# Patient Record
Sex: MALE | Race: WHITE | NOT HISPANIC OR LATINO | Employment: OTHER | URBAN - METROPOLITAN AREA
[De-identification: names, ages, dates, MRNs, and addresses within clinical notes are randomized per-mention and may not be internally consistent; named-entity substitution may affect disease eponyms.]

---

## 2024-06-05 ENCOUNTER — HOSPITAL ENCOUNTER (OUTPATIENT)
Facility: CLINIC | Age: 89
Discharge: NURSING, REHAB OR LONG-TERM CARE | End: 2024-10-02
Attending: HOSPITALIST

## 2024-06-05 ENCOUNTER — HOSPITAL ENCOUNTER (OUTPATIENT)
Facility: CLINIC | Age: 89
Setting detail: OBSERVATION
End: 2024-06-05
Admitting: HOSPITALIST
Payer: COMMERCIAL

## 2024-06-05 VITALS
SYSTOLIC BLOOD PRESSURE: 153 MMHG | TEMPERATURE: 97.9 F | DIASTOLIC BLOOD PRESSURE: 86 MMHG | HEART RATE: 56 BPM | OXYGEN SATURATION: 95 % | RESPIRATION RATE: 18 BRPM

## 2024-06-05 DIAGNOSIS — F03.90 DEMENTIA, UNSPECIFIED DEMENTIA SEVERITY, UNSPECIFIED DEMENTIA TYPE, UNSPECIFIED WHETHER BEHAVIORAL, PSYCHOTIC, OR MOOD DISTURBANCE OR ANXIETY (H): ICD-10-CM

## 2024-06-05 DIAGNOSIS — L21.0 DANDRUFF, ADULT: ICD-10-CM

## 2024-06-05 DIAGNOSIS — K59.01 SLOW TRANSIT CONSTIPATION: ICD-10-CM

## 2024-06-05 DIAGNOSIS — R52 PAIN: ICD-10-CM

## 2024-06-05 DIAGNOSIS — G47.00 INSOMNIA, UNSPECIFIED TYPE: ICD-10-CM

## 2024-06-05 DIAGNOSIS — F03.90 DEMENTIA, UNSPECIFIED DEMENTIA SEVERITY, UNSPECIFIED DEMENTIA TYPE, UNSPECIFIED WHETHER BEHAVIORAL, PSYCHOTIC, OR MOOD DISTURBANCE OR ANXIETY (H): Primary | ICD-10-CM

## 2024-06-05 DIAGNOSIS — R35.0 URINARY FREQUENCY: ICD-10-CM

## 2024-06-05 DIAGNOSIS — T74.91XD ELDER ABUSE, SUBSEQUENT ENCOUNTER: ICD-10-CM

## 2024-06-05 LAB
ALBUMIN UR-MCNC: 10 MG/DL
APPEARANCE UR: CLEAR
BILIRUB UR QL STRIP: NEGATIVE
COLOR UR AUTO: ABNORMAL
GLUCOSE BLDC GLUCOMTR-MCNC: 97 MG/DL (ref 70–99)
GLUCOSE UR STRIP-MCNC: NEGATIVE MG/DL
HGB UR QL STRIP: ABNORMAL
KETONES UR STRIP-MCNC: NEGATIVE MG/DL
LEUKOCYTE ESTERASE UR QL STRIP: NEGATIVE
MUCOUS THREADS #/AREA URNS LPF: PRESENT /LPF
NITRATE UR QL: NEGATIVE
PH UR STRIP: 5 [PH] (ref 5–7)
RBC URINE: 3 /HPF
SP GR UR STRIP: 1.02 (ref 1–1.03)
SQUAMOUS EPITHELIAL: <1 /HPF
UROBILINOGEN UR STRIP-MCNC: NORMAL MG/DL
WBC URINE: 3 /HPF

## 2024-06-05 PROCEDURE — 250N000011 HC RX IP 250 OP 636: Mod: JZ | Performed by: NURSE PRACTITIONER

## 2024-06-05 PROCEDURE — 250N000013 HC RX MED GY IP 250 OP 250 PS 637: Performed by: NURSE PRACTITIONER

## 2024-06-05 PROCEDURE — G0378 HOSPITAL OBSERVATION PER HR: HCPCS

## 2024-06-05 PROCEDURE — 96372 THER/PROPH/DIAG INJ SC/IM: CPT | Performed by: NURSE PRACTITIONER

## 2024-06-05 PROCEDURE — 81001 URINALYSIS AUTO W/SCOPE: CPT

## 2024-06-05 PROCEDURE — 99285 EMERGENCY DEPT VISIT HI MDM: CPT

## 2024-06-05 PROCEDURE — 99222 1ST HOSP IP/OBS MODERATE 55: CPT | Performed by: NURSE PRACTITIONER

## 2024-06-05 PROCEDURE — 82962 GLUCOSE BLOOD TEST: CPT

## 2024-06-05 PROCEDURE — 101N000002 HC CUSTODIAL CARE DAILY

## 2024-06-05 RX ORDER — ACETAMINOPHEN 325 MG/1
650 TABLET ORAL EVERY 4 HOURS PRN
Status: DISCONTINUED | OUTPATIENT
Start: 2024-06-05 | End: 2024-06-05

## 2024-06-05 RX ORDER — OLANZAPINE 10 MG/2ML
2.5 INJECTION, POWDER, FOR SOLUTION INTRAMUSCULAR EVERY 6 HOURS PRN
Status: DISCONTINUED | OUTPATIENT
Start: 2024-06-05 | End: 2024-08-24

## 2024-06-05 RX ORDER — CEPHALEXIN 250 MG/5ML
10 POWDER, FOR SUSPENSION ORAL 4 TIMES DAILY
Status: ON HOLD | COMMUNITY
Start: 2024-06-04 | End: 2024-10-02

## 2024-06-05 RX ORDER — ACETAMINOPHEN 325 MG/1
650 TABLET ORAL EVERY 4 HOURS PRN
Status: DISCONTINUED | OUTPATIENT
Start: 2024-06-05 | End: 2024-08-24

## 2024-06-05 RX ORDER — CEPHALEXIN 250 MG/5ML
500 POWDER, FOR SUSPENSION ORAL 4 TIMES DAILY
Status: DISCONTINUED | OUTPATIENT
Start: 2024-06-05 | End: 2024-06-11

## 2024-06-05 RX ORDER — OLANZAPINE 5 MG/1
10 TABLET, ORALLY DISINTEGRATING ORAL 2 TIMES DAILY PRN
Status: DISCONTINUED | OUTPATIENT
Start: 2024-06-05 | End: 2024-06-05

## 2024-06-05 RX ORDER — OLANZAPINE 10 MG/2ML
10 INJECTION, POWDER, FOR SOLUTION INTRAMUSCULAR 2 TIMES DAILY PRN
Status: DISCONTINUED | OUTPATIENT
Start: 2024-06-05 | End: 2024-06-05

## 2024-06-05 RX ORDER — HYDROXYZINE HYDROCHLORIDE 25 MG/1
50 TABLET, FILM COATED ORAL EVERY 6 HOURS PRN
Status: DISCONTINUED | OUTPATIENT
Start: 2024-06-05 | End: 2024-06-05

## 2024-06-05 RX ORDER — CALCIUM CARBONATE 500 MG/1
500 TABLET, CHEWABLE ORAL 4 TIMES DAILY PRN
Status: DISCONTINUED | OUTPATIENT
Start: 2024-06-05 | End: 2024-08-24

## 2024-06-05 RX ADMIN — QUETIAPINE FUMARATE 12.5 MG: 25 TABLET ORAL at 23:30

## 2024-06-05 RX ADMIN — CEPHALEXIN 500 MG: 250 FOR SUSPENSION ORAL at 20:44

## 2024-06-05 RX ADMIN — OLANZAPINE 10 MG: 10 INJECTION, POWDER, FOR SOLUTION INTRAMUSCULAR at 21:49

## 2024-06-05 ASSESSMENT — ACTIVITIES OF DAILY LIVING (ADL)
ADLS_ACUITY_SCORE: 35
ADLS_ACUITY_SCORE: 36
ADLS_ACUITY_SCORE: 33
ADLS_ACUITY_SCORE: 40
ADLS_ACUITY_SCORE: 35
ADLS_ACUITY_SCORE: 36
ADLS_ACUITY_SCORE: 40
ADLS_ACUITY_SCORE: 40
ADLS_ACUITY_SCORE: 35
ADLS_ACUITY_SCORE: 36
ADLS_ACUITY_SCORE: 35
ADLS_ACUITY_SCORE: 40

## 2024-06-05 ASSESSMENT — COLUMBIA-SUICIDE SEVERITY RATING SCALE - C-SSRS
1. IN THE PAST MONTH, HAVE YOU WISHED YOU WERE DEAD OR WISHED YOU COULD GO TO SLEEP AND NOT WAKE UP?: NO
6. HAVE YOU EVER DONE ANYTHING, STARTED TO DO ANYTHING, OR PREPARED TO DO ANYTHING TO END YOUR LIFE?: NO
2. HAVE YOU ACTUALLY HAD ANY THOUGHTS OF KILLING YOURSELF IN THE PAST MONTH?: NO
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
## 2024-06-05 NOTE — PLAN OF CARE
Received a message that family called with questions, left a VM for both contacts listed as called did not leave a message/name or call back number.
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
## 2024-06-05 NOTE — PHARMACY-ADMISSION MEDICATION HISTORY
Pharmacist Admission Medication History    Admission medication history is complete. The information provided in this note is only as accurate as the sources available at the time of the update.    Information Source(s): Family member and Hospital records via in-person    Pertinent Information: Daughter reports he takes no prescription medications except the new antibiotic prescribed yesterday.     Changes made to PTA medication list:  Added: Cephalexin suspension  Deleted: Losartan-hydrochlorothiazide 100-25mg, Klor-Con 20mEq, Psyllium packets BID  Changed: None    Allergies reviewed with patient and updates made in EHR: yes    Medication History Completed By: Lino Orozco RPH 6/5/2024 2:11 PM    PTA Med List   Medication Sig Last Dose    ascorbic acid (VITAMIN C) 1000 MG tablet [ASCORBIC ACID (VITAMIN C) 1000 MG TABLET] Take 1,000 mg by mouth 2 (two) times a day.  Past Week    cephALEXin (KEFLEX) 250 MG/5ML suspension Take 10 mLs by mouth 4 times daily 6/5/2024 at 0800    cholecalciferol, vitamin D3, 1,000 unit tablet [CHOLECALCIFEROL, VITAMIN D3, 1,000 UNIT TABLET] Take 2,000 Units by mouth 2 (two) times a day.  Past Week

## 2024-06-06 PROCEDURE — 96372 THER/PROPH/DIAG INJ SC/IM: CPT | Performed by: NURSE PRACTITIONER

## 2024-06-06 PROCEDURE — 250N000013 HC RX MED GY IP 250 OP 250 PS 637: Performed by: NURSE PRACTITIONER

## 2024-06-06 PROCEDURE — 99232 SBSQ HOSP IP/OBS MODERATE 35: CPT | Performed by: STUDENT IN AN ORGANIZED HEALTH CARE EDUCATION/TRAINING PROGRAM

## 2024-06-06 PROCEDURE — 101N000002 HC CUSTODIAL CARE DAILY

## 2024-06-06 PROCEDURE — 250N000011 HC RX IP 250 OP 636: Mod: JZ | Performed by: NURSE PRACTITIONER

## 2024-06-06 RX ADMIN — CEPHALEXIN 500 MG: 250 FOR SUSPENSION ORAL at 19:27

## 2024-06-06 RX ADMIN — CEPHALEXIN 500 MG: 250 FOR SUSPENSION ORAL at 14:07

## 2024-06-06 RX ADMIN — OLANZAPINE 2.5 MG: 10 INJECTION, POWDER, FOR SOLUTION INTRAMUSCULAR at 23:43

## 2024-06-06 RX ADMIN — CEPHALEXIN 500 MG: 250 FOR SUSPENSION ORAL at 15:34

## 2024-06-06 RX ADMIN — CEPHALEXIN 500 MG: 250 FOR SUSPENSION ORAL at 09:40

## 2024-06-06 ASSESSMENT — ACTIVITIES OF DAILY LIVING (ADL)
ADLS_ACUITY_SCORE: 40
ADLS_ACUITY_SCORE: 42
ADLS_ACUITY_SCORE: 40
ADLS_ACUITY_SCORE: 42
ADLS_ACUITY_SCORE: 40
ADLS_ACUITY_SCORE: 40
ADLS_ACUITY_SCORE: 42
ADLS_ACUITY_SCORE: 40
ADLS_ACUITY_SCORE: 42
ADLS_ACUITY_SCORE: 40

## 2024-06-06 NOTE — PHARMACY-ADMISSION MEDICATION HISTORY
Pharmacist Admission Medication History  Please see admission medication history completed earlier today prior to change in patient class to group home care.      Cassy AvinaD., BCPS       PTA Med List   Medication Sig Last Dose    ascorbic acid (VITAMIN C) 1000 MG tablet [ASCORBIC ACID (VITAMIN C) 1000 MG TABLET] Take 1,000 mg by mouth 2 (two) times a day.  Past Week    cephALEXin (KEFLEX) 250 MG/5ML suspension Take 10 mLs by mouth 4 times daily 6/5/2024 at 0800    cholecalciferol, vitamin D3, 1,000 unit tablet [CHOLECALCIFEROL, VITAMIN D3, 1,000 UNIT TABLET] Take 2,000 Units by mouth 2 (two) times a day.  Past Week

## 2024-06-06 NOTE — ED NOTES
Talked to daughter Franck that says pt needs to stay overnight, would not be able to take him. Will clarify with other sister she says to make sure that they are all on the same page, and that hopefully pt can have a bed upstairs.

## 2024-06-06 NOTE — PROGRESS NOTES
Cross Cover Note:  6/5/2024 11:18 PM     Received Olanzapine 10 mg IM 2149.  Still with agitation.  Will have Olanzapine 2.5 mg IM q6hrs PRN available (rescue) instead of 10mg BID PRN.  Will also have Quetiapine 12.5 mg PO q6hrs PRN agitation (preferred route).    Okay for kingter.    LANI Covarrubias CNP
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
## 2024-06-06 NOTE — PROGRESS NOTES
[facility]    Medicine Progress Note - Hospitalist Service    Date of Admission:  6/5/2024    Assessment & Plan   88-year-old male who was transitioned to long-term care on 6/5.  He has history of dementia and family is unable to provide care for him.  He was recently treated for right full cellulitis which has resolved.      group home care admission.  Social work consult.    Right foot cellulitis.  Continuing Keflex 4 times daily through 6/11.  There is some swelling but no redness.  Venous ultrasound negative for cellulitis.    Dementia.  Not on any medications.  As needed olanzapine ordered for agitation.          Diet: Combination Diet Regular Diet    DVT Prophylaxis: Pneumatic Compression Devices  Maddox Catheter: Not present  Lines: None     Cardiac Monitoring: None  Code Status: Full Code      Clinically Significant Risk Factors Present on Admission                    # Dementia: noted on problem list                      Disposition Plan     Medically Ready for Discharge: Anticipated in 5+ Days             Lito Skinner MD  Hospitalist Service  [facility]  Securely message with [employer] (more info)  Text page via [employer] Paging/Directory   ______________________________________________________________________    Interval History   Patient seen in the ER this morning.  Confused and does not make much sense.    Physical Exam   Vital Signs: Temp: 98  F (36.7  C) Temp src: Oral BP: (!) 159/81 Pulse: 51   Resp: 18 SpO2: 100 % O2 Device: None (Room air)    Weight: 0 lbs 0 oz    General Appearance: Sitter at bedside.  Appears comfortable but not making much sense.  Respiratory: Clear to auscultation.  Cardiovascular: S1-S2 normal.  GI: Soft and nontender.  Skin: No rash.  Other: Right lower extremity edema.    Medical Decision Making         Data         Imaging results reviewed over the past 24 hrs:   No results found for this or any previous visit (from the past 24  hour(s)).

## 2024-06-06 NOTE — ED NOTES
[facility]    ED Boarding Nurse Handoff Addendum Report:    Date/time: 6/6/2024, 6:43 AM    Activity Level: assist of 1    Fall Risk: Yes:  bed/chair alarm on, nonskid shoes/slippers when out of bed, arm band in place, patient and family education, assistive device/personal items within reach, activity supervised, and room door open    Active Infusions: None    Current Meds Due: None    Current care needs: Sitter, see orders    Oxygen requirements (liters/min and/or FiO2): none    Respiratory status: Room air    Vital signs (within last 30 minutes):    Vitals:    06/05/24 1733 06/05/24 1948 06/05/24 2220   BP: (!) 154/79 (!) 173/99 (!) 158/82   Pulse: 58 70    Resp: 16     Temp: 98.8  F (37.1  C) 97.4  F (36.3  C)    TempSrc: Oral Temporal    SpO2: 98% 95%        Focused assessment within last 30 minutes:  Alert to self only, confused, no non-verbal indications of pain observed, sitter at bedside      ED Boarding Nurse name: Leticia Byrne RN

## 2024-06-07 PROCEDURE — 99232 SBSQ HOSP IP/OBS MODERATE 35: CPT | Performed by: STUDENT IN AN ORGANIZED HEALTH CARE EDUCATION/TRAINING PROGRAM

## 2024-06-07 PROCEDURE — 101N000002 HC CUSTODIAL CARE DAILY

## 2024-06-07 PROCEDURE — 250N000013 HC RX MED GY IP 250 OP 250 PS 637: Performed by: NURSE PRACTITIONER

## 2024-06-07 RX ADMIN — CEPHALEXIN 500 MG: 250 FOR SUSPENSION ORAL at 21:27

## 2024-06-07 RX ADMIN — CEPHALEXIN 500 MG: 250 FOR SUSPENSION ORAL at 12:23

## 2024-06-07 RX ADMIN — CEPHALEXIN 500 MG: 250 FOR SUSPENSION ORAL at 08:49

## 2024-06-07 RX ADMIN — CEPHALEXIN 500 MG: 250 FOR SUSPENSION ORAL at 17:36

## 2024-06-07 ASSESSMENT — ACTIVITIES OF DAILY LIVING (ADL)
ADLS_ACUITY_SCORE: 50
ADLS_ACUITY_SCORE: 47
ADLS_ACUITY_SCORE: 42
ADLS_ACUITY_SCORE: 50
ADLS_ACUITY_SCORE: 51
ADLS_ACUITY_SCORE: 52
ADLS_ACUITY_SCORE: 42
ADLS_ACUITY_SCORE: 47
ADLS_ACUITY_SCORE: 51
ADLS_ACUITY_SCORE: 47
ADLS_ACUITY_SCORE: 42
ADLS_ACUITY_SCORE: 51
ADLS_ACUITY_SCORE: 42
ADLS_ACUITY_SCORE: 51
ADLS_ACUITY_SCORE: 52
ADLS_ACUITY_SCORE: 42
ADLS_ACUITY_SCORE: 50
ADLS_ACUITY_SCORE: 51
ADLS_ACUITY_SCORE: 50
ADLS_ACUITY_SCORE: 51
ADLS_ACUITY_SCORE: 42

## 2024-06-07 NOTE — PROGRESS NOTES
Care Management Medical [facility]f Outpatient Consult:    Care management consulted by ED provider for nursing home assessment.  CM spoke with provider to discuss nursing home case. Provider has confirmed patient is medically stable for discharge.    Background information (reason for presentation to ED): Placement    Provider recommended discharge disposition:  LTC    Resources needed for discharge: MA    Estimated timeline for needed resources: >24 hrs (new LTC once MA is pending)    Barriers to discharge: MA, LTC that accept's patients with Dementia and MA pending    CM met/spoke with patient/family at bedside/via phone.  Discussed patient is medically stable for discharge from ED & medical insurance will likely not cover the hospital stay.  Discussed recommended discharge disposition and resources needed, included noted barriers.  Provided the following resources:       Discussed above with ED provider.      Next steps:   Anticipate patient will discharge to LTC when MA is pending.    Anticipate patient will transition to nursing home care due to needing placement and limited funds.       Richy spoke to Severa with FC who said that they have tried to contact both of the pt's dtrs to discuss a MA application, but was unable to get in touch with them.  FC will continue to work on contacting the pt's family to get a MA application completed to proceed forward with the pt's discharge.    KARLA William, [facility]  Inpatient Care Coordination  [facility]  [phone]

## 2024-06-07 NOTE — PROGRESS NOTES
[facility]    Medicine Progress Note - Hospitalist Service    Date of Admission:  6/5/2024    Assessment & Plan   88-year-old male who was transitioned to [facility] care on 6/5.  He has history of dementia and family is unable to provide care for him.  He was recently treated for right full cellulitis which has resolved.      retirement care admission.  Social work consult.    Right foot cellulitis.  Continuing Keflex 4 times daily through 6/11.  There is some swelling but no redness.  Venous ultrasound negative for cellulitis.    Dementia.  Not on any medications.  As needed olanzapine ordered for agitation.          Diet: Combination Diet Regular Diet    DVT Prophylaxis: Pneumatic Compression Devices  Maddox Catheter: Not present  Lines: None     Cardiac Monitoring: None  Code Status: Full Code      Clinically Significant Risk Factors Present on Admission                    # Dementia: noted on problem list                      Disposition Plan     Medically Ready for Discharge: Anticipated in 5+ Days             Lito Skinner MD  Hospitalist Service  [facility]  Securely message with [employer] (more info)  Text page via [employer] Paging/Directory   ______________________________________________________________________    Interval History   Patient seen in the ER this morning and discussed with the sitter.  Patient is now sleeping comfortably in the bed.  He was awake this morning and was confused.    Physical Exam   Vital Signs: Temp: 97.7  F (36.5  C) Temp src: Axillary BP: (!) 153/82 Pulse: 53   Resp: 22 SpO2: 96 % O2 Device: None (Room air)    Weight: 0 lbs 0 oz    General Appearance: Sitter at bedside.  Sleeping comfortably in the chair     Medical Decision Making         Data         Imaging results reviewed over the past 24 hrs:   No results found for this or any previous visit (from the past 24 hour(s)).

## 2024-06-07 NOTE — PROGRESS NOTES
[facility]    ED Boarding Nurse Handoff Addendum Report:    Date/time: 6/7/2024, 4:03 AM    Activity Level: assist of 1    Fall Risk: Yes:  bed/chair alarm on, nonskid shoes/slippers when out of bed, patient and family education, assistive device/personal items within reach, activity supervised, room door open, and sitter at bedside    Active Infusions: None    Current Meds Due: See MAR    Current care needs: Monitor for agitation    Oxygen requirements (liters/min and/or FiO2): None    Respiratory status: Room air    Vital signs (within last 30 minutes):    Vitals:    06/05/24 2220 06/06/24 0645 06/06/24 1230 06/06/24 2234   BP: (!) 158/82 (!) 155/63 (!) 159/81 133/67   BP Location:   Right arm Right arm   Pulse:   51 53   Resp:   18 18   Temp:   98  F (36.7  C) 97.7  F (36.5  C)   TempSrc:   Oral Axillary   SpO2:   100% 97%       Focused assessment within last 30 minutes:    Assumed care 2617-2250. Disoriented x4. Ax1 when OOB. No IV access. External catheter in place draining yellow colored urine accurately. On a regular diet. On RA. Given PRN Zyprexa x1 for agitation overnight. Sitter at bedside. Plan of care ongoing.     ED Boarding Nurse name: Arabella Yates RN

## 2024-06-07 NOTE — PROGRESS NOTES
[facility]    ED Boarding Nurse Handoff Addendum Report:    Date/time: 6/7/2024, 5:12 PM    Activity Level:  heavy AX2, unable to bear own weight at times    Fall Risk: Yes:  bed/chair alarm on, nonskid shoes/slippers when out of bed, arm band in place, patient and family education, assistive device/personal items within reach, activity supervised, mobility aid in reach, room door open, and toileting schedule implemented    Active Infusions: none    Current Meds Due: review MAR    Current care needs: continue plan of care    Oxygen requirements (liters/min and/or FiO2):     Respiratory status: Room air    Vital signs (within last 30 minutes):  /65 (BP Location: Right arm)   Pulse 55   Temp 98.3  F (36.8  C) (Oral)   Resp 18   SpO2 98%       Focused assessment within last 30 minutes:    Dementia, disoriented x4. Garbled spontaneous speech. VSS on RA. No behaviors this shift. Regular diet, good urine output (purewick). No bm this shift.   ED Boarding Nurse name: Urvashi Salmeron RN    RECEIVING UNIT ED HANDOFF REVIEW    Above ED Nurse Handoff Report was reviewed: Yes  Reviewed by: Sergey Sandoval RN on June 7, 2024 at 5:17 PM   I Samantha called the ED to inform them the note was read: Yes

## 2024-06-08 PROCEDURE — 250N000013 HC RX MED GY IP 250 OP 250 PS 637: Performed by: NURSE PRACTITIONER

## 2024-06-08 PROCEDURE — 99232 SBSQ HOSP IP/OBS MODERATE 35: CPT | Performed by: STUDENT IN AN ORGANIZED HEALTH CARE EDUCATION/TRAINING PROGRAM

## 2024-06-08 PROCEDURE — 101N000002 HC CUSTODIAL CARE DAILY

## 2024-06-08 RX ADMIN — CEPHALEXIN 500 MG: 250 FOR SUSPENSION ORAL at 21:25

## 2024-06-08 RX ADMIN — CEPHALEXIN 500 MG: 250 FOR SUSPENSION ORAL at 12:58

## 2024-06-08 RX ADMIN — CEPHALEXIN 500 MG: 250 FOR SUSPENSION ORAL at 08:36

## 2024-06-08 RX ADMIN — CEPHALEXIN 500 MG: 250 FOR SUSPENSION ORAL at 16:55

## 2024-06-08 ASSESSMENT — ACTIVITIES OF DAILY LIVING (ADL)
ADLS_ACUITY_SCORE: 47
ADLS_ACUITY_SCORE: 47
ADLS_ACUITY_SCORE: 52
ADLS_ACUITY_SCORE: 52
ADLS_ACUITY_SCORE: 49
ADLS_ACUITY_SCORE: 47
ADLS_ACUITY_SCORE: 52
ADLS_ACUITY_SCORE: 47
ADLS_ACUITY_SCORE: 52
ADLS_ACUITY_SCORE: 48
ADLS_ACUITY_SCORE: 49
ADLS_ACUITY_SCORE: 52
ADLS_ACUITY_SCORE: 49
ADLS_ACUITY_SCORE: 49
ADLS_ACUITY_SCORE: 52
ADLS_ACUITY_SCORE: 49
ADLS_ACUITY_SCORE: 52

## 2024-06-08 NOTE — PLAN OF CARE
"15:02  RN shift summary 7-3: No c/o pain this shift.  Alert.  Oriented to self.  [place]. Pocket talker in room if needed. Up to chair with assist of 2 gait belt and walker.  Lungs clear.  96% RA.  Regular diet.  Assisted with feeding.  Voiding without difficulty.  Uses urinal. No PIV.  Continue po keflex for resolving right foot cellulitis.  Here for half-way care.  Awaiting LTC / care facility placement.         Goal Outcome Evaluation:      Plan of Care Reviewed With: patient          Outcome Evaluation: Pt alert and oriented to self; cooperative with cares; no longer has a sitter.      Problem: Adult Inpatient Plan of Care  Goal: Plan of Care Review  Description: The Plan of Care Review/Shift note should be completed every shift.  The Outcome Evaluation is a brief statement about your assessment that the patient is improving, declining, or no change.  This information will be displayed automatically on your shift  note.  Outcome: Progressing  Flowsheets (Taken 6/8/2024 1356)  Outcome Evaluation:   Pt alert and oriented to self   cooperative with cares   no longer has a sitter.  Plan of Care Reviewed With: patient  Goal: Patient-Specific Goal (Individualized)  Description: You can add care plan individualizations to a care plan. Examples of Individualization might be:  \"Parent requests to be called daily at 9am for status\", \"I have a hard time hearing out of my right ear\", or \"Do not touch me to wake me up as it startles  me\".  Outcome: Progressing  Goal: Absence of Hospital-Acquired Illness or Injury  Outcome: Progressing  Intervention: Identify and Manage Fall Risk  Recent Flowsheet Documentation  Taken 6/8/2024 0840 by Fariha Phillips, RN  Safety Promotion/Fall Prevention:   activity supervised   clutter free environment maintained   nonskid shoes/slippers when out of bed   room near nurse's station   supervised activity  Intervention: Prevent Skin Injury  Recent Flowsheet Documentation  Taken 6/8/2024 0840 by " Fariha Phillips RN  Body Position: supine, head elevated  Goal: Optimal Comfort and Wellbeing  Outcome: Progressing  Goal: Readiness for Transition of Care  Outcome: Progressing     Problem: Delirium  Goal: Optimal Coping  Outcome: Progressing  Goal: Improved Behavioral Control  Outcome: Progressing  Goal: Improved Attention and Thought Clarity  Outcome: Progressing  Goal: Improved Sleep  Outcome: Progressing     Problem: Dementia Signs/Symptoms  Goal: Improved Behavioral Control (Dementia Signs/Symptoms)  Outcome: Progressing  Goal: Improved Mood Symptoms (Dementia Signs/Symptoms)  Outcome: Progressing  Goal: Optimized Cognitive Function (Dementia Signs/Symptoms)  Outcome: Progressing  Goal: Optimized Oral Intake (Dementia Signs/Symptoms)  Outcome: Progressing  Goal: Improved Sleep (Dementia Signs/Symptoms)  Outcome: Progressing  Goal: Enhanced Social or Functional Skills and Ability (Dementia Signs/Symptoms)  Outcome: Progressing

## 2024-06-08 NOTE — PLAN OF CARE
"Pt detention outpatient status, cooperative with cares. Communication remains challenging, Pt receptive and willing while not fully able to follow commands. Expressive aphasia, speech garbled and illogical. Continent of bladder with urinal use at bedside, Pt prompted own voiding. Was not compliant with external cath, confused with device/process. PRN seroquel not required.      Goal Outcome Evaluation:      Plan of Care Reviewed With: patient    Overall Patient Progress: no changeOverall Patient Progress: no change    Outcome Evaluation: Pt confused, expressive aphasia, calm, and cooperative. Sitter at bedside and PRN seroquil not administered.      Problem: Adult Inpatient Plan of Care  Goal: Plan of Care Review  Description: The Plan of Care Review/Shift note should be completed every shift.  The Outcome Evaluation is a brief statement about your assessment that the patient is improving, declining, or no change.  This information will be displayed automatically on your shift  note.  Outcome: Progressing  Flowsheets (Taken 6/8/2024 0012)  Outcome Evaluation: Pt confused, expressive aphasia, calm, and cooperative. Sitter at bedside and PRN seroquil not administered.  Plan of Care Reviewed With: patient  Overall Patient Progress: no change  Goal: Patient-Specific Goal (Individualized)  Description: You can add care plan individualizations to a care plan. Examples of Individualization might be:  \"Parent requests to be called daily at 9am for status\", \"I have a hard time hearing out of my right ear\", or \"Do not touch me to wake me up as it startles  me\".  Outcome: Progressing  Goal: Absence of Hospital-Acquired Illness or Injury  Outcome: Progressing  Intervention: Identify and Manage Fall Risk  Recent Flowsheet Documentation  Taken 6/7/2024 2015 by Sergey Snadoval, RN  Safety Promotion/Fall Prevention:   supervised activity   safety round/check completed   room organization consistent   bedside attendant  Goal: Optimal " Comfort and Wellbeing  Outcome: Progressing  Goal: Readiness for Transition of Care  Outcome: Progressing  Intervention: Mutually Develop Transition Plan  Recent Flowsheet Documentation  Taken 6/7/2024 1700 by Sergey Sandoval, RN  Equipment Currently Used at Home: walker, standard     Problem: Delirium  Goal: Optimal Coping  Outcome: Progressing  Goal: Improved Behavioral Control  Outcome: Progressing  Intervention: Minimize Safety Risk  Recent Flowsheet Documentation  Taken 6/7/2024 2015 by Sergey Sandoval, RN  Enhanced Safety Measures:  at bedside  Goal: Improved Attention and Thought Clarity  Outcome: Progressing  Goal: Improved Sleep  Outcome: Progressing     Problem: Dementia Signs/Symptoms  Goal: Improved Behavioral Control (Dementia Signs/Symptoms)  Outcome: Progressing  Goal: Improved Mood Symptoms (Dementia Signs/Symptoms)  Outcome: Progressing  Goal: Optimized Cognitive Function (Dementia Signs/Symptoms)  Outcome: Progressing  Goal: Optimized Oral Intake (Dementia Signs/Symptoms)  Outcome: Progressing  Goal: Improved Sleep (Dementia Signs/Symptoms)  Outcome: Progressing  Goal: Enhanced Social or Functional Skills and Ability (Dementia Signs/Symptoms)  Outcome: Progressing

## 2024-06-08 NOTE — PROGRESS NOTES
[facility]    Medicine Progress Note - Hospitalist Service    Date of Admission:  6/5/2024    Assessment & Plan   88-year-old male who was transitioned to longterm care on 6/5.  He has history of dementia and family is unable to provide care for him.  He was recently treated for right full cellulitis which has resolved.      intermediate care admission.  Social work consult.    Right foot cellulitis.  Continuing Keflex 4 times daily through 6/11.  Swelling and redness resolved.    Venous ultrasound negative for DVT.    Dementia.  Not on any medications.  As needed olanzapine ordered for agitation.          Diet: Combination Diet Regular Diet  Room Service    DVT Prophylaxis: Pneumatic Compression Devices  Maddox Catheter: Not present  Lines: None     Cardiac Monitoring: None  Code Status: Full Code      Clinically Significant Risk Factors Present on Admission                    # Dementia: noted on problem list                      Disposition Plan     Medically Ready for Discharge: Anticipated in 5+ Days             Lito Skinner MD  Hospitalist Service  [facility]  Securely message with [employer] (more info)  Text page via [employer] Paging/Directory   ______________________________________________________________________    Interval History   Patient seen in his room today.  Confused but appears comfortable.  Physical Exam   Vital Signs: Temp: 98.5  F (36.9  C) Temp src: Oral BP: (!) 157/71 Pulse: 55   Resp: 18 SpO2: 96 % O2 Device: None (Room air)    Weight: 0 lbs 0 oz    General Appearance: Appears pleasantly confused.  Has known left periorbital bruising.    Medical Decision Making         Data         Imaging results reviewed over the past 24 hrs:   No results found for this or any previous visit (from the past 24 hour(s)).

## 2024-06-09 PROCEDURE — 99232 SBSQ HOSP IP/OBS MODERATE 35: CPT | Performed by: STUDENT IN AN ORGANIZED HEALTH CARE EDUCATION/TRAINING PROGRAM

## 2024-06-09 PROCEDURE — 250N000013 HC RX MED GY IP 250 OP 250 PS 637: Performed by: NURSE PRACTITIONER

## 2024-06-09 PROCEDURE — 101N000002 HC CUSTODIAL CARE DAILY

## 2024-06-09 RX ADMIN — CEPHALEXIN 500 MG: 250 FOR SUSPENSION ORAL at 19:33

## 2024-06-09 RX ADMIN — CEPHALEXIN 500 MG: 250 FOR SUSPENSION ORAL at 08:35

## 2024-06-09 RX ADMIN — QUETIAPINE FUMARATE 12.5 MG: 25 TABLET ORAL at 03:46

## 2024-06-09 RX ADMIN — CEPHALEXIN 500 MG: 250 FOR SUSPENSION ORAL at 13:14

## 2024-06-09 RX ADMIN — CEPHALEXIN 500 MG: 250 FOR SUSPENSION ORAL at 16:00

## 2024-06-09 ASSESSMENT — ACTIVITIES OF DAILY LIVING (ADL)
ADLS_ACUITY_SCORE: 49
ADLS_ACUITY_SCORE: 52
ADLS_ACUITY_SCORE: 52
ADLS_ACUITY_SCORE: 53
ADLS_ACUITY_SCORE: 49
ADLS_ACUITY_SCORE: 53
ADLS_ACUITY_SCORE: 52
ADLS_ACUITY_SCORE: 52
ADLS_ACUITY_SCORE: 53
ADLS_ACUITY_SCORE: 52
ADLS_ACUITY_SCORE: 53
ADLS_ACUITY_SCORE: 53
ADLS_ACUITY_SCORE: 49
ADLS_ACUITY_SCORE: 49
ADLS_ACUITY_SCORE: 52
ADLS_ACUITY_SCORE: 52
ADLS_ACUITY_SCORE: 53
ADLS_ACUITY_SCORE: 52
ADLS_ACUITY_SCORE: 49
ADLS_ACUITY_SCORE: 52
ADLS_ACUITY_SCORE: 49
ADLS_ACUITY_SCORE: 52
ADLS_ACUITY_SCORE: 52

## 2024-06-09 NOTE — PLAN OF CARE
"Pt calm and cooperative with cares. AO to self, follows commands with reinforcement and repetition, expressive aphasia upon reply. Continent of bladder and bowel and will occasionally request assistance. Prompting urinal use at bedside effective for voiding. Good appetite and food intake. Hydration intake with prompting. Family requested Pt's daughters remain the only designated visitors.    Goal Outcome Evaluation:      Plan of Care Reviewed With: patient, family    Overall Patient Progress: no changeOverall Patient Progress: no change    Outcome Evaluation: Pt remains on prison cares. AO to self, A1-2 with gb and walker. Continent with occasional prompting.      Problem: Adult Inpatient Plan of Care  Goal: Plan of Care Review  Description: The Plan of Care Review/Shift note should be completed every shift.  The Outcome Evaluation is a brief statement about your assessment that the patient is improving, declining, or no change.  This information will be displayed automatically on your shift  note.  Outcome: Progressing  Flowsheets (Taken 6/8/2024 6810)  Outcome Evaluation: Pt remains on prison cares. AO to self, A1-2 with gb and walker. Continent with occasional prompting.  Plan of Care Reviewed With:   patient   family  Overall Patient Progress: no change  Goal: Patient-Specific Goal (Individualized)  Description: You can add care plan individualizations to a care plan. Examples of Individualization might be:  \"Parent requests to be called daily at 9am for status\", \"I have a hard time hearing out of my right ear\", or \"Do not touch me to wake me up as it startles  me\".  Outcome: Progressing  Goal: Absence of Hospital-Acquired Illness or Injury  Outcome: Progressing  Intervention: Identify and Manage Fall Risk  Recent Flowsheet Documentation  Taken 6/8/2024 5538 by Sergey Sandoval, RN  Safety Promotion/Fall Prevention:   activity supervised   mobility aid in reach   room organization consistent   safety " round/check completed  Intervention: Prevent Skin Injury  Recent Flowsheet Documentation  Taken 6/8/2024 1655 by Sergey Sandoval, RN  Body Position:   supine, head elevated   supine, legs elevated  Intervention: Prevent Infection  Recent Flowsheet Documentation  Taken 6/8/2024 1655 by Sergey Sandoval, RN  Infection Prevention:   environmental surveillance performed   hand hygiene promoted  Goal: Optimal Comfort and Wellbeing  Outcome: Progressing  Goal: Readiness for Transition of Care  Outcome: Progressing     Problem: Delirium  Goal: Optimal Coping  Outcome: Progressing  Goal: Improved Behavioral Control  Outcome: Progressing  Goal: Improved Attention and Thought Clarity  Outcome: Progressing  Goal: Improved Sleep  Outcome: Progressing     Problem: Dementia Signs/Symptoms  Goal: Improved Behavioral Control (Dementia Signs/Symptoms)  Outcome: Progressing  Goal: Improved Mood Symptoms (Dementia Signs/Symptoms)  Outcome: Progressing  Goal: Optimized Cognitive Function (Dementia Signs/Symptoms)  Outcome: Progressing  Goal: Optimized Oral Intake (Dementia Signs/Symptoms)  Outcome: Progressing  Goal: Improved Sleep (Dementia Signs/Symptoms)  Outcome: Progressing  Goal: Enhanced Social or Functional Skills and Ability (Dementia Signs/Symptoms)  Outcome: Progressing

## 2024-06-09 NOTE — PROGRESS NOTES
[facility] [place]    Medicine Progress Note - Hospitalist Service    Date of Admission:  6/5/2024    Assessment & Plan   88-year-old male who was transitioned to nursing home care on 6/5.  He has history of dementia and family is unable to provide care for him.  He was recently treated for right full cellulitis which has resolved.      nursing home care admission.  Social work consult.    Right foot cellulitis.  Continuing Keflex 4 times daily through 6/11.  Swelling and redness resolved.    Venous ultrasound negative for DVT.    Dementia.  Not on any medications.  As needed olanzapine ordered for agitation.          Diet: Combination Diet Regular Diet  Room Service    DVT Prophylaxis: Pneumatic Compression Devices  Maddox Catheter: Not present  Lines: None     Cardiac Monitoring: None  Code Status: Full Code      Clinically Significant Risk Factors Present on Admission                    # Dementia: noted on problem list                      Disposition Plan     Medically Ready for Discharge: Anticipated in 5+ Days             Lito Skinner MD  Hospitalist Service  [facility] [place]  Securely message with [employer] (more info)  Text page via [employer] Paging/Directory   ______________________________________________________________________    Interval History   Patient seen in his room today.  Sleeping comfortably..  Physical Exam   Vital Signs: Temp: 97.9  F (36.6  C) Temp src: Axillary BP: (!) 168/82 Pulse: 63   Resp: 18 SpO2: 98 % O2 Device: None (Room air)    Weight: 0 lbs 0 oz    General Appearance: Sleeping comfortably.  Medical Decision Making         Data         Imaging results reviewed over the past 24 hrs:   No results found for this or any previous visit (from the past 24 hour(s)).

## 2024-06-09 NOTE — PLAN OF CARE
"Goal Outcome Evaluation:      Plan of Care Reviewed With: patient    Overall Patient Progress: no changeOverall Patient Progress: no change    Outcome Evaluation: Zyprexa given for confusion overnight      Problem: Adult Inpatient Plan of Care  Goal: Plan of Care Review  Description: The Plan of Care Review/Shift note should be completed every shift.  The Outcome Evaluation is a brief statement about your assessment that the patient is improving, declining, or no change.  This information will be displayed automatically on your shift  note.  Outcome: Progressing  Flowsheets (Taken 6/9/2024 4036)  Outcome Evaluation: Zyprexa given for confusion overnight  Plan of Care Reviewed With: patient  Overall Patient Progress: no change  Goal: Patient-Specific Goal (Individualized)  Description: You can add care plan individualizations to a care plan. Examples of Individualization might be:  \"Parent requests to be called daily at 9am for status\", \"I have a hard time hearing out of my right ear\", or \"Do not touch me to wake me up as it startles  me\".  Outcome: Progressing  Goal: Absence of Hospital-Acquired Illness or Injury  Outcome: Progressing  Intervention: Identify and Manage Fall Risk  Recent Flowsheet Documentation  Taken 6/8/2024 6702 by Jarett Meyer RN  Safety Promotion/Fall Prevention:   activity supervised   assistive device/personal items within reach   clutter free environment maintained   nonskid shoes/slippers when out of bed   room near nurse's station   safety round/check completed   supervised activity  Goal: Optimal Comfort and Wellbeing  Outcome: Progressing  Goal: Readiness for Transition of Care  Outcome: Progressing     Problem: Delirium  Goal: Optimal Coping  Outcome: Progressing  Goal: Improved Behavioral Control  Outcome: Progressing  Goal: Improved Attention and Thought Clarity  Outcome: Progressing  Goal: Improved Sleep  Outcome: Progressing     Problem: Dementia Signs/Symptoms  Goal: Improved " Behavioral Control (Dementia Signs/Symptoms)  Outcome: Progressing  Goal: Improved Mood Symptoms (Dementia Signs/Symptoms)  Outcome: Progressing  Goal: Optimized Cognitive Function (Dementia Signs/Symptoms)  Outcome: Progressing  Goal: Optimized Oral Intake (Dementia Signs/Symptoms)  Outcome: Progressing  Goal: Improved Sleep (Dementia Signs/Symptoms)  Outcome: Progressing  Goal: Enhanced Social or Functional Skills and Ability (Dementia Signs/Symptoms)  Outcome: Progressing

## 2024-06-09 NOTE — PLAN OF CARE
"12:28 RN Shift Summary 7-3: RN shift: No c/o pain this shift.  Alert.  Oriented to self.  [place]. Pocket talker in room if needed. Up to chair with assist of 2 gait belt and walker.  Lungs clear.  98% RA.  Regular diet.  Assisted with feeding.  Voiding without difficulty.  Uses urinal. Incont at times.  No PIV.  Continue po keflex for resolving right foot cellulitis.  Here for [facility] care.  Awaiting LTC / care facility placement.           Goal Outcome Evaluation:      Plan of Care Reviewed With: patient    Overall Patient Progress: no changeOverall Patient Progress: no change    Outcome Evaluation: Up in chair this morning; good appetite; cooperative with cares.      Problem: Adult Inpatient Plan of Care  Goal: Plan of Care Review  Description: The Plan of Care Review/Shift note should be completed every shift.  The Outcome Evaluation is a brief statement about your assessment that the patient is improving, declining, or no change.  This information will be displayed automatically on your shift  note.  Outcome: Progressing  Flowsheets (Taken 6/9/2024 1202)  Outcome Evaluation:   Up in chair this morning   good appetite   cooperative with cares.  Plan of Care Reviewed With: patient  Overall Patient Progress: no change  Goal: Patient-Specific Goal (Individualized)  Description: You can add care plan individualizations to a care plan. Examples of Individualization might be:  \"Parent requests to be called daily at 9am for status\", \"I have a hard time hearing out of my right ear\", or \"Do not touch me to wake me up as it startles  me\".  Outcome: Progressing  Goal: Absence of Hospital-Acquired Illness or Injury  Outcome: Progressing  Intervention: Identify and Manage Fall Risk  Recent Flowsheet Documentation  Taken 6/9/2024 0814 by Fariha Phillips, RN  Safety Promotion/Fall Prevention:   activity supervised   clutter free environment maintained   nonskid shoes/slippers when out of bed   room near nurse's station   " supervised activity  Goal: Optimal Comfort and Wellbeing  Outcome: Progressing  Goal: Readiness for Transition of Care  Outcome: Progressing     Problem: Delirium  Goal: Optimal Coping  Outcome: Progressing  Intervention: Optimize Psychosocial Adjustment to Delirium  Recent Flowsheet Documentation  Taken 6/9/2024 0835 by Fariha Phillips RN  Supportive Measures: active listening utilized  Goal: Improved Behavioral Control  Outcome: Progressing  Intervention: Minimize Safety Risk  Recent Flowsheet Documentation  Taken 6/9/2024 0835 by Fariha Phillips RN  Enhanced Safety Measures: room near unit station  Goal: Improved Attention and Thought Clarity  Outcome: Progressing  Goal: Improved Sleep  Outcome: Progressing     Problem: Dementia Signs/Symptoms  Goal: Improved Behavioral Control (Dementia Signs/Symptoms)  Outcome: Progressing  Intervention: Manage Behavior  Recent Flowsheet Documentation  Taken 6/9/2024 0835 by Fariha Phillips RN  Environmental Support: calm environment promoted  Goal: Improved Mood Symptoms (Dementia Signs/Symptoms)  Outcome: Progressing  Intervention: Optimize Emotion and Mood  Recent Flowsheet Documentation  Taken 6/9/2024 0835 by Fariha Phillips RN  Supportive Measures: active listening utilized  Goal: Optimized Cognitive Function (Dementia Signs/Symptoms)  Outcome: Progressing  Goal: Optimized Oral Intake (Dementia Signs/Symptoms)  Outcome: Progressing  Goal: Improved Sleep (Dementia Signs/Symptoms)  Outcome: Progressing  Goal: Enhanced Social or Functional Skills and Ability (Dementia Signs/Symptoms)  Outcome: Progressing

## 2024-06-10 PROCEDURE — 101N000002 HC CUSTODIAL CARE DAILY

## 2024-06-10 PROCEDURE — 99232 SBSQ HOSP IP/OBS MODERATE 35: CPT | Performed by: STUDENT IN AN ORGANIZED HEALTH CARE EDUCATION/TRAINING PROGRAM

## 2024-06-10 PROCEDURE — 250N000013 HC RX MED GY IP 250 OP 250 PS 637: Performed by: NURSE PRACTITIONER

## 2024-06-10 RX ADMIN — CEPHALEXIN 500 MG: 250 FOR SUSPENSION ORAL at 16:19

## 2024-06-10 RX ADMIN — CEPHALEXIN 500 MG: 250 FOR SUSPENSION ORAL at 07:53

## 2024-06-10 RX ADMIN — CEPHALEXIN 500 MG: 250 FOR SUSPENSION ORAL at 13:36

## 2024-06-10 RX ADMIN — CEPHALEXIN 500 MG: 250 FOR SUSPENSION ORAL at 20:20

## 2024-06-10 ASSESSMENT — ACTIVITIES OF DAILY LIVING (ADL)
ADLS_ACUITY_SCORE: 52
ADLS_ACUITY_SCORE: 56
ADLS_ACUITY_SCORE: 56
ADLS_ACUITY_SCORE: 51
ADLS_ACUITY_SCORE: 56
ADLS_ACUITY_SCORE: 55
ADLS_ACUITY_SCORE: 56
ADLS_ACUITY_SCORE: 52
ADLS_ACUITY_SCORE: 55
ADLS_ACUITY_SCORE: 52
ADLS_ACUITY_SCORE: 52
ADLS_ACUITY_SCORE: 56
ADLS_ACUITY_SCORE: 56
ADLS_ACUITY_SCORE: 52
ADLS_ACUITY_SCORE: 55
ADLS_ACUITY_SCORE: 56
ADLS_ACUITY_SCORE: 52
ADLS_ACUITY_SCORE: 51
ADLS_ACUITY_SCORE: 56
ADLS_ACUITY_SCORE: 51
ADLS_ACUITY_SCORE: 51

## 2024-06-10 NOTE — PLAN OF CARE
"Nursing staff and support encouraged pt to get some sleep at 0200. Changed brief x 1. A2 with GB + walker.     Goal Outcome Evaluation:      Plan of Care Reviewed With: patient    Overall Patient Progress: improvingOverall Patient Progress: improving    Outcome Evaluation: Slept well, did not have to give any meds for confusion overnight.      Problem: Adult Inpatient Plan of Care  Goal: Plan of Care Review  Description: The Plan of Care Review/Shift note should be completed every shift.  The Outcome Evaluation is a brief statement about your assessment that the patient is improving, declining, or no change.  This information will be displayed automatically on your shift  note.  Outcome: Progressing  Flowsheets (Taken 6/10/2024 0609)  Outcome Evaluation: Slept well, did not have to give any meds for confusion overnight.  Plan of Care Reviewed With: patient  Overall Patient Progress: improving  Goal: Patient-Specific Goal (Individualized)  Description: You can add care plan individualizations to a care plan. Examples of Individualization might be:  \"Parent requests to be called daily at 9am for status\", \"I have a hard time hearing out of my right ear\", or \"Do not touch me to wake me up as it startles  me\".  Outcome: Progressing  Goal: Absence of Hospital-Acquired Illness or Injury  Outcome: Progressing  Intervention: Prevent Skin Injury  Recent Flowsheet Documentation  Taken 6/10/2024 0200 by Jarett Meyer RN  Body Position: supine, head elevated  Intervention: Prevent and Manage VTE (Venous Thromboembolism) Risk  Recent Flowsheet Documentation  Taken 6/10/2024 0200 by Jarett Meyer, RN  VTE Prevention/Management: SCDs (sequential compression devices) off  Goal: Optimal Comfort and Wellbeing  Outcome: Progressing  Goal: Readiness for Transition of Care  Outcome: Progressing     Problem: Delirium  Goal: Optimal Coping  Outcome: Progressing  Intervention: Optimize Psychosocial Adjustment to Delirium  Recent " Flowsheet Documentation  Taken 6/10/2024 0200 by Jarett Meyer RN  Supportive Measures: active listening utilized  Goal: Improved Behavioral Control  Outcome: Progressing  Goal: Improved Attention and Thought Clarity  Outcome: Progressing  Goal: Improved Sleep  Outcome: Progressing     Problem: Dementia Signs/Symptoms  Goal: Improved Behavioral Control (Dementia Signs/Symptoms)  Outcome: Progressing  Intervention: Manage Behavior  Recent Flowsheet Documentation  Taken 6/10/2024 0200 by Jarett Meyer RN  Environmental Support: calm environment promoted  Goal: Improved Mood Symptoms (Dementia Signs/Symptoms)  Outcome: Progressing  Intervention: Optimize Emotion and Mood  Recent Flowsheet Documentation  Taken 6/10/2024 0200 by Jarett Meyer RN  Supportive Measures: active listening utilized  Goal: Optimized Cognitive Function (Dementia Signs/Symptoms)  Outcome: Progressing  Goal: Optimized Oral Intake (Dementia Signs/Symptoms)  Outcome: Progressing  Goal: Improved Sleep (Dementia Signs/Symptoms)  Outcome: Progressing  Goal: Enhanced Social or Functional Skills and Ability (Dementia Signs/Symptoms)  Outcome: Progressing

## 2024-06-10 NOTE — PLAN OF CARE
"15:10 Shift Summary 7-3: RN shift: No c/o pain this shift.  Alert.  Oriented to self.  [place]. Pocket talker in room if needed. Up to chair with assist of 2 gait belt and walker.  Lungs clear.  97% RA.  Regular diet.  Assisted with feeding.  Voiding without difficulty.  Uses urinal. Incont at times.  No PIV.  Continue po keflex for resolving right foot cellulitis.  Here for [facility] care.  Awaiting LTC / care facility placement.         Goal Outcome Evaluation:           Overall Patient Progress: no changeOverall Patient Progress: no change    Outcome Evaluation: assisted care.  Good appetite.  Impulsive behavior at times.      Problem: Adult Inpatient Plan of Care  Goal: Plan of Care Review  Description: The Plan of Care Review/Shift note should be completed every shift.  The Outcome Evaluation is a brief statement about your assessment that the patient is improving, declining, or no change.  This information will be displayed automatically on your shift  note.  Outcome: Progressing  Flowsheets (Taken 6/10/2024 1408)  Outcome Evaluation: assisted care.  Good appetite.  Impulsive behavior at times.  Overall Patient Progress: no change  Goal: Patient-Specific Goal (Individualized)  Description: You can add care plan individualizations to a care plan. Examples of Individualization might be:  \"Parent requests to be called daily at 9am for status\", \"I have a hard time hearing out of my right ear\", or \"Do not touch me to wake me up as it startles  me\".  Outcome: Progressing  Goal: Absence of Hospital-Acquired Illness or Injury  Outcome: Progressing  Intervention: Identify and Manage Fall Risk  Recent Flowsheet Documentation  Taken 6/10/2024 0754 by Fariha Phillips, RN  Safety Promotion/Fall Prevention:   activity supervised   clutter free environment maintained   nonskid shoes/slippers when out of bed   room near nurse's station   supervised activity  Intervention: Prevent Skin Injury  Recent Flowsheet " Documentation  Taken 6/10/2024 0754 by Fariha Phillips RN  Body Position: supine, head elevated  Goal: Optimal Comfort and Wellbeing  Outcome: Progressing  Goal: Readiness for Transition of Care  Outcome: Progressing     Problem: Delirium  Goal: Optimal Coping  Outcome: Progressing  Intervention: Optimize Psychosocial Adjustment to Delirium  Recent Flowsheet Documentation  Taken 6/10/2024 0754 by Fariha Phillips RN  Supportive Measures: active listening utilized  Goal: Improved Behavioral Control  Outcome: Progressing  Intervention: Minimize Safety Risk  Recent Flowsheet Documentation  Taken 6/10/2024 0754 by Fariha Phillips RN  Enhanced Safety Measures: room near unit station  Goal: Improved Attention and Thought Clarity  Outcome: Progressing  Goal: Improved Sleep  Outcome: Progressing     Problem: Dementia Signs/Symptoms  Goal: Improved Behavioral Control (Dementia Signs/Symptoms)  Outcome: Progressing  Intervention: Manage Behavior  Recent Flowsheet Documentation  Taken 6/10/2024 0754 by Fariha Phillips RN  Environmental Support: calm environment promoted  Goal: Improved Mood Symptoms (Dementia Signs/Symptoms)  Outcome: Progressing  Intervention: Optimize Emotion and Mood  Recent Flowsheet Documentation  Taken 6/10/2024 0754 by Fariha Phillips RN  Supportive Measures: active listening utilized  Goal: Optimized Cognitive Function (Dementia Signs/Symptoms)  Outcome: Progressing  Goal: Optimized Oral Intake (Dementia Signs/Symptoms)  Outcome: Progressing  Goal: Improved Sleep (Dementia Signs/Symptoms)  Outcome: Progressing  Goal: Enhanced Social or Functional Skills and Ability (Dementia Signs/Symptoms)  Outcome: Progressing

## 2024-06-10 NOTE — PROGRESS NOTES
[facility]    Medicine Progress Note - Hospitalist Service    Date of Admission:  6/5/2024    Assessment & Plan   88-year-old male who [facility].  He has history of dementia and family is unable to provide care for him.  He was recently treated for right full cellulitis which has resolved.      long-term care admission.  Social work consult.    Right foot cellulitis.  Continuing Keflex 4 times daily through 6/11.  Swelling and redness resolved.    Venous ultrasound negative for DVT.    Dementia.  Not on any medications.  As needed olanzapine ordered for agitation.          Diet: Combination Diet Regular Diet  Room Service    DVT Prophylaxis: Pneumatic Compression Devices  Maddox Catheter: Not present  Lines: None     Cardiac Monitoring: None  Code Status: Full Code      Clinically Significant Risk Factors Present on Admission                    # Dementia: noted on problem list                      Disposition Plan     Medically Ready for Discharge: Anticipated in 5+ Days             Lito Skinner MD  Hospitalist Service  [facility]  Securely message with [employer] (more info)  Text page via [employer] Paging/Directory   ______________________________________________________________________    Interval History   Patient seen in his room today.  Sitting in the chair this morning, having breakfast.  Physical Exam   Vital Signs: Temp: 98.8  F (37.1  C) Temp src: Axillary BP: (!) 151/68 Pulse: 60   Resp: 18 SpO2: 97 % O2 Device: None (Room air)    Weight: 0 lbs 0 oz    General Appearance: Sitting comfortably.  Medical Decision Making         Data         Imaging results reviewed over the past 24 hrs:   No results found for this or any previous visit (from the past 24 hour(s)).

## 2024-06-10 NOTE — PLAN OF CARE
"Pt AO to self, calm and cooperative w/ cares. Repetition of commands often required. Expressive aphasia. Denies pain and SOB. A1-2 w/ gb and walker to restroom, chair. Difficulty standing this shift.Appetite good. Keflex suspension administered. Remains on senior living cares. Daughter, Cristal, updated via telephone, plans to bring in information regarding Pt keflex prescription prior to admission as well as at home medication information.     Goal Outcome Evaluation:      Plan of Care Reviewed With: patient, child    Overall Patient Progress: no changeOverall Patient Progress: no change    Outcome Evaluation: Pt VSS, cooperative w/ cares, pleasant demeanor. Appetite good, remains senior living care.      Problem: Adult Inpatient Plan of Care  Goal: Plan of Care Review  Description: The Plan of Care Review/Shift note should be completed every shift.  The Outcome Evaluation is a brief statement about your assessment that the patient is improving, declining, or no change.  This information will be displayed automatically on your shift  note.  Outcome: Progressing  Flowsheets (Taken 6/9/2024 2071)  Outcome Evaluation: Pt VSS, cooperative w/ cares, pleasant demeanor. Appetite good, remains senior living care.  Plan of Care Reviewed With:   patient   child  Goal: Patient-Specific Goal (Individualized)  Description: You can add care plan individualizations to a care plan. Examples of Individualization might be:  \"Parent requests to be called daily at 9am for status\", \"I have a hard time hearing out of my right ear\", or \"Do not touch me to wake me up as it startles  me\".  Outcome: Progressing  Goal: Absence of Hospital-Acquired Illness or Injury  Outcome: Progressing  Intervention: Identify and Manage Fall Risk  Recent Flowsheet Documentation  Taken 6/9/2024 1600 by Sergey Sandoval RN  Safety Promotion/Fall Prevention:   activity supervised   safety round/check completed   room organization consistent   room near nurse's station   room " door open   mobility aid in reach  Goal: Optimal Comfort and Wellbeing  Outcome: Progressing  Goal: Readiness for Transition of Care  Outcome: Progressing     Problem: Delirium  Goal: Optimal Coping  Outcome: Progressing  Goal: Improved Behavioral Control  Outcome: Progressing  Goal: Improved Attention and Thought Clarity  Outcome: Progressing  Goal: Improved Sleep  Outcome: Progressing     Problem: Dementia Signs/Symptoms  Goal: Improved Behavioral Control (Dementia Signs/Symptoms)  Outcome: Progressing  Goal: Improved Mood Symptoms (Dementia Signs/Symptoms)  Outcome: Progressing  Goal: Optimized Cognitive Function (Dementia Signs/Symptoms)  Outcome: Progressing  Goal: Optimized Oral Intake (Dementia Signs/Symptoms)  Outcome: Progressing  Goal: Improved Sleep (Dementia Signs/Symptoms)  Outcome: Progressing  Goal: Enhanced Social or Functional Skills and Ability (Dementia Signs/Symptoms)  Outcome: Progressing

## 2024-06-11 PROCEDURE — 101N000002 HC CUSTODIAL CARE DAILY

## 2024-06-11 PROCEDURE — 250N000013 HC RX MED GY IP 250 OP 250 PS 637: Performed by: INTERNAL MEDICINE

## 2024-06-11 PROCEDURE — 99232 SBSQ HOSP IP/OBS MODERATE 35: CPT | Performed by: INTERNAL MEDICINE

## 2024-06-11 PROCEDURE — 250N000013 HC RX MED GY IP 250 OP 250 PS 637: Performed by: NURSE PRACTITIONER

## 2024-06-11 RX ORDER — CEPHALEXIN 250 MG/5ML
500 POWDER, FOR SUSPENSION ORAL 4 TIMES DAILY
Status: COMPLETED | OUTPATIENT
Start: 2024-06-11 | End: 2024-06-11

## 2024-06-11 RX ADMIN — CEPHALEXIN 500 MG: 250 FOR SUSPENSION ORAL at 09:21

## 2024-06-11 RX ADMIN — CEPHALEXIN 500 MG: 250 FOR SUSPENSION ORAL at 12:40

## 2024-06-11 RX ADMIN — CEPHALEXIN 500 MG: 250 FOR SUSPENSION ORAL at 17:13

## 2024-06-11 ASSESSMENT — ACTIVITIES OF DAILY LIVING (ADL)
ADLS_ACUITY_SCORE: 50
ADLS_ACUITY_SCORE: 51
ADLS_ACUITY_SCORE: 50
ADLS_ACUITY_SCORE: 51
ADLS_ACUITY_SCORE: 50
ADLS_ACUITY_SCORE: 51
ADLS_ACUITY_SCORE: 50

## 2024-06-11 NOTE — PLAN OF CARE
"  Problem: Adult Inpatient Plan of Care  Goal: Plan of Care Review  Description: The Plan of Care Review/Shift note should be completed every shift.  The Outcome Evaluation is a brief statement about your assessment that the patient is improving, declining, or no change.  This information will be displayed automatically on your shift  note.  Outcome: Not Progressing  Flowsheets (Taken 6/10/2024 2119)  Outcome Evaluation: Pleasant and cooperative. Good appetite. Ambulated to BR w walker/GB.  Plan of Care Reviewed With:   patient   family  Overall Patient Progress: no change     Problem: Adult Inpatient Plan of Care  Goal: Patient-Specific Goal (Individualized)  Description: You can add care plan individualizations to a care plan. Examples of Individualization might be:  \"Parent requests to be called daily at 9am for status\", \"I have a hard time hearing out of my right ear\", or \"Do not touch me to wake me up as it startles  me\".  Outcome: Not Progressing     Problem: Adult Inpatient Plan of Care  Goal: Absence of Hospital-Acquired Illness or Injury  Intervention: Identify and Manage Fall Risk  Recent Flowsheet Documentation  Taken 6/10/2024 1733 by Lisa Dorman RN  Safety Promotion/Fall Prevention:   activity supervised   clutter free environment maintained   nonskid shoes/slippers when out of bed   room near nurse's station   supervised activity     Problem: Adult Inpatient Plan of Care  Goal: Absence of Hospital-Acquired Illness or Injury  Intervention: Prevent Skin Injury  Recent Flowsheet Documentation  Taken 6/10/2024 1733 by Lisa Dorman RN  Body Position: supine, head elevated   Goal Outcome Evaluation:      Plan of Care Reviewed With: patient, family    Overall Patient Progress: no changeOverall Patient Progress: no change    Outcome Evaluation: Pleasant and cooperative. Good appetite. Ambulated to BR w walker/GB.      "

## 2024-06-11 NOTE — PLAN OF CARE
"  Pt is alert to self with confusion. Pt denies pain or discomfort. Pt is on keflex for right foot cellulitis. Pt is hard of hearing. Pt has no IV access. Pt is assist of one in transfer and care. Pt is slept most of the shift. Pt is sleeping in bed. Bed alarm in place and flip light within reach. Will continue to monitor and assess pt.       Goal Outcome Evaluation:      Plan of Care Reviewed With: patient    Overall Patient Progress: improvingOverall Patient Progress: improving    Outcome Evaluation: pt denies pain. no acute distress noted. pt has good appetite.      Problem: Adult Inpatient Plan of Care  Goal: Plan of Care Review  Description: The Plan of Care Review/Shift note should be completed every shift.  The Outcome Evaluation is a brief statement about your assessment that the patient is improving, declining, or no change.  This information will be displayed automatically on your shift  note.  Outcome: Progressing  Flowsheets (Taken 6/11/2024 0056)  Outcome Evaluation: pt denies pain. no acute distress noted. pt has good appetite.  Plan of Care Reviewed With: patient  Overall Patient Progress: improving  Goal: Patient-Specific Goal (Individualized)  Description: You can add care plan individualizations to a care plan. Examples of Individualization might be:  \"Parent requests to be called daily at 9am for status\", \"I have a hard time hearing out of my right ear\", or \"Do not touch me to wake me up as it startles  me\".  Outcome: Progressing  Goal: Absence of Hospital-Acquired Illness or Injury  Outcome: Progressing  Intervention: Identify and Manage Fall Risk  Recent Flowsheet Documentation  Taken 6/10/2024 9853 by Reji Duron RN  Safety Promotion/Fall Prevention:   assistive device/personal items within reach   activity supervised   nonskid shoes/slippers when out of bed   increased rounding and observation   clutter free environment maintained   increase visualization of patient   patient and " family education   safety round/check completed  Intervention: Prevent Skin Injury  Recent Flowsheet Documentation  Taken 6/10/2024 2350 by Reji Duron RN  Body Position: position changed independently  Intervention: Prevent and Manage VTE (Venous Thromboembolism) Risk  Recent Flowsheet Documentation  Taken 6/10/2024 2350 by Reji Duron RN  VTE Prevention/Management: SCDs (sequential compression devices) off  Intervention: Prevent Infection  Recent Flowsheet Documentation  Taken 6/10/2024 2350 by Reji Duron RN  Infection Prevention:   rest/sleep promoted   single patient room provided   hand hygiene promoted  Goal: Optimal Comfort and Wellbeing  Outcome: Progressing  Goal: Readiness for Transition of Care  Outcome: Progressing     Problem: Delirium  Goal: Optimal Coping  Outcome: Progressing  Intervention: Optimize Psychosocial Adjustment to Delirium  Recent Flowsheet Documentation  Taken 6/10/2024 2350 by Reji Duron RN  Supportive Measures:   self-care encouraged   self-reflection promoted   active listening utilized  Goal: Improved Behavioral Control  Outcome: Progressing  Intervention: Minimize Safety Risk  Recent Flowsheet Documentation  Taken 6/10/2024 2350 by Reji Duron RN  Enhanced Safety Measures: room near unit station  Goal: Improved Attention and Thought Clarity  Outcome: Progressing  Goal: Improved Sleep  Outcome: Progressing     Problem: Dementia Signs/Symptoms  Goal: Improved Behavioral Control (Dementia Signs/Symptoms)  Outcome: Progressing  Intervention: Manage Behavior  Recent Flowsheet Documentation  Taken 6/10/2024 2350 by Reji Duron RN  Environmental Support:   rest periods encouraged   distractions minimized  Goal: Improved Mood Symptoms (Dementia Signs/Symptoms)  Outcome: Progressing  Intervention: Optimize Emotion and Mood  Recent Flowsheet Documentation  Taken 6/10/2024 2350 by Reji Duron  RN  Supportive Measures:   self-care encouraged   self-reflection promoted   active listening utilized  Goal: Optimized Cognitive Function (Dementia Signs/Symptoms)  Outcome: Progressing  Goal: Optimized Oral Intake (Dementia Signs/Symptoms)  Outcome: Progressing  Goal: Improved Sleep (Dementia Signs/Symptoms)  Outcome: Progressing  Goal: Enhanced Social or Functional Skills and Ability (Dementia Signs/Symptoms)  Outcome: Progressing

## 2024-06-11 NOTE — PROGRESS NOTES
[facility]    Medicine Progress Note - Hospitalist Service    Date of Admission:  6/5/2024    Assessment & Plan   88-year-old male who was transitioned to senior living care on 6/5.  He has history of dementia and family is unable to provide care for him.  He was recently treated for right full cellulitis which has resolved.  Patient is MA pending, awaiting placement in long term care      detention care admission.  Social work consulted.  Looking for LTC.  Is MA pending.     Right foot cellulitis.  Resolved with Keflex 4 times daily through 6/11.  Swelling and redness resolved.    Venous ultrasound negative for DVT.    Dementia.  Not on any medications.  As needed olanzapine ordered for agitation.          Diet: Combination Diet Regular Diet  Room Service    DVT Prophylaxis: Pneumatic Compression Devices  Maddox Catheter: Not present  Lines: None     Cardiac Monitoring: None  Code Status: Full Code         Medically Ready for Discharge: Anticipated in 5+ Days, can go anytime once MA application has been accepted and has a bed to go to     Discussed with nursing, social work         Ata Nogueira MD  Hospitalist Service  [facility]  Securely message with [employer] (more info)  Text page via [facility] Paging/Directory   ______________________________________________________________________    Interval History   Patient is new to me.  Patient found lying in bed.  Has no new complaints.  Awaiting placement      Physical Exam   Vital Signs: Temp: 98  F (36.7  C) Temp src: Oral BP: 123/60 Pulse: 61   Resp: 18 SpO2: 93 % O2 Device: None (Room air)    Weight: 0 lbs 0 oz    General Appearance: Lying flat in bed, no distress, appears elderly   EXTR: ALVAREZ, no current erythema of his foot    Imaging results reviewed over the past 24 hrs:   No results found for this or any previous visit (from the past 24 hour(s)).

## 2024-06-11 NOTE — PLAN OF CARE
"Denies pain. A1 w/ walker and gb. Last dose of Keflex given. External cath in place. Safety maintained throughout shift.       Goal Outcome Evaluation:      Plan of Care Reviewed With: patient    Overall Patient Progress: no changeOverall Patient Progress: no change    Outcome Evaluation: Denies pain. Ceflex finished.              Problem: Adult Inpatient Plan of Care  Goal: Plan of Care Review  Description: The Plan of Care Review/Shift note should be completed every shift.  The Outcome Evaluation is a brief statement about your assessment that the patient is improving, declining, or no change.  This information will be displayed automatically on your shift  note.  Outcome: Progressing  Flowsheets (Taken 6/11/2024 5447)  Outcome Evaluation: Denies pain. Ceflex finished.  Plan of Care Reviewed With: patient  Overall Patient Progress: no change  Goal: Patient-Specific Goal (Individualized)  Description: You can add care plan individualizations to a care plan. Examples of Individualization might be:  \"Parent requests to be called daily at 9am for status\", \"I have a hard time hearing out of my right ear\", or \"Do not touch me to wake me up as it startles  me\".  Outcome: Progressing  Goal: Absence of Hospital-Acquired Illness or Injury  Outcome: Progressing  Intervention: Identify and Manage Fall Risk  Recent Flowsheet Documentation  Taken 6/11/2024 0925 by Nela Garcia, RN  Safety Promotion/Fall Prevention:   assistive device/personal items within reach   activity supervised   nonskid shoes/slippers when out of bed   increased rounding and observation   clutter free environment maintained   increase visualization of patient   patient and family education   safety round/check completed   lighting adjusted   room near nurse's station   supervised activity  Intervention: Prevent Skin Injury  Recent Flowsheet Documentation  Taken 6/11/2024 0925 by Nela Garcia, RN  Body Position: position changed " independently  Goal: Optimal Comfort and Wellbeing  Outcome: Progressing  Goal: Readiness for Transition of Care  Outcome: Progressing     Problem: Delirium  Goal: Optimal Coping  Outcome: Progressing  Goal: Improved Behavioral Control  Outcome: Progressing  Intervention: Minimize Safety Risk  Recent Flowsheet Documentation  Taken 6/11/2024 0925 by Nela Garcia RN  Enhanced Safety Measures: room near unit station  Goal: Improved Attention and Thought Clarity  Outcome: Progressing  Goal: Improved Sleep  Outcome: Progressing     Problem: Dementia Signs/Symptoms  Goal: Improved Behavioral Control (Dementia Signs/Symptoms)  Outcome: Progressing  Goal: Improved Mood Symptoms (Dementia Signs/Symptoms)  Outcome: Progressing  Goal: Optimized Cognitive Function (Dementia Signs/Symptoms)  Outcome: Progressing  Goal: Optimized Oral Intake (Dementia Signs/Symptoms)  Outcome: Progressing  Goal: Improved Sleep (Dementia Signs/Symptoms)  Outcome: Progressing  Goal: Enhanced Social or Functional Skills and Ability (Dementia Signs/Symptoms)  Outcome: Progressing

## 2024-06-12 PROCEDURE — 250N000013 HC RX MED GY IP 250 OP 250 PS 637: Performed by: NURSE PRACTITIONER

## 2024-06-12 PROCEDURE — 99207 PR NO BILLABLE SERVICE THIS VISIT: CPT | Performed by: INTERNAL MEDICINE

## 2024-06-12 PROCEDURE — 101N000002 HC CUSTODIAL CARE DAILY

## 2024-06-12 RX ADMIN — QUETIAPINE FUMARATE 12.5 MG: 25 TABLET ORAL at 00:59

## 2024-06-12 ASSESSMENT — ACTIVITIES OF DAILY LIVING (ADL)
ADLS_ACUITY_SCORE: 50
ADLS_ACUITY_SCORE: 55
ADLS_ACUITY_SCORE: 50
ADLS_ACUITY_SCORE: 50
ADLS_ACUITY_SCORE: 55
ADLS_ACUITY_SCORE: 50
ADLS_ACUITY_SCORE: 55
ADLS_ACUITY_SCORE: 50
ADLS_ACUITY_SCORE: 55
ADLS_ACUITY_SCORE: 50
ADLS_ACUITY_SCORE: 50
ADLS_ACUITY_SCORE: 55
ADLS_ACUITY_SCORE: 50
ADLS_ACUITY_SCORE: 55
ADLS_ACUITY_SCORE: 50

## 2024-06-12 NOTE — PLAN OF CARE
Cared for [id number]    Activity: Ax1, walker, gb. Pt did not move out of bed for writer overnight  Neuro:  A&O to self, illogical and garbled speech, baseline per report.   Cardiac: no tele, HR in the 60s.  Resp: on RA w/no SOB noted  GI/: external cath with adequate output. No BM on shift  Diet: Regular with room service  Skin: L eye bruising   LDAs:  no PIV, MD aware  Pain: no pain PAINAD score  PRNs: Seroquel    Discharge Plan: Awaiting placement for LTC    Major Shift Events:  One episode overnight of pt wanting to get out of bed. Easily redirectable but kicking blankets off. PRN given.    Plan: Continue with plan of care  For vital signs and complete assessments, please see documentation under flowsheets.    Garima Tucker RN      Goal Outcome Evaluation:      Plan of Care Reviewed With: patient    Overall Patient Progress: no changeOverall Patient Progress: no change    Outcome Evaluation: A&O to self, no acute changes overnight, Awaiting placement      Problem: Delirium  Goal: Optimal Coping  Outcome: Not Progressing  Goal: Improved Behavioral Control  Outcome: Not Progressing  Goal: Improved Attention and Thought Clarity  Outcome: Not Progressing  Goal: Improved Sleep  Outcome: Not Progressing     Problem: Dementia Signs/Symptoms  Goal: Improved Behavioral Control (Dementia Signs/Symptoms)  Outcome: Not Progressing  Goal: Improved Mood Symptoms (Dementia Signs/Symptoms)  Outcome: Not Progressing  Goal: Optimized Cognitive Function (Dementia Signs/Symptoms)  Outcome: Not Progressing  Goal: Optimized Oral Intake (Dementia Signs/Symptoms)  Outcome: Not Progressing  Goal: Improved Sleep (Dementia Signs/Symptoms)  Outcome: Not Progressing  Goal: Enhanced Social or Functional Skills and Ability (Dementia Signs/Symptoms)  Outcome: Not Progressing     Problem: Adult Inpatient Plan of Care  Goal: Plan of Care Review  Description: The Plan of Care Review/Shift note should be completed every shift.  The Outcome  "Evaluation is a brief statement about your assessment that the patient is improving, declining, or no change.  This information will be displayed automatically on your shift  note.  Outcome: Progressing  Flowsheets (Taken 6/12/2024 0632)  Outcome Evaluation: A&O to self, no acute changes overnight, Awaiting placement  Plan of Care Reviewed With: patient  Overall Patient Progress: no change  Goal: Patient-Specific Goal (Individualized)  Description: You can add care plan individualizations to a care plan. Examples of Individualization might be:  \"Parent requests to be called daily at 9am for status\", \"I have a hard time hearing out of my right ear\", or \"Do not touch me to wake me up as it startles  me\".  Outcome: Progressing  Goal: Absence of Hospital-Acquired Illness or Injury  Outcome: Progressing  Intervention: Identify and Manage Fall Risk  Recent Flowsheet Documentation  Taken 6/11/2024 2210 by Prieto Tucker RN  Safety Promotion/Fall Prevention: safety round/check completed  Goal: Optimal Comfort and Wellbeing  Outcome: Progressing  Goal: Readiness for Transition of Care  Outcome: Progressing     "

## 2024-06-12 NOTE — PROGRESS NOTES
[facility]    Medicine Progress Note - Hospitalist Service    Date of Admission:  6/5/2024    Assessment & Plan   88-year-old male who was transitioned to longterm care on 6/5.  He has history of dementia and family is unable to provide care for him.  He was recently treated for right full cellulitis which has resolved.  Patient is MA pending, awaiting placement in long term care         jail care admission.  Social work consulted.  Looking for LTC.  Is MA pending.     Right foot cellulitis.  Resolved with Keflex 4 times daily through 6/11.  Swelling and redness resolved.    Venous ultrasound negative for DVT.    Dementia.  Not on any medications.  As needed olanzapine ordered for agitation.     4.  Advance care planning  Patient is 89 with significant dementia.  Is currently full code and this likely is not appropriate.  I tried calling both daughter without success.  Ongoing advance care planning needs to be done.      Diet: Combination Diet Regular Diet  Room Service    DVT Prophylaxis: Pneumatic Compression Devices  Maddox Catheter: Not present  Lines: None     Cardiac Monitoring: None  Code Status: Full Code  , this needs to be readdressed and discussed with family.        Medically Ready for Discharge: Anticipated in 5+ Days, can go anytime once MA application has been accepted and has a bed to go to     Discussed with nursing, social work     NONBILLABLE SOCIAL NOTE, IF ABLE TO CONTACT FAMILY COULD ILL FOR ADVANCE CARE PLANNING.     Ata Nogueira MD  Hospitalist Service  [facility]  Securely message with [employer] (more info)  Text page via [employer] Paging/Directory   ______________________________________________________________________    Interval History     Patient found lying in bed.  Sleeping well,  Has no new complaints.  Awaiting placement      Physical Exam   Vital Signs: Temp: 98  F (36.7  C) Temp src: Oral BP: 123/60 Pulse: 61   Resp: 18 SpO2: 93 %  O2 Device: None (Room air)    Weight: 0 lbs 0 oz    General Appearance: Lying flat in bed, no distress, appears elderly   EXTR: ALVAREZ, no current erythema of his foot    Imaging results reviewed over the past 24 hrs:   No results found for this or any previous visit (from the past 24 hour(s)).

## 2024-06-12 NOTE — PLAN OF CARE
"Denies pain. A1 w/ walker and gb. Pulled off external cath. Utilized pocket talker. Safety maintained throughout shift.     Goal Outcome Evaluation:      Plan of Care Reviewed With: patient    Overall Patient Progress: no changeOverall Patient Progress: no change    Outcome Evaluation: Up in chair for some of shift. Transferred there with pivot.            Problem: Adult Inpatient Plan of Care  Goal: Plan of Care Review  Description: The Plan of Care Review/Shift note should be completed every shift.  The Outcome Evaluation is a brief statement about your assessment that the patient is improving, declining, or no change.  This information will be displayed automatically on your shift  note.  Outcome: Not Progressing  Flowsheets (Taken 6/12/2024 0017)  Outcome Evaluation: Up in chair for some of shit. Transferred there with pivot.  Plan of Care Reviewed With: patient  Overall Patient Progress: no change  Goal: Patient-Specific Goal (Individualized)  Description: You can add care plan individualizations to a care plan. Examples of Individualization might be:  \"Parent requests to be called daily at 9am for status\", \"I have a hard time hearing out of my right ear\", or \"Do not touch me to wake me up as it startles  me\".  Outcome: Not Progressing  Goal: Absence of Hospital-Acquired Illness or Injury  Outcome: Not Progressing  Goal: Optimal Comfort and Wellbeing  Outcome: Not Progressing  Goal: Readiness for Transition of Care  Outcome: Not Progressing     Problem: Delirium  Goal: Optimal Coping  Outcome: Not Progressing  Goal: Improved Behavioral Control  Outcome: Not Progressing  Goal: Improved Attention and Thought Clarity  Outcome: Not Progressing  Goal: Improved Sleep  Outcome: Not Progressing     Problem: Dementia Signs/Symptoms  Goal: Improved Behavioral Control (Dementia Signs/Symptoms)  Outcome: Not Progressing  Goal: Improved Mood Symptoms (Dementia Signs/Symptoms)  Outcome: Not Progressing  Goal: Optimized " Cognitive Function (Dementia Signs/Symptoms)  Outcome: Not Progressing  Goal: Optimized Oral Intake (Dementia Signs/Symptoms)  Outcome: Not Progressing  Goal: Improved Sleep (Dementia Signs/Symptoms)  Outcome: Not Progressing  Goal: Enhanced Social or Functional Skills and Ability (Dementia Signs/Symptoms)  Outcome: Not Progressing

## 2024-06-13 PROCEDURE — 99207 PR NO BILLABLE SERVICE THIS VISIT: CPT | Performed by: INTERNAL MEDICINE

## 2024-06-13 PROCEDURE — 101N000002 HC CUSTODIAL CARE DAILY

## 2024-06-13 ASSESSMENT — ACTIVITIES OF DAILY LIVING (ADL)
ADLS_ACUITY_SCORE: 50
ADLS_ACUITY_SCORE: 53
ADLS_ACUITY_SCORE: 53
ADLS_ACUITY_SCORE: 50
ADLS_ACUITY_SCORE: 57
ADLS_ACUITY_SCORE: 57
ADLS_ACUITY_SCORE: 51
ADLS_ACUITY_SCORE: 53
ADLS_ACUITY_SCORE: 51
ADLS_ACUITY_SCORE: 50
ADLS_ACUITY_SCORE: 55
ADLS_ACUITY_SCORE: 55
ADLS_ACUITY_SCORE: 51
ADLS_ACUITY_SCORE: 53
ADLS_ACUITY_SCORE: 57
ADLS_ACUITY_SCORE: 53
ADLS_ACUITY_SCORE: 55
ADLS_ACUITY_SCORE: 57
ADLS_ACUITY_SCORE: 51
ADLS_ACUITY_SCORE: 51
ADLS_ACUITY_SCORE: 53

## 2024-06-13 NOTE — PLAN OF CARE
"Pt slept most of noc.  Denies pain.  Incontinent of urine.  Here for senior living care, awaiting LTC placement.  Problem: Adult Inpatient Plan of Care  Goal: Plan of Care Review  Description: The Plan of Care Review/Shift note should be completed every shift.  The Outcome Evaluation is a brief statement about your assessment that the patient is improving, declining, or no change.  This information will be displayed automatically on your shift  note.  Outcome: Adequate for Care Transition  Flowsheets (Taken 6/13/2024 0615)  Plan of Care Reviewed With: patient  Goal: Patient-Specific Goal (Individualized)  Description: You can add care plan individualizations to a care plan. Examples of Individualization might be:  \"Parent requests to be called daily at 9am for status\", \"I have a hard time hearing out of my right ear\", or \"Do not touch me to wake me up as it startles  me\".  Outcome: Adequate for Care Transition  Goal: Absence of Hospital-Acquired Illness or Injury  Outcome: Adequate for Care Transition  Intervention: Identify and Manage Fall Risk  Recent Flowsheet Documentation  Taken 6/13/2024 0115 by Orin Coker, RN  Safety Promotion/Fall Prevention:   activity supervised   clutter free environment maintained   increased rounding and observation   nonskid shoes/slippers when out of bed   patient and family education   safety round/check completed  Goal: Optimal Comfort and Wellbeing  Outcome: Adequate for Care Transition  Goal: Readiness for Transition of Care  Outcome: Adequate for Care Transition   Goal Outcome Evaluation:      Plan of Care Reviewed With: patient                 "

## 2024-06-13 NOTE — PLAN OF CARE
"Goal Outcome Evaluation:      Disoriented to place, time and situation. [place], provided pocket talker as needed.  A x1 gb/ pivot to chair.  Pulled external catheter.  Plan looking placement for LTC facility.  Will continue POC and monitoring.       Plan of Care Reviewed With: patient    Overall Patient Progress: no changeOverall Patient Progress: no change    Outcome Evaluation: UP in chair for dinner.no pain for the shift.      Problem: Adult Inpatient Plan of Care  Goal: Plan of Care Review  Description: The Plan of Care Review/Shift note should be completed every shift.  The Outcome Evaluation is a brief statement about your assessment that the patient is improving, declining, or no change.  This information will be displayed automatically on your shift  note.  Outcome: Progressing  Flowsheets (Taken 6/12/2024 2213)  Outcome Evaluation: UP in chair for dinner.no pain for the shift.  Plan of Care Reviewed With: patient  Overall Patient Progress: no change  Goal: Patient-Specific Goal (Individualized)  Description: You can add care plan individualizations to a care plan. Examples of Individualization might be:  \"Parent requests to be called daily at 9am for status\", \"I have a hard time hearing out of my right ear\", or \"Do not touch me to wake me up as it startles  me\".  Outcome: Progressing  Goal: Absence of Hospital-Acquired Illness or Injury  Outcome: Progressing  Goal: Optimal Comfort and Wellbeing  Outcome: Progressing  Goal: Readiness for Transition of Care  Outcome: Progressing     Problem: Delirium  Goal: Optimal Coping  Outcome: Progressing  Goal: Improved Behavioral Control  Outcome: Progressing  Goal: Improved Attention and Thought Clarity  Outcome: Progressing  Goal: Improved Sleep  Outcome: Progressing     Problem: Dementia Signs/Symptoms  Goal: Improved Behavioral Control (Dementia Signs/Symptoms)  Outcome: Progressing  Goal: Improved Mood Symptoms (Dementia Signs/Symptoms)  Outcome: Progressing  Goal: " Optimized Cognitive Function (Dementia Signs/Symptoms)  Outcome: Progressing  Goal: Optimized Oral Intake (Dementia Signs/Symptoms)  Outcome: Progressing  Goal: Improved Sleep (Dementia Signs/Symptoms)  Outcome: Progressing  Goal: Enhanced Social or Functional Skills and Ability (Dementia Signs/Symptoms)  Outcome: Progressing

## 2024-06-13 NOTE — PLAN OF CARE
Denies pain. A1 w/ walker and gb. Utilized pocket talker. Incontinent of bladder. Redness on buttocks, blanchable. Safety maintained throughout shift.     Goal Outcome Evaluation:      Plan of Care Reviewed With: patient    Overall Patient Progress: no changeOverall Patient Progress: no change    Outcome Evaluation: Up in chair most of shift. Waiting for placement.            Problem: Dementia Signs/Symptoms  Goal: Improved Behavioral Control (Dementia Signs/Symptoms)  6/13/2024 1633 by Nela Garcia RN  Outcome: Progressing  6/13/2024 1632 by Nela Garcia RN  Outcome: Progressing  Goal: Improved Mood Symptoms (Dementia Signs/Symptoms)  6/13/2024 1633 by Nela Garcia RN  Outcome: Progressing  6/13/2024 1632 by Nela Garcia RN  Outcome: Progressing  Goal: Optimized Cognitive Function (Dementia Signs/Symptoms)  6/13/2024 1633 by Nela Garcia RN  Outcome: Progressing  6/13/2024 1632 by Nela Garcia RN  Outcome: Progressing  Goal: Optimized Oral Intake (Dementia Signs/Symptoms)  6/13/2024 1633 by Nela Garcia RN  Outcome: Progressing  6/13/2024 1632 by Nela Garcia RN  Outcome: Progressing  Goal: Improved Sleep (Dementia Signs/Symptoms)  6/13/2024 1633 by Nela Garcia RN  Outcome: Progressing  6/13/2024 1632 by Nela Garcia RN  Outcome: Progressing  Goal: Enhanced Social or Functional Skills and Ability (Dementia Signs/Symptoms)  6/13/2024 1633 by Nela Garcia RN  Outcome: Progressing  6/13/2024 1632 by Nela Garcia RN  Outcome: Progressing     Problem: Adult Inpatient Plan of Care  Goal: Plan of Care Review  Description: The Plan of Care Review/Shift note should be completed every shift.  The Outcome Evaluation is a brief statement about your assessment that the patient is improving, declining, or no change.  This information will be displayed automatically on your shift  note.  Outcome: Progressing  Flowsheets (Taken 6/13/2024 1633)  Outcome Evaluation: Up in  "chair most of shift. Waiting for placement.  Plan of Care Reviewed With: patient  Overall Patient Progress: no change  Goal: Patient-Specific Goal (Individualized)  Description: You can add care plan individualizations to a care plan. Examples of Individualization might be:  \"Parent requests to be called daily at 9am for status\", \"I have a hard time hearing out of my right ear\", or \"Do not touch me to wake me up as it startles  me\".  Outcome: Progressing  Goal: Absence of Hospital-Acquired Illness or Injury  Outcome: Progressing  Intervention: Identify and Manage Fall Risk  Recent Flowsheet Documentation  Taken 6/13/2024 1100 by Nela Garcia, RN  Safety Promotion/Fall Prevention:   activity supervised   assistive device/personal items within reach   clutter free environment maintained   increased rounding and observation   increase visualization of patient   lighting adjusted   nonskid shoes/slippers when out of bed   patient and family education   room near nurse's station   safety round/check completed   supervised activity  Intervention: Prevent Skin Injury  Recent Flowsheet Documentation  Taken 6/13/2024 1100 by Nela Garcia, RN  Body Position: position changed independently  Goal: Optimal Comfort and Wellbeing  Outcome: Progressing  Goal: Readiness for Transition of Care  Outcome: Progressing     "

## 2024-06-13 NOTE — PROGRESS NOTES
[facility]    Medicine Progress Note - Hospitalist Service    Date of Admission:  6/5/2024    Assessment & Plan   88-year-old male who [facility].  He has history of dementia and family is unable to provide care for him.  He was recently treated for right full cellulitis which has resolved.  Patient is MA pending, awaiting placement in long term care     6/13/24:  patient without any changes in plan of care.  Awaiting MA and placement.  This a nonbillable note    correction care admission.  Social work consulted.  Looking for LTC.  Is MA pending.     Right foot cellulitis.  Resolved with Keflex 4 times daily through 6/11.  Swelling and redness resolved.    Venous ultrasound negative for DVT.    Dementia.  Not on any medications.  As needed olanzapine ordered for agitation.     4.  Advance care planning  Patient is 89 with significant dementia.  Is currently full code and this likely is not appropriate.  I tried calling both daughter without success.  Ongoing advance care planning needs to be done.      Diet: Combination Diet Regular Diet  Room Service    DVT Prophylaxis: Pneumatic Compression Devices  Maddox Catheter: Not present  Lines: None     Cardiac Monitoring: None  Code Status: Full Code  , this needs to be readdressed and discussed with family.        Medically Ready for Discharge: Anticipated in 5+ Days, can go anytime once MA application has been accepted and has a bed to go to     Discussed with nursing, social work     NONBILLABLE SOCIAL NOTE, IF ABLE TO CONTACT FAMILY COULD BILL FOR ADVANCE CARE PLANNING once taking about his code status.     Ata Nogueira MD  Hospitalist Service  [facility]  Securely message with [employer] (more info)  Text page via [employer] Paging/Directory   ______________________________________________________________________    Interval History     Patient found lying in bed.  Sleeping well,  Has no new  complaints.  Awaiting placement, no change to plan today      Physical Exam   Vital Signs: Temp: 97.9  F (36.6  C) Temp src: Oral BP: 121/54 Pulse: 53   Resp: 18 SpO2: 94 % O2 Device: None (Room air)    Weight: 0 lbs 0 oz    General Appearance: Lying flat in bed, no distress, appears elderly        Imaging results reviewed over the past 24 hrs:   No results found for this or any previous visit (from the past 24 hour(s)).

## 2024-06-14 PROCEDURE — 99497 ADVNCD CARE PLAN 30 MIN: CPT | Performed by: INTERNAL MEDICINE

## 2024-06-14 PROCEDURE — 99232 SBSQ HOSP IP/OBS MODERATE 35: CPT | Performed by: INTERNAL MEDICINE

## 2024-06-14 PROCEDURE — 250N000013 HC RX MED GY IP 250 OP 250 PS 637: Performed by: STUDENT IN AN ORGANIZED HEALTH CARE EDUCATION/TRAINING PROGRAM

## 2024-06-14 PROCEDURE — 101N000002 HC CUSTODIAL CARE DAILY

## 2024-06-14 PROCEDURE — 250N000013 HC RX MED GY IP 250 OP 250 PS 637: Performed by: INTERNAL MEDICINE

## 2024-06-14 RX ORDER — AMOXICILLIN 250 MG
1 CAPSULE ORAL AT BEDTIME
Status: DISCONTINUED | OUTPATIENT
Start: 2024-06-14 | End: 2024-10-02 | Stop reason: HOSPADM

## 2024-06-14 RX ORDER — POLYETHYLENE GLYCOL 3350 17 G/17G
17 POWDER, FOR SOLUTION ORAL DAILY PRN
Status: DISCONTINUED | OUTPATIENT
Start: 2024-06-14 | End: 2024-10-02 | Stop reason: HOSPADM

## 2024-06-14 RX ORDER — BISACODYL 10 MG
10 SUPPOSITORY, RECTAL RECTAL DAILY PRN
Status: DISCONTINUED | OUTPATIENT
Start: 2024-06-14 | End: 2024-10-02 | Stop reason: HOSPADM

## 2024-06-14 RX ADMIN — POLYETHYLENE GLYCOL 3350 17 G: 17 POWDER, FOR SOLUTION ORAL at 10:29

## 2024-06-14 ASSESSMENT — ACTIVITIES OF DAILY LIVING (ADL)
ADLS_ACUITY_SCORE: 56
ADLS_ACUITY_SCORE: 53
ADLS_ACUITY_SCORE: 56
ADLS_ACUITY_SCORE: 56
ADLS_ACUITY_SCORE: 57
ADLS_ACUITY_SCORE: 57
ADLS_ACUITY_SCORE: 56
ADLS_ACUITY_SCORE: 53
ADLS_ACUITY_SCORE: 56
ADLS_ACUITY_SCORE: 56
ADLS_ACUITY_SCORE: 52
ADLS_ACUITY_SCORE: 57
ADLS_ACUITY_SCORE: 53
ADLS_ACUITY_SCORE: 52
ADLS_ACUITY_SCORE: 52
ADLS_ACUITY_SCORE: 56
ADLS_ACUITY_SCORE: 56
ADLS_ACUITY_SCORE: 53
ADLS_ACUITY_SCORE: 52
ADLS_ACUITY_SCORE: 53
ADLS_ACUITY_SCORE: 52
ADLS_ACUITY_SCORE: 53
ADLS_ACUITY_SCORE: 56

## 2024-06-14 NOTE — PLAN OF CARE
"Code status changed to DNR/DNI, VSS, A1-2 gaitbelt walker, denies pain, regular diet. Alert to self only per baseline, PRN bowel meds ordered and given today successful BM. Up in chair for meals, awaiting placement and MA.      Goal Outcome Evaluation:      Plan of Care Reviewed With: patient    Overall Patient Progress: no changeOverall Patient Progress: no change    Outcome Evaluation: VSS confused per baseline, awaiting placement    Problem: Dementia Signs/Symptoms  Goal: Improved Behavioral Control (Dementia Signs/Symptoms)  Outcome: Not Progressing  Goal: Improved Mood Symptoms (Dementia Signs/Symptoms)  Outcome: Not Progressing  Goal: Optimized Cognitive Function (Dementia Signs/Symptoms)  Outcome: Not Progressing  Goal: Optimized Oral Intake (Dementia Signs/Symptoms)  Outcome: Not Progressing  Goal: Improved Sleep (Dementia Signs/Symptoms)  Outcome: Not Progressing  Goal: Enhanced Social or Functional Skills and Ability (Dementia Signs/Symptoms)  Outcome: Not Progressing     Problem: Adult Inpatient Plan of Care  Goal: Plan of Care Review  Description: The Plan of Care Review/Shift note should be completed every shift.  The Outcome Evaluation is a brief statement about your assessment that the patient is improving, declining, or no change.  This information will be displayed automatically on your shift  note.  Outcome: Not Progressing  Flowsheets (Taken 6/14/2024 1500)  Outcome Evaluation: VSS confused per baseline, awaiting placement  Plan of Care Reviewed With: patient  Overall Patient Progress: no change  Goal: Patient-Specific Goal (Individualized)  Description: You can add care plan individualizations to a care plan. Examples of Individualization might be:  \"Parent requests to be called daily at 9am for status\", \"I have a hard time hearing out of my right ear\", or \"Do not touch me to wake me up as it startles  me\".  Outcome: Not Progressing  Goal: Absence of Hospital-Acquired Illness or Injury  Outcome: " Not Progressing  Intervention: Identify and Manage Fall Risk  Recent Flowsheet Documentation  Taken 6/14/2024 1000 by Katie Marks, RN  Safety Promotion/Fall Prevention: safety round/check completed  Goal: Optimal Comfort and Wellbeing  Outcome: Not Progressing  Goal: Readiness for Transition of Care  Outcome: Not Progressing

## 2024-06-14 NOTE — PROGRESS NOTES
[facility]    Medicine Progress Note - Hospitalist Service    Date of Admission:  6/5/2024    Assessment & Plan   88-year-old male who was transitioned to snf care on 6/5.  He has history of dementia and family is unable to provide care for him.  He was recently treated for right full cellulitis which has resolved.  Patient is MA pending, awaiting placement in long term care     6/14/24:  patient without any changes in plan of care.  Awaiting MA and placement.  Patient with constipation has not gone in days.  I attempted to call his daughters.  Cheri's phone is disconnected.  I left a message for Cristal and Zenia his daughters about the need to talk about advance care planning.     assisted care admission.  Social work consulted.  Looking for LTC.  Is MA pending.     Constipation  Start on senna, have as needed miralax and dulcolax available    Right foot cellulitis.  Resolved with Keflex 4 times daily through 6/11.  Swelling and redness resolved.    Venous ultrasound negative for DVT.    Dementia.  Not on any medications.  As needed olanzapine ordered for agitation.     4.  Advance care planning  Patient is 89 with significant dementia.  Is currently full code and this likely is not appropriate.  I tried calling his daughters without success.  Ongoing advance care planning needs to be done.    Addendum: had a 25 minutes discussion with family.  Was able to get a hold of the patient's daughter Cheri [phone] who is listed as power of  along with her Sister Cristal.  Her other sister Zenia is not.  We reviewed the patient's healthcare directive that was signed in 2018 at which point he is wanted to be full code except and the fact when it was deemed not appropriate.  We had a long discussion about the patient's progressive dementia, current quality life, the fact that he now has a guardian and is on able to make his own decisions.  We have decided the patient should be DNR/I  at this point but we will still continue on other treatments including antibiotics.  Patient's daughter Cheri will update the rest of the family about her decision.  I feel this is completely appropriate for this patient.     Diet: Combination Diet Regular Diet  Room Service    DVT Prophylaxis: Pneumatic Compression Devices  Maddox Catheter: Not present  Lines: None     Cardiac Monitoring: None  Code Status: Full Code  , this needs to be readdressed and discussed with family.        Medically Ready for Discharge: Anticipated in 5+ Days, can go anytime once MA application has been accepted and has a bed to go to     Discussed with nursing, social work     NONBILLABLE SOCIAL NOTE, IF ABLE TO CONTACT FAMILY COULD BILL FOR ADVANCE CARE PLANNING once taking about his code status.     Ata Nogueira MD  Hospitalist Service  [facility]  Securely message with [employer] (more info)  Text page via [employer] Paging/Directory   ______________________________________________________________________    Interval History     Patient found lying in bed patient without any new complaints.  Awaiting placement, no changes in plan today.        Physical Exam   Vital Signs: Temp: 98  F (36.7  C) Temp src: Oral BP: (!) 141/68 Pulse: 54   Resp: 20 SpO2: 100 % O2 Device: None (Room air)    Weight: 0 lbs 0 oz    General Appearance: Lying flat in bed, no distress, appears elderly        Imaging results reviewed over the past 24 hrs:   No results found for this or any previous visit (from the past 24 hour(s)).

## 2024-06-14 NOTE — PLAN OF CARE
"Disoriented. RA. A1-2 with gb/walker. Reg diet. Incontinent of bladder and bowel.    Goal Outcome Evaluation:      Plan of Care Reviewed With: patient    Overall Patient Progress: no changeOverall Patient Progress: no change    Outcome Evaluation: Awaiting [facility] placement. Slept throughout my shift.      Problem: Dementia Signs/Symptoms  Goal: Improved Behavioral Control (Dementia Signs/Symptoms)  Outcome: Progressing  Goal: Improved Mood Symptoms (Dementia Signs/Symptoms)  Outcome: Progressing  Goal: Optimized Cognitive Function (Dementia Signs/Symptoms)  Outcome: Progressing  Goal: Optimized Oral Intake (Dementia Signs/Symptoms)  Outcome: Progressing  Goal: Improved Sleep (Dementia Signs/Symptoms)  Outcome: Progressing  Goal: Enhanced Social or Functional Skills and Ability (Dementia Signs/Symptoms)  Outcome: Progressing     Problem: Adult Inpatient Plan of Care  Goal: Plan of Care Review  Description: The Plan of Care Review/Shift note should be completed every shift.  The Outcome Evaluation is a brief statement about your assessment that the patient is improving, declining, or no change.  This information will be displayed automatically on your shift  note.  Outcome: Progressing  Flowsheets (Taken 6/14/2024 0612)  Outcome Evaluation: Awaiting [facility] placement. Slept throughout my shift.  Plan of Care Reviewed With: patient  Overall Patient Progress: no change  Goal: Patient-Specific Goal (Individualized)  Description: You can add care plan individualizations to a care plan. Examples of Individualization might be:  \"Parent requests to be called daily at 9am for status\", \"I have a hard time hearing out of my right ear\", or \"Do not touch me to wake me up as it startles  me\".  Outcome: Progressing  Goal: Absence of Hospital-Acquired Illness or Injury  Outcome: Progressing  Goal: Optimal Comfort and Wellbeing  Outcome: Progressing  Goal: Readiness for Transition of Care  Outcome: Progressing     "

## 2024-06-15 PROCEDURE — 250N000013 HC RX MED GY IP 250 OP 250 PS 637: Performed by: INTERNAL MEDICINE

## 2024-06-15 PROCEDURE — 250N000013 HC RX MED GY IP 250 OP 250 PS 637: Performed by: NURSE PRACTITIONER

## 2024-06-15 PROCEDURE — 250N000013 HC RX MED GY IP 250 OP 250 PS 637: Performed by: STUDENT IN AN ORGANIZED HEALTH CARE EDUCATION/TRAINING PROGRAM

## 2024-06-15 PROCEDURE — 101N000002 HC CUSTODIAL CARE DAILY

## 2024-06-15 PROCEDURE — 99207 PR NO BILLABLE SERVICE THIS VISIT: CPT | Performed by: INTERNAL MEDICINE

## 2024-06-15 RX ADMIN — QUETIAPINE FUMARATE 12.5 MG: 25 TABLET ORAL at 23:54

## 2024-06-15 RX ADMIN — SENNOSIDES AND DOCUSATE SODIUM 1 TABLET: 8.6; 5 TABLET ORAL at 21:01

## 2024-06-15 ASSESSMENT — ACTIVITIES OF DAILY LIVING (ADL)
ADLS_ACUITY_SCORE: 52
ADLS_ACUITY_SCORE: 53
ADLS_ACUITY_SCORE: 52
ADLS_ACUITY_SCORE: 52
ADLS_ACUITY_SCORE: 53
ADLS_ACUITY_SCORE: 52
ADLS_ACUITY_SCORE: 53
ADLS_ACUITY_SCORE: 52
ADLS_ACUITY_SCORE: 53
ADLS_ACUITY_SCORE: 52
ADLS_ACUITY_SCORE: 53
ADLS_ACUITY_SCORE: 52

## 2024-06-15 NOTE — PLAN OF CARE
Pt is alert to self with confusion. Pt denies pain or discomfort. Pt is hard of hearing. Pt has no IV access. Pt is assist of one in transfer and care. Bed alarm in place and call light within reach. Will continue to monitor and assess pt.          Goal Outcome Evaluation:      Plan of Care Reviewed With: patient    Overall Patient Progress: no changeOverall Patient Progress: no change    Outcome Evaluation: pt is awaiting for placement.      Problem: Dementia Signs/Symptoms  Goal: Improved Behavioral Control (Dementia Signs/Symptoms)  6/15/2024 1850 by Reji Duron RN  Outcome: Progressing  6/15/2024 1833 by Reji Duron RN  Outcome: Progressing  Goal: Improved Mood Symptoms (Dementia Signs/Symptoms)  6/15/2024 1850 by Reji Duron RN  Outcome: Progressing  6/15/2024 1833 by Reji Duron RN  Outcome: Progressing  Goal: Optimized Cognitive Function (Dementia Signs/Symptoms)  6/15/2024 1850 by Reji Duron RN  Outcome: Progressing  6/15/2024 1833 by Reji Duron RN  Outcome: Progressing  Goal: Optimized Oral Intake (Dementia Signs/Symptoms)  6/15/2024 1850 by Reji Duron RN  Outcome: Progressing  6/15/2024 1833 by Reji Duron RN  Outcome: Progressing  Goal: Improved Sleep (Dementia Signs/Symptoms)  6/15/2024 1850 by Reji Duron RN  Outcome: Progressing  6/15/2024 1833 by Reji Duron RN  Outcome: Progressing  Goal: Enhanced Social or Functional Skills and Ability (Dementia Signs/Symptoms)  6/15/2024 1850 by Reji Duron RN  Outcome: Progressing  6/15/2024 1833 by Reji Duron RN  Outcome: Progressing     Problem: Adult Inpatient Plan of Care  Goal: Plan of Care Review  Description: The Plan of Care Review/Shift note should be completed every shift.  The Outcome Evaluation is a brief statement about your assessment that the patient is improving, declining, or no  "change.  This information will be displayed automatically on your shift  note.  6/15/2024 1850 by Reji Duron RN  Outcome: Progressing  Flowsheets (Taken 6/15/2024 1850)  Outcome Evaluation: pt is awaiting for placement.  Plan of Care Reviewed With: patient  Overall Patient Progress: no change  6/15/2024 1833 by Reji Duron RN  Outcome: Progressing  Flowsheets (Taken 6/15/2024 1833)  Outcome Evaluation: pt awaiting for placement.  Plan of Care Reviewed With: patient  Overall Patient Progress: no change  Goal: Patient-Specific Goal (Individualized)  Description: You can add care plan individualizations to a care plan. Examples of Individualization might be:  \"Parent requests to be called daily at 9am for status\", \"I have a hard time hearing out of my right ear\", or \"Do not touch me to wake me up as it startles  me\".  6/15/2024 1850 by Reji Duron RN  Outcome: Progressing  6/15/2024 1833 by Reji Duron RN  Outcome: Progressing  Goal: Absence of Hospital-Acquired Illness or Injury  6/15/2024 1850 by Reji Duron RN  Outcome: Progressing  6/15/2024 1833 by Reji Duron RN  Outcome: Progressing  Intervention: Identify and Manage Fall Risk  Recent Flowsheet Documentation  Taken 6/15/2024 1619 by Reji Duron RN  Safety Promotion/Fall Prevention:   activity supervised   assistive device/personal items within reach   increased rounding and observation   clutter free environment maintained   increase visualization of patient   nonskid shoes/slippers when out of bed   patient and family education   safety round/check completed  Intervention: Prevent Infection  Recent Flowsheet Documentation  Taken 6/15/2024 1619 by Reji Duron RN  Infection Prevention:   rest/sleep promoted   single patient room provided   hand hygiene promoted  Goal: Optimal Comfort and Wellbeing  6/15/2024 1850 by Reji Duron" RN  Outcome: Progressing  6/15/2024 1833 by Reji Duron RN  Outcome: Progressing  Goal: Readiness for Transition of Care  6/15/2024 1850 by Reji Duron RN  Outcome: Progressing  6/15/2024 1833 by Reji Duron RN  Outcome: Progressing

## 2024-06-15 NOTE — PLAN OF CARE
"Goal Outcome Evaluation:    Pt confused, at baseline. A1 gb/walker. Awaiting placement. SW following.      Plan of Care Reviewed With: patient    Overall Patient Progress: no change    Outcome Evaluation: Awaiting placement.    Problem: Dementia Signs/Symptoms  Goal: Improved Behavioral Control (Dementia Signs/Symptoms)  Outcome: Progressing  Goal: Improved Mood Symptoms (Dementia Signs/Symptoms)  Outcome: Progressing  Goal: Optimized Cognitive Function (Dementia Signs/Symptoms)  Outcome: Progressing  Goal: Optimized Oral Intake (Dementia Signs/Symptoms)  Outcome: Progressing  Goal: Improved Sleep (Dementia Signs/Symptoms)  Outcome: Progressing  Goal: Enhanced Social or Functional Skills and Ability (Dementia Signs/Symptoms)  Outcome: Progressing     Problem: Adult Inpatient Plan of Care  Goal: Plan of Care Review  Description: The Plan of Care Review/Shift note should be completed every shift.  The Outcome Evaluation is a brief statement about your assessment that the patient is improving, declining, or no change.  This information will be displayed automatically on your shift  note.  Outcome: Progressing  Flowsheets (Taken 6/15/2024 1346)  Outcome Evaluation: Awaiting placement.  Plan of Care Reviewed With: patient  Overall Patient Progress: no change  Goal: Patient-Specific Goal (Individualized)  Description: You can add care plan individualizations to a care plan. Examples of Individualization might be:  \"Parent requests to be called daily at 9am for status\", \"I have a hard time hearing out of my right ear\", or \"Do not touch me to wake me up as it startles  me\".  Outcome: Progressing  Goal: Absence of Hospital-Acquired Illness or Injury  Outcome: Progressing  Intervention: Identify and Manage Fall Risk  Recent Flowsheet Documentation  Taken 6/15/2024 1150 by Laury Rios, RN  Safety Promotion/Fall Prevention:   activity supervised   clutter free environment maintained   nonskid shoes/slippers when out of " bed   safety round/check completed  Intervention: Prevent Skin Injury  Recent Flowsheet Documentation  Taken 6/15/2024 1150 by Laury Rios, RN  Body Position: position changed independently  Goal: Optimal Comfort and Wellbeing  Outcome: Progressing  Goal: Readiness for Transition of Care  Outcome: Progressing

## 2024-06-15 NOTE — PROGRESS NOTES
[facility] [place]    Medicine Progress Note - Hospitalist Service    Date of Admission:  6/5/2024    Assessment & Plan   88-year-old male who was transitioned to longterm care on 6/5.  He has history of dementia and family is unable to provide care for him.  He was recently treated for right full cellulitis which has resolved.  Patient is MA pending, awaiting placement in long term care     6/15/24: Made the patient DNR/I yesterday after discussions with the patient's power of  daughter Brittny beckett.  Patient has no new complaints.  There is no changes in his plan of care or his orders today.  This is a nonbillable social visit.    nursing home care admission.  Social work consulted.  Looking for LTC.  Is MA pending.     Constipation  Start on senna, have as needed miralax and dulcolax available    Right foot cellulitis.  Resolved with Keflex 4 times daily through 6/11.  Swelling and redness resolved.    Venous ultrasound negative for DVT.    Dementia.  Not on any medications.  As needed olanzapine ordered for agitation.     4.  Advance care planning  Patient is 89 with significant dementia.  Is currently full code and this likely is not appropriate.  I tried calling his daughters without success.  Ongoing advance care planning needs to be done. On 6/14/24, I had a 25 minutes discussion with family.  Was able to get a hold of the patient's daughter Cheri [phone] who is listed as power of  along with her Sister Cristal.  Her other sister Zenia is not.  We reviewed the patient's healthcare directive that was signed in 2018 at which point he is wanted to be full code except and the fact when it was deemed not appropriate.  We had a long discussion about the patient's progressive dementia, current quality life, the fact that he now has a guardian and is on able to make his own decisions.  We have decided the patient should be DNR/I at this point but we will still continue on other  treatments including antibiotics.  Patient's daughter Cheri will update the rest of the family about her decision.  I feel this is completely appropriate for this patient. I did update the patient who is agreable though he does not have capacity to make his own decisions at this point.      Diet: Combination Diet Regular Diet  Room Service    DVT Prophylaxis: Pneumatic Compression Devices  Maddox Catheter: Not present  Lines: None     Cardiac Monitoring: None  Code Status: No CPR- Do NOT Intubate  , this needs to be readdressed and discussed with family.        Medically Ready for Discharge: Anticipated in 5+ Days, can go anytime once MA application has been accepted and has a bed to go to     Discussed with nursing      NONBILLABLE SOCIAL NOTE      Ata Nogueira MD  Hospitalist Service  [facility]  Securely message with [employer] (more info)  Text page via [employer] Paging/Directory   ______________________________________________________________________    Interval History   Patient lying in bed without any new complaints.  Still awaiting placement.  Patient will be here for weeks.      Physical Exam   Vital Signs: Temp: 99  F (37.2  C) Temp src: Oral BP: 127/61 Pulse: 57   Resp: 24 SpO2: 98 % O2 Device: None (Room air)    Weight: 0 lbs 0 oz    General Appearance: Lying flat in bed, no distress, appears elderly        Imaging results reviewed over the past 24 hrs:   No results found for this or any previous visit (from the past 24 hour(s)).

## 2024-06-15 NOTE — PLAN OF CARE
"  Problem: Dementia Signs/Symptoms  Goal: Improved Behavioral Control (Dementia Signs/Symptoms)  Outcome: Progressing  Intervention: Manage Behavior  Recent Flowsheet Documentation  Taken 6/14/2024 1610 by Brook Hansen RN  Environmental Support: rest periods encouraged  Goal: Improved Mood Symptoms (Dementia Signs/Symptoms)  Outcome: Progressing  Intervention: Optimize Emotion and Mood  Recent Flowsheet Documentation  Taken 6/14/2024 1610 by Brook Hansen RN  Supportive Measures: self-care encouraged  Goal: Optimized Cognitive Function (Dementia Signs/Symptoms)  Outcome: Progressing  Goal: Optimized Oral Intake (Dementia Signs/Symptoms)  Outcome: Progressing  Goal: Improved Sleep (Dementia Signs/Symptoms)  Outcome: Progressing  Goal: Enhanced Social or Functional Skills and Ability (Dementia Signs/Symptoms)  Outcome: Progressing     Problem: Adult Inpatient Plan of Care  Goal: Plan of Care Review  Description: The Plan of Care Review/Shift note should be completed every shift.  The Outcome Evaluation is a brief statement about your assessment that the patient is improving, declining, or no change.  This information will be displayed automatically on your shift  note.  Outcome: Progressing  Flowsheets (Taken 6/14/2024 2302)  Outcome Evaluation: dementia and confused at baseline. ate poorly, up in recliner. singing to staff and pleasantly confused. [place]. awaiting placement.  Plan of Care Reviewed With: patient  Overall Patient Progress: no change  Goal: Patient-Specific Goal (Individualized)  Description: You can add care plan individualizations to a care plan. Examples of Individualization might be:  \"Parent requests to be called daily at 9am for status\", \"I have a hard time hearing out of my right ear\", or \"Do not touch me to wake me up as it startles  me\".  Outcome: Progressing  Goal: Absence of Hospital-Acquired Illness or Injury  Outcome: Progressing  Goal: Optimal Comfort and Wellbeing  Outcome: " Progressing  Intervention: Monitor Pain and Promote Comfort  Recent Flowsheet Documentation  Taken 6/14/2024 1610 by Brook Hansen RN  Pain Management Interventions:   repositioned   rest  Goal: Readiness for Transition of Care  Outcome: Progressing   Goal Outcome Evaluation:      Plan of Care Reviewed With: patient    Overall Patient Progress: no changeOverall Patient Progress: no change    Outcome Evaluation: dementia and confused at baseline. ate poorly, up in recliner. singing to staff and pleasantly confused. [place]. awaiting placement.       Stable.

## 2024-06-15 NOTE — PLAN OF CARE
"Pt is alert but disoriented x4. VSS. RA. Incontinent of bladder and bowel, no bm on my shift. A1 gb/walker. Pt functioning at baseline. Awaiting placement.    Goal Outcome Evaluation:      Plan of Care Reviewed With: patient    Overall Patient Progress: no changeOverall Patient Progress: no change    Outcome Evaluation: Confused, pleasant. Awaiting placement.      Problem: Dementia Signs/Symptoms  Goal: Improved Behavioral Control (Dementia Signs/Symptoms)  Outcome: Progressing  Goal: Improved Mood Symptoms (Dementia Signs/Symptoms)  Outcome: Progressing  Goal: Optimized Cognitive Function (Dementia Signs/Symptoms)  Outcome: Progressing  Goal: Optimized Oral Intake (Dementia Signs/Symptoms)  Outcome: Progressing  Goal: Improved Sleep (Dementia Signs/Symptoms)  Outcome: Progressing  Goal: Enhanced Social or Functional Skills and Ability (Dementia Signs/Symptoms)  Outcome: Progressing     Problem: Adult Inpatient Plan of Care  Goal: Plan of Care Review  Description: The Plan of Care Review/Shift note should be completed every shift.  The Outcome Evaluation is a brief statement about your assessment that the patient is improving, declining, or no change.  This information will be displayed automatically on your shift  note.  Outcome: Progressing  Flowsheets (Taken 6/15/2024 0604)  Outcome Evaluation: Confused, pleasant. Awaiting placement.  Plan of Care Reviewed With: patient  Overall Patient Progress: no change  Goal: Patient-Specific Goal (Individualized)  Description: You can add care plan individualizations to a care plan. Examples of Individualization might be:  \"Parent requests to be called daily at 9am for status\", \"I have a hard time hearing out of my right ear\", or \"Do not touch me to wake me up as it startles  me\".  Outcome: Progressing  Goal: Absence of Hospital-Acquired Illness or Injury  Outcome: Progressing  Intervention: Identify and Manage Fall Risk  Recent Flowsheet Documentation  Taken 6/15/2024 0141 " by Partha, Doyin N, RN  Safety Promotion/Fall Prevention:   activity supervised   assistive device/personal items within reach   safety round/check completed   room organization consistent   clutter free environment maintained  Intervention: Prevent Skin Injury  Recent Flowsheet Documentation  Taken 6/15/2024 0141 by Axel Chavis RN  Body Position:   position changed independently   log-rolled  Intervention: Prevent Infection  Recent Flowsheet Documentation  Taken 6/15/2024 0412 by Axel Chavis RN  Infection Prevention:   single patient room provided   equipment surfaces disinfected   hand hygiene promoted   rest/sleep promoted  Taken 6/15/2024 0000 by Axel Chavis RN  Infection Prevention: single patient room provided  Goal: Optimal Comfort and Wellbeing  Outcome: Progressing  Goal: Readiness for Transition of Care  Outcome: Progressing     Problem: Adult Inpatient Plan of Care  Goal: Plan of Care Review  Description: The Plan of Care Review/Shift note should be completed every shift.  The Outcome Evaluation is a brief statement about your assessment that the patient is improving, declining, or no change.  This information will be displayed automatically on your shift  note.  Recent Flowsheet Documentation  Taken 6/15/2024 0625 by Axel Chavis RN  Outcome Evaluation: Confused, pleasant. Awaiting placement.  Plan of Care Reviewed With: patient  Overall Patient Progress: no change  Goal: Absence of Hospital-Acquired Illness or Injury  Intervention: Identify and Manage Fall Risk  Recent Flowsheet Documentation  Taken 6/15/2024 0141 by Axel Chavis RN  Safety Promotion/Fall Prevention:   activity supervised   assistive device/personal items within reach   safety round/check completed   room organization consistent   clutter free environment maintained  Intervention: Prevent Skin Injury  Recent Flowsheet Documentation  Taken 6/15/2024 0141 by Axel Chavis RN  Body Position:   position changed independently    log-rolled  Intervention: Prevent Infection  Recent Flowsheet Documentation  Taken 6/15/2024 0412 by Axel Chavis, RN  Infection Prevention:   single patient room provided   equipment surfaces disinfected   hand hygiene promoted   rest/sleep promoted  Taken 6/15/2024 0000 by Axel Chavis, RN  Infection Prevention: single patient room provided     Problem: Delirium  Goal: Improved Behavioral Control  Intervention: Minimize Safety Risk  Recent Flowsheet Documentation  Taken 6/15/2024 0141 by Axel Chavis, RN  Enhanced Safety Measures:  at bedside

## 2024-06-16 PROCEDURE — 101N000002 HC CUSTODIAL CARE DAILY

## 2024-06-16 PROCEDURE — 99207 PR NO BILLABLE SERVICE THIS VISIT: CPT | Performed by: INTERNAL MEDICINE

## 2024-06-16 ASSESSMENT — ACTIVITIES OF DAILY LIVING (ADL)
ADLS_ACUITY_SCORE: 52
ADLS_ACUITY_SCORE: 50
ADLS_ACUITY_SCORE: 52
ADLS_ACUITY_SCORE: 50
ADLS_ACUITY_SCORE: 52
ADLS_ACUITY_SCORE: 50
ADLS_ACUITY_SCORE: 52
ADLS_ACUITY_SCORE: 50
ADLS_ACUITY_SCORE: 52
ADLS_ACUITY_SCORE: 50
ADLS_ACUITY_SCORE: 50
ADLS_ACUITY_SCORE: 52

## 2024-06-16 NOTE — PLAN OF CARE
"AO x0 pleasantly confused, adequate intake and output. SBA gaitbelt walker to bathroom, denies pain.      Goal Outcome Evaluation:    Plan of Care Reviewed With: patient, family  Overall Patient Progress: improvingOverall Patient Progress: improving  Outcome Evaluation: pt pleasently confused awaiting placement, family visited today    Problem: Dementia Signs/Symptoms  Goal: Improved Behavioral Control (Dementia Signs/Symptoms)  Outcome: Progressing  Goal: Improved Mood Symptoms (Dementia Signs/Symptoms)  Outcome: Progressing  Goal: Optimized Cognitive Function (Dementia Signs/Symptoms)  Outcome: Progressing  Goal: Optimized Oral Intake (Dementia Signs/Symptoms)  Outcome: Progressing  Goal: Improved Sleep (Dementia Signs/Symptoms)  Outcome: Progressing  Goal: Enhanced Social or Functional Skills and Ability (Dementia Signs/Symptoms)  Outcome: Progressing     Problem: Adult Inpatient Plan of Care  Goal: Plan of Care Review  Description: The Plan of Care Review/Shift note should be completed every shift.  The Outcome Evaluation is a brief statement about your assessment that the patient is improving, declining, or no change.  This information will be displayed automatically on your shift  note.  Outcome: Progressing  Flowsheets (Taken 6/16/2024 1224)  Outcome Evaluation: pt pleasently confused awaiting placement, family visited today  Plan of Care Reviewed With:   patient   family  Overall Patient Progress: improving  Goal: Patient-Specific Goal (Individualized)  Description: You can add care plan individualizations to a care plan. Examples of Individualization might be:  \"Parent requests to be called daily at 9am for status\", \"I have a hard time hearing out of my right ear\", or \"Do not touch me to wake me up as it startles  me\".  Outcome: Progressing  Goal: Absence of Hospital-Acquired Illness or Injury  Outcome: Progressing  Goal: Optimal Comfort and Wellbeing  Outcome: Progressing  Goal: Readiness for Transition of " Care  Outcome: Progressing     Problem: Fall Injury Risk  Goal: Absence of Fall and Fall-Related Injury  Outcome: Progressing

## 2024-06-16 NOTE — PLAN OF CARE
"Orientation: confuse VSS  Pain: denies pain, no numbness and tingling sensation on both arms and legs.  O2: RA  GI/: Ambulates to restroom: 1 person assist.  Activity: A1 GB with walker.  Diet: regular diet  Protocols: none  Major shift events:  Plan:waiting for placement  Problem: Dementia Signs/Symptoms  Goal: Improved Behavioral Control (Dementia Signs/Symptoms)  Outcome: Progressing  Goal: Improved Mood Symptoms (Dementia Signs/Symptoms)  Outcome: Progressing  Goal: Optimized Cognitive Function (Dementia Signs/Symptoms)  Outcome: Progressing  Goal: Optimized Oral Intake (Dementia Signs/Symptoms)  Outcome: Progressing  Goal: Improved Sleep (Dementia Signs/Symptoms)  Outcome: Progressing  Goal: Enhanced Social or Functional Skills and Ability (Dementia Signs/Symptoms)  Outcome: Progressing     Problem: Adult Inpatient Plan of Care  Goal: Plan of Care Review  Description: The Plan of Care Review/Shift note should be completed every shift.  The Outcome Evaluation is a brief statement about your assessment that the patient is improving, declining, or no change.  This information will be displayed automatically on your shift  note.  Outcome: Progressing  Flowsheets (Taken 6/16/2024 0044)  Plan of Care Reviewed With: patient  Overall Patient Progress: no change  Goal: Patient-Specific Goal (Individualized)  Description: You can add care plan individualizations to a care plan. Examples of Individualization might be:  \"Parent requests to be called daily at 9am for status\", \"I have a hard time hearing out of my right ear\", or \"Do not touch me to wake me up as it startles  me\".  Outcome: Progressing  Goal: Absence of Hospital-Acquired Illness or Injury  Outcome: Progressing  Goal: Optimal Comfort and Wellbeing  Outcome: Progressing  Goal: Readiness for Transition of Care  Outcome: Progressing   Goal Outcome Evaluation:      Plan of Care Reviewed With: patient    Overall Patient Progress: no changeOverall Patient " Progress: no change

## 2024-06-16 NOTE — PROGRESS NOTES
[facility]    Medicine Progress Note - Hospitalist Service    Date of Admission:  6/5/2024    Assessment & Plan   88-year-old male who was transitioned to long term care on 6/5.  He has history of dementia and family is unable to provide care for him.  He was recently treated for right full cellulitis which has resolved.  Patient is MA pending, awaiting placement in long term care    6/16/24:  patient without any new complaints.  Slept well.  Eating fine.  This is nonbillable social visit       [facility] care admission.  Social work consulted.  Looking for LTC.  Is MA pending. Once a bed is available patient `can go anytime     Constipation  Started/continue on senna, have as needed miralax and dulcolax available    Right foot cellulitis.  Resolved with Keflex 4 times daily through 6/11.  Swelling and redness resolved.    Venous ultrasound negative for DVT.    Dementia.  Not on any medications.  As needed olanzapine ordered for agitation.     4.  Advance care planning  Patient is 89 with significant dementia.  Is currently full code and this likely is not appropriate.  I tried calling his daughters without success.  Ongoing advance care planning needs to be done. On 6/14/24, I had a 25 minutes discussion with family.  Was able to get a hold of the patient's daughter Cheri [phone] who is listed as power of  along with her Sister Cristal.  Her other sister Zenia is not.  We reviewed the patient's healthcare directive that was signed in 2018 at which point he is wanted to be full code except and the fact when it was deemed not appropriate.  We had a long discussion about the patient's progressive dementia, current quality life, the fact that he now has a guardian and is on able to make his own decisions.  We have decided the patient should be DNR/I at this point but we will still continue on other treatments including antibiotics.  Patient's daughter Cheri will update the rest of the  family about her decision.  I feel this is completely appropriate for this patient. I did update the patient who is agreable though he does not have capacity to make his own decisions at this point.      Diet: Combination Diet Regular Diet  Room Service    DVT Prophylaxis: Pneumatic Compression Devices  Maddox Catheter: Not present  Lines: None     Cardiac Monitoring: None  Code Status: No CPR- Do NOT Intubate  , this needs to be readdressed and discussed with family.        Medically Ready for Discharge: Anticipated in 5+ Days, can go anytime once MA application has been accepted and has a bed to go to     Discussed with nursing and had nothing new to add       NONBILLABLE NOTE          Ata Nogueiar MD  Hospitalist Service  [facility]  Securely message with [employer] (more info)  Text page via [employer] Paging/Directory   ______________________________________________________________________    Interval History   Patient lying in bed without any new complaints once again.  Still awaiting placement, MA pending.  Patient will be here for weeks.      Physical Exam   Vital Signs: Temp: 98.4  F (36.9  C) Temp src: Oral BP: 139/71 Pulse: 53   Resp: 18 SpO2: 98 % O2 Device: None (Room air)    Weight: 0 lbs 0 oz    General Appearance: Lying flat in bed, no distress, appears elderly        Imaging results reviewed over the past 24 hrs:   No results found for this or any previous visit (from the past 24 hour(s)).

## 2024-06-17 PROCEDURE — 101N000002 HC CUSTODIAL CARE DAILY

## 2024-06-17 PROCEDURE — 250N000013 HC RX MED GY IP 250 OP 250 PS 637: Performed by: INTERNAL MEDICINE

## 2024-06-17 RX ADMIN — SENNOSIDES AND DOCUSATE SODIUM 1 TABLET: 8.6; 5 TABLET ORAL at 22:52

## 2024-06-17 ASSESSMENT — ACTIVITIES OF DAILY LIVING (ADL)
ADLS_ACUITY_SCORE: 50
ADLS_ACUITY_SCORE: 51
ADLS_ACUITY_SCORE: 50
ADLS_ACUITY_SCORE: 51
ADLS_ACUITY_SCORE: 50
ADLS_ACUITY_SCORE: 51
ADLS_ACUITY_SCORE: 51
ADLS_ACUITY_SCORE: 50
ADLS_ACUITY_SCORE: 50

## 2024-06-17 NOTE — PLAN OF CARE
"Patient confused, alert, follow commands, up in chair, walked in room. Had soft BM. Resting comfortably in bed.  Problem: Dementia Signs/Symptoms  Goal: Improved Behavioral Control (Dementia Signs/Symptoms)  Outcome: Not Progressing  Goal: Improved Mood Symptoms (Dementia Signs/Symptoms)  Outcome: Not Progressing  Goal: Optimized Cognitive Function (Dementia Signs/Symptoms)  Outcome: Not Progressing  Goal: Optimized Oral Intake (Dementia Signs/Symptoms)  Outcome: Not Progressing  Goal: Improved Sleep (Dementia Signs/Symptoms)  Outcome: Not Progressing  Goal: Enhanced Social or Functional Skills and Ability (Dementia Signs/Symptoms)  Outcome: Not Progressing     Problem: Adult Inpatient Plan of Care  Goal: Plan of Care Review  Description: The Plan of Care Review/Shift note should be completed every shift.  The Outcome Evaluation is a brief statement about your assessment that the patient is improving, declining, or no change.  This information will be displayed automatically on your shift  note.  Outcome: Not Progressing  Flowsheets (Taken 6/16/2024 1820)  Outcome Evaluation: Patient on comfort care, confused, follow commands. Poor appetite, resting comfortably.  Plan of Care Reviewed With:   patient   family  Goal: Patient-Specific Goal (Individualized)  Description: You can add care plan individualizations to a care plan. Examples of Individualization might be:  \"Parent requests to be called daily at 9am for status\", \"I have a hard time hearing out of my right ear\", or \"Do not touch me to wake me up as it startles  me\".  Outcome: Not Progressing  Goal: Absence of Hospital-Acquired Illness or Injury  Outcome: Not Progressing  Intervention: Identify and Manage Fall Risk  Recent Flowsheet Documentation  Taken 6/16/2024 1700 by Marisela Robb RN  Safety Promotion/Fall Prevention:   activity supervised   assistive device/personal items within reach   clutter free environment maintained   nonskid shoes/slippers when " out of bed   room door open   toileting scheduled   supervised activity  Goal: Optimal Comfort and Wellbeing  Outcome: Not Progressing  Goal: Readiness for Transition of Care  Outcome: Not Progressing     Problem: Fall Injury Risk  Goal: Absence of Fall and Fall-Related Injury  Outcome: Not Progressing  Intervention: Promote Injury-Free Environment  Recent Flowsheet Documentation  Taken 6/16/2024 1700 by Marisela Robb RN  Safety Promotion/Fall Prevention:   activity supervised   assistive device/personal items within reach   clutter free environment maintained   nonskid shoes/slippers when out of bed   room door open   toileting scheduled   supervised activity   Goal Outcome Evaluation:      Plan of Care Reviewed With: patient, family          Outcome Evaluation: Patient on comfort care, confused, follow commands. Poor appetite, resting comfortably.

## 2024-06-17 NOTE — PLAN OF CARE
"[id number]    D/O x4, very pleasantly confused. Sings to staff. Alarms on for safety, up A1 GB W. Slept well overnight.     Goal Outcome Evaluation:      Plan of Care Reviewed With: patient    Overall Patient Progress: no changeOverall Patient Progress: no change    Outcome Evaluation: assisted care w/ no acute medical needs at this time.      Problem: Dementia Signs/Symptoms  Goal: Improved Behavioral Control (Dementia Signs/Symptoms)  Outcome: Not Progressing  Goal: Improved Mood Symptoms (Dementia Signs/Symptoms)  Outcome: Not Progressing  Goal: Optimized Cognitive Function (Dementia Signs/Symptoms)  Outcome: Not Progressing  Goal: Optimized Oral Intake (Dementia Signs/Symptoms)  Outcome: Not Progressing  Goal: Improved Sleep (Dementia Signs/Symptoms)  Outcome: Not Progressing  Goal: Enhanced Social or Functional Skills and Ability (Dementia Signs/Symptoms)  Outcome: Not Progressing     Problem: Adult Inpatient Plan of Care  Goal: Plan of Care Review  Description: The Plan of Care Review/Shift note should be completed every shift.  The Outcome Evaluation is a brief statement about your assessment that the patient is improving, declining, or no change.  This information will be displayed automatically on your shift  note.  Outcome: Not Progressing  Flowsheets (Taken 6/17/2024 0233)  Outcome Evaluation: assisted care w/ no acute medical needs at this time.  Plan of Care Reviewed With: patient  Overall Patient Progress: no change  Goal: Patient-Specific Goal (Individualized)  Description: You can add care plan individualizations to a care plan. Examples of Individualization might be:  \"Parent requests to be called daily at 9am for status\", \"I have a hard time hearing out of my right ear\", or \"Do not touch me to wake me up as it startles  me\".  Outcome: Not Progressing  Goal: Absence of Hospital-Acquired Illness or Injury  Outcome: Not Progressing  Intervention: Prevent Skin Injury  Recent Flowsheet " Documentation  Taken 6/17/2024 0030 by Lian Zimmerman RN  Body Position: position changed independently  Goal: Optimal Comfort and Wellbeing  Outcome: Not Progressing  Goal: Readiness for Transition of Care  Outcome: Not Progressing     Problem: Fall Injury Risk  Goal: Absence of Fall and Fall-Related Injury  Outcome: Not Progressing

## 2024-06-17 NOTE — PROGRESS NOTES
[facility]    Medicine Progress Note - Hospitalist Service    Date of Admission:  6/5/2024    Assessment & Plan   88-year-old male who [facility].  He has history of dementia and family is unable to provide care for him.  He was recently treated for right full cellulitis which has resolved.  Patient is MA pending, awaiting placement in long term care    6/16/24:  patient without any new complaints.  Slept well.  Eating fine.  This is nonbillable social visit       [facility] care admission.  Social work consulted.  Looking for LTC.  Is MA pending. Once a bed is available patient `can go anytime     Constipation  Started/continue on senna, have as needed miralax and dulcolax available    Right foot cellulitis.  Resolved with Keflex 4 times daily through 6/11.  Swelling and redness resolved.    Venous ultrasound negative for DVT.    Dementia.  Not on any medications.  As needed olanzapine ordered for agitation.          Diet: Combination Diet Regular Diet  Room Service    DVT Prophylaxis: Pneumatic Compression Devices  Maddox Catheter: Not present  Lines: None     Cardiac Monitoring: None  Code Status: No CPR- Do NOT Intubate      Clinically Significant Risk Factors Present on Admission                    # Dementia: noted on problem list                      Disposition Plan     Medically Ready for Discharge: Anticipated in 2-4 Days             Cosmo Landers MD  Hospitalist Service  [facility]  Securely message with [employer] (more info)  Text page via [employer] Paging/Directory   ______________________________________________________________________    Interval History     Poor historian.    Physical Exam   Vital Signs: Temp: 98.1  F (36.7  C) Temp src: Oral BP: (!) 140/67 Pulse: 51   Resp: 18 SpO2: 96 % O2 Device: None (Room air)    Weight: 0 lbs 0 oz    General Appearance:  Lying flat in bed, no distress, appears elderly     Medical Decision  Making         MINUTES SPENT BY ME on the date of service doing chart review, history, exam, documentation & further activities per the note.      Data

## 2024-06-17 NOTE — PLAN OF CARE
"Denies pain. A1 w/ walker and gb. Utilized pocket talker. Incontinent of bladder. Had BM in toilet. Redness on buttocks, blanchable. Safety maintained throughout shift.     Goal Outcome Evaluation:      Plan of Care Reviewed With: patient    Overall Patient Progress: no changeOverall Patient Progress: no change    Outcome Evaluation: long-term care w/ no acute medical needs at this time.            Problem: Dementia Signs/Symptoms  Goal: Improved Behavioral Control (Dementia Signs/Symptoms)  Outcome: Progressing  Goal: Improved Mood Symptoms (Dementia Signs/Symptoms)  Outcome: Progressing  Goal: Optimized Cognitive Function (Dementia Signs/Symptoms)  Outcome: Progressing  Goal: Optimized Oral Intake (Dementia Signs/Symptoms)  Outcome: Progressing  Goal: Improved Sleep (Dementia Signs/Symptoms)  Outcome: Progressing  Goal: Enhanced Social or Functional Skills and Ability (Dementia Signs/Symptoms)  Outcome: Progressing     Problem: Adult Inpatient Plan of Care  Goal: Plan of Care Review  Description: The Plan of Care Review/Shift note should be completed every shift.  The Outcome Evaluation is a brief statement about your assessment that the patient is improving, declining, or no change.  This information will be displayed automatically on your shift  note.  Outcome: Progressing  Flowsheets (Taken 6/17/2024 1123)  Outcome Evaluation: long-term care w/ no acute medical needs at this time.  Plan of Care Reviewed With: patient  Overall Patient Progress: no change  Goal: Patient-Specific Goal (Individualized)  Description: You can add care plan individualizations to a care plan. Examples of Individualization might be:  \"Parent requests to be called daily at 9am for status\", \"I have a hard time hearing out of my right ear\", or \"Do not touch me to wake me up as it startles  me\".  Outcome: Progressing  Goal: Absence of Hospital-Acquired Illness or Injury  Outcome: Progressing  Goal: Optimal Comfort and Wellbeing  Outcome: " Progressing  Goal: Readiness for Transition of Care  Outcome: Progressing     Problem: Fall Injury Risk  Goal: Absence of Fall and Fall-Related Injury  Outcome: Progressing

## 2024-06-18 PROCEDURE — 101N000002 HC CUSTODIAL CARE DAILY

## 2024-06-18 ASSESSMENT — ACTIVITIES OF DAILY LIVING (ADL)
ADLS_ACUITY_SCORE: 50

## 2024-06-18 NOTE — PLAN OF CARE
"Complete head-to-toe assessment done today per orders.     Disoriented x4. No pain. A1 w/ walker and gb. LS: dim. No tele. Incont B/B. Mild BLE edema. Blanchable redness on buttocks. Waiting for insurance/placement.     Goal Outcome Evaluation:      Plan of Care Reviewed With: patient    Overall Patient Progress: no changeOverall Patient Progress: no change    Outcome Evaluation: half-way care. No acute medical needs at this time.            Problem: Dementia Signs/Symptoms  Goal: Improved Behavioral Control (Dementia Signs/Symptoms)  Outcome: Progressing  Goal: Improved Mood Symptoms (Dementia Signs/Symptoms)  Outcome: Progressing  Goal: Optimized Cognitive Function (Dementia Signs/Symptoms)  Outcome: Progressing  Goal: Optimized Oral Intake (Dementia Signs/Symptoms)  Outcome: Progressing  Goal: Improved Sleep (Dementia Signs/Symptoms)  Outcome: Progressing  Goal: Enhanced Social or Functional Skills and Ability (Dementia Signs/Symptoms)  Outcome: Progressing     Problem: Adult Inpatient Plan of Care  Goal: Plan of Care Review  Description: The Plan of Care Review/Shift note should be completed every shift.  The Outcome Evaluation is a brief statement about your assessment that the patient is improving, declining, or no change.  This information will be displayed automatically on your shift  note.  Outcome: Progressing  Flowsheets (Taken 6/18/2024 1126)  Outcome Evaluation: half-way care. No acute medical needs at this time.  Plan of Care Reviewed With: patient  Overall Patient Progress: no change  Goal: Patient-Specific Goal (Individualized)  Description: You can add care plan individualizations to a care plan. Examples of Individualization might be:  \"Parent requests to be called daily at 9am for status\", \"I have a hard time hearing out of my right ear\", or \"Do not touch me to wake me up as it startles  me\".  Outcome: Progressing  Goal: Absence of Hospital-Acquired Illness or Injury  Outcome: " Progressing  Intervention: Identify and Manage Fall Risk  Recent Flowsheet Documentation  Taken 6/18/2024 1110 by Nela Garcia RN  Safety Promotion/Fall Prevention:   activity supervised   assistive device/personal items within reach   clutter free environment maintained   increased rounding and observation   increase visualization of patient   lighting adjusted   nonskid shoes/slippers when out of bed   patient and family education   room door open   room near nurse's station   safety round/check completed   supervised activity  Intervention: Prevent Skin Injury  Recent Flowsheet Documentation  Taken 6/18/2024 1110 by Nela Garcia RN  Body Position: position changed independently  Goal: Optimal Comfort and Wellbeing  Outcome: Progressing  Goal: Readiness for Transition of Care  Outcome: Progressing     Problem: Fall Injury Risk  Goal: Absence of Fall and Fall-Related Injury  Outcome: Progressing  Intervention: Identify and Manage Contributors  Recent Flowsheet Documentation  Taken 6/18/2024 1110 by Nela Garcia RN  Medication Review/Management: medications reviewed  Intervention: Promote Injury-Free Environment  Recent Flowsheet Documentation  Taken 6/18/2024 1110 by Nela Garcia RN  Safety Promotion/Fall Prevention:   activity supervised   assistive device/personal items within reach   clutter free environment maintained   increased rounding and observation   increase visualization of patient   lighting adjusted   nonskid shoes/slippers when out of bed   patient and family education   room door open   room near nurse's station   safety round/check completed   supervised activity

## 2024-06-18 NOTE — PROGRESS NOTES
[facility]    Medicine Progress Note - Hospitalist Service    Date of Admission:  6/5/2024    Assessment & Plan   88-year-old male who was transitioned to [facility] care on 6/5.  He has history of dementia and family is unable to provide care for him.  He was recently treated for right full cellulitis which has resolved.  Patient is MA pending, awaiting placement in long term care    6/16/24:  patient without any new complaints.  Slept well.  Eating fine.  This is nonbillable social visit       skilled nursing care admission.  Social work consulted.  Looking for LTC.  Is MA pending. Once a bed is available patient `can go anytime     Constipation  Started/continue on senna, have as needed miralax and dulcolax available    Right foot cellulitis.  Resolved with Keflex 4 times daily through 6/11.  Swelling and redness resolved.    Venous ultrasound negative for DVT.    Dementia.  Not on any medications.  As needed olanzapine ordered for agitation.          Diet: Combination Diet Regular Diet  Room Service    DVT Prophylaxis: Pneumatic Compression Devices  Maddox Catheter: Not present  Lines: None     Cardiac Monitoring: None  Code Status: No CPR- Do NOT Intubate      Clinically Significant Risk Factors Present on Admission                    # Dementia: noted on problem list                      Disposition Plan     Medically Ready for Discharge: Anticipated Tomorrow             Cosmo Landers MD  Hospitalist Service  [facility]  Securely message with [employer] (more info)  Text page via [employer] Paging/Directory   ______________________________________________________________________    Interval History     Poor historian.     Physical Exam   Vital Signs: Temp: 98.5  F (36.9  C) Temp src: Oral BP: 133/65 Pulse: 62   Resp: 20 SpO2: 96 % O2 Device: None (Room air)    Weight: 0 lbs 0 oz    General Appearance: Lying flat in bed, no distress, appears elderly     Medical Decision Making          MINUTES SPENT BY ME on the date of service doing chart review, history, exam, documentation & further activities per the note.          Data

## 2024-06-18 NOTE — PLAN OF CARE
"  Problem: Adult Inpatient Plan of Care  Goal: Plan of Care Review  Description: The Plan of Care Review/Shift note should be completed every shift.  The Outcome Evaluation is a brief statement about your assessment that the patient is improving, declining, or no change.  This information will be displayed automatically on your shift  note.  Recent Flowsheet Documentation  Taken 6/18/2024 0744 by Viki Joya RN  Outcome Evaluation: No acute medical needs at the moment, residential care  Plan of Care Reviewed With: patient  Overall Patient Progress: no change     Problem: Adult Inpatient Plan of Care  Goal: Plan of Care Review  Description: The Plan of Care Review/Shift note should be completed every shift.  The Outcome Evaluation is a brief statement about your assessment that the patient is improving, declining, or no change.  This information will be displayed automatically on your shift  note.  Outcome: Progressing  Flowsheets (Taken 6/18/2024 0744)  Outcome Evaluation: No acute medical needs at the moment, residential care  Plan of Care Reviewed With: patient  Overall Patient Progress: no change  Goal: Patient-Specific Goal (Individualized)  Description: You can add care plan individualizations to a care plan. Examples of Individualization might be:  \"Parent requests to be called daily at 9am for status\", \"I have a hard time hearing out of my right ear\", or \"Do not touch me to wake me up as it startles  me\".  Outcome: Progressing  Goal: Absence of Hospital-Acquired Illness or Injury  Outcome: Progressing  Goal: Optimal Comfort and Wellbeing  Outcome: Progressing  Goal: Readiness for Transition of Care  Outcome: Progressing     Problem: Dementia Signs/Symptoms  Goal: Improved Behavioral Control (Dementia Signs/Symptoms)  Outcome: Progressing  Goal: Improved Mood Symptoms (Dementia Signs/Symptoms)  Outcome: Progressing  Goal: Optimized Cognitive Function (Dementia Signs/Symptoms)  Outcome: Progressing  Goal: " Optimized Oral Intake (Dementia Signs/Symptoms)  Outcome: Progressing  Goal: Improved Sleep (Dementia Signs/Symptoms)  Outcome: Progressing  Goal: Enhanced Social or Functional Skills and Ability (Dementia Signs/Symptoms)  Outcome: Progressing     Problem: Fall Injury Risk  Goal: Absence of Fall and Fall-Related Injury  Outcome: Progressing   Goal Outcome Evaluation:      Plan of Care Reviewed With: patient    Overall Patient Progress: no changeOverall Patient Progress: no change    Outcome Evaluation: No acute medical needs at the moment, intermediate care

## 2024-06-19 PROCEDURE — 250N000013 HC RX MED GY IP 250 OP 250 PS 637: Performed by: NURSE PRACTITIONER

## 2024-06-19 PROCEDURE — 250N000013 HC RX MED GY IP 250 OP 250 PS 637: Performed by: INTERNAL MEDICINE

## 2024-06-19 PROCEDURE — 101N000002 HC CUSTODIAL CARE DAILY

## 2024-06-19 RX ADMIN — SENNOSIDES AND DOCUSATE SODIUM 1 TABLET: 8.6; 5 TABLET ORAL at 21:52

## 2024-06-19 RX ADMIN — QUETIAPINE FUMARATE 12.5 MG: 25 TABLET ORAL at 16:54

## 2024-06-19 ASSESSMENT — ACTIVITIES OF DAILY LIVING (ADL)
ADLS_ACUITY_SCORE: 52
ADLS_ACUITY_SCORE: 51
ADLS_ACUITY_SCORE: 51
ADLS_ACUITY_SCORE: 52
ADLS_ACUITY_SCORE: 51
ADLS_ACUITY_SCORE: 50
ADLS_ACUITY_SCORE: 52
ADLS_ACUITY_SCORE: 52
ADLS_ACUITY_SCORE: 51
ADLS_ACUITY_SCORE: 50
ADLS_ACUITY_SCORE: 52
ADLS_ACUITY_SCORE: 51
ADLS_ACUITY_SCORE: 51
ADLS_ACUITY_SCORE: 50
ADLS_ACUITY_SCORE: 52
ADLS_ACUITY_SCORE: 51

## 2024-06-19 NOTE — PLAN OF CARE
"[id number]    Inpatient Progress Note:  Patient is disoriented. No S/SX of respiratory distress. No wheezes, crackles, or rales auscultated . A X A walker and gait belt. Not call light reliable. Bed alarm on and audible. No IV access. No verbal or nonverbal S/SX of pain indicators present. Awaiting for LTC placement. SW following.      BP (!) 145/62 (BP Location: Left arm)   Pulse 67   Temp 97.8  F (36.6  C) (Axillary)   Resp 18   SpO2 94%      Will continue to provide supportive cares.     Crystal Talley RN     Problem: Dementia Signs/Symptoms  Goal: Improved Behavioral Control (Dementia Signs/Symptoms)  Outcome: Progressing  Goal: Improved Mood Symptoms (Dementia Signs/Symptoms)  Outcome: Progressing  Intervention: Optimize Emotion and Mood  Recent Flowsheet Documentation  Taken 6/19/2024 0338 by Crystal Talley, RN  Supportive Measures: active listening utilized  Goal: Optimized Cognitive Function (Dementia Signs/Symptoms)  Outcome: Progressing  Goal: Optimized Oral Intake (Dementia Signs/Symptoms)  Outcome: Progressing  Goal: Improved Sleep (Dementia Signs/Symptoms)  Outcome: Progressing  Goal: Enhanced Social or Functional Skills and Ability (Dementia Signs/Symptoms)  Outcome: Progressing     Problem: Adult Inpatient Plan of Care  Goal: Plan of Care Review  Description: The Plan of Care Review/Shift note should be completed every shift.  The Outcome Evaluation is a brief statement about your assessment that the patient is improving, declining, or no change.  This information will be displayed automatically on your shift  note.  Outcome: Progressing  Flowsheets (Taken 6/19/2024 0602)  Plan of Care Reviewed With: patient  Goal: Patient-Specific Goal (Individualized)  Description: You can add care plan individualizations to a care plan. Examples of Individualization might be:  \"Parent requests to be called daily at 9am for status\", \"I have a hard time hearing out of my right ear\", or \"Do not touch me " "to wake me up as it startles  me\".  Outcome: Progressing  Goal: Absence of Hospital-Acquired Illness or Injury  Outcome: Progressing  Intervention: Identify and Manage Fall Risk  Recent Flowsheet Documentation  Taken 6/19/2024 0338 by Crystal Talley RN  Safety Promotion/Fall Prevention:   activity supervised   assistive device/personal items within reach   clutter free environment maintained   increased rounding and observation   increase visualization of patient   lighting adjusted   nonskid shoes/slippers when out of bed   patient and family education   room door open   room near nurse's station   safety round/check completed   supervised activity  Intervention: Prevent Skin Injury  Recent Flowsheet Documentation  Taken 6/19/2024 0338 by Crystal Talley RN  Body Position: position changed independently  Goal: Optimal Comfort and Wellbeing  Outcome: Progressing  Goal: Readiness for Transition of Care  Outcome: Progressing     Problem: Fall Injury Risk  Goal: Absence of Fall and Fall-Related Injury  Outcome: Progressing  Intervention: Identify and Manage Contributors  Recent Flowsheet Documentation  Taken 6/19/2024 0338 by Crystal Talley RN  Medication Review/Management: medications reviewed  Intervention: Promote Injury-Free Environment  Recent Flowsheet Documentation  Taken 6/19/2024 0338 by Crystal Talley RN  Safety Promotion/Fall Prevention:   activity supervised   assistive device/personal items within reach   clutter free environment maintained   increased rounding and observation   increase visualization of patient   lighting adjusted   nonskid shoes/slippers when out of bed   patient and family education   room door open   room near nurse's station   safety round/check completed   supervised activity         Plan of Care Reviewed With: patient                 "

## 2024-06-19 NOTE — PLAN OF CARE
Provided cares for pt from [id number]. Pt disoriented x4. Denied pain. Pt started to become agitated this evening, PRN Seroquel given. BM x1. Assist of 1-2, pt refused gait belt and walker. Tolerating regular diet. Waiting placement.     BP (!) 141/78 (BP Location: Left arm)   Pulse 54   Temp 98.2  F (36.8  C) (Oral)   Resp 16   SpO2 95%         Plan of Care Reviewed With: patient    Overall Patient Progress: no changeOverall Patient Progress: no change    Outcome Evaluation: waiting for placement, pt aggitated this afternoon PRN seraquel given      Problem: Dementia Signs/Symptoms  Goal: Improved Behavioral Control (Dementia Signs/Symptoms)  Outcome: Progressing  Intervention: Manage Behavior  Recent Flowsheet Documentation  Taken 6/19/2024 1658 by Hamida Patino RN  Environmental Support:   rest periods encouraged   calm environment promoted  Goal: Improved Mood Symptoms (Dementia Signs/Symptoms)  Outcome: Progressing  Intervention: Optimize Emotion and Mood  Recent Flowsheet Documentation  Taken 6/19/2024 1658 by Hamida Patino RN  Supportive Measures:   counseling provided   decision-making supported   positive reinforcement provided   problem-solving facilitated   relaxation techniques promoted   self-care encouraged  Goal: Optimized Cognitive Function (Dementia Signs/Symptoms)  Outcome: Progressing  Goal: Optimized Oral Intake (Dementia Signs/Symptoms)  Outcome: Progressing  Goal: Improved Sleep (Dementia Signs/Symptoms)  Outcome: Progressing  Goal: Enhanced Social or Functional Skills and Ability (Dementia Signs/Symptoms)  Outcome: Progressing     Problem: Adult Inpatient Plan of Care  Goal: Plan of Care Review  Description: The Plan of Care Review/Shift note should be completed every shift.  The Outcome Evaluation is a brief statement about your assessment that the patient is improving, declining, or no change.  This information will be displayed automatically on your shift  note.  Outcome:  "Progressing  Flowsheets (Taken 6/19/2024 1707)  Outcome Evaluation: waiting for placement, pt aggitated this afternoon PRN luisl given  Plan of Care Reviewed With: patient  Overall Patient Progress: no change  Goal: Patient-Specific Goal (Individualized)  Description: You can add care plan individualizations to a care plan. Examples of Individualization might be:  \"Parent requests to be called daily at 9am for status\", \"I have a hard time hearing out of my right ear\", or \"Do not touch me to wake me up as it startles  me\".  Outcome: Progressing  Goal: Absence of Hospital-Acquired Illness or Injury  Outcome: Progressing  Intervention: Identify and Manage Fall Risk  Recent Flowsheet Documentation  Taken 6/19/2024 1658 by Hamida Patino RN  Safety Promotion/Fall Prevention:   activity supervised   assistive device/personal items within reach   clutter free environment maintained   increased rounding and observation   nonskid shoes/slippers when out of bed   room door open   room near nurse's station   room organization consistent   safety round/check completed   supervised activity  Intervention: Prevent Skin Injury  Recent Flowsheet Documentation  Taken 6/19/2024 1658 by Hamida Patino RN  Body Position:   position changed independently   sitting up in bed  Intervention: Prevent Infection  Recent Flowsheet Documentation  Taken 6/19/2024 1658 by Hamida Patino RN  Infection Prevention:   equipment surfaces disinfected   personal protective equipment utilized   hand hygiene promoted   rest/sleep promoted   single patient room provided  Goal: Optimal Comfort and Wellbeing  Outcome: Progressing  Goal: Readiness for Transition of Care  Outcome: Progressing     Problem: Fall Injury Risk  Goal: Absence of Fall and Fall-Related Injury  Outcome: Progressing  Intervention: Promote Injury-Free Environment  Recent Flowsheet Documentation  Taken 6/19/2024 1658 by Hamida Patino RN  Safety Promotion/Fall " Prevention:   activity supervised   assistive device/personal items within reach   clutter free environment maintained   increased rounding and observation   nonskid shoes/slippers when out of bed   room door open   room near nurse's station   room organization consistent   safety round/check completed   supervised activity

## 2024-06-19 NOTE — PLAN OF CARE
"Denies pain. A1 w/ walker and gb. Incontinent of bladder. Safety maintained throughout shift. Waiting for insurance/placement.     Goal Outcome Evaluation:      Plan of Care Reviewed With: patient    Overall Patient Progress: no changeOverall Patient Progress: no change    Outcome Evaluation: [facility] care. No acute medical needs at this time.            Problem: Dementia Signs/Symptoms  Goal: Improved Behavioral Control (Dementia Signs/Symptoms)  Outcome: Progressing  Goal: Improved Mood Symptoms (Dementia Signs/Symptoms)  Outcome: Progressing  Goal: Optimized Cognitive Function (Dementia Signs/Symptoms)  Outcome: Progressing  Goal: Optimized Oral Intake (Dementia Signs/Symptoms)  Outcome: Progressing  Goal: Improved Sleep (Dementia Signs/Symptoms)  Outcome: Progressing  Goal: Enhanced Social or Functional Skills and Ability (Dementia Signs/Symptoms)  Outcome: Progressing     Problem: Adult Inpatient Plan of Care  Goal: Plan of Care Review  Description: The Plan of Care Review/Shift note should be completed every shift.  The Outcome Evaluation is a brief statement about your assessment that the patient is improving, declining, or no change.  This information will be displayed automatically on your shift  note.  Outcome: Progressing  Flowsheets (Taken 6/19/2024 1152)  Outcome Evaluation: [facility] care. No acute medical needs at this time.  Plan of Care Reviewed With: patient  Overall Patient Progress: no change  Goal: Patient-Specific Goal (Individualized)  Description: You can add care plan individualizations to a care plan. Examples of Individualization might be:  \"Parent requests to be called daily at 9am for status\", \"I have a hard time hearing out of my right ear\", or \"Do not touch me to wake me up as it startles  me\".  Outcome: Progressing  Goal: Absence of Hospital-Acquired Illness or Injury  Outcome: Progressing  Goal: Optimal Comfort and Wellbeing  Outcome: Progressing  Goal: Readiness for Transition of " Care  Outcome: Progressing     Problem: Fall Injury Risk  Goal: Absence of Fall and Fall-Related Injury  Outcome: Progressing

## 2024-06-19 NOTE — PROGRESS NOTES
[facility]    Medicine Progress Note - Hospitalist Service    Date of Admission:  6/5/2024    Assessment & Plan   88-year-old male who was transitioned to shelter care on 6/5.  He has history of dementia and family is unable to provide care for him.  He was recently treated for right full cellulitis which has resolved.  Patient is MA pending, awaiting placement in long term care    6/19/24:  patient without any new complaints.  Slept well.  Eating fine.  This is nonbillable social visit       [facility] care admission.  Social work consulted.  Looking for LTC.  Is MA pending. Once a bed is available patient `can go anytime     Constipation  Started/continue on senna, have as needed miralax and dulcolax available    Right foot cellulitis.  Resolved with Keflex 4 times daily through 6/11.  Swelling and redness resolved.    Venous ultrasound negative for DVT.    Dementia.  Not on any medications.  As needed olanzapine ordered for agitation.          Diet: Combination Diet Regular Diet  Room Service    DVT Prophylaxis: Pneumatic Compression Devices  Maddox Catheter: Not present  Lines: None     Cardiac Monitoring: None  Code Status: No CPR- Do NOT Intubate      Clinically Significant Risk Factors Present on Admission                    # Dementia: noted on problem list                      Disposition Plan     Medically Ready for Discharge: Anticipated in 2-4 Days             Cosmo Landers MD  Hospitalist Service  [facility]  Securely message with [employer] (more info)  Text page via [employer] Paging/Directory   ______________________________________________________________________    Interval History     Poor historian.     Physical Exam   Vital Signs: Temp: 97.8  F (36.6  C) Temp src: Axillary BP: (!) 145/62 Pulse: 67   Resp: 18 SpO2: 94 % O2 Device: None (Room air)    Weight: 0 lbs 0 oz    General Appearance: Lying flat in bed, no distress, appears elderly     Medical  Decision Making         MINUTES SPENT BY ME on the date of service doing chart review, history, exam, documentation & further activities per the note.          Data

## 2024-06-19 NOTE — UTILIZATION REVIEW
Concurrent stay review; Secondary Review Determination - [facility]    [facility]        Under the authority of the Utilization Management Committee, the utilization review process indicated a secondary review on the above patient.  The review outcome is based on review of the medical records, discussions with staff, and applying clinical experience noted on the date of the review.        (x) Outpatient senior care status is appropriate       RATIONALE FOR DETERMINATION:      The Patient is 89 y/o  man with PMHx significant for dementia was brought to emergency room for evaluation for social work services because his family is unable to provide care for him. He was recently treated for right full cellulitis which has resolved. Patient is MA pending, awaiting placement in long term care .   No new concerns, no new medical problems, no daily medications.    Patient delayed discharge is related to disposition, there is no medical necessity for inpatient admission at the time of this review. If there is a change in patient status, please resend for review.    The information on this document is developed by the utilization review team in order for the business office to ensure compliance.  This only denotes the appropriateness of proper admission status and does not reflect the quality of care rendered.       The definitions of Inpatient Status and Observation Status used in making the determination above are those provided in the CMS Coverage Manual, Chapter 1 and Chapter 6, section 70.4.         Sincerely,     MARIO ALDRICH MD   Utilization Review  Physician Advisor  [facility]

## 2024-06-20 PROCEDURE — 101N000002 HC CUSTODIAL CARE DAILY

## 2024-06-20 ASSESSMENT — ACTIVITIES OF DAILY LIVING (ADL)
ADLS_ACUITY_SCORE: 55
ADLS_ACUITY_SCORE: 51
ADLS_ACUITY_SCORE: 55
ADLS_ACUITY_SCORE: 51
ADLS_ACUITY_SCORE: 55
ADLS_ACUITY_SCORE: 51
ADLS_ACUITY_SCORE: 55
ADLS_ACUITY_SCORE: 55
ADLS_ACUITY_SCORE: 51
ADLS_ACUITY_SCORE: 55
ADLS_ACUITY_SCORE: 55
ADLS_ACUITY_SCORE: 51
ADLS_ACUITY_SCORE: 51
ADLS_ACUITY_SCORE: 55

## 2024-06-20 NOTE — PLAN OF CARE
"Goal Outcome Evaluation:    Patient cares provided as needed. No pain noted. Breathing comfortably on RA. Sleeping between cares.      Plan of Care Reviewed With: patient    Overall Patient Progress: improvingOverall Patient Progress: improving    Outcome Evaluation: Pt waiting for placement, medically stable      Problem: Dementia Signs/Symptoms  Goal: Improved Behavioral Control (Dementia Signs/Symptoms)  Outcome: Progressing  Goal: Improved Mood Symptoms (Dementia Signs/Symptoms)  Outcome: Progressing  Goal: Optimized Cognitive Function (Dementia Signs/Symptoms)  Outcome: Progressing  Goal: Optimized Oral Intake (Dementia Signs/Symptoms)  Outcome: Progressing  Goal: Improved Sleep (Dementia Signs/Symptoms)  Outcome: Progressing  Goal: Enhanced Social or Functional Skills and Ability (Dementia Signs/Symptoms)  Outcome: Progressing     Problem: Adult Inpatient Plan of Care  Goal: Plan of Care Review  Description: The Plan of Care Review/Shift note should be completed every shift.  The Outcome Evaluation is a brief statement about your assessment that the patient is improving, declining, or no change.  This information will be displayed automatically on your shift  note.  Outcome: Progressing  Flowsheets (Taken 6/19/2024 2223)  Outcome Evaluation: Pt waiting for placement, medically stable  Plan of Care Reviewed With: patient  Overall Patient Progress: improving  Goal: Patient-Specific Goal (Individualized)  Description: You can add care plan individualizations to a care plan. Examples of Individualization might be:  \"Parent requests to be called daily at 9am for status\", \"I have a hard time hearing out of my right ear\", or \"Do not touch me to wake me up as it startles  me\".  Outcome: Progressing  Goal: Absence of Hospital-Acquired Illness or Injury  Outcome: Progressing  Goal: Optimal Comfort and Wellbeing  Outcome: Progressing  Goal: Readiness for Transition of Care  Outcome: Progressing     Problem: Fall Injury " Risk  Goal: Absence of Fall and Fall-Related Injury  Outcome: Progressing

## 2024-06-20 NOTE — PLAN OF CARE
Patient up with assist of 2, gait belt, and walker. Bed alarm on. Appetite fair.  Problem: Adult Inpatient Plan of Care  Goal: Plan of Care Review  Description: The Plan of Care Review/Shift note should be completed every shift.  The Outcome Evaluation is a brief statement about your assessment that the patient is improving, declining, or no change.  This information will be displayed automatically on your shift  note.  Recent Flowsheet Documentation  Taken 6/20/2024 1732 by Luciana Edwards, RN  Plan of Care Reviewed With: patient  Overall Patient Progress: no change  Goal: Absence of Hospital-Acquired Illness or Injury  Intervention: Prevent Skin Injury  Recent Flowsheet Documentation  Taken 6/20/2024 0900 by Luciana Edwards, RN  Body Position: position changed independently   Goal Outcome Evaluation:      Plan of Care Reviewed With: patient    Overall Patient Progress: no changeOverall Patient Progress: no change

## 2024-06-20 NOTE — PROGRESS NOTES
[facility]    Medicine Progress Note - Hospitalist Service    Date of Admission:  6/5/2024    Assessment & Plan   88-year-old male who was transitioned to [facility] care on 6/5.  He has history of dementia and family is unable to provide care for him.  He was recently treated for right full cellulitis which has resolved.  Patient is MA pending, awaiting placement in long term care    6/19/24:  patient without any new complaints.  Slept well.  Eating fine.  This is nonbillable social visit       [facility] care admission.  Social work consulted.  Looking for LTC.  Is MA pending. Once a bed is available patient `can go anytime     Constipation  Started/continue on senna, have as needed miralax and dulcolax available    Right foot cellulitis.  Resolved with Keflex 4 times daily through 6/11.  Swelling and redness resolved.    Venous ultrasound negative for DVT.    Dementia.  Not on any medications.  As needed olanzapine ordered for agitation.          Diet: Combination Diet Regular Diet  Room Service    DVT Prophylaxis: Pneumatic Compression Devices  Maddox Catheter: Not present  Lines: None     Cardiac Monitoring: None  Code Status: No CPR- Do NOT Intubate      Clinically Significant Risk Factors Present on Admission                    # Dementia: noted on problem list                      Disposition Plan     Medically Ready for Discharge: Anticipated Tomorrow             Cosmo Landers MD  Hospitalist Service  [facility]  Securely message with [employer] (more info)  Text page via [employer] Paging/Directory   ______________________________________________________________________    Interval History     Poor historian.    Physical Exam   Vital Signs:                    Weight: 0 lbs 0 oz    General Appearance: Lying flat in bed, no distress, appears elderly     Medical Decision Making         MINUTES SPENT BY ME on the date of service doing chart review, history, exam,  documentation & further activities per the note.      Data

## 2024-06-21 PROCEDURE — 250N000013 HC RX MED GY IP 250 OP 250 PS 637: Performed by: INTERNAL MEDICINE

## 2024-06-21 PROCEDURE — 101N000002 HC CUSTODIAL CARE DAILY

## 2024-06-21 RX ADMIN — SENNOSIDES AND DOCUSATE SODIUM 1 TABLET: 8.6; 5 TABLET ORAL at 21:39

## 2024-06-21 ASSESSMENT — ACTIVITIES OF DAILY LIVING (ADL)
ADLS_ACUITY_SCORE: 51

## 2024-06-21 NOTE — PROGRESS NOTES
[facility]    Medicine Progress Note - Hospitalist Service    Date of Admission:  6/5/2024    Assessment & Plan   88-year-old male who was transitioned to residential care on 6/5.  He has history of dementia and family is unable to provide care for him.  He was recently treated for right full cellulitis which has resolved.  Patient is MA pending, awaiting placement in long term care    6/21/24:  patient without any new complaints. This is nonbillable social visit       retirement care admission.  Social work consulted.  Looking for LTC.  Is MA pending. Once a bed is available patient `can go anytime     Constipation  Started/continue on senna, have as needed miralax and dulcolax available    Right foot cellulitis.  Resolved with Keflex 4 times daily through 6/11.  Swelling and redness resolved.    Venous ultrasound negative for DVT.    Dementia.  Not on any medications.  As needed olanzapine ordered for agitation.          Diet: Combination Diet Regular Diet  Room Service    DVT Prophylaxis: Pneumatic Compression Devices  Maddox Catheter: Not present  Lines: None     Cardiac Monitoring: None  Code Status: No CPR- Do NOT Intubate      Clinically Significant Risk Factors Present on Admission                    # Dementia: noted on problem list                      Disposition Plan     Medically Ready for Discharge: Anticipated Tomorrow             Anjali Leiva MD  Hospitalist Service  [facility]  Securely message with [employer] (more info)  Text page via [employer] Paging/Directory   ______________________________________________________________________    Interval History     No complaints.     Physical Exam   Vital Signs: Temp: 97.8  F (36.6  C) Temp src: Axillary BP: 139/69 Pulse: 55   Resp: 16 SpO2: 96 % O2 Device: None (Room air)    Weight: 0 lbs 0 oz    General Appearance: sitting in recliner,  no distress, appears elderly     Medical Decision Making         MINUTES SPENT BY ME on  the date of service doing chart review, history, exam, documentation & further activities per the note.      Data

## 2024-06-21 NOTE — PLAN OF CARE
Goal Outcome Evaluation:                    Pt is alert, Disoriented x4. Easily agitated.  Ax1, gb, ambulated to bathroom. Pt gets oob/chair without calling for help. Voiding. Tolerating diet, minimal appetite. Pt refused RN head to toe assessment. Bed alarm on,

## 2024-06-21 NOTE — PLAN OF CARE
Alert but disoriented x 4 . Shows labile mood ,and  easily get agitated . Fall precaution maintained . Floor mat placed between bed and bathroom door . Refused to take scheduled  senna-docusate . Ambulated to bathroom with gait belt and walker .     Goal Outcome Evaluation:  Over all pt's progress : No change   Outcome evaluation : stable , in no form of distress or pain .     Problem: Adult Inpatient Plan of Care  Goal: Absence of Hospital-Acquired Illness or Injury  Intervention: Prevent Skin Injury  Recent Flowsheet Documentation  Taken 6/20/2024 1931 by Heraclio Hammond, RN  Body Position:   supine, head elevated   position changed independently

## 2024-06-22 PROCEDURE — 250N000013 HC RX MED GY IP 250 OP 250 PS 637: Performed by: INTERNAL MEDICINE

## 2024-06-22 PROCEDURE — 250N000013 HC RX MED GY IP 250 OP 250 PS 637: Performed by: NURSE PRACTITIONER

## 2024-06-22 PROCEDURE — 101N000002 HC CUSTODIAL CARE DAILY

## 2024-06-22 RX ADMIN — SENNOSIDES AND DOCUSATE SODIUM 1 TABLET: 8.6; 5 TABLET ORAL at 22:54

## 2024-06-22 RX ADMIN — ACETAMINOPHEN 650 MG: 325 TABLET, FILM COATED ORAL at 15:05

## 2024-06-22 ASSESSMENT — ACTIVITIES OF DAILY LIVING (ADL)
ADLS_ACUITY_SCORE: 51
ADLS_ACUITY_SCORE: 47
ADLS_ACUITY_SCORE: 50
ADLS_ACUITY_SCORE: 47
ADLS_ACUITY_SCORE: 47
ADLS_ACUITY_SCORE: 51
ADLS_ACUITY_SCORE: 47
ADLS_ACUITY_SCORE: 51
ADLS_ACUITY_SCORE: 51
ADLS_ACUITY_SCORE: 46
ADLS_ACUITY_SCORE: 51
ADLS_ACUITY_SCORE: 50
ADLS_ACUITY_SCORE: 51
ADLS_ACUITY_SCORE: 51
ADLS_ACUITY_SCORE: 47
ADLS_ACUITY_SCORE: 51
ADLS_ACUITY_SCORE: 51
ADLS_ACUITY_SCORE: 47
ADLS_ACUITY_SCORE: 47
ADLS_ACUITY_SCORE: 51
ADLS_ACUITY_SCORE: 46

## 2024-06-22 NOTE — PROGRESS NOTES
"SW notified that pt's daughter hCeri was here this AM looking for SW to \"sign paperwork.\"  Writer called Cheri and left a message [phone] and left a message asking for a return call. And if VM received to leave email so we can send paperwork via email and she can sign and return.     Awaiting return call.     Diane HITCHCOCK, Casual   Inpatient Care Coordination  [facility]  [phone]    "

## 2024-06-22 NOTE — PLAN OF CARE
Assumed care of patient at 1700.  VSS and denies pain.  Poor appetite and only eating peaches.  Encouraged to drink water.  LS diminished and illogical speech.  Continue monitoring.      Goal Outcome Evaluation:     Plan of Care Reviewed With: patient     Overall Patient Progress: Progressing     Outcome Evaluation: [facility]     Problem: Adult Inpatient Plan of Care  Goal: Absence of Hospital-Acquired Illness or Injury  6/21/2024 2004 by Howard Arizmendi, RN  Outcome: Progressing  6/21/2024 2003 by Howard Arizmendi, RN  Outcome: Progressing  Goal: Optimal Comfort and Wellbeing  6/21/2024 2004 by Howard Arizmendi, RN  Outcome: Progressing  6/21/2024 2003 by Howard Arizmendi, RN  Outcome: Progressing

## 2024-06-22 NOTE — PROGRESS NOTES
[facility]    Medicine Progress Note - Hospitalist Service    Date of Admission:  6/5/2024    Assessment & Plan   88-year-old male who was transitioned to group home care on 6/5.  He has history of dementia and family is unable to provide care for him.  He was recently treated for right full cellulitis which has resolved.  Patient is MA pending, awaiting placement in long term care    6/22/24:  patient without any new complaints. This is nonbillable social visit       intermediate care admission.  Social work consulted.  Looking for LTC.  Is MA pending. Once a bed is available patient `can go anytime     Constipation  Started/continue on senna, have as needed miralax and dulcolax available    Right foot cellulitis.  Resolved with Keflex 4 times daily through 6/11.  Swelling and redness resolved.    Venous ultrasound negative for DVT.    Dementia.  Not on any medications.  As needed olanzapine ordered for agitation.          Diet: Combination Diet Regular Diet  Room Service    DVT Prophylaxis: Pneumatic Compression Devices  Maddox Catheter: Not present  Lines: None     Cardiac Monitoring: None  Code Status: No CPR- Do NOT Intubate      Clinically Significant Risk Factors Present on Admission                    # Dementia: noted on problem list                      Disposition Plan     Medically Ready for Discharge: Anticipated Tomorrow             Anjali Leiva MD  Hospitalist Service  [facility]  Securely message with [employer] (more info)  Text page via [employer] Paging/Directory   ______________________________________________________________________    Interval History     No complaints.  Reported feeling good.    Physical Exam   Vital Signs: Temp: 97.5  F (36.4  C) Temp src: Axillary BP: (!) 144/73 Pulse: 50   Resp: 16 SpO2: 95 % O2 Device: None (Room air)    Weight: 0 lbs 0 oz    General Appearance: sitting in recliner,  no distress, appears elderly     Medical Decision Making          MINUTES SPENT BY ME on the date of service doing chart review, history, exam, documentation & further activities per the note.      Data

## 2024-06-22 NOTE — PLAN OF CARE
Care of Pt began at 19:00, Pt disoriented x4, VSS. Pleasant and calm, uncooperative with cares at times. Bed alarm on and Pt ambulates without warning, no call light use, education on safety and unit routine not effective with no signs of learning.     Goal Outcome Evaluation:       Plan of care reviewed with: patient      Overall patient progress: Progressing     Outcome evaluation: senior care care      Problem: Adult Inpatient Plan of Care  Goal: Absence of Hospital-Acquired Illness or Injury  6/21/2024 2254 by Sergey Sandoval, RN  Outcome: Progressing  6/21/2024 2244 by Sergey Sandoval, RN  Outcome: Progressing  Goal: Optimal Comfort and Wellbeing  6/21/2024 2254 by Sergey Sandoval, RN  Outcome: Progressing  6/21/2024 2244 by Sergey Sandoval, RN  Outcome: Progressing

## 2024-06-22 NOTE — PLAN OF CARE
Problem: Adult Inpatient Plan of Care  Goal: Absence of Hospital-Acquired Illness or Injury  Intervention: Identify and Manage Fall Risk  Recent Flowsheet Documentation  Taken 6/22/2024 0030 by Maria De Jesus Bruce RN  Safety Promotion/Fall Prevention:   safety round/check completed   room organization consistent   room near nurse's station   room door open   activity supervised   clutter free environment maintained   nonskid shoes/slippers when out of bed  Intervention: Prevent and Manage VTE (Venous Thromboembolism) Risk  Recent Flowsheet Documentation  Taken 6/22/2024 0030 by Maria De Jesus Bruce RN  VTE Prevention/Management: SCDs (sequential compression devices) off

## 2024-06-23 PROCEDURE — 101N000002 HC CUSTODIAL CARE DAILY

## 2024-06-23 ASSESSMENT — ACTIVITIES OF DAILY LIVING (ADL)
ADLS_ACUITY_SCORE: 46
ADLS_ACUITY_SCORE: 47
ADLS_ACUITY_SCORE: 46
ADLS_ACUITY_SCORE: 43
ADLS_ACUITY_SCORE: 47
ADLS_ACUITY_SCORE: 43
ADLS_ACUITY_SCORE: 43
ADLS_ACUITY_SCORE: 47
ADLS_ACUITY_SCORE: 47
ADLS_ACUITY_SCORE: 43
ADLS_ACUITY_SCORE: 43
ADLS_ACUITY_SCORE: 46
ADLS_ACUITY_SCORE: 46
ADLS_ACUITY_SCORE: 47
ADLS_ACUITY_SCORE: 43
ADLS_ACUITY_SCORE: 47
ADLS_ACUITY_SCORE: 43

## 2024-06-23 NOTE — PROGRESS NOTES
[facility]    Medicine Progress Note - Hospitalist Service    Date of Admission:  6/5/2024    Assessment & Plan   88-year-old male who was transitioned to [facility] care on 6/5.  He has history of dementia and family is unable to provide care for him.  He was recently treated for right full cellulitis which has resolved.  Patient is MA pending, awaiting placement in long term care.         intermediate care admission.  Social work consulted.  Looking for LTC.  Is MA pending. Once a bed is available patient `can go anytime     Constipation  Started/continue on senna, have as needed miralax and dulcolax available    Right foot cellulitis.  Resolved with Keflex 4 times daily through 6/11.  Swelling and redness resolved.    Venous ultrasound negative for DVT.    Dementia.  Not on any medications.  As needed olanzapine ordered for agitation.    5.   Bradycardia            Patient was noted to have HR in 50s. He is not on any beta blocker or CCB. Refused EKG.            -Discussed with RN to monitor heart rate when patient is awake, reattempt getting an EKG if patient is cooperative          Diet: Combination Diet Regular Diet  Room Service    DVT Prophylaxis: Pneumatic Compression Devices  Maddox Catheter: Not present  Lines: None     Cardiac Monitoring: None  Code Status: No CPR- Do NOT Intubate      Clinically Significant Risk Factors Present on Admission                    # Dementia: noted on problem list                      Disposition Plan     Medically Ready for Discharge: Anticipated Tomorrow             Anjali Leiva MD  Hospitalist Service  [facility]  Securely message with [employer] (more info)  Text page via [employer] Paging/Directory   ______________________________________________________________________    Interval History     Feels well.  Denied any dizziness or weakness however I am not sure how much he understands those questions given his underlying  dementia.    Physical Exam   Vital Signs: Temp: 97.6  F (36.4  C) Temp src: Axillary BP: (!) 155/75 Pulse: 56   Resp: 17 SpO2: 95 % O2 Device: None (Room air)    Weight: 0 lbs 0 oz    General Appearance: sitting in bed  no distress, appears elderly   CVS: bradycardia, regular rhythm     Medical Decision Making       10 MINUTES SPENT BY ME on the date of service doing chart review, history, exam, documentation & further activities per the note.      Data

## 2024-06-23 NOTE — PLAN OF CARE
Pt currently in snf care and eating partial meals but refuses to eat at times.  More energetic when family arrived.  Continue monitoring patient and prevent pressure injury. Pt bradycardic but refused to allow ECG to be performed.  LS diminished.  Tray setup.      Goal Outcome Evaluation:     Plan of Care Reviewed With: patient     Overall Patient Progress: Progressing     Outcome Evaluation: snf care continued

## 2024-06-23 NOTE — PLAN OF CARE
VSS.  Pt provided tylenol for pain in shoulder which resolved.  Poor appetite.  Illogical speech.  Does not use call light and floor mats in place for added protection.  Continue to encourage fluid intake.  Declined to ambulate with writer on multiple occasions.  Continue with intermediate care.     Goal Outcome Evaluation:     Plan of Care Reviewed With: patient     Overall Patient Progress: Progressing     Outcome Evaluation: [facility] care continued        Problem: Adult Inpatient Plan of Care  Goal: Absence of Hospital-Acquired Illness or Injury  Outcome: Progressing  Intervention: Identify and Manage Fall Risk  Recent Flowsheet Documentation  Taken 6/22/2024 0815 by Howard Arizmendi RN  Safety Promotion/Fall Prevention: safety round/check completed  Taken 6/22/2024 0725 by Howard Arizmendi RN  Safety Promotion/Fall Prevention: safety round/check completed  Intervention: Prevent Skin Injury  Recent Flowsheet Documentation  Taken 6/22/2024 0815 by Howard Arizmendi RN  Body Position: position changed independently  Intervention: Prevent and Manage VTE (Venous Thromboembolism) Risk  Recent Flowsheet Documentation  Taken 6/22/2024 0815 by Howard Arizmendi RN  VTE Prevention/Management: SCDs (sequential compression devices) off  Intervention: Prevent Infection  Recent Flowsheet Documentation  Taken 6/22/2024 0815 by Howard Arizmendi RN  Infection Prevention:   environmental surveillance performed   equipment surfaces disinfected   hand hygiene promoted   rest/sleep promoted   single patient room provided  Goal: Optimal Comfort and Wellbeing  Outcome: Progressing  Intervention: Monitor Pain and Promote Comfort  Recent Flowsheet Documentation  Taken 6/22/2024 1505 by Howard Arizmendi RN  Pain Management Interventions: medication (see MAR)

## 2024-06-23 NOTE — PLAN OF CARE
Pt disoriented x4. Transitioned to nursing home care. Awaiting placement to LTC once bed is available.     Problem: Adult Inpatient Plan of Care  Goal: Absence of Hospital-Acquired Illness or Injury  6/23/2024 0627 by Julia Moss RN  Outcome: Met  6/23/2024 0627 by Julia Moss RN  Outcome: Progressing  Intervention: Identify and Manage Fall Risk  Recent Flowsheet Documentation  Taken 6/22/2024 2150 by Julia Moss RN  Safety Promotion/Fall Prevention:   safety round/check completed   nonskid shoes/slippers when out of bed  Intervention: Prevent Skin Injury  Recent Flowsheet Documentation  Taken 6/22/2024 2150 by Julia Moss RN  Body Position: position changed independently  Intervention: Prevent and Manage VTE (Venous Thromboembolism) Risk  Recent Flowsheet Documentation  Taken 6/22/2024 2150 by Julia Moss RN  VTE Prevention/Management: SCDs (sequential compression devices) off  Intervention: Prevent Infection  Recent Flowsheet Documentation  Taken 6/22/2024 2150 by Julia Moss RN  Infection Prevention:   rest/sleep promoted   single patient room provided  Goal: Optimal Comfort and Wellbeing  6/23/2024 0627 by Julia Moss RN  Outcome: Met  6/23/2024 0627 by Julia Moss RN  Outcome: Progressing   Goal Outcome Evaluation:      Resolved.

## 2024-06-23 NOTE — PROGRESS NOTES
"Writer notified that pt's daughters were in the room. Writer met with Cristal and Cheri. Explained I do not have access to the paperwork that needs to be signed but I had left a message yesterday with Cheri that we can email it Monday AM.  Cheri provided email address of [email or web address]. Cheri stated that we can call her \"at any time\" with questions or needs so she can support moving things forward. Writer confirmed Cheri's number, stating we have attempted to reach out with minimal success. Cheri reports phone number of [phone].  Cristal asked for a business card, provided generic card to both Cristal and Cheri, along with phone number for 3rd floor  cell phone.     Diane HITCHCOCK, Casual   Inpatient Care Coordination  [facility]  [phone]    "

## 2024-06-24 PROCEDURE — 101N000002 HC CUSTODIAL CARE DAILY

## 2024-06-24 ASSESSMENT — ACTIVITIES OF DAILY LIVING (ADL)
ADLS_ACUITY_SCORE: 43
ADLS_ACUITY_SCORE: 44
ADLS_ACUITY_SCORE: 43
ADLS_ACUITY_SCORE: 49
ADLS_ACUITY_SCORE: 45
ADLS_ACUITY_SCORE: 49
ADLS_ACUITY_SCORE: 45
ADLS_ACUITY_SCORE: 44
ADLS_ACUITY_SCORE: 43
ADLS_ACUITY_SCORE: 45
ADLS_ACUITY_SCORE: 43
ADLS_ACUITY_SCORE: 45
ADLS_ACUITY_SCORE: 43
ADLS_ACUITY_SCORE: 44
ADLS_ACUITY_SCORE: 45
ADLS_ACUITY_SCORE: 43
ADLS_ACUITY_SCORE: 49
ADLS_ACUITY_SCORE: 45
ADLS_ACUITY_SCORE: 44
ADLS_ACUITY_SCORE: 45
ADLS_ACUITY_SCORE: 43

## 2024-06-24 NOTE — PLAN OF CARE
"Pt disoriented x4. Calm, pleasant, cooperative w cares, repetition of commands required. A1 gb/wkr, minimally compliant w ambulation needs. Continent. Refuses removal of brief in bed. Continued w nursing home cares.     Goal Outcome Evaluation:      Plan of Care Reviewed With: patient    Overall Patient Progress: no changeOverall Patient Progress: no change    Outcome Evaluation: Pt disoriented x4, stable, A1 w gb/wkr, minimally compliant w call use of ambulation needs.      Problem: Fall Injury Risk  Goal: Absence of Fall and Fall-Related Injury  Outcome: Not Progressing  Intervention: Identify and Manage Contributors  Recent Flowsheet Documentation  Taken 6/23/2024 1700 by Sergey Sandoval, RN  Medication Review/Management: medications reviewed  Intervention: Promote Injury-Free Environment  Recent Flowsheet Documentation  Taken 6/23/2024 1700 by Sergey Sandoval, RN  Safety Promotion/Fall Prevention:   supervised activity   room organization consistent   room door open   room near nurse's station   mobility aid in reach   lighting adjusted   increase visualization of patient     Problem: Adult Inpatient Plan of Care  Goal: Plan of Care Review  Description: The Plan of Care Review/Shift note should be completed every shift.  The Outcome Evaluation is a brief statement about your assessment that the patient is improving, declining, or no change.  This information will be displayed automatically on your shift  note.  Outcome: Adequate for Care Transition  Flowsheets (Taken 6/24/2024 0024)  Outcome Evaluation: Pt disoriented x4, stable, A1 w gb/wkr, minimally compliant w call use of ambulation needs.  Plan of Care Reviewed With: patient  Overall Patient Progress: no change  Goal: Patient-Specific Goal (Individualized)  Description: You can add care plan individualizations to a care plan. Examples of Individualization might be:  \"Parent requests to be called daily at 9am for status\", \"I have a hard time hearing out of my right " "ear\", or \"Do not touch me to wake me up as it startles  me\".  Outcome: Adequate for Care Transition  Goal: Absence of Hospital-Acquired Illness or Injury  Outcome: Adequate for Care Transition  Intervention: Identify and Manage Fall Risk  Recent Flowsheet Documentation  Taken 6/23/2024 1700 by Sergey Sandoval RN  Safety Promotion/Fall Prevention:   supervised activity   room organization consistent   room door open   room near nurse's station   mobility aid in reach   lighting adjusted   increase visualization of patient  Intervention: Prevent Skin Injury  Recent Flowsheet Documentation  Taken 6/23/2024 1700 by Sergey Sandoval RN  Body Position: position changed independently  Intervention: Prevent Infection  Recent Flowsheet Documentation  Taken 6/23/2024 1700 by Sergey Sandoval RN  Infection Prevention: rest/sleep promoted  Goal: Optimal Comfort and Wellbeing  Outcome: Adequate for Care Transition  Goal: Readiness for Transition of Care  Outcome: Adequate for Care Transition     Problem: Adult Inpatient Plan of Care  Goal: Plan of Care Review  Description: The Plan of Care Review/Shift note should be completed every shift.  The Outcome Evaluation is a brief statement about your assessment that the patient is improving, declining, or no change.  This information will be displayed automatically on your shift  note.  Recent Flowsheet Documentation  Taken 6/24/2024 0024 by Sergey Sandoval RN  Outcome Evaluation: Pt disoriented x4, stable, A1 w gb/wkr, minimally compliant w call use of ambulation needs.  Plan of Care Reviewed With: patient  Overall Patient Progress: no change  Goal: Absence of Hospital-Acquired Illness or Injury  Intervention: Identify and Manage Fall Risk  Recent Flowsheet Documentation  Taken 6/23/2024 1700 by Sergey Sandoval RN  Safety Promotion/Fall Prevention:   supervised activity   room organization consistent   room door open   room near nurse's station   mobility aid in reach   lighting adjusted   " increase visualization of patient  Intervention: Prevent Skin Injury  Recent Flowsheet Documentation  Taken 6/23/2024 1700 by Sergey Sandoval RN  Body Position: position changed independently  Intervention: Prevent Infection  Recent Flowsheet Documentation  Taken 6/23/2024 1700 by Sergey Sandoval RN  Infection Prevention: rest/sleep promoted     Problem: Delirium  Goal: Improved Behavioral Control  Intervention: Minimize Safety Risk  Recent Flowsheet Documentation  Taken 6/23/2024 1700 by Sergey Sandoval RN  Communication Enhancement Strategies:   verbal communication attempts encouraged   one-step directions provided  Enhanced Safety Measures: room near unit station  Goal: Improved Attention and Thought Clarity  Intervention: Maximize Cognitive Function  Recent Flowsheet Documentation  Taken 6/23/2024 1700 by Sergey Sandoval RN  Sensory Stimulation Regulation: care clustered  Reorientation Measures: reorientation provided     Problem: Adult Inpatient Plan of Care  Goal: Plan of Care Review  Description: The Plan of Care Review/Shift note should be completed every shift.  The Outcome Evaluation is a brief statement about your assessment that the patient is improving, declining, or no change.  This information will be displayed automatically on your shift  note.  Recent Flowsheet Documentation  Taken 6/24/2024 0024 by Sergey Sandoval RN  Outcome Evaluation: Pt disoriented x4, stable, A1 w gb/wkr, minimally compliant w call use of ambulation needs.  Plan of Care Reviewed With: patient  Overall Patient Progress: no change  Goal: Absence of Hospital-Acquired Illness or Injury  Intervention: Identify and Manage Fall Risk  Recent Flowsheet Documentation  Taken 6/23/2024 1700 by Sergey Sandoval RN  Safety Promotion/Fall Prevention:   supervised activity   room organization consistent   room door open   room near nurse's station   mobility aid in reach   lighting adjusted   increase visualization of patient  Intervention: Prevent  Skin Injury  Recent Flowsheet Documentation  Taken 6/23/2024 1700 by Sergey Sandoval RN  Body Position: position changed independently  Intervention: Prevent Infection  Recent Flowsheet Documentation  Taken 6/23/2024 1700 by Sergey Sandoval RN  Infection Prevention: rest/sleep promoted     Problem: Fall Injury Risk  Goal: Absence of Fall and Fall-Related Injury  Intervention: Identify and Manage Contributors  Recent Flowsheet Documentation  Taken 6/23/2024 1700 by Sergey Sandoval RN  Medication Review/Management: medications reviewed  Intervention: Promote Injury-Free Environment  Recent Flowsheet Documentation  Taken 6/23/2024 1700 by Sergey Sandoval RN  Safety Promotion/Fall Prevention:   supervised activity   room organization consistent   room door open   room near nurse's station   mobility aid in reach   lighting adjusted   increase visualization of patient     Problem: Adult Inpatient Plan of Care  Goal: Absence of Hospital-Acquired Illness or Injury  Intervention: Identify and Manage Fall Risk  Recent Flowsheet Documentation  Taken 6/23/2024 1700 by Sergey Sandoval RN  Safety Promotion/Fall Prevention:   supervised activity   room organization consistent   room door open   room near nurse's station   mobility aid in reach   lighting adjusted   increase visualization of patient  Intervention: Prevent Skin Injury  Recent Flowsheet Documentation  Taken 6/23/2024 1700 by Sergey Sandoval RN  Body Position: position changed independently  Intervention: Prevent Infection  Recent Flowsheet Documentation  Taken 6/23/2024 1700 by Sergey Sandoval RN  Infection Prevention: rest/sleep promoted     Problem: Fall Injury Risk  Goal: Absence of Fall and Fall-Related Injury  Intervention: Identify and Manage Contributors  Recent Flowsheet Documentation  Taken 6/23/2024 1700 by Sergey Sandoval RN  Medication Review/Management: medications reviewed  Intervention: Promote Injury-Free Environment  Recent Flowsheet Documentation  Taken  6/23/2024 1700 by Sergey Sandoval, RN  Safety Promotion/Fall Prevention:   supervised activity   room organization consistent   room door open   room near nurse's station   mobility aid in reach   lighting adjusted   increase visualization of patient

## 2024-06-24 NOTE — PROGRESS NOTES
[facility]    Medicine Progress Note - Hospitalist Service    Date of Admission:  6/5/2024    Assessment & Plan   88-year-old male who was transitioned to [facility] care on 6/5.  He has history of dementia and family is unable to provide care for him.  He was recently treated for right full cellulitis which has resolved.  Patient is MA pending, awaiting placement in long term care.     [facility]  care patient.  This is a nonbillable social visit    [facility] care admission.  Social work consulted.  Looking for LTC.  Is MA pending. Once a bed is available patient `can go anytime     Constipation  Started/continue on senna, have as needed miralax and dulcolax available    Right foot cellulitis.  Resolved with Keflex 4 times daily through 6/11.  Swelling and redness resolved.    Venous ultrasound negative for DVT.    Dementia.  Not on any medications.  As needed olanzapine ordered for agitation.    5.   Bradycardia            Patient was noted to have HR in 50s. He is not on any beta blocker or CCB. Refused EKG.            -Discussed with RN to monitor heart rate when patient is awake, reattempt getting an EKG if patient is cooperative          Diet: Combination Diet Regular Diet  Room Service    DVT Prophylaxis: Pneumatic Compression Devices  Maddox Catheter: Not present  Lines: None     Cardiac Monitoring: None  Code Status: No CPR- Do NOT Intubate      Clinically Significant Risk Factors Present on Admission                    # Dementia: noted on problem list                      Disposition Plan     Medically Ready for Discharge: Anticipated Tomorrow             Anjali Leiva MD  Hospitalist Service  [facility]  Securely message with [employer] (more info)  Text page via [employer] Paging/Directory   ______________________________________________________________________    Interval History     Sleeping, I did not wake up the patient.    Physical Exam   Vital Signs: Temp: 98  F  (36.7  C) Temp src: Oral BP: (!) 148/82 Pulse: 61   Resp: 18 SpO2: 98 % O2 Device: None (Room air)    Weight: 0 lbs 0 oz    General Appearance: Appears comfortable, sleeping    Medical Decision Making         MINUTES SPENT BY ME on the date of service doing chart review, history, exam, documentation & further activities per the note.      Data

## 2024-06-24 NOTE — PLAN OF CARE
Shift summary ([id number])    [facility] orientation, pt not answering asked questions but did state name. VSS on RA. Denies pain, no e/o pain. No reported SOB, CP. Up to chair and bathroom Ax1 GB W. Sacral mepi placed to upper midline buttocks for small skin tear-like appearance. Poor PO intake, continuing to encourage PO intake. Awaiting placement by SW and intermediate cares per orders.     Problem: Adult Inpatient Plan of Care  Goal: Absence of Hospital-Acquired Illness or Injury  Intervention: Identify and Manage Fall Risk  Recent Flowsheet Documentation  Taken 6/24/2024 1739 by Marva Collins RN  Safety Promotion/Fall Prevention: safety round/check completed  Taken 6/24/2024 1230 by Marva Collins RN  Safety Promotion/Fall Prevention: safety round/check completed  Taken 6/24/2024 1052 by Marva Collins RN  Safety Promotion/Fall Prevention: safety round/check completed  Taken 6/24/2024 1000 by Marva Collins RN  Safety Promotion/Fall Prevention: safety round/check completed  Taken 6/24/2024 0917 by Marva Collins RN  Safety Promotion/Fall Prevention:   activity supervised   assistive device/personal items within reach   clutter free environment maintained   increased rounding and observation   increase visualization of patient   lighting adjusted   mobility aid in reach   nonskid shoes/slippers when out of bed   patient and family education   room door open   room near nurse's station   room organization consistent   safety round/check completed   supervised activity   treat reversible contributory factors   treat underlying cause  Taken 6/24/2024 0730 by Marva Collins RN  Safety Promotion/Fall Prevention: safety round/check completed  Intervention: Prevent Skin Injury  Recent Flowsheet Documentation  Taken 6/24/2024 1230 by Marva Collins RN  Body Position:   position changed independently   supine, head elevated  Taken 6/24/2024 0920 by Marva Collins  RN  Body Position:   position changed independently   supine, head elevated  Taken 6/24/2024 0917 by Marva Collins RN  Skin Protection:   adhesive use limited   incontinence pads utilized  Device Skin Pressure Protection:   absorbent pad utilized/changed   adhesive use limited   pressure points protected   tubing/devices free from skin contact  Intervention: Prevent and Manage VTE (Venous Thromboembolism) Risk  Recent Flowsheet Documentation  Taken 6/24/2024 0917 by Marva Collins RN  VTE Prevention/Management: SCDs (sequential compression devices) off  Intervention: Prevent Infection  Recent Flowsheet Documentation  Taken 6/24/2024 0917 by Marva Collins RN  Infection Prevention:   equipment surfaces disinfected   hand hygiene promoted   personal protective equipment utilized   rest/sleep promoted   single patient room provided

## 2024-06-24 NOTE — PLAN OF CARE
Goal Outcome Evaluation:    Pt currently in [facility] outpatient care. VSS, calm, disoriented x4, A1 gb walker, regular diet, no IV access, redirecting required, continent. Awaiting LTC placement.       Problem: Adult Inpatient Plan of Care  Goal: Absence of Hospital-Acquired Illness or Injury  Intervention: Identify and Manage Fall Risk  Recent Flowsheet Documentation  Taken 6/24/2024 0104 by Sabrina Medina, RN  Safety Promotion/Fall Prevention:   activity supervised   assistive device/personal items within reach   clutter free environment maintained   nonskid shoes/slippers when out of bed   safety round/check completed  Intervention: Prevent and Manage VTE (Venous Thromboembolism) Risk  Recent Flowsheet Documentation  Taken 6/24/2024 0104 by Sabrina Medina, RN  VTE Prevention/Management: SCDs (sequential compression devices) off  Intervention: Prevent Infection  Recent Flowsheet Documentation  Taken 6/24/2024 0104 by Sabrina Medina, RN  Infection Prevention: rest/sleep promoted

## 2024-06-25 PROCEDURE — 250N000013 HC RX MED GY IP 250 OP 250 PS 637: Performed by: INTERNAL MEDICINE

## 2024-06-25 PROCEDURE — 101N000002 HC CUSTODIAL CARE DAILY

## 2024-06-25 RX ADMIN — SENNOSIDES AND DOCUSATE SODIUM 1 TABLET: 8.6; 5 TABLET ORAL at 21:44

## 2024-06-25 ASSESSMENT — ACTIVITIES OF DAILY LIVING (ADL)
ADLS_ACUITY_SCORE: 48
ADLS_ACUITY_SCORE: 49
ADLS_ACUITY_SCORE: 48
ADLS_ACUITY_SCORE: 49
ADLS_ACUITY_SCORE: 47
ADLS_ACUITY_SCORE: 47
ADLS_ACUITY_SCORE: 49
ADLS_ACUITY_SCORE: 49
ADLS_ACUITY_SCORE: 48
ADLS_ACUITY_SCORE: 49
ADLS_ACUITY_SCORE: 48
ADLS_ACUITY_SCORE: 49
ADLS_ACUITY_SCORE: 48
ADLS_ACUITY_SCORE: 49
ADLS_ACUITY_SCORE: 49
ADLS_ACUITY_SCORE: 47
ADLS_ACUITY_SCORE: 49
ADLS_ACUITY_SCORE: 48
ADLS_ACUITY_SCORE: 49
ADLS_ACUITY_SCORE: 49
ADLS_ACUITY_SCORE: 48

## 2024-06-25 NOTE — PROGRESS NOTES
[facility]    Medicine Progress Note - Hospitalist Service    Date of Admission:  6/5/2024    Assessment & Plan   88-year-old male who was transitioned to halfway care on 6/5.  He has history of dementia and family is unable to provide care for him.  He was recently treated for right full cellulitis which has resolved.  Patient is MA pending, awaiting placement in long term care.     [facility]  care patient.  This is a nonbillable social visit    [facility] care admission.  Social work consulted.  Looking for LTC.  Is MA pending. Once a bed is available patient `can go anytime     Constipation  Started/continue on senna, have as needed miralax and dulcolax available    Right foot cellulitis.  Resolved with Keflex 4 times daily through 6/11.  Swelling and redness resolved.    Venous ultrasound negative for DVT.    Dementia.  Not on any medications.  As needed olanzapine ordered for agitation.    5.   Bradycardia            Patient was noted to have HR in 50s. He is not on any beta blocker or CCB. Refused EKG. asymptomatic           - Monitor clinically          Diet: Combination Diet Regular Diet  Room Service    DVT Prophylaxis: Pneumatic Compression Devices  Maddox Catheter: Not present  Lines: None     Cardiac Monitoring: None  Code Status: No CPR- Do NOT Intubate      Clinically Significant Risk Factors Present on Admission                    # Dementia: noted on problem list                      Disposition Plan     Medically Ready for Discharge: Anticipated Tomorrow             Anjali Leiva MD  Hospitalist Service  [facility]  Securely message with [employer] (more info)  Text page via [employer] Paging/Directory   ______________________________________________________________________    Interval History     Sitting in bed, said that he feels good before when I asked him.  He asked me for how long he is going to stay in the hospital.  He denied any complaints.  No chest pain  or difficulty breathing.  No weakness or dizziness    Physical Exam   Vital Signs: Temp: (P) 98.1  F (36.7  C) Temp src: (P) Oral BP: (P) 132/66 Pulse: (P) 59   Resp: (P) 18 SpO2: (P) 96 % O2 Device: (P) None (Room air)    Weight: 0 lbs 0 oz    General Appearance: Appears comfortable, sleeping  Cardiovascular: Mildly bradycardic, regular rhythm    Medical Decision Making         MINUTES SPENT BY ME on the date of service doing chart review, history, exam, documentation & further activities per the note.      Data

## 2024-06-25 NOTE — PLAN OF CARE
"Cared for 1900-0730    No falls and acute changes overnight. Ax1 gb/walker. Pt has be moving to the edge of the bed, releasing bed alarms. However, easily redirectable back to bed or bathroom if needed. Pt illogical and garbled speech. VSS on RA. Denies pain. Poor intake and appetite. Awaiting placement by SW and snf cares per orders.    Plan: Continue with plan of care  For vital signs and complete assessments, please see documentation under flowsheets.    Garima Tucker RN    Goal Outcome Evaluation:                      Problem: Adult Inpatient Plan of Care  Goal: Plan of Care Review  Description: The Plan of Care Review/Shift note should be completed every shift.  The Outcome Evaluation is a brief statement about your assessment that the patient is improving, declining, or no change.  This information will be displayed automatically on your shift  note.  6/25/2024 0121 by Prieto Tucker RN  Outcome: Progressing  Flowsheets (Taken 6/25/2024 0121)  Outcome Evaluation: no falls and acute changes overnight  Plan of Care Reviewed With: patient  Overall Patient Progress: no change  6/25/2024 0121 by Prieto Tucker RN  Reactivated  Flowsheets (Taken 6/25/2024 0121)  Outcome Evaluation: no falls and acute changes overnight  Plan of Care Reviewed With: patient  Overall Patient Progress: no change  Goal: Patient-Specific Goal (Individualized)  Description: You can add care plan individualizations to a care plan. Examples of Individualization might be:  \"Parent requests to be called daily at 9am for status\", \"I have a hard time hearing out of my right ear\", or \"Do not touch me to wake me up as it startles  me\".  6/25/2024 0121 by Prieto Tucker RN  Outcome: Progressing  6/25/2024 0121 by Prieto Tucker RN  Reactivated  Goal: Absence of Hospital-Acquired Illness or Injury  6/25/2024 0121 by Prieto Tucker RN  Outcome: Progressing  6/25/2024 0121 by Prieto Tucker RN  Reactivated  Intervention: Identify and Manage " Fall Risk  Recent Flowsheet Documentation  Taken 6/24/2024 1948 by Prieto Tucker RN  Safety Promotion/Fall Prevention: safety round/check completed  Goal: Optimal Comfort and Wellbeing  6/25/2024 0121 by Prieto Tucker RN  Outcome: Progressing  6/25/2024 0121 by Prieto Tucker RN  Reactivated  Goal: Readiness for Transition of Care  6/25/2024 0121 by Prieto Tucker RN  Outcome: Progressing  6/25/2024 0121 by Prieto Tucker RN  Reactivated     Problem: Fall Injury Risk  Goal: Absence of Fall and Fall-Related Injury  6/25/2024 0121 by Prieto Tucker RN  Outcome: Progressing  6/25/2024 0120 by Prieto Tucker RN  Outcome: Not Progressing  Intervention: Promote Injury-Free Environment  Recent Flowsheet Documentation  Taken 6/24/2024 1948 by Prieto Tucker RN  Safety Promotion/Fall Prevention: safety round/check completed

## 2024-06-25 NOTE — PLAN OF CARE
"Shift summary (0700-1930)    Pt oriented to self. [place], understands staff / makes needs known best via pen/paper. VSS on RA. Denies pain, no e/o pain. No reported CP, SOB. Up to chair and bathroom Ax1 GB W. 2 BM's this shift. Mepi to buttocks CDI. Continuing encouragement of food and drink, improving intake w/ soft foods. Awaiting placement, SW following. Continuing alf cares per orders.     Goal Outcome Evaluation:      Plan of Care Reviewed With: patient    Overall Patient Progress: no changeOverall Patient Progress: no change    Outcome Evaluation: No pt changes. Continuing alf cares, awaiting placement by SW.      Problem: Adult Inpatient Plan of Care  Goal: Plan of Care Review  Description: The Plan of Care Review/Shift note should be completed every shift.  The Outcome Evaluation is a brief statement about your assessment that the patient is improving, declining, or no change.  This information will be displayed automatically on your shift  note.  Outcome: Not Progressing  Flowsheets (Taken 6/25/2024 1119)  Outcome Evaluation: No pt changes. Continuing alf cares, awaiting placement by SW.  Plan of Care Reviewed With: patient  Overall Patient Progress: no change  Goal: Patient-Specific Goal (Individualized)  Description: You can add care plan individualizations to a care plan. Examples of Individualization might be:  \"Parent requests to be called daily at 9am for status\", \"I have a hard time hearing out of my right ear\", or \"Do not touch me to wake me up as it startles  me\".  Outcome: Not Progressing  Goal: Absence of Hospital-Acquired Illness or Injury  Outcome: Not Progressing  Intervention: Identify and Manage Fall Risk  Recent Flowsheet Documentation  Taken 6/25/2024 0905 by Marva Collins, RN  Safety Promotion/Fall Prevention:   activity supervised   assistive device/personal items within reach   clutter free environment maintained   increased rounding and observation   increase " visualization of patient   lighting adjusted   mobility aid in reach   nonskid shoes/slippers when out of bed   patient and family education   room door open   room near nurse's station   room organization consistent   safety round/check completed   supervised activity   treat reversible contributory factors   treat underlying cause  Taken 6/25/2024 0714 by Marva Collins RN  Safety Promotion/Fall Prevention: safety round/check completed  Intervention: Prevent Skin Injury  Recent Flowsheet Documentation  Taken 6/25/2024 0905 by Marva Collins RN  Body Position:   position changed independently   supine, head elevated  Skin Protection:   adhesive use limited   incontinence pads utilized  Device Skin Pressure Protection:   absorbent pad utilized/changed   adhesive use limited   pressure points protected   tubing/devices free from skin contact  Intervention: Prevent and Manage VTE (Venous Thromboembolism) Risk  Recent Flowsheet Documentation  Taken 6/25/2024 0905 by Marva Collins RN  VTE Prevention/Management: SCDs (sequential compression devices) off  Intervention: Prevent Infection  Recent Flowsheet Documentation  Taken 6/25/2024 0905 by Marva Collins RN  Infection Prevention:   equipment surfaces disinfected   hand hygiene promoted   personal protective equipment utilized   rest/sleep promoted   single patient room provided  Goal: Optimal Comfort and Wellbeing  Outcome: Not Progressing  Goal: Readiness for Transition of Care  Outcome: Not Progressing

## 2024-06-26 PROCEDURE — 250N000013 HC RX MED GY IP 250 OP 250 PS 637: Performed by: INTERNAL MEDICINE

## 2024-06-26 PROCEDURE — 101N000002 HC CUSTODIAL CARE DAILY

## 2024-06-26 RX ADMIN — SENNOSIDES AND DOCUSATE SODIUM 1 TABLET: 8.6; 5 TABLET ORAL at 21:25

## 2024-06-26 ASSESSMENT — ACTIVITIES OF DAILY LIVING (ADL)
ADLS_ACUITY_SCORE: 47
ADLS_ACUITY_SCORE: 51
ADLS_ACUITY_SCORE: 55
ADLS_ACUITY_SCORE: 55
ADLS_ACUITY_SCORE: 51
ADLS_ACUITY_SCORE: 47
ADLS_ACUITY_SCORE: 51
ADLS_ACUITY_SCORE: 55
ADLS_ACUITY_SCORE: 55
ADLS_ACUITY_SCORE: 51
ADLS_ACUITY_SCORE: 55
ADLS_ACUITY_SCORE: 55
ADLS_ACUITY_SCORE: 47
ADLS_ACUITY_SCORE: 51
ADLS_ACUITY_SCORE: 47

## 2024-06-26 NOTE — PLAN OF CARE
"Orientation: oriented to self VSS  Pain: denies pain,  O2: RA  GI/: Ambulates to restroom: 1 person assist.   Activity: A1 GB with walker.  Diet: regular diet  Protocols: none  Major shift events:  Plan:waiting placement to long term care  Problem: Adult Inpatient Plan of Care  Goal: Plan of Care Review  Description: The Plan of Care Review/Shift note should be completed every shift.  The Outcome Evaluation is a brief statement about your assessment that the patient is improving, declining, or no change.  This information will be displayed automatically on your shift  note.  Outcome: Progressing  Flowsheets (Taken 6/26/2024 0015)  Plan of Care Reviewed With: patient  Overall Patient Progress: no change  Goal: Patient-Specific Goal (Individualized)  Description: You can add care plan individualizations to a care plan. Examples of Individualization might be:  \"Parent requests to be called daily at 9am for status\", \"I have a hard time hearing out of my right ear\", or \"Do not touch me to wake me up as it startles  me\".  Outcome: Progressing  Goal: Absence of Hospital-Acquired Illness or Injury  Outcome: Progressing  Intervention: Identify and Manage Fall Risk  Recent Flowsheet Documentation  Taken 6/25/2024 2033 by Adams Gil RN  Safety Promotion/Fall Prevention:   activity supervised   assistive device/personal items within reach   clutter free environment maintained   nonskid shoes/slippers when out of bed   safety round/check completed  Intervention: Prevent and Manage VTE (Venous Thromboembolism) Risk  Recent Flowsheet Documentation  Taken 6/25/2024 2033 by Adams Gil RN  VTE Prevention/Management: SCDs (sequential compression devices) off  Intervention: Prevent Infection  Recent Flowsheet Documentation  Taken 6/25/2024 2033 by Adams Gil RN  Infection Prevention: rest/sleep promoted  Goal: Optimal Comfort and Wellbeing  Outcome: Progressing  Goal: Readiness for Transition of Care  Outcome: " Progressing     Problem: Fall Injury Risk  Goal: Absence of Fall and Fall-Related Injury  Outcome: Progressing  Intervention: Promote Injury-Free Environment  Recent Flowsheet Documentation  Taken 6/25/2024 2033 by Adams Gil RN  Safety Promotion/Fall Prevention:   activity supervised   assistive device/personal items within reach   clutter free environment maintained   nonskid shoes/slippers when out of bed   safety round/check completed   Goal Outcome Evaluation:      Plan of Care Reviewed With: patient    Overall Patient Progress: no changeOverall Patient Progress: no change

## 2024-06-26 NOTE — PLAN OF CARE
"No changes. Pt calm. Continue longterm cares. Placement pending,  following. Incont at times. Alarms in place.       Goal Outcome Evaluation:      Plan of Care Reviewed With: patient          Outcome Evaluation: No changes. Pt calm. Continue longterm cares. Placement pending,  following      Problem: Adult Inpatient Plan of Care  Goal: Plan of Care Review  Description: The Plan of Care Review/Shift note should be completed every shift.  The Outcome Evaluation is a brief statement about your assessment that the patient is improving, declining, or no change.  This information will be displayed automatically on your shift  note.  Outcome: Progressing  Flowsheets (Taken 6/26/2024 1356)  Outcome Evaluation: No changes. Pt calm. Continue longterm cares. Placement pending,  following  Plan of Care Reviewed With: patient  Goal: Patient-Specific Goal (Individualized)  Description: You can add care plan individualizations to a care plan. Examples of Individualization might be:  \"Parent requests to be called daily at 9am for status\", \"I have a hard time hearing out of my right ear\", or \"Do not touch me to wake me up as it startles  me\".  Outcome: Progressing  Goal: Absence of Hospital-Acquired Illness or Injury  Outcome: Progressing  Intervention: Identify and Manage Fall Risk  Recent Flowsheet Documentation  Taken 6/26/2024 1228 by Janine Cantu RN  Safety Promotion/Fall Prevention: activity supervised  Intervention: Prevent Skin Injury  Recent Flowsheet Documentation  Taken 6/26/2024 1228 by Janine Cantu RN  Body Position: position changed independently  Goal: Optimal Comfort and Wellbeing  Outcome: Progressing  Goal: Readiness for Transition of Care  Outcome: Progressing     Problem: Fall Injury Risk  Goal: Absence of Fall and Fall-Related Injury  Outcome: Progressing  Intervention: Identify and Manage Contributors  Recent Flowsheet Documentation  Taken 6/26/2024 1228 by Janine Cantu" RN  Medication Review/Management: medications reviewed  Intervention: Promote Injury-Free Environment  Recent Flowsheet Documentation  Taken 6/26/2024 1228 by Janine Cantu RN  Safety Promotion/Fall Prevention: activity supervised

## 2024-06-26 NOTE — PROGRESS NOTES
[facility]    Medicine Progress Note - Hospitalist Service    Date of Admission:  6/5/2024    Assessment & Plan   88-year-old male who was transitioned to senior care care on 6/5.  He has history of dementia and family is unable to provide care for him.  He was recently treated for right full cellulitis which has resolved.  Patient is MA pending, awaiting placement in long term care.     [facility]  care patient.  This is a nonbillable social visit    [facility] care admission.  Social work consulted.  Looking for LTC.  Is MA pending. Once a bed is available patient `can go anytime     Constipation  Started/continue on senna, have as needed miralax and dulcolax available    Right foot cellulitis.  Resolved with Keflex 4 times daily through 6/11.  Swelling and redness resolved.    Venous ultrasound negative for DVT.    Dementia.  Not on any medications.  As needed olanzapine ordered for agitation.    5.   Bradycardia            Patient was noted to have HR in 50s. He is not on any beta blocker or CCB. Refused EKG. asymptomatic           - Monitor clinically          Diet: Combination Diet Regular Diet  Room Service    DVT Prophylaxis: Pneumatic Compression Devices  Maddox Catheter: Not present  Lines: None     Cardiac Monitoring: None  Code Status: No CPR- Do NOT Intubate      Clinically Significant Risk Factors Present on Admission                    # Dementia: noted on problem list                      Disposition Plan     Medically Ready for Discharge: Anticipated Tomorrow             Anjali Leiva MD  Hospitalist Service  [facility]  Securely message with [employer] (more info)  Text page via [employer] Paging/Directory   ______________________________________________________________________    Interval History     Leaping in bed.  I did not wake up the patient      Physical Exam   Vital Signs: Temp: 98.6  F (37  C) Temp src: Oral BP: (!) 143/72 Pulse: 50   Resp: 16 SpO2: 95 % O2  Device: None (Room air)    Weight: 0 lbs 0 oz    General Appearance: Appears comfortable, sleeping      Medical Decision Making         MINUTES SPENT BY ME on the date of service doing chart review, history, exam, documentation & further activities per the note.      Data

## 2024-06-27 PROCEDURE — 250N000013 HC RX MED GY IP 250 OP 250 PS 637: Performed by: INTERNAL MEDICINE

## 2024-06-27 PROCEDURE — 101N000002 HC CUSTODIAL CARE DAILY

## 2024-06-27 RX ADMIN — SENNOSIDES AND DOCUSATE SODIUM 1 TABLET: 8.6; 5 TABLET ORAL at 21:38

## 2024-06-27 ASSESSMENT — ACTIVITIES OF DAILY LIVING (ADL)
ADLS_ACUITY_SCORE: 51

## 2024-06-27 NOTE — PLAN OF CARE
"Pt disoriented x4. Appears to be in no distress.  Medically ready for discharge.     Problem: Adult Inpatient Plan of Care  Goal: Plan of Care Review  Description: The Plan of Care Review/Shift note should be completed every shift.  The Outcome Evaluation is a brief statement about your assessment that the patient is improving, declining, or no change.  This information will be displayed automatically on your shift  note.  Outcome: Progressing  Flowsheets (Taken 6/27/2024 0448)  Outcome Evaluation: Resolved. Awaiting placement.  Plan of Care Reviewed With: patient  Overall Patient Progress: no change  Goal: Patient-Specific Goal (Individualized)  Description: You can add care plan individualizations to a care plan. Examples of Individualization might be:  \"Parent requests to be called daily at 9am for status\", \"I have a hard time hearing out of my right ear\", or \"Do not touch me to wake me up as it startles  me\".  Outcome: Progressing  Goal: Absence of Hospital-Acquired Illness or Injury  Outcome: Progressing  Intervention: Identify and Manage Fall Risk  Recent Flowsheet Documentation  Taken 6/27/2024 0120 by Julia Moss RN  Safety Promotion/Fall Prevention:   safety round/check completed   nonskid shoes/slippers when out of bed  Intervention: Prevent Skin Injury  Recent Flowsheet Documentation  Taken 6/27/2024 0120 by Julia Moss RN  Body Position: position changed independently  Intervention: Prevent and Manage VTE (Venous Thromboembolism) Risk  Recent Flowsheet Documentation  Taken 6/27/2024 0120 by Julia Moss RN  VTE Prevention/Management: SCDs (sequential compression devices) off  Intervention: Prevent Infection  Recent Flowsheet Documentation  Taken 6/27/2024 0120 by Julia Moss RN  Infection Prevention:   rest/sleep promoted   single patient room provided  Goal: Optimal Comfort and Wellbeing  Outcome: Progressing  Goal: Readiness for Transition of Care  Outcome: Progressing     Problem: Fall Injury " Risk  Goal: Absence of Fall and Fall-Related Injury  Outcome: Progressing  Intervention: Identify and Manage Contributors  Recent Flowsheet Documentation  Taken 6/27/2024 0120 by Julia Moss RN  Medication Review/Management: medications reviewed  Intervention: Promote Injury-Free Environment  Recent Flowsheet Documentation  Taken 6/27/2024 0120 by Julia Moss, RN  Safety Promotion/Fall Prevention:   safety round/check completed   nonskid shoes/slippers when out of bed   Goal Outcome Evaluation:      Plan of Care Reviewed With: patient    Overall Patient Progress: no changeOverall Patient Progress: no change    Outcome Evaluation: Resolved. Awaiting placement.

## 2024-06-27 NOTE — PLAN OF CARE
"No changes. Pt calm. Continue shelter cares. Placement pending,  following. Incont at times. Alarms in place.            Goal Outcome Evaluation:      Plan of Care Reviewed With: patient    Overall Patient Progress: improvingOverall Patient Progress: improving    Outcome Evaluation: Pleasant, calm, waiting on placement, halfway cares      Problem: Adult Inpatient Plan of Care  Goal: Plan of Care Review  Description: The Plan of Care Review/Shift note should be completed every shift.  The Outcome Evaluation is a brief statement about your assessment that the patient is improving, declining, or no change.  This information will be displayed automatically on your shift  note.  Outcome: Progressing  Flowsheets (Taken 6/27/2024 1316)  Outcome Evaluation: Pleasant, calm, waiting on placement, halfway cares  Plan of Care Reviewed With: patient  Overall Patient Progress: improving  Goal: Patient-Specific Goal (Individualized)  Description: You can add care plan individualizations to a care plan. Examples of Individualization might be:  \"Parent requests to be called daily at 9am for status\", \"I have a hard time hearing out of my right ear\", or \"Do not touch me to wake me up as it startles  me\".  Outcome: Progressing  Goal: Absence of Hospital-Acquired Illness or Injury  Outcome: Progressing  Goal: Optimal Comfort and Wellbeing  Outcome: Progressing  Goal: Readiness for Transition of Care  Outcome: Progressing     Problem: Fall Injury Risk  Goal: Absence of Fall and Fall-Related Injury  Outcome: Progressing     "

## 2024-06-27 NOTE — PROGRESS NOTES
[facility]    Medicine Progress Note - Hospitalist Service    Date of Admission:  6/5/2024    Assessment & Plan   88-year-old male who was transitioned to shelter care on 6/5.  He has history of dementia and family is unable to provide care for him.  He was recently treated for right full cellulitis which has resolved.  Patient is MA pending, awaiting placement in long term care.     assisted  care patient.  This is a nonbillable social visit    assisted care admission.  Social work consulted.  Looking for LTC.  Is MA pending. Once a bed is available patient `can go anytime     Constipation  Started/continue on senna, have as needed miralax and dulcolax available    Right foot cellulitis.  Resolved with Keflex 4 times daily through 6/11.  Swelling and redness resolved.    Venous ultrasound negative for DVT.    Dementia.  Not on any medications.  As needed olanzapine ordered for agitation.    5.   Bradycardia            Patient was noted to have HR in 50s. He is not on any beta blocker or CCB. Refused EKG. asymptomatic           - Monitor clinically          Diet: Combination Diet Regular Diet  Room Service    DVT Prophylaxis: Pneumatic Compression Devices  Maddox Catheter: Not present  Lines: None     Cardiac Monitoring: None  Code Status: No CPR- Do NOT Intubate      Clinically Significant Risk Factors Present on Admission                    # Dementia: noted on problem list                      Disposition Plan     Medically Ready for Discharge: Anticipated Tomorrow             Edna Kelly MD  Hospitalist Service  [facility]  Securely message with [employer] (more info)  Text page via [employer] Paging/Directory   ______________________________________________________________________    Interval History     Resting in bed.  Discussed with bedside RN.  No acute issues.  Denies any complaints      Physical Exam   Vital Signs: Temp: 98.5  F (36.9  C) Temp src: Oral BP: 138/65  Pulse: 56   Resp: 17 SpO2: 94 % O2 Device: None (Room air)    Weight: 0 lbs 0 oz    General Appearance: Appears comfortable, resting in bed      Medical Decision Making         MINUTES SPENT BY ME on the date of service doing chart review, history, exam, documentation & further activities per the note.      Data

## 2024-06-27 NOTE — PLAN OF CARE
"Shift Summary (6421 - 7801):    Pt pleasantly confused, cooperative and redirectable. Assist of 1 gb wk to bathroom, inc at times. Fair appetite, needs encouragement. Alarms in place. Placement pending.    Goal Outcome Evaluation:      Plan of Care Reviewed With: patient    Overall Patient Progress: no change    Outcome Evaluation: Pt calm, pleasant. Continue group home cares. Awaiting placement.      Problem: Adult Inpatient Plan of Care  Goal: Plan of Care Review  Description: The Plan of Care Review/Shift note should be completed every shift.  The Outcome Evaluation is a brief statement about your assessment that the patient is improving, declining, or no change.  This information will be displayed automatically on your shift  note.  Outcome: Progressing  Flowsheets (Taken 6/26/2024 2357)  Outcome Evaluation: Pt calm, pleasant. Continue group home cares. Awaiting placement.  Plan of Care Reviewed With: patient  Overall Patient Progress: no change  Goal: Patient-Specific Goal (Individualized)  Description: You can add care plan individualizations to a care plan. Examples of Individualization might be:  \"Parent requests to be called daily at 9am for status\", \"I have a hard time hearing out of my right ear\", or \"Do not touch me to wake me up as it startles  me\".  Outcome: Progressing  Goal: Absence of Hospital-Acquired Illness or Injury  Outcome: Progressing  Intervention: Identify and Manage Fall Risk  Recent Flowsheet Documentation  Taken 6/26/2024 1554 by Nayana Trinh, RN  Safety Promotion/Fall Prevention:   activity supervised   assistive device/personal items within reach   clutter free environment maintained   increased rounding and observation   nonskid shoes/slippers when out of bed   room door open   room near nurse's station   room organization consistent   safety round/check completed   supervised activity  Intervention: Prevent Skin Injury  Recent Flowsheet Documentation  Taken 6/26/2024 1554 by Nayana Trinh " LATESHA RN  Body Position:   position changed independently   supine, head elevated  Intervention: Prevent Infection  Recent Flowsheet Documentation  Taken 6/26/2024 1554 by Nayana Trinh RN  Infection Prevention: single patient room provided  Goal: Optimal Comfort and Wellbeing  Outcome: Progressing  Goal: Readiness for Transition of Care  Outcome: Progressing     Problem: Fall Injury Risk  Goal: Absence of Fall and Fall-Related Injury  Outcome: Progressing  Intervention: Identify and Manage Contributors  Recent Flowsheet Documentation  Taken 6/26/2024 1554 by Nayana Trinh RN  Medication Review/Management: medications reviewed  Intervention: Promote Injury-Free Environment  Recent Flowsheet Documentation  Taken 6/26/2024 1554 by Nayana Trinh, RN  Safety Promotion/Fall Prevention:   activity supervised   assistive device/personal items within reach   clutter free environment maintained   increased rounding and observation   nonskid shoes/slippers when out of bed   room door open   room near nurse's station   room organization consistent   safety round/check completed   supervised activity

## 2024-06-28 PROCEDURE — 250N000013 HC RX MED GY IP 250 OP 250 PS 637: Performed by: INTERNAL MEDICINE

## 2024-06-28 PROCEDURE — 101N000002 HC CUSTODIAL CARE DAILY

## 2024-06-28 RX ADMIN — SENNOSIDES AND DOCUSATE SODIUM 1 TABLET: 8.6; 5 TABLET ORAL at 22:02

## 2024-06-28 ASSESSMENT — ACTIVITIES OF DAILY LIVING (ADL)
ADLS_ACUITY_SCORE: 51

## 2024-06-28 NOTE — PLAN OF CARE
"longterm cares. No changes overnight. No signs of pain or distress. Disoriented x4. Ax1 GB walker. Alarms in place. Waiting for placement.    Goal Outcome Evaluation:      Plan of Care Reviewed With: patient    Overall Patient Progress: no change    Outcome Evaluation: longterm cares. Awaiting placement. Ax1 GB walker.    Problem: Adult Inpatient Plan of Care  Goal: Plan of Care Review  Description: The Plan of Care Review/Shift note should be completed every shift.  The Outcome Evaluation is a brief statement about your assessment that the patient is improving, declining, or no change.  This information will be displayed automatically on your shift  note.  Outcome: Progressing  Flowsheets (Taken 6/28/2024 0354)  Outcome Evaluation: longterm cares. Awaiting placement. Ax1 GB walker.  Plan of Care Reviewed With: patient  Overall Patient Progress: no change  Goal: Patient-Specific Goal (Individualized)  Description: You can add care plan individualizations to a care plan. Examples of Individualization might be:  \"Parent requests to be called daily at 9am for status\", \"I have a hard time hearing out of my right ear\", or \"Do not touch me to wake me up as it startles  me\".  Outcome: Progressing  Goal: Absence of Hospital-Acquired Illness or Injury  Outcome: Progressing  Intervention: Identify and Manage Fall Risk  Recent Flowsheet Documentation  Taken 6/28/2024 0237 by Donna Shine, RN  Safety Promotion/Fall Prevention: safety round/check completed  Taken 6/28/2024 0130 by Donna Shine, RN  Safety Promotion/Fall Prevention:   activity supervised   assistive device/personal items within reach   clutter free environment maintained   increased rounding and observation   increase visualization of patient   lighting adjusted   mobility aid in reach   nonskid shoes/slippers when out of bed   patient and family education   room near nurse's station   room organization consistent   safety round/check completed   " supervised activity  Taken 6/27/2024 2330 by Donna Shine RN  Safety Promotion/Fall Prevention: safety round/check completed  Intervention: Prevent Skin Injury  Recent Flowsheet Documentation  Taken 6/28/2024 0130 by Donna Shine RN  Body Position: position changed independently  Intervention: Prevent Infection  Recent Flowsheet Documentation  Taken 6/28/2024 0130 by Donna Shine RN  Infection Prevention:   hand hygiene promoted   rest/sleep promoted   single patient room provided  Goal: Optimal Comfort and Wellbeing  Outcome: Progressing  Goal: Readiness for Transition of Care  Outcome: Progressing     Problem: Fall Injury Risk  Goal: Absence of Fall and Fall-Related Injury  Outcome: Progressing  Intervention: Identify and Manage Contributors  Recent Flowsheet Documentation  Taken 6/28/2024 0130 by Donna Shine RN  Medication Review/Management: medications reviewed  Intervention: Promote Injury-Free Environment  Recent Flowsheet Documentation  Taken 6/28/2024 0237 by Donna Shine RN  Safety Promotion/Fall Prevention: safety round/check completed  Taken 6/28/2024 0130 by Donna Shine RN  Safety Promotion/Fall Prevention:   activity supervised   assistive device/personal items within reach   clutter free environment maintained   increased rounding and observation   increase visualization of patient   lighting adjusted   mobility aid in reach   nonskid shoes/slippers when out of bed   patient and family education   room near nurse's station   room organization consistent   safety round/check completed   supervised activity  Taken 6/27/2024 2330 by Donna Shine RN  Safety Promotion/Fall Prevention: safety round/check completed

## 2024-06-28 NOTE — PROGRESS NOTES
[facility]    Medicine Progress Note - Hospitalist Service    Date of Admission:  6/5/2024    Assessment & Plan   88-year-old male who was transitioned to nursing home care on 6/5.  He has history of dementia and family is unable to provide care for him.  He was recently treated for right full cellulitis which has resolved.  Patient is MA pending, awaiting placement in long term care.     [facility]  care patient.  This is a nonbillable social visit    [facility] care admission.  Social work consulted.  Looking for LTC.  Is MA pending. Once a bed is available patient `can go anytime     Constipation  Started/continue on senna, have as needed miralax and dulcolax available    Right foot cellulitis.  Resolved with Keflex 4 times daily through 6/11.  Swelling and redness resolved.    Venous ultrasound negative for DVT.    Dementia.  Not on any medications.  As needed olanzapine ordered for agitation.    5.   Bradycardia            Patient was noted to have HR in 50s. He is not on any beta blocker or CCB. Refused EKG. asymptomatic           - Monitor clinically          Diet: Combination Diet Regular Diet  Room Service    DVT Prophylaxis: Pneumatic Compression Devices  Maddox Catheter: Not present  Lines: None     Cardiac Monitoring: None  Code Status: No CPR- Do NOT Intubate      Clinically Significant Risk Factors Present on Admission                    # Dementia: noted on problem list                      Disposition Plan     Medically Ready for Discharge: Anticipated Tomorrow             Edna Kelly MD  Hospitalist Service  [facility]  Securely message with [email or web address] (more info)  Text page via [employer] Paging/Directory   ______________________________________________________________________    Interval History     Resting in bed.  Discussed with bedside RN.  No acute issues.  Denies any complaints      Physical Exam   Vital Signs:                    Weight: 0 lbs 0 oz    General  Appearance: Appears comfortable, resting in bed      Medical Decision Making         MINUTES SPENT BY ME on the date of service doing chart review, history, exam, documentation & further activities per the note.      Data

## 2024-06-28 NOTE — PLAN OF CARE
"End of shift note: Pt alert, disoriented x4. Sating well on RA, VSS. Pt up 1 assist w/ GB and walker. VSS. No IV access, MD aware.     Goal Outcome Evaluation:      Plan of Care Reviewed With: patient    Overall Patient Progress: no changeOverall Patient Progress: no change    Outcome Evaluation: [facility]. Denies pain. Awaiting placement.      Problem: Adult Inpatient Plan of Care  Goal: Plan of Care Review  Description: The Plan of Care Review/Shift note should be completed every shift.  The Outcome Evaluation is a brief statement about your assessment that the patient is improving, declining, or no change.  This information will be displayed automatically on your shift  note.  Outcome: Progressing  Flowsheets (Taken 6/28/2024 1422)  Outcome Evaluation: prison cares. Denies pain. Awaiting placement.  Plan of Care Reviewed With: patient  Overall Patient Progress: no change  Goal: Patient-Specific Goal (Individualized)  Description: You can add care plan individualizations to a care plan. Examples of Individualization might be:  \"Parent requests to be called daily at 9am for status\", \"I have a hard time hearing out of my right ear\", or \"Do not touch me to wake me up as it startles  me\".  Outcome: Progressing  Goal: Absence of Hospital-Acquired Illness or Injury  Outcome: Progressing  Intervention: Identify and Manage Fall Risk  Recent Flowsheet Documentation  Taken 6/28/2024 1145 by Donna Mauricio RN  Safety Promotion/Fall Prevention: safety round/check completed  Taken 6/28/2024 1035 by Donna Mauricio RN  Safety Promotion/Fall Prevention: safety round/check completed  Taken 6/28/2024 0930 by Donna Mauricio RN  Safety Promotion/Fall Prevention: safety round/check completed  Taken 6/28/2024 0739 by Donna Mauricio, RN  Safety Promotion/Fall Prevention: safety round/check completed  Intervention: Prevent Skin Injury  Recent Flowsheet Documentation  Taken 6/28/2024 0834 by Donna Mauricio RN  Body " Position: position changed independently  Goal: Optimal Comfort and Wellbeing  Outcome: Progressing  Goal: Readiness for Transition of Care  Outcome: Progressing     Problem: Fall Injury Risk  Goal: Absence of Fall and Fall-Related Injury  Outcome: Progressing  Intervention: Promote Injury-Free Environment  Recent Flowsheet Documentation  Taken 6/28/2024 1145 by Donna Mauricio, RN  Safety Promotion/Fall Prevention: safety round/check completed  Taken 6/28/2024 1035 by Donna Mauricio, RN  Safety Promotion/Fall Prevention: safety round/check completed  Taken 6/28/2024 0930 by Donna Mauricio, RN  Safety Promotion/Fall Prevention: safety round/check completed  Taken 6/28/2024 0739 by Donna Mauricio, RN  Safety Promotion/Fall Prevention: safety round/check completed

## 2024-06-28 NOTE — PLAN OF CARE
"Goal Outcome Evaluation:      Plan of Care Reviewed With: patient    Overall Patient Progress: improving    Outcome Evaluation: [facility] cares. Pleasant and calm. Awaiting placement.      Problem: Adult Inpatient Plan of Care  Goal: Plan of Care Review  Description: The Plan of Care Review/Shift note should be completed every shift.  The Outcome Evaluation is a brief statement about your assessment that the patient is improving, declining, or no change.  This information will be displayed automatically on your shift  note.  Outcome: Progressing  Flowsheets (Taken 6/28/2024 0027)  Outcome Evaluation: [facility] cares. Pleasant and calm. Awaiting placement.  Plan of Care Reviewed With: patient  Overall Patient Progress: improving  Goal: Patient-Specific Goal (Individualized)  Description: You can add care plan individualizations to a care plan. Examples of Individualization might be:  \"Parent requests to be called daily at 9am for status\", \"I have a hard time hearing out of my right ear\", or \"Do not touch me to wake me up as it startles  me\".  Outcome: Progressing  Goal: Absence of Hospital-Acquired Illness or Injury  Outcome: Progressing  Intervention: Identify and Manage Fall Risk  Recent Flowsheet Documentation  Taken 6/27/2024 1700 by Nayana Trinh, RN  Safety Promotion/Fall Prevention:   activity supervised   assistive device/personal items within reach   clutter free environment maintained   mobility aid in reach   nonskid shoes/slippers when out of bed   room door open   room near nurse's station   room organization consistent   safety round/check completed   supervised activity  Intervention: Prevent Skin Injury  Recent Flowsheet Documentation  Taken 6/27/2024 1700 by Nayana Trinh RN  Body Position: position changed independently  Goal: Optimal Comfort and Wellbeing  Outcome: Progressing  Goal: Readiness for Transition of Care  Outcome: Progressing     Problem: Fall Injury Risk  Goal: Absence of Fall and " Fall-Related Injury  Outcome: Progressing  Intervention: Identify and Manage Contributors  Recent Flowsheet Documentation  Taken 6/27/2024 1700 by Nayana Trinh, RN  Medication Review/Management: medications reviewed  Intervention: Promote Injury-Free Environment  Recent Flowsheet Documentation  Taken 6/27/2024 1700 by Nayana Trinh, RN  Safety Promotion/Fall Prevention:   activity supervised   assistive device/personal items within reach   clutter free environment maintained   mobility aid in reach   nonskid shoes/slippers when out of bed   room door open   room near nurse's station   room organization consistent   safety round/check completed   supervised activity

## 2024-06-29 PROCEDURE — 101N000002 HC CUSTODIAL CARE DAILY

## 2024-06-29 PROCEDURE — 250N000013 HC RX MED GY IP 250 OP 250 PS 637: Performed by: INTERNAL MEDICINE

## 2024-06-29 RX ADMIN — SENNOSIDES AND DOCUSATE SODIUM 1 TABLET: 8.6; 5 TABLET ORAL at 21:11

## 2024-06-29 ASSESSMENT — ACTIVITIES OF DAILY LIVING (ADL)
ADLS_ACUITY_SCORE: 51

## 2024-06-29 NOTE — PROGRESS NOTES
[facility]    Medicine Progress Note - Hospitalist Service    Date of Admission:  6/5/2024    Assessment & Plan   88-year-old male who was [facility]ed to [facility] on 6/5.  He has history of dementia and family is unable to provide care for him.  He was recently treated for right full cellulitis which has resolved.  Patient is MA pending, awaiting placement in long term care.     [facility]  care patient.  This is a nonbillable social visit    [facility] care admission.  Social work consulted.  Looking for LTC.  Is MA pending. Once a bed is available patient `can go anytime     Constipation  Started/continue on senna, have as needed miralax and dulcolax available    Right foot cellulitis.  Resolved with Keflex 4 times daily through 6/11.  Swelling and redness resolved.    Venous ultrasound negative for DVT.    Dementia.  Not on any medications.  As needed olanzapine ordered for agitation.    5.   Bradycardia            Patient was noted to have HR in 50s. He is not on any beta blocker or CCB. Refused EKG. asymptomatic           - Monitor clinically          Diet: Combination Diet Regular Diet  Room Service    DVT Prophylaxis: Pneumatic Compression Devices  Maddox Catheter: Not present  Lines: None     Cardiac Monitoring: None  Code Status: No CPR- Do NOT Intubate      Clinically Significant Risk Factors Present on Admission                    # Dementia: noted on problem list                      Disposition Plan     Medically Ready for Discharge: Anticipated Tomorrow             Mendez Dobbs MD  Hospitalist Service  [facility]  Securely message with [employer] (more info)  Text page via [employer] Paging/Directory   ______________________________________________________________________    Interval History   Nursing notes reviewed. No acute events overnight.       Physical Exam   Vital Signs: Temp: 98.1  F (36.7  C) Temp src: Oral BP: 126/63 Pulse: 56   Resp: 16 SpO2: 96 % O2  Device: None (Room air)    Weight: 0 lbs 0 oz    General Appearance: Appears comfortable, resting in bed. Lunch tray in front of him.      Medical Decision Making         MINUTES SPENT BY ME on the date of service doing chart review, history, exam, documentation & further activities per the note.      Data

## 2024-06-29 NOTE — PLAN OF CARE
"End of shift note: Pt alert, disoriented x4. Sating well on RA, VSS. Pt up 1 assist w/ GB and walker. VSS. No IV access, MD aware.      Goal Outcome Evaluation:      Plan of Care Reviewed With: patient    Overall Patient Progress: no changeOverall Patient Progress: no change    Outcome Evaluation: group home cares. Denies pain. Awaiting placement.      Problem: Adult Inpatient Plan of Care  Goal: Plan of Care Review  Description: The Plan of Care Review/Shift note should be completed every shift.  The Outcome Evaluation is a brief statement about your assessment that the patient is improving, declining, or no change.  This information will be displayed automatically on your shift  note.  Outcome: Progressing  Flowsheets (Taken 6/29/2024 1416)  Outcome Evaluation: group home cares. Denies pain. Awaiting placement.  Plan of Care Reviewed With: patient  Overall Patient Progress: no change  Goal: Patient-Specific Goal (Individualized)  Description: You can add care plan individualizations to a care plan. Examples of Individualization might be:  \"Parent requests to be called daily at 9am for status\", \"I have a hard time hearing out of my right ear\", or \"Do not touch me to wake me up as it startles  me\".  Outcome: Progressing  Goal: Absence of Hospital-Acquired Illness or Injury  Outcome: Progressing  Intervention: Identify and Manage Fall Risk  Recent Flowsheet Documentation  Taken 6/29/2024 1357 by Donna Mauricio, RN  Safety Promotion/Fall Prevention: safety round/check completed  Taken 6/29/2024 1245 by Donna Mauricio, RN  Safety Promotion/Fall Prevention: safety round/check completed  Taken 6/29/2024 1131 by Donna Mauricio, RN  Safety Promotion/Fall Prevention: safety round/check completed  Taken 6/29/2024 1030 by Donna Mauricio, RN  Safety Promotion/Fall Prevention: safety round/check completed  Taken 6/29/2024 0920 by Donna Mauricio, RN  Safety Promotion/Fall Prevention: safety round/check completed  Taken " 6/29/2024 0820 by Donna Mauricio RN  Safety Promotion/Fall Prevention: safety round/check completed  Taken 6/29/2024 0719 by Donna Mauricio RN  Safety Promotion/Fall Prevention: safety round/check completed  Goal: Optimal Comfort and Wellbeing  Outcome: Progressing  Goal: Readiness for Transition of Care  Outcome: Progressing     Problem: Fall Injury Risk  Goal: Absence of Fall and Fall-Related Injury  Outcome: Progressing  Intervention: Promote Injury-Free Environment  Recent Flowsheet Documentation  Taken 6/29/2024 1357 by Donna Mauricio RN  Safety Promotion/Fall Prevention: safety round/check completed  Taken 6/29/2024 1245 by Donna Mauricio RN  Safety Promotion/Fall Prevention: safety round/check completed  Taken 6/29/2024 1131 by Donna Mauricio RN  Safety Promotion/Fall Prevention: safety round/check completed  Taken 6/29/2024 1030 by Donna Mauricio RN  Safety Promotion/Fall Prevention: safety round/check completed  Taken 6/29/2024 0920 by Donna Mauricio RN  Safety Promotion/Fall Prevention: safety round/check completed  Taken 6/29/2024 0820 by Donna Mauricio RN  Safety Promotion/Fall Prevention: safety round/check completed  Taken 6/29/2024 0719 by Donna Mauricio RN  Safety Promotion/Fall Prevention: safety round/check completed

## 2024-06-29 NOTE — PLAN OF CARE
"prison cares. No changes overnight. No signs of pain or distress. Disoriented x4. Ax1 GB walker. Bradycardic in 50s. Alarms in place. Waiting for placement.    Goal Outcome Evaluation:       Plan of Care Reviewed With: patient    Overall Patient Progress: no change    Outcome Evaluation: prison cares. Denies pain. Awaiting placement. Ax1 GB walker.    Problem: Adult Inpatient Plan of Care  Goal: Plan of Care Review  Description: The Plan of Care Review/Shift note should be completed every shift.  The Outcome Evaluation is a brief statement about your assessment that the patient is improving, declining, or no change.  This information will be displayed automatically on your shift  note.  6/29/2024 0402 by Donna Shine RN  Outcome: Progressing  Flowsheets (Taken 6/29/2024 0402)  Outcome Evaluation: prison cares. Denies pain. Awaiting placement. Ax1 GB walker.  Plan of Care Reviewed With: patient  Overall Patient Progress: no change  6/29/2024 0401 by Donna Shine RN  Outcome: Progressing  Flowsheets (Taken 6/29/2024 0401)  Plan of Care Reviewed With: patient  Overall Patient Progress: no change  6/29/2024 0401 by Donna Shine RN  Outcome: Progressing  Flowsheets (Taken 6/29/2024 0401)  Outcome Evaluation: prison cares. Denies pain. Awaiting placement. Ax1 GB walker.  Plan of Care Reviewed With: patient  Overall Patient Progress: no change  Goal: Patient-Specific Goal (Individualized)  Description: You can add care plan individualizations to a care plan. Examples of Individualization might be:  \"Parent requests to be called daily at 9am for status\", \"I have a hard time hearing out of my right ear\", or \"Do not touch me to wake me up as it startles  me\".  6/29/2024 0402 by Donna Shine RN  Outcome: Progressing  6/29/2024 0401 by Donna Shine RN  Outcome: Progressing  6/29/2024 0401 by Donna Shine RN  Outcome: Progressing  Goal: Absence of Hospital-Acquired Illness or " Injury  6/29/2024 0402 by Donna Shine RN  Outcome: Progressing  6/29/2024 0401 by Donna Shine RN  Outcome: Progressing  6/29/2024 0401 by Donna Shine RN  Outcome: Progressing  Intervention: Identify and Manage Fall Risk  Recent Flowsheet Documentation  Taken 6/28/2024 1956 by Donna Shine RN  Safety Promotion/Fall Prevention:   assistive device/personal items within reach   clutter free environment maintained   increased rounding and observation   increase visualization of patient   lighting adjusted   mobility aid in reach   nonskid shoes/slippers when out of bed   patient and family education   room door open   room organization consistent   room near nurse's station   safety round/check completed  Intervention: Prevent Skin Injury  Recent Flowsheet Documentation  Taken 6/28/2024 1956 by Donna Shine RN  Body Position: position changed independently  Intervention: Prevent Infection  Recent Flowsheet Documentation  Taken 6/28/2024 1956 by Donna Shine RN  Infection Prevention:   hand hygiene promoted   rest/sleep promoted   single patient room provided  Goal: Optimal Comfort and Wellbeing  6/29/2024 0402 by Donna Shine RN  Outcome: Progressing  6/29/2024 0401 by Donna Shine RN  Outcome: Progressing  6/29/2024 0401 by Donna Shine RN  Outcome: Progressing  Goal: Readiness for Transition of Care  6/29/2024 0402 by Donna Shine RN  Outcome: Progressing  6/29/2024 0401 by Donna Shine RN  Outcome: Progressing  6/29/2024 0401 by Donna Shine RN  Outcome: Progressing     Problem: Fall Injury Risk  Goal: Absence of Fall and Fall-Related Injury  6/29/2024 0402 by Donan Shine RN  Outcome: Progressing  6/29/2024 0401 by Donna Shine RN  Outcome: Progressing  6/29/2024 0401 by Donna Shine RN  Outcome: Progressing  Intervention: Identify and Manage Contributors  Recent Flowsheet Documentation  Taken 6/28/2024 1956 by Rl  Donna EDWARD RN  Medication Review/Management: medications reviewed  Intervention: Promote Injury-Free Environment  Recent Flowsheet Documentation  Taken 6/28/2024 1956 by Donna Shine, RN  Safety Promotion/Fall Prevention:   assistive device/personal items within reach   clutter free environment maintained   increased rounding and observation   increase visualization of patient   lighting adjusted   mobility aid in reach   nonskid shoes/slippers when out of bed   patient and family education   room door open   room organization consistent   room near nurse's station   safety round/check completed

## 2024-06-30 PROCEDURE — 250N000013 HC RX MED GY IP 250 OP 250 PS 637: Performed by: INTERNAL MEDICINE

## 2024-06-30 PROCEDURE — 101N000002 HC CUSTODIAL CARE DAILY

## 2024-06-30 RX ADMIN — SENNOSIDES AND DOCUSATE SODIUM 1 TABLET: 8.6; 5 TABLET ORAL at 21:12

## 2024-06-30 ASSESSMENT — ACTIVITIES OF DAILY LIVING (ADL)
ADLS_ACUITY_SCORE: 51

## 2024-06-30 NOTE — PLAN OF CARE
Goal Outcome Evaluation:    Denies pain. Disoriented x4. Bradycardic.       Plan of Care Reviewed With: patient    Overall Patient Progress: no changeOverall Patient Progress: no change    Outcome Evaluation: Continue to await for placement.      Problem: Adult Inpatient Plan of Care  Goal: Plan of Care Review  Description: The Plan of Care Review/Shift note should be completed every shift.  The Outcome Evaluation is a brief statement about your assessment that the patient is improving, declining, or no change.  This information will be displayed automatically on your shift  note.  Outcome: Progressing  Flowsheets (Taken 6/30/2024 0302)  Outcome Evaluation: Continue to await for placement.  Plan of Care Reviewed With: patient  Overall Patient Progress: no change  Goal: Absence of Hospital-Acquired Illness or Injury  Intervention: Identify and Manage Fall Risk  Recent Flowsheet Documentation  Taken 6/29/2024 2001 by Manoj Palencia RN  Safety Promotion/Fall Prevention:   treat underlying cause   treat reversible contributory factors   toileting scheduled   supervised activity   safety round/check completed     Problem: Adult Inpatient Plan of Care  Goal: Plan of Care Review  Description: The Plan of Care Review/Shift note should be completed every shift.  The Outcome Evaluation is a brief statement about your assessment that the patient is improving, declining, or no change.  This information will be displayed automatically on your shift  note.  Recent Flowsheet Documentation  Taken 6/30/2024 0302 by Manoj Palencia RN  Outcome Evaluation: Continue to await for placement.  Plan of Care Reviewed With: patient  Overall Patient Progress: no change  Goal: Absence of Hospital-Acquired Illness or Injury  Intervention: Identify and Manage Fall Risk  Recent Flowsheet Documentation  Taken 6/29/2024 2001 by Manoj Palencia RN  Safety Promotion/Fall Prevention:   treat underlying cause   treat reversible contributory factors   toileting scheduled    supervised activity   safety round/check completed     Problem: Fall Injury Risk  Goal: Absence of Fall and Fall-Related Injury  Intervention: Promote Injury-Free Environment  Recent Flowsheet Documentation  Taken 6/29/2024 2001 by Manoj Palencia RN  Safety Promotion/Fall Prevention:   treat underlying cause   treat reversible contributory factors   toileting scheduled   supervised activity   safety round/check completed     Problem: Adult Inpatient Plan of Care  Goal: Absence of Hospital-Acquired Illness or Injury  Intervention: Identify and Manage Fall Risk  Recent Flowsheet Documentation  Taken 6/29/2024 2001 by Manoj Palencia RN  Safety Promotion/Fall Prevention:   treat underlying cause   treat reversible contributory factors   toileting scheduled   supervised activity   safety round/check completed     Problem: Fall Injury Risk  Goal: Absence of Fall and Fall-Related Injury  Intervention: Promote Injury-Free Environment  Recent Flowsheet Documentation  Taken 6/29/2024 2001 by Manoj Palencia RN  Safety Promotion/Fall Prevention:   treat underlying cause   treat reversible contributory factors   toileting scheduled   supervised activity   safety round/check completed     Problem: Fall Injury Risk  Goal: Absence of Fall and Fall-Related Injury  Intervention: Promote Injury-Free Environment  Recent Flowsheet Documentation  Taken 6/29/2024 2001 by Manoj Palencia RN  Safety Promotion/Fall Prevention:   treat underlying cause   treat reversible contributory factors   toileting scheduled   supervised activity   safety round/check completed     Problem: Adult Inpatient Plan of Care  Goal: Plan of Care Review  Description: The Plan of Care Review/Shift note should be completed every shift.  The Outcome Evaluation is a brief statement about your assessment that the patient is improving, declining, or no change.  This information will be displayed automatically on your shift  note.  Recent Flowsheet Documentation  Taken 6/30/2024 0302 by Slime  Manoj, RN  Outcome Evaluation: Continue to await for placement.  Plan of Care Reviewed With: patient  Overall Patient Progress: no change  Goal: Absence of Hospital-Acquired Illness or Injury  Intervention: Identify and Manage Fall Risk  Recent Flowsheet Documentation  Taken 6/29/2024 2001 by Manoj Palencia, RN  Safety Promotion/Fall Prevention:   treat underlying cause   treat reversible contributory factors   toileting scheduled   supervised activity   safety round/check completed

## 2024-06-30 NOTE — PLAN OF CARE
"End of shift note: Pt alert, disoriented x4. Sating well on RA, VSS. Pt up 1 assist w/ GB and walker. VSS. No IV access, MD aware.     Goal Outcome Evaluation:      Plan of Care Reviewed With: patient, child    Overall Patient Progress: no changeOverall Patient Progress: no change    Outcome Evaluation: Denies pain. Awaiting placement.      Problem: Adult Inpatient Plan of Care  Goal: Plan of Care Review  Description: The Plan of Care Review/Shift note should be completed every shift.  The Outcome Evaluation is a brief statement about your assessment that the patient is improving, declining, or no change.  This information will be displayed automatically on your shift  note.  Outcome: Progressing  Flowsheets (Taken 6/30/2024 1408)  Outcome Evaluation: Denies pain. Awaiting placement.  Plan of Care Reviewed With:   patient   child  Overall Patient Progress: no change  Goal: Patient-Specific Goal (Individualized)  Description: You can add care plan individualizations to a care plan. Examples of Individualization might be:  \"Parent requests to be called daily at 9am for status\", \"I have a hard time hearing out of my right ear\", or \"Do not touch me to wake me up as it startles  me\".  Outcome: Progressing  Goal: Absence of Hospital-Acquired Illness or Injury  Outcome: Progressing  Intervention: Identify and Manage Fall Risk  Recent Flowsheet Documentation  Taken 6/30/2024 1315 by Donna Mauricio, RN  Safety Promotion/Fall Prevention: safety round/check completed  Taken 6/30/2024 1153 by Donna Mauricio, RN  Safety Promotion/Fall Prevention: safety round/check completed  Taken 6/30/2024 1044 by Donna Mauricio, RN  Safety Promotion/Fall Prevention: safety round/check completed  Taken 6/30/2024 0930 by Donna Mauricio, RN  Safety Promotion/Fall Prevention: safety round/check completed  Taken 6/30/2024 0830 by Donna Mauricio, RN  Safety Promotion/Fall Prevention: safety round/check completed  Taken 6/30/2024 0729 by " Mauricio, Donna, RN  Safety Promotion/Fall Prevention: safety round/check completed  Taken 6/30/2024 0721 by Donna Mauricio RN  Safety Promotion/Fall Prevention: safety round/check completed  Intervention: Prevent Skin Injury  Recent Flowsheet Documentation  Taken 6/30/2024 0729 by Donna Mauricio RN  Body Position: position changed independently  Intervention: Prevent and Manage VTE (Venous Thromboembolism) Risk  Recent Flowsheet Documentation  Taken 6/30/2024 0729 by Donna Mauricio RN  VTE Prevention/Management: SCDs off (sequential compression devices)  Intervention: Prevent Infection  Recent Flowsheet Documentation  Taken 6/30/2024 0729 by Donna Mauricio RN  Infection Prevention:   hand hygiene promoted   rest/sleep promoted   single patient room provided  Goal: Optimal Comfort and Wellbeing  Outcome: Progressing  Goal: Readiness for Transition of Care  Outcome: Progressing     Problem: Fall Injury Risk  Goal: Absence of Fall and Fall-Related Injury  Outcome: Progressing  Intervention: Identify and Manage Contributors  Recent Flowsheet Documentation  Taken 6/30/2024 0729 by Donna Mauricio RN  Medication Review/Management: medications reviewed  Intervention: Promote Injury-Free Environment  Recent Flowsheet Documentation  Taken 6/30/2024 1315 by Donna Mauricio RN  Safety Promotion/Fall Prevention: safety round/check completed  Taken 6/30/2024 1153 by Donna Mauricio RN  Safety Promotion/Fall Prevention: safety round/check completed  Taken 6/30/2024 1044 by Donna Mauricio RN  Safety Promotion/Fall Prevention: safety round/check completed  Taken 6/30/2024 0930 by Donna Mauricio RN  Safety Promotion/Fall Prevention: safety round/check completed  Taken 6/30/2024 0830 by Donna Mauricio RN  Safety Promotion/Fall Prevention: safety round/check completed  Taken 6/30/2024 0729 by Donna Mauricio RN  Safety Promotion/Fall Prevention: safety round/check completed  Taken 6/30/2024 0721 by  Donna Mauricio, RN  Safety Promotion/Fall Prevention: safety round/check completed

## 2024-06-30 NOTE — PROGRESS NOTES
[facility]    Medicine Progress Note - Hospitalist Service    Date of Admission:  6/5/2024    Assessment & Plan   88-year-old male who was transitioned to senior living care on 6/5.  He has history of dementia and family is unable to provide care for him.  He was recently treated for right full cellulitis which has resolved.  Patient is MA pending, awaiting placement in long term care.     alf  care patient.  This is a nonbillable social visit    alf care admission.  Social work consulted.  Looking for LTC.  Is MA pending. Once a bed is available patient `can go anytime     Constipation  Started/continue on senna, have as needed miralax and dulcolax available    Right foot cellulitis.  Resolved with Keflex 4 times daily through 6/11.  Swelling and redness resolved.    Venous ultrasound negative for DVT.    Dementia.  Not on any medications.  As needed olanzapine ordered for agitation.    5.   Bradycardia            Patient was noted to have HR in 50s. He is not on any beta blocker or CCB. Refused EKG. asymptomatic           - Monitor clinically          Diet: Combination Diet Regular Diet  Room Service    DVT Prophylaxis: Pneumatic Compression Devices  Maddox Catheter: Not present  Lines: None     Cardiac Monitoring: None  Code Status: No CPR- Do NOT Intubate      Clinically Significant Risk Factors Present on Admission                    # Dementia: noted on problem list                      Disposition Plan     Medically Ready for Discharge: Ready Now           Mendez Dobbs MD  Hospitalist Service  [facility]  Securely message with [employer] (more info)  Text page via [employer] Paging/Directory   ______________________________________________________________________    Interval History   Nursing notes reviewed. No acute events overnight. Patient doing well. Sitting up in bed.      Physical Exam   Vital Signs: Temp: 98.6  F (37  C) Temp src: Oral BP: 114/61 Pulse: 54    Resp: 16 SpO2: 96 % O2 Device: None (Room air)    Weight: 0 lbs 0 oz    General Appearance: Appears comfortable, resting in bed.      Medical Decision Making         MINUTES SPENT BY ME on the date of service doing chart review, history, exam, documentation & further activities per the note.      Data

## 2024-07-01 PROCEDURE — 101N000002 HC CUSTODIAL CARE DAILY

## 2024-07-01 PROCEDURE — 250N000013 HC RX MED GY IP 250 OP 250 PS 637: Performed by: INTERNAL MEDICINE

## 2024-07-01 RX ADMIN — SENNOSIDES AND DOCUSATE SODIUM 1 TABLET: 8.6; 5 TABLET ORAL at 21:25

## 2024-07-01 ASSESSMENT — ACTIVITIES OF DAILY LIVING (ADL)
ADLS_ACUITY_SCORE: 51

## 2024-07-01 NOTE — PLAN OF CARE
"Goal Outcome Evaluation:      Plan of Care Reviewed With: patient    Overall Patient Progress: improvingOverall Patient Progress: improving    Outcome Evaluation: Awaiting placement      Problem: Adult Inpatient Plan of Care  Goal: Plan of Care Review  Description: The Plan of Care Review/Shift note should be completed every shift.  The Outcome Evaluation is a brief statement about your assessment that the patient is improving, declining, or no change.  This information will be displayed automatically on your shift  note.  Outcome: Progressing  Flowsheets (Taken 7/1/2024 7783)  Outcome Evaluation: Awaiting placement  Plan of Care Reviewed With: patient  Overall Patient Progress: improving  Goal: Patient-Specific Goal (Individualized)  Description: You can add care plan individualizations to a care plan. Examples of Individualization might be:  \"Parent requests to be called daily at 9am for status\", \"I have a hard time hearing out of my right ear\", or \"Do not touch me to wake me up as it startles  me\".  Outcome: Progressing  Goal: Absence of Hospital-Acquired Illness or Injury  Outcome: Progressing  Goal: Optimal Comfort and Wellbeing  Outcome: Progressing  Goal: Readiness for Transition of Care  Outcome: Progressing     Problem: Fall Injury Risk  Goal: Absence of Fall and Fall-Related Injury  Outcome: Progressing       "

## 2024-07-01 NOTE — PROGRESS NOTES
Care Management Follow Up    Length of Stay (days): 0    Expected Discharge Date: 07/09/2024       Additional Information:  SW continues to follow. Patient's MA is still pending.     KARLA Coronado, [facility]  Emergency Room   Please contact the SW on the floor in which the patient is staying for any questions or concerns

## 2024-07-01 NOTE — PLAN OF CARE
"End of shift note: Pt alert, disoriented x4. Sating well on RA, VSS. Pt up 1 assist w/ GB and walker. VSS. No IV access, MD aware.     Goal Outcome Evaluation:      Plan of Care Reviewed With: patient, child    Overall Patient Progress: no changeOverall Patient Progress: no change    Outcome Evaluation: Denies pain. Waiting on placement.      Problem: Adult Inpatient Plan of Care  Goal: Plan of Care Review  Description: The Plan of Care Review/Shift note should be completed every shift.  The Outcome Evaluation is a brief statement about your assessment that the patient is improving, declining, or no change.  This information will be displayed automatically on your shift  note.  Outcome: Progressing  Flowsheets (Taken 7/1/2024 1338)  Outcome Evaluation: Denies pain. Waiting on placement.  Overall Patient Progress: no change  Goal: Patient-Specific Goal (Individualized)  Description: You can add care plan individualizations to a care plan. Examples of Individualization might be:  \"Parent requests to be called daily at 9am for status\", \"I have a hard time hearing out of my right ear\", or \"Do not touch me to wake me up as it startles  me\".  Outcome: Progressing  Goal: Absence of Hospital-Acquired Illness or Injury  Outcome: Progressing  Intervention: Identify and Manage Fall Risk  Recent Flowsheet Documentation  Taken 7/1/2024 1315 by Donna Mauricio, RN  Safety Promotion/Fall Prevention: safety round/check completed  Taken 7/1/2024 1215 by Donna Mauricio RN  Safety Promotion/Fall Prevention: safety round/check completed  Taken 7/1/2024 0930 by Donna Mauricio, RN  Safety Promotion/Fall Prevention: safety round/check completed  Taken 7/1/2024 0830 by Donna Mauricio, RN  Safety Promotion/Fall Prevention: safety round/check completed  Taken 7/1/2024 0727 by Donna Mauricio, RN  Safety Promotion/Fall Prevention: safety round/check completed  Taken 7/1/2024 0715 by Donna Mauricio, RN  Safety Promotion/Fall " Group Topic:  Group Psychotherapy    Date: 10/8/2019  Start Time: 1045  End Time: 1200    Focus: What I Need In My Life Right Now Is More   Number in attendance: 10    Method: Group  Attendance: Present  Participation: Moderate  Patient Response: Attentive and Interactive  Mood: Appropriate to content  Affect: Calm  Behavior/Socialization: Cooperative  Thought Process: Focused  Task Performance: Follows directions  Additional Information:  Psychosocial Stressors: Health and Lacks daytime structure  Symptom Notations: The patient shared how he needs structure in his day and consistent personal responsibility. He was encouraged to set 5 daily written goals to incorporate this in his life. Esperanza Hinojosa, MARYW [facility], [place]   Prevention: safety round/check completed  Intervention: Prevent Skin Injury  Recent Flowsheet Documentation  Taken 7/1/2024 0727 by Donna Mauricio RN  Body Position: position changed independently  Intervention: Prevent and Manage VTE (Venous Thromboembolism) Risk  Recent Flowsheet Documentation  Taken 7/1/2024 0727 by Donna Mauricio RN  VTE Prevention/Management: SCDs off (sequential compression devices)  Intervention: Prevent Infection  Recent Flowsheet Documentation  Taken 7/1/2024 0727 by Donna Mauricio RN  Infection Prevention:   hand hygiene promoted   rest/sleep promoted   single patient room provided  Goal: Optimal Comfort and Wellbeing  Outcome: Progressing  Goal: Readiness for Transition of Care  Outcome: Progressing     Problem: Fall Injury Risk  Goal: Absence of Fall and Fall-Related Injury  Outcome: Progressing  Intervention: Identify and Manage Contributors  Recent Flowsheet Documentation  Taken 7/1/2024 0727 by Donna Mauricio RN  Medication Review/Management: medications reviewed  Intervention: Promote Injury-Free Environment  Recent Flowsheet Documentation  Taken 7/1/2024 1315 by Donna Mauricio RN  Safety Promotion/Fall Prevention: safety round/check completed  Taken 7/1/2024 1215 by Donna Mauricio RN  Safety Promotion/Fall Prevention: safety round/check completed  Taken 7/1/2024 0930 by Donna Mauricio RN  Safety Promotion/Fall Prevention: safety round/check completed  Taken 7/1/2024 0830 by Donna Mauricio RN  Safety Promotion/Fall Prevention: safety round/check completed  Taken 7/1/2024 0727 by Donna Mauricio RN  Safety Promotion/Fall Prevention: safety round/check completed  Taken 7/1/2024 0715 by Donna Mauricio RN  Safety Promotion/Fall Prevention: safety round/check completed

## 2024-07-01 NOTE — PROGRESS NOTES
[facility]    Medicine Progress Note - Hospitalist Service    Date of Admission:  6/5/2024    Assessment & Plan   88-year-old male who was transitioned to [facility] care on 6/5.  He has history of dementia and family is unable to provide care for him.  He was recently treated for right full cellulitis which has resolved.  Patient is MA pending, awaiting placement in long term care. No significant changes over last several days.    care home  care patient.  This is a nonbillable social visit    care home care admission.  Social work consulted.  Looking for LTC.  Is MA pending. Once a bed is available patient `can go anytime     Constipation  Started/continue on senna, have as needed miralax and dulcolax available    Right foot cellulitis.  Resolved with Keflex 4 times daily through 6/11.  Swelling and redness resolved.    Venous ultrasound negative for DVT.    Dementia.  Not on any medications.  As needed olanzapine ordered for agitation.    5.   Bradycardia            Patient was noted to have HR in 50s. He is not on any beta blocker or CCB. Refused EKG. asymptomatic           - Monitor clinically          Diet: Combination Diet Regular Diet  Room Service    DVT Prophylaxis: Pneumatic Compression Devices  Maddox Catheter: Not present  Lines: None     Cardiac Monitoring: None  Code Status: No CPR- Do NOT Intubate      Clinically Significant Risk Factors Present on Admission                    # Dementia: noted on problem list                      Disposition Plan     Medically Ready for Discharge: Ready Now           Mendez Dobbs MD  Hospitalist Service  [facility]  Securely message with [employer] (more info)  Text page via [employer] Paging/Directory   ______________________________________________________________________    Interval History   Nursing notes reviewed. No acute events overnight. Patient doing well. Sitting up in bed eating lunch. When asked how he is doing, patient  shrugged.    Physical Exam   Vital Signs: Temp: 97.3  F (36.3  C) Temp src: Axillary BP: 138/70 Pulse: 51   Resp: 18 SpO2: 97 % O2 Device: None (Room air)    Weight: 0 lbs 0 oz    General Appearance: Appears comfortable, resting in bed.      Medical Decision Making         MINUTES SPENT BY ME on the date of service doing chart review, history, exam, documentation & further activities per the note.      Data

## 2024-07-02 PROCEDURE — 250N000013 HC RX MED GY IP 250 OP 250 PS 637: Performed by: INTERNAL MEDICINE

## 2024-07-02 PROCEDURE — 101N000002 HC CUSTODIAL CARE DAILY

## 2024-07-02 RX ADMIN — SENNOSIDES AND DOCUSATE SODIUM 1 TABLET: 8.6; 5 TABLET ORAL at 22:20

## 2024-07-02 ASSESSMENT — ACTIVITIES OF DAILY LIVING (ADL)
ADLS_ACUITY_SCORE: 51

## 2024-07-02 NOTE — PROGRESS NOTES
Care Management Follow Up    Length of Stay (days): 0    Expected Discharge Date: 07/09/2024      Additional Information:  SW continues to follow. Patient's MA is still pending. Need MA for placement.     KARLA Coronado, [facility]  Emergency Room   Please contact the SW on the floor in which the patient is staying for any questions or concerns

## 2024-07-02 NOTE — PLAN OF CARE
"Denies pain. A1 w/ walker and gb. Incontinent at times. Safety maintained throughout shift. Waiting for insurance/placement, SW following.       Goal Outcome Evaluation:      Plan of Care Reviewed With: patient    Overall Patient Progress: no changeOverall Patient Progress: no change    Outcome Evaluation: longterm Care            Problem: Adult Inpatient Plan of Care  Goal: Plan of Care Review  Description: The Plan of Care Review/Shift note should be completed every shift.  The Outcome Evaluation is a brief statement about your assessment that the patient is improving, declining, or no change.  This information will be displayed automatically on your shift  note.  Outcome: Progressing  Flowsheets (Taken 7/2/2024 1023)  Outcome Evaluation: longterm Care  Plan of Care Reviewed With: patient  Overall Patient Progress: no change  Goal: Patient-Specific Goal (Individualized)  Description: You can add care plan individualizations to a care plan. Examples of Individualization might be:  \"Parent requests to be called daily at 9am for status\", \"I have a hard time hearing out of my right ear\", or \"Do not touch me to wake me up as it startles  me\".  Outcome: Progressing  Goal: Absence of Hospital-Acquired Illness or Injury  Outcome: Progressing  Goal: Optimal Comfort and Wellbeing  Outcome: Progressing  Goal: Readiness for Transition of Care  Outcome: Progressing     Problem: Fall Injury Risk  Goal: Absence of Fall and Fall-Related Injury  Outcome: Progressing     "

## 2024-07-02 NOTE — PLAN OF CARE
"VSS. RA. Denies pain/SOB. A1 gb/walker. Awaiting placement.     Goal Outcome Evaluation:      Plan of Care Reviewed With: patient    Overall Patient Progress: no changeOverall Patient Progress: no change    Outcome Evaluation: MELISSA CORRALES. Denies pain/SOB. Awaiting placement.      Problem: Adult Inpatient Plan of Care  Goal: Plan of Care Review  Description: The Plan of Care Review/Shift note should be completed every shift.  The Outcome Evaluation is a brief statement about your assessment that the patient is improving, declining, or no change.  This information will be displayed automatically on your shift  note.  Outcome: Progressing  Flowsheets (Taken 7/2/2024 0752)  Outcome Evaluation: VSFLORENCE CORRALES. Denies pain/SOB. Awaiting placement.  Plan of Care Reviewed With: patient  Overall Patient Progress: no change  Goal: Patient-Specific Goal (Individualized)  Description: You can add care plan individualizations to a care plan. Examples of Individualization might be:  \"Parent requests to be called daily at 9am for status\", \"I have a hard time hearing out of my right ear\", or \"Do not touch me to wake me up as it startles  me\".  Outcome: Progressing  Goal: Absence of Hospital-Acquired Illness or Injury  Outcome: Progressing  Goal: Optimal Comfort and Wellbeing  Outcome: Progressing  Goal: Readiness for Transition of Care  Outcome: Progressing     Problem: Fall Injury Risk  Goal: Absence of Fall and Fall-Related Injury  Outcome: Progressing     "

## 2024-07-02 NOTE — PROGRESS NOTES
[facility]    Medicine Progress Note - Hospitalist Service    Date of Admission:  6/5/2024    Assessment & Plan   88-year-old male who was transitioned to assisted care on 6/5.  He has history of dementia and family is unable to provide care for him.  He was recently treated for right full cellulitis which has resolved.  Patient is MA pending, awaiting placement in long term care. No significant changes over last several days.    [facility]  care patient.  This is a nonbillable social visit    [facility] care admission.  Social work consulted.  Looking for LTC.  Is MA pending. Once a bed is available patient `can go anytime     Constipation  Started/continue on senna, have as needed miralax and dulcolax available    Right foot cellulitis.  Resolved with Keflex 4 times daily through 6/11.  Swelling and redness resolved.    Venous ultrasound negative for DVT.    Dementia.  Not on any medications.  As needed olanzapine ordered for agitation.    5.   Bradycardia            Patient was noted to have HR in 50s. He is not on any beta blocker or CCB. Refused EKG. asymptomatic           - Monitor clinically          Diet: Combination Diet Regular Diet  Room Service    DVT Prophylaxis: Pneumatic Compression Devices  Maddox Catheter: Not present  Lines: None     Cardiac Monitoring: None  Code Status: No CPR- Do NOT Intubate      Clinically Significant Risk Factors Present on Admission                    # Dementia: noted on problem list                      Disposition Plan     Medically Ready for Discharge: Ready Now           Mendez Dobbs MD  Hospitalist Service  [facility]  Securely message with [employer] (more info)  Text page via [employer] Paging/Directory   ______________________________________________________________________    Interval History   Nursing notes reviewed. No acute events overnight. Patient doing well. Sitting up in bed eating lunch. When asked how he is doing, patient  shrugged.    Physical Exam   Vital Signs: Temp: 97.9  F (36.6  C) Temp src: Oral BP: 128/63 Pulse: (!) 49   Resp: 18 SpO2: 96 % O2 Device: None (Room air)    Weight: 0 lbs 0 oz    General Appearance: Appears comfortable, resting in bed.      Medical Decision Making         MINUTES SPENT BY ME on the date of service doing chart review, history, exam, documentation & further activities per the note.      Data

## 2024-07-03 PROCEDURE — 250N000013 HC RX MED GY IP 250 OP 250 PS 637: Performed by: INTERNAL MEDICINE

## 2024-07-03 PROCEDURE — 101N000002 HC CUSTODIAL CARE DAILY

## 2024-07-03 RX ADMIN — SENNOSIDES AND DOCUSATE SODIUM 1 TABLET: 8.6; 5 TABLET ORAL at 21:05

## 2024-07-03 ASSESSMENT — ACTIVITIES OF DAILY LIVING (ADL)
ADLS_ACUITY_SCORE: 48
ADLS_ACUITY_SCORE: 48
ADLS_ACUITY_SCORE: 47
ADLS_ACUITY_SCORE: 48
ADLS_ACUITY_SCORE: 47
ADLS_ACUITY_SCORE: 48
ADLS_ACUITY_SCORE: 52
ADLS_ACUITY_SCORE: 48
ADLS_ACUITY_SCORE: 52
ADLS_ACUITY_SCORE: 48
ADLS_ACUITY_SCORE: 48
ADLS_ACUITY_SCORE: 47
ADLS_ACUITY_SCORE: 48
ADLS_ACUITY_SCORE: 52
ADLS_ACUITY_SCORE: 48
ADLS_ACUITY_SCORE: 48
ADLS_ACUITY_SCORE: 47
ADLS_ACUITY_SCORE: 47

## 2024-07-03 NOTE — PROGRESS NOTES
[facility]    Medicine Progress Note - Hospitalist Service    Date of Admission:  6/5/2024    Assessment & Plan   88-year-old male who was transitioned to half-way care on 6/5.  He has history of dementia and family is unable to provide care for him.  He was recently treated for right full cellulitis which has resolved.  Patient is MA pending, awaiting placement in long term care. No significant changes over last several days.    [facility]  care patient.  This is a nonbillable social visit    [facility] care admission.  Social work consulted.  Looking for LTC.  Is MA pending. Once a bed is available patient `can go anytime     Constipation  Started/continue on senna, have as needed miralax and dulcolax available    Right foot cellulitis.  Resolved with Keflex 4 times daily through 6/11.  Swelling and redness resolved.    Venous ultrasound negative for DVT.    Dementia.  Not on any medications.  As needed olanzapine ordered for agitation.    5.   Bradycardia            Patient was noted to have HR in 50s. He is not on any beta blocker or CCB. Refused EKG. asymptomatic           - Monitor clinically          Diet: Combination Diet Regular Diet  Room Service    DVT Prophylaxis: Pneumatic Compression Devices  Maddox Catheter: Not present  Lines: None     Cardiac Monitoring: None  Code Status: No CPR- Do NOT Intubate      Clinically Significant Risk Factors Present on Admission                    # Dementia: noted on problem list                      Disposition Plan     Medically Ready for Discharge: Ready Now           Menedz Dobbs MD  Hospitalist Service  [facility]  Securely message with [employer] (more info)  Text page via [employer] Paging/Directory   ______________________________________________________________________    Interval History   Nursing notes reviewed. No acute events overnight. Patient doing well. Sitting in bedside chair asking for lunch.    Physical Exam   Vital  Signs: Temp: 97.9  F (36.6  C) Temp src: Oral BP: (!) 143/79 Pulse: 54   Resp: 18 SpO2: 97 % O2 Device: None (Room air)    Weight: 0 lbs 0 oz    General Appearance: Appears comfortable, resting in bed.      Medical Decision Making         MINUTES SPENT BY ME on the date of service doing chart review, history, exam, documentation & further activities per the note.      Data

## 2024-07-03 NOTE — PLAN OF CARE
"Shift: 1418-7697    A1 w/ walker and gb. Safety maintained throughout shift. Waiting for insurance/placement, SW following.     Goal Outcome Evaluation:      Plan of Care Reviewed With: patient    Overall Patient Progress: no changeOverall Patient Progress: no change    Outcome Evaluation: half-way care, waiting for MA for placement            Problem: Adult Inpatient Plan of Care  Goal: Plan of Care Review  Description: The Plan of Care Review/Shift note should be completed every shift.  The Outcome Evaluation is a brief statement about your assessment that the patient is improving, declining, or no change.  This information will be displayed automatically on your shift  note.  7/3/2024 1033 by Nela Garcia RN  Outcome: Progressing  Flowsheets (Taken 7/3/2024 1033)  Outcome Evaluation: half-way care, waiting for MA for placement  Plan of Care Reviewed With: patient  Overall Patient Progress: no change  7/3/2024 1031 by Nela Garcia RN  Outcome: Progressing  Flowsheets (Taken 7/3/2024 1031)  Overall Patient Progress: no change  7/3/2024 1031 by Nela Garcia RN  Outcome: Progressing  Flowsheets (Taken 7/3/2024 1031)  Outcome Evaluation: half-way care, waiting for MA for placement  Plan of Care Reviewed With: patient  Overall Patient Progress: no change  Goal: Patient-Specific Goal (Individualized)  Description: You can add care plan individualizations to a care plan. Examples of Individualization might be:  \"Parent requests to be called daily at 9am for status\", \"I have a hard time hearing out of my right ear\", or \"Do not touch me to wake me up as it startles  me\".  7/3/2024 1033 by Nela Garcia, RN  Outcome: Progressing  7/3/2024 1031 by Nela Garcia, RN  Outcome: Progressing  7/3/2024 1031 by Nela Garcia RN  Outcome: Progressing  Goal: Absence of Hospital-Acquired Illness or Injury  7/3/2024 1033 by Nela Garcia, RN  Outcome: Progressing  7/3/2024 1031 by Nela Garcia, " RN  Outcome: Progressing  7/3/2024 1031 by Nela Garcia RN  Outcome: Progressing  Goal: Optimal Comfort and Wellbeing  7/3/2024 1033 by Nela Garcia RN  Outcome: Progressing  7/3/2024 1031 by Nela Garcia, RN  Outcome: Progressing  7/3/2024 1031 by Nela Garcia, RN  Outcome: Progressing  Goal: Readiness for Transition of Care  7/3/2024 1033 by Nela Garcia RN  Outcome: Progressing  7/3/2024 1031 by Nela Garcia, RN  Outcome: Progressing  7/3/2024 1031 by Nela Garcia, RN  Outcome: Progressing     Problem: Fall Injury Risk  Goal: Absence of Fall and Fall-Related Injury  7/3/2024 1033 by Nela Garcia RN  Outcome: Progressing  7/3/2024 1031 by Nela Garcia, RN  Outcome: Progressing  7/3/2024 1031 by Nela Garcia, RN  Outcome: Progressing

## 2024-07-03 NOTE — PLAN OF CARE
"assisted care waiting for placement. No pain complain.  Problem: Adult Inpatient Plan of Care  Goal: Plan of Care Review  Description: The Plan of Care Review/Shift note should be completed every shift.  The Outcome Evaluation is a brief statement about your assessment that the patient is improving, declining, or no change.  This information will be displayed automatically on your shift  note.  Outcome: Progressing  Flowsheets (Taken 7/3/2024 0631)  Outcome Evaluation: assisted care waiting for placement  Plan of Care Reviewed With: patient  Overall Patient Progress: improving  Goal: Patient-Specific Goal (Individualized)  Description: You can add care plan individualizations to a care plan. Examples of Individualization might be:  \"Parent requests to be called daily at 9am for status\", \"I have a hard time hearing out of my right ear\", or \"Do not touch me to wake me up as it startles  me\".  Outcome: Progressing  Goal: Absence of Hospital-Acquired Illness or Injury  Outcome: Progressing  Intervention: Prevent Skin Injury  Recent Flowsheet Documentation  Taken 7/3/2024 0500 by Kusum Solis, [facility]  Body Position: position changed independently  Taken 7/3/2024 0254 by Kusum Solis, RN  Body Position: position changed independently  Goal: Optimal Comfort and Wellbeing  Outcome: Progressing  Goal: Readiness for Transition of Care  Outcome: Progressing     Problem: Fall Injury Risk  Goal: Absence of Fall and Fall-Related Injury  Outcome: Progressing   Goal Outcome Evaluation:      Plan of Care Reviewed With: patient    Overall Patient Progress: improvingOverall Patient Progress: improving    Outcome Evaluation: assisted care waiting for placement      "

## 2024-07-03 NOTE — PROGRESS NOTES
Care Management Follow Up    Length of Stay (days): 0    Expected Discharge Date: 07/09/2024      Additional Information:  SW continues to follow. Patient's MA is still pending. Need MA for placement. Per  notes on 7/1, application is waiting to be assigned to a worker. [facility] is working on March MA applications, and it could be months before patient's MA is processed.     Medical team should expect that patient is in the hospital through the summer.     KARLA Coronado, [facility]  Emergency Room   Please contact the SW on the floor in which the patient is staying for any questions or concerns

## 2024-07-03 NOTE — PLAN OF CARE
Sleeping comfortably.     Goal Outcome Evaluation:      Plan of Care Reviewed With: patient    Overall Patient Progress: no changeOverall Patient Progress: no change    Outcome Evaluation: [facility]

## 2024-07-03 NOTE — PLAN OF CARE
"Goal Outcome Evaluation:      Plan of Care Reviewed With: patient    Overall Patient Progress: no change    Outcome Evaluation: A1 w/ walker and gb to bathroom. Oriented to self only. calm.  Safety maintained throughout shift. Waiting for insurance/placement, SW following.    Problem: Adult Inpatient Plan of Care  Goal: Plan of Care Review  Description: The Plan of Care Review/Shift note should be completed every shift.  The Outcome Evaluation is a brief statement about your assessment that the patient is improving, declining, or no change.  This information will be displayed automatically on your shift  note.  Outcome: Not Progressing  Flowsheets (Taken 7/3/2024 1455)  Outcome Evaluation: A1 w/ walker and gb to bathroom. Oriented to self only. calm.  Safety maintained throughout shift. Waiting for insurance/placement, SW following.  Plan of Care Reviewed With: patient  Overall Patient Progress: no change  Goal: Patient-Specific Goal (Individualized)  Description: You can add care plan individualizations to a care plan. Examples of Individualization might be:  \"Parent requests to be called daily at 9am for status\", \"I have a hard time hearing out of my right ear\", or \"Do not touch me to wake me up as it startles  me\".  Outcome: Not Progressing  Goal: Absence of Hospital-Acquired Illness or Injury  Outcome: Not Progressing  Intervention: Identify and Manage Fall Risk  Recent Flowsheet Documentation  Taken 7/3/2024 1030 by Kym Mobley, RN  Safety Promotion/Fall Prevention: safety round/check completed  Goal: Optimal Comfort and Wellbeing  Outcome: Not Progressing  Goal: Readiness for Transition of Care  Outcome: Not Progressing           "

## 2024-07-03 NOTE — PLAN OF CARE
Shift: [id number]    A1 w/ walker and gb. Safety maintained throughout shift. Waiting for insurance/placement, SW following.     Goal Outcome Evaluation:      Plan of Care Reviewed With: patient    Overall Patient Progress: no change     Outcome Evaluation: penitentiary care, waiting for MA for placement

## 2024-07-04 PROCEDURE — 101N000002 HC CUSTODIAL CARE DAILY

## 2024-07-04 PROCEDURE — 250N000013 HC RX MED GY IP 250 OP 250 PS 637: Performed by: INTERNAL MEDICINE

## 2024-07-04 RX ADMIN — SENNOSIDES AND DOCUSATE SODIUM 1 TABLET: 8.6; 5 TABLET ORAL at 21:52

## 2024-07-04 ASSESSMENT — ACTIVITIES OF DAILY LIVING (ADL)
ADLS_ACUITY_SCORE: 51
ADLS_ACUITY_SCORE: 51
ADLS_ACUITY_SCORE: 52
ADLS_ACUITY_SCORE: 51
ADLS_ACUITY_SCORE: 52
ADLS_ACUITY_SCORE: 51
ADLS_ACUITY_SCORE: 52
ADLS_ACUITY_SCORE: 51
ADLS_ACUITY_SCORE: 51
ADLS_ACUITY_SCORE: 52
ADLS_ACUITY_SCORE: 51
ADLS_ACUITY_SCORE: 51
ADLS_ACUITY_SCORE: 52
ADLS_ACUITY_SCORE: 52
ADLS_ACUITY_SCORE: 51

## 2024-07-04 NOTE — PROGRESS NOTES
[facility]    Medicine Progress Note - Hospitalist Service    Date of Admission:  6/5/2024    Assessment & Plan   88-year-old male who was transitioned to retirement care on 6/5.  He has history of dementia and family is unable to provide care for him.  He was recently treated for right full cellulitis which has resolved.  Patient is MA pending, awaiting placement in long term care. No significant changes over last several days.    jail  care patient.  This is a nonbillable social visit    jail care admission.  Social work consulted.  Looking for LTC.  Is MA pending. Once a bed is available patient `can go anytime     Constipation  Started/continue on senna, have as needed miralax and dulcolax available    Right foot cellulitis.  Resolved with Keflex 4 times daily through 6/11.  Swelling and redness resolved.    Venous ultrasound negative for DVT.    Dementia.  Not on any medications.  As needed olanzapine ordered for agitation.    5.   Bradycardia            Patient was noted to have HR in 50s. He is not on any beta blocker or CCB. Refused EKG. asymptomatic           - Monitor clinically          Diet: Combination Diet Regular Diet  Room Service    DVT Prophylaxis: Pneumatic Compression Devices  Maddox Catheter: Not present  Lines: None     Cardiac Monitoring: None  Code Status: No CPR- Do NOT Intubate      Clinically Significant Risk Factors Present on Admission                    # Dementia: noted on problem list                      Disposition Plan     Medically Ready for Discharge: Ready Now           Mendez Dobbs MD  Hospitalist Service  [facility]  Securely message with [employer] (more info)  Text page via [employer] Paging/Directory   ______________________________________________________________________    Interval History   Nursing notes reviewed. No acute events overnight. Patient doing well. Sitting in bedside chair singing.    Physical Exam   Vital Signs:  Temp: 97.8  F (36.6  C) Temp src: Oral BP: 133/63 Pulse: 51   Resp: 18 SpO2: 98 % O2 Device: None (Room air)    Weight: 0 lbs 0 oz    General Appearance: Appears comfortable, resting in bed.      Medical Decision Making         MINUTES SPENT BY ME on the date of service doing chart review, history, exam, documentation & further activities per the note.      Data

## 2024-07-04 NOTE — PLAN OF CARE
"Pt is alert to self with confusion. Pt denies pain or discomfort. Pt is hard of hearing. Pt has no IV access. Pt is assist of one in transfer and care. Bed alarm in place and call light within reach. Plan is LTC placement. SW following. Will continue to monitor and assess pt.     Goal Outcome Evaluation:      Plan of Care Reviewed With: patient    Overall Patient Progress: improvingOverall Patient Progress: improving    Outcome Evaluation: pt is alert to self. pt denies pain. pt is assist of one with gait belt and walker.      Problem: Adult Inpatient Plan of Care  Goal: Plan of Care Review  Description: The Plan of Care Review/Shift note should be completed every shift.  The Outcome Evaluation is a brief statement about your assessment that the patient is improving, declining, or no change.  This information will be displayed automatically on your shift  note.  Outcome: Progressing  Flowsheets (Taken 7/3/2024 2204)  Outcome Evaluation: pt is alert to self. pt denies pain. pt is assist of one with gait belt and walker.  Plan of Care Reviewed With: patient  Overall Patient Progress: improving  Goal: Patient-Specific Goal (Individualized)  Description: You can add care plan individualizations to a care plan. Examples of Individualization might be:  \"Parent requests to be called daily at 9am for status\", \"I have a hard time hearing out of my right ear\", or \"Do not touch me to wake me up as it startles  me\".  Outcome: Progressing  Goal: Absence of Hospital-Acquired Illness or Injury  Outcome: Progressing  Intervention: Identify and Manage Fall Risk  Recent Flowsheet Documentation  Taken 7/3/2024 2115 by Reji Duron RN  Safety Promotion/Fall Prevention:   activity supervised   assistive device/personal items within reach   clutter free environment maintained   increase visualization of patient   increased rounding and observation   lighting adjusted   patient and family education   nonskid shoes/slippers " when out of bed   safety round/check completed  Intervention: Prevent Skin Injury  Recent Flowsheet Documentation  Taken 7/3/2024 2115 by Reji Duron RN  Body Position: position changed independently  Intervention: Prevent Infection  Recent Flowsheet Documentation  Taken 7/3/2024 2115 by Reji Duron RN  Infection Prevention:   rest/sleep promoted   single patient room provided   hand hygiene promoted  Goal: Optimal Comfort and Wellbeing  Outcome: Progressing  Goal: Readiness for Transition of Care  Outcome: Progressing     Problem: Fall Injury Risk  Goal: Absence of Fall and Fall-Related Injury  Outcome: Progressing  Intervention: Identify and Manage Contributors  Recent Flowsheet Documentation  Taken 7/3/2024 2115 by Reji Duron RN  Medication Review/Management: medications reviewed  Intervention: Promote Injury-Free Environment  Recent Flowsheet Documentation  Taken 7/3/2024 2115 by Reji Duron RN  Safety Promotion/Fall Prevention:   activity supervised   assistive device/personal items within reach   clutter free environment maintained   increase visualization of patient   increased rounding and observation   lighting adjusted   patient and family education   nonskid shoes/slippers when out of bed   safety round/check completed

## 2024-07-05 PROCEDURE — 101N000002 HC CUSTODIAL CARE DAILY

## 2024-07-05 ASSESSMENT — ACTIVITIES OF DAILY LIVING (ADL)
ADLS_ACUITY_SCORE: 50
ADLS_ACUITY_SCORE: 51
ADLS_ACUITY_SCORE: 50
ADLS_ACUITY_SCORE: 51
ADLS_ACUITY_SCORE: 50
ADLS_ACUITY_SCORE: 51

## 2024-07-05 NOTE — PLAN OF CARE
"Denies pain, alert to self, continent- ambulating to bathroom, reg diet- reordered lunch, very hard of hearing, a1 gb/w, waiting on LTC placement discharge tbd.    Goal Outcome Evaluation:      Plan of Care Reviewed With: patient    Overall Patient Progress: no changeOverall Patient Progress: no change    Outcome Evaluation: waiting on placement      Problem: Adult Inpatient Plan of Care  Goal: Plan of Care Review  Description: The Plan of Care Review/Shift note should be completed every shift.  The Outcome Evaluation is a brief statement about your assessment that the patient is improving, declining, or no change.  This information will be displayed automatically on your shift  note.  Outcome: Progressing  Flowsheets (Taken 7/5/2024 1403)  Outcome Evaluation: waiting on placement  Plan of Care Reviewed With: patient  Overall Patient Progress: no change  Goal: Patient-Specific Goal (Individualized)  Description: You can add care plan individualizations to a care plan. Examples of Individualization might be:  \"Parent requests to be called daily at 9am for status\", \"I have a hard time hearing out of my right ear\", or \"Do not touch me to wake me up as it startles  me\".  Outcome: Progressing  Goal: Absence of Hospital-Acquired Illness or Injury  Outcome: Progressing  Intervention: Identify and Manage Fall Risk  Recent Flowsheet Documentation  Taken 7/5/2024 1300 by Joyce Kapadia, RN  Safety Promotion/Fall Prevention:   nonskid shoes/slippers when out of bed   safety round/check completed  Intervention: Prevent Infection  Recent Flowsheet Documentation  Taken 7/5/2024 1300 by Joyce Kapadia, RN  Infection Prevention:   single patient room provided   hand hygiene promoted  Goal: Optimal Comfort and Wellbeing  Outcome: Progressing  Goal: Readiness for Transition of Care  Outcome: Progressing     Problem: Fall Injury Risk  Goal: Absence of Fall and Fall-Related Injury  Outcome: Progressing  Intervention: Promote " Injury-Free Environment  Recent Flowsheet Documentation  Taken 7/5/2024 1300 by Joyce Kapadia, RN  Safety Promotion/Fall Prevention:   nonskid shoes/slippers when out of bed   safety round/check completed

## 2024-07-05 NOTE — PROGRESS NOTES
[facility]    Medicine Progress Note - Hospitalist Service    Date of Admission:  6/5/2024    Assessment & Plan   88-year-old male who was transitioned to detention care on 6/5.  He has history of dementia and family is unable to provide care for him.  He was recently treated for right full cellulitis which has resolved.  Patient is MA pending, awaiting placement in long term care. No significant changes over last several days.    retirement  care patient.  This is a nonbillable social visit    retirement care admission.  Social work consulted.  Looking for LTC.  Is MA pending. Once a bed is available patient `can go anytime     Constipation  Started/continue on senna, have as needed miralax and dulcolax available    Right foot cellulitis.  Resolved with Keflex 4 times daily through 6/11.  Swelling and redness resolved.    Venous ultrasound negative for DVT.    Dementia.  Not on any medications.  As needed olanzapine ordered for agitation.    5.   Bradycardia            Patient was noted to have HR in 50s. He is not on any beta blocker or CCB. Refused EKG. asymptomatic           - Monitor clinically          Diet: Combination Diet Regular Diet  Room Service    DVT Prophylaxis: Pneumatic Compression Devices  Maddox Catheter: Not present  Lines: None     Cardiac Monitoring: None  Code Status: No CPR- Do NOT Intubate      Clinically Significant Risk Factors Present on Admission                    # Dementia: noted on problem list                      Disposition Plan     Medically Ready for Discharge: Ready Now           Anjali Leiva MD  Hospitalist Service  [facility]  Securely message with [employer] (more info)  Text page via [employer] Paging/Directory   ______________________________________________________________________    Interval History   Nursing notes reviewed. Patient doing well.  Sitting in bed reported that he is hungry and wants to eat.  Physical Exam   Vital Signs: Temp:  97.8  F (36.6  C) Temp src: Oral BP: (!) 150/72 Pulse: 52   Resp: 18 SpO2: 98 % O2 Device: None (Room air)    Weight: 0 lbs 0 oz    General Appearance: Appears comfortable, resting in bed.      Medical Decision Making         MINUTES SPENT BY ME on the date of service doing chart review, history, exam, documentation & further activities per the note.      Data

## 2024-07-05 NOTE — PLAN OF CARE
"Pt. Alert to self, confused, resting in bed during the shift. Pt. To await placement in long term care. Pt. Denies any needs at this time will continue with POC    Goal Outcome Evaluation:      Plan of Care Reviewed With: patient    Overall Patient Progress: no changeOverall Patient Progress: no change    Outcome Evaluation: Pt. alert to self, resting in bed during the shift. Awaiting placement in long term care.      Problem: Adult Inpatient Plan of Care  Goal: Plan of Care Review  Description: The Plan of Care Review/Shift note should be completed every shift.  The Outcome Evaluation is a brief statement about your assessment that the patient is improving, declining, or no change.  This information will be displayed automatically on your shift  note.  Outcome: Progressing  Flowsheets (Taken 7/5/2024 0617)  Outcome Evaluation: Pt. alert to self, resting in bed during the shift. Awaiting placement in long term care.  Plan of Care Reviewed With: patient  Overall Patient Progress: no change  Goal: Patient-Specific Goal (Individualized)  Description: You can add care plan individualizations to a care plan. Examples of Individualization might be:  \"Parent requests to be called daily at 9am for status\", \"I have a hard time hearing out of my right ear\", or \"Do not touch me to wake me up as it startles  me\".  Outcome: Progressing  Goal: Absence of Hospital-Acquired Illness or Injury  Outcome: Progressing  Intervention: Identify and Manage Fall Risk  Recent Flowsheet Documentation  Taken 7/4/2024 2130 by Radha Mendoza, RN  Safety Promotion/Fall Prevention:   activity supervised   clutter free environment maintained   increased rounding and observation   increase visualization of patient   lighting adjusted   nonskid shoes/slippers when out of bed   safety round/check completed   treat reversible contributory factors   treat underlying cause  Intervention: Prevent Skin Injury  Recent Flowsheet Documentation  Taken 7/4/2024 " 2130 by Radha Mendoza RN  Body Position: position changed independently  Skin Protection:   adhesive use limited   incontinence pads utilized  Device Skin Pressure Protection: absorbent pad utilized/changed  Intervention: Prevent and Manage VTE (Venous Thromboembolism) Risk  Recent Flowsheet Documentation  Taken 7/4/2024 2130 by Radha Mendoza RN  VTE Prevention/Management: SCDs off (sequential compression devices)  Goal: Optimal Comfort and Wellbeing  Outcome: Progressing  Goal: Readiness for Transition of Care  Outcome: Progressing     Problem: Fall Injury Risk  Goal: Absence of Fall and Fall-Related Injury  Outcome: Progressing  Intervention: Identify and Manage Contributors  Recent Flowsheet Documentation  Taken 7/4/2024 2130 by Radha Mendoza RN  Medication Review/Management: medications reviewed  Intervention: Promote Injury-Free Environment  Recent Flowsheet Documentation  Taken 7/4/2024 2130 by Radha Mendoza RN  Safety Promotion/Fall Prevention:   activity supervised   clutter free environment maintained   increased rounding and observation   increase visualization of patient   lighting adjusted   nonskid shoes/slippers when out of bed   safety round/check completed   treat reversible contributory factors   treat underlying cause

## 2024-07-06 PROCEDURE — 250N000013 HC RX MED GY IP 250 OP 250 PS 637: Performed by: INTERNAL MEDICINE

## 2024-07-06 PROCEDURE — 101N000002 HC CUSTODIAL CARE DAILY

## 2024-07-06 RX ADMIN — SENNOSIDES AND DOCUSATE SODIUM 1 TABLET: 8.6; 5 TABLET ORAL at 21:04

## 2024-07-06 ASSESSMENT — ACTIVITIES OF DAILY LIVING (ADL)
ADLS_ACUITY_SCORE: 45
ADLS_ACUITY_SCORE: 45
ADLS_ACUITY_SCORE: 46
ADLS_ACUITY_SCORE: 46
ADLS_ACUITY_SCORE: 45
ADLS_ACUITY_SCORE: 46
ADLS_ACUITY_SCORE: 46
ADLS_ACUITY_SCORE: 45
ADLS_ACUITY_SCORE: 46
ADLS_ACUITY_SCORE: 50
ADLS_ACUITY_SCORE: 45
ADLS_ACUITY_SCORE: 45
ADLS_ACUITY_SCORE: 46
ADLS_ACUITY_SCORE: 45

## 2024-07-06 NOTE — PLAN OF CARE
"Alert to self, will talk occasionally, hard of hearing, ambulates to bathroom, sba/a1 w gaitbelt, waiting for LTC placement.     Goal Outcome Evaluation:      Plan of Care Reviewed With: patient    Overall Patient Progress: no changeOverall Patient Progress: no change    Outcome Evaluation: alert to self, hard of hearing, ambulates to bathroom, waiting for LTC placement      Problem: Adult Inpatient Plan of Care  Goal: Plan of Care Review  Description: The Plan of Care Review/Shift note should be completed every shift.  The Outcome Evaluation is a brief statement about your assessment that the patient is improving, declining, or no change.  This information will be displayed automatically on your shift  note.  Outcome: Progressing  Flowsheets (Taken 7/6/2024 1220)  Outcome Evaluation: alert to self, hard of hearing, ambulates to bathroom, waiting for LTC placement  Plan of Care Reviewed With: patient  Overall Patient Progress: no change  Goal: Patient-Specific Goal (Individualized)  Description: You can add care plan individualizations to a care plan. Examples of Individualization might be:  \"Parent requests to be called daily at 9am for status\", \"I have a hard time hearing out of my right ear\", or \"Do not touch me to wake me up as it startles  me\".  Outcome: Progressing  Goal: Absence of Hospital-Acquired Illness or Injury  Outcome: Progressing  Intervention: Prevent Skin Injury  Recent Flowsheet Documentation  Taken 7/6/2024 1046 by Joyce Kapadia, RN  Body Position:   position changed independently   sitting up in bed  Goal: Optimal Comfort and Wellbeing  Outcome: Progressing  Goal: Readiness for Transition of Care  Outcome: Progressing     Problem: Fall Injury Risk  Goal: Absence of Fall and Fall-Related Injury  Outcome: Progressing     "

## 2024-07-06 NOTE — PLAN OF CARE
"Goal Outcome Evaluation:      Plan of Care Reviewed With: patient    Overall Patient Progress: no changeOverall Patient Progress: no change    Outcome Evaluation: Alert to self, pleasant. Up SBA. Voiding. Waiting for placement      Problem: Adult Inpatient Plan of Care  Goal: Plan of Care Review  Description: The Plan of Care Review/Shift note should be completed every shift.  The Outcome Evaluation is a brief statement about your assessment that the patient is improving, declining, or no change.  This information will be displayed automatically on your shift  note.  Outcome: Progressing  Flowsheets (Taken 7/6/2024 0733)  Outcome Evaluation: Alert to self, pleasant. Up SBA. Voiding. Waiting for placement  Plan of Care Reviewed With: patient  Overall Patient Progress: no change  Goal: Patient-Specific Goal (Individualized)  Description: You can add care plan individualizations to a care plan. Examples of Individualization might be:  \"Parent requests to be called daily at 9am for status\", \"I have a hard time hearing out of my right ear\", or \"Do not touch me to wake me up as it startles  me\".  Outcome: Progressing  Goal: Absence of Hospital-Acquired Illness or Injury  Outcome: Progressing  Intervention: Identify and Manage Fall Risk  Recent Flowsheet Documentation  Taken 7/5/2024 2307 by Skylar Louis RN  Safety Promotion/Fall Prevention:   activity supervised   assistive device/personal items within reach   clutter free environment maintained   nonskid shoes/slippers when out of bed   room door open   room near nurse's station   room organization consistent   safety round/check completed  Intervention: Prevent Skin Injury  Recent Flowsheet Documentation  Taken 7/5/2024 2307 by Skylar Louis RN  Body Position: position changed independently  Intervention: Prevent Infection  Recent Flowsheet Documentation  Taken 7/5/2024 2307 by Skylar Louis RN  Infection Prevention:   rest/sleep " promoted   single patient room provided   equipment surfaces disinfected  Goal: Optimal Comfort and Wellbeing  Outcome: Progressing  Goal: Readiness for Transition of Care  Outcome: Progressing     Problem: Fall Injury Risk  Goal: Absence of Fall and Fall-Related Injury  Outcome: Progressing  Intervention: Promote Injury-Free Environment  Recent Flowsheet Documentation  Taken 7/5/2024 2306 by Skyalr Louis RN  Safety Promotion/Fall Prevention:   activity supervised   assistive device/personal items within reach   clutter free environment maintained   nonskid shoes/slippers when out of bed   room door open   room near nurse's station   room organization consistent   safety round/check completed

## 2024-07-06 NOTE — PROGRESS NOTES
[facility]    Medicine Progress Note - Hospitalist Service    Date of Admission:  6/5/2024    Assessment & Plan   88-year-old male who was transitioned to detention care on 6/5.  He has history of dementia and family is unable to provide care for him.  He was recently treated for right full cellulitis which has resolved.  Patient is MA pending, awaiting placement in long term care. No significant changes over last several days.    longterm  care patient.  This is a nonbillable social visit    longterm care admission.  Social work consulted.  Looking for LTC.  Is MA pending. Once a bed is available patient `can go anytime     Constipation  Started/continue on senna, have as needed miralax and dulcolax available    Right foot cellulitis.  Resolved with Keflex 4 times daily through 6/11.  Swelling and redness resolved.    Venous ultrasound negative for DVT.    Dementia.  Not on any medications.  As needed olanzapine ordered for agitation.    5.   Bradycardia            Patient was noted to have HR in 50s. He is not on any beta blocker or CCB. Refused EKG. asymptomatic           - Monitor clinically          Diet: Combination Diet Regular Diet  Room Service    DVT Prophylaxis: Pneumatic Compression Devices  Maddox Catheter: Not present  Lines: None     Cardiac Monitoring: None  Code Status: No CPR- Do NOT Intubate      Clinically Significant Risk Factors Present on Admission                    # Dementia: noted on problem list                      Disposition Plan     Medically Ready for Discharge: Ready Now           Anjali Leiva MD  Hospitalist Service  [facility]  Securely message with [employer] (more info)  Text page via [employer] Paging/Directory   ______________________________________________________________________    Interval History   Nursing notes reviewed. Patient doing well.  Sitting in bed eating chocolate chip cookies.  Physical Exam   Vital Signs: Temp: 97.8  F (36.6   C) Temp src: Oral BP: 134/54 Pulse: 53   Resp: 20 SpO2: 97 % O2 Device: None (Room air)    Weight: 0 lbs 0 oz    General Appearance: Appears comfortable, resting in bed.  Eating chocolate chip cookies      Medical Decision Making         MINUTES SPENT BY ME on the date of service doing chart review, history, exam, documentation & further activities per the note.      Data

## 2024-07-07 PROCEDURE — 250N000013 HC RX MED GY IP 250 OP 250 PS 637: Performed by: INTERNAL MEDICINE

## 2024-07-07 PROCEDURE — 101N000002 HC CUSTODIAL CARE DAILY

## 2024-07-07 RX ADMIN — SENNOSIDES AND DOCUSATE SODIUM 1 TABLET: 8.6; 5 TABLET ORAL at 22:44

## 2024-07-07 ASSESSMENT — ACTIVITIES OF DAILY LIVING (ADL)
ADLS_ACUITY_SCORE: 45
ADLS_ACUITY_SCORE: 51
ADLS_ACUITY_SCORE: 45
ADLS_ACUITY_SCORE: 51
ADLS_ACUITY_SCORE: 51
ADLS_ACUITY_SCORE: 45
ADLS_ACUITY_SCORE: 45
ADLS_ACUITY_SCORE: 51
ADLS_ACUITY_SCORE: 45
ADLS_ACUITY_SCORE: 45
ADLS_ACUITY_SCORE: 51
ADLS_ACUITY_SCORE: 51
ADLS_ACUITY_SCORE: 45
ADLS_ACUITY_SCORE: 45
ADLS_ACUITY_SCORE: 51
ADLS_ACUITY_SCORE: 51
ADLS_ACUITY_SCORE: 45
ADLS_ACUITY_SCORE: 51
ADLS_ACUITY_SCORE: 51

## 2024-07-07 NOTE — PLAN OF CARE
"Alert to self, seems less content and has more questions today, forgets to call for bathroom so bed alarm goes off, doesn't finish meals, but foods he likes are written on board in room, plan for LTC pending MA.     Goal Outcome Evaluation:      Plan of Care Reviewed With: patient    Overall Patient Progress: no changeOverall Patient Progress: no change    Outcome Evaluation: alert to self, more confused today wanting to change clothes, won't call for bathroom so bed alarm goes off, waiting for placement      Problem: Adult Inpatient Plan of Care  Goal: Plan of Care Review  Description: The Plan of Care Review/Shift note should be completed every shift.  The Outcome Evaluation is a brief statement about your assessment that the patient is improving, declining, or no change.  This information will be displayed automatically on your shift  note.  Outcome: Progressing  Flowsheets (Taken 7/7/2024 1116)  Outcome Evaluation: alert to self, more confused today wanting to change clothes, won't call for bathroom so bed alarm goes off, waiting for placement  Plan of Care Reviewed With: patient  Overall Patient Progress: no change  Goal: Patient-Specific Goal (Individualized)  Description: You can add care plan individualizations to a care plan. Examples of Individualization might be:  \"Parent requests to be called daily at 9am for status\", \"I have a hard time hearing out of my right ear\", or \"Do not touch me to wake me up as it startles  me\".  Outcome: Progressing  Goal: Absence of Hospital-Acquired Illness or Injury  Outcome: Progressing  Goal: Optimal Comfort and Wellbeing  Outcome: Progressing  Goal: Readiness for Transition of Care  Outcome: Progressing     Problem: Fall Injury Risk  Goal: Absence of Fall and Fall-Related Injury  Outcome: Progressing     "

## 2024-07-07 NOTE — PLAN OF CARE
AO to self, VSS, denies pain, assist 1 with walker GB, [place], no IV access, regular diet likes peanut butter and jelly sandwiches, [facility] care waiting for placement.    Problem: Adult Inpatient Plan of Care  Goal: Plan of Care Review  Description: The Plan of Care Review/Shift note should be completed every shift.  The Outcome Evaluation is a brief statement about your assessment that the patient is improving, declining, or no change.  This information will be displayed automatically on your shift  note.  Outcome: Progressing  Flowsheets (Taken 7/6/2024 1910)  Plan of Care Reviewed With: patient  Overall Patient Progress: no change     Problem: Adult Inpatient Plan of Care  Goal: Plan of Care Review  Description: The Plan of Care Review/Shift note should be completed every shift.  The Outcome Evaluation is a brief statement about your assessment that the patient is improving, declining, or no change.  This information will be displayed automatically on your shift  note.  Recent Flowsheet Documentation  Taken 7/6/2024 1910 by Shakira Mosquera RN  Plan of Care Reviewed With: patient  Overall Patient Progress: no change     Problem: Adult Inpatient Plan of Care  Goal: Plan of Care Review  Description: The Plan of Care Review/Shift note should be completed every shift.  The Outcome Evaluation is a brief statement about your assessment that the patient is improving, declining, or no change.  This information will be displayed automatically on your shift  note.  Recent Flowsheet Documentation  Taken 7/6/2024 1910 by Shakira Mosquera RN  Plan of Care Reviewed With: patient  Overall Patient Progress: no change     Problem: Adult Inpatient Plan of Care  Goal: Plan of Care Review  Description: The Plan of Care Review/Shift note should be completed every shift.  The Outcome Evaluation is a brief statement about your assessment that the patient is improving, declining, or no change.  This information will be displayed  "automatically on your shift  note.  Outcome: Progressing  Flowsheets (Taken 7/6/2024 1910)  Plan of Care Reviewed With: patient  Overall Patient Progress: no change  Goal: Patient-Specific Goal (Individualized)  Description: You can add care plan individualizations to a care plan. Examples of Individualization might be:  \"Parent requests to be called daily at 9am for status\", \"I have a hard time hearing out of my right ear\", or \"Do not touch me to wake me up as it startles  me\".  Outcome: Progressing  Goal: Absence of Hospital-Acquired Illness or Injury  Outcome: Progressing  Goal: Optimal Comfort and Wellbeing  Outcome: Progressing  Goal: Readiness for Transition of Care  Outcome: Progressing     Problem: Fall Injury Risk  Goal: Absence of Fall and Fall-Related Injury  Outcome: Progressing   Goal Outcome Evaluation:      Plan of Care Reviewed With: patient    Overall Patient Progress: no changeOverall Patient Progress: no change           "

## 2024-07-07 NOTE — PROGRESS NOTES
[facility]    Medicine Progress Note - Hospitalist Service    Date of Admission:  6/5/2024    Assessment & Plan   88-year-old male who was transitioned to intermediate care on 6/5.  He has history of dementia and family is unable to provide care for him.  He was recently treated for right full cellulitis which has resolved.  Patient is MA pending, awaiting placement in long term care. No significant changes over last several days.    detention  care patient.  This is a nonbillable social visit    detention care admission.  Social work consulted.  Looking for LTC.  Is MA pending. Once a bed is available patient `can go anytime     Constipation  Started/continue on senna, have as needed miralax and dulcolax available    Right foot cellulitis.  Resolved with Keflex 4 times daily through 6/11.  Swelling and redness resolved.    Venous ultrasound negative for DVT.    Dementia.  Not on any medications.  As needed olanzapine ordered for agitation.    5.   Bradycardia            Patient was noted to have HR in 50s. He is not on any beta blocker or CCB. Refused EKG. asymptomatic           - Monitor clinically          Diet: Combination Diet Regular Diet  Room Service    DVT Prophylaxis: Pneumatic Compression Devices  Maddox Catheter: Not present  Lines: None     Cardiac Monitoring: None  Code Status: No CPR- Do NOT Intubate      Clinically Significant Risk Factors Present on Admission                    # Dementia: noted on problem list                      Disposition Plan     Medically Ready for Discharge: Ready Now           Anjali Leiva MD  Hospitalist Service  [facility]  Securely message with [email or web address] (more info)  Text page via [employer] Paging/Directory   ______________________________________________________________________    Interval History   Nursing notes reviewed. Patient doing well.  Sitting in bed eating breakfast.   Physical Exam   Vital Signs: Temp: 97.8  F (36.6  C) Temp  src: Oral BP: 127/65 Pulse: 50   Resp: 18 SpO2: 96 % O2 Device: None (Room air)    Weight: 0 lbs 0 oz    General Appearance: Appears comfortable, resting in bed.  Eating breakfast.      Medical Decision Making         MINUTES SPENT BY ME on the date of service doing chart review, history, exam, documentation & further activities per the note.      Data

## 2024-07-07 NOTE — PROGRESS NOTES
Pt denies pain or any discomfort. Garbled speech. Pleasant. Know when to use bathroom, does not call for help, sets off alarm. Appear to have slept most of the night. No concerns at this time.

## 2024-07-08 PROCEDURE — 101N000002 HC CUSTODIAL CARE DAILY

## 2024-07-08 ASSESSMENT — ACTIVITIES OF DAILY LIVING (ADL)
ADLS_ACUITY_SCORE: 51

## 2024-07-08 NOTE — PROGRESS NOTES
[facility]    Medicine Progress Note - Hospitalist Service    Date of Admission:  6/5/2024    Assessment & Plan   88-year-old male who was transitioned to jail care on [facility]/5.  He has history of dementia and family is unable to provide care for him.  He was recently treated for right full cellulitis which has resolved.  Patient is MA pending, awaiting placement in long term care. No significant changes over last several days.    alf  care patient.  This is a nonbillable social visit    alf care admission.  Social work consulted.  Looking for LTC.  Is MA pending. Once a bed is available patient `can go anytime     Constipation  Started/continue on senna, have as needed miralax and dulcolax available    Right foot cellulitis.  Resolved with Keflex 4 times daily through 6/11.  Swelling and redness resolved.    Venous ultrasound negative for DVT.    Dementia.  Not on any medications.  As needed olanzapine ordered for agitation.    5.   Bradycardia            Patient was noted to have HR in 50s. He is not on any beta blocker or CCB. Refused EKG. asymptomatic           - Monitor clinically    6. Paper work       Requested to fill out some paperwork.  I am not sure what kind of paperwork that is.  requested daughter to bring the paperwork to the hospital and I will take a look.      Diet: Combination Diet Regular Diet  Room Service    DVT Prophylaxis: Pneumatic Compression Devices  Maddox Catheter: Not present  Lines: None     Cardiac Monitoring: None  Code Status: No CPR- Do NOT Intubate      Clinically Significant Risk Factors Present on Admission                    # Dementia: noted on problem list                      Disposition Plan     Medically Ready for Discharge: Ready Now           Anjali Leiva MD  Hospitalist Service  [facility]  Securely message with [employer] (more info)  Text page via [email or web address] Paging/Directory    ______________________________________________________________________    Interval History   Nursing notes reviewed. Patient doing well.  Sitting in bed watching TV.     Physical Exam   Vital Signs: Temp: 97  F (36.1  C) Temp src: Oral BP: 130/67 Pulse: 52   Resp: 18 SpO2: 97 % O2 Device: None (Room air)    Weight: 0 lbs 0 oz    General Appearance: Appears comfortable, resting in bed.       Medical Decision Making         MINUTES SPENT BY ME on the date of service doing chart review, history, exam, documentation & further activities per the note.      Data

## 2024-07-08 NOTE — PLAN OF CARE
Alert to self, impulsive will not call if need to get up, waiting for placement, patient in green during shift     Problem: Adult Inpatient Plan of Care  Goal: Plan of Care Review  Description: The Plan of Care Review/Shift note should be completed every shift.  The Outcome Evaluation is a brief statement about your assessment that the patient is improving, declining, or no change.  This information will be displayed automatically on your shift  note.  Outcome: Progressing  Flowsheets (Taken 7/7/2024 1906)  Outcome Evaluation: Alert to self, impulsive will not call if need to get up, waiting for placement, patient in green during shift  Plan of Care Reviewed With: patient  Overall Patient Progress: no change     Problem: Adult Inpatient Plan of Care  Goal: Plan of Care Review  Description: The Plan of Care Review/Shift note should be completed every shift.  The Outcome Evaluation is a brief statement about your assessment that the patient is improving, declining, or no change.  This information will be displayed automatically on your shift  note.  Recent Flowsheet Documentation  Taken 7/7/2024 1906 by Shakira Mosquera RN  Outcome Evaluation: Alert to self, impulsive will not call if need to get up, waiting for placement, patient in green during shift  Plan of Care Reviewed With: patient  Overall Patient Progress: no change     Problem: Adult Inpatient Plan of Care  Goal: Plan of Care Review  Description: The Plan of Care Review/Shift note should be completed every shift.  The Outcome Evaluation is a brief statement about your assessment that the patient is improving, declining, or no change.  This information will be displayed automatically on your shift  note.  Recent Flowsheet Documentation  Taken 7/7/2024 1906 by Shakira Mosquera RN  Outcome Evaluation: Alert to self, impulsive will not call if need to get up, waiting for placement, patient in green during shift  Plan of Care Reviewed With: patient  Overall Patient  Progress: no change     Problem: Adult Inpatient Plan of Care  Goal: Plan of Care Review  Description: The Plan of Care Review/Shift note should be completed every shift.  The Outcome Evaluation is a brief statement about your assessment that the patient is improving, declining, or no change.  This information will be displayed automatically on your shift  note.  Outcome: Progressing  Flowsheets (Taken 7/7/2024 7862)  Outcome Evaluation: Alert to self, impulsive will not call if need to get up, waiting for placement, patient in green during shift  Plan of Care Reviewed With: patient  Overall Patient Progress: no change   Goal Outcome Evaluation:      Plan of Care Reviewed With: patient    Overall Patient Progress: no changeOverall Patient Progress: no change    Outcome Evaluation: Alert to self, impulsive will not call if need to get up, waiting for placement, patient in green during shift

## 2024-07-08 NOTE — PROGRESS NOTES
Care Management Follow Up    Length of Stay (days): 0    Expected Discharge Date: 07/09/2024       Additional Information:  SW continues to follow. Patient's MA is still pending. Need MA for placement. Per  notes on 7/1, application is waiting to be assigned to a worker. [facility] is working on March MA applications, and it could be months before patient's MA is processed.      Medical team should expect that patient is in the hospital through the summer.     KARLA Coronado, [facility]  Emergency Room   Please contact the SW on the floor in which the patient is staying for any questions or concerns

## 2024-07-08 NOTE — PLAN OF CARE
"Denies pain. A1 w/ walker and gb. Safety maintained throughout shift. Waiting for insurance/placement, SW following.     Goal Outcome Evaluation:      Plan of Care Reviewed With: patient    Overall Patient Progress: no changeOverall Patient Progress: no change    Outcome Evaluation: long-term cares. Waiting for MA and placement.            Problem: Adult Inpatient Plan of Care  Goal: Plan of Care Review  Description: The Plan of Care Review/Shift note should be completed every shift.  The Outcome Evaluation is a brief statement about your assessment that the patient is improving, declining, or no change.  This information will be displayed automatically on your shift  note.  Outcome: Progressing  Flowsheets (Taken 7/8/2024 1400)  Outcome Evaluation: long-term cares. Waiting for MA and placement.  Plan of Care Reviewed With: patient  Overall Patient Progress: no change  Goal: Patient-Specific Goal (Individualized)  Description: You can add care plan individualizations to a care plan. Examples of Individualization might be:  \"Parent requests to be called daily at 9am for status\", \"I have a hard time hearing out of my right ear\", or \"Do not touch me to wake me up as it startles  me\".  Outcome: Progressing  Goal: Absence of Hospital-Acquired Illness or Injury  Outcome: Progressing  Goal: Optimal Comfort and Wellbeing  Outcome: Progressing  Goal: Readiness for Transition of Care  Outcome: Progressing     Problem: Dementia Signs/Symptoms  Goal: Improved Behavioral Control (Dementia Signs/Symptoms)  Outcome: Progressing  Goal: Improved Mood Symptoms (Dementia Signs/Symptoms)  Outcome: Progressing  Goal: Optimized Cognitive Function (Dementia Signs/Symptoms)  Outcome: Progressing  Goal: Optimized Oral Intake (Dementia Signs/Symptoms)  Outcome: Progressing  Goal: Improved Sleep (Dementia Signs/Symptoms)  Outcome: Progressing  Goal: Enhanced Social or Functional Skills and Ability (Dementia Signs/Symptoms)  Outcome: " Progressing     Problem: Fall Injury Risk  Goal: Absence of Fall and Fall-Related Injury  Outcome: Progressing

## 2024-07-08 NOTE — PLAN OF CARE
Goal Outcome Evaluation:      Plan of Care Reviewed With: patient    Overall Patient Progress: no changeOverall Patient Progress: no change    Outcome Evaluation: Waiting for placement. No change in health status.      Problem: Adult Inpatient Plan of Care  Goal: Plan of Care Review  Description: The Plan of Care Review/Shift note should be completed every shift.  The Outcome Evaluation is a brief statement about your assessment that the patient is improving, declining, or no change.  This information will be displayed automatically on your shift  note.  Outcome: Adequate for Care Transition  Flowsheets (Taken 7/8/2024 0242)  Outcome Evaluation: Waiting for placement. No change in health status.  Plan of Care Reviewed With: patient  Overall Patient Progress: no change  Goal: Absence of Hospital-Acquired Illness or Injury  Intervention: Identify and Manage Fall Risk  Recent Flowsheet Documentation  Taken 7/7/2024 2300 by Orin Vazquez RN  Safety Promotion/Fall Prevention:   activity supervised   assistive device/personal items within reach   clutter free environment maintained   nonskid shoes/slippers when out of bed   patient and family education   safety round/check completed  Intervention: Prevent Skin Injury  Recent Flowsheet Documentation  Taken 7/7/2024 2300 by Orin Vazquez RN  Body Position: position changed independently  Skin Protection: incontinence pads utilized  Device Skin Pressure Protection: absorbent pad utilized/changed

## 2024-07-09 PROCEDURE — 101N000002 HC CUSTODIAL CARE DAILY

## 2024-07-09 ASSESSMENT — ACTIVITIES OF DAILY LIVING (ADL)
ADLS_ACUITY_SCORE: 52
ADLS_ACUITY_SCORE: 52
ADLS_ACUITY_SCORE: 51
ADLS_ACUITY_SCORE: 49
ADLS_ACUITY_SCORE: 51
ADLS_ACUITY_SCORE: 49
ADLS_ACUITY_SCORE: 51
ADLS_ACUITY_SCORE: 51
ADLS_ACUITY_SCORE: 48
ADLS_ACUITY_SCORE: 49
ADLS_ACUITY_SCORE: 51
ADLS_ACUITY_SCORE: 52

## 2024-07-09 NOTE — PLAN OF CARE
"Pt. Alert, confused, resting in bed most of the shift, up with assist of 1, walker and gaitbelt. Pt. Denies any needs at this time Will continue with POC.    Goal Outcome Evaluation:      Plan of Care Reviewed With: patient    Overall Patient Progress: no changeOverall Patient Progress: no change    Outcome Evaluation: [facility] cares, waiting for MA approval and placement.      Problem: Adult Inpatient Plan of Care  Goal: Plan of Care Review  Description: The Plan of Care Review/Shift note should be completed every shift.  The Outcome Evaluation is a brief statement about your assessment that the patient is improving, declining, or no change.  This information will be displayed automatically on your shift  note.  Outcome: Progressing  Flowsheets (Taken 7/9/2024 0452)  Outcome Evaluation: [facility] cares, waiting for MA approval and placement.  Plan of Care Reviewed With: patient  Overall Patient Progress: no change  Goal: Patient-Specific Goal (Individualized)  Description: You can add care plan individualizations to a care plan. Examples of Individualization might be:  \"Parent requests to be called daily at 9am for status\", \"I have a hard time hearing out of my right ear\", or \"Do not touch me to wake me up as it startles  me\".  Outcome: Progressing  Goal: Absence of Hospital-Acquired Illness or Injury  Outcome: Progressing  Intervention: Prevent Skin Injury  Recent Flowsheet Documentation  Taken 7/8/2024 2201 by Radha Mendoza RN  Skin Protection: incontinence pads utilized  Device Skin Pressure Protection: absorbent pad utilized/changed  Intervention: Prevent and Manage VTE (Venous Thromboembolism) Risk  Recent Flowsheet Documentation  Taken 7/8/2024 2201 by Radha Mendoza RN  VTE Prevention/Management: SCDs off (sequential compression devices)  Goal: Optimal Comfort and Wellbeing  Outcome: Progressing  Goal: Readiness for Transition of Care  Outcome: Progressing     Problem: Dementia Signs/Symptoms  Goal: " Improved Behavioral Control (Dementia Signs/Symptoms)  Outcome: Progressing  Intervention: Manage Behavior  Recent Flowsheet Documentation  Taken 7/8/2024 2201 by Radha Mendoza RN  Environmental Support:   calm environment promoted   rest periods encouraged  Goal: Improved Mood Symptoms (Dementia Signs/Symptoms)  Outcome: Progressing  Intervention: Optimize Emotion and Mood  Recent Flowsheet Documentation  Taken 7/8/2024 2201 by Radha Mendoza RN  Supportive Measures: active listening utilized  Goal: Optimized Cognitive Function (Dementia Signs/Symptoms)  Outcome: Progressing  Goal: Optimized Oral Intake (Dementia Signs/Symptoms)  Outcome: Progressing  Goal: Improved Sleep (Dementia Signs/Symptoms)  Outcome: Progressing  Goal: Enhanced Social or Functional Skills and Ability (Dementia Signs/Symptoms)  Outcome: Progressing     Problem: Fall Injury Risk  Goal: Absence of Fall and Fall-Related Injury  Outcome: Progressing

## 2024-07-09 NOTE — PLAN OF CARE
"Denies pain. A1 w/ walker and gb. Safety maintained throughout shift. Waiting for insurance/placement, SW following.     Goal Outcome Evaluation:      Plan of Care Reviewed With: patient    Overall Patient Progress: no changeOverall Patient Progress: no change    Outcome Evaluation: [facility] care. Waiting for MA approval and placement.            Problem: Adult Inpatient Plan of Care  Goal: Plan of Care Review  Description: The Plan of Care Review/Shift note should be completed every shift.  The Outcome Evaluation is a brief statement about your assessment that the patient is improving, declining, or no change.  This information will be displayed automatically on your shift  note.  Outcome: Progressing  Flowsheets (Taken 7/9/2024 1217)  Outcome Evaluation: [facility]iary care. Waiting for MA approval and placement.  Plan of Care Reviewed With: patient  Overall Patient Progress: no change  Goal: Patient-Specific Goal (Individualized)  Description: You can add care plan individualizations to a care plan. Examples of Individualization might be:  \"Parent requests to be called daily at 9am for status\", \"I have a hard time hearing out of my right ear\", or \"Do not touch me to wake me up as it startles  me\".  Outcome: Progressing  Goal: Absence of Hospital-Acquired Illness or Injury  Outcome: Progressing  Goal: Optimal Comfort and Wellbeing  Outcome: Progressing  Goal: Readiness for Transition of Care  Outcome: Progressing     Problem: Dementia Signs/Symptoms  Goal: Improved Behavioral Control (Dementia Signs/Symptoms)  Outcome: Progressing  Goal: Improved Mood Symptoms (Dementia Signs/Symptoms)  Outcome: Progressing  Goal: Optimized Cognitive Function (Dementia Signs/Symptoms)  Outcome: Progressing  Goal: Optimized Oral Intake (Dementia Signs/Symptoms)  Outcome: Progressing  Goal: Improved Sleep (Dementia Signs/Symptoms)  Outcome: Progressing  Goal: Enhanced Social or Functional Skills and Ability (Dementia " Signs/Symptoms)  Outcome: Progressing     Problem: Fall Injury Risk  Goal: Absence of Fall and Fall-Related Injury  Outcome: Progressing

## 2024-07-10 PROCEDURE — 101N000002 HC CUSTODIAL CARE DAILY

## 2024-07-10 ASSESSMENT — ACTIVITIES OF DAILY LIVING (ADL)
ADLS_ACUITY_SCORE: 52
ADLS_ACUITY_SCORE: 51
ADLS_ACUITY_SCORE: 52
ADLS_ACUITY_SCORE: 51
ADLS_ACUITY_SCORE: 52
ADLS_ACUITY_SCORE: 45
ADLS_ACUITY_SCORE: 52
ADLS_ACUITY_SCORE: 51
ADLS_ACUITY_SCORE: 52
ADLS_ACUITY_SCORE: 52

## 2024-07-10 NOTE — PLAN OF CARE
"Goal Outcome Evaluation:      Plan of Care Reviewed With: patient    Overall Patient Progress: no changeOverall Patient Progress: no change    Outcome Evaluation: Placement  Awaiting MA approval.  Problem: Adult Inpatient Plan of Care  Goal: Plan of Care Review  Description: The Plan of Care Review/Shift note should be completed every shift.  The Outcome Evaluation is a brief statement about your assessment that the patient is improving, declining, or no change.  This information will be displayed automatically on your shift  note.  7/10/2024 0147 by Ilan Herman RN  Outcome: Progressing  7/10/2024 0147 by Ilan Herman RN  Outcome: Progressing  Flowsheets (Taken 7/10/2024 0147)  Outcome Evaluation: Placement  Plan of Care Reviewed With: patient  Overall Patient Progress: no change  7/10/2024 0146 by Ilan Herman RN  Outcome: Progressing  Flowsheets (Taken 7/10/2024 0146)  Outcome Evaluation: Placement  Plan of Care Reviewed With: patient  Overall Patient Progress: no change  Goal: Patient-Specific Goal (Individualized)  Description: You can add care plan individualizations to a care plan. Examples of Individualization might be:  \"Parent requests to be called daily at 9am for status\", \"I have a hard time hearing out of my right ear\", or \"Do not touch me to wake me up as it startles  me\".  7/10/2024 0147 by Ilan Herman RN  Outcome: Progressing  7/10/2024 0147 by Ilan Herman RN  Outcome: Progressing  7/10/2024 0146 by Ilan Herman RN  Outcome: Progressing  Goal: Absence of Hospital-Acquired Illness or Injury  7/10/2024 0147 by Ilan Herman RN  Outcome: Progressing  7/10/2024 0147 by Ilan Herman RN  Outcome: Progressing  7/10/2024 0146 by Ilan Herman RN  Outcome: Progressing  Goal: Optimal Comfort and Wellbeing  7/10/2024 0147 by Ilan Herman RN  Outcome: Progressing  7/10/2024 0147 by Ilan Herman RN  Outcome: Progressing  7/10/2024 0146 by Ilan Herman RN  Outcome: " Progressing  Goal: Readiness for Transition of Care  7/10/2024 0147 by Ilan Herman RN  Outcome: Progressing  7/10/2024 0147 by Ilan Herman RN  Outcome: Progressing  7/10/2024 0146 by Ilan Herman RN  Outcome: Progressing     Problem: Dementia Signs/Symptoms  Goal: Improved Behavioral Control (Dementia Signs/Symptoms)  7/10/2024 0147 by Ilan Herman, RN  Outcome: Progressing  7/10/2024 0147 by Ilan Herman, RN  Outcome: Progressing  7/10/2024 0146 by Ilan Herman RN  Outcome: Progressing  Goal: Improved Mood Symptoms (Dementia Signs/Symptoms)  7/10/2024 0147 by Ilan Herman, RN  Outcome: Progressing  7/10/2024 0147 by Ilan Herman, RN  Outcome: Progressing  7/10/2024 0146 by Ilan Herman, RN  Outcome: Progressing  Goal: Optimized Cognitive Function (Dementia Signs/Symptoms)  7/10/2024 0147 by Ilan Herman, RN  Outcome: Progressing  7/10/2024 0147 by Ilan Herman, RN  Outcome: Progressing  7/10/2024 0146 by Ilan Herman, RN  Outcome: Progressing  Goal: Optimized Oral Intake (Dementia Signs/Symptoms)  7/10/2024 0147 by Ilan Herman, RN  Outcome: Progressing  7/10/2024 0147 by Ilan Herman, RN  Outcome: Progressing  7/10/2024 0146 by Ilan Herman, RN  Outcome: Progressing  Goal: Improved Sleep (Dementia Signs/Symptoms)  7/10/2024 0147 by Ilan Herman, RN  Outcome: Progressing  7/10/2024 0147 by Ilan Herman, RN  Outcome: Progressing  7/10/2024 0146 by Ilan Herman RN  Outcome: Progressing  Goal: Enhanced Social or Functional Skills and Ability (Dementia Signs/Symptoms)  7/10/2024 0147 by Ilan Herman, RN  Outcome: Progressing  7/10/2024 0147 by Ilan Herman, RN  Outcome: Progressing  7/10/2024 0146 by Ilan Herman, RN  Outcome: Progressing     Problem: Fall Injury Risk  Goal: Absence of Fall and Fall-Related Injury  7/10/2024 0147 by Ilan Herman, RN  Outcome: Progressing  7/10/2024 0147 by Ilan Herman, RN  Outcome: Progressing  7/10/2024 0146 by Ilan Herman,  RN  Outcome: Progressing

## 2024-07-10 NOTE — PROGRESS NOTES
Care Management Follow Up    Length of Stay (days): 0    Expected Discharge Date: 07/16/2024     Concerns to be Addressed: basic needs, care coordination/care conferences, decision making, discharge planning, financial/insurance       Additional Information:  SW continues to follow. Patient's MA is still pending. Need MA for placement. Per  notes on 7/1, application is waiting to be assigned to a worker. [facility] is working on March MA applications, and it could be months before patient's MA is processed.      Medical team should expect that patient is in the hospital through the summer.     KARLA Coronado, [facility]  Emergency Room   Please contact the SW on the floor in which the patient is staying for any questions or concerns

## 2024-07-10 NOTE — PLAN OF CARE
"Pt A&O x4. Transitioned to [facility] care 6/5. Confused. Does not appear to be in distress. No IV access. Safety measures maintained.    Problem: Adult Inpatient Plan of Care  Goal: Plan of Care Review  Description: The Plan of Care Review/Shift note should be completed every shift.  The Outcome Evaluation is a brief statement about your assessment that the patient is improving, declining, or no change.  This information will be displayed automatically on your shift  note.  Outcome: Adequate for Care Transition  Flowsheets (Taken 7/10/2024 1534)  Outcome Evaluation: Awaiting placement.  Plan of Care Reviewed With: patient  Overall Patient Progress: no change  Goal: Patient-Specific Goal (Individualized)  Description: You can add care plan individualizations to a care plan. Examples of Individualization might be:  \"Parent requests to be called daily at 9am for status\", \"I have a hard time hearing out of my right ear\", or \"Do not touch me to wake me up as it startles  me\".  Outcome: Adequate for Care Transition  Goal: Absence of Hospital-Acquired Illness or Injury  Outcome: Adequate for Care Transition  Intervention: Identify and Manage Fall Risk  Recent Flowsheet Documentation  Taken 7/10/2024 0939 by Julia Moss RN  Safety Promotion/Fall Prevention:   safety round/check completed   nonskid shoes/slippers when out of bed  Intervention: Prevent Skin Injury  Recent Flowsheet Documentation  Taken 7/10/2024 0939 by Julia Moss RN  Body Position: position changed independently  Taken 7/10/2024 0936 by Julia Moss RN  Body Position: position changed independently  Intervention: Prevent Infection  Recent Flowsheet Documentation  Taken 7/10/2024 0939 by Julia Moss RN  Infection Prevention: single patient room provided  Goal: Optimal Comfort and Wellbeing  Outcome: Adequate for Care Transition  Goal: Readiness for Transition of Care  Outcome: Adequate for Care Transition     Problem: Dementia Signs/Symptoms  Goal: " Improved Behavioral Control (Dementia Signs/Symptoms)  Outcome: Adequate for Care Transition  Intervention: Manage Behavior  Recent Flowsheet Documentation  Taken 7/10/2024 0939 by Julia Moss RN  Environmental Support: calm environment promoted  Goal: Improved Mood Symptoms (Dementia Signs/Symptoms)  Outcome: Adequate for Care Transition  Goal: Optimized Cognitive Function (Dementia Signs/Symptoms)  Outcome: Adequate for Care Transition  Goal: Optimized Oral Intake (Dementia Signs/Symptoms)  Outcome: Adequate for Care Transition  Goal: Improved Sleep (Dementia Signs/Symptoms)  Outcome: Adequate for Care Transition  Goal: Enhanced Social or Functional Skills and Ability (Dementia Signs/Symptoms)  Outcome: Adequate for Care Transition     Problem: Fall Injury Risk  Goal: Absence of Fall and Fall-Related Injury  Outcome: Adequate for Care Transition  Intervention: Identify and Manage Contributors  Recent Flowsheet Documentation  Taken 7/10/2024 0939 by Julia Moss RN  Medication Review/Management: medications reviewed  Intervention: Promote Injury-Free Environment  Recent Flowsheet Documentation  Taken 7/10/2024 0939 by Julia Moss RN  Safety Promotion/Fall Prevention:   safety round/check completed   nonskid shoes/slippers when out of bed   Goal Outcome Evaluation:      Plan of Care Reviewed With: patient    Overall Patient Progress: no changeOverall Patient Progress: no change    Outcome Evaluation: Awaiting placement.

## 2024-07-10 NOTE — PROGRESS NOTES
[facility]    Medicine Progress Note - Hospitalist Service    Date of Admission:  6/5/2024    Assessment & Plan   88-year-old male who was transitioned to [facility] care on 6/5.  He has history of dementia and family is unable to provide care for him.  He was recently treated for right full cellulitis which has resolved.  Patient is MA pending, awaiting placement in long term care. No significant changes over last several days.    longterm  care patient.  This is a nonbillable social visit    longterm care admission.  Social work consulted.  Looking for LTC.  Is MA pending. Once a bed is available patient `can go anytime     Constipation  Started/continue on senna, have as needed miralax and dulcolax available    Right foot cellulitis.  Resolved with Keflex 4 times daily through 6/11.  Swelling and redness resolved.    Venous ultrasound negative for DVT.    Dementia.  Not on any medications.  As needed olanzapine ordered for agitation.    5.   Bradycardia            Patient was noted to have HR in 50s. He is not on any beta blocker or CCB. Refused EKG. asymptomatic           - Monitor clinically    6. Paper work       Requested to fill out some paperwork.  I am not sure what kind of paperwork that is.  requested daughter to bring the paperwork to the hospital and I will take a look.      Diet: Combination Diet Regular Diet  Room Service    DVT Prophylaxis: Pneumatic Compression Devices  Maddox Catheter: Not present  Lines: None     Cardiac Monitoring: None  Code Status: No CPR- Do NOT Intubate      Clinically Significant Risk Factors Present on Admission                    # Dementia: noted on problem list                      Disposition Plan     Medically Ready for Discharge: Ready Now           Lito Skinner MD  Hospitalist Service  [facility]  Securely message with [employer] (more info)  Text page via [employer] Paging/Directory    ______________________________________________________________________    Interval History   Nursing notes reviewed. Patient doing well.  Sitting in bed watching TV.     Physical Exam   Vital Signs: Temp: 98.4  F (36.9  C) Temp src: Oral BP: 136/68 Pulse: 53   Resp: 20 SpO2: 95 % O2 Device: None (Room air)    Weight: 0 lbs 0 oz    General Appearance: Appears comfortable, resting in bed.       Medical Decision Making         MINUTES SPENT BY ME on the date of service doing chart review, history, exam, documentation & further activities per the note.      Data

## 2024-07-11 PROCEDURE — 101N000002 HC CUSTODIAL CARE DAILY

## 2024-07-11 ASSESSMENT — ACTIVITIES OF DAILY LIVING (ADL)
ADLS_ACUITY_SCORE: 47
ADLS_ACUITY_SCORE: 45
ADLS_ACUITY_SCORE: 47
ADLS_ACUITY_SCORE: 47
ADLS_ACUITY_SCORE: 45
ADLS_ACUITY_SCORE: 47

## 2024-07-11 NOTE — PLAN OF CARE
Patient remains confused, no complaints, pleasant, still awaiting placement no acute needs seen or reported.      Problem: Adult Inpatient Plan of Care  Goal: Plan of Care Review  Description: The Plan of Care Review/Shift note should be completed every shift.  The Outcome Evaluation is a brief statement about your assessment that the patient is improving, declining, or no change.  This information will be displayed automatically on your shift  note.  7/10/2024 1945 by Quinton Quezada RN  Outcome: Not Progressing  Flowsheets (Taken 7/10/2024 1945)  Outcome Evaluation: Patient awaiting placement, confused.  Plan of Care Reviewed With: patient  Overall Patient Progress: improving  7/10/2024 1944 by Quinton Quezada RN  Outcome: Progressing  Flowsheets (Taken 7/10/2024 1944)  Plan of Care Reviewed With: patient  Overall Patient Progress: improving   Goal Outcome Evaluation:      Plan of Care Reviewed With: patient    Overall Patient Progress: improvingOverall Patient Progress: improving    Outcome Evaluation: Patient awaiting placement, confused.

## 2024-07-11 NOTE — PLAN OF CARE
"Goal Outcome Evaluation:      Plan of Care Reviewed With: patient    Overall Patient Progress: no changeOverall Patient Progress: no change    Outcome Evaluation: confused    Alert. Disoriented x 4. Speech is garbled. VSS on RA except bradycardia and elevated BP. Impulsive. Up with assist of 1. LS clear. +BS. No BM this shift. +flatus. Regular diet, poor appetite. PO intake encouraged. PAINAD 0. Appears comfortable. Attempted to ambulate patient but patient unable to cooperate. No IV access. Voiding via BR. Discharge pending placement.    Problem: Adult Inpatient Plan of Care  Goal: Plan of Care Review  Description: The Plan of Care Review/Shift note should be completed every shift.  The Outcome Evaluation is a brief statement about your assessment that the patient is improving, declining, or no change.  This information will be displayed automatically on your shift  note.  Outcome: Not Progressing  Flowsheets (Taken 7/11/2024 1723)  Outcome Evaluation: confused  Plan of Care Reviewed With: patient  Overall Patient Progress: no change  Goal: Patient-Specific Goal (Individualized)  Description: You can add care plan individualizations to a care plan. Examples of Individualization might be:  \"Parent requests to be called daily at 9am for status\", \"I have a hard time hearing out of my right ear\", or \"Do not touch me to wake me up as it startles  me\".  Outcome: Not Progressing     "

## 2024-07-11 NOTE — PROGRESS NOTES
Daughter, Cheri calls back for update. Questions answered to her satisfaction. Nothing further needed at this time.

## 2024-07-11 NOTE — PLAN OF CARE
"No changed observed on  Pt's mental status , he is still confused . Overestimates his ability and moves out of bed without calling for assistance or alerting nursing staff . Ambulated to bathroom .     Goal Outcome Evaluation:    Overall Patient Progress: no change  Outcome Evaluation: confused , In no form of distress or pain .   Problem: Adult Inpatient Plan of Care  Goal: Plan of Care Review  Description: The Plan of Care Review/Shift note should be completed every shift.  The Outcome Evaluation is a brief statement about your assessment that the patient is improving, declining, or no change.  This information will be displayed automatically on your shift  note.  Outcome: Progressing  Flowsheets (Taken 7/11/2024 0659)  Outcome Evaluation: confused  Overall Patient Progress: no change  Goal: Patient-Specific Goal (Individualized)  Description: You can add care plan individualizations to a care plan. Examples of Individualization might be:  \"Parent requests to be called daily at 9am for status\", \"I have a hard time hearing out of my right ear\", or \"Do not touch me to wake me up as it startles  me\".  Outcome: Progressing         "

## 2024-07-11 NOTE — PROGRESS NOTES
Attempted to reach daughter, Cheri at preferred phone number. She was unavailable. Left message for her to call me back.

## 2024-07-11 NOTE — PROGRESS NOTES
[facility]    Medicine Progress Note - Hospitalist Service    Date of Admission:  6/5/2024    Assessment & Plan   88-year-old male who was transitioned to [facility] care on 6/5.  He has history of dementia and family is unable to provide care for him.  He was recently treated for right full cellulitis which has resolved.  Patient is MA pending, awaiting placement in long term care. No significant changes over last several days.    longterm  care patient.  This is a nonbillable social visit    longterm care admission.  Social work consulted.  Looking for LTC.  Is MA pending. Once a bed is available patient `can go anytime     Constipation  Started/continue on senna, have as needed miralax and dulcolax available    Right foot cellulitis.  Resolved with Keflex 4 times daily through 6/11.  Swelling and redness resolved.    Venous ultrasound negative for DVT.    Dementia.  Not on any medications.  As needed olanzapine ordered for agitation.    5.   Bradycardia            Patient was noted to have HR in 50s. He is not on any beta blocker or CCB. Refused EKG. asymptomatic           - Monitor clinically    6. Paper work       Requested to fill out some paperwork.  I am not sure what kind of paperwork that is.  requested daughter to bring the paperwork to the hospital and I will take a look.      Diet: Combination Diet Regular Diet  Room Service    DVT Prophylaxis: Pneumatic Compression Devices  Maddox Catheter: Not present  Lines: None     Cardiac Monitoring: None  Code Status: No CPR- Do NOT Intubate      Clinically Significant Risk Factors Present on Admission                    # Dementia: noted on problem list                      Disposition Plan     Medically Ready for Discharge: Ready Now           Lito Skinner MD  Hospitalist Service  [facility]  Securely message with [employer] (more info)  Text page via [employer] Paging/Directory    ______________________________________________________________________    Interval History   Nursing notes reviewed. Patient doing well.     Physical Exam   Vital Signs: Temp: 98.4  F (36.9  C) Temp src: Axillary BP: (!) 154/73 Pulse: 50   Resp: 22 SpO2: 98 % O2 Device: None (Room air)    Weight: 0 lbs 0 oz    General Appearance: Appears comfortable, resting in bed.       Medical Decision Making         MINUTES SPENT BY ME on the date of service doing chart review, history, exam, documentation & further activities per the note.      Data

## 2024-07-11 NOTE — PROGRESS NOTES
Care Management Follow Up    Length of Stay (days): 0    Expected Discharge Date: 07/17/2024       Additional Information:  SW continues to follow. Patient's MA is still pending. Need MA for placement. Per  notes on 7/1, application is waiting to be assigned to a worker. [facility] is working on March MA applications, and it could be months before patient's MA is processed.      Medical team should expect that patient is in the hospital through the summer.    KARLA Coronado, [facility]  Emergency Room   Please contact the SW on the floor in which the patient is staying for any questions or concerns

## 2024-07-12 PROCEDURE — 101N000002 HC CUSTODIAL CARE DAILY

## 2024-07-12 PROCEDURE — 250N000013 HC RX MED GY IP 250 OP 250 PS 637: Performed by: INTERNAL MEDICINE

## 2024-07-12 RX ADMIN — POLYETHYLENE GLYCOL 3350 17 G: 17 POWDER, FOR SOLUTION ORAL at 11:29

## 2024-07-12 ASSESSMENT — ACTIVITIES OF DAILY LIVING (ADL)
ADLS_ACUITY_SCORE: 47
ADLS_ACUITY_SCORE: 51
ADLS_ACUITY_SCORE: 47
ADLS_ACUITY_SCORE: 51
ADLS_ACUITY_SCORE: 51
ADLS_ACUITY_SCORE: 47
ADLS_ACUITY_SCORE: 47
ADLS_ACUITY_SCORE: 51
ADLS_ACUITY_SCORE: 51
ADLS_ACUITY_SCORE: 47
ADLS_ACUITY_SCORE: 47

## 2024-07-12 NOTE — PLAN OF CARE
"/69 (BP Location: Left arm, Patient Position: Semi-Liu's)   Pulse 53   Temp 98  F (36.7  C) (Oral)   Resp 17   SpO2 97%     General - VSS on RA. Alert, not decision making. Cognition at baseline. Responds to yes / no questions. Denies pain.  Neuro - AxO to self  Resp - Lungs clear, dim, RA  Cards - WDL  GI - BS normoactive, appetite is fair, tray set up, s/s of constipation are present   - x. Incontinent at times. Ambulates to bathroom, provided full bath  Skin - WDL, all pressure areas assessed  No IV - MD approved    Plan - assess long term care options  Today pt received full shower and skin care. Full skin assessment complete, WNL. Sacral redness but intact. Pt alert before eating meals. Ambulates Ax1 with furniture grabbing; pt found to carry walker instead of proper use.    Goal Outcome Evaluation:      Plan of Care Reviewed With: patient    Overall Patient Progress: no changeOverall Patient Progress: no change    Outcome Evaluation: Denies pain, denies s/s of illness, Neuro at baseline      Problem: Adult Inpatient Plan of Care  Goal: Plan of Care Review  Description: The Plan of Care Review/Shift note should be completed every shift.  The Outcome Evaluation is a brief statement about your assessment that the patient is improving, declining, or no change.  This information will be displayed automatically on your shift  note.  Outcome: Progressing  Flowsheets (Taken 7/12/2024 1044)  Outcome Evaluation: Denies pain, denies s/s of illness, Neuro at baseline  Plan of Care Reviewed With: patient  Overall Patient Progress: no change  Goal: Patient-Specific Goal (Individualized)  Description: You can add care plan individualizations to a care plan. Examples of Individualization might be:  \"Parent requests to be called daily at 9am for status\", \"I have a hard time hearing out of my right ear\", or \"Do not touch me to wake me up as it startles  me\".  Outcome: Progressing     "

## 2024-07-12 NOTE — PLAN OF CARE
Alert , No changed observed on  Pt's mental status , he is still confused . Overestimates his ability and moves out of bed without calling for assistance or alerting nursing staff . Ambulated to bathroom . Denied pain , refused stool softner        Goal Outcome Evaluation:    Overall Patient Progress: no change    Problem: Adult Inpatient Plan of Care  Goal: Plan of Care Review  Description: The Plan of Care Review/Shift note should be completed every shift.  The Outcome Evaluation is a brief statement about your assessment that the patient is improving, declining, or no change.  This information will be displayed automatically on your shift  note.  Outcome: Not Progressing  Flowsheets (Taken 7/12/2024 0427)  Outcome Evaluation: vss  Overall Patient Progress: no change

## 2024-07-12 NOTE — PROGRESS NOTES
[facility]    Medicine Progress Note - Hospitalist Service    Date of Admission:  6/5/2024    Assessment & Plan   88-year-old male who was transitioned to halfway care on 6/5.  He has history of dementia and family is unable to provide care for him.  He was recently treated for right full cellulitis which has resolved.  Patient is MA pending, awaiting placement in long term care. No significant changes over last several days.    residential  care patient.  This is a nonbillable social visit    residential care admission.  Social work consulted.  Looking for LTC.  Is MA pending. Once a bed is available patient `can go anytime     Constipation  Started/continue on senna, have as needed miralax and dulcolax available    Right foot cellulitis.  Resolved with Keflex 4 times daily through 6/11.  Swelling and redness resolved.    Venous ultrasound negative for DVT.    Dementia.  Not on any medications.  As needed olanzapine ordered for agitation.    5.   Bradycardia            Patient was noted to have HR in 50s. He is not on any beta blocker or CCB. Refused EKG. asymptomatic           - Monitor clinically    6. Paper work       Requested to fill out some paperwork.  I am not sure what kind of paperwork that is.  requested daughter to bring the paperwork to the hospital and I will take a look.      Diet: Combination Diet Regular Diet  Room Service    DVT Prophylaxis: Pneumatic Compression Devices  Maddox Catheter: Not present  Lines: None     Cardiac Monitoring: None  Code Status: No CPR- Do NOT Intubate      Clinically Significant Risk Factors Present on Admission                    # Dementia: noted on problem list                      Disposition Plan     Medically Ready for Discharge: Ready Now           Lito Skinner MD  Hospitalist Service  [facility]  Securely message with [employer] (more info)  Text page via [employer] Paging/Directory    ______________________________________________________________________    Interval History   Nursing notes reviewed. Patient doing well.     Physical Exam   Vital Signs: Temp: 98  F (36.7  C) Temp src: Oral BP: 135/69 Pulse: 53   Resp: 18 SpO2: 97 % O2 Device: None (Room air)    Weight: 0 lbs 0 oz    General Appearance: Appears comfortable, resting in bed.       Medical Decision Making         MINUTES SPENT BY ME on the date of service doing chart review, history, exam, documentation & further activities per the note.      Data

## 2024-07-13 PROCEDURE — 101N000002 HC CUSTODIAL CARE DAILY

## 2024-07-13 ASSESSMENT — ACTIVITIES OF DAILY LIVING (ADL)
ADLS_ACUITY_SCORE: 48
ADLS_ACUITY_SCORE: 47
ADLS_ACUITY_SCORE: 48
ADLS_ACUITY_SCORE: 48
ADLS_ACUITY_SCORE: 47
ADLS_ACUITY_SCORE: 48
ADLS_ACUITY_SCORE: 47
ADLS_ACUITY_SCORE: 48
ADLS_ACUITY_SCORE: 48
ADLS_ACUITY_SCORE: 51
ADLS_ACUITY_SCORE: 51
ADLS_ACUITY_SCORE: 48
ADLS_ACUITY_SCORE: 47
ADLS_ACUITY_SCORE: 48
ADLS_ACUITY_SCORE: 47
ADLS_ACUITY_SCORE: 48
ADLS_ACUITY_SCORE: 48

## 2024-07-13 NOTE — PLAN OF CARE
"nursing home Outpatient class. Alert. No S/S of illness present, VSS. Poor appetite, appears to only prefer finger foods, usually sweets, but uses spoon for pudding. Loose BMs this shift x3, was given PRN Miralax in AM, bedtime senna not given. Up assist x1 w/ walker, though pt sets off bed alarm to get OOB, does not use walker. Plan: awaiting LTC placement.         Goal Outcome Evaluation:      Plan of Care Reviewed With: patient    Overall Patient Progress: no changeOverall Patient Progress: no change    Outcome Evaluation: No pain. Loose BMs (miralax).      Problem: Adult Inpatient Plan of Care  Goal: Plan of Care Review  Description: The Plan of Care Review/Shift note should be completed every shift.  The Outcome Evaluation is a brief statement about your assessment that the patient is improving, declining, or no change.  This information will be displayed automatically on your shift  note.  Outcome: Progressing  Flowsheets (Taken 7/12/2024 2129)  Outcome Evaluation: No pain. Loose BMs (miralax).  Plan of Care Reviewed With: patient  Overall Patient Progress: no change  Goal: Patient-Specific Goal (Individualized)  Description: You can add care plan individualizations to a care plan. Examples of Individualization might be:  \"Parent requests to be called daily at 9am for status\", \"I have a hard time hearing out of my right ear\", or \"Do not touch me to wake me up as it startles  me\".  Outcome: Progressing         "

## 2024-07-13 NOTE — PROGRESS NOTES
[facility]    Medicine Progress Note - Hospitalist Service    Date of Admission:  6/5/2024    Assessment & Plan   88-year-old male who was transitioned to long term care on 6/5.  He has history of dementia and family is unable to provide care for him.  He was recently treated for right full cellulitis which has resolved.  Patient is MA pending, awaiting placement in long term care. No significant changes over last several days.    residential  care patient.  This is a nonbillable social visit    residential care admission.  Social work consulted.  Looking for LTC.  Is MA pending. Once a bed is available patient `can go anytime     Constipation  Started/continue on senna, have as needed miralax and dulcolax available    Right foot cellulitis.  Resolved with Keflex 4 times daily through 6/11.  Swelling and redness resolved.    Venous ultrasound negative for DVT.    Dementia.  Not on any medications.  As needed olanzapine ordered for agitation.    5.   Bradycardia            Patient was noted to have HR in 50s. He is not on any beta blocker or CCB. Refused EKG. asymptomatic           - Monitor clinically    6. Paper work       I filled out guardianship papers.          Diet: Combination Diet Regular Diet  Room Service    DVT Prophylaxis: Pneumatic Compression Devices  Maddox Catheter: Not present  Lines: None     Cardiac Monitoring: None  Code Status: No CPR- Do NOT Intubate      Clinically Significant Risk Factors Present on Admission                    # Dementia: noted on problem list                      Disposition Plan     Medically Ready for Discharge: Ready Now           Lito Skinner MD  Hospitalist Service  [facility]  Securely message with [employer] (more info)  Text page via [employer] Paging/Directory   ______________________________________________________________________    Interval History   Nursing notes reviewed. Patient doing well.     Physical Exam   Vital Signs:  Temp: 98  F (36.7  C) Temp src: Oral BP: (!) 140/66 Pulse: 50   Resp: 18 SpO2: 97 % O2 Device: None (Room air)    Weight: 0 lbs 0 oz    General Appearance: Appears comfortable, resting in bed.       Medical Decision Making         MINUTES SPENT BY ME on the date of service doing chart review, history, exam, documentation & further activities per the note.      Data

## 2024-07-13 NOTE — PLAN OF CARE
"BP (!) 140/66 (BP Location: Left arm)   Pulse 50   Temp 98  F (36.7  C) (Oral)   Resp 18   SpO2 97%     General - VSS on RA. Alert, not decision making. Cognition at baseline. Responds to yes / no questions. Denies pain.  Neuro - AxO to self  Resp - Lungs clear, dim, RA  Cards - WDL  GI - BS normoactive, appetite is fair, tray set up, pt stooled x1 loose   - x. Incontinent at times. Ambulates to bathroom Ax1  Skin - WDL, all pressure areas assessed  No IV - MD approved     Plan - assess long term care options  Today pt received facial hair grooming w/ family consent. Bed bath and full linen change.    Pt alert before eating meals. Ambulates Ax1 with furniture grabbing; pt unable to use walker correctly.  Discussed plan of care with family at bedside.     Goal Outcome Evaluation:      Plan of Care Reviewed With: patient    Overall Patient Progress: no changeOverall Patient Progress: no change    Outcome Evaluation: No changes, VSS on RA, cognition mentation at baseline, denies pain      Problem: Adult Inpatient Plan of Care  Goal: Plan of Care Review  Description: The Plan of Care Review/Shift note should be completed every shift.  The Outcome Evaluation is a brief statement about your assessment that the patient is improving, declining, or no change.  This information will be displayed automatically on your shift  note.  Outcome: Progressing  Flowsheets (Taken 7/13/2024 4307)  Outcome Evaluation: No changes, VSS on RA, cognition mentation at baseline, denies pain  Plan of Care Reviewed With: patient  Overall Patient Progress: no change  Goal: Patient-Specific Goal (Individualized)  Description: You can add care plan individualizations to a care plan. Examples of Individualization might be:  \"Parent requests to be called daily at 9am for status\", \"I have a hard time hearing out of my right ear\", or \"Do not touch me to wake me up as it startles  me\".  Outcome: Progressing     "

## 2024-07-13 NOTE — PLAN OF CARE
"Cared for [id number]    Activity: SBA. Pt OOB before gb and walker can be used.  Neuro:  A&Ox1, to self. Confused. Answers to yes/no questions  Cardiac: WNL  Resp: on RA with O2 sats >92%. Appears comfortable.  GI/: Nocturia. See note below. No BM on shift.   Diet: Regular. Poor intake. Encourage food and oral intake. 1:1 feeding.   Skin: WNL  LDAs:  No PIV, Ok'd MD  Pain: no pain, PAINAD    Consults: SW following  Discharge Plan: Pending TCU placement     Major Shift Events:  SBA. Pt OOB multiple times overnight to use the bathroom releasing bed alarms. Attempted external cath, pt unable to cooperate. Brief back in place.    Plan: Continue with plan of care  For vital signs and complete assessments, please see documentation under flowsheets.    Garima Tucker RN      Goal Outcome Evaluation:      Plan of Care Reviewed With: patient    Overall Patient Progress: no changeOverall Patient Progress: no change    Outcome Evaluation: no acute changes overnight      Problem: Adult Inpatient Plan of Care  Goal: Plan of Care Review  Description: The Plan of Care Review/Shift note should be completed every shift.  The Outcome Evaluation is a brief statement about your assessment that the patient is improving, declining, or no change.  This information will be displayed automatically on your shift  note.  Outcome: Progressing  Flowsheets (Taken 7/13/2024 0404)  Outcome Evaluation: no acute changes overnight  Plan of Care Reviewed With: patient  Overall Patient Progress: no change  Goal: Patient-Specific Goal (Individualized)  Description: You can add care plan individualizations to a care plan. Examples of Individualization might be:  \"Parent requests to be called daily at 9am for status\", \"I have a hard time hearing out of my right ear\", or \"Do not touch me to wake me up as it startles  me\".  Outcome: Progressing     "

## 2024-07-14 PROCEDURE — 250N000013 HC RX MED GY IP 250 OP 250 PS 637: Performed by: INTERNAL MEDICINE

## 2024-07-14 PROCEDURE — 101N000002 HC CUSTODIAL CARE DAILY

## 2024-07-14 RX ADMIN — SENNOSIDES AND DOCUSATE SODIUM 1 TABLET: 8.6; 5 TABLET ORAL at 21:44

## 2024-07-14 ASSESSMENT — ACTIVITIES OF DAILY LIVING (ADL)
ADLS_ACUITY_SCORE: 48
ADLS_ACUITY_SCORE: 45
ADLS_ACUITY_SCORE: 45
ADLS_ACUITY_SCORE: 48
ADLS_ACUITY_SCORE: 45
ADLS_ACUITY_SCORE: 48
ADLS_ACUITY_SCORE: 45
ADLS_ACUITY_SCORE: 48

## 2024-07-14 NOTE — PLAN OF CARE
Care from [id number]  Inpatient Progress Note - MS3  For vital signs and complete assessments, please see documentation flowsheets.     half-way outpatient class.  ORIENTATION: Aox1, to self, confused, answers yes/no questions.  PAIN: PAINAD, no pain.  O2: RA  GI/: Ambulates to restroom; one loose BM on evening shift, but not overnight.  ACTIVITY: Pt gets OOB setting off bed alarm without calling, shuffling to bathroom before walker/GB can be used, staff to room immediately when bed alarm gets set off for safety and fall precaution.  DIET: Regular  LINES/DRAINS: No PIV  PLAN Awaiting placement.     Goal Outcome Evaluation:      Plan of Care Reviewed With: patient    Overall Patient Progress: no changeOverall Patient Progress: no change    Outcome Evaluation: Pt baseline, denies pain, no acute changes overnight.    Problem: Adult Inpatient Plan of Care  Goal: Plan of Care Review  Description: The Plan of Care Review/Shift note should be completed every shift.  The Outcome Evaluation is a brief statement about your assessment that the patient is improving, declining, or no change.  This information will be displayed automatically on your shift  note.  Recent Flowsheet Documentation  Taken 7/14/2024 0249 by Donna Peoples RN  Outcome Evaluation: Pt baseline, denies pain, no acute changes overnight.  Plan of Care Reviewed With: patient  Overall Patient Progress: no change  Goal: Absence of Hospital-Acquired Illness or Injury  Intervention: Identify and Manage Fall Risk  Recent Flowsheet Documentation  Taken 7/13/2024 2301 by Donna Peoples, RN  Safety Promotion/Fall Prevention: safety round/check completed  Intervention: Prevent Skin Injury  Recent Flowsheet Documentation  Taken 7/13/2024 2301 by Donna Peoples RN  Body Position: position changed independently

## 2024-07-14 NOTE — PROGRESS NOTES
[facility]    Medicine Progress Note - Hospitalist Service    Date of Admission:  6/5/2024    Assessment & Plan   88-year-old male who was transitioned to detention care on 6/5.  He has history of dementia and family is unable to provide care for him.  He was recently treated for right full cellulitis which has resolved.  Patient is MA pending, awaiting placement in long term care. No significant changes over last several days.    [facility]  care patient.  This is a nonbillable social visit    [facility] care admission.  Social work consulted.  Looking for LTC.  Is MA pending. Once a bed is available patient `can go anytime     Constipation  Started/continue on senna, have as needed miralax and dulcolax available    Right foot cellulitis.  Resolved with Keflex 4 times daily through 6/11.  Swelling and redness resolved.    Venous ultrasound negative for DVT.    Dementia.  Not on any medications.  As needed olanzapine ordered for agitation.    5.   Bradycardia            Patient was noted to have HR in 50s. He is not on any beta blocker or CCB. Refused EKG. asymptomatic           - Monitor clinically    6. Paper work       I filled out guardianship papers on 7/13 for the daughter.          Diet: Combination Diet Regular Diet  Room Service    DVT Prophylaxis: Pneumatic Compression Devices  Maddox Catheter: Not present  Lines: None     Cardiac Monitoring: None  Code Status: No CPR- Do NOT Intubate      Clinically Significant Risk Factors Present on Admission                    # Dementia: noted on problem list                      Disposition Plan     Medically Ready for Discharge: Ready Now           Lito Skniner MD  Hospitalist Service  [facility]  Securely message with [employer] (more info)  Text page via [employer] Paging/Directory   ______________________________________________________________________    Interval History   Nursing notes reviewed. Patient doing well.     Physical  Exam   Vital Signs: Temp: 97.8  F (36.6  C) Temp src: Oral BP: 132/62 Pulse: 52   Resp: 18 SpO2: 94 % O2 Device: None (Room air)    Weight: 0 lbs 0 oz    General Appearance: Appears comfortable, resting in bed.       Medical Decision Making         MINUTES SPENT BY ME on the date of service doing chart review, history, exam, documentation & further activities per the note.      Data

## 2024-07-14 NOTE — PLAN OF CARE
/62 (BP Location: Left arm, Patient Position: Semi-Liu's, Cuff Size: Adult Regular)   Pulse 52   Temp 97.8  F (36.6  C) (Oral)   Resp 18   SpO2 94%     General - VSS on RA. Alert, not decision making. Cognition at baseline. Responds to yes / no questions. Denies pain.  Neuro - AxO to self  Resp - Lungs clear, dim, RA  Cards - WDL  GI - BS normoactive, appetite is fair, tray set up, pt stooled x1 loose   - x. Incontinent at times. Ambulates to bathroom Ax1  Skin - WDL, all pressure areas assessed  No IV - MD approved     Plan - assess long term care options  Today pt received assistance with both meals, encouraged hydration. Pt denied activity with writer and mostly rested. Gave partial bed bath and assessed all pressure areas.     Goal Outcome Evaluation:      Plan of Care Reviewed With: patient    Overall Patient Progress: no changeOverall Patient Progress: no change    Outcome Evaluation: At baseline cognition, denies pain, VSS on RA      Problem: Adult Inpatient Plan of Care  Goal: Plan of Care Review  Description: The Plan of Care Review/Shift note should be completed every shift.  The Outcome Evaluation is a brief statement about your assessment that the patient is improving, declining, or no change.  This information will be displayed automatically on your shift  note.  7/14/2024 1430 by Barbi Tam, RN  Outcome: Progressing  Flowsheets (Taken 7/14/2024 1430)  Outcome Evaluation: At baseline cognition, denies pain, VSS on RA  Plan of Care Reviewed With: patient  Overall Patient Progress: no change  7/14/2024 1427 by Barbi Tam, RN  Outcome: Progressing  Flowsheets (Taken 7/14/2024 1427)  Outcome Evaluation: At baseline cognition, denies pain, VSS on RA  Plan of Care Reviewed With: patient  Overall Patient Progress: no change  Goal: Patient-Specific Goal (Individualized)  Description: You can add care plan individualizations to a care plan. Examples of Individualization might be:   "\"Parent requests to be called daily at 9am for status\", \"I have a hard time hearing out of my right ear\", or \"Do not touch me to wake me up as it startles  me\".  7/14/2024 1430 by Barbi Tam, RN  Outcome: Progressing  7/14/2024 1427 by Barbi Tam, RN  Outcome: Progressing     "

## 2024-07-14 NOTE — PLAN OF CARE
"care home Outpatient class. Pt baseline. No S/S of illness present, VSS. Eats finger foods and sweets (PB&J, cookies, pudding, popsicles). Loose BM this shift x1, was given PRN Miralax in AM, bedtime senna not given. Up assist x1 w/ walker, though pt sets off bed alarm to get OOB, does not use walker. Plan: awaiting LTC placement       Goal Outcome Evaluation:      Plan of Care Reviewed With: patient    Overall Patient Progress: no changeOverall Patient Progress: no change    Outcome Evaluation: Pt baseline, no pain, VSS.      Problem: Adult Inpatient Plan of Care  Goal: Plan of Care Review  Description: The Plan of Care Review/Shift note should be completed every shift.  The Outcome Evaluation is a brief statement about your assessment that the patient is improving, declining, or no change.  This information will be displayed automatically on your shift  note.  Outcome: Progressing  Flowsheets (Taken 7/13/2024 1910)  Outcome Evaluation: Pt baseline, no pain, VSS.  Plan of Care Reviewed With: patient  Overall Patient Progress: no change  Goal: Patient-Specific Goal (Individualized)  Description: You can add care plan individualizations to a care plan. Examples of Individualization might be:  \"Parent requests to be called daily at 9am for status\", \"I have a hard time hearing out of my right ear\", or \"Do not touch me to wake me up as it startles  me\".  Outcome: Progressing         "

## 2024-07-15 PROCEDURE — 250N000013 HC RX MED GY IP 250 OP 250 PS 637: Performed by: INTERNAL MEDICINE

## 2024-07-15 PROCEDURE — 101N000002 HC CUSTODIAL CARE DAILY

## 2024-07-15 RX ADMIN — SENNOSIDES AND DOCUSATE SODIUM 1 TABLET: 8.6; 5 TABLET ORAL at 22:41

## 2024-07-15 ASSESSMENT — ACTIVITIES OF DAILY LIVING (ADL)
ADLS_ACUITY_SCORE: 48
ADLS_ACUITY_SCORE: 48
ADLS_ACUITY_SCORE: 45
ADLS_ACUITY_SCORE: 48
ADLS_ACUITY_SCORE: 44
ADLS_ACUITY_SCORE: 45
ADLS_ACUITY_SCORE: 45
ADLS_ACUITY_SCORE: 48
ADLS_ACUITY_SCORE: 48
ADLS_ACUITY_SCORE: 45
ADLS_ACUITY_SCORE: 47
ADLS_ACUITY_SCORE: 45
ADLS_ACUITY_SCORE: 48
ADLS_ACUITY_SCORE: 41
ADLS_ACUITY_SCORE: 45
ADLS_ACUITY_SCORE: 45
ADLS_ACUITY_SCORE: 41
ADLS_ACUITY_SCORE: 41

## 2024-07-15 NOTE — PROGRESS NOTES
[facility]    Medicine Progress Note - Hospitalist Service    Date of Admission:  6/5/2024    Assessment & Plan   88-year-old male who was transitioned to snf care on 6/5.  He has history of dementia and family is unable to provide care for him.  He was recently treated for right full cellulitis which has resolved.  Patient is MA pending, awaiting placement in long term care. No significant changes over last several days.    care home  care patient.  This is a nonbillable social visit.    care home care admission.  Social work consulted.  Looking for LTC.  Is MA pending. Once a bed is available patient can go anytime     Constipation  Started/continue on senna, have as needed miralax and dulcolax available    Right foot cellulitis.  Resolved with Keflex 4 times daily through 6/11.  Swelling and redness resolved.    Venous ultrasound negative for DVT.    Dementia.  Not on any medications.  As needed olanzapine ordered for agitation.    5.   Bradycardia            Patient was noted to have HR in 50s. He is not on any beta blocker or CCB. Refused EKG. asymptomatic           - Monitor clinically    6. Paper work       Previous provider filled out guardianship papers on 7/13 for the daughter.          Diet: Combination Diet Regular Diet  Room Service    DVT Prophylaxis: Pneumatic Compression Devices  Maddox Catheter: Not present  Lines: None     Cardiac Monitoring: None  Code Status: No CPR- Do NOT Intubate      Clinically Significant Risk Factors Present on Admission                    # Dementia: noted on problem list                      Disposition Plan     Medically Ready for Discharge: Ready Now           Alexandre Kurtz MD  Hospitalist Service  [facility]  Securely message with [employer] (more info)  Text page via [employer] Paging/Directory   ______________________________________________________________________    Interval History   Nursing notes reviewed. Patient doing  well. No acute concerns or complaints.      Physical Exam   Vital Signs:           Resp: 18        Weight: 0 lbs 0 oz    General Appearance: Appears comfortable, resting in bed.       Medical Decision Making         MINUTES SPENT BY ME on the date of service doing chart review, history, exam, documentation & further activities per the note.      Data

## 2024-07-15 NOTE — PLAN OF CARE
"snf Outpatient class. Pt baseline. No S/S of illness present, VSS, no pain. Eats finger foods and sweets. Offered walk, but pt declined, happy and singing in bed. Up assist x1 w/ walker, though pt sets off bed alarm to get OOB, does not use walker. Plan: awaiting LTC placement       Goal Outcome Evaluation:      Plan of Care Reviewed With: patient    Overall Patient Progress: no changeOverall Patient Progress: no change    Outcome Evaluation: Pt at baseline, no pain, VSS, RA.      Problem: Adult Inpatient Plan of Care  Goal: Plan of Care Review  Description: The Plan of Care Review/Shift note should be completed every shift.  The Outcome Evaluation is a brief statement about your assessment that the patient is improving, declining, or no change.  This information will be displayed automatically on your shift  note.  Outcome: Progressing  Flowsheets (Taken 7/14/2024 1945)  Outcome Evaluation: Pt at baseline, no pain, VSS, RA.  Plan of Care Reviewed With: patient  Overall Patient Progress: no change  Goal: Patient-Specific Goal (Individualized)  Description: You can add care plan individualizations to a care plan. Examples of Individualization might be:  \"Parent requests to be called daily at 9am for status\", \"I have a hard time hearing out of my right ear\", or \"Do not touch me to wake me up as it startles  me\".  Outcome: Progressing         "

## 2024-07-15 NOTE — PLAN OF CARE
"Alert to self, denies pain, forgets to call for bathroom & sets off alarm, safety mats on floor, pending placement in LTC .     Goal Outcome Evaluation:      Plan of Care Reviewed With: patient    Overall Patient Progress: no changeOverall Patient Progress: no change    Outcome Evaluation: no change, oriented to self, denies pain      Problem: Adult Inpatient Plan of Care  Goal: Plan of Care Review  Description: The Plan of Care Review/Shift note should be completed every shift.  The Outcome Evaluation is a brief statement about your assessment that the patient is improving, declining, or no change.  This information will be displayed automatically on your shift  note.  Outcome: Progressing  Flowsheets (Taken 7/15/2024 1231)  Outcome Evaluation: no change, oriented to self, denies pain  Plan of Care Reviewed With: patient  Overall Patient Progress: no change  Goal: Patient-Specific Goal (Individualized)  Description: You can add care plan individualizations to a care plan. Examples of Individualization might be:  \"Parent requests to be called daily at 9am for status\", \"I have a hard time hearing out of my right ear\", or \"Do not touch me to wake me up as it startles  me\".  Outcome: Progressing     "

## 2024-07-15 NOTE — PLAN OF CARE
Care from [id number]  Inpatient Progress Note - MS3  For vital signs and complete assessments, please see documentation flowsheets.     nursing home outpatient class.  ORIENTATION: Aox1, to self, confused, answers yes/no questions.  PAIN: PAINAD, no pain.  O2: RA  GI/: Ambulates to restroom.  ACTIVITY: Pt gets OOB setting off bed alarm without calling, shuffling to bathroom before walker/GB can be used, staff to room immediately when bed alarm gets set off for safety and fall precaution.  DIET: Regular  LINES/DRAINS: No PIV  PLAN Awaiting placement.     Goal Outcome Evaluation:      Plan of Care Reviewed With: patient    Overall Patient Progress: no changeOverall Patient Progress: no change    Outcome Evaluation: Pt at baseline, denies pain, room air.    Problem: Adult Inpatient Plan of Care  Goal: Plan of Care Review  Description: The Plan of Care Review/Shift note should be completed every shift.  The Outcome Evaluation is a brief statement about your assessment that the patient is improving, declining, or no change.  This information will be displayed automatically on your shift  note.  Recent Flowsheet Documentation  Taken 7/15/2024 0256 by Donna Peoples, RN  Outcome Evaluation: Pt at baseline, denies pain, room air.  Plan of Care Reviewed With: patient  Overall Patient Progress: no change  Goal: Absence of Hospital-Acquired Illness or Injury  Intervention: Identify and Manage Fall Risk  Recent Flowsheet Documentation  Taken 7/14/2024 1635 by Donna Peoples, RN  Safety Promotion/Fall Prevention:   safety round/check completed   room organization consistent   room near nurse's station   room door open   patient and family education   nonskid shoes/slippers when out of bed   mobility aid in reach   lighting adjusted   increase visualization of patient   increased rounding and observation   clutter free environment maintained   assistive device/personal items within reach   activity supervised   supervised  activity  Intervention: Prevent Skin Injury  Recent Flowsheet Documentation  Taken 7/14/2024 2257 by Donna Peoples, RN  Body Position: position changed independently

## 2024-07-16 PROCEDURE — 99232 SBSQ HOSP IP/OBS MODERATE 35: CPT | Performed by: STUDENT IN AN ORGANIZED HEALTH CARE EDUCATION/TRAINING PROGRAM

## 2024-07-16 PROCEDURE — 101N000002 HC CUSTODIAL CARE DAILY

## 2024-07-16 ASSESSMENT — ACTIVITIES OF DAILY LIVING (ADL)
ADLS_ACUITY_SCORE: 44
ADLS_ACUITY_SCORE: 41
ADLS_ACUITY_SCORE: 49
ADLS_ACUITY_SCORE: 48
ADLS_ACUITY_SCORE: 44
ADLS_ACUITY_SCORE: 44
ADLS_ACUITY_SCORE: 41
ADLS_ACUITY_SCORE: 44
ADLS_ACUITY_SCORE: 49
ADLS_ACUITY_SCORE: 44
ADLS_ACUITY_SCORE: 44
ADLS_ACUITY_SCORE: 48
ADLS_ACUITY_SCORE: 44
ADLS_ACUITY_SCORE: 44
ADLS_ACUITY_SCORE: 49
ADLS_ACUITY_SCORE: 44

## 2024-07-16 NOTE — PLAN OF CARE
"Pt remains on care home care, oriented to self, confused, VSS. Continent of bladder this shift, up frequently to bathroom, call light use not appropriate, bed alarm on. Walker use w prompting and continued encouragement. Minimal appetite, oral hydration encouraged. Some void attempts in restroom with no output, Pt refused bladder scan. Voiding post scan attempt witnessed.      Goal Outcome Evaluation:       Plan of Care Reviewed With: patient    Overall Patient Progress: no changeOverall Patient Progress: no change    Outcome Evaluation: Pt remains on care home care, up from bed spontaneously for restroom use, BA on.      Problem: Adult Inpatient Plan of Care  Goal: Plan of Care Review  Description: The Plan of Care Review/Shift note should be completed every shift.  The Outcome Evaluation is a brief statement about your assessment that the patient is improving, declining, or no change.  This information will be displayed automatically on your shift  note.  Outcome: Progressing  Flowsheets (Taken 7/15/2024 2346)  Outcome Evaluation: Pt remains on care home care, up from bed spontaneously for restroom use, BA on.  Overall Patient Progress: no change  Goal: Patient-Specific Goal (Individualized)  Description: You can add care plan individualizations to a care plan. Examples of Individualization might be:  \"Parent requests to be called daily at 9am for status\", \"I have a hard time hearing out of my right ear\", or \"Do not touch me to wake me up as it startles  me\".  Outcome: Progressing     Problem: Adult Inpatient Plan of Care  Goal: Plan of Care Review  Description: The Plan of Care Review/Shift note should be completed every shift.  The Outcome Evaluation is a brief statement about your assessment that the patient is improving, declining, or no change.  This information will be displayed automatically on your shift  note.  Recent Flowsheet Documentation  Taken 7/15/2024 2346 by Sergey Sandoval RN  Outcome Evaluation: " Pt remains on intermediate care, up from bed spontaneously for restroom use, BA on.  Overall Patient Progress: no change  Goal: Absence of Hospital-Acquired Illness or Injury  Intervention: Identify and Manage Fall Risk  Recent Flowsheet Documentation  Taken 7/15/2024 1600 by Sergey Sandoval RN  Safety Promotion/Fall Prevention:   supervised activity   safety round/check completed   room organization consistent   room near nurse's station     Problem: Adult Inpatient Plan of Care  Goal: Plan of Care Review  Description: The Plan of Care Review/Shift note should be completed every shift.  The Outcome Evaluation is a brief statement about your assessment that the patient is improving, declining, or no change.  This information will be displayed automatically on your shift  note.  Recent Flowsheet Documentation  Taken 7/15/2024 2346 by Sergey Sandoval RN  Outcome Evaluation: Pt remains on intermediate care, up from bed spontaneously for restroom use, BA on.  Overall Patient Progress: no change  Goal: Absence of Hospital-Acquired Illness or Injury  Intervention: Identify and Manage Fall Risk  Recent Flowsheet Documentation  Taken 7/15/2024 1600 by Sergey Sandoval RN  Safety Promotion/Fall Prevention:   supervised activity   safety round/check completed   room organization consistent   room near nurse's station     Problem: Fall Injury Risk  Goal: Absence of Fall and Fall-Related Injury  Intervention: Promote Injury-Free Environment  Recent Flowsheet Documentation  Taken 7/15/2024 1600 by Sergey Sandoval RN  Safety Promotion/Fall Prevention:   supervised activity   safety round/check completed   room organization consistent   room near nurse's station     Problem: Adult Inpatient Plan of Care  Goal: Absence of Hospital-Acquired Illness or Injury  Intervention: Identify and Manage Fall Risk  Recent Flowsheet Documentation  Taken 7/15/2024 1600 by Sergey Sandoval RN  Safety Promotion/Fall Prevention:   supervised activity   safety  round/check completed   room organization consistent   room near nurse's station     Problem: Fall Injury Risk  Goal: Absence of Fall and Fall-Related Injury  Intervention: Promote Injury-Free Environment  Recent Flowsheet Documentation  Taken 7/15/2024 1600 by Sergey Sandoval RN  Safety Promotion/Fall Prevention:   supervised activity   safety round/check completed   room organization consistent   room near nurse's station     Problem: Fall Injury Risk  Goal: Absence of Fall and Fall-Related Injury  Intervention: Promote Injury-Free Environment  Recent Flowsheet Documentation  Taken 7/15/2024 1600 by Sergey Sandoval RN  Safety Promotion/Fall Prevention:   supervised activity   safety round/check completed   room organization consistent   room near nurse's station     Problem: Adult Inpatient Plan of Care  Goal: Plan of Care Review  Description: The Plan of Care Review/Shift note should be completed every shift.  The Outcome Evaluation is a brief statement about your assessment that the patient is improving, declining, or no change.  This information will be displayed automatically on your shift  note.  Recent Flowsheet Documentation  Taken 7/15/2024 2346 by Sergey Sandoval RN  Outcome Evaluation: Pt remains on shelter care, up from bed spontaneously for restroom use, BA on.  Overall Patient Progress: no change  Goal: Absence of Hospital-Acquired Illness or Injury  Intervention: Identify and Manage Fall Risk  Recent Flowsheet Documentation  Taken 7/15/2024 1600 by Sergey Sandoval RN  Safety Promotion/Fall Prevention:   supervised activity   safety round/check completed   room organization consistent   room near nurse's station     Problem: Adult Inpatient Plan of Care  Goal: Plan of Care Review  Description: The Plan of Care Review/Shift note should be completed every shift.  The Outcome Evaluation is a brief statement about your assessment that the patient is improving, declining, or no change.  This information will  be displayed automatically on your shift  note.  Recent Flowsheet Documentation  Taken 7/15/2024 2346 by Sergey Sandoval RN  Outcome Evaluation: Pt remains on half-way care, up from bed spontaneously for restroom use, BA on.  Overall Patient Progress: no change  Goal: Absence of Hospital-Acquired Illness or Injury  Intervention: Identify and Manage Fall Risk  Recent Flowsheet Documentation  Taken 7/15/2024 1600 by Sergey Sandoval, BALDOMERO  Safety Promotion/Fall Prevention:   supervised activity   safety round/check completed   room organization consistent   room near nurse's station     Problem: Fall Injury Risk  Goal: Absence of Fall and Fall-Related Injury  Intervention: Promote Injury-Free Environment  Recent Flowsheet Documentation  Taken 7/15/2024 1600 by Sergey Sandoval RN  Safety Promotion/Fall Prevention:   supervised activity   safety round/check completed   room organization consistent   room near nurse's station     Problem: Fall Injury Risk  Goal: Absence of Fall and Fall-Related Injury  Intervention: Promote Injury-Free Environment  Recent Flowsheet Documentation  Taken 7/15/2024 1600 by Sergey Sandoval, RN  Safety Promotion/Fall Prevention:   supervised activity   safety round/check completed   room organization consistent   room near nurse's station     Problem: Adult Inpatient Plan of Care  Goal: Absence of Hospital-Acquired Illness or Injury  Intervention: Identify and Manage Fall Risk  Recent Flowsheet Documentation  Taken 7/15/2024 1600 by Sergey Sandoval RN  Safety Promotion/Fall Prevention:   supervised activity   safety round/check completed   room organization consistent   room near nurse's station

## 2024-07-16 NOTE — PROGRESS NOTES
[facility]    Medicine Progress Note - Hospitalist Service    Date of Admission:  6/5/2024    Assessment & Plan   88-year-old male who was transitioned to group home care on 6/5.  He has history of dementia and family is unable to provide care for him.  He was recently treated for right full cellulitis which has resolved.  Patient is MA pending, awaiting placement in long term care. No significant changes over last several days.    senior living  care patient.  This is a nonbillable social visit.    senior living care admission.  Social work consulted.  Looking for LTC.  Is MA pending. Once a bed is available patient can go anytime     Constipation  Started/continue on senna, have as needed miralax and dulcolax available    Right foot cellulitis.  Resolved with Keflex 4 times daily through 6/11.  Swelling and redness resolved.    Venous ultrasound negative for DVT.    Dementia.  Not on any medications.  As needed olanzapine ordered for agitation.    5.   Bradycardia            Patient was noted to have HR in 50s. He is not on any beta blocker or CCB. Refused EKG. asymptomatic           - Monitor clinically    6. Paper work       Previous provider filled out guardianship papers on 7/13 for the daughter.          Diet: Combination Diet Regular Diet  Room Service    DVT Prophylaxis: Pneumatic Compression Devices  Maddox Catheter: Not present  Lines: None     Cardiac Monitoring: None  Code Status: No CPR- Do NOT Intubate      Clinically Significant Risk Factors Present on Admission                    # Dementia: noted on problem list                      Disposition Plan     Medically Ready for Discharge: Ready Now           Alexandre Kurtz MD  Hospitalist Service  [facility]  Securely message with [employer] (more info)  Text page via [employer] Paging/Directory   ______________________________________________________________________    Interval History   Nursing notes reviewed. Patient doing  well. No acute concerns or complaints.      Physical Exam   Vital Signs: Temp: 97.8  F (36.6  C) Temp src: Oral BP: (!) 149/72 Pulse: 50   Resp: 16 SpO2: 96 % O2 Device: None (Room air)    Weight: 0 lbs 0 oz    General Appearance: Appears comfortable, resting in bed.       Medical Decision Making         MINUTES SPENT BY ME on the date of service doing chart review, history, exam, documentation & further activities per the note.      Data

## 2024-07-16 NOTE — PLAN OF CARE
"Goal Outcome Evaluation:      Plan of Care Reviewed With: patient    Overall Patient Progress: no changeOverall Patient Progress: no change    Outcome Evaluation: Pt on care home care. Confuse. Up SBA; frequently up to the bathroom to void. Pt was bladder scan, 40-50ml noted. SW following. Waiting for LTC.      Problem: Adult Inpatient Plan of Care  Goal: Plan of Care Review  Description: The Plan of Care Review/Shift note should be completed every shift.  The Outcome Evaluation is a brief statement about your assessment that the patient is improving, declining, or no change.  This information will be displayed automatically on your shift  note.  Outcome: Progressing  Flowsheets (Taken 7/16/2024 0643)  Outcome Evaluation:   Pt on care home care. Confuse. Up SBA   frequently up to the bathroom to void. Pt was bladder scan, 40-50ml noted. SW following. Waiting for LTC.  Plan of Care Reviewed With: patient  Overall Patient Progress: no change  Goal: Patient-Specific Goal (Individualized)  Description: You can add care plan individualizations to a care plan. Examples of Individualization might be:  \"Parent requests to be called daily at 9am for status\", \"I have a hard time hearing out of my right ear\", or \"Do not touch me to wake me up as it startles  me\".  Outcome: Progressing     "

## 2024-07-17 PROCEDURE — 101N000002 HC CUSTODIAL CARE DAILY

## 2024-07-17 PROCEDURE — 99232 SBSQ HOSP IP/OBS MODERATE 35: CPT | Performed by: STUDENT IN AN ORGANIZED HEALTH CARE EDUCATION/TRAINING PROGRAM

## 2024-07-17 ASSESSMENT — ACTIVITIES OF DAILY LIVING (ADL)
ADLS_ACUITY_SCORE: 48
ADLS_ACUITY_SCORE: 43
ADLS_ACUITY_SCORE: 47
ADLS_ACUITY_SCORE: 43
ADLS_ACUITY_SCORE: 47
ADLS_ACUITY_SCORE: 48
ADLS_ACUITY_SCORE: 43
ADLS_ACUITY_SCORE: 47
ADLS_ACUITY_SCORE: 43
ADLS_ACUITY_SCORE: 47
ADLS_ACUITY_SCORE: 49
ADLS_ACUITY_SCORE: 48
ADLS_ACUITY_SCORE: 49
ADLS_ACUITY_SCORE: 47
ADLS_ACUITY_SCORE: 49
ADLS_ACUITY_SCORE: 47
ADLS_ACUITY_SCORE: 43
ADLS_ACUITY_SCORE: 48
ADLS_ACUITY_SCORE: 49

## 2024-07-17 NOTE — PLAN OF CARE
"Goal Outcome Evaluation:      Plan of Care Reviewed With: patient    Overall Patient Progress: no changeOverall Patient Progress: no change    Outcome Evaluation: Pt remains on intermediate care, freq voids in bathroom, BMx3 this shift      Problem: Adult Inpatient Plan of Care  Goal: Plan of Care Review  Description: The Plan of Care Review/Shift note should be completed every shift.  The Outcome Evaluation is a brief statement about your assessment that the patient is improving, declining, or no change.  This information will be displayed automatically on your shift  note.  Outcome: Progressing  Flowsheets (Taken 7/16/2024 2239)  Outcome Evaluation: Pt remains on intermediate care, freq voids in bathroom, BMx3 this shift  Plan of Care Reviewed With: patient  Overall Patient Progress: no change  Goal: Patient-Specific Goal (Individualized)  Description: You can add care plan individualizations to a care plan. Examples of Individualization might be:  \"Parent requests to be called daily at 9am for status\", \"I have a hard time hearing out of my right ear\", or \"Do not touch me to wake me up as it startles  me\".  Outcome: Progressing     Problem: Adult Inpatient Plan of Care  Goal: Plan of Care Review  Description: The Plan of Care Review/Shift note should be completed every shift.  The Outcome Evaluation is a brief statement about your assessment that the patient is improving, declining, or no change.  This information will be displayed automatically on your shift  note.  Recent Flowsheet Documentation  Taken 7/16/2024 2239 by Sergey Sandoval, RN  Outcome Evaluation: Pt remains on intermediate care, freq voids in bathroom, BMx3 this shift  Plan of Care Reviewed With: patient  Overall Patient Progress: no change  Goal: Absence of Hospital-Acquired Illness or Injury  Intervention: Identify and Manage Fall Risk  Recent Flowsheet Documentation  Taken 7/16/2024 1832 by Sergey Sandoval, RN  Safety Promotion/Fall Prevention:   mobility " aid in reach   nonskid shoes/slippers when out of bed   activity supervised   room organization consistent   room near nurse's station     Problem: Adult Inpatient Plan of Care  Goal: Plan of Care Review  Description: The Plan of Care Review/Shift note should be completed every shift.  The Outcome Evaluation is a brief statement about your assessment that the patient is improving, declining, or no change.  This information will be displayed automatically on your shift  note.  Recent Flowsheet Documentation  Taken 7/16/2024 2239 by Sergey Sandoval RN  Outcome Evaluation: Pt remains on half-way care, freq voids in bathroom, BMx3 this shift  Plan of Care Reviewed With: patient  Overall Patient Progress: no change  Goal: Absence of Hospital-Acquired Illness or Injury  Intervention: Identify and Manage Fall Risk  Recent Flowsheet Documentation  Taken 7/16/2024 1832 by Sergey Sandoval, RN  Safety Promotion/Fall Prevention:   mobility aid in reach   nonskid shoes/slippers when out of bed   activity supervised   room organization consistent   room near nurse's station     Problem: Fall Injury Risk  Goal: Absence of Fall and Fall-Related Injury  Intervention: Promote Injury-Free Environment  Recent Flowsheet Documentation  Taken 7/16/2024 1832 by Sergey Sandoval, RN  Safety Promotion/Fall Prevention:   mobility aid in reach   nonskid shoes/slippers when out of bed   activity supervised   room organization consistent   room near nurse's station     Problem: Adult Inpatient Plan of Care  Goal: Absence of Hospital-Acquired Illness or Injury  Intervention: Identify and Manage Fall Risk  Recent Flowsheet Documentation  Taken 7/16/2024 1832 by Sergey Sandoval, RN  Safety Promotion/Fall Prevention:   mobility aid in reach   nonskid shoes/slippers when out of bed   activity supervised   room organization consistent   room near nurse's station     Problem: Fall Injury Risk  Goal: Absence of Fall and Fall-Related Injury  Intervention:  Promote Injury-Free Environment  Recent Flowsheet Documentation  Taken 7/16/2024 1832 by Sergey Sandoval RN  Safety Promotion/Fall Prevention:   mobility aid in reach   nonskid shoes/slippers when out of bed   activity supervised   room organization consistent   room near nurse's station     Problem: Fall Injury Risk  Goal: Absence of Fall and Fall-Related Injury  Intervention: Promote Injury-Free Environment  Recent Flowsheet Documentation  Taken 7/16/2024 1832 by Sergey Sandoval RN  Safety Promotion/Fall Prevention:   mobility aid in reach   nonskid shoes/slippers when out of bed   activity supervised   room organization consistent   room near nurse's station     Problem: Adult Inpatient Plan of Care  Goal: Plan of Care Review  Description: The Plan of Care Review/Shift note should be completed every shift.  The Outcome Evaluation is a brief statement about your assessment that the patient is improving, declining, or no change.  This information will be displayed automatically on your shift  note.  Recent Flowsheet Documentation  Taken 7/16/2024 2239 by Sergey Sandoval RN  Outcome Evaluation: Pt remains on group home care, freq voids in bathroom, BMx3 this shift  Plan of Care Reviewed With: patient  Overall Patient Progress: no change  Goal: Absence of Hospital-Acquired Illness or Injury  Intervention: Identify and Manage Fall Risk  Recent Flowsheet Documentation  Taken 7/16/2024 1832 by Sergey Sandoval RN  Safety Promotion/Fall Prevention:   mobility aid in reach   nonskid shoes/slippers when out of bed   activity supervised   room organization consistent   room near nurse's station     Problem: Adult Inpatient Plan of Care  Goal: Plan of Care Review  Description: The Plan of Care Review/Shift note should be completed every shift.  The Outcome Evaluation is a brief statement about your assessment that the patient is improving, declining, or no change.  This information will be displayed automatically on your  shift  note.  Recent Flowsheet Documentation  Taken 7/16/2024 2239 by Sergey Sandoval RN  Outcome Evaluation: Pt remains on nursing home care, freq voids in bathroom, BMx3 this shift  Plan of Care Reviewed With: patient  Overall Patient Progress: no change  Goal: Absence of Hospital-Acquired Illness or Injury  Intervention: Identify and Manage Fall Risk  Recent Flowsheet Documentation  Taken 7/16/2024 1832 by Sergey Sandoval, BALDOMERO  Safety Promotion/Fall Prevention:   mobility aid in reach   nonskid shoes/slippers when out of bed   activity supervised   room organization consistent   room near nurse's station     Problem: Fall Injury Risk  Goal: Absence of Fall and Fall-Related Injury  Intervention: Promote Injury-Free Environment  Recent Flowsheet Documentation  Taken 7/16/2024 1832 by Sergey Sandoval RN  Safety Promotion/Fall Prevention:   mobility aid in reach   nonskid shoes/slippers when out of bed   activity supervised   room organization consistent   room near nurse's station     Problem: Fall Injury Risk  Goal: Absence of Fall and Fall-Related Injury  Intervention: Promote Injury-Free Environment  Recent Flowsheet Documentation  Taken 7/16/2024 1832 by Sergey Sandoval, BALDOMERO  Safety Promotion/Fall Prevention:   mobility aid in reach   nonskid shoes/slippers when out of bed   activity supervised   room organization consistent   room near nurse's station     Problem: Adult Inpatient Plan of Care  Goal: Absence of Hospital-Acquired Illness or Injury  Intervention: Identify and Manage Fall Risk  Recent Flowsheet Documentation  Taken 7/16/2024 1832 by Sergey Sandoval, RN  Safety Promotion/Fall Prevention:   mobility aid in reach   nonskid shoes/slippers when out of bed   activity supervised   room organization consistent   room near nurse's station

## 2024-07-17 NOTE — PLAN OF CARE
"Temp: 97.8  F (36.6  C) Temp src: Oral BP: (!) 155/75 Pulse: 54   Resp: 18 SpO2: 98 % O2 Device: None (Room air)      Alert to self, denies pain. Excellent appetite today. Still very impulsive when needing to get up to the bathroom. Remains very pleasant. Discharge when placement found.          Goal Outcome Evaluation:           Overall Patient Progress: no changeOverall Patient Progress: no change    Outcome Evaluation: Remains residential care, still impulsive getting up to bathroom. Denies pain. Alert to self. Conitnues to wait for MA/LTC placement.      Problem: Adult Inpatient Plan of Care  Goal: Plan of Care Review  Description: The Plan of Care Review/Shift note should be completed every shift.  The Outcome Evaluation is a brief statement about your assessment that the patient is improving, declining, or no change.  This information will be displayed automatically on your shift  note.  Outcome: Progressing  Flowsheets (Taken 7/17/2024 1329)  Outcome Evaluation: Remains residential care, still impulsive getting up to bathroom. Denies pain. Alert to self. Conitnues to wait for MA/LTC placement.  Overall Patient Progress: no change  Goal: Patient-Specific Goal (Individualized)  Description: You can add care plan individualizations to a care plan. Examples of Individualization might be:  \"Parent requests to be called daily at 9am for status\", \"I have a hard time hearing out of my right ear\", or \"Do not touch me to wake me up as it startles  me\".  Outcome: Progressing      Problem: Adult Inpatient Plan of Care  Goal: Plan of Care Review  Description: The Plan of Care Review/Shift note should be completed every shift.  The Outcome Evaluation is a brief statement about your assessment that the patient is improving, declining, or no change.  This information will be displayed automatically on your shift  note.  Recent Flowsheet Documentation  Taken 7/17/2024 1329 by Wilson Beal RN  Outcome Evaluation: Remains " [facility] care, still impulsive getting up to bathroom. Denies pain. Alert to self. Conitnues to wait for MA/LTC placement.  Overall Patient Progress: no change     Problem: Adult Inpatient Plan of Care  Goal: Plan of Care Review  Description: The Plan of Care Review/Shift note should be completed every shift.  The Outcome Evaluation is a brief statement about your assessment that the patient is improving, declining, or no change.  This information will be displayed automatically on your shift  note.  Recent Flowsheet Documentation  Taken 7/17/2024 1329 by Wilson Beal RN  Outcome Evaluation: Remains [facility] care, still impulsive getting up to bathroom. Denies pain. Alert to self. Conitnues to wait for MA/LTC placement.  Overall Patient Progress: no change     Problem: Adult Inpatient Plan of Care  Goal: Plan of Care Review  Description: The Plan of Care Review/Shift note should be completed every shift.  The Outcome Evaluation is a brief statement about your assessment that the patient is improving, declining, or no change.  This information will be displayed automatically on your shift  note.  Recent Flowsheet Documentation  Taken 7/17/2024 1329 by Wilson Beal RN  Outcome Evaluation: Remains [facility] care, still impulsive getting up to bathroom. Denies pain. Alert to self. Conitnues to wait for MA/LTC placement.  Overall Patient Progress: no change     Problem: Adult Inpatient Plan of Care  Goal: Plan of Care Review  Description: The Plan of Care Review/Shift note should be completed every shift.  The Outcome Evaluation is a brief statement about your assessment that the patient is improving, declining, or no change.  This information will be displayed automatically on your shift  note.  Recent Flowsheet Documentation  Taken 7/17/2024 1329 by Wilson Beal RN  Outcome Evaluation: Remains [facility] care, still impulsive getting up to bathroom. Denies pain. Alert to self. Conitnues to wait for  MA/LTC placement.  Overall Patient Progress: no change

## 2024-07-17 NOTE — PLAN OF CARE
"Goal Outcome Evaluation:      Plan of Care Reviewed With: patient    Overall Patient Progress: no changeOverall Patient Progress: no change    Outcome Evaluation: Pt pleasantly confuse, remains senior living care. Up to bathroom frequently; calm, redirectable. Denies pain & sob. Awaiting LTC placement      Problem: Adult Inpatient Plan of Care  Goal: Plan of Care Review  Description: The Plan of Care Review/Shift note should be completed every shift.  The Outcome Evaluation is a brief statement about your assessment that the patient is improving, declining, or no change.  This information will be displayed automatically on your shift  note.  Outcome: Progressing  Flowsheets (Taken 7/17/2024 0818)  Outcome Evaluation:   Pt pleasantly confuse, remains senior living care. Up to bathroom frequently   calm, redirectable. Denies pain & sob. Awaiting LTC placement  Plan of Care Reviewed With: patient  Overall Patient Progress: no change  Goal: Patient-Specific Goal (Individualized)  Description: You can add care plan individualizations to a care plan. Examples of Individualization might be:  \"Parent requests to be called daily at 9am for status\", \"I have a hard time hearing out of my right ear\", or \"Do not touch me to wake me up as it startles  me\".  Outcome: Progressing     "

## 2024-07-17 NOTE — PROGRESS NOTES
[facility]    Medicine Progress Note - Hospitalist Service    Date of Admission:  6/5/2024    Assessment & Plan   88-year-old male who was transitioned to shelter care on 6/5.  He has history of dementia and family is unable to provide care for him.  He was recently treated for right full cellulitis which has resolved.  Patient is MA pending, awaiting placement in long term care. No significant changes over last several days.    [facility]  care patient.  This is a nonbillable social visit.    [facility] care admission.  Social work consulted.  Looking for LTC.  Is MA pending. Once a bed is available patient can go anytime     Constipation  Started/continue on senna, have as needed miralax and dulcolax available    Right foot cellulitis.  Resolved with Keflex 4 times daily through 6/11.  Swelling and redness resolved.    Venous ultrasound negative for DVT.    Dementia.  Not on any medications.  As needed olanzapine ordered for agitation.    5.   Bradycardia            Patient was noted to have HR in 50s. He is not on any beta blocker or CCB. Refused EKG. asymptomatic           - Monitor clinically    6. Paper work       Previous provider filled out guardianship papers on 7/13 for the daughter.          Diet: Combination Diet Regular Diet  Room Service    DVT Prophylaxis: Pneumatic Compression Devices  Maddox Catheter: Not present  Lines: None     Cardiac Monitoring: None  Code Status: No CPR- Do NOT Intubate      Clinically Significant Risk Factors Present on Admission                    # Dementia: noted on problem list                      Disposition Plan     Medically Ready for Discharge: Ready Now           Alexandre Kurtz MD  Hospitalist Service  [facility]  Securely message with [employer] (more info)  Text page via [employer] Paging/Directory   ______________________________________________________________________    Interval History   Nursing notes reviewed. Patient doing  well. No acute concerns or complaints.      Physical Exam   Vital Signs: Temp: 97.8  F (36.6  C) Temp src: Oral BP: (!) 155/75 Pulse: 54   Resp: 18 SpO2: 98 % O2 Device: None (Room air)    Weight: 0 lbs 0 oz    General Appearance: Appears comfortable, resting in bed.       Medical Decision Making         MINUTES SPENT BY ME on the date of service doing chart review, history, exam, documentation & further activities per the note.      Data

## 2024-07-18 PROCEDURE — 101N000002 HC CUSTODIAL CARE DAILY

## 2024-07-18 PROCEDURE — 99232 SBSQ HOSP IP/OBS MODERATE 35: CPT | Performed by: STUDENT IN AN ORGANIZED HEALTH CARE EDUCATION/TRAINING PROGRAM

## 2024-07-18 PROCEDURE — 250N000013 HC RX MED GY IP 250 OP 250 PS 637: Performed by: INTERNAL MEDICINE

## 2024-07-18 RX ADMIN — SENNOSIDES AND DOCUSATE SODIUM 1 TABLET: 8.6; 5 TABLET ORAL at 21:57

## 2024-07-18 ASSESSMENT — ACTIVITIES OF DAILY LIVING (ADL)
ADLS_ACUITY_SCORE: 48

## 2024-07-18 NOTE — PLAN OF CARE
Problem: Adult Inpatient Plan of Care  Goal: Absence of Hospital-Acquired Illness or Injury  Intervention: Identify and Manage Fall Risk  Recent Flowsheet Documentation  Taken 7/18/2024 0149 by Maria De Jesus Bruce RN  Safety Promotion/Fall Prevention:   supervised activity   safety round/check completed   room organization consistent   room near nurse's station   room door open   nonskid shoes/slippers when out of bed   mobility aid in reach   increase visualization of patient   increased rounding and observation   clutter free environment maintained  Intervention: Prevent Skin Injury  Recent Flowsheet Documentation  Taken 7/18/2024 0149 by Maria De Jesus Bruce RN  Body Position: position changed independently  Skin Protection:   adhesive use limited   incontinence pads utilized  Device Skin Pressure Protection: absorbent pad utilized/changed  Intervention: Prevent Infection  Recent Flowsheet Documentation  Taken 7/18/2024 0149 by Maria De Jesus Bruce RN  Infection Prevention:   rest/sleep promoted   environmental surveillance performed   equipment surfaces disinfected   hand hygiene promoted   single patient room provided   Goal Outcome Evaluation:

## 2024-07-18 NOTE — PLAN OF CARE
"Goal Outcome Evaluation:      Plan of Care Reviewed With: patient      The patient remains alert to self intermittently but disoriented to time, place, situation. Mood is very pleasant. Ambulates SBA, on alarms for safety. Intermittent incontinence. No IV access. No pain reported/observed. Vitally stable on room air.    Problem: Adult Inpatient Plan of Care  Goal: Plan of Care Review  Description: The Plan of Care Review/Shift note should be completed every shift.  The Outcome Evaluation is a brief statement about your assessment that the patient is improving, declining, or no change.  This information will be displayed automatically on your shift  note.  Outcome: Progressing  Flowsheets (Taken 7/18/2024 1234)  Plan of Care Reviewed With: patient  Goal: Patient-Specific Goal (Individualized)  Description: You can add care plan individualizations to a care plan. Examples of Individualization might be:  \"Parent requests to be called daily at 9am for status\", \"I have a hard time hearing out of my right ear\", or \"Do not touch me to wake me up as it startles  me\".  Outcome: Progressing              "

## 2024-07-18 NOTE — PLAN OF CARE
"Pt half-way care. AO to self, denies pain, calm and friendly. Typ. cooperative w repeat commands. Up for multiple voids, steady output. BMs x3 this shift, soft, formed, PM senna held. No call light use up spontaneously, bed alarm on. Appetite and intake poor.    Goal Outcome Evaluation:      Plan of Care Reviewed With: patient    Overall Patient Progress: no changeOverall Patient Progress: no change    Outcome Evaluation: Pt remains half-way care, up to restroom multiple times spontaneously, senna held for 3x loose BMs.      Problem: Adult Inpatient Plan of Care  Goal: Plan of Care Review  Description: The Plan of Care Review/Shift note should be completed every shift.  The Outcome Evaluation is a brief statement about your assessment that the patient is improving, declining, or no change.  This information will be displayed automatically on your shift  note.  Outcome: Progressing  Flowsheets (Taken 7/17/2024 2256)  Outcome Evaluation: Pt remains half-way care, up to restroom multiple times spontaneously, senna held for 3x loose BMs.  Plan of Care Reviewed With: patient  Overall Patient Progress: no change  Goal: Patient-Specific Goal (Individualized)  Description: You can add care plan individualizations to a care plan. Examples of Individualization might be:  \"Parent requests to be called daily at 9am for status\", \"I have a hard time hearing out of my right ear\", or \"Do not touch me to wake me up as it startles  me\".  Outcome: Progressing     Problem: Adult Inpatient Plan of Care  Goal: Plan of Care Review  Description: The Plan of Care Review/Shift note should be completed every shift.  The Outcome Evaluation is a brief statement about your assessment that the patient is improving, declining, or no change.  This information will be displayed automatically on your shift  note.  Recent Flowsheet Documentation  Taken 7/17/2024 2256 by Sergey Sandoval, RN  Outcome Evaluation: Pt remains half-way care, up to " restroom multiple times spontaneously, senna held for 3x loose BMs.  Plan of Care Reviewed With: patient  Overall Patient Progress: no change  Goal: Absence of Hospital-Acquired Illness or Injury  Intervention: Identify and Manage Fall Risk  Recent Flowsheet Documentation  Taken 7/17/2024 1700 by Sergey Sandoval RN  Safety Promotion/Fall Prevention:   supervised activity   safety round/check completed   room organization consistent   room near nurse's station   room door open   nonskid shoes/slippers when out of bed   mobility aid in reach   increase visualization of patient   increased rounding and observation   clutter free environment maintained  Intervention: Prevent Infection  Recent Flowsheet Documentation  Taken 7/17/2024 1700 by Sergey Sandoval RN  Infection Prevention:   rest/sleep promoted   environmental surveillance performed     Problem: Delirium  Goal: Improved Behavioral Control  Intervention: Minimize Safety Risk  Recent Flowsheet Documentation  Taken 7/17/2024 1700 by Sergey Sandoval RN  Enhanced Safety Measures: floor mats     Problem: Adult Inpatient Plan of Care  Goal: Plan of Care Review  Description: The Plan of Care Review/Shift note should be completed every shift.  The Outcome Evaluation is a brief statement about your assessment that the patient is improving, declining, or no change.  This information will be displayed automatically on your shift  note.  Recent Flowsheet Documentation  Taken 7/17/2024 2256 by Sergey Sandoval RN  Outcome Evaluation: Pt remains jail care, up to restroom multiple times spontaneously, senna held for 3x loose BMs.  Plan of Care Reviewed With: patient  Overall Patient Progress: no change  Goal: Absence of Hospital-Acquired Illness or Injury  Intervention: Identify and Manage Fall Risk  Recent Flowsheet Documentation  Taken 7/17/2024 1700 by Sergey Sandoval RN  Safety Promotion/Fall Prevention:   supervised activity   safety round/check completed   room  organization consistent   room near nurse's station   room door open   nonskid shoes/slippers when out of bed   mobility aid in reach   increase visualization of patient   increased rounding and observation   clutter free environment maintained  Intervention: Prevent Infection  Recent Flowsheet Documentation  Taken 7/17/2024 1700 by Sergey Sandoval RN  Infection Prevention:   rest/sleep promoted   environmental surveillance performed     Problem: Fall Injury Risk  Goal: Absence of Fall and Fall-Related Injury  Intervention: Promote Injury-Free Environment  Recent Flowsheet Documentation  Taken 7/17/2024 1700 by Sergey Sandoval RN  Safety Promotion/Fall Prevention:   supervised activity   safety round/check completed   room organization consistent   room near nurse's station   room door open   nonskid shoes/slippers when out of bed   mobility aid in reach   increase visualization of patient   increased rounding and observation   clutter free environment maintained     Problem: Adult Inpatient Plan of Care  Goal: Absence of Hospital-Acquired Illness or Injury  Intervention: Identify and Manage Fall Risk  Recent Flowsheet Documentation  Taken 7/17/2024 1700 by Sergey Sandoval RN  Safety Promotion/Fall Prevention:   supervised activity   safety round/check completed   room organization consistent   room near nurse's station   room door open   nonskid shoes/slippers when out of bed   mobility aid in reach   increase visualization of patient   increased rounding and observation   clutter free environment maintained  Intervention: Prevent Infection  Recent Flowsheet Documentation  Taken 7/17/2024 1700 by Sergey Sandoval RN  Infection Prevention:   rest/sleep promoted   environmental surveillance performed     Problem: Fall Injury Risk  Goal: Absence of Fall and Fall-Related Injury  Intervention: Promote Injury-Free Environment  Recent Flowsheet Documentation  Taken 7/17/2024 1700 by Sergey Sandoval RN  Safety Promotion/Fall  Prevention:   supervised activity   safety round/check completed   room organization consistent   room near nurse's station   room door open   nonskid shoes/slippers when out of bed   mobility aid in reach   increase visualization of patient   increased rounding and observation   clutter free environment maintained     Problem: Fall Injury Risk  Goal: Absence of Fall and Fall-Related Injury  Intervention: Promote Injury-Free Environment  Recent Flowsheet Documentation  Taken 7/17/2024 1700 by Sergey Sandoval RN  Safety Promotion/Fall Prevention:   supervised activity   safety round/check completed   room organization consistent   room near nurse's station   room door open   nonskid shoes/slippers when out of bed   mobility aid in reach   increase visualization of patient   increased rounding and observation   clutter free environment maintained     Problem: Adult Inpatient Plan of Care  Goal: Plan of Care Review  Description: The Plan of Care Review/Shift note should be completed every shift.  The Outcome Evaluation is a brief statement about your assessment that the patient is improving, declining, or no change.  This information will be displayed automatically on your shift  note.  Recent Flowsheet Documentation  Taken 7/17/2024 2256 by Sergey Sandoval RN  Outcome Evaluation: Pt remains detention care, up to restroom multiple times spontaneously, senna held for 3x loose BMs.  Plan of Care Reviewed With: patient  Overall Patient Progress: no change  Goal: Absence of Hospital-Acquired Illness or Injury  Intervention: Identify and Manage Fall Risk  Recent Flowsheet Documentation  Taken 7/17/2024 1700 by Sergey Sandoval RN  Safety Promotion/Fall Prevention:   supervised activity   safety round/check completed   room organization consistent   room near nurse's station   room door open   nonskid shoes/slippers when out of bed   mobility aid in reach   increase visualization of patient   increased rounding and observation    clutter free environment maintained  Intervention: Prevent Infection  Recent Flowsheet Documentation  Taken 7/17/2024 1700 by Sergey Sandoval RN  Infection Prevention:   rest/sleep promoted   environmental surveillance performed     Problem: Adult Inpatient Plan of Care  Goal: Plan of Care Review  Description: The Plan of Care Review/Shift note should be completed every shift.  The Outcome Evaluation is a brief statement about your assessment that the patient is improving, declining, or no change.  This information will be displayed automatically on your shift  note.  Recent Flowsheet Documentation  Taken 7/17/2024 2256 by Sergey Sandoval RN  Outcome Evaluation: Pt remains prison care, up to restroom multiple times spontaneously, senna held for 3x loose BMs.  Plan of Care Reviewed With: patient  Overall Patient Progress: no change  Goal: Absence of Hospital-Acquired Illness or Injury  Intervention: Identify and Manage Fall Risk  Recent Flowsheet Documentation  Taken 7/17/2024 1700 by Sergey Sandoval RN  Safety Promotion/Fall Prevention:   supervised activity   safety round/check completed   room organization consistent   room near nurse's station   room door open   nonskid shoes/slippers when out of bed   mobility aid in reach   increase visualization of patient   increased rounding and observation   clutter free environment maintained  Intervention: Prevent Infection  Recent Flowsheet Documentation  Taken 7/17/2024 1700 by Sergey Sandoval RN  Infection Prevention:   rest/sleep promoted   environmental surveillance performed     Problem: Fall Injury Risk  Goal: Absence of Fall and Fall-Related Injury  Intervention: Promote Injury-Free Environment  Recent Flowsheet Documentation  Taken 7/17/2024 1700 by Sergey Sandoval RN  Safety Promotion/Fall Prevention:   supervised activity   safety round/check completed   room organization consistent   room near nurse's station   room door open   nonskid shoes/slippers when out of  bed   mobility aid in reach   increase visualization of patient   increased rounding and observation   clutter free environment maintained     Problem: Fall Injury Risk  Goal: Absence of Fall and Fall-Related Injury  Intervention: Promote Injury-Free Environment  Recent Flowsheet Documentation  Taken 7/17/2024 1700 by Sergey Sandoval, RN  Safety Promotion/Fall Prevention:   supervised activity   safety round/check completed   room organization consistent   room near nurse's station   room door open   nonskid shoes/slippers when out of bed   mobility aid in reach   increase visualization of patient   increased rounding and observation   clutter free environment maintained     Problem: Adult Inpatient Plan of Care  Goal: Absence of Hospital-Acquired Illness or Injury  Intervention: Identify and Manage Fall Risk  Recent Flowsheet Documentation  Taken 7/17/2024 1700 by Sergey Sandoval, RN  Safety Promotion/Fall Prevention:   supervised activity   safety round/check completed   room organization consistent   room near nurse's station   room door open   nonskid shoes/slippers when out of bed   mobility aid in reach   increase visualization of patient   increased rounding and observation   clutter free environment maintained  Intervention: Prevent Infection  Recent Flowsheet Documentation  Taken 7/17/2024 1700 by eSrgey Sandoval, RN  Infection Prevention:   rest/sleep promoted   environmental surveillance performed

## 2024-07-18 NOTE — PROGRESS NOTES
[facility]    Medicine Progress Note - Hospitalist Service    Date of Admission:  6/5/2024    Assessment & Plan   88-year-old male who was transitioned to skilled nursing care on 6/5.  He has history of dementia and family is unable to provide care for him.  He was recently treated for right full cellulitis which has resolved.  Patient is MA pending, awaiting placement in long term care. No significant changes over last several days.    halfway  care patient.  This is a nonbillable social visit.    halfway care admission.  Social work consulted.  Looking for LTC.  Is MA pending. Once a bed is available patient can go anytime     Constipation  Started/continue on senna, have as needed miralax and dulcolax available    Right foot cellulitis.  Resolved with Keflex 4 times daily through 6/11.  Swelling and redness resolved.    Venous ultrasound negative for DVT.    Dementia.  Not on any medications.  As needed olanzapine ordered for agitation.    5.   Bradycardia            Patient was noted to have HR in 50s. He is not on any beta blocker or CCB. Refused EKG. asymptomatic           - Monitor clinically    6. Paper work       Previous provider filled out guardianship papers on 7/13 for the daughter.          Diet: Combination Diet Regular Diet  Room Service    DVT Prophylaxis: Pneumatic Compression Devices  Maddox Catheter: Not present  Lines: None     Cardiac Monitoring: None  Code Status: No CPR- Do NOT Intubate      Clinically Significant Risk Factors Present on Admission                    # Dementia: noted on problem list                      Disposition Plan     Medically Ready for Discharge: Ready Now           Alexandre Kurtz MD  Hospitalist Service  [facility]  Securely message with [employer] (more info)  Text page via [facility] Paging/Directory   ______________________________________________________________________    Interval History   Nursing notes reviewed. Patient doing  well. No acute concerns or complaints.    Physical Exam   Vital Signs: Temp: 98.1  F (36.7  C) Temp src: Oral BP: (!) 150/72 Pulse: 52   Resp: 16 SpO2: 96 % O2 Device: None (Room air)    Weight: 0 lbs 0 oz    General Appearance: Appears comfortable, resting in bed.       Medical Decision Making         MINUTES SPENT BY ME on the date of service doing chart review, history, exam, documentation & further activities per the note.      Data

## 2024-07-19 PROCEDURE — 99207 PR NO BILLABLE SERVICE THIS VISIT: CPT | Performed by: STUDENT IN AN ORGANIZED HEALTH CARE EDUCATION/TRAINING PROGRAM

## 2024-07-19 PROCEDURE — 101N000002 HC CUSTODIAL CARE DAILY

## 2024-07-19 ASSESSMENT — ACTIVITIES OF DAILY LIVING (ADL)
ADLS_ACUITY_SCORE: 48
ADLS_ACUITY_SCORE: 45
ADLS_ACUITY_SCORE: 48
ADLS_ACUITY_SCORE: 45
ADLS_ACUITY_SCORE: 48
ADLS_ACUITY_SCORE: 45
ADLS_ACUITY_SCORE: 48
ADLS_ACUITY_SCORE: 48
ADLS_ACUITY_SCORE: 45
ADLS_ACUITY_SCORE: 45

## 2024-07-19 NOTE — PLAN OF CARE
"Confused, tried to go for a walk & he refused, gets out of bed on his own & sets off alarm, plan for LTC pending MA.     Goal Outcome Evaluation:      Plan of Care Reviewed With: patient    Overall Patient Progress: no changeOverall Patient Progress: no change    Outcome Evaluation: alert to self, gets out of bed without calling & sets off alarm, plan for LTC pending MA      Problem: Adult Inpatient Plan of Care  Goal: Plan of Care Review  Description: The Plan of Care Review/Shift note should be completed every shift.  The Outcome Evaluation is a brief statement about your assessment that the patient is improving, declining, or no change.  This information will be displayed automatically on your shift  note.  Outcome: Progressing  Flowsheets (Taken 7/19/2024 1246)  Outcome Evaluation: alert to self, gets out of bed without calling & sets off alarm, plan for LTC pending MA  Plan of Care Reviewed With: patient  Overall Patient Progress: no change  Goal: Patient-Specific Goal (Individualized)  Description: You can add care plan individualizations to a care plan. Examples of Individualization might be:  \"Parent requests to be called daily at 9am for status\", \"I have a hard time hearing out of my right ear\", or \"Do not touch me to wake me up as it startles  me\".  Outcome: Progressing     "

## 2024-07-19 NOTE — PROGRESS NOTES
[facility]    Medicine Progress Note - Hospitalist Service    Date of Admission:  6/5/2024    Assessment & Plan   88-year-old male who was transitioned to jail care on 6/5.  He has history of dementia and family is unable to provide care for him.  He was recently treated for right full cellulitis which has resolved.  Patient is MA pending, awaiting placement in long term care. No significant changes over last several days.    [facility]  care patient.  This is a nonbillable social visit.    [facility] care admission.  Social work consulted.  Looking for LTC.  Is MA pending. Once a bed is available patient can go anytime     Constipation  Started/continue on senna, have as needed miralax and dulcolax available    Right foot cellulitis.  Resolved with Keflex 4 times daily through 6/11.  Swelling and redness resolved.    Venous ultrasound negative for DVT.    Dementia.  Not on any medications.  As needed olanzapine ordered for agitation.    5.   Bradycardia            Patient was noted to have HR in 50s. He is not on any beta blocker or CCB. Refused EKG. asymptomatic           - Monitor clinically    6. Paper work       Previous provider filled out guardianship papers on 7/13 for the daughter.          Diet: Combination Diet Regular Diet  Room Service    DVT Prophylaxis: Pneumatic Compression Devices  Maddox Catheter: Not present  Lines: None     Cardiac Monitoring: None  Code Status: No CPR- Do NOT Intubate      Clinically Significant Risk Factors Present on Admission                    # Dementia: noted on problem list                      Disposition Plan     Medically Ready for Discharge: Ready Now           Alexandre Kurtz MD  Hospitalist Service  [facility]  Securely message with [employer] (more info)  Text page via [employer] Paging/Directory   ______________________________________________________________________    Interval History   Nursing notes reviewed. Patient doing  well. No acute concerns or complaints.    Physical Exam   Vital Signs: Temp: 97  F (36.1  C) Temp src: Oral BP: (!) 148/66 Pulse: 58   Resp: 18 SpO2: 98 % O2 Device: None (Room air)    Weight: 0 lbs 0 oz    General Appearance: Appears comfortable, resting in bed.       Medical Decision Making         MINUTES SPENT BY ME on the date of service doing chart review, history, exam, documentation & further activities per the note.      Data

## 2024-07-19 NOTE — PLAN OF CARE
Care from [id number]  Inpatient Progress Note - [facility]  For vital signs and complete assessments, please see documentation flowsheets.     ORIENTATION: Alert to self, pleasantly confused.  PAIN: PAINAD, no indiacations of pain, denies pain and SOB.  O2: RA  GI/: Brief on, ambulates to restroom.  ACTIVITY: A1 Gb walker; often rushes into bathroom before staff can provide the walker, GB. Fall pads in place, bed alarm on.  DIET: Regular  PLAN: Placement pending.    Goal Outcome Evaluation:      Plan of Care Reviewed With: patient    Overall Patient Progress: no changeOverall Patient Progress: no change    Outcome Evaluation: long-term cares, pleasantly confused, spontaneously up to bathroom setting off bed alarms, senna adminitered this shift as no charted BM during the day.    Problem: Adult Inpatient Plan of Care  Goal: Plan of Care Review  Description: The Plan of Care Review/Shift note should be completed every shift.  The Outcome Evaluation is a brief statement about your assessment that the patient is improving, declining, or no change.  This information will be displayed automatically on your shift  note.  Recent Flowsheet Documentation  Taken 7/19/2024 0115 by Donna Peoples, RN  Outcome Evaluation: long-term cares, pleasantly confused, spontaneously up to bathroom setting off bed alarms, senna adminitered this shift as no charted BM during the day.  Plan of Care Reviewed With: patient  Overall Patient Progress: no change  Goal: Absence of Hospital-Acquired Illness or Injury  Intervention: Identify and Manage Fall Risk  Recent Flowsheet Documentation  Taken 7/18/2024 2301 by Donna Peoples, RN  Safety Promotion/Fall Prevention:   safety round/check completed   room organization consistent   room near nurse's station   room door open   patient and family education   nonskid shoes/slippers when out of bed   mobility aid in reach   lighting adjusted   increase visualization of patient   increased rounding and  observation   clutter free environment maintained   assistive device/personal items within reach   activity supervised   supervised activity  Taken 7/18/2024 1932 by Donna Peoples RN  Safety Promotion/Fall Prevention:   safety round/check completed   room organization consistent   room near nurse's station   room door open   patient and family education   nonskid shoes/slippers when out of bed   mobility aid in reach   lighting adjusted   increase visualization of patient   increased rounding and observation   clutter free environment maintained   assistive device/personal items within reach   activity supervised   supervised activity  Intervention: Prevent Skin Injury  Recent Flowsheet Documentation  Taken 7/19/2024 0040 by Donna Peoples RN  Body Position: position changed independently  Taken 7/18/2024 2301 by Donna Peoples RN  Body Position: position changed independently  Taken 7/18/2024 2200 by Donna Peoples RN  Body Position: position changed independently  Taken 7/18/2024 1951 by Donna Peoples RN  Body Position: position changed independently  Taken 7/18/2024 1932 by Donna Peoples RN  Body Position: position changed independently  Intervention: Prevent and Manage VTE (Venous Thromboembolism) Risk  Recent Flowsheet Documentation  Taken 7/18/2024 2301 by Donna Peoples RN  VTE Prevention/Management: SCDs off (sequential compression devices)  Taken 7/18/2024 1932 by Donna Peoples RN  VTE Prevention/Management: SCDs off (sequential compression devices)  Intervention: Prevent Infection  Recent Flowsheet Documentation  Taken 7/18/2024 2301 by Donna Peoples RN  Infection Prevention:   rest/sleep promoted   hand hygiene promoted  Taken 7/18/2024 1932 by Donna Peoples RN  Infection Prevention:   rest/sleep promoted   hand hygiene promoted

## 2024-07-19 NOTE — PLAN OF CARE
Pt Alert to self. No pain per PAINAD scale. A1 GB/W. Alarms on. Medically ready for discharge - awaiting LTC placement.        Goal Outcome Evaluation:      Plan of Care Reviewed With: patient    Overall Patient Progress: no changeOverall Patient Progress: no change    Outcome Evaluation: awaiting for LTC pending MA insurance      Problem: Adult Inpatient Plan of Care  Goal: Plan of Care Review  Description: The Plan of Care Review/Shift note should be completed every shift.  The Outcome Evaluation is a brief statement about your assessment that the patient is improving, declining, or no change.  This information will be displayed automatically on your shift  note.  Recent Flowsheet Documentation  Taken 7/19/2024 1718 by Rebekah Bello, RN  Outcome Evaluation: awaiting for LTC pending MA insurance  Plan of Care Reviewed With: patient  Overall Patient Progress: no change

## 2024-07-20 PROCEDURE — 250N000013 HC RX MED GY IP 250 OP 250 PS 637: Performed by: INTERNAL MEDICINE

## 2024-07-20 PROCEDURE — 101N000002 HC CUSTODIAL CARE DAILY

## 2024-07-20 PROCEDURE — 99207 PR NO BILLABLE SERVICE THIS VISIT: CPT | Performed by: INTERNAL MEDICINE

## 2024-07-20 RX ADMIN — SENNOSIDES AND DOCUSATE SODIUM 1 TABLET: 8.6; 5 TABLET ORAL at 22:28

## 2024-07-20 ASSESSMENT — ACTIVITIES OF DAILY LIVING (ADL)
ADLS_ACUITY_SCORE: 49
ADLS_ACUITY_SCORE: 49
ADLS_ACUITY_SCORE: 45
ADLS_ACUITY_SCORE: 49
ADLS_ACUITY_SCORE: 45
ADLS_ACUITY_SCORE: 49
ADLS_ACUITY_SCORE: 45
ADLS_ACUITY_SCORE: 50
ADLS_ACUITY_SCORE: 49

## 2024-07-20 NOTE — PLAN OF CARE
[id number]    care home care continues. Awaiting placement. BM this shift, voiding adequately. No pain per PAINAD scale. Up SBA-A1 GB W. Alarms on.     Goal Outcome Evaluation:      Plan of Care Reviewed With: patient    Overall Patient Progress: no changeOverall Patient Progress: no change    Outcome Evaluation: awaiting placement      Problem: Adult Inpatient Plan of Care  Goal: Plan of Care Review  Description: The Plan of Care Review/Shift note should be completed every shift.  The Outcome Evaluation is a brief statement about your assessment that the patient is improving, declining, or no change.  This information will be displayed automatically on your shift  note.  Recent Flowsheet Documentation  Taken 7/19/2024 2132 by Lian Zimmerman RN  Outcome Evaluation: awaiting placement  Plan of Care Reviewed With: patient  Overall Patient Progress: no change     Problem: Adult Inpatient Plan of Care  Goal: Plan of Care Review  Description: The Plan of Care Review/Shift note should be completed every shift.  The Outcome Evaluation is a brief statement about your assessment that the patient is improving, declining, or no change.  This information will be displayed automatically on your shift  note.  Recent Flowsheet Documentation  Taken 7/19/2024 2132 by Lian Zimmerman RN  Outcome Evaluation: awaiting placement  Plan of Care Reviewed With: patient  Overall Patient Progress: no change     Problem: Adult Inpatient Plan of Care  Goal: Plan of Care Review  Description: The Plan of Care Review/Shift note should be completed every shift.  The Outcome Evaluation is a brief statement about your assessment that the patient is improving, declining, or no change.  This information will be displayed automatically on your shift  note.  Recent Flowsheet Documentation  Taken 7/19/2024 2132 by Lian Zimmerman RN  Outcome Evaluation: awaiting placement  Plan of Care Reviewed With: patient  Overall Patient Progress: no change      Problem: Adult Inpatient Plan of Care  Goal: Plan of Care Review  Description: The Plan of Care Review/Shift note should be completed every shift.  The Outcome Evaluation is a brief statement about your assessment that the patient is improving, declining, or no change.  This information will be displayed automatically on your shift  note.  Recent Flowsheet Documentation  Taken 7/19/2024 2132 by Lian Zimmerman RN  Outcome Evaluation: awaiting placement  Plan of Care Reviewed With: patient  Overall Patient Progress: no change

## 2024-07-20 NOTE — PROGRESS NOTES
[facility]    Medicine Progress Note - Hospitalist Service    Date of Admission:  6/5/2024    Assessment & Plan   88-year-old male who was transitioned to [facility] care on 6/5.  He has history of dementia and family is unable to provide care for him.  During his stay we was treated for right foot cellulitis which has resolved.  Patient is MA pending, awaiting placement in long term care.  Just awaiting placement    Patient know to me, no new changes, awaiting placement, patient known to me,  This is a nonbillable social visit.    prison care admission.  Social work consulted.  Looking for LTC.  Is MA pending, SW working on. Once a bed is available patient can go anytime     Constipation  Started/continue on senna, have as needed miralax and dulcolax available    Right foot cellulitis.  Resolved with Keflex 4 times daily through 6/11.  Swelling and redness resolved.    Venous ultrasound negative for DVT.    Dementia.  Not on any medications.   As needed olanzapine ordered for agitation.    5.   Bradycardia            Patient was noted to have HR in 50s. He is not on any beta blocker or CCB. Refused EKG. asymptomatic           - Monitor clinically    6. Paper work       Previous provider filled out guardianship papers on 7/13 for the daughter.          Diet: Combination Diet Regular Diet  Room Service    DVT Prophylaxis: Pneumatic Compression Devices  [place]ox Catheter: Not present  Lines: None     Cardiac Monitoring: None  Code Status: No CPR- Do NOT Intubate      Clinically Significant Risk Factors Present on Admission                    # Dementia: noted on problem list                        Medically Ready for Discharge: [facility], MA p ending, awaiting LTC placement    Discussed with nursing         Ata Nogueira MD  Hospitalist Service  [facility]  Securely message with [employer] (more info)  Text page via [facility] Paging/Directory    ______________________________________________________________________    Interval History   Patient known to me, no new complaints.     Physical Exam   Vital Signs: Temp: 97.9  F (36.6  C) Temp src: Oral BP: 136/76 Pulse: (!) 48   Resp: 18 SpO2: 97 % O2 Device: None (Room air)    Weight: 0 lbs 0 oz    General Appearance: Appears comfortable, resting in bed. Pleasantly confused          Data

## 2024-07-20 NOTE — PLAN OF CARE
"Confused, still refusing walk or sitting in recliner, doesn't call & sets off bed alarm when going to bathroom, pending MA for LTC placement.      Goal Outcome Evaluation:      Plan of Care Reviewed With: patient    Overall Patient Progress: no changeOverall Patient Progress: no change    Outcome Evaluation: pending MA for LTC placement      Problem: Adult Inpatient Plan of Care  Goal: Plan of Care Review  Description: The Plan of Care Review/Shift note should be completed every shift.  The Outcome Evaluation is a brief statement about your assessment that the patient is improving, declining, or no change.  This information will be displayed automatically on your shift  note.  Outcome: Progressing  Flowsheets (Taken 7/20/2024 1256)  Outcome Evaluation: pending MA for LTC placement  Plan of Care Reviewed With: patient  Overall Patient Progress: no change  Goal: Patient-Specific Goal (Individualized)  Description: You can add care plan individualizations to a care plan. Examples of Individualization might be:  \"Parent requests to be called daily at 9am for status\", \"I have a hard time hearing out of my right ear\", or \"Do not touch me to wake me up as it startles  me\".  Outcome: Progressing     "

## 2024-07-20 NOTE — PLAN OF CARE
A&O x self only. Assist of 1, walker. No pain assessed per PAINAD scale. Voids frequently. Will discharge to LTC facility once placement is found.     Goal Outcome Evaluation:      Plan of Care Reviewed With: patient    Overall Patient Progress: no changeOverall Patient Progress: no change    Outcome Evaluation: Awaiting placement to LTC facility      Problem: Adult Inpatient Plan of Care  Goal: Plan of Care Review  Description: The Plan of Care Review/Shift note should be completed every shift.  The Outcome Evaluation is a brief statement about your assessment that the patient is improving, declining, or no change.  This information will be displayed automatically on your shift  note.  Recent Flowsheet Documentation  Taken 7/20/2024 0345 by Arabella Key RN  Outcome Evaluation: Awaiting placement to LTC facility  Plan of Care Reviewed With: patient  Overall Patient Progress: no change     Problem: Adult Inpatient Plan of Care  Goal: Plan of Care Review  Description: The Plan of Care Review/Shift note should be completed every shift.  The Outcome Evaluation is a brief statement about your assessment that the patient is improving, declining, or no change.  This information will be displayed automatically on your shift  note.  Recent Flowsheet Documentation  Taken 7/20/2024 0345 by Arabella Key RN  Outcome Evaluation: Awaiting placement to LTC facility  Plan of Care Reviewed With: patient  Overall Patient Progress: no change     Problem: Adult Inpatient Plan of Care  Goal: Plan of Care Review  Description: The Plan of Care Review/Shift note should be completed every shift.  The Outcome Evaluation is a brief statement about your assessment that the patient is improving, declining, or no change.  This information will be displayed automatically on your shift  note.  Recent Flowsheet Documentation  Taken 7/20/2024 0345 by Arabella Key RN  Outcome Evaluation: Awaiting placement to LTC facility  Plan of Care Reviewed  With: patient  Overall Patient Progress: no change     Problem: Adult Inpatient Plan of Care  Goal: Plan of Care Review  Description: The Plan of Care Review/Shift note should be completed every shift.  The Outcome Evaluation is a brief statement about your assessment that the patient is improving, declining, or no change.  This information will be displayed automatically on your shift  note.  Recent Flowsheet Documentation  Taken 7/20/2024 0345 by Arabella Key RN  Outcome Evaluation: Awaiting placement to LTC facility  Plan of Care Reviewed With: patient  Overall Patient Progress: no change     Problem: Adult Inpatient Plan of Care  Goal: Plan of Care Review  Description: The Plan of Care Review/Shift note should be completed every shift.  The Outcome Evaluation is a brief statement about your assessment that the patient is improving, declining, or no change.  This information will be displayed automatically on your shift  note.  Flowsheets (Taken 7/20/2024 0345)  Outcome Evaluation: Awaiting placement to LTC facility  Plan of Care Reviewed With: patient  Overall Patient Progress: no change

## 2024-07-21 PROCEDURE — 101N000002 HC CUSTODIAL CARE DAILY

## 2024-07-21 PROCEDURE — 99207 PR NO BILLABLE SERVICE THIS VISIT: CPT | Performed by: INTERNAL MEDICINE

## 2024-07-21 ASSESSMENT — ACTIVITIES OF DAILY LIVING (ADL)
ADLS_ACUITY_SCORE: 49
ADLS_ACUITY_SCORE: 49
ADLS_ACUITY_SCORE: 48
ADLS_ACUITY_SCORE: 48
ADLS_ACUITY_SCORE: 49
ADLS_ACUITY_SCORE: 48
ADLS_ACUITY_SCORE: 49
ADLS_ACUITY_SCORE: 48
ADLS_ACUITY_SCORE: 49
ADLS_ACUITY_SCORE: 48
ADLS_ACUITY_SCORE: 48
ADLS_ACUITY_SCORE: 49
ADLS_ACUITY_SCORE: 48
ADLS_ACUITY_SCORE: 49
ADLS_ACUITY_SCORE: 48

## 2024-07-21 NOTE — PLAN OF CARE
Pt a&ox0. Awaiting placement. BM this shift, voiding adequately. No pain per PAINAD scale. Up SBA-A1 GB W. Alarms on.   Problem: Adult Inpatient Plan of Care  Goal: Plan of Care Review  Description: The Plan of Care Review/Shift note should be completed every shift.  The Outcome Evaluation is a brief statement about your assessment that the patient is improving, declining, or no change.  This information will be displayed automatically on your shift  note.  Recent Flowsheet Documentation  Taken 7/21/2024 0630 by Maria De Jesus Bruce RN  Overall Patient Progress: no change  Goal: Absence of Hospital-Acquired Illness or Injury  Intervention: Identify and Manage Fall Risk  Recent Flowsheet Documentation  Taken 7/21/2024 0200 by Maria De Jesus Bruce, RN  Safety Promotion/Fall Prevention:   safety round/check completed   supervised activity   room near nurse's station   room door open   nonskid shoes/slippers when out of bed   mobility aid in reach   increase visualization of patient   activity supervised   Goal Outcome Evaluation:           Overall Patient Progress: no changeOverall Patient Progress: no change

## 2024-07-21 NOTE — PLAN OF CARE
"Confused, gets out of bed for bathroom & sets off alarm, went for a walk in hallway, continuing to encourage hydration & food intake, discharge pending MA & LTC placement.     Goal Outcome Evaluation:      Plan of Care Reviewed With: patient    Overall Patient Progress: no changeOverall Patient Progress: no change    Outcome Evaluation: went on walk today, shelter care pending LTC, pleasantly confused      Problem: Adult Inpatient Plan of Care  Goal: Plan of Care Review  Description: The Plan of Care Review/Shift note should be completed every shift.  The Outcome Evaluation is a brief statement about your assessment that the patient is improving, declining, or no change.  This information will be displayed automatically on your shift  note.  Outcome: Progressing  Flowsheets (Taken 7/21/2024 6469)  Outcome Evaluation: went on walk today, shelter care pending LTC, pleasantly confused  Plan of Care Reviewed With: patient  Overall Patient Progress: no change  Goal: Patient-Specific Goal (Individualized)  Description: You can add care plan individualizations to a care plan. Examples of Individualization might be:  \"Parent requests to be called daily at 9am for status\", \"I have a hard time hearing out of my right ear\", or \"Do not touch me to wake me up as it startles  me\".  Outcome: Progressing     "

## 2024-07-21 NOTE — PLAN OF CARE
"Pt remains detention care. Freq out of bed, no call light use. A1, will refuse gb, often uncooperative w walker use. Increasingly unsteady on feet. Regular diet on room service, minimal appetite, prefers sweet/snacks. Pushed oral hydration, poor intake.     Goal Outcome Evaluation:      Plan of Care Reviewed With: patient    Overall Patient Progress: no changeOverall Patient Progress: no change    Outcome Evaluation: Pt remains on detention cares. Freq voids, ambulates to bathroom      Problem: Adult Inpatient Plan of Care  Goal: Plan of Care Review  Description: The Plan of Care Review/Shift note should be completed every shift.  The Outcome Evaluation is a brief statement about your assessment that the patient is improving, declining, or no change.  This information will be displayed automatically on your shift  note.  Outcome: Progressing  Flowsheets (Taken 7/20/2024 2225)  Outcome Evaluation: Pt remains on detention cares. Freq voids, ambulates to bathroom  Plan of Care Reviewed With: patient  Overall Patient Progress: no change  Goal: Patient-Specific Goal (Individualized)  Description: You can add care plan individualizations to a care plan. Examples of Individualization might be:  \"Parent requests to be called daily at 9am for status\", \"I have a hard time hearing out of my right ear\", or \"Do not touch me to wake me up as it startles  me\".  Outcome: Progressing     Problem: Adult Inpatient Plan of Care  Goal: Plan of Care Review  Description: The Plan of Care Review/Shift note should be completed every shift.  The Outcome Evaluation is a brief statement about your assessment that the patient is improving, declining, or no change.  This information will be displayed automatically on your shift  note.  Recent Flowsheet Documentation  Taken 7/20/2024 2225 by Sergey Sandoval RN  Outcome Evaluation: Pt remains on detention cares. Freq voids, ambulates to bathroom  Plan of Care Reviewed With: patient  Overall Patient " Progress: no change  Goal: Absence of Hospital-Acquired Illness or Injury  Intervention: Identify and Manage Fall Risk  Recent Flowsheet Documentation  Taken 7/20/2024 1700 by Sergey Sandoval RN  Safety Promotion/Fall Prevention:   safety round/check completed   supervised activity   room near nurse's station   room door open   nonskid shoes/slippers when out of bed   mobility aid in reach   increase visualization of patient     Problem: Delirium  Goal: Improved Behavioral Control  Intervention: Minimize Safety Risk  Recent Flowsheet Documentation  Taken 7/20/2024 1700 by Sergey Sandoval RN  Enhanced Safety Measures: floor mats     Problem: Adult Inpatient Plan of Care  Goal: Plan of Care Review  Description: The Plan of Care Review/Shift note should be completed every shift.  The Outcome Evaluation is a brief statement about your assessment that the patient is improving, declining, or no change.  This information will be displayed automatically on your shift  note.  Recent Flowsheet Documentation  Taken 7/20/2024 2225 by Sergey Sandoval RN  Outcome Evaluation: Pt remains on [facility] cares. Freq voids, ambulates to bathroom  Plan of Care Reviewed With: patient  Overall Patient Progress: no change  Goal: Absence of Hospital-Acquired Illness or Injury  Intervention: Identify and Manage Fall Risk  Recent Flowsheet Documentation  Taken 7/20/2024 1700 by Sergey Sandoval RN  Safety Promotion/Fall Prevention:   safety round/check completed   supervised activity   room near nurse's station   room door open   nonskid shoes/slippers when out of bed   mobility aid in reach   increase visualization of patient     Problem: Fall Injury Risk  Goal: Absence of Fall and Fall-Related Injury  Intervention: Promote Injury-Free Environment  Recent Flowsheet Documentation  Taken 7/20/2024 1700 by Sergey Sandoval RN  Safety Promotion/Fall Prevention:   safety round/check completed   supervised activity   room near nurse's station   room door  open   nonskid shoes/slippers when out of bed   mobility aid in reach   increase visualization of patient     Problem: Adult Inpatient Plan of Care  Goal: Absence of Hospital-Acquired Illness or Injury  Intervention: Identify and Manage Fall Risk  Recent Flowsheet Documentation  Taken 7/20/2024 1700 by Sergey Sandoval RN  Safety Promotion/Fall Prevention:   safety round/check completed   supervised activity   room near nurse's station   room door open   nonskid shoes/slippers when out of bed   mobility aid in reach   increase visualization of patient     Problem: Fall Injury Risk  Goal: Absence of Fall and Fall-Related Injury  Intervention: Promote Injury-Free Environment  Recent Flowsheet Documentation  Taken 7/20/2024 1700 by Sergey Sandoval RN  Safety Promotion/Fall Prevention:   safety round/check completed   supervised activity   room near nurse's station   room door open   nonskid shoes/slippers when out of bed   mobility aid in reach   increase visualization of patient     Problem: Fall Injury Risk  Goal: Absence of Fall and Fall-Related Injury  Intervention: Promote Injury-Free Environment  Recent Flowsheet Documentation  Taken 7/20/2024 1700 by Sergey Sandoval RN  Safety Promotion/Fall Prevention:   safety round/check completed   supervised activity   room near nurse's station   room door open   nonskid shoes/slippers when out of bed   mobility aid in reach   increase visualization of patient     Problem: Adult Inpatient Plan of Care  Goal: Plan of Care Review  Description: The Plan of Care Review/Shift note should be completed every shift.  The Outcome Evaluation is a brief statement about your assessment that the patient is improving, declining, or no change.  This information will be displayed automatically on your shift  note.  Recent Flowsheet Documentation  Taken 7/20/2024 2225 by Sergey Sandoval RN  Outcome Evaluation: Pt remains on shelter cares. Freq voids, ambulates to bathroom  Plan of Care  Reviewed With: patient  Overall Patient Progress: no change  Goal: Absence of Hospital-Acquired Illness or Injury  Intervention: Identify and Manage Fall Risk  Recent Flowsheet Documentation  Taken 7/20/2024 1700 by Sergey Sandoval RN  Safety Promotion/Fall Prevention:   safety round/check completed   supervised activity   room near nurse's station   room door open   nonskid shoes/slippers when out of bed   mobility aid in reach   increase visualization of patient     Problem: Adult Inpatient Plan of Care  Goal: Plan of Care Review  Description: The Plan of Care Review/Shift note should be completed every shift.  The Outcome Evaluation is a brief statement about your assessment that the patient is improving, declining, or no change.  This information will be displayed automatically on your shift  note.  Recent Flowsheet Documentation  Taken 7/20/2024 2225 by Sergey Sandoval RN  Outcome Evaluation: Pt remains on [facility] cares. Freq voids, ambulates to bathroom  Plan of Care Reviewed With: patient  Overall Patient Progress: no change  Goal: Absence of Hospital-Acquired Illness or Injury  Intervention: Identify and Manage Fall Risk  Recent Flowsheet Documentation  Taken 7/20/2024 1700 by Sergey Sandoval RN  Safety Promotion/Fall Prevention:   safety round/check completed   supervised activity   room near nurse's station   room door open   nonskid shoes/slippers when out of bed   mobility aid in reach   increase visualization of patient     Problem: Fall Injury Risk  Goal: Absence of Fall and Fall-Related Injury  Intervention: Promote Injury-Free Environment  Recent Flowsheet Documentation  Taken 7/20/2024 1700 by Sergey Sandoval RN  Safety Promotion/Fall Prevention:   safety round/check completed   supervised activity   room near nurse's station   room door open   nonskid shoes/slippers when out of bed   mobility aid in reach   increase visualization of patient     Problem: Fall Injury Risk  Goal: Absence of Fall and  Fall-Related Injury  Intervention: Promote Injury-Free Environment  Recent Flowsheet Documentation  Taken 7/20/2024 1700 by Sergey Sandoval RN  Safety Promotion/Fall Prevention:   safety round/check completed   supervised activity   room near nurse's station   room door open   nonskid shoes/slippers when out of bed   mobility aid in reach   increase visualization of patient     Problem: Adult Inpatient Plan of Care  Goal: Absence of Hospital-Acquired Illness or Injury  Intervention: Identify and Manage Fall Risk  Recent Flowsheet Documentation  Taken 7/20/2024 1700 by Sergey Sandoval, RN  Safety Promotion/Fall Prevention:   safety round/check completed   supervised activity   room near nurse's station   room door open   nonskid shoes/slippers when out of bed   mobility aid in reach   increase visualization of patient

## 2024-07-21 NOTE — PROGRESS NOTES
[facility]    Medicine Progress Note - Hospitalist Service    Date of Admission:  6/5/2024    Assessment & Plan   88-year-old male who was transitioned to long term care on 6/5.  He has history of dementia and family is unable to provide care for him.  During his stay we was treated for right foot cellulitis which has resolved.  Patient is MA pending, awaiting placement in long term care.  Just awaiting placement    7/21/24 No new changes, awaiting placement, patient known to me,  This is a nonbillable social visit.    nursing home care admission.  Social work consulted.  Looking for LTC.  Is MA pending, SW working on. Once a bed is available patient can go anytime     Constipation  Started/continue on senna, have as needed miralax and dulcolax available    Right foot cellulitis.  Resolved with Keflex 4 times daily through 6/11.  Swelling and redness resolved.    Venous ultrasound negative for DVT.    Dementia.  Not on any medications.   As needed olanzapine ordered for agitation.    5.   Bradycardia            Patient was noted to have HR in 50s. He is not on any beta blocker or CCB. Refused EKG. asymptomatic           - Monitor clinically    6. Paper work       Previous provider filled out guardianship papers on 7/13 for the daughter.          Diet: Combination Diet Regular Diet  Room Service    DVT Prophylaxis: Pneumatic Compression Devices  Maddox Catheter: Not present  Lines: None     Cardiac Monitoring: None  Code Status: No CPR- Do NOT Intubate      Clinically Significant Risk Factors Present on Admission                    # Dementia: noted on problem list                    Medically Ready for Discharge: [facility], MA pending, awaiting LTC placement    Discussed with nursing again         Ata Nogueira MD  Hospitalist Service  [facility]  Securely message with [email or web address] (more info)  Text page via [email or web address] Paging/Directory    ______________________________________________________________________    Interval History   Patient known to me, no new complaints.  Pleasantly demented    Physical Exam   Vital Signs: Temp: 97.9  F (36.6  C) Temp src: Oral BP: 137/64 Pulse: 57   Resp: 18 SpO2: 97 % O2 Device: None (Room air)    Weight: 0 lbs 0 oz    General Appearance: Appears comfortable, resting in bed. Pleasantly confused          Data

## 2024-07-22 PROCEDURE — 101N000002 HC CUSTODIAL CARE DAILY

## 2024-07-22 PROCEDURE — 250N000013 HC RX MED GY IP 250 OP 250 PS 637: Performed by: INTERNAL MEDICINE

## 2024-07-22 PROCEDURE — 99207 PR NO BILLABLE SERVICE THIS VISIT: CPT | Performed by: INTERNAL MEDICINE

## 2024-07-22 PROCEDURE — 250N000013 HC RX MED GY IP 250 OP 250 PS 637: Performed by: NURSE PRACTITIONER

## 2024-07-22 RX ADMIN — SENNOSIDES AND DOCUSATE SODIUM 1 TABLET: 8.6; 5 TABLET ORAL at 22:17

## 2024-07-22 RX ADMIN — QUETIAPINE FUMARATE 12.5 MG: 25 TABLET ORAL at 22:16

## 2024-07-22 ASSESSMENT — ACTIVITIES OF DAILY LIVING (ADL)
ADLS_ACUITY_SCORE: 52
ADLS_ACUITY_SCORE: 48
ADLS_ACUITY_SCORE: 50
ADLS_ACUITY_SCORE: 48
ADLS_ACUITY_SCORE: 52
ADLS_ACUITY_SCORE: 50
ADLS_ACUITY_SCORE: 48
ADLS_ACUITY_SCORE: 52
ADLS_ACUITY_SCORE: 52
ADLS_ACUITY_SCORE: 48
ADLS_ACUITY_SCORE: 48
ADLS_ACUITY_SCORE: 50
ADLS_ACUITY_SCORE: 48
ADLS_ACUITY_SCORE: 52
ADLS_ACUITY_SCORE: 48
ADLS_ACUITY_SCORE: 48
ADLS_ACUITY_SCORE: 50
ADLS_ACUITY_SCORE: 48

## 2024-07-22 NOTE — PLAN OF CARE
Goal Outcome Evaluation:      Plan of Care Reviewed With: patient    Overall Patient Progress: no changeOverall Patient Progress: no change    Outcome Evaluation: Pt remains group home care. Pt up to the bathroom frequently. Voiding. Bedbath done.        Confused, pleasant. Frequently up to bathroom setting off bed alarm. Redirectable. Voiding. Small BM.           Problem: Adult Inpatient Plan of Care  Goal: Plan of Care Review  Description: The Plan of Care Review/Shift note should be completed every shift.  The Outcome Evaluation is a brief statement about your assessment that the patient is improving, declining, or no change.  This information will be displayed automatically on your shift  note.  Recent Flowsheet Documentation  Taken 7/22/2024 1428 by Orin Peres, RN  Outcome Evaluation: Pt remains group home care. Pt up to the bathroom frequently. Voiding. Bedbath done.  Plan of Care Reviewed With: patient  Overall Patient Progress: no change  Goal: Absence of Hospital-Acquired Illness or Injury  Intervention: Identify and Manage Fall Risk  Recent Flowsheet Documentation  Taken 7/22/2024 0802 by Orin Peres, RN  Safety Promotion/Fall Prevention:   safety round/check completed   room organization consistent   room near nurse's station   room door open   nonskid shoes/slippers when out of bed   lighting adjusted   clutter free environment maintained   assistive device/personal items within reach   activity supervised  Intervention: Prevent Skin Injury  Recent Flowsheet Documentation  Taken 7/22/2024 0802 by Orin Peres, RN  Body Position: position changed independently

## 2024-07-22 NOTE — PROGRESS NOTES
[facility]    Medicine Progress Note - Hospitalist Service    Date of Admission:  6/5/2024    Assessment & Plan   88-year-old male who was transitioned to snf care on 6/5.  He has history of dementia and family is unable to provide care for him.  During his stay we was treated for right foot cellulitis which has resolved.  Patient is MA pending, awaiting placement in long term care.  Just awaiting placement    7/22/24 No new changes, awaiting placement, he gave me the thumbs up,  This is a nonbillable social visit.    long term care admission.  Social work consulted.  Looking for LTC.  Is MA pending, SW working on. Once a bed is available patient can go anytime     Constipation  Started/continue on senna, have as needed miralax and dulcolax available    Right foot cellulitis.  Resolved with Keflex 4 times daily through 6/11.  Swelling and redness resolved.    Venous ultrasound negative for DVT.    Dementia.  Not on any medications.   As needed olanzapine ordered for agitation.    5.   Bradycardia            Patient was noted to have HR in 50s. He is not on any beta blocker or CCB. Refused EKG. asymptomatic           - Monitor clinically    6. Paper work       Previous provider filled out guardianship papers on 7/13 for the daughter.          Diet: Combination Diet Regular Diet  Room Service    DVT Prophylaxis: Pneumatic Compression Devices  Maddox Catheter: Not present  Lines: None     Cardiac Monitoring: None  Code Status: No CPR- Do NOT Intubate      Clinically Significant Risk Factors Present on Admission                    # Dementia: noted on problem list                    Medically Ready for Discharge: [facility], MA pending, awaiting LTC placement    Discussed with nursing again and social work/case management         Ata Nogueira MD  Hospitalist Service  [facility]  Securely message with [employer] (more info)  Text page via [employer] Paging/Directory    ______________________________________________________________________    Interval History   Patient known to me, no new complaints.  Pleasantly demented, gave me the thumbs up.     Physical Exam   Vital Signs: Temp: 97.8  F (36.6  C) Temp src: Axillary BP: (!) 157/58 Pulse: 51   Resp: 16 SpO2: 96 % O2 Device: None (Room air)    Weight: 0 lbs 0 oz    General Appearance: Appears comfortable, resting in bed. Pleasantly confused          Data

## 2024-07-22 NOTE — PLAN OF CARE
"Pt AOx1, oriented to self at times. A1 w walker, refuses gb. Spontaneous voids in restroom, no call light use, will stand up at will, uncooperative w fall risk education, BA on. Appetite/intake fair, prefers sweets/snacks, oral hydration promoted. Laurie held r/t BM freq. Asleep by 21:00 this shift, overall more restful and less ambulation attempts in room.     Goal Outcome Evaluation:      Plan of Care Reviewed With: patient    Overall Patient Progress: no changeOverall Patient Progress: no change    Outcome Evaluation: Pt remains residential care, less active this shift, sup spontenously to void, BA on.      Problem: Adult Inpatient Plan of Care  Goal: Plan of Care Review  Description: The Plan of Care Review/Shift note should be completed every shift.  The Outcome Evaluation is a brief statement about your assessment that the patient is improving, declining, or no change.  This information will be displayed automatically on your shift  note.  Outcome: Adequate for Care Transition  Flowsheets (Taken 7/21/2024 2147)  Outcome Evaluation: Pt remains residential care, less active this shift, sup spontenously to void, BA on.  Plan of Care Reviewed With: patient  Overall Patient Progress: no change  Goal: Patient-Specific Goal (Individualized)  Description: You can add care plan individualizations to a care plan. Examples of Individualization might be:  \"Parent requests to be called daily at 9am for status\", \"I have a hard time hearing out of my right ear\", or \"Do not touch me to wake me up as it startles  me\".  Outcome: Adequate for Care Transition     Problem: Adult Inpatient Plan of Care  Goal: Plan of Care Review  Description: The Plan of Care Review/Shift note should be completed every shift.  The Outcome Evaluation is a brief statement about your assessment that the patient is improving, declining, or no change.  This information will be displayed automatically on your shift  note.  Recent Flowsheet " Documentation  Taken 7/21/2024 2147 by Sergey Sandoval RN  Outcome Evaluation: Pt remains senior care care, less active this shift, sup spontenously to void, BA on.  Plan of Care Reviewed With: patient  Overall Patient Progress: no change  Goal: Absence of Hospital-Acquired Illness or Injury  Intervention: Identify and Manage Fall Risk  Recent Flowsheet Documentation  Taken 7/21/2024 1600 by Sergey Sandoval RN  Safety Promotion/Fall Prevention:   activity supervised   mobility aid in reach   nonskid shoes/slippers when out of bed   safety round/check completed  Intervention: Prevent Infection  Recent Flowsheet Documentation  Taken 7/21/2024 1600 by Sergey Sandoval RN  Infection Prevention:   rest/sleep promoted   environmental surveillance performed     Problem: Delirium  Goal: Improved Behavioral Control  Intervention: Minimize Safety Risk  Recent Flowsheet Documentation  Taken 7/21/2024 1600 by Sergey Sandoval RN  Enhanced Safety Measures: floor mats     Problem: Adult Inpatient Plan of Care  Goal: Plan of Care Review  Description: The Plan of Care Review/Shift note should be completed every shift.  The Outcome Evaluation is a brief statement about your assessment that the patient is improving, declining, or no change.  This information will be displayed automatically on your shift  note.  Recent Flowsheet Documentation  Taken 7/21/2024 2147 by Sergey Sandoval RN  Outcome Evaluation: Pt remains senior care care, less active this shift, sup spontenously to void, BA on.  Plan of Care Reviewed With: patient  Overall Patient Progress: no change  Goal: Absence of Hospital-Acquired Illness or Injury  Intervention: Identify and Manage Fall Risk  Recent Flowsheet Documentation  Taken 7/21/2024 1600 by Sergey Sandoval RN  Safety Promotion/Fall Prevention:   activity supervised   mobility aid in reach   nonskid shoes/slippers when out of bed   safety round/check completed  Intervention: Prevent Infection  Recent Flowsheet  Documentation  Taken 7/21/2024 1600 by Sergey Sandoval RN  Infection Prevention:   rest/sleep promoted   environmental surveillance performed     Problem: Fall Injury Risk  Goal: Absence of Fall and Fall-Related Injury  Intervention: Identify and Manage Contributors  Recent Flowsheet Documentation  Taken 7/21/2024 1600 by Sergey Sandoval RN  Medication Review/Management: medications reviewed  Intervention: Promote Injury-Free Environment  Recent Flowsheet Documentation  Taken 7/21/2024 1600 by Sergey Sandoval RN  Safety Promotion/Fall Prevention:   activity supervised   mobility aid in reach   nonskid shoes/slippers when out of bed   safety round/check completed     Problem: Adult Inpatient Plan of Care  Goal: Absence of Hospital-Acquired Illness or Injury  Intervention: Identify and Manage Fall Risk  Recent Flowsheet Documentation  Taken 7/21/2024 1600 by Sergey Sandoval RN  Safety Promotion/Fall Prevention:   activity supervised   mobility aid in reach   nonskid shoes/slippers when out of bed   safety round/check completed  Intervention: Prevent Infection  Recent Flowsheet Documentation  Taken 7/21/2024 1600 by Sergey Sandoval RN  Infection Prevention:   rest/sleep promoted   environmental surveillance performed     Problem: Fall Injury Risk  Goal: Absence of Fall and Fall-Related Injury  Intervention: Identify and Manage Contributors  Recent Flowsheet Documentation  Taken 7/21/2024 1600 by Sergey Sandoval RN  Medication Review/Management: medications reviewed  Intervention: Promote Injury-Free Environment  Recent Flowsheet Documentation  Taken 7/21/2024 1600 by Sergey Sandoval RN  Safety Promotion/Fall Prevention:   activity supervised   mobility aid in reach   nonskid shoes/slippers when out of bed   safety round/check completed     Problem: Fall Injury Risk  Goal: Absence of Fall and Fall-Related Injury  Intervention: Identify and Manage Contributors  Recent Flowsheet Documentation  Taken 7/21/2024 1600 by Jaime  BALDOMERO Rincon  Medication Review/Management: medications reviewed  Intervention: Promote Injury-Free Environment  Recent Flowsheet Documentation  Taken 7/21/2024 1600 by Sergey Sandoval RN  Safety Promotion/Fall Prevention:   activity supervised   mobility aid in reach   nonskid shoes/slippers when out of bed   safety round/check completed     Problem: Adult Inpatient Plan of Care  Goal: Plan of Care Review  Description: The Plan of Care Review/Shift note should be completed every shift.  The Outcome Evaluation is a brief statement about your assessment that the patient is improving, declining, or no change.  This information will be displayed automatically on your shift  note.  Recent Flowsheet Documentation  Taken 7/21/2024 2147 by Sergey Sandoval RN  Outcome Evaluation: Pt remains care home care, less active this shift, sup spontenously to void, BA on.  Plan of Care Reviewed With: patient  Overall Patient Progress: no change  Goal: Absence of Hospital-Acquired Illness or Injury  Intervention: Identify and Manage Fall Risk  Recent Flowsheet Documentation  Taken 7/21/2024 1600 by Sergey Sandoval RN  Safety Promotion/Fall Prevention:   activity supervised   mobility aid in reach   nonskid shoes/slippers when out of bed   safety round/check completed  Intervention: Prevent Infection  Recent Flowsheet Documentation  Taken 7/21/2024 1600 by Sergey Sandoval RN  Infection Prevention:   rest/sleep promoted   environmental surveillance performed     Problem: Adult Inpatient Plan of Care  Goal: Plan of Care Review  Description: The Plan of Care Review/Shift note should be completed every shift.  The Outcome Evaluation is a brief statement about your assessment that the patient is improving, declining, or no change.  This information will be displayed automatically on your shift  note.  Recent Flowsheet Documentation  Taken 7/21/2024 2147 by Sergey Sandoval RN  Outcome Evaluation: Pt remains care home care, less active this shift,  sup spontenously to void, BA on.  Plan of Care Reviewed With: patient  Overall Patient Progress: no change  Goal: Absence of Hospital-Acquired Illness or Injury  Intervention: Identify and Manage Fall Risk  Recent Flowsheet Documentation  Taken 7/21/2024 1600 by Sergey Sandoval RN  Safety Promotion/Fall Prevention:   activity supervised   mobility aid in reach   nonskid shoes/slippers when out of bed   safety round/check completed  Intervention: Prevent Infection  Recent Flowsheet Documentation  Taken 7/21/2024 1600 by Sergey Sandoval RN  Infection Prevention:   rest/sleep promoted   environmental surveillance performed     Problem: Fall Injury Risk  Goal: Absence of Fall and Fall-Related Injury  Intervention: Identify and Manage Contributors  Recent Flowsheet Documentation  Taken 7/21/2024 1600 by Sergey Sandoval RN  Medication Review/Management: medications reviewed  Intervention: Promote Injury-Free Environment  Recent Flowsheet Documentation  Taken 7/21/2024 1600 by Sergey Sandoval RN  Safety Promotion/Fall Prevention:   activity supervised   mobility aid in reach   nonskid shoes/slippers when out of bed   safety round/check completed     Problem: Fall Injury Risk  Goal: Absence of Fall and Fall-Related Injury  Intervention: Identify and Manage Contributors  Recent Flowsheet Documentation  Taken 7/21/2024 1600 by Sergey Sandoval RN  Medication Review/Management: medications reviewed  Intervention: Promote Injury-Free Environment  Recent Flowsheet Documentation  Taken 7/21/2024 1600 by Sergey Sandoval RN  Safety Promotion/Fall Prevention:   activity supervised   mobility aid in reach   nonskid shoes/slippers when out of bed   safety round/check completed     Problem: Adult Inpatient Plan of Care  Goal: Absence of Hospital-Acquired Illness or Injury  Intervention: Identify and Manage Fall Risk  Recent Flowsheet Documentation  Taken 7/21/2024 1600 by Sergey Sandoval RN  Safety Promotion/Fall Prevention:   activity  supervised   mobility aid in reach   nonskid shoes/slippers when out of bed   safety round/check completed  Intervention: Prevent Infection  Recent Flowsheet Documentation  Taken 7/21/2024 1600 by Sergey Sandoval, RN  Infection Prevention:   rest/sleep promoted   environmental surveillance performed

## 2024-07-23 PROCEDURE — 101N000002 HC CUSTODIAL CARE DAILY

## 2024-07-23 PROCEDURE — 99207 PR NO BILLABLE SERVICE THIS VISIT: CPT | Performed by: INTERNAL MEDICINE

## 2024-07-23 ASSESSMENT — ACTIVITIES OF DAILY LIVING (ADL)
ADLS_ACUITY_SCORE: 50

## 2024-07-23 NOTE — PLAN OF CARE
Pt is alert to self with confusion. Pt denies pain or discomfort. Pt is hard of hearing. Pt has no IV access. Pt is assist of one in transfer and care. Bed alarm in place and call light within reach. Plan is LTC placement. SW following. Will continue to monitor and assess pt.

## 2024-07-23 NOTE — PLAN OF CARE
Goal Outcome Evaluation:    No change. Denies pain. A1 w/ walker and gb. Safety maintained throughout shift. Waiting for insurance/placement, SW following.             Problem: Comorbidity Management  Goal: Maintenance of Asthma Control  7/23/2024 1108 by Nela Garcia RN  Outcome: Progressing  7/23/2024 1108 by Nela Garcia RN  Outcome: Progressing  Intervention: Maintain Asthma Symptom Control  Recent Flowsheet Documentation  Taken 7/23/2024 0903 by Nela Garcia RN  Medication Review/Management: medications reviewed  Goal: Maintenance of Behavioral Health Symptom Control  7/23/2024 1108 by Nela Garcia RN  Outcome: Progressing  7/23/2024 1108 by Nela Garcia RN  Outcome: Progressing  Intervention: Maintain Behavioral Health Symptom Control  Recent Flowsheet Documentation  Taken 7/23/2024 0903 by Nela Garcia RN  Medication Review/Management: medications reviewed  Goal: Maintenance of COPD Symptom Control  7/23/2024 1108 by Nela Garcia RN  Outcome: Progressing  7/23/2024 1108 by Nela Garcia RN  Outcome: Progressing  Intervention: Maintain COPD Symptom Control  Recent Flowsheet Documentation  Taken 7/23/2024 0903 by Nela Garcia RN  Medication Review/Management: medications reviewed  Goal: Blood Glucose Levels Within Targeted Range  7/23/2024 1108 by Nela Garcia RN  Outcome: Progressing  7/23/2024 1108 by Nela Garcia RN  Outcome: Progressing  Intervention: Monitor and Manage Glycemia  Recent Flowsheet Documentation  Taken 7/23/2024 0903 by Nela Garcia RN  Medication Review/Management: medications reviewed  Goal: Maintenance of Heart Failure Symptom Control  7/23/2024 1108 by Nela Garcia RN  Outcome: Progressing  7/23/2024 1108 by Nela Garcia RN  Outcome: Progressing  Intervention: Maintain Heart Failure Management  Recent Flowsheet Documentation  Taken 7/23/2024 0903 by Nela Garcia RN  Medication Review/Management: medications  reviewed  Goal: Blood Pressure in Desired Range  7/23/2024 1108 by Nela Garcia RN  Outcome: Progressing  7/23/2024 1108 by Nela Garcia RN  Outcome: Progressing  Intervention: Maintain Blood Pressure Management  Recent Flowsheet Documentation  Taken 7/23/2024 0903 by Nela Garcia RN  Medication Review/Management: medications reviewed  Goal: Maintenance of Osteoarthritis Symptom Control  7/23/2024 1108 by Nela Garcia RN  Outcome: Progressing  7/23/2024 1108 by Nela Garcia RN  Outcome: Progressing  Intervention: Maintain Osteoarthritis Symptom Control  Recent Flowsheet Documentation  Taken 7/23/2024 0903 by Nela Garcia RN  Activity Management:   ambulated to bathroom   back to bed  Medication Review/Management: medications reviewed  Goal: Bariatric Home Regimen Maintained  7/23/2024 1108 by Nela Garcia RN  Outcome: Progressing  7/23/2024 1108 by Nela Garcia RN  Outcome: Progressing  Intervention: Maintain and Manage Postbariatric Surgery Care  Recent Flowsheet Documentation  Taken 7/23/2024 0903 by Nela Garcia RN  Medication Review/Management: medications reviewed  Goal: Maintenance of Seizure Control  7/23/2024 1108 by Nela Garcia RN  Outcome: Progressing  7/23/2024 1108 by Nela Garcia RN  Outcome: Progressing  Intervention: Maintain Seizure Symptom Control  Recent Flowsheet Documentation  Taken 7/23/2024 0903 by Nela Garcia RN  Medication Review/Management: medications reviewed     Problem: Pain Acute  Goal: Optimal Pain Control and Function  7/23/2024 1108 by Nela Garcia RN  Outcome: Progressing  7/23/2024 1108 by Nela Garcia RN  Outcome: Progressing  Intervention: Prevent or Manage Pain  Recent Flowsheet Documentation  Taken 7/23/2024 0903 by Nela Garcia RN  Medication Review/Management: medications reviewed     Problem: Infection  Goal: Absence of Infection Signs and Symptoms  7/23/2024 1108 by Nela Garcia RN  Outcome:  Progressing  7/23/2024 1108 by Nela Garcia, RN  Outcome: Progressing

## 2024-07-23 NOTE — PLAN OF CARE
"  Problem: Adult Inpatient Plan of Care  Goal: Plan of Care Review  Description: The Plan of Care Review/Shift note should be completed every shift.  The Outcome Evaluation is a brief statement about your assessment that the patient is improving, declining, or no change.  This information will be displayed automatically on your shift  note.  Outcome: Adequate for Care Transition  Flowsheets (Taken 7/22/2024 2220)  Outcome Evaluation: Pt residential care. Up to BR frequently with no output at times. Bladder scanned for >75. Appetite is good.  Goal: Patient-Specific Goal (Individualized)  Description: You can add care plan individualizations to a care plan. Examples of Individualization might be:  \"Parent requests to be called daily at 9am for status\", \"I have a hard time hearing out of my right ear\", or \"Do not touch me to wake me up as it startles  me\".  Outcome: Adequate for Care Transition  Goal: Absence of Hospital-Acquired Illness or Injury  Outcome: Adequate for Care Transition  Goal: Optimal Comfort and Wellbeing  Outcome: Adequate for Care Transition  Goal: Readiness for Transition of Care  Outcome: Adequate for Care Transition   Goal Outcome Evaluation:                 Outcome Evaluation: Pt residential care. Up to BR frequently with no output at times. Bladder scanned for >75. Appetite is good.      "

## 2024-07-23 NOTE — PROGRESS NOTES
[facility]    Medicine Progress Note - Hospitalist Service    Date of Admission:  6/5/2024    Assessment & Plan   88-year-old male who was transitioned to care home care on 6/5.  He has history of dementia and family is unable to provide care for him.  During his stay we was treated for right foot cellulitis which has resolved.  Patient is MA pending, awaiting placement in long term care.  Just awaiting placement    7/23/24: no new changes overnight.    This is a nonbillable social visit.    senior living care admission.  Social work consulted.  Looking for LTC.  Is MA pending, SW working on. Once a bed is available patient can go anytime     Constipation  Started/continue on senna, have as needed miralax and dulcolax available    Right foot cellulitis.  Resolved with Keflex 4 times daily through 6/11.  Swelling and redness resolved.    Venous ultrasound negative for DVT.    Dementia.  Not on any medications.   As needed olanzapine ordered for agitation.    5.   Bradycardia            Patient was noted to have HR in 50s. He is not on any beta blocker or CCB. Refused EKG. asymptomatic           - Monitor clinically    6. Paper work       Previous provider filled out guardianship papers on 7/13 for the daughter.          Diet: Combination Diet Regular Diet  Room Service    DVT Prophylaxis: Pneumatic Compression Devices  Maddox Catheter: Not present  Lines: None     Cardiac Monitoring: None  Code Status: No CPR- Do NOT Intubate      Clinically Significant Risk Factors Present on Admission                    # Dementia: noted on problem list                    Medically Ready for Discharge: [facility], MA pending, awaiting LTC placement    Discussed with nursing again and social work/case management         Ata Nogueira MD  Hospitalist Service  [facility]  Securely message with [employer] (more info)  Text page via [facility] Paging/Directory    ______________________________________________________________________    Interval History   No new complaints.   .     Physical Exam   Vital Signs: Temp: 97.8  F (36.6  C) Temp src: Oral BP: (!) 145/77 Pulse: 52   Resp: 18 SpO2: 97 % O2 Device: None (Room air)    Weight: 0 lbs 0 oz    General Appearance: Appears comfortable, resting in bed. Pleasantly confused, sitting up in bed          Data

## 2024-07-24 PROCEDURE — 99207 PR NO BILLABLE SERVICE THIS VISIT: CPT | Performed by: INTERNAL MEDICINE

## 2024-07-24 PROCEDURE — 101N000002 HC CUSTODIAL CARE DAILY

## 2024-07-24 ASSESSMENT — ACTIVITIES OF DAILY LIVING (ADL)
ADLS_ACUITY_SCORE: 52
ADLS_ACUITY_SCORE: 52
ADLS_ACUITY_SCORE: 50
ADLS_ACUITY_SCORE: 52
ADLS_ACUITY_SCORE: 52
ADLS_ACUITY_SCORE: 50
ADLS_ACUITY_SCORE: 52
ADLS_ACUITY_SCORE: 52
ADLS_ACUITY_SCORE: 50
ADLS_ACUITY_SCORE: 52
ADLS_ACUITY_SCORE: 50
ADLS_ACUITY_SCORE: 52

## 2024-07-24 NOTE — PROGRESS NOTES
No change. Comfortable. Voiding w/o difficulty.No BM this shift + flatus. Alarms on at all times. Waiting for insurance/placement - SW following.

## 2024-07-24 NOTE — PROGRESS NOTES
[facility]    Medicine Progress Note - Hospitalist Service    Date of Admission:  6/5/2024    Assessment & Plan   88-year-old male who was transitioned to half-way care on 6/5.  He has history of dementia and family is unable to provide care for him.  During his stay we was treated for right foot cellulitis which has resolved.  Patient is MA pending, awaiting placement in long term care.  Just awaiting placement    7/24/24: no new changes overnight.    This is a nonbillable social visit.    prison care admission.  Social work consulted.  Looking for LTC.  Is MA pending, SW working on. Once a bed is available patient can go anytime     Constipation  Started/continue on senna, have as needed miralax and dulcolax available    Right foot cellulitis.  Resolved with Keflex 4 times daily through 6/11.  Swelling and redness resolved.    Venous ultrasound negative for DVT.    Dementia.  Not on any medications.   As needed olanzapine ordered for agitation.    5.   Bradycardia            Patient was noted to have HR in 50s. He is not on any beta blocker or CCB. Refused EKG. asymptomatic           - Monitor clinically    6. Paper work       Previous provider filled out guardianship papers on 7/13 for the daughter.          Diet: Combination Diet Regular Diet  Room Service    DVT Prophylaxis: Pneumatic Compression Devices  Maddox Catheter: Not present  Lines: None     Cardiac Monitoring: None  Code Status: No CPR- Do NOT Intubate      Clinically Significant Risk Factors Present on Admission                    # Dementia: noted on problem list                    Medically Ready for Discharge: [facility], MA pending, awaiting LTC placement    Discussed with nursing again and social work/case management         Ata Nogueira MD  Hospitalist Service  [facility]  Securely message with [employer] (more info)  Text page via [facility] Paging/Directory    ______________________________________________________________________    Interval History   No new complaints.   .     Physical Exam   Vital Signs: Temp: 97.8  F (36.6  C) Temp src: Oral BP: (!) 145/77 Pulse: 52   Resp: 18 SpO2: 97 % O2 Device: None (Room air)    Weight: 0 lbs 0 oz    General Appearance: Appears comfortable, resting in bed. Pleasantly confused, sitting up in bed          Data

## 2024-07-24 NOTE — PLAN OF CARE
"Pt is on [facility] cares. Alert to self only. No signs of pain noted. Ax1 GB walker. No changes overnight. Bed alarm on for safety. Waiting for placement. SW following.    Goal Outcome Evaluation:    Problem: Comorbidity Management  Goal: Maintenance of Behavioral Health Symptom Control  7/24/2024 0519 by Donna Shine RN  Outcome: Progressing  7/24/2024 0519 by Donna Shine RN  Outcome: Progressing     Problem: Pain Acute  Goal: Optimal Pain Control and Function  7/24/2024 0519 by Donna Shine RN  Outcome: Progressing  7/24/2024 0519 by Donna Shine RN  Outcome: Progressing     Problem: Adult Inpatient Plan of Care  Goal: Plan of Care Review  Description: The Plan of Care Review/Shift note should be completed every shift.  The Outcome Evaluation is a brief statement about your assessment that the patient is improving, declining, or no change.  This information will be displayed automatically on your shift  note.  Outcome: Progressing  Flowsheets (Taken 7/24/2024 0519)  Outcome Evaluation: group home care. No pain noted per PAINAD scale. No changes overnight.  Plan of Care Reviewed With: patient  Overall Patient Progress: no change  Goal: Patient-Specific Goal (Individualized)  Description: You can add care plan individualizations to a care plan. Examples of Individualization might be:  \"Parent requests to be called daily at 9am for status\", \"I have a hard time hearing out of my right ear\", or \"Do not touch me to wake me up as it startles  me\".  Outcome: Progressing  Goal: Absence of Hospital-Acquired Illness or Injury  Outcome: Progressing  Intervention: Identify and Manage Fall Risk  Recent Flowsheet Documentation  Taken 7/23/2024 2310 by Donna Shine RN  Safety Promotion/Fall Prevention: safety round/check completed  Intervention: Prevent Skin Injury  Recent Flowsheet Documentation  Taken 7/23/2024 2310 by Donna Shine RN  Body Position: position changed " -- DO NOT REPLY / DO NOT REPLY ALL --  -- Message is from [facility] ([facility]) --    General Patient Message: Patient inquiring on status of refill. Patient has been out of medication for 1 week.      Caller Information       Type Contact Phone/Fax    05/17/2023 03:49 AM CDT Interface (Incoming) [facility] [employer] [id number] - [address] AT [facility] [address] [phone]    05/19/2023 11:07 AM CDT Phone (Incoming) Renata Carvajal (Self) [phone] (M)    05/22/2023 11:54 AM CDT Phone (Incoming) Renata Carvajal (Self) [phone] (M)        Alternative phone number: na    Can a detailed message be left? Yes    Message Turnaround: [place]:    Refer to site's KB page for routing instructions    Please give this turnaround time to the caller:   \"You can expect to receive a response 1-3 business days after your provider's clinical team reviews the message\"               independently  Intervention: Prevent Infection  Recent Flowsheet Documentation  Taken 7/23/2024 2310 by Donna Shine, RN  Infection Prevention:   hand hygiene promoted   rest/sleep promoted   single patient room provided  Goal: Optimal Comfort and Wellbeing  Outcome: Progressing  Goal: Readiness for Transition of Care  Outcome: Progressing

## 2024-07-24 NOTE — PROGRESS NOTES
Care Management Follow Up    Length of Stay (days): 0    Expected Discharge Date: 07/31/2024     Concerns to be Addressed: basic needs, care coordination/care conferences, decision making, discharge planning, financial/insurance         Additional Information:     SW continues to follow. FC is working on getting LTC-MA. Once patient has MA, SW will assist in looking for placement.     KARLA Coronado, [facility]  [facility]   Please contact the SW on the floor in which the patient is staying for any questions or concerns

## 2024-07-25 PROCEDURE — 101N000002 HC CUSTODIAL CARE DAILY

## 2024-07-25 PROCEDURE — 99232 SBSQ HOSP IP/OBS MODERATE 35: CPT | Performed by: INTERNAL MEDICINE

## 2024-07-25 ASSESSMENT — ACTIVITIES OF DAILY LIVING (ADL)
ADLS_ACUITY_SCORE: 52
ADLS_ACUITY_SCORE: 51
ADLS_ACUITY_SCORE: 52
ADLS_ACUITY_SCORE: 51
ADLS_ACUITY_SCORE: 52
ADLS_ACUITY_SCORE: 51
ADLS_ACUITY_SCORE: 52
ADLS_ACUITY_SCORE: 51
ADLS_ACUITY_SCORE: 52
ADLS_ACUITY_SCORE: 51
ADLS_ACUITY_SCORE: 52
ADLS_ACUITY_SCORE: 51

## 2024-07-25 NOTE — PROGRESS NOTES
Care Management Follow Up    Length of Stay (days): 0    Expected Discharge Date: 07/31/2024     Concerns to be Addressed: basic needs, care coordination/care conferences, decision making, discharge planning, financial/insurance     Patient plan of care discussed at interdisciplinary rounds: Yes    Anticipated Discharge Disposition:  LTC       Ccurrent facility and service quality ratings:    Education Provided on the Discharge Plan:    Patient/Family in Agreement with the Plan:  yes    Referrals Placed by CM/SW: LTC    Private pay costs discussed: Not applicable    Additional Information:  SW sent LTC referrals with MA pending.     KARLA Coronado, [facility]   Please contact the SW on the floor in which the patient is staying for any questions or concerns

## 2024-07-25 NOTE — PLAN OF CARE
"Cared for [id number]    Pt Alert to self only, VSS; Pt denies pain, headache, dizziness, N/V & SOB. Pt up Asst: 1 w/gait belt & walker. Lung sounds clear, RA. Blanchable redness on sacrum. Social Work following. Plan to discharge LTC, pending placement. Will continue with plan of care.    +++++++++++++++++++++++++++++++++++    Goal Outcome Evaluation:      Plan of Care Reviewed With: patient    Overall Patient Progress: no changeOverall Patient Progress: no change    Outcome Evaluation: Pending LTC placement    Problem: Comorbidity Management  Goal: Maintenance of Behavioral Health Symptom Control  Outcome: Not Progressing  Intervention: Maintain Behavioral Health Symptom Control  Recent Flowsheet Documentation  Taken 7/25/2024 0900 by Olga Anderson RN  Medication Review/Management: medications reviewed     Problem: Pain Acute  Goal: Optimal Pain Control and Function  Outcome: Not Progressing  Intervention: Prevent or Manage Pain  Recent Flowsheet Documentation  Taken 7/25/2024 0900 by Olga Anderson RN  Sleep/Rest Enhancement: awakenings minimized  Medication Review/Management: medications reviewed     Problem: Adult Inpatient Plan of Care  Goal: Plan of Care Review  Description: The Plan of Care Review/Shift note should be completed every shift.  The Outcome Evaluation is a brief statement about your assessment that the patient is improving, declining, or no change.  This information will be displayed automatically on your shift  note.  Outcome: Not Progressing  Flowsheets (Taken 7/25/2024 1522)  Outcome Evaluation: Pending LTC placement  Plan of Care Reviewed With: patient  Overall Patient Progress: no change  Goal: Patient-Specific Goal (Individualized)  Description: You can add care plan individualizations to a care plan. Examples of Individualization might be:  \"Parent requests to be called daily at 9am for status\", \"I have a hard time hearing out of my right ear\", or \"Do not touch me to wake me up " "as it startles  me\".  Outcome: Not Progressing  Goal: Absence of Hospital-Acquired Illness or Injury  Outcome: Not Progressing  Intervention: Identify and Manage Fall Risk  Recent Flowsheet Documentation  Taken 7/25/2024 0900 by Olga Anderson RN  Safety Promotion/Fall Prevention:   activity supervised   clutter free environment maintained   increased rounding and observation   increase visualization of patient   safety round/check completed   supervised activity  Intervention: Prevent Skin Injury  Recent Flowsheet Documentation  Taken 7/25/2024 0900 by Olga Anderson RN  Body Position: position changed independently  Skin Protection: adhesive use limited  Device Skin Pressure Protection:   absorbent pad utilized/changed   adhesive use limited  Intervention: Prevent Infection  Recent Flowsheet Documentation  Taken 7/25/2024 0900 by Olga Anderson RN  Infection Prevention: single patient room provided  Goal: Optimal Comfort and Wellbeing  Outcome: Not Progressing  Goal: Readiness for Transition of Care  Outcome: Not Progressing       "

## 2024-07-25 NOTE — PLAN OF CARE
Pt calm and cooperative w cares. Disoriented x4. Freq ambulation to restroom, voiding well, BMs x 2 minimal appetite/intake.     Assumed care of Pt mid shift. Report from mandated RN included: lawyer Karina, called regarding gaurdianship process. Pt status was not discussed. Release of medical information required to inform  of pt location/status. Based on verbal discussion w pt's daughter and Cheri TSE , [facility] Hel[facility] is allowed information pertaining to Pt. It was unclear to staff the specific form required for this transition/information request. SW input requested.       Goal Outcome Evaluation:      Plan of Care Reviewed With: patient    Overall Patient Progress: no changeOverall Patient Progress: no change    Outcome Evaluation: Pt remains on assisted care. Up freq throughout shift to use restroom. BA on.      Problem: Comorbidity Management  Goal: Maintenance of Behavioral Health Symptom Control  Outcome: Progressing  Intervention: Maintain Behavioral Health Symptom Control  Recent Flowsheet Documentation  Taken 7/24/2024 1650 by Sergey Sandoval, RN  Medication Review/Management: medications reviewed     Problem: Pain Acute  Goal: Optimal Pain Control and Function  Outcome: Progressing  Intervention: Prevent or Manage Pain  Recent Flowsheet Documentation  Taken 7/24/2024 1650 by Sergey Sandoval, RN  Sensory Stimulation Regulation:   care clustered   television on  Medication Review/Management: medications reviewed     Problem: Adult Inpatient Plan of Care  Goal: Plan of Care Review  Description: The Plan of Care Review/Shift note should be completed every shift.  The Outcome Evaluation is a brief statement about your assessment that the patient is improving, declining, or no change.  This information will be displayed automatically on your shift  note.  Outcome: Progressing  Flowsheets (Taken 7/24/2024 2344)  Outcome Evaluation: Pt remains on assisted care. Up freq throughout shift to use restroom. BA  "on.  Plan of Care Reviewed With: patient  Overall Patient Progress: no change  Goal: Patient-Specific Goal (Individualized)  Description: You can add care plan individualizations to a care plan. Examples of Individualization might be:  \"Parent requests to be called daily at 9am for status\", \"I have a hard time hearing out of my right ear\", or \"Do not touch me to wake me up as it startles  me\".  Outcome: Progressing  Goal: Absence of Hospital-Acquired Illness or Injury  Outcome: Progressing  Intervention: Identify and Manage Fall Risk  Recent Flowsheet Documentation  Taken 7/24/2024 1650 by Sergey Sandoval RN  Safety Promotion/Fall Prevention:   activity supervised   room organization consistent   safety round/check completed   room door open   nonskid shoes/slippers when out of bed   mobility aid in reach   increase visualization of patient  Intervention: Prevent Skin Injury  Recent Flowsheet Documentation  Taken 7/24/2024 1650 by Sergey Sandoval RN  Device Skin Pressure Protection: absorbent pad utilized/changed  Goal: Optimal Comfort and Wellbeing  Outcome: Progressing  Goal: Readiness for Transition of Care  Outcome: Progressing     Problem: Adult Inpatient Plan of Care  Goal: Plan of Care Review  Description: The Plan of Care Review/Shift note should be completed every shift.  The Outcome Evaluation is a brief statement about your assessment that the patient is improving, declining, or no change.  This information will be displayed automatically on your shift  note.  Recent Flowsheet Documentation  Taken 7/24/2024 2344 by Sergey Sandoval RN  Outcome Evaluation: Pt remains on shelter care. Up freq throughout shift to use restroom. BA on.  Plan of Care Reviewed With: patient  Overall Patient Progress: no change  Goal: Absence of Hospital-Acquired Illness or Injury  Intervention: Identify and Manage Fall Risk  Recent Flowsheet Documentation  Taken 7/24/2024 1650 by Sergey Sandoval, RN  Safety Promotion/Fall " Prevention:   activity supervised   room organization consistent   safety round/check completed   room door open   nonskid shoes/slippers when out of bed   mobility aid in reach   increase visualization of patient  Intervention: Prevent Skin Injury  Recent Flowsheet Documentation  Taken 7/24/2024 1650 by Sergey Sandoval RN  Device Skin Pressure Protection: absorbent pad utilized/changed     Problem: Delirium  Goal: Improved Behavioral Control  Intervention: Minimize Safety Risk  Recent Flowsheet Documentation  Taken 7/24/2024 1650 by Sergey Sandoval RN  Enhanced Safety Measures: room near unit station  Goal: Improved Attention and Thought Clarity  Intervention: Maximize Cognitive Function  Recent Flowsheet Documentation  Taken 7/24/2024 1650 by Sergey Sandoval RN  Sensory Stimulation Regulation:   care clustered   television on     Problem: Adult Inpatient Plan of Care  Goal: Plan of Care Review  Description: The Plan of Care Review/Shift note should be completed every shift.  The Outcome Evaluation is a brief statement about your assessment that the patient is improving, declining, or no change.  This information will be displayed automatically on your shift  note.  Recent Flowsheet Documentation  Taken 7/24/2024 2344 by Sergey Sandoval RN  Outcome Evaluation: Pt remains on detention care. Up freq throughout shift to use restroom. BA on.  Plan of Care Reviewed With: patient  Overall Patient Progress: no change  Goal: Absence of Hospital-Acquired Illness or Injury  Intervention: Identify and Manage Fall Risk  Recent Flowsheet Documentation  Taken 7/24/2024 1650 by Sergey Sandoval RN  Safety Promotion/Fall Prevention:   activity supervised   room organization consistent   safety round/check completed   room door open   nonskid shoes/slippers when out of bed   mobility aid in reach   increase visualization of patient  Intervention: Prevent Skin Injury  Recent Flowsheet Documentation  Taken 7/24/2024 1650 by Sergey Sandoval  RN  Device Skin Pressure Protection: absorbent pad utilized/changed     Problem: Fall Injury Risk  Goal: Absence of Fall and Fall-Related Injury  Intervention: Identify and Manage Contributors  Recent Flowsheet Documentation  Taken 7/24/2024 1650 by Sergey Sandoval RN  Medication Review/Management: medications reviewed  Intervention: Promote Injury-Free Environment  Recent Flowsheet Documentation  Taken 7/24/2024 1650 by Sergey Sandoval RN  Safety Promotion/Fall Prevention:   activity supervised   room organization consistent   safety round/check completed   room door open   nonskid shoes/slippers when out of bed   mobility aid in reach   increase visualization of patient     Problem: Adult Inpatient Plan of Care  Goal: Absence of Hospital-Acquired Illness or Injury  Intervention: Identify and Manage Fall Risk  Recent Flowsheet Documentation  Taken 7/24/2024 1650 by Sergey Sandoval RN  Safety Promotion/Fall Prevention:   activity supervised   room organization consistent   safety round/check completed   room door open   nonskid shoes/slippers when out of bed   mobility aid in reach   increase visualization of patient  Intervention: Prevent Skin Injury  Recent Flowsheet Documentation  Taken 7/24/2024 1650 by Sergey Sandoval RN  Device Skin Pressure Protection: absorbent pad utilized/changed     Problem: Fall Injury Risk  Goal: Absence of Fall and Fall-Related Injury  Intervention: Identify and Manage Contributors  Recent Flowsheet Documentation  Taken 7/24/2024 1650 by Sergey Sandoval RN  Medication Review/Management: medications reviewed  Intervention: Promote Injury-Free Environment  Recent Flowsheet Documentation  Taken 7/24/2024 1650 by Sergey Sandoval RN  Safety Promotion/Fall Prevention:   activity supervised   room organization consistent   safety round/check completed   room door open   nonskid shoes/slippers when out of bed   mobility aid in reach   increase visualization of patient     Problem: Fall Injury  Risk  Goal: Absence of Fall and Fall-Related Injury  Intervention: Identify and Manage Contributors  Recent Flowsheet Documentation  Taken 7/24/2024 1650 by Sergey Sandoval RN  Medication Review/Management: medications reviewed  Intervention: Promote Injury-Free Environment  Recent Flowsheet Documentation  Taken 7/24/2024 1650 by Sergey Sandoval RN  Safety Promotion/Fall Prevention:   activity supervised   room organization consistent   safety round/check completed   room door open   nonskid shoes/slippers when out of bed   mobility aid in reach   increase visualization of patient     Problem: Adult Inpatient Plan of Care  Goal: Plan of Care Review  Description: The Plan of Care Review/Shift note should be completed every shift.  The Outcome Evaluation is a brief statement about your assessment that the patient is improving, declining, or no change.  This information will be displayed automatically on your shift  note.  Recent Flowsheet Documentation  Taken 7/24/2024 2344 by Sergey Sandoval RN  Outcome Evaluation: Pt remains on intermediate care. Up freq throughout shift to use restroom. BA on.  Plan of Care Reviewed With: patient  Overall Patient Progress: no change  Goal: Absence of Hospital-Acquired Illness or Injury  Intervention: Identify and Manage Fall Risk  Recent Flowsheet Documentation  Taken 7/24/2024 1650 by Sergey Sandoval RN  Safety Promotion/Fall Prevention:   activity supervised   room organization consistent   safety round/check completed   room door open   nonskid shoes/slippers when out of bed   mobility aid in reach   increase visualization of patient  Intervention: Prevent Skin Injury  Recent Flowsheet Documentation  Taken 7/24/2024 1650 by Sergey Sandoval RN  Device Skin Pressure Protection: absorbent pad utilized/changed     Problem: Adult Inpatient Plan of Care  Goal: Plan of Care Review  Description: The Plan of Care Review/Shift note should be completed every shift.  The Outcome Evaluation is a  brief statement about your assessment that the patient is improving, declining, or no change.  This information will be displayed automatically on your shift  note.  Recent Flowsheet Documentation  Taken 7/24/2024 2344 by Sergey Sandoval RN  Outcome Evaluation: Pt remains on skilled nursing care. Up freq throughout shift to use restroom. BA on.  Plan of Care Reviewed With: patient  Overall Patient Progress: no change  Goal: Absence of Hospital-Acquired Illness or Injury  Intervention: Identify and Manage Fall Risk  Recent Flowsheet Documentation  Taken 7/24/2024 1650 by Sergey Sandoval RN  Safety Promotion/Fall Prevention:   activity supervised   room organization consistent   safety round/check completed   room door open   nonskid shoes/slippers when out of bed   mobility aid in reach   increase visualization of patient  Intervention: Prevent Skin Injury  Recent Flowsheet Documentation  Taken 7/24/2024 1650 by Sergey Sandoval RN  Device Skin Pressure Protection: absorbent pad utilized/changed     Problem: Fall Injury Risk  Goal: Absence of Fall and Fall-Related Injury  Intervention: Identify and Manage Contributors  Recent Flowsheet Documentation  Taken 7/24/2024 1650 by Sergey Sandoval RN  Medication Review/Management: medications reviewed  Intervention: Promote Injury-Free Environment  Recent Flowsheet Documentation  Taken 7/24/2024 1650 by Sergey Sandoval RN  Safety Promotion/Fall Prevention:   activity supervised   room organization consistent   safety round/check completed   room door open   nonskid shoes/slippers when out of bed   mobility aid in reach   increase visualization of patient     Problem: Fall Injury Risk  Goal: Absence of Fall and Fall-Related Injury  Intervention: Identify and Manage Contributors  Recent Flowsheet Documentation  Taken 7/24/2024 1650 by Sergey Sandoval RN  Medication Review/Management: medications reviewed  Intervention: Promote Injury-Free Environment  Recent Flowsheet Documentation  Taken  7/24/2024 1650 by Sergey Sandoval RN  Safety Promotion/Fall Prevention:   activity supervised   room organization consistent   safety round/check completed   room door open   nonskid shoes/slippers when out of bed   mobility aid in reach   increase visualization of patient     Problem: Adult Inpatient Plan of Care  Goal: Plan of Care Review  Description: The Plan of Care Review/Shift note should be completed every shift.  The Outcome Evaluation is a brief statement about your assessment that the patient is improving, declining, or no change.  This information will be displayed automatically on your shift  note.  Recent Flowsheet Documentation  Taken 7/24/2024 2344 by Sergey Sandoval RN  Outcome Evaluation: Pt remains on intermediate care. Up freq throughout shift to use restroom. BA on.  Plan of Care Reviewed With: patient  Overall Patient Progress: no change  Goal: Absence of Hospital-Acquired Illness or Injury  Intervention: Identify and Manage Fall Risk  Recent Flowsheet Documentation  Taken 7/24/2024 1650 by Sergey Sandoval RN  Safety Promotion/Fall Prevention:   activity supervised   room organization consistent   safety round/check completed   room door open   nonskid shoes/slippers when out of bed   mobility aid in reach   increase visualization of patient  Intervention: Prevent Skin Injury  Recent Flowsheet Documentation  Taken 7/24/2024 1650 by Sergey Sandoval RN  Device Skin Pressure Protection: absorbent pad utilized/changed     Problem: Adult Inpatient Plan of Care  Goal: Plan of Care Review  Description: The Plan of Care Review/Shift note should be completed every shift.  The Outcome Evaluation is a brief statement about your assessment that the patient is improving, declining, or no change.  This information will be displayed automatically on your shift  note.  Recent Flowsheet Documentation  Taken 7/24/2024 2344 by Sergey Sandoval RN  Outcome Evaluation: Pt remains on intermediate care. Up freq throughout shift  to use restroom. BA on.  Plan of Care Reviewed With: patient  Overall Patient Progress: no change  Goal: Absence of Hospital-Acquired Illness or Injury  Intervention: Identify and Manage Fall Risk  Recent Flowsheet Documentation  Taken 7/24/2024 1650 by Sergey Sandoval RN  Safety Promotion/Fall Prevention:   activity supervised   room organization consistent   safety round/check completed   room door open   nonskid shoes/slippers when out of bed   mobility aid in reach   increase visualization of patient  Intervention: Prevent Skin Injury  Recent Flowsheet Documentation  Taken 7/24/2024 1650 by Sergey Sandoval RN  Device Skin Pressure Protection: absorbent pad utilized/changed     Problem: Comorbidity Management  Goal: Maintenance of Asthma Control  Intervention: Maintain Asthma Symptom Control  Recent Flowsheet Documentation  Taken 7/24/2024 1650 by Sergey Sandoval RN  Medication Review/Management: medications reviewed  Goal: Maintenance of COPD Symptom Control  Intervention: Maintain COPD Symptom Control  Recent Flowsheet Documentation  Taken 7/24/2024 1650 by Sergey Sandoval RN  Medication Review/Management: medications reviewed  Goal: Blood Glucose Levels Within Targeted Range  Intervention: Monitor and Manage Glycemia  Recent Flowsheet Documentation  Taken 7/24/2024 1650 by Sergey Sandoval RN  Medication Review/Management: medications reviewed  Goal: Maintenance of Heart Failure Symptom Control  Intervention: Maintain Heart Failure Management  Recent Flowsheet Documentation  Taken 7/24/2024 1650 by Sergey Sandoval RN  Medication Review/Management: medications reviewed  Goal: Blood Pressure in Desired Range  Intervention: Maintain Blood Pressure Management  Recent Flowsheet Documentation  Taken 7/24/2024 1650 by Sergey Sandoval RN  Medication Review/Management: medications reviewed  Goal: Maintenance of Osteoarthritis Symptom Control  Intervention: Maintain Osteoarthritis Symptom Control  Recent Flowsheet  Documentation  Taken 7/24/2024 1650 by Sergey Sandoval RN  Medication Review/Management: medications reviewed  Goal: Bariatric Home Regimen Maintained  Intervention: Maintain and Manage Postbariatric Surgery Care  Recent Flowsheet Documentation  Taken 7/24/2024 1650 by Sergey Sandoval RN  Medication Review/Management: medications reviewed  Goal: Maintenance of Seizure Control  Intervention: Maintain Seizure Symptom Control  Recent Flowsheet Documentation  Taken 7/24/2024 1650 by Sergey Sandoval RN  Sensory Stimulation Regulation:   care clustered   television on  Medication Review/Management: medications reviewed

## 2024-07-25 NOTE — PROGRESS NOTES
[facility]  Hospitalist Progress Note  Med Elliott MD 07/25/2024    Reason for Stay (Diagnosis): placement         Assessment and Plan:      Summary of Stay: Jemal Gray is a 88-year-old male who was transitioned to alf care on 6/5.  He has history of dementia and family is unable to provide care for him.  During his stay we was treated for right foot cellulitis which has resolved.  Patient is MA pending, awaiting placement in long term care.      Medically stable for discharge when disposition plan in place     assisted care admission.  Social work consulted.  Looking for LTC.  Is MA pending, SW working on. Once a bed is available patient can go anytime      Constipation  Started/continue on senna, have as needed miralax and dulcolax available     Right foot cellulitis.  Resolved with Keflex 4 times daily through 6/11.  Swelling and redness resolved.    Venous ultrasound negative for DVT.     Dementia.  Not on any medications.   As needed olanzapine ordered for agitation.     5.   Bradycardia            Patient was noted to have HR in 50s.  Asymptomatic     6. Paper work       Previous provider filled out guardianship papers on 7/13 for the daughter.           Diet: Combination Diet Regular Diet  Room Service    DVT Prophylaxis: Pneumatic Compression Devices  Maddox Catheter: Not present  Lines: None     Cardiac Monitoring: None  Code Status: No CPR- Do NOT Intubate  Dispo: Dissipate discharge when safe disposition plan in place; LTC facility being sought        Interval History (Subjective):      Poor historian due to dementia.  Denies any significant issues today                  Physical Exam:      Last Vital Signs:  /64 (BP Location: Left arm)   Pulse 56   Temp 97.1  F (36.2  C) (Axillary)   Resp 20   SpO2 97%       Intake/Output Summary (Last 24 hours) at 7/25/2024 1235  Last data filed at 7/24/2024 1400  Gross per 24 hour   Intake 480 ml   Output --   Net 480 ml  "      Constitutional: Awake, alert, cooperative, no apparent distress.  Can be challenging to understand, mumbles when communicating     Respiratory: Clear to auscultation bilaterally, no crackles or wheezing   Cardiovascular: Regular rate and rhythm, normal S1 and S2, and no murmur noted   Abdomen: Normal bowel sounds, soft, non-distended, non-tender   Skin: No rashes, no cyanosis, dry to touch   Neuro: Alert and awake no focal weakness, numbness, ++memory loss   Extremities: No edema, normal range of motion   Other(s):        All other systems: Negative          Medications:      All current medications were reviewed with changes reflected in problem list.         Data:      All new lab and imaging data was reviewed.   Labs:       No results found for: \"NA\" No results found for: \"CHLORIDE\" No results found for: \"BUN\"   No results found for: \"POTASSIUM\" No results found for: \"CO2\" No results found for: \"CR\"     No results for input(s): \"WBC\", \"HGB\", \"HCT\", \"MCV\", \"PLT\" in the last 168 hours.   Imaging:   No results found for this or any previous visit (from the past 24 hour(s)).   "

## 2024-07-25 NOTE — PROGRESS NOTES
Care Management Follow Up    Length of Stay (days): 0    Expected Discharge Date: 07/31/2024     Concerns to be Addressed: basic needs, care coordination/care conferences, decision making, discharge planning, financial/insurance         Additional Information:  LVM for daughter Cheri. JAMAR explained, without any PHI, that we cannot share any medical records with a . SW explained how to connect with medical records.     KARLA Coronado, [facility]  [facility]   Please contact the SW on the floor in which the patient is staying for any questions or concerns

## 2024-07-25 NOTE — PLAN OF CARE
"Pt is alert to self with confusion. Pt denies pain or discomfort. Pt is hard of hearing. Pt has no IV access. Pt is assist of one in transfer and care. Bed alarm in place and call light within reach. Plan is LTC placement. SW following. Will continue to monitor and assess pt.         Goal Outcome Evaluation:      Plan of Care Reviewed With: patient    Overall Patient Progress: improvingOverall Patient Progress: improving    Outcome Evaluation: pt is on assisted care. pt is waiting for placement.      Problem: Comorbidity Management  Goal: Maintenance of Behavioral Health Symptom Control  Outcome: Progressing  Intervention: Maintain Behavioral Health Symptom Control  Recent Flowsheet Documentation  Taken 7/25/2024 0002 by Reji Duron RN  Medication Review/Management: medications reviewed     Problem: Pain Acute  Goal: Optimal Pain Control and Function  Outcome: Progressing  Intervention: Prevent or Manage Pain  Recent Flowsheet Documentation  Taken 7/25/2024 0002 by Reji Duron RN  Medication Review/Management: medications reviewed     Problem: Adult Inpatient Plan of Care  Goal: Plan of Care Review  Description: The Plan of Care Review/Shift note should be completed every shift.  The Outcome Evaluation is a brief statement about your assessment that the patient is improving, declining, or no change.  This information will be displayed automatically on your shift  note.  Outcome: Progressing  Flowsheets (Taken 7/25/2024 0039)  Outcome Evaluation: pt is on assisted care. pt is waiting for placement.  Plan of Care Reviewed With: patient  Overall Patient Progress: improving  Goal: Patient-Specific Goal (Individualized)  Description: You can add care plan individualizations to a care plan. Examples of Individualization might be:  \"Parent requests to be called daily at 9am for status\", \"I have a hard time hearing out of my right ear\", or \"Do not touch me to wake me up as it " "startles  me\".  Outcome: Progressing  Goal: Absence of Hospital-Acquired Illness or Injury  Outcome: Progressing  Intervention: Identify and Manage Fall Risk  Recent Flowsheet Documentation  Taken 7/25/2024 0002 by Reji Duron, RN  Safety Promotion/Fall Prevention:   assistive device/personal items within reach   activity supervised   increase visualization of patient   clutter free environment maintained   increased rounding and observation   lighting adjusted   nonskid shoes/slippers when out of bed   patient and family education   safety round/check completed  Intervention: Prevent Skin Injury  Recent Flowsheet Documentation  Taken 7/25/2024 0002 by Reji Duron, RN  Body Position: position changed independently  Intervention: Prevent Infection  Recent Flowsheet Documentation  Taken 7/25/2024 0002 by Reji Duron, RN  Infection Prevention:   single patient room provided   hand hygiene promoted   rest/sleep promoted  Goal: Optimal Comfort and Wellbeing  Outcome: Progressing  Goal: Readiness for Transition of Care  Outcome: Progressing     "

## 2024-07-25 NOTE — PLAN OF CARE
Goal Outcome Evaluation:           Overall Patient Progress: no changeOverall Patient Progress: no change    Outcome Evaluation: MA pending. LTC referral sent

## 2024-07-26 LAB — GLUCOSE BLDC GLUCOMTR-MCNC: 98 MG/DL (ref 70–99)

## 2024-07-26 PROCEDURE — 101N000002 HC CUSTODIAL CARE DAILY

## 2024-07-26 PROCEDURE — 99232 SBSQ HOSP IP/OBS MODERATE 35: CPT | Performed by: INTERNAL MEDICINE

## 2024-07-26 PROCEDURE — 82962 GLUCOSE BLOOD TEST: CPT

## 2024-07-26 ASSESSMENT — ACTIVITIES OF DAILY LIVING (ADL)
ADLS_ACUITY_SCORE: 51

## 2024-07-26 NOTE — PLAN OF CARE
"Cared for [id number]     Pt Alert to self only, VSS; Pt denies pain, headache, dizziness, N/V & SOB. Pt up Asst: 1 w/gait belt & walker. Lung sounds clear, RA. Blanchable redness on sacrum. Social Work following. Plan to discharge LTC, pending placement. Will continue with plan of     ++++++++++++++++++++++++++++    Goal Outcome Evaluation:      Plan of Care Reviewed With: patient    Overall Patient Progress: no changeOverall Patient Progress: no change    Outcome Evaluation: Pending LTC placement    Problem: Comorbidity Management  Goal: Maintenance of Behavioral Health Symptom Control  Outcome: Not Progressing  Intervention: Maintain Behavioral Health Symptom Control  Recent Flowsheet Documentation  Taken 7/26/2024 1145 by Olga Anderson RN  Medication Review/Management: medications reviewed     Problem: Pain Acute  Goal: Optimal Pain Control and Function  Outcome: Not Progressing  Intervention: Prevent or Manage Pain  Recent Flowsheet Documentation  Taken 7/26/2024 1145 by Olga Anderson RN  Sleep/Rest Enhancement: awakenings minimized  Medication Review/Management: medications reviewed     Problem: Adult Inpatient Plan of Care  Goal: Plan of Care Review  Description: The Plan of Care Review/Shift note should be completed every shift.  The Outcome Evaluation is a brief statement about your assessment that the patient is improving, declining, or no change.  This information will be displayed automatically on your shift  note.  Outcome: Not Progressing  Flowsheets (Taken 7/26/2024 1516)  Outcome Evaluation: Pending LTC placement  Plan of Care Reviewed With: patient  Overall Patient Progress: no change  Goal: Patient-Specific Goal (Individualized)  Description: You can add care plan individualizations to a care plan. Examples of Individualization might be:  \"Parent requests to be called daily at 9am for status\", \"I have a hard time hearing out of my right ear\", or \"Do not touch me to wake me up as it " "startles  me\".  Outcome: Not Progressing  Goal: Absence of Hospital-Acquired Illness or Injury  Outcome: Not Progressing  Intervention: Identify and Manage Fall Risk  Recent Flowsheet Documentation  Taken 7/26/2024 1145 by Olga Anderson RN  Safety Promotion/Fall Prevention:   activity supervised   clutter free environment maintained   increased rounding and observation   increase visualization of patient   safety round/check completed   supervised activity  Intervention: Prevent Skin Injury  Recent Flowsheet Documentation  Taken 7/26/2024 1145 by Olga Anderson RN  Body Position: position changed independently  Skin Protection: adhesive use limited  Device Skin Pressure Protection:   absorbent pad utilized/changed   adhesive use limited  Intervention: Prevent Infection  Recent Flowsheet Documentation  Taken 7/26/2024 1145 by Olga Anderson RN  Infection Prevention: single patient room provided  Goal: Optimal Comfort and Wellbeing  Outcome: Not Progressing  Goal: Readiness for Transition of Care  Outcome: Not Progressing       "

## 2024-07-26 NOTE — PROGRESS NOTES
[facility]  Hospitalist Progress Note  Med Elliott MD 07/26/2024    Reason for Stay (Diagnosis): placement         Assessment and Plan:      Summary of Stay: Jemal Gray is a 88-year-old male who was [facility] on [facility].  He has history of dementia and family is unable to provide care for him.  During his stay we was treated for right foot cellulitis which has resolved.  Patient is MA pending, awaiting placement in long term care.       Medically stable for discharge when disposition plan in place     intermediate care admission.  Social work consulted.  Looking for LTC.  Is MA pending, SW working on. Once a bed is available patient can go anytime      Constipation  Started/continue on senna, have as needed miralax and dulcolax available     Right foot cellulitis.  Resolved with Keflex 4 times daily through 6/11.  Swelling and redness resolved.    Venous ultrasound negative for DVT.     Dementia.  Not on any medications.   As needed olanzapine ordered for agitation.placement     5.   Bradycardia            Patient was noted to have HR in 50s.  Asymptomatic     6. Paper work       Previous provider filled out guardianship papers on 7/13 for the daughter.           Diet: Combination Diet Regular Diet  Room Service    DVT Prophylaxis: Pneumatic Compression Devices  Maddox Catheter: Not present  Lines: None     Cardiac Monitoring: None  Code Status: No CPR- Do NOT Intubate  Dispo: Anticipate discharge when safe disposition plan in place; LTC facility being sought        Interval History (Subjective):      Poor historian due to dementia.  No concerns or complaints today.  Await long-term care placement                  Physical Exam:      Last Vital Signs:  BP (!) 157/76 (BP Location: Left arm)   Pulse 52   Temp 98  F (36.7  C) (Oral)   Resp 18   SpO2 94%       Intake/Output Summary (Last 24 hours) at 7/26/2024 1034  Last data filed at 7/25/2024 1553  Gross per 24 hour  "  Intake 440 ml   Output --   Net 440 ml       Constitutional: Awake, alert, cooperative, no apparent distress.  Watching TV.  Just completed breakfast     Respiratory: Clear to auscultation bilaterally, no crackles or wheezing   Cardiovascular: Regular rate and rhythm, normal S1 and S2, and no murmur noted   Abdomen: Normal bowel sounds, soft, non-distended, non-tender   Skin: No rashes, no cyanosis, dry to touch   Neuro: Alert and awake, no weakness, numbness, +memory loss   Extremities: No edema, normal range of motion   Other(s):        All other systems: Negative          Medications:      All current medications were reviewed with changes reflected in problem list.         Data:      All new lab and imaging data was reviewed.   Labs:       No results found for: \"NA\" No results found for: \"CHLORIDE\" No results found for: \"BUN\"   No results found for: \"POTASSIUM\" No results found for: \"CO2\" No results found for: \"CR\"     No results for input(s): \"WBC\", \"HGB\", \"HCT\", \"MCV\", \"PLT\" in the last 168 hours.   Imaging:   No results found for this or any previous visit (from the past 24 hour(s)).   "

## 2024-07-26 NOTE — PLAN OF CARE
"Pt disoriented x4. Calm and cooperative w cares, pleasant demeanor uncooperative at times r/t communication challenges, not behavioral. Up freq to void in restroom, voiding well - bed alarm on. Appetite/intake poor, oral hydration promoted. Pt preferred juice and snacks this shift. Evening meal did not arrive, room service appropriate set, Pt favorite foods added to comments in attempt to have Pt favorite foods delivered as scheduled. Verify and adjust if needed. See SW notes from 7/25 regarding most recent PoA status.     Goal Outcome Evaluation:      Plan of Care Reviewed With: patient    Overall Patient Progress: no changeOverall Patient Progress: no change    Outcome Evaluation: Pt remains senior living care. Up freq to void in restroom. BA on.      Problem: Comorbidity Management  Goal: Maintenance of Behavioral Health Symptom Control  Outcome: Progressing     Problem: Pain Acute  Goal: Optimal Pain Control and Function  Outcome: Progressing  Intervention: Prevent or Manage Pain  Recent Flowsheet Documentation  Taken 7/25/2024 9282 by Sergey Sandoval RN  Sensory Stimulation Regulation:   lighting decreased   television on   care clustered     Problem: Adult Inpatient Plan of Care  Goal: Plan of Care Review  Description: The Plan of Care Review/Shift note should be completed every shift.  The Outcome Evaluation is a brief statement about your assessment that the patient is improving, declining, or no change.  This information will be displayed automatically on your shift  note.  Outcome: Progressing  Flowsheets (Taken 7/25/2024 8430)  Outcome Evaluation: Pt remains senior living care. Up freq to void in restroom. BA on.  Plan of Care Reviewed With: patient  Overall Patient Progress: no change  Goal: Patient-Specific Goal (Individualized)  Description: You can add care plan individualizations to a care plan. Examples of Individualization might be:  \"Parent requests to be called daily at 9am for status\", \"I have a hard " "time hearing out of my right ear\", or \"Do not touch me to wake me up as it startles  me\".  Outcome: Progressing  Goal: Absence of Hospital-Acquired Illness or Injury  Outcome: Progressing  Intervention: Identify and Manage Fall Risk  Recent Flowsheet Documentation  Taken 7/25/2024 1552 by Sergey Sandoval RN  Safety Promotion/Fall Prevention:   activity supervised   increase visualization of patient   mobility aid in reach   nonskid shoes/slippers when out of bed   room door open   safety round/check completed  Intervention: Prevent Infection  Recent Flowsheet Documentation  Taken 7/25/2024 1552 by Sergey Sandoval RN  Infection Prevention: environmental surveillance performed  Goal: Optimal Comfort and Wellbeing  Outcome: Progressing  Goal: Readiness for Transition of Care  Outcome: Progressing     Problem: Adult Inpatient Plan of Care  Goal: Plan of Care Review  Description: The Plan of Care Review/Shift note should be completed every shift.  The Outcome Evaluation is a brief statement about your assessment that the patient is improving, declining, or no change.  This information will be displayed automatically on your shift  note.  Recent Flowsheet Documentation  Taken 7/25/2024 2346 by Sergey Sandoval RN  Outcome Evaluation: Pt remains detention care. Up freq to void in restroom. BA on.  Plan of Care Reviewed With: patient  Overall Patient Progress: no change  Goal: Absence of Hospital-Acquired Illness or Injury  Intervention: Identify and Manage Fall Risk  Recent Flowsheet Documentation  Taken 7/25/2024 1552 by Sergey Sandoval RN  Safety Promotion/Fall Prevention:   activity supervised   increase visualization of patient   mobility aid in reach   nonskid shoes/slippers when out of bed   room door open   safety round/check completed  Intervention: Prevent Infection  Recent Flowsheet Documentation  Taken 7/25/2024 1552 by Sergey Sandoval RN  Infection Prevention: environmental surveillance performed     Problem: " Delirium  Goal: Improved Attention and Thought Clarity  Intervention: Maximize Cognitive Function  Recent Flowsheet Documentation  Taken 7/25/2024 1552 by Sergey Sandoval RN  Sensory Stimulation Regulation:   lighting decreased   television on   care clustered     Problem: Adult Inpatient Plan of Care  Goal: Plan of Care Review  Description: The Plan of Care Review/Shift note should be completed every shift.  The Outcome Evaluation is a brief statement about your assessment that the patient is improving, declining, or no change.  This information will be displayed automatically on your shift  note.  Recent Flowsheet Documentation  Taken 7/25/2024 2346 by Sergey Sandoval RN  Outcome Evaluation: Pt remains jail care. Up freq to void in restroom. BA on.  Plan of Care Reviewed With: patient  Overall Patient Progress: no change  Goal: Absence of Hospital-Acquired Illness or Injury  Intervention: Identify and Manage Fall Risk  Recent Flowsheet Documentation  Taken 7/25/2024 1552 by Sergey Sandoval RN  Safety Promotion/Fall Prevention:   activity supervised   increase visualization of patient   mobility aid in reach   nonskid shoes/slippers when out of bed   room door open   safety round/check completed  Intervention: Prevent Infection  Recent Flowsheet Documentation  Taken 7/25/2024 1552 by Sergey Sandoval RN  Infection Prevention: environmental surveillance performed     Problem: Fall Injury Risk  Goal: Absence of Fall and Fall-Related Injury  Intervention: Promote Injury-Free Environment  Recent Flowsheet Documentation  Taken 7/25/2024 1552 by Sergey Sandoval RN  Safety Promotion/Fall Prevention:   activity supervised   increase visualization of patient   mobility aid in reach   nonskid shoes/slippers when out of bed   room door open   safety round/check completed     Problem: Adult Inpatient Plan of Care  Goal: Absence of Hospital-Acquired Illness or Injury  Intervention: Identify and Manage Fall Risk  Recent Flowsheet  Documentation  Taken 7/25/2024 1552 by Sergey Sandoval RN  Safety Promotion/Fall Prevention:   activity supervised   increase visualization of patient   mobility aid in reach   nonskid shoes/slippers when out of bed   room door open   safety round/check completed  Intervention: Prevent Infection  Recent Flowsheet Documentation  Taken 7/25/2024 1552 by Sergey Sandoval RN  Infection Prevention: environmental surveillance performed     Problem: Fall Injury Risk  Goal: Absence of Fall and Fall-Related Injury  Intervention: Promote Injury-Free Environment  Recent Flowsheet Documentation  Taken 7/25/2024 1552 by Sergey Sandoval RN  Safety Promotion/Fall Prevention:   activity supervised   increase visualization of patient   mobility aid in reach   nonskid shoes/slippers when out of bed   room door open   safety round/check completed     Problem: Fall Injury Risk  Goal: Absence of Fall and Fall-Related Injury  Intervention: Promote Injury-Free Environment  Recent Flowsheet Documentation  Taken 7/25/2024 1552 by Sergey Sandoval RN  Safety Promotion/Fall Prevention:   activity supervised   increase visualization of patient   mobility aid in reach   nonskid shoes/slippers when out of bed   room door open   safety round/check completed     Problem: Adult Inpatient Plan of Care  Goal: Plan of Care Review  Description: The Plan of Care Review/Shift note should be completed every shift.  The Outcome Evaluation is a brief statement about your assessment that the patient is improving, declining, or no change.  This information will be displayed automatically on your shift  note.  Recent Flowsheet Documentation  Taken 7/25/2024 2346 by Sergey Sandoval RN  Outcome Evaluation: Pt remains [facility] care. Up freq to void in restroom. BA on.  Plan of Care Reviewed With: patient  Overall Patient Progress: no change  Goal: Absence of Hospital-Acquired Illness or Injury  Intervention: Identify and Manage Fall Risk  Recent Flowsheet  Documentation  Taken 7/25/2024 1552 by Sergey Sandoval RN  Safety Promotion/Fall Prevention:   activity supervised   increase visualization of patient   mobility aid in reach   nonskid shoes/slippers when out of bed   room door open   safety round/check completed  Intervention: Prevent Infection  Recent Flowsheet Documentation  Taken 7/25/2024 1552 by Sergey Sandoval RN  Infection Prevention: environmental surveillance performed     Problem: Adult Inpatient Plan of Care  Goal: Plan of Care Review  Description: The Plan of Care Review/Shift note should be completed every shift.  The Outcome Evaluation is a brief statement about your assessment that the patient is improving, declining, or no change.  This information will be displayed automatically on your shift  note.  Recent Flowsheet Documentation  Taken 7/25/2024 2346 by Sergey Sandoval RN  Outcome Evaluation: Pt remains [facility] care. Up freq to void in restroom. BA on.  Plan of Care Reviewed With: patient  Overall Patient Progress: no change  Goal: Absence of Hospital-Acquired Illness or Injury  Intervention: Identify and Manage Fall Risk  Recent Flowsheet Documentation  Taken 7/25/2024 1552 by Sergey Sandoval RN  Safety Promotion/Fall Prevention:   activity supervised   increase visualization of patient   mobility aid in reach   nonskid shoes/slippers when out of bed   room door open   safety round/check completed  Intervention: Prevent Infection  Recent Flowsheet Documentation  Taken 7/25/2024 1552 by Sergey Sandoval RN  Infection Prevention: environmental surveillance performed     Problem: Fall Injury Risk  Goal: Absence of Fall and Fall-Related Injury  Intervention: Promote Injury-Free Environment  Recent Flowsheet Documentation  Taken 7/25/2024 1552 by Sergey Sandoval RN  Safety Promotion/Fall Prevention:   activity supervised   increase visualization of patient   mobility aid in reach   nonskid shoes/slippers when out of bed   room door open   safety  round/check completed     Problem: Fall Injury Risk  Goal: Absence of Fall and Fall-Related Injury  Intervention: Promote Injury-Free Environment  Recent Flowsheet Documentation  Taken 7/25/2024 1552 by Sergey Sandoval RN  Safety Promotion/Fall Prevention:   activity supervised   increase visualization of patient   mobility aid in reach   nonskid shoes/slippers when out of bed   room door open   safety round/check completed     Problem: Adult Inpatient Plan of Care  Goal: Plan of Care Review  Description: The Plan of Care Review/Shift note should be completed every shift.  The Outcome Evaluation is a brief statement about your assessment that the patient is improving, declining, or no change.  This information will be displayed automatically on your shift  note.  Recent Flowsheet Documentation  Taken 7/25/2024 2346 by Sergey Sandoval RN  Outcome Evaluation: Pt remains prison care. Up freq to void in restroom. BA on.  Plan of Care Reviewed With: patient  Overall Patient Progress: no change  Goal: Absence of Hospital-Acquired Illness or Injury  Intervention: Identify and Manage Fall Risk  Recent Flowsheet Documentation  Taken 7/25/2024 1552 by Sergey Sandoval RN  Safety Promotion/Fall Prevention:   activity supervised   increase visualization of patient   mobility aid in reach   nonskid shoes/slippers when out of bed   room door open   safety round/check completed  Intervention: Prevent Infection  Recent Flowsheet Documentation  Taken 7/25/2024 1552 by Sergey Sandoval RN  Infection Prevention: environmental surveillance performed     Problem: Adult Inpatient Plan of Care  Goal: Plan of Care Review  Description: The Plan of Care Review/Shift note should be completed every shift.  The Outcome Evaluation is a brief statement about your assessment that the patient is improving, declining, or no change.  This information will be displayed automatically on your shift  note.  Recent Flowsheet Documentation  Taken 7/25/2024  5886 by Sergey Sandoval, RN  Outcome Evaluation: Pt remains halfway care. Up freq to void in restroom. BA on.  Plan of Care Reviewed With: patient  Overall Patient Progress: no change  Goal: Absence of Hospital-Acquired Illness or Injury  Intervention: Identify and Manage Fall Risk  Recent Flowsheet Documentation  Taken 7/25/2024 1552 by Sergey Sandoval, RN  Safety Promotion/Fall Prevention:   activity supervised   increase visualization of patient   mobility aid in reach   nonskid shoes/slippers when out of bed   room door open   safety round/check completed  Intervention: Prevent Infection  Recent Flowsheet Documentation  Taken 7/25/2024 1552 by Sergey Sandoval, RN  Infection Prevention: environmental surveillance performed     Problem: Comorbidity Management  Goal: Maintenance of Seizure Control  Intervention: Maintain Seizure Symptom Control  Recent Flowsheet Documentation  Taken 7/25/2024 1552 by Sergey Sandoval, RN  Sensory Stimulation Regulation:   lighting decreased   television on   care clustered

## 2024-07-26 NOTE — PLAN OF CARE
Pt slept t/o the night without incident.      Goal Outcome Evaluation:      Plan of Care Reviewed With: patient    Overall Patient Progress: no change    Outcome Evaluation: Pt remains group home care. Slept throught the night without issues.      Problem: Comorbidity Management  Goal: Maintenance of Behavioral Health Symptom Control  Outcome: Not Progressing  Intervention: Maintain Behavioral Health Symptom Control  Recent Flowsheet Documentation  Taken 7/26/2024 0100 by Flores Sears RN  Medication Review/Management: medications reviewed  Taken 7/25/2024 2330 by Flores Sears RN  Medication Review/Management: medications reviewed     Problem: Pain Acute  Goal: Optimal Pain Control and Function  Outcome: Not Progressing  Intervention: Develop Pain Management Plan  Recent Flowsheet Documentation  Taken 7/25/2024 2330 by Flores Sears RN  Pain Management Interventions: rest  Intervention: Prevent or Manage Pain  Recent Flowsheet Documentation  Taken 7/26/2024 0100 by Flores Sears RN  Medication Review/Management: medications reviewed  Taken 7/25/2024 2330 by Flores Sears RN  Sleep/Rest Enhancement:   awakenings minimized   room darkened   regular sleep/rest pattern promoted  Medication Review/Management: medications reviewed     Problem: Adult Inpatient Plan of Care  Goal: Plan of Care Review  Description: The Plan of Care Review/Shift note should be completed every shift.  The Outcome Evaluation is a brief statement about your assessment that the patient is improving, declining, or no change.  This information will be displayed automatically on your shift  note.  Outcome: Not Progressing  Flowsheets (Taken 7/26/2024 0349)  Outcome Evaluation: Pt remains group home care. Slept throught the night without issues.  Plan of Care Reviewed With: patient  Overall Patient Progress: no change  Goal: Patient-Specific Goal (Individualized)  Description: You can add care plan individualizations to a care  "plan. Examples of Individualization might be:  \"Parent requests to be called daily at 9am for status\", \"I have a hard time hearing out of my right ear\", or \"Do not touch me to wake me up as it startles  me\".  Outcome: Not Progressing  Goal: Absence of Hospital-Acquired Illness or Injury  Outcome: Not Progressing  Intervention: Identify and Manage Fall Risk  Recent Flowsheet Documentation  Taken 7/26/2024 0300 by Flores Sears RN  Safety Promotion/Fall Prevention: safety round/check completed  Taken 7/26/2024 0100 by Flores Sears RN  Safety Promotion/Fall Prevention: safety round/check completed  Taken 7/25/2024 2330 by Flores Sears RN  Safety Promotion/Fall Prevention:   safety round/check completed   room near nurse's station   room door open   nonskid shoes/slippers when out of bed   clutter free environment maintained  Intervention: Prevent Skin Injury  Recent Flowsheet Documentation  Taken 7/26/2024 0300 by Flores Sears RN  Body Position:   position changed independently   supine, head elevated  Taken 7/26/2024 0100 by Flores Sears RN  Body Position:   position changed independently   supine, head elevated  Taken 7/25/2024 2330 by Flores Sears RN  Body Position:   position changed independently   side-lying  Skin Protection: adhesive use limited  Device Skin Pressure Protection:   absorbent pad utilized/changed   adhesive use limited  Intervention: Prevent Infection  Recent Flowsheet Documentation  Taken 7/25/2024 2330 by Flores Sears RN  Infection Prevention:   single patient room provided   rest/sleep promoted   hand hygiene promoted  Goal: Optimal Comfort and Wellbeing  Outcome: Not Progressing  Intervention: Monitor Pain and Promote Comfort  Recent Flowsheet Documentation  Taken 7/25/2024 2330 by Flores Sears RN  Pain Management Interventions: rest  Goal: Readiness for Transition of Care  Outcome: Not Progressing     "

## 2024-07-27 PROCEDURE — 250N000013 HC RX MED GY IP 250 OP 250 PS 637: Performed by: INTERNAL MEDICINE

## 2024-07-27 PROCEDURE — 99232 SBSQ HOSP IP/OBS MODERATE 35: CPT | Performed by: INTERNAL MEDICINE

## 2024-07-27 PROCEDURE — 101N000002 HC CUSTODIAL CARE DAILY

## 2024-07-27 RX ADMIN — SENNOSIDES AND DOCUSATE SODIUM 1 TABLET: 8.6; 5 TABLET ORAL at 22:40

## 2024-07-27 ASSESSMENT — ACTIVITIES OF DAILY LIVING (ADL)
ADLS_ACUITY_SCORE: 51

## 2024-07-27 NOTE — PLAN OF CARE
Pt oriented to self. Incomprehensible speech most of the time. Likes to sing. SBA. Reg diet. Pleasant. Slept well tonight.     Goal Outcome Evaluation:      Plan of Care Reviewed With: patient    Overall Patient Progress: no changeOverall Patient Progress: no change    Outcome Evaluation: Pending LTC placement      Problem: Adult Inpatient Plan of Care  Goal: Plan of Care Review  Description: The Plan of Care Review/Shift note should be completed every shift.  The Outcome Evaluation is a brief statement about your assessment that the patient is improving, declining, or no change.  This information will be displayed automatically on your shift  note.  Recent Flowsheet Documentation  Taken 7/27/2024 0548 by Orin Vazquez RN  Outcome Evaluation: Pending LTC placement  Plan of Care Reviewed With: patient  Overall Patient Progress: no change  Goal: Absence of Hospital-Acquired Illness or Injury  Intervention: Prevent Skin Injury  Recent Flowsheet Documentation  Taken 7/26/2024 2300 by Orin Vazquez RN  Body Position: position changed independently     Problem: Adult Inpatient Plan of Care  Goal: Plan of Care Review  Description: The Plan of Care Review/Shift note should be completed every shift.  The Outcome Evaluation is a brief statement about your assessment that the patient is improving, declining, or no change.  This information will be displayed automatically on your shift  note.  Recent Flowsheet Documentation  Taken 7/27/2024 0548 by Orin Vazquez RN  Outcome Evaluation: Pending LTC placement  Plan of Care Reviewed With: patient  Overall Patient Progress: no change  Goal: Absence of Hospital-Acquired Illness or Injury  Intervention: Prevent Skin Injury  Recent Flowsheet Documentation  Taken 7/26/2024 2300 by Orin Vazquez RN  Body Position: position changed independently     Problem: Adult Inpatient Plan of Care  Goal: Absence of Hospital-Acquired Illness or  Injury  Intervention: Prevent Skin Injury  Recent Flowsheet Documentation  Taken 7/26/2024 2300 by Orin Vazquez RN  Body Position: position changed independently     Problem: Adult Inpatient Plan of Care  Goal: Plan of Care Review  Description: The Plan of Care Review/Shift note should be completed every shift.  The Outcome Evaluation is a brief statement about your assessment that the patient is improving, declining, or no change.  This information will be displayed automatically on your shift  note.  Recent Flowsheet Documentation  Taken 7/27/2024 0548 by Orin Vazquez RN  Outcome Evaluation: Pending LTC placement  Plan of Care Reviewed With: patient  Overall Patient Progress: no change  Goal: Absence of Hospital-Acquired Illness or Injury  Intervention: Prevent Skin Injury  Recent Flowsheet Documentation  Taken 7/26/2024 2300 by Orin Vazquez RN  Body Position: position changed independently     Problem: Comorbidity Management  Goal: Maintenance of Behavioral Health Symptom Control  Outcome: Progressing

## 2024-07-27 NOTE — PROGRESS NOTES
[facility]  Hospitalist Progress Note  Med Elliott MD 07/27/2024    Reason for Stay (Diagnosis): placement         Assessment and Plan:      Summary of Stay: Jemal Gray is a 88-year-old male who was transitioned to longterm care on 6/5.  He has history of dementia and family is unable to provide care for him.  During his stay we was treated for right foot cellulitis which has resolved.  Patient is MA pending, awaiting placement in long term care.       Medically stable for discharge when disposition plan in place     retirement care admission.  Social work consulted.  Looking for LTC.  Is MA pending, SW working on. Once a bed is available patient can go anytime      Constipation  Started/continue on senna, have as needed miralax and dulcolax available     Right foot cellulitis.  Resolved with Keflex 4 times daily through 6/11.  Swelling and redness resolved.    Venous ultrasound negative for DVT.     Dementia with chronic aphasia.  Not on any medications.   As needed olanzapine ordered for agitation.placement     5.   Bradycardia            Patient was noted to have HR in 50s.  Asymptomatic     6. Paper work       Previous provider filled out guardianship papers on 7/13 for the daughter.           Diet: Combination Diet Regular Diet  Room Service    DVT Prophylaxis: Pneumatic Compression Devices  Maddox Catheter: Not present  Lines: None     Cardiac Monitoring: None  Code Status: No CPR- Do NOT Intubate  Dispo: Anticipate discharge when safe disposition plan in place; LTC facility being sought        Interval History (Subjective):      Poor historian due to dementia.  No concerns today.  Eating cookies when I entered the room.  watching TV                  Physical Exam:      Last Vital Signs:  BP (!) 142/73 (BP Location: Left arm)   Pulse 54   Temp 97.9  F (36.6  C) (Oral)   Resp 16   SpO2 96%       Intake/Output Summary (Last 24 hours) at 7/27/2024 1105  Last data filed at 7/27/2024  "0916  Gross per 24 hour   Intake 640 ml   Output --   Net 640 ml       Constitutional: Awake, alert, cooperative, no apparent distress     Respiratory: Clear to auscultation bilaterally, no crackles or wheezing   Cardiovascular: Regular rate and rhythm, normal S1 and S2, and no murmur noted   Abdomen: Normal bowel sounds, soft, non-distended, non-tender   Skin: No rashes, no cyanosis, dry to touch   Neuro: Alert and awake, no weakness, numbness, ++memory loss   Extremities: No edema, normal range of motion   Other(s):        All other systems: Negative          Medications:      All current medications were reviewed with changes reflected in problem list.         Data:      All new lab and imaging data was reviewed.   Labs:  Recent Labs   Lab 07/26/24  0741   GLC 98     No results for input(s): \"WBC\", \"HGB\", \"HCT\", \"MCV\", \"PLT\" in the last 168 hours.   Imaging:   No results found for this or any previous visit (from the past 24 hour(s)).   "

## 2024-07-28 PROCEDURE — 101N000002 HC CUSTODIAL CARE DAILY

## 2024-07-28 PROCEDURE — 99232 SBSQ HOSP IP/OBS MODERATE 35: CPT | Performed by: INTERNAL MEDICINE

## 2024-07-28 ASSESSMENT — ACTIVITIES OF DAILY LIVING (ADL)
ADLS_ACUITY_SCORE: 51
ADLS_ACUITY_SCORE: 46
ADLS_ACUITY_SCORE: 47
ADLS_ACUITY_SCORE: 47
ADLS_ACUITY_SCORE: 51
ADLS_ACUITY_SCORE: 46
ADLS_ACUITY_SCORE: 47
ADLS_ACUITY_SCORE: 51
ADLS_ACUITY_SCORE: 47
ADLS_ACUITY_SCORE: 51

## 2024-07-28 NOTE — PROGRESS NOTES
[facility] [place]  Hospitalist Progress Note  Med Elliott MD 07/28/2024    Reason for Stay (Diagnosis): placement         Assessment and Plan:      Summary of Stay: Jemal Gray is a 88-year-old male who [facility].  He has history of dementia and family is unable to provide care for him.  During his stay we was treated for right foot cellulitis which has resolved.  Patient is MA pending, awaiting placement in long term care.       Medically stable for discharge when disposition plan in place     nursing home care admission.  Social work consulted.  Looking for LTC.  Is MA pending, SW working on. Once a bed is available patient can go anytime      Constipation  Started/continue on senna, have as needed miralax and dulcolax available     Right foot cellulitis.  Resolved with Keflex 4 times daily through 6/11.  Swelling and redness resolved.    Venous ultrasound negative for DVT.     Dementia with chronic aphasia.  Not on any medications.   As needed olanzapine ordered for agitation.placement     5.   Bradycardia            Patient was noted to have HR in 50s.  Asymptomatic     6. Paper work       Previous provider filled out guardianship papers on 7/13 for the daughter.           Diet: Combination Diet Regular Diet  Room Service    DVT Prophylaxis: Pneumatic Compression Devices  Maddox Catheter: Not present  Lines: None     Cardiac Monitoring: None  Code Status: No CPR- Do NOT Intubate  Dispo: Anticipate discharge when safe disposition plan in place; LTC facility being sought        Interval History (Subjective):      Poor historian.  Watching TV.  Await placement                  Physical Exam:      Last Vital Signs:  /64 (BP Location: Left arm)   Pulse 56   Temp 98.4  F (36.9  C) (Oral)   Resp 18   SpO2 96%       Intake/Output Summary (Last 24 hours) at 7/28/2024 1055  Last data filed at 7/28/2024 0100  Gross per 24 hour   Intake 100 ml   Output --   Net 100 ml  "      Constitutional: Awake, alert, cooperative, no apparent distress     Respiratory: Clear to auscultation bilaterally, no crackles or wheezing   Cardiovascular: Regular rate and rhythm, normal S1 and S2, and no murmur noted   Abdomen: Normal bowel sounds, soft, non-distended, non-tender   Skin: No rashes, no cyanosis, dry to touch   Neuro: Alert and alert, no weakness, numbness, ++memory loss.  Aphasic speech unchanged   Extremities: No edema, normal range of motion   Other(s):        All other systems: Negative          Medications:      All current medications were reviewed with changes reflected in problem list.         Data:      All new lab and imaging data was reviewed.   Labs:       No results found for: \"NA\" No results found for: \"CHLORIDE\" No results found for: \"BUN\"   No results found for: \"POTASSIUM\" No results found for: \"CO2\" No results found for: \"CR\"     No results for input(s): \"WBC\", \"HGB\", \"HCT\", \"MCV\", \"PLT\" in the last 168 hours.   Imaging:   No results found for this or any previous visit (from the past 24 hour(s)).   "

## 2024-07-28 NOTE — PLAN OF CARE
BP (!) 142/73 (BP Location: Left arm)   Pulse 54   Temp 97.9  F (36.6  C) (Oral)   Resp 16   SpO2 96%     VSS, PT resting comfortably in room this shift. RN placed call to nutrition to send up appropriate scheduled meals for PT. Awaiting placement for discharge.

## 2024-07-28 NOTE — PLAN OF CARE
"[id number]    Alert and oriented to self. Pleasant. Voiding. Passing gas. Denies pain. Awaiting placement.     Goal Outcome Evaluation:      Plan of Care Reviewed With: patient    Overall Patient Progress: no changeOverall Patient Progress: no change    Outcome Evaluation: placement pending      Problem: Comorbidity Management  Goal: Maintenance of Behavioral Health Symptom Control  Outcome: Progressing     Problem: Pain Acute  Goal: Optimal Pain Control and Function  Outcome: Progressing     Problem: Adult Inpatient Plan of Care  Goal: Plan of Care Review  Description: The Plan of Care Review/Shift note should be completed every shift.  The Outcome Evaluation is a brief statement about your assessment that the patient is improving, declining, or no change.  This information will be displayed automatically on your shift  note.  Outcome: Progressing  Flowsheets (Taken 7/28/2024 7255)  Outcome Evaluation: placement pending  Plan of Care Reviewed With: patient  Overall Patient Progress: no change  Goal: Patient-Specific Goal (Individualized)  Description: You can add care plan individualizations to a care plan. Examples of Individualization might be:  \"Parent requests to be called daily at 9am for status\", \"I have a hard time hearing out of my right ear\", or \"Do not touch me to wake me up as it startles  me\".  Outcome: Progressing  Goal: Absence of Hospital-Acquired Illness or Injury  Outcome: Progressing  Goal: Optimal Comfort and Wellbeing  Outcome: Progressing  Goal: Readiness for Transition of Care  Outcome: Progressing     "

## 2024-07-28 NOTE — PLAN OF CARE
/64 (BP Location: Left arm)   Pulse 56   Temp 98.4  F (36.9  C) (Oral)   Resp 18   SpO2 96%     VSS, PT resting comfortably in room this shift. Awaiting placement for discharge

## 2024-07-29 PROCEDURE — 99232 SBSQ HOSP IP/OBS MODERATE 35: CPT | Performed by: INTERNAL MEDICINE

## 2024-07-29 PROCEDURE — 101N000002 HC CUSTODIAL CARE DAILY

## 2024-07-29 PROCEDURE — 250N000013 HC RX MED GY IP 250 OP 250 PS 637: Performed by: INTERNAL MEDICINE

## 2024-07-29 RX ADMIN — SENNOSIDES AND DOCUSATE SODIUM 1 TABLET: 8.6; 5 TABLET ORAL at 21:46

## 2024-07-29 ASSESSMENT — ACTIVITIES OF DAILY LIVING (ADL)
ADLS_ACUITY_SCORE: 48
ADLS_ACUITY_SCORE: 46
ADLS_ACUITY_SCORE: 48
ADLS_ACUITY_SCORE: 48
ADLS_ACUITY_SCORE: 46
ADLS_ACUITY_SCORE: 48
ADLS_ACUITY_SCORE: 46
ADLS_ACUITY_SCORE: 46
ADLS_ACUITY_SCORE: 48
ADLS_ACUITY_SCORE: 46
ADLS_ACUITY_SCORE: 48
ADLS_ACUITY_SCORE: 46
ADLS_ACUITY_SCORE: 48
ADLS_ACUITY_SCORE: 47

## 2024-07-29 NOTE — PLAN OF CARE
"[id number]    Slept well throughout night, voiding. Refused stool softener. SBA/a1 gb w.     Goal Outcome Evaluation:      Plan of Care Reviewed With: patient    Overall Patient Progress: no changeOverall Patient Progress: no change    Outcome Evaluation: LTC placement pending      Problem: Comorbidity Management  Goal: Maintenance of Behavioral Health Symptom Control  Outcome: Not Progressing     Problem: Pain Acute  Goal: Optimal Pain Control and Function  Outcome: Not Progressing     Problem: Adult Inpatient Plan of Care  Goal: Plan of Care Review  Description: The Plan of Care Review/Shift note should be completed every shift.  The Outcome Evaluation is a brief statement about your assessment that the patient is improving, declining, or no change.  This information will be displayed automatically on your shift  note.  Outcome: Not Progressing  Flowsheets (Taken 7/29/2024 9357)  Outcome Evaluation: LTC placement pending  Plan of Care Reviewed With: patient  Overall Patient Progress: no change  Goal: Patient-Specific Goal (Individualized)  Description: You can add care plan individualizations to a care plan. Examples of Individualization might be:  \"Parent requests to be called daily at 9am for status\", \"I have a hard time hearing out of my right ear\", or \"Do not touch me to wake me up as it startles  me\".  Outcome: Not Progressing  Goal: Absence of Hospital-Acquired Illness or Injury  Outcome: Not Progressing  Goal: Optimal Comfort and Wellbeing  Outcome: Not Progressing  Goal: Readiness for Transition of Care  Outcome: Not Progressing     "

## 2024-07-29 NOTE — PROGRESS NOTES
[facility]  Hospitalist Progress Note  Med Elliott MD 07/29/2024    Reason for Stay (Diagnosis): placement         Assessment and Plan:      Summary of Stay: Jemal Gray is a 88-year-old male who was transitioned to skilled nursing care on 6/5.  He has history of dementia and family is unable to provide care for him.  During his stay we was treated for right foot cellulitis which has resolved.  Patient is MA pending, awaiting placement in long term care.       Medically stable for discharge when disposition plan in place     intermediate care admission.  Social work consulted.  Looking for LTC.  Is MA pending, SW working on. Once a bed is available patient can go anytime      Constipation  Started/continue on senna, have as needed miralax and dulcolax available     Right foot cellulitis.  Resolved with Keflex 4 times daily through 6/11.  Swelling and redness resolved.    Venous ultrasound negative for DVT.     Dementia with chronic aphasia.  Not on any medications.   As needed olanzapine ordered for agitation.placement     5.   Bradycardia            Patient was noted to have HR in 50s.  Asymptomatic     6. Paper work       Previous provider filled out guardianship papers on 7/13 for the daughter.           Diet: Combination Diet Regular Diet  Room Service    DVT Prophylaxis: Pneumatic Compression Devices  Maddox Catheter: Not present  Lines: None     Cardiac Monitoring: None  Code Status: No CPR- Do NOT Intubate  Dispo: Anticipate discharge when safe disposition plan in place; LTC facility being sought    Medically Ready for Discharge: [facility]            Interval History (Subjective):      Poor historian.  Watching TV poor historian due to dementia.  Watching TV.  Await placement       Physical Exam:      Last Vital Signs:  BP (!) 149/74 (BP Location: Right arm)   Pulse 51   Temp 98.2  F (36.8  C) (Oral)   Resp 20   SpO2 97%       Intake/Output Summary (Last 24 hours) at 7/29/2024 1155  Last  "data filed at 7/29/2024 0930  Gross per 24 hour   Intake 320 ml   Output --   Net 320 ml       Constitutional: Awake, alert, cooperative, no apparent distress     Respiratory: Clear to auscultation bilaterally, no crackles or wheezing   Cardiovascular: Regular rate and rhythm, normal S1 and S2, and no murmur noted   Abdomen: Normal bowel sounds, soft, non-distended, non-tender   Skin: No rashes, no cyanosis, dry to touch   Neuro: Alert and awake, no weakness, numbness, ++memory loss   Extremities: No edema, normal range of motion   Other(s):        All other systems: Negative          Medications:      All current medications were reviewed with changes reflected in problem list.         Data:      All new lab and imaging data was reviewed.   Labs:       No results found for: \"NA\" No results found for: \"CHLORIDE\" No results found for: \"BUN\"   No results found for: \"POTASSIUM\" No results found for: \"CO2\" No results found for: \"CR\"     No results for input(s): \"WBC\", \"HGB\", \"HCT\", \"MCV\", \"PLT\" in the last 168 hours.   Imaging:   No results found for this or any previous visit (from the past 24 hour(s)).   "

## 2024-07-29 NOTE — PLAN OF CARE
"14:12 RN shift summary 7-3:  Continue prison cares.  Awaiting LTC placement.          Goal Outcome Evaluation:      Plan of Care Reviewed With: patient    Overall Patient Progress: no changeOverall Patient Progress: no change    Outcome Evaluation: snf care continues; LTC placement pending.      Problem: Comorbidity Management  Goal: Maintenance of Behavioral Health Symptom Control  Outcome: Progressing  Intervention: Maintain Behavioral Health Symptom Control  Recent Flowsheet Documentation  Taken 7/29/2024 0900 by Fariha Phillips RN  Medication Review/Management: medications reviewed     Problem: Pain Acute  Goal: Optimal Pain Control and Function  Outcome: Progressing  Intervention: Prevent or Manage Pain  Recent Flowsheet Documentation  Taken 7/29/2024 0900 by Fariha Phillips RN  Medication Review/Management: medications reviewed     Problem: Adult Inpatient Plan of Care  Goal: Plan of Care Review  Description: The Plan of Care Review/Shift note should be completed every shift.  The Outcome Evaluation is a brief statement about your assessment that the patient is improving, declining, or no change.  This information will be displayed automatically on your shift  note.  Outcome: Progressing  Flowsheets (Taken 7/29/2024 1325)  Outcome Evaluation:   snf care continues   LTC placement pending.  Plan of Care Reviewed With: patient  Overall Patient Progress: no change  Goal: Patient-Specific Goal (Individualized)  Description: You can add care plan individualizations to a care plan. Examples of Individualization might be:  \"Parent requests to be called daily at 9am for status\", \"I have a hard time hearing out of my right ear\", or \"Do not touch me to wake me up as it startles  me\".  Outcome: Progressing  Goal: Absence of Hospital-Acquired Illness or Injury  Outcome: Progressing  Intervention: Identify and Manage Fall Risk  Recent Flowsheet Documentation  Taken 7/29/2024 0900 by Fariha Phillips, " RN  Safety Promotion/Fall Prevention:   safety round/check completed   room near nurse's station   clutter free environment maintained   nonskid shoes/slippers when out of bed  Intervention: Prevent Skin Injury  Recent Flowsheet Documentation  Taken 7/29/2024 0962 by Fariha Phillips, RN  Body Position: supine, head elevated  Goal: Optimal Comfort and Wellbeing  Outcome: Progressing  Goal: Readiness for Transition of Care  Outcome: Progressing

## 2024-07-30 PROCEDURE — 101N000002 HC CUSTODIAL CARE DAILY

## 2024-07-30 PROCEDURE — 250N000013 HC RX MED GY IP 250 OP 250 PS 637: Performed by: INTERNAL MEDICINE

## 2024-07-30 RX ADMIN — SENNOSIDES AND DOCUSATE SODIUM 1 TABLET: 8.6; 5 TABLET ORAL at 22:01

## 2024-07-30 RX ADMIN — POLYETHYLENE GLYCOL 3350 17 G: 17 POWDER, FOR SOLUTION ORAL at 14:08

## 2024-07-30 ASSESSMENT — ACTIVITIES OF DAILY LIVING (ADL)
ADLS_ACUITY_SCORE: 47
ADLS_ACUITY_SCORE: 48
ADLS_ACUITY_SCORE: 44
ADLS_ACUITY_SCORE: 48
ADLS_ACUITY_SCORE: 47
ADLS_ACUITY_SCORE: 46
ADLS_ACUITY_SCORE: 47
ADLS_ACUITY_SCORE: 46
ADLS_ACUITY_SCORE: 48
ADLS_ACUITY_SCORE: 46
ADLS_ACUITY_SCORE: 46
ADLS_ACUITY_SCORE: 50
ADLS_ACUITY_SCORE: 47
ADLS_ACUITY_SCORE: 46
ADLS_ACUITY_SCORE: 44
ADLS_ACUITY_SCORE: 48
ADLS_ACUITY_SCORE: 46
ADLS_ACUITY_SCORE: 46
ADLS_ACUITY_SCORE: 48
ADLS_ACUITY_SCORE: 47
ADLS_ACUITY_SCORE: 47
ADLS_ACUITY_SCORE: 46
ADLS_ACUITY_SCORE: 48

## 2024-07-30 NOTE — PLAN OF CARE
Alert, oriented to self. Denies pain. Up with assist x1/walker. Pending placement.    Goal Outcome Evaluation:      Plan of Care Reviewed With: patient    Overall Patient Progress: no changeOverall Patient Progress: no change    Outcome Evaluation: [facility] cares, pending placement      Problem: Adult Inpatient Plan of Care  Goal: Plan of Care Review  Description: The Plan of Care Review/Shift note should be completed every shift.  The Outcome Evaluation is a brief statement about your assessment that the patient is improving, declining, or no change.  This information will be displayed automatically on your shift  note.  Recent Flowsheet Documentation  Taken 7/30/2024 0353 by Katie Paniagua RN  Outcome Evaluation: [facility] cares, pending placement  Plan of Care Reviewed With: patient  Overall Patient Progress: no change  Goal: Absence of Hospital-Acquired Illness or Injury  Intervention: Identify and Manage Fall Risk  Recent Flowsheet Documentation  Taken 7/30/2024 0134 by Katie Paniagua RN  Safety Promotion/Fall Prevention:   activity supervised   clutter free environment maintained   nonskid shoes/slippers when out of bed   safety round/check completed   room near nurse's station  Intervention: Prevent Skin Injury  Recent Flowsheet Documentation  Taken 7/30/2024 0134 by Katie Paniagua RN  Body Position: position changed independently  Intervention: Prevent Infection  Recent Flowsheet Documentation  Taken 7/30/2024 0134 by Katie Paniagua RN  Infection Prevention:   rest/sleep promoted   single patient room provided

## 2024-07-30 NOTE — PLAN OF CARE
"14:20 RN shift summary 7-3:  Continue group home cares.  PRN Miralax given today for constipation / frequent toileting with smalll BMs. Awaiting LTC placement.          Goal Outcome Evaluation:      Plan of Care Reviewed With: patient    Overall Patient Progress: no changeOverall Patient Progress: no change    Outcome Evaluation: Continue group home cares.  Awaiting medical assistance paperwork and eventual LTC placement.      Problem: Comorbidity Management  Goal: Maintenance of Behavioral Health Symptom Control  Outcome: Progressing  Intervention: Maintain Behavioral Health Symptom Control  Recent Flowsheet Documentation  Taken 7/30/2024 0815 by Fariha Phillips, RN  Medication Review/Management: medications reviewed     Problem: Pain Acute  Goal: Optimal Pain Control and Function  Outcome: Progressing  Intervention: Prevent or Manage Pain  Recent Flowsheet Documentation  Taken 7/30/2024 0815 by Fariha Phillips, RN  Medication Review/Management: medications reviewed     Problem: Adult Inpatient Plan of Care  Goal: Plan of Care Review  Description: The Plan of Care Review/Shift note should be completed every shift.  The Outcome Evaluation is a brief statement about your assessment that the patient is improving, declining, or no change.  This information will be displayed automatically on your shift  note.  Outcome: Progressing  Flowsheets (Taken 7/30/2024 1207)  Outcome Evaluation: Continue group home cares.  Awaiting medical assistance paperwork and eventual LTC placement.  Plan of Care Reviewed With: patient  Overall Patient Progress: no change  Goal: Patient-Specific Goal (Individualized)  Description: You can add care plan individualizations to a care plan. Examples of Individualization might be:  \"Parent requests to be called daily at 9am for status\", \"I have a hard time hearing out of my right ear\", or \"Do not touch me to wake me up as it startles  me\".  Outcome: Progressing  Goal: Absence of Hospital-Acquired " Illness or Injury  Outcome: Progressing  Intervention: Identify and Manage Fall Risk  Recent Flowsheet Documentation  Taken 7/30/2024 0815 by Fariha Phillips, RN  Safety Promotion/Fall Prevention:   safety round/check completed   room near nurse's station   clutter free environment maintained   nonskid shoes/slippers when out of bed  Intervention: Prevent Skin Injury  Recent Flowsheet Documentation  Taken 7/30/2024 0815 by Fariha Phillips, RN  Body Position: position changed independently  Goal: Optimal Comfort and Wellbeing  Outcome: Progressing  Goal: Readiness for Transition of Care  Outcome: Progressing

## 2024-07-30 NOTE — PLAN OF CARE
"Goal Outcome Evaluation:      Plan of Care Reviewed With: patient    Overall Patient Progress: no changeOverall Patient Progress: no change    Outcome Evaluation: waiting placement    Problem: Comorbidity Management  Goal: Maintenance of Behavioral Health Symptom Control  Outcome: Progressing  Intervention: Maintain Behavioral Health Symptom Control  Recent Flowsheet Documentation  Taken 7/29/2024 1500 by Erin Gifford RN  Medication Review/Management: medications reviewed     Problem: Pain Acute  Goal: Optimal Pain Control and Function  Outcome: Progressing  Intervention: Prevent or Manage Pain  Recent Flowsheet Documentation  Taken 7/29/2024 1500 by Erin Gifford RN  Sensory Stimulation Regulation: lighting decreased  Sleep/Rest Enhancement: awakenings minimized  Medication Review/Management: medications reviewed  Intervention: Optimize Psychosocial Wellbeing  Recent Flowsheet Documentation  Taken 7/29/2024 1500 by Erin Gifford RN  Supportive Measures: active listening utilized     Problem: Adult Inpatient Plan of Care  Goal: Plan of Care Review  Description: The Plan of Care Review/Shift note should be completed every shift.  The Outcome Evaluation is a brief statement about your assessment that the patient is improving, declining, or no change.  This information will be displayed automatically on your shift  note.  Outcome: Progressing  Flowsheets (Taken 7/29/2024 2232)  Outcome Evaluation: waiting placement  Plan of Care Reviewed With: patient  Overall Patient Progress: no change  Goal: Patient-Specific Goal (Individualized)  Description: You can add care plan individualizations to a care plan. Examples of Individualization might be:  \"Parent requests to be called daily at 9am for status\", \"I have a hard time hearing out of my right ear\", or \"Do not touch me to wake me up as it startles  me\".  Outcome: Progressing  Goal: Absence of Hospital-Acquired Illness or Injury  Outcome: " Progressing  Intervention: Prevent Skin Injury  Recent Flowsheet Documentation  Taken 7/29/2024 1500 by Erin Gifford, RN  Skin Protection: adhesive use limited  Device Skin Pressure Protection: absorbent pad utilized/changed  Goal: Optimal Comfort and Wellbeing  Outcome: Progressing  Goal: Readiness for Transition of Care  Outcome: Progressing

## 2024-07-30 NOTE — PROGRESS NOTES
[facility]  Hospitalist Progress Note  Guicho Rashid MD 07/30/2024    Reason for Stay (Diagnosis): placement         Assessment and Plan:      Summary of Stay: Jemal Gray is a 88-year-old male who was transitioned to longterm care on 6/5.  He has history of dementia and family is unable to provide care for him.  During his stay we was treated for right foot cellulitis which has resolved.  Patient is MA pending, awaiting placement in long term care.       Medically stable for discharge when disposition plan in place     prison care admission.  Social work consulted.  Looking for LTC.  Is MA pending, SW working on. Once a bed is available patient can go anytime      Constipation  Started/continue on senna, have as needed miralax and dulcolax available     Right foot cellulitis.  Resolved with Keflex 4 times daily through 6/11.  Swelling and redness resolved.    Venous ultrasound negative for DVT.     Dementia with chronic aphasia.  Not on any medications.   As needed olanzapine ordered for agitation.placement     5.   Bradycardia            Patient was noted to have HR in 50s.  Asymptomatic     6. Paper work       Previous provider filled out guardianship papers on 7/13 for the daughter.           Diet: Combination Diet Regular Diet  Room Service    DVT Prophylaxis: Pneumatic Compression Devices  Maddox Catheter: Not present  Lines: None     Cardiac Monitoring: None  Code Status: No CPR- Do NOT Intubate  Dispo: Anticipate discharge when safe disposition plan in place; LTC facility being sought    Medically Ready for Discharge: [facility]            Interval History (Subjective):      No major issues reported today.   Pt is unable to report.  Denies complaints.            Physical Exam:      Last Vital Signs:  /58 (BP Location: Left arm)   Pulse 71   Temp 97.8  F (36.6  C) (Oral)   Resp 20   SpO2 95%       Intake/Output Summary (Last 24 hours) at 7/29/2024 6965  Last data filed at  "7/29/2024 0930  Gross per 24 hour   Intake 320 ml   Output --   Net 320 ml       Constitutional: Awake, alert, cooperative, no apparent distress     Respiratory: No increased WOB   Cardiovascular: Well-perufsed   Abdomen:    Skin: No rashes, no cyanosis, dry to touch   Neuro: Alert and awake, discourse is meandering.   Extremities: No edema or lesions   Other(s):        All other systems: Negative          Medications:      All current medications were reviewed with changes reflected in problem list.         Data:      All new lab and imaging data was reviewed.   Labs:       No results found for: \"NA\" No results found for: \"CHLORIDE\" No results found for: \"BUN\"   No results found for: \"POTASSIUM\" No results found for: \"CO2\" No results found for: \"CR\"     No results for input(s): \"WBC\", \"HGB\", \"HCT\", \"MCV\", \"PLT\" in the last 168 hours.   Imaging:   No results found for this or any previous visit (from the past 24 hour(s)).   "

## 2024-07-31 PROCEDURE — 101N000002 HC CUSTODIAL CARE DAILY

## 2024-07-31 PROCEDURE — 250N000013 HC RX MED GY IP 250 OP 250 PS 637: Performed by: INTERNAL MEDICINE

## 2024-07-31 RX ADMIN — SENNOSIDES AND DOCUSATE SODIUM 1 TABLET: 8.6; 5 TABLET ORAL at 21:43

## 2024-07-31 ASSESSMENT — ACTIVITIES OF DAILY LIVING (ADL)
ADLS_ACUITY_SCORE: 47
ADLS_ACUITY_SCORE: 48
ADLS_ACUITY_SCORE: 46
ADLS_ACUITY_SCORE: 47
ADLS_ACUITY_SCORE: 48
ADLS_ACUITY_SCORE: 48
ADLS_ACUITY_SCORE: 47
ADLS_ACUITY_SCORE: 48
ADLS_ACUITY_SCORE: 47
ADLS_ACUITY_SCORE: 48
ADLS_ACUITY_SCORE: 46
ADLS_ACUITY_SCORE: 48
ADLS_ACUITY_SCORE: 47
ADLS_ACUITY_SCORE: 46
ADLS_ACUITY_SCORE: 48
ADLS_ACUITY_SCORE: 46
ADLS_ACUITY_SCORE: 46
ADLS_ACUITY_SCORE: 47

## 2024-07-31 NOTE — PROGRESS NOTES
[facility]  Hospitalist Progress Note  Guicho Rashid MD 07/31/2024    Reason for Stay (Diagnosis): placement         Assessment and Plan:      Summary of Stay: Jemal Gray is a 88-year-old male who was transitioned to [facility] care on 6/5.  He has history of dementia and family is unable to provide care for him.  During his stay we was treated for right foot cellulitis which has resolved.  Patient is MA pending, awaiting placement in long term care.       Medically stable for discharge when disposition plan in place     correction care admission.  Social work consulted.  Looking for LTC.  Is MA pending, SW working on. Once a bed is available patient can go anytime      Constipation  Started/continue on senna, have as needed miralax and dulcolax available     Right foot cellulitis.  Resolved with Keflex 4 times daily through 6/11.  Swelling and redness resolved.    Venous ultrasound negative for DVT.     Dementia with chronic aphasia.  Not on any medications.   As needed olanzapine ordered for agitation.placement     5.   Bradycardia            Patient was noted to have HR in 50s.  Asymptomatic     6. Paper work       Previous provider filled out guardianship papers on 7/13 for the daughter.           Diet: Combination Diet Regular Diet  Room Service    DVT Prophylaxis: Pneumatic Compression Devices  Maddox Catheter: Not present  Lines: None     Cardiac Monitoring: None  Code Status: No CPR- Do NOT Intubate  Dispo: Anticipate discharge when safe disposition plan in place; LTC facility being sought    Medically Ready for Discharge: [facility]            Interval History (Subjective):      No clinical change documented by nursing overnight.  Intermittently hypertensive    Pt denies concerns at this time.           Physical Exam:      Last Vital Signs:  BP (!) 161/65 (BP Location: Left arm)   Pulse 52   Temp 98.2  F (36.8  C) (Oral)   Resp 18   SpO2 96%       Intake/Output Summary (Last 24 hours)  "at 7/29/2024 1155  Last data filed at 7/29/2024 0930  Gross per 24 hour   Intake 320 ml   Output --   Net 320 ml       Constitutional: Awake, alert, cooperative, no apparent distress.  Pleasantly interactive.     Respiratory: No increased WOB   Cardiovascular: Well-perfused   Abdomen:    Skin: No rashes, no cyanosis, dry to touch   Neuro: Alert and awake, discourse is meandering.   Extremities: No edema or lesions   Other(s):        All other systems: Negative          Medications:      All current medications were reviewed with changes reflected in problem list.         Data:      All new lab and imaging data was reviewed.   Labs:       No results found for: \"NA\" No results found for: \"CHLORIDE\" No results found for: \"BUN\"   No results found for: \"POTASSIUM\" No results found for: \"CO2\" No results found for: \"CR\"     No results for input(s): \"WBC\", \"HGB\", \"HCT\", \"MCV\", \"PLT\" in the last 168 hours.   Imaging:   No results found for this or any previous visit (from the past 24 hour(s)).   "

## 2024-07-31 NOTE — PLAN OF CARE
"Goal Outcome Evaluation:      Plan of Care Reviewed With: patient    Overall Patient Progress: no changeOverall Patient Progress: no change    Outcome Evaluation: Continued jail cares.      Problem: Comorbidity Management  Goal: Maintenance of Behavioral Health Symptom Control  Outcome: Progressing     Problem: Pain Acute  Goal: Optimal Pain Control and Function  Outcome: Progressing     Problem: Adult Inpatient Plan of Care  Goal: Plan of Care Review  Description: The Plan of Care Review/Shift note should be completed every shift.  The Outcome Evaluation is a brief statement about your assessment that the patient is improving, declining, or no change.  This information will be displayed automatically on your shift  note.  Outcome: Progressing  Flowsheets (Taken 7/31/2024 1571)  Outcome Evaluation: Continued jail cares.  Plan of Care Reviewed With: patient  Overall Patient Progress: no change  Goal: Patient-Specific Goal (Individualized)  Description: You can add care plan individualizations to a care plan. Examples of Individualization might be:  \"Parent requests to be called daily at 9am for status\", \"I have a hard time hearing out of my right ear\", or \"Do not touch me to wake me up as it startles  me\".  Outcome: Progressing  Goal: Absence of Hospital-Acquired Illness or Injury  Outcome: Progressing  Goal: Optimal Comfort and Wellbeing  Outcome: Progressing  Goal: Readiness for Transition of Care  Outcome: Progressing     Problem: Fall Injury Risk  Goal: Absence of Fall and Fall-Related Injury  Outcome: Progressing     "

## 2024-07-31 NOTE — PLAN OF CARE
"Goal Outcome Evaluation:      Plan of Care Reviewed With: patient    Overall Patient Progress: no changeOverall Patient Progress: no change  Alert/ confused, denies pain.  Plan waiting LTC placement. Continue POC and monitoring.   Problem: Comorbidity Management  Goal: Maintenance of Behavioral Health Symptom Control  Outcome: Not Progressing  Intervention: Maintain Behavioral Health Symptom Control  Recent Flowsheet Documentation  Taken 7/30/2024 1620 by Erin Gifford RN  Medication Review/Management: medications reviewed     Problem: Pain Acute  Goal: Optimal Pain Control and Function  Outcome: Not Progressing  Intervention: Prevent or Manage Pain  Recent Flowsheet Documentation  Taken 7/30/2024 1620 by Erin Gifford RN  Sensory Stimulation Regulation: lighting decreased  Sleep/Rest Enhancement: awakenings minimized  Medication Review/Management: medications reviewed  Intervention: Optimize Psychosocial Wellbeing  Recent Flowsheet Documentation  Taken 7/30/2024 1620 by Erin Gifford RN  Supportive Measures: active listening utilized     Problem: Adult Inpatient Plan of Care  Goal: Plan of Care Review  Description: The Plan of Care Review/Shift note should be completed every shift.  The Outcome Evaluation is a brief statement about your assessment that the patient is improving, declining, or no change.  This information will be displayed automatically on your shift  note.  Outcome: Not Progressing  Flowsheets (Taken 7/30/2024 2349)  Plan of Care Reviewed With: patient  Overall Patient Progress: no change  Goal: Patient-Specific Goal (Individualized)  Description: You can add care plan individualizations to a care plan. Examples of Individualization might be:  \"Parent requests to be called daily at 9am for status\", \"I have a hard time hearing out of my right ear\", or \"Do not touch me to wake me up as it startles  me\".  Outcome: Not Progressing  Goal: Absence of Hospital-Acquired Illness or " Injury  Outcome: Not Progressing  Intervention: Identify and Manage Fall Risk  Recent Flowsheet Documentation  Taken 7/30/2024 1620 by Erin Gifford RN  Safety Promotion/Fall Prevention:   activity supervised   clutter free environment maintained  Intervention: Prevent Skin Injury  Recent Flowsheet Documentation  Taken 7/30/2024 1620 by Erin Gifford RN  Body Position: position changed independently  Skin Protection: adhesive use limited  Device Skin Pressure Protection: absorbent pad utilized/changed  Intervention: Prevent Infection  Recent Flowsheet Documentation  Taken 7/30/2024 1620 by Erin Gifford RN  Infection Prevention: personal protective equipment utilized  Goal: Optimal Comfort and Wellbeing  Outcome: Not Progressing  Goal: Readiness for Transition of Care  Outcome: Not Progressing

## 2024-07-31 NOTE — PLAN OF CARE
"  Problem: Comorbidity Management  Goal: Maintenance of Behavioral Health Symptom Control  Outcome: Not Progressing  Intervention: Maintain Behavioral Health Symptom Control  Recent Flowsheet Documentation  Taken 7/31/2024 0100 by Laurence Trevizo RN  Medication Review/Management: medications reviewed     Problem: Pain Acute  Goal: Optimal Pain Control and Function  Outcome: Not Progressing  Intervention: Prevent or Manage Pain  Recent Flowsheet Documentation  Taken 7/31/2024 0100 by Laurence Trevizo RN  Sensory Stimulation Regulation:   care clustered   lighting decreased  Sleep/Rest Enhancement:   awakenings minimized   comfort measures  Medication Review/Management: medications reviewed  Intervention: Optimize Psychosocial Wellbeing  Recent Flowsheet Documentation  Taken 7/31/2024 0100 by Laurence Trevizo RN  Supportive Measures: active listening utilized     Problem: Adult Inpatient Plan of Care  Goal: Plan of Care Review  Description: The Plan of Care Review/Shift note should be completed every shift.  The Outcome Evaluation is a brief statement about your assessment that the patient is improving, declining, or no change.  This information will be displayed automatically on your shift  note.  Outcome: Not Progressing  Flowsheets (Taken 7/31/2024 0633)  Outcome Evaluation: no issues overnight. residential cares continue.  Plan of Care Reviewed With: patient  Overall Patient Progress: no change  Goal: Patient-Specific Goal (Individualized)  Description: You can add care plan individualizations to a care plan. Examples of Individualization might be:  \"Parent requests to be called daily at 9am for status\", \"I have a hard time hearing out of my right ear\", or \"Do not touch me to wake me up as it startles  me\".  Outcome: Not Progressing  Goal: Absence of Hospital-Acquired Illness or Injury  Outcome: Not Progressing  Intervention: Identify and Manage Fall Risk  Recent Flowsheet Documentation  Taken 7/31/2024 0100 " by Laurence Trevizo, RN  Safety Promotion/Fall Prevention:   activity supervised   lighting adjusted   clutter free environment maintained   nonskid shoes/slippers when out of bed   safety round/check completed  Intervention: Prevent Skin Injury  Recent Flowsheet Documentation  Taken 7/31/2024 0100 by Laurence Trevizo, RN  Skin Protection: adhesive use limited  Device Skin Pressure Protection: absorbent pad utilized/changed  Intervention: Prevent Infection  Recent Flowsheet Documentation  Taken 7/31/2024 0248 by Laurence Trevizo, RN  Infection Prevention:   environmental surveillance performed   hand hygiene promoted  Goal: Optimal Comfort and Wellbeing  Outcome: Not Progressing  Goal: Readiness for Transition of Care  Outcome: Not Progressing   Goal Outcome Evaluation:

## 2024-08-01 PROCEDURE — 250N000013 HC RX MED GY IP 250 OP 250 PS 637: Performed by: INTERNAL MEDICINE

## 2024-08-01 PROCEDURE — 101N000002 HC CUSTODIAL CARE DAILY

## 2024-08-01 RX ADMIN — SENNOSIDES AND DOCUSATE SODIUM 1 TABLET: 8.6; 5 TABLET ORAL at 21:02

## 2024-08-01 ASSESSMENT — ACTIVITIES OF DAILY LIVING (ADL)
ADLS_ACUITY_SCORE: 46
ADLS_ACUITY_SCORE: 48
ADLS_ACUITY_SCORE: 46
ADLS_ACUITY_SCORE: 48
ADLS_ACUITY_SCORE: 48
ADLS_ACUITY_SCORE: 46
ADLS_ACUITY_SCORE: 48
ADLS_ACUITY_SCORE: 46
ADLS_ACUITY_SCORE: 48
ADLS_ACUITY_SCORE: 46
ADLS_ACUITY_SCORE: 48
ADLS_ACUITY_SCORE: 48
ADLS_ACUITY_SCORE: 46

## 2024-08-01 NOTE — PLAN OF CARE
Goal Outcome Evaluation:      Plan of Care Reviewed With: patient    Overall Patient Progress: no changeOverall Patient Progress: no change    Outcome Evaluation: continue [facility] cares  Alert to self, impulsive with getting out of bed.  Assist of one with walker.  Toelrating a regular diet.   Problem: Adult Inpatient Plan of Care  Goal: Plan of Care Review  Description: The Plan of Care Review/Shift note should be completed every shift.  The Outcome Evaluation is a brief statement about your assessment that the patient is improving, declining, or no change.  This information will be displayed automatically on your shift  note.  Recent Flowsheet Documentation  Taken 8/1/2024 1548 by Hamida Tam, RN  Outcome Evaluation: continue [facility] cares  Plan of Care Reviewed With: patient  Overall Patient Progress: no change  Goal: Absence of Hospital-Acquired Illness or Injury  Intervention: Identify and Manage Fall Risk  Recent Flowsheet Documentation  Taken 8/1/2024 1118 by Hamida Tam, RN  Safety Promotion/Fall Prevention:   safety round/check completed   patient and family education   nonskid shoes/slippers when out of bed   lighting adjusted   increase visualization of patient   increased rounding and observation   clutter free environment maintained

## 2024-08-01 NOTE — PROGRESS NOTES
[facility]  Hospitalist Progress Note  Guicho Rashid MD 08/01/2024    Reason for Stay (Diagnosis): placement         Assessment and Plan:      Summary of Stay: Jemal Gray is a 88-year-old male who was transitioned to assisted care on 6/5.  He has history of dementia and family is unable to provide care for him.  During his stay we was treated for right foot cellulitis which has resolved.  Patient is MA pending, awaiting placement in long term care.       Medically stable for discharge when disposition plan in place     halfway care admission.  Social work consulted.  Looking for LTC.  Is MA pending, SW working on. Once a bed is available patient can go anytime      Constipation  Started/continue on senna, have as needed miralax and dulcolax available     Right foot cellulitis.  Resolved with Keflex 4 times daily through 6/11.  Swelling and redness resolved.    Venous ultrasound negative for DVT.     Dementia with chronic aphasia.  Not on any medications.   As needed olanzapine ordered for agitation.placement     5.   Bradycardia            Patient was noted to have HR in 50s.  Asymptomatic     6. Paper work       Previous provider filled out guardianship papers on 7/13 for the daughter.           Diet: Combination Diet Regular Diet  Room Service    DVT Prophylaxis: Pneumatic Compression Devices  Maddxo Catheter: Not present  Lines: None     Cardiac Monitoring: None  Code Status: No CPR- Do NOT Intubate  Dispo: Anticipate discharge when safe disposition plan in place; LTC facility being sought    Medically Ready for Discharge: [facility]            Interval History (Subjective):      No clinical change documented by nursing overnight.  Intermittently hypertensive    I checked on the pt again. He denies concerns. No change in cares or status.           Physical Exam:      Last Vital Signs:  BP (!) 161/65 (BP Location: Left arm)   Pulse 52   Temp 98.2  F (36.8  C) (Oral)   Resp 18   SpO2 96%  "      Intake/Output Summary (Last 24 hours) at 7/29/2024 1155  Last data filed at 7/29/2024 0930  Gross per 24 hour   Intake 320 ml   Output --   Net 320 ml       Constitutional: Awake, alert, cooperative, no apparent distress.  Pleasantly interactive.     Respiratory: No increased WOB   Cardiovascular: Well-perfused   Abdomen:    Skin: No rashes, no cyanosis, dry to touch   Neuro: Alert and awake, discourse is meandering.   Extremities: No edema or lesions   Other(s):        All other systems: Negative          Medications:      All current medications were reviewed with changes reflected in problem list.         Data:      All new lab and imaging data was reviewed.   Labs:       No results found for: \"NA\" No results found for: \"CHLORIDE\" No results found for: \"BUN\"   No results found for: \"POTASSIUM\" No results found for: \"CO2\" No results found for: \"CR\"     No results for input(s): \"WBC\", \"HGB\", \"HCT\", \"MCV\", \"PLT\" in the last 168 hours.   Imaging:   No results found for this or any previous visit (from the past 24 hour(s)).   "

## 2024-08-01 NOTE — PLAN OF CARE
"Orientation: Alert / confused  Pain: denies pain  O2: RA  GI/: Ambulates to restroom. 1 person assist  Activity: A1 GB with walker.  Diet: regular diet  Protocols: none  Major shift events:  Plan: continue POC  Problem: Comorbidity Management  Goal: Maintenance of Behavioral Health Symptom Control  Outcome: Progressing     Problem: Pain Acute  Goal: Optimal Pain Control and Function  Outcome: Progressing     Problem: Adult Inpatient Plan of Care  Goal: Plan of Care Review  Description: The Plan of Care Review/Shift note should be completed every shift.  The Outcome Evaluation is a brief statement about your assessment that the patient is improving, declining, or no change.  This information will be displayed automatically on your shift  note.  Outcome: Progressing  Flowsheets (Taken 7/31/2024 4450)  Plan of Care Reviewed With: patient  Overall Patient Progress: no change  Goal: Patient-Specific Goal (Individualized)  Description: You can add care plan individualizations to a care plan. Examples of Individualization might be:  \"Parent requests to be called daily at 9am for status\", \"I have a hard time hearing out of my right ear\", or \"Do not touch me to wake me up as it startles  me\".  Outcome: Progressing  Goal: Absence of Hospital-Acquired Illness or Injury  Outcome: Progressing  Goal: Optimal Comfort and Wellbeing  Outcome: Progressing  Goal: Readiness for Transition of Care  Outcome: Progressing     Problem: Fall Injury Risk  Goal: Absence of Fall and Fall-Related Injury  Outcome: Progressing   Goal Outcome Evaluation:      Plan of Care Reviewed With: patient    Overall Patient Progress: no changeOverall Patient Progress: no change           "

## 2024-08-02 PROCEDURE — 250N000013 HC RX MED GY IP 250 OP 250 PS 637: Performed by: INTERNAL MEDICINE

## 2024-08-02 PROCEDURE — 101N000002 HC CUSTODIAL CARE DAILY

## 2024-08-02 RX ADMIN — SENNOSIDES AND DOCUSATE SODIUM 1 TABLET: 8.6; 5 TABLET ORAL at 22:05

## 2024-08-02 ASSESSMENT — ACTIVITIES OF DAILY LIVING (ADL)
ADLS_ACUITY_SCORE: 46
ADLS_ACUITY_SCORE: 48
ADLS_ACUITY_SCORE: 46
ADLS_ACUITY_SCORE: 48
ADLS_ACUITY_SCORE: 46
ADLS_ACUITY_SCORE: 48
ADLS_ACUITY_SCORE: 46

## 2024-08-02 NOTE — PLAN OF CARE
Alert to self, confused and impulsive, fall mats in place. Ambulates with Ax1 with walker. Tolerated diet. Awaiting placement to LTC.       Problem: Adult Inpatient Plan of Care  Goal: Plan of Care Review  Description: The Plan of Care Review/Shift note should be completed every shift.  The Outcome Evaluation is a brief statement about your assessment that the patient is improving, declining, or no change.  This information will be displayed automatically on your shift  note.  Outcome: Progressing  Flowsheets (Taken 8/1/2024 2118)  Outcome Evaluation: awaitng placement  Overall Patient Progress: no change     Goal Outcome Evaluation:    Overall Patient Progress: no change    Outcome Evaluation: awaitng placement

## 2024-08-02 NOTE — PROGRESS NOTES
[facility]  Hospitalist Progress Note  Guicho Rashid MD 08/02/2024    Reason for Stay (Diagnosis): placement         Assessment and Plan:      Summary of Stay: Jemal Gray is a 88-year-old male who was transitioned to senior living care on 6/5.  He has history of dementia and family is unable to provide care for him.  During his stay we was treated for right foot cellulitis which has resolved.  Patient is MA pending, awaiting placement in long term care.       Medically stable for discharge when disposition plan in place     shelter care admission.  Social work consulted.  Looking for LTC.  Is MA pending, SW working on. Once a bed is available patient can go anytime      Constipation  Started/continue on senna, have as needed miralax and dulcolax available     Right foot cellulitis.  Resolved with Keflex 4 times daily through 6/11.  Swelling and redness resolved.    Venous ultrasound negative for DVT.     Dementia with chronic aphasia.  Not on any medications.   As needed olanzapine ordered for agitation.placement     5.   Bradycardia            Patient was noted to have HR in 50s.  Asymptomatic     6. Paper work       Previous provider filled out guardianship papers on 7/13 for the daughter.           Diet: Combination Diet Regular Diet  Room Service    DVT Prophylaxis: Pneumatic Compression Devices  Maddox Catheter: Not present  Lines: None     Cardiac Monitoring: None  Code Status: No CPR- Do NOT Intubate  Dispo: Anticipate discharge when safe disposition plan in place; LTC facility being sought    Medically Ready for Discharge: Ready Now            Interval History (Subjective):      No clinical change documented by nursing overnight.  Today, when I entered the room, the patient seemed very confused.  He wanted to eat lunch and did not seem to understand that he needed to wait in the room for his lunch to come.  He clearly is not oriented to the situation.  He also could not be reassured.   "Fortunately, he did not become severely agitated or combative.           Physical Exam:      Last Vital Signs:  /67 (BP Location: Left arm)   Pulse 50   Temp 98.4  F (36.9  C) (Oral)   Resp 18   SpO2 98%       Intake/Output Summary (Last 24 hours) at 7/29/2024 1155  Last data filed at 7/29/2024 0930  Gross per 24 hour   Intake 320 ml   Output --   Net 320 ml       Constitutional: Awake, alert, cooperative, no apparent distress.  Pleasantly interactive.     Respiratory: No increased WOB   Cardiovascular: Well-perfused   Abdomen:    Skin: No rashes, no cyanosis, dry to touch   Neuro: Alert and awake, discourse is meandering.   Extremities: No edema or lesions   Other(s):        All other systems: Negative          Medications:      All current medications were reviewed with changes reflected in problem list.         Data:      All new lab and imaging data was reviewed.   Labs:       No results found for: \"NA\" No results found for: \"CHLORIDE\" No results found for: \"BUN\"   No results found for: \"POTASSIUM\" No results found for: \"CO2\" No results found for: \"CR\"     No results for input(s): \"WBC\", \"HGB\", \"HCT\", \"MCV\", \"PLT\" in the last 168 hours.   Imaging:   No results found for this or any previous visit (from the past 24 hour(s)).   " 1

## 2024-08-02 NOTE — PLAN OF CARE
"Alert to self only. Impulsive. Up to bathroom multiple times. Awaiting placement.     Goal Outcome Evaluation:      Plan of Care Reviewed With: patient    Overall Patient Progress: no changeOverall Patient Progress: no change    Outcome Evaluation: Awaiting placement      Problem: Adult Inpatient Plan of Care  Goal: Plan of Care Review  Description: The Plan of Care Review/Shift note should be completed every shift.  The Outcome Evaluation is a brief statement about your assessment that the patient is improving, declining, or no change.  This information will be displayed automatically on your shift  note.  Outcome: Progressing  Flowsheets (Taken 8/2/2024 0423)  Outcome Evaluation: Awaiting placement  Plan of Care Reviewed With: patient  Overall Patient Progress: no change  Goal: Patient-Specific Goal (Individualized)  Description: You can add care plan individualizations to a care plan. Examples of Individualization might be:  \"Parent requests to be called daily at 9am for status\", \"I have a hard time hearing out of my right ear\", or \"Do not touch me to wake me up as it startles  me\".  Outcome: Progressing  Goal: Absence of Hospital-Acquired Illness or Injury  Outcome: Progressing  Intervention: Identify and Manage Fall Risk  Recent Flowsheet Documentation  Taken 8/2/2024 0100 by Justine Cheung, RN  Safety Promotion/Fall Prevention:   lighting adjusted   clutter free environment maintained   nonskid shoes/slippers when out of bed  Intervention: Prevent Skin Injury  Recent Flowsheet Documentation  Taken 8/2/2024 0100 by Justine Cheung, RN  Body Position: position changed independently  Intervention: Prevent Infection  Recent Flowsheet Documentation  Taken 8/2/2024 0100 by Justine Cheung, RN  Infection Prevention: single patient room provided  Goal: Optimal Comfort and Wellbeing  Outcome: Progressing  Goal: Readiness for Transition of Care  Outcome: Progressing     "

## 2024-08-02 NOTE — PLAN OF CARE
"Goal Outcome Evaluation:      Plan of Care Reviewed With: patient        [facility]  Daily Vitals: /67 (BP Location: Left arm)   Pulse 50   Temp 98.4  F (36.9  C) (Oral)   Resp 18   SpO2 98%   LDAs: no access  Diet: regular   Activity: Ax1 GB/walker - not call light appropriate - bed alarm on   Pain: 0 on PAINAD scale   Plan: SW following for placement; weekly assessments on Monday       Problem: Adult Inpatient Plan of Care  Goal: Plan of Care Review  Description: The Plan of Care Review/Shift note should be completed every shift.  The Outcome Evaluation is a brief statement about your assessment that the patient is improving, declining, or no change.  This information will be displayed automatically on your shift  note.  Outcome: Progressing  Flowsheets (Taken 8/2/2024 1439)  Plan of Care Reviewed With: patient  Goal: Patient-Specific Goal (Individualized)  Description: You can add care plan individualizations to a care plan. Examples of Individualization might be:  \"Parent requests to be called daily at 9am for status\", \"I have a hard time hearing out of my right ear\", or \"Do not touch me to wake me up as it startles  me\".  Outcome: Progressing  Goal: Absence of Hospital-Acquired Illness or Injury  Outcome: Progressing  Goal: Optimal Comfort and Wellbeing  Outcome: Progressing  Goal: Readiness for Transition of Care  Outcome: Progressing         "

## 2024-08-03 PROCEDURE — 101N000002 HC CUSTODIAL CARE DAILY

## 2024-08-03 PROCEDURE — 250N000013 HC RX MED GY IP 250 OP 250 PS 637: Performed by: NURSE PRACTITIONER

## 2024-08-03 RX ADMIN — QUETIAPINE FUMARATE 12.5 MG: 25 TABLET ORAL at 14:41

## 2024-08-03 ASSESSMENT — ACTIVITIES OF DAILY LIVING (ADL)
ADLS_ACUITY_SCORE: 47
ADLS_ACUITY_SCORE: 48
ADLS_ACUITY_SCORE: 47
ADLS_ACUITY_SCORE: 47
ADLS_ACUITY_SCORE: 48
ADLS_ACUITY_SCORE: 47
ADLS_ACUITY_SCORE: 48
ADLS_ACUITY_SCORE: 47
ADLS_ACUITY_SCORE: 51
ADLS_ACUITY_SCORE: 47
ADLS_ACUITY_SCORE: 48
ADLS_ACUITY_SCORE: 48
ADLS_ACUITY_SCORE: 47
ADLS_ACUITY_SCORE: 47
ADLS_ACUITY_SCORE: 51
ADLS_ACUITY_SCORE: 48
ADLS_ACUITY_SCORE: 51
ADLS_ACUITY_SCORE: 47

## 2024-08-03 NOTE — PLAN OF CARE
Patient is alert but has confusion, was up multiple times at night to go to the bathroom, continent of bowel and bladder, SBA with transfers, slept well at night overall.       Problem: Adult Inpatient Plan of Care  Goal: Absence of Hospital-Acquired Illness or Injury  Intervention: Identify and Manage Fall Risk  Recent Flowsheet Documentation  Taken 8/3/2024 0109 by Mere Valerio RN  Safety Promotion/Fall Prevention:   activity supervised   clutter free environment maintained   lighting adjusted   nonskid shoes/slippers when out of bed   patient and family education   room near nurse's station   room organization consistent   safety round/check completed   supervised activity  Intervention: Prevent Skin Injury  Recent Flowsheet Documentation  Taken 8/3/2024 0109 by Mere Valerio RN  Body Position: position changed independently  Intervention: Prevent Infection  Recent Flowsheet Documentation  Taken 8/3/2024 0109 by Mere Valerio RN  Infection Prevention:   hand hygiene promoted   rest/sleep promoted   single patient room provided     Problem: Delirium  Goal: Improved Behavioral Control  Intervention: Minimize Safety Risk  Recent Flowsheet Documentation  Taken 8/3/2024 0109 by Mere Valerio RN  Enhanced Safety Measures:   floor mats   room near unit station     Problem: Adult Inpatient Plan of Care  Goal: Absence of Hospital-Acquired Illness or Injury  Intervention: Identify and Manage Fall Risk  Recent Flowsheet Documentation  Taken 8/3/2024 0109 by Mere Valerio RN  Safety Promotion/Fall Prevention:   activity supervised   clutter free environment maintained   lighting adjusted   nonskid shoes/slippers when out of bed   patient and family education   room near nurse's station   room organization consistent   safety round/check completed   supervised activity  Intervention: Prevent Skin Injury  Recent Flowsheet Documentation  Taken 8/3/2024 0109 by Mere Valerio RN  Body Position: position changed  independently  Intervention: Prevent Infection  Recent Flowsheet Documentation  Taken 8/3/2024 0109 by Mere Valerio RN  Infection Prevention:   hand hygiene promoted   rest/sleep promoted   single patient room provided     Problem: Fall Injury Risk  Goal: Absence of Fall and Fall-Related Injury  Intervention: Identify and Manage Contributors  Recent Flowsheet Documentation  Taken 8/3/2024 0109 by Mere Valerio RN  Medication Review/Management: medications reviewed  Intervention: Promote Injury-Free Environment  Recent Flowsheet Documentation  Taken 8/3/2024 0109 by Mere Valerio RN  Safety Promotion/Fall Prevention:   activity supervised   clutter free environment maintained   lighting adjusted   nonskid shoes/slippers when out of bed   patient and family education   room near nurse's station   room organization consistent   safety round/check completed   supervised activity     Problem: Adult Inpatient Plan of Care  Goal: Absence of Hospital-Acquired Illness or Injury  Intervention: Identify and Manage Fall Risk  Recent Flowsheet Documentation  Taken 8/3/2024 0109 by Mere Valerio RN  Safety Promotion/Fall Prevention:   activity supervised   clutter free environment maintained   lighting adjusted   nonskid shoes/slippers when out of bed   patient and family education   room near nurse's station   room organization consistent   safety round/check completed   supervised activity  Intervention: Prevent Skin Injury  Recent Flowsheet Documentation  Taken 8/3/2024 0109 by Mere Valerio RN  Body Position: position changed independently  Intervention: Prevent Infection  Recent Flowsheet Documentation  Taken 8/3/2024 0109 by Mere Valerio RN  Infection Prevention:   hand hygiene promoted   rest/sleep promoted   single patient room provided     Problem: Fall Injury Risk  Goal: Absence of Fall and Fall-Related Injury  Intervention: Identify and Manage Contributors  Recent Flowsheet Documentation  Taken 8/3/2024 0109 by  Mere Valerio RN  Medication Review/Management: medications reviewed  Intervention: Promote Injury-Free Environment  Recent Flowsheet Documentation  Taken 8/3/2024 0109 by Mere Valerio RN  Safety Promotion/Fall Prevention:   activity supervised   clutter free environment maintained   lighting adjusted   nonskid shoes/slippers when out of bed   patient and family education   room near nurse's station   room organization consistent   safety round/check completed   supervised activity     Problem: Fall Injury Risk  Goal: Absence of Fall and Fall-Related Injury  Intervention: Identify and Manage Contributors  Recent Flowsheet Documentation  Taken 8/3/2024 0109 by Mere Valerio RN  Medication Review/Management: medications reviewed  Intervention: Promote Injury-Free Environment  Recent Flowsheet Documentation  Taken 8/3/2024 0109 by Mere Valerio RN  Safety Promotion/Fall Prevention:   activity supervised   clutter free environment maintained   lighting adjusted   nonskid shoes/slippers when out of bed   patient and family education   room near nurse's station   room organization consistent   safety round/check completed   supervised activity     Problem: Adult Inpatient Plan of Care  Goal: Absence of Hospital-Acquired Illness or Injury  Intervention: Identify and Manage Fall Risk  Recent Flowsheet Documentation  Taken 8/3/2024 0109 by Mere Valerio RN  Safety Promotion/Fall Prevention:   activity supervised   clutter free environment maintained   lighting adjusted   nonskid shoes/slippers when out of bed   patient and family education   room near nurse's station   room organization consistent   safety round/check completed   supervised activity  Intervention: Prevent Skin Injury  Recent Flowsheet Documentation  Taken 8/3/2024 0109 by Mere Valerio, RN  Body Position: position changed independently  Intervention: Prevent Infection  Recent Flowsheet Documentation  Taken 8/3/2024 0109 by Mere Valerio RN  Infection  Prevention:   hand hygiene promoted   rest/sleep promoted   single patient room provided     Problem: Adult Inpatient Plan of Care  Goal: Absence of Hospital-Acquired Illness or Injury  Intervention: Identify and Manage Fall Risk  Recent Flowsheet Documentation  Taken 8/3/2024 0109 by Mere Valerio RN  Safety Promotion/Fall Prevention:   activity supervised   clutter free environment maintained   lighting adjusted   nonskid shoes/slippers when out of bed   patient and family education   room near nurse's station   room organization consistent   safety round/check completed   supervised activity  Intervention: Prevent Skin Injury  Recent Flowsheet Documentation  Taken 8/3/2024 0109 by Mere Valerio RN  Body Position: position changed independently  Intervention: Prevent Infection  Recent Flowsheet Documentation  Taken 8/3/2024 0109 by Mere Valerio RN  Infection Prevention:   hand hygiene promoted   rest/sleep promoted   single patient room provided     Problem: Fall Injury Risk  Goal: Absence of Fall and Fall-Related Injury  Intervention: Identify and Manage Contributors  Recent Flowsheet Documentation  Taken 8/3/2024 0109 by Mere Valerio RN  Medication Review/Management: medications reviewed  Intervention: Promote Injury-Free Environment  Recent Flowsheet Documentation  Taken 8/3/2024 0109 by Mere Valerio RN  Safety Promotion/Fall Prevention:   activity supervised   clutter free environment maintained   lighting adjusted   nonskid shoes/slippers when out of bed   patient and family education   room near nurse's station   room organization consistent   safety round/check completed   supervised activity     Problem: Fall Injury Risk  Goal: Absence of Fall and Fall-Related Injury  Intervention: Identify and Manage Contributors  Recent Flowsheet Documentation  Taken 8/3/2024 0109 by Mere Valerio RN  Medication Review/Management: medications reviewed  Intervention: Promote Injury-Free Environment  Recent  Flowsheet Documentation  Taken 8/3/2024 0109 by Mere Valerio RN  Safety Promotion/Fall Prevention:   activity supervised   clutter free environment maintained   lighting adjusted   nonskid shoes/slippers when out of bed   patient and family education   room near nurse's station   room organization consistent   safety round/check completed   supervised activity     Problem: Adult Inpatient Plan of Care  Goal: Absence of Hospital-Acquired Illness or Injury  Intervention: Identify and Manage Fall Risk  Recent Flowsheet Documentation  Taken 8/3/2024 0109 by Mere Valerio RN  Safety Promotion/Fall Prevention:   activity supervised   clutter free environment maintained   lighting adjusted   nonskid shoes/slippers when out of bed   patient and family education   room near nurse's station   room organization consistent   safety round/check completed   supervised activity  Intervention: Prevent Skin Injury  Recent Flowsheet Documentation  Taken 8/3/2024 0109 by Mere Valerio RN  Body Position: position changed independently  Intervention: Prevent Infection  Recent Flowsheet Documentation  Taken 8/3/2024 0109 by Mere Valerio RN  Infection Prevention:   hand hygiene promoted   rest/sleep promoted   single patient room provided     Problem: Adult Inpatient Plan of Care  Goal: Absence of Hospital-Acquired Illness or Injury  Intervention: Identify and Manage Fall Risk  Recent Flowsheet Documentation  Taken 8/3/2024 0109 by Mere Valerio RN  Safety Promotion/Fall Prevention:   activity supervised   clutter free environment maintained   lighting adjusted   nonskid shoes/slippers when out of bed   patient and family education   room near nurse's station   room organization consistent   safety round/check completed   supervised activity  Intervention: Prevent Skin Injury  Recent Flowsheet Documentation  Taken 8/3/2024 0109 by Mere Valerio RN  Body Position: position changed independently  Intervention: Prevent  Infection  Recent Flowsheet Documentation  Taken 8/3/2024 0109 by Mere Valerio RN  Infection Prevention:   hand hygiene promoted   rest/sleep promoted   single patient room provided     Problem: Comorbidity Management  Goal: Maintenance of Asthma Control  Intervention: Maintain Asthma Symptom Control  Recent Flowsheet Documentation  Taken 8/3/2024 0109 by Mere Valerio RN  Medication Review/Management: medications reviewed  Goal: Maintenance of Behavioral Health Symptom Control  Intervention: Maintain Behavioral Health Symptom Control  Recent Flowsheet Documentation  Taken 8/3/2024 0109 by Mere Valerio RN  Medication Review/Management: medications reviewed  Goal: Maintenance of COPD Symptom Control  Intervention: Maintain COPD Symptom Control  Recent Flowsheet Documentation  Taken 8/3/2024 0109 by Mere Valerio RN  Medication Review/Management: medications reviewed  Goal: Blood Glucose Levels Within Targeted Range  Intervention: Monitor and Manage Glycemia  Recent Flowsheet Documentation  Taken 8/3/2024 0109 by Mere Valerio RN  Medication Review/Management: medications reviewed  Goal: Maintenance of Heart Failure Symptom Control  Intervention: Maintain Heart Failure Management  Recent Flowsheet Documentation  Taken 8/3/2024 0109 by Mere Valerio RN  Medication Review/Management: medications reviewed  Goal: Blood Pressure in Desired Range  Intervention: Maintain Blood Pressure Management  Recent Flowsheet Documentation  Taken 8/3/2024 0109 by Mere Valerio RN  Medication Review/Management: medications reviewed  Goal: Maintenance of Osteoarthritis Symptom Control  Intervention: Maintain Osteoarthritis Symptom Control  Recent Flowsheet Documentation  Taken 8/3/2024 0109 by Mere Valerio RN  Medication Review/Management: medications reviewed  Goal: Bariatric Home Regimen Maintained  Intervention: Maintain and Manage Postbariatric Surgery Care  Recent Flowsheet Documentation  Taken 8/3/2024 0109 by Teodoro  BALDOMERO Severino  Medication Review/Management: medications reviewed  Goal: Maintenance of Seizure Control  Intervention: Maintain Seizure Symptom Control  Recent Flowsheet Documentation  Taken 8/3/2024 0109 by Mere Valerio RN  Medication Review/Management: medications reviewed     Problem: Pain Acute  Goal: Optimal Pain Control and Function  Intervention: Prevent or Manage Pain  Recent Flowsheet Documentation  Taken 8/3/2024 0109 by Mere Valerio RN  Medication Review/Management: medications reviewed     Problem: Adult Inpatient Plan of Care  Goal: Absence of Hospital-Acquired Illness or Injury  Intervention: Identify and Manage Fall Risk  Recent Flowsheet Documentation  Taken 8/3/2024 0109 by Mere Valerio RN  Safety Promotion/Fall Prevention:   activity supervised   clutter free environment maintained   lighting adjusted   nonskid shoes/slippers when out of bed   patient and family education   room near nurse's station   room organization consistent   safety round/check completed   supervised activity  Intervention: Prevent Skin Injury  Recent Flowsheet Documentation  Taken 8/3/2024 0109 by Mere Valerio RN  Body Position: position changed independently  Intervention: Prevent Infection  Recent Flowsheet Documentation  Taken 8/3/2024 0109 by Mere Valerio RN  Infection Prevention:   hand hygiene promoted   rest/sleep promoted   single patient room provided     Problem: Fall Injury Risk  Goal: Absence of Fall and Fall-Related Injury  Intervention: Identify and Manage Contributors  Recent Flowsheet Documentation  Taken 8/3/2024 0109 by Mere Valerio RN  Medication Review/Management: medications reviewed  Intervention: Promote Injury-Free Environment  Recent Flowsheet Documentation  Taken 8/3/2024 0109 by Mere Valerio RN  Safety Promotion/Fall Prevention:   activity supervised   clutter free environment maintained   lighting adjusted   nonskid shoes/slippers when out of bed   patient and family education   room  near nurse's station   room organization consistent   safety round/check completed   supervised activity     Problem: Adult Inpatient Plan of Care  Goal: Absence of Hospital-Acquired Illness or Injury  Intervention: Identify and Manage Fall Risk  Recent Flowsheet Documentation  Taken 8/3/2024 0109 by Mere Valerio, RN  Safety Promotion/Fall Prevention:   activity supervised   clutter free environment maintained   lighting adjusted   nonskid shoes/slippers when out of bed   patient and family education   room near nurse's station   room organization consistent   safety round/check completed   supervised activity  Intervention: Prevent Skin Injury  Recent Flowsheet Documentation  Taken 8/3/2024 0109 by Mere Valerio, RN  Body Position: position changed independently  Intervention: Prevent Infection  Recent Flowsheet Documentation  Taken 8/3/2024 0109 by Mere Valerio, RN  Infection Prevention:   hand hygiene promoted   rest/sleep promoted   single patient room provided

## 2024-08-03 NOTE — PLAN OF CARE
"Goal Outcome Evaluation:      Plan of Care Reviewed With: patient    Overall Patient Progress: no changeOverall Patient Progress: no change    Outcome Evaluation: denies pain. Waiting Placement.    Alert/confused, VSS daily, SBA, BA on, weekly assessment on Monday. Plan LTC facility.  Continue POC and monitoring.   Problem: Adult Inpatient Plan of Care  Goal: Plan of Care Review  Description: The Plan of Care Review/Shift note should be completed every shift.  The Outcome Evaluation is a brief statement about your assessment that the patient is improving, declining, or no change.  This information will be displayed automatically on your shift  note.  Outcome: Adequate for Care Transition  Flowsheets (Taken 8/2/2024 0537)  Outcome Evaluation: denies pain. Waiting Placement.  Plan of Care Reviewed With: patient  Overall Patient Progress: no change  Goal: Patient-Specific Goal (Individualized)  Description: You can add care plan individualizations to a care plan. Examples of Individualization might be:  \"Parent requests to be called daily at 9am for status\", \"I have a hard time hearing out of my right ear\", or \"Do not touch me to wake me up as it startles  me\".  Outcome: Adequate for Care Transition  Goal: Absence of Hospital-Acquired Illness or Injury  Outcome: Adequate for Care Transition  Intervention: Identify and Manage Fall Risk  Recent Flowsheet Documentation  Taken 8/2/2024 1600 by Erin Gifford RN  Safety Promotion/Fall Prevention:   activity supervised   treat underlying cause   treat reversible contributory factors   lighting adjusted  Intervention: Prevent Skin Injury  Recent Flowsheet Documentation  Taken 8/2/2024 1600 by Erin Gifford RN  Body Position: position changed independently  Intervention: Prevent Infection  Recent Flowsheet Documentation  Taken 8/2/2024 1600 by Erin Gifford RN  Infection Prevention: single patient room provided  Goal: Optimal Comfort and Wellbeing  Outcome: Adequate " for Care Transition  Goal: Readiness for Transition of Care  Outcome: Adequate for Care Transition

## 2024-08-03 NOTE — PLAN OF CARE
Vss A&O to person only. Bed alarm on. Pt impulsive does not call . Floor uday next to bed. Ambulated x3 in quiles. Denies pain. Eating well.   Problem: Adult Inpatient Plan of Care  Goal: Absence of Hospital-Acquired Illness or Injury  Intervention: Identify and Manage Fall Risk  Recent Flowsheet Documentation  Taken 8/3/2024 1158 by Anselmo Best RN  Safety Promotion/Fall Prevention:   activity supervised   assistive device/personal items within reach   clutter free environment maintained   increased rounding and observation   increase visualization of patient   lighting adjusted   mobility aid in reach   nonskid shoes/slippers when out of bed   room organization consistent   safety round/check completed  Taken 8/3/2024 0947 by Anselmo Best RN  Safety Promotion/Fall Prevention:   activity supervised   assistive device/personal items within reach   nonskid shoes/slippers when out of bed   patient and family education  Taken 8/3/2024 0730 by Anselmo Best RN  Safety Promotion/Fall Prevention:   activity supervised   assistive device/personal items within reach   clutter free environment maintained   increased rounding and observation   increase visualization of patient   lighting adjusted   nonskid shoes/slippers when out of bed   mobility aid in reach   patient and family education   room organization consistent   safety round/check completed  Intervention: Prevent Skin Injury  Recent Flowsheet Documentation  Taken 8/3/2024 0730 by Anselmo Best RN  Body Position:   position changed independently   weight shifting   log-rolled   legs elevated   heels elevated     Problem: Delirium  Goal: Improved Behavioral Control  Intervention: Minimize Safety Risk  Recent Flowsheet Documentation  Taken 8/3/2024 1158 by Anselmo Best RN  Enhanced Safety Measures: (foam uday on floor) other (see comments)     Problem: Adult Inpatient Plan of Care  Goal: Absence of Hospital-Acquired Illness or  Injury  Intervention: Identify and Manage Fall Risk  Recent Flowsheet Documentation  Taken 8/3/2024 1158 by Anselmo Best RN  Safety Promotion/Fall Prevention:   activity supervised   assistive device/personal items within reach   clutter free environment maintained   increased rounding and observation   increase visualization of patient   lighting adjusted   mobility aid in reach   nonskid shoes/slippers when out of bed   room organization consistent   safety round/check completed  Taken 8/3/2024 0947 by Anselmo Best RN  Safety Promotion/Fall Prevention:   activity supervised   assistive device/personal items within reach   nonskid shoes/slippers when out of bed   patient and family education  Taken 8/3/2024 0730 by Anselmo Best RN  Safety Promotion/Fall Prevention:   activity supervised   assistive device/personal items within reach   clutter free environment maintained   increased rounding and observation   increase visualization of patient   lighting adjusted   nonskid shoes/slippers when out of bed   mobility aid in reach   patient and family education   room organization consistent   safety round/check completed  Intervention: Prevent Skin Injury  Recent Flowsheet Documentation  Taken 8/3/2024 0730 by Anselmo Best RN  Body Position:   position changed independently   weight shifting   log-rolled   legs elevated   heels elevated     Problem: Fall Injury Risk  Goal: Absence of Fall and Fall-Related Injury  Intervention: Identify and Manage Contributors  Recent Flowsheet Documentation  Taken 8/3/2024 1158 by Anselmo Best RN  Medication Review/Management: medications reviewed  Intervention: Promote Injury-Free Environment  Recent Flowsheet Documentation  Taken 8/3/2024 1158 by Anselmo Best RN  Safety Promotion/Fall Prevention:   activity supervised   assistive device/personal items within reach   clutter free environment maintained   increased rounding and observation   increase  visualization of patient   lighting adjusted   mobility aid in reach   nonskid shoes/slippers when out of bed   room organization consistent   safety round/check completed  Taken 8/3/2024 0947 by Anselmo Best RN  Safety Promotion/Fall Prevention:   activity supervised   assistive device/personal items within reach   nonskid shoes/slippers when out of bed   patient and family education  Taken 8/3/2024 0730 by Anselmo Best RN  Safety Promotion/Fall Prevention:   activity supervised   assistive device/personal items within reach   clutter free environment maintained   increased rounding and observation   increase visualization of patient   lighting adjusted   nonskid shoes/slippers when out of bed   mobility aid in reach   patient and family education   room organization consistent   safety round/check completed     Problem: Adult Inpatient Plan of Care  Goal: Absence of Hospital-Acquired Illness or Injury  Intervention: Identify and Manage Fall Risk  Recent Flowsheet Documentation  Taken 8/3/2024 1158 by Anselmo Best RN  Safety Promotion/Fall Prevention:   activity supervised   assistive device/personal items within reach   clutter free environment maintained   increased rounding and observation   increase visualization of patient   lighting adjusted   mobility aid in reach   nonskid shoes/slippers when out of bed   room organization consistent   safety round/check completed  Taken 8/3/2024 0947 by Anselmo Best RN  Safety Promotion/Fall Prevention:   activity supervised   assistive device/personal items within reach   nonskid shoes/slippers when out of bed   patient and family education  Taken 8/3/2024 0730 by Anselmo Best RN  Safety Promotion/Fall Prevention:   activity supervised   assistive device/personal items within reach   clutter free environment maintained   increased rounding and observation   increase visualization of patient   lighting adjusted   nonskid shoes/slippers when  out of bed   mobility aid in reach   patient and family education   room organization consistent   safety round/check completed  Intervention: Prevent Skin Injury  Recent Flowsheet Documentation  Taken 8/3/2024 0730 by Anselmo Best RN  Body Position:   position changed independently   weight shifting   log-rolled   legs elevated   heels elevated     Problem: Fall Injury Risk  Goal: Absence of Fall and Fall-Related Injury  Intervention: Identify and Manage Contributors  Recent Flowsheet Documentation  Taken 8/3/2024 1158 by Anselmo Best RN  Medication Review/Management: medications reviewed  Intervention: Promote Injury-Free Environment  Recent Flowsheet Documentation  Taken 8/3/2024 1158 by Anselmo Best RN  Safety Promotion/Fall Prevention:   activity supervised   assistive device/personal items within reach   clutter free environment maintained   increased rounding and observation   increase visualization of patient   lighting adjusted   mobility aid in reach   nonskid shoes/slippers when out of bed   room organization consistent   safety round/check completed  Taken 8/3/2024 0947 by nAselmo Best RN  Safety Promotion/Fall Prevention:   activity supervised   assistive device/personal items within reach   nonskid shoes/slippers when out of bed   patient and family education  Taken 8/3/2024 0730 by Anselmo Best RN  Safety Promotion/Fall Prevention:   activity supervised   assistive device/personal items within reach   clutter free environment maintained   increased rounding and observation   increase visualization of patient   lighting adjusted   nonskid shoes/slippers when out of bed   mobility aid in reach   patient and family education   room organization consistent   safety round/check completed     Problem: Fall Injury Risk  Goal: Absence of Fall and Fall-Related Injury  Intervention: Identify and Manage Contributors  Recent Flowsheet Documentation  Taken 8/3/2024 1158 by Nasir  Anselmo EDWARD RN  Medication Review/Management: medications reviewed  Intervention: Promote Injury-Free Environment  Recent Flowsheet Documentation  Taken 8/3/2024 1158 by Anselmo Best RN  Safety Promotion/Fall Prevention:   activity supervised   assistive device/personal items within reach   clutter free environment maintained   increased rounding and observation   increase visualization of patient   lighting adjusted   mobility aid in reach   nonskid shoes/slippers when out of bed   room organization consistent   safety round/check completed  Taken 8/3/2024 0947 by Anselmo Best RN  Safety Promotion/Fall Prevention:   activity supervised   assistive device/personal items within reach   nonskid shoes/slippers when out of bed   patient and family education  Taken 8/3/2024 0730 by Anselmo Best RN  Safety Promotion/Fall Prevention:   activity supervised   assistive device/personal items within reach   clutter free environment maintained   increased rounding and observation   increase visualization of patient   lighting adjusted   nonskid shoes/slippers when out of bed   mobility aid in reach   patient and family education   room organization consistent   safety round/check completed     Problem: Adult Inpatient Plan of Care  Goal: Absence of Hospital-Acquired Illness or Injury  Intervention: Identify and Manage Fall Risk  Recent Flowsheet Documentation  Taken 8/3/2024 1158 by Anselmo Best RN  Safety Promotion/Fall Prevention:   activity supervised   assistive device/personal items within reach   clutter free environment maintained   increased rounding and observation   increase visualization of patient   lighting adjusted   mobility aid in reach   nonskid shoes/slippers when out of bed   room organization consistent   safety round/check completed  Taken 8/3/2024 0947 by Anselmo Best RN  Safety Promotion/Fall Prevention:   activity supervised   assistive device/personal items within reach    nonskid shoes/slippers when out of bed   patient and family education  Taken 8/3/2024 0730 by Anselmo Best RN  Safety Promotion/Fall Prevention:   activity supervised   assistive device/personal items within reach   clutter free environment maintained   increased rounding and observation   increase visualization of patient   lighting adjusted   nonskid shoes/slippers when out of bed   mobility aid in reach   patient and family education   room organization consistent   safety round/check completed  Intervention: Prevent Skin Injury  Recent Flowsheet Documentation  Taken 8/3/2024 0730 by Anselmo Best RN  Body Position:   position changed independently   weight shifting   log-rolled   legs elevated   heels elevated     Problem: Adult Inpatient Plan of Care  Goal: Absence of Hospital-Acquired Illness or Injury  Intervention: Identify and Manage Fall Risk  Recent Flowsheet Documentation  Taken 8/3/2024 1158 by Anselmo Best RN  Safety Promotion/Fall Prevention:   activity supervised   assistive device/personal items within reach   clutter free environment maintained   increased rounding and observation   increase visualization of patient   lighting adjusted   mobility aid in reach   nonskid shoes/slippers when out of bed   room organization consistent   safety round/check completed  Taken 8/3/2024 0947 by Anselmo Best RN  Safety Promotion/Fall Prevention:   activity supervised   assistive device/personal items within reach   nonskid shoes/slippers when out of bed   patient and family education  Taken 8/3/2024 0730 by Anselmo Best RN  Safety Promotion/Fall Prevention:   activity supervised   assistive device/personal items within reach   clutter free environment maintained   increased rounding and observation   increase visualization of patient   lighting adjusted   nonskid shoes/slippers when out of bed   mobility aid in reach   patient and family education   room organization consistent    safety round/check completed  Intervention: Prevent Skin Injury  Recent Flowsheet Documentation  Taken 8/3/2024 0730 by Anselmo Best RN  Body Position:   position changed independently   weight shifting   log-rolled   legs elevated   heels elevated     Problem: Fall Injury Risk  Goal: Absence of Fall and Fall-Related Injury  Intervention: Identify and Manage Contributors  Recent Flowsheet Documentation  Taken 8/3/2024 1158 by Anselmo Best RN  Medication Review/Management: medications reviewed  Intervention: Promote Injury-Free Environment  Recent Flowsheet Documentation  Taken 8/3/2024 1158 by Anselmo Best RN  Safety Promotion/Fall Prevention:   activity supervised   assistive device/personal items within reach   clutter free environment maintained   increased rounding and observation   increase visualization of patient   lighting adjusted   mobility aid in reach   nonskid shoes/slippers when out of bed   room organization consistent   safety round/check completed  Taken 8/3/2024 0947 by Anselmo Best RN  Safety Promotion/Fall Prevention:   activity supervised   assistive device/personal items within reach   nonskid shoes/slippers when out of bed   patient and family education  Taken 8/3/2024 0730 by Anselmo Best RN  Safety Promotion/Fall Prevention:   activity supervised   assistive device/personal items within reach   clutter free environment maintained   increased rounding and observation   increase visualization of patient   lighting adjusted   nonskid shoes/slippers when out of bed   mobility aid in reach   patient and family education   room organization consistent   safety round/check completed     Problem: Fall Injury Risk  Goal: Absence of Fall and Fall-Related Injury  Intervention: Identify and Manage Contributors  Recent Flowsheet Documentation  Taken 8/3/2024 1158 by Anselmo Best RN  Medication Review/Management: medications reviewed  Intervention: Promote Injury-Free  Environment  Recent Flowsheet Documentation  Taken 8/3/2024 1158 by Anselmo Best RN  Safety Promotion/Fall Prevention:   activity supervised   assistive device/personal items within reach   clutter free environment maintained   increased rounding and observation   increase visualization of patient   lighting adjusted   mobility aid in reach   nonskid shoes/slippers when out of bed   room organization consistent   safety round/check completed  Taken 8/3/2024 0947 by Anselmo Best RN  Safety Promotion/Fall Prevention:   activity supervised   assistive device/personal items within reach   nonskid shoes/slippers when out of bed   patient and family education  Taken 8/3/2024 0730 by Anselmo Best RN  Safety Promotion/Fall Prevention:   activity supervised   assistive device/personal items within reach   clutter free environment maintained   increased rounding and observation   increase visualization of patient   lighting adjusted   nonskid shoes/slippers when out of bed   mobility aid in reach   patient and family education   room organization consistent   safety round/check completed     Problem: Adult Inpatient Plan of Care  Goal: Absence of Hospital-Acquired Illness or Injury  Intervention: Identify and Manage Fall Risk  Recent Flowsheet Documentation  Taken 8/3/2024 1158 by Anselmo Best RN  Safety Promotion/Fall Prevention:   activity supervised   assistive device/personal items within reach   clutter free environment maintained   increased rounding and observation   increase visualization of patient   lighting adjusted   mobility aid in reach   nonskid shoes/slippers when out of bed   room organization consistent   safety round/check completed  Taken 8/3/2024 0947 by Anselmo Best RN  Safety Promotion/Fall Prevention:   activity supervised   assistive device/personal items within reach   nonskid shoes/slippers when out of bed   patient and family education  Taken 8/3/2024 0730 by Nasir  Anselmo J, RN  Safety Promotion/Fall Prevention:   activity supervised   assistive device/personal items within reach   clutter free environment maintained   increased rounding and observation   increase visualization of patient   lighting adjusted   nonskid shoes/slippers when out of bed   mobility aid in reach   patient and family education   room organization consistent   safety round/check completed  Intervention: Prevent Skin Injury  Recent Flowsheet Documentation  Taken 8/3/2024 0730 by Anselmo Best RN  Body Position:   position changed independently   weight shifting   log-rolled   legs elevated   heels elevated     Problem: Adult Inpatient Plan of Care  Goal: Absence of Hospital-Acquired Illness or Injury  Intervention: Identify and Manage Fall Risk  Recent Flowsheet Documentation  Taken 8/3/2024 1158 by Anselmo Best RN  Safety Promotion/Fall Prevention:   activity supervised   assistive device/personal items within reach   clutter free environment maintained   increased rounding and observation   increase visualization of patient   lighting adjusted   mobility aid in reach   nonskid shoes/slippers when out of bed   room organization consistent   safety round/check completed  Taken 8/3/2024 0947 by Anselmo Best RN  Safety Promotion/Fall Prevention:   activity supervised   assistive device/personal items within reach   nonskid shoes/slippers when out of bed   patient and family education  Taken 8/3/2024 0730 by Anselmo Best RN  Safety Promotion/Fall Prevention:   activity supervised   assistive device/personal items within reach   clutter free environment maintained   increased rounding and observation   increase visualization of patient   lighting adjusted   nonskid shoes/slippers when out of bed   mobility aid in reach   patient and family education   room organization consistent   safety round/check completed  Intervention: Prevent Skin Injury  Recent Flowsheet Documentation  Taken  8/3/2024 0730 by Anselmo Best RN  Body Position:   position changed independently   weight shifting   log-rolled   legs elevated   heels elevated     Problem: Comorbidity Management  Goal: Maintenance of Asthma Control  Intervention: Maintain Asthma Symptom Control  Recent Flowsheet Documentation  Taken 8/3/2024 1158 by Anselmo Best RN  Medication Review/Management: medications reviewed  Goal: Maintenance of Behavioral Health Symptom Control  Intervention: Maintain Behavioral Health Symptom Control  Recent Flowsheet Documentation  Taken 8/3/2024 1158 by Anselmo Best RN  Medication Review/Management: medications reviewed  Goal: Maintenance of COPD Symptom Control  Intervention: Maintain COPD Symptom Control  Recent Flowsheet Documentation  Taken 8/3/2024 1158 by Anselmo Best RN  Medication Review/Management: medications reviewed  Goal: Blood Glucose Levels Within Targeted Range  Intervention: Monitor and Manage Glycemia  Recent Flowsheet Documentation  Taken 8/3/2024 1158 by Anselmo Best RN  Medication Review/Management: medications reviewed  Goal: Maintenance of Heart Failure Symptom Control  Intervention: Maintain Heart Failure Management  Recent Flowsheet Documentation  Taken 8/3/2024 1158 by Anselmo Best RN  Medication Review/Management: medications reviewed  Goal: Blood Pressure in Desired Range  Intervention: Maintain Blood Pressure Management  Recent Flowsheet Documentation  Taken 8/3/2024 1158 by Anselmo Best RN  Medication Review/Management: medications reviewed  Goal: Maintenance of Osteoarthritis Symptom Control  Intervention: Maintain Osteoarthritis Symptom Control  Recent Flowsheet Documentation  Taken 8/3/2024 1158 by Anselmo Best RN  Assistive Device Utilized:   gait belt   walker  Activity Management:   ambulated in room   back to bed  Medication Review/Management: medications reviewed  Goal: Bariatric Home Regimen Maintained  Intervention: Maintain and  Manage Postbariatric Surgery Care  Recent Flowsheet Documentation  Taken 8/3/2024 1158 by Anselmo Best RN  Medication Review/Management: medications reviewed  Goal: Maintenance of Seizure Control  Intervention: Maintain Seizure Symptom Control  Recent Flowsheet Documentation  Taken 8/3/2024 1158 by Anselmo Best RN  Medication Review/Management: medications reviewed     Problem: Pain Acute  Goal: Optimal Pain Control and Function  Intervention: Prevent or Manage Pain  Recent Flowsheet Documentation  Taken 8/3/2024 1158 by Anselmo Best RN  Medication Review/Management: medications reviewed     Problem: Adult Inpatient Plan of Care  Goal: Absence of Hospital-Acquired Illness or Injury  Intervention: Identify and Manage Fall Risk  Recent Flowsheet Documentation  Taken 8/3/2024 1158 by Anselmo Best RN  Safety Promotion/Fall Prevention:   activity supervised   assistive device/personal items within reach   clutter free environment maintained   increased rounding and observation   increase visualization of patient   lighting adjusted   mobility aid in reach   nonskid shoes/slippers when out of bed   room organization consistent   safety round/check completed  Taken 8/3/2024 0947 by Anselmo Best RN  Safety Promotion/Fall Prevention:   activity supervised   assistive device/personal items within reach   nonskid shoes/slippers when out of bed   patient and family education  Taken 8/3/2024 0730 by Anselmo Best RN  Safety Promotion/Fall Prevention:   activity supervised   assistive device/personal items within reach   clutter free environment maintained   increased rounding and observation   increase visualization of patient   lighting adjusted   nonskid shoes/slippers when out of bed   mobility aid in reach   patient and family education   room organization consistent   safety round/check completed  Intervention: Prevent Skin Injury  Recent Flowsheet Documentation  Taken 8/3/2024 0730 by  Anselmo Best RN  Body Position:   position changed independently   weight shifting   log-rolled   legs elevated   heels elevated     Problem: Fall Injury Risk  Goal: Absence of Fall and Fall-Related Injury  Intervention: Identify and Manage Contributors  Recent Flowsheet Documentation  Taken 8/3/2024 1158 by Anselmo Best RN  Medication Review/Management: medications reviewed  Intervention: Promote Injury-Free Environment  Recent Flowsheet Documentation  Taken 8/3/2024 1158 by Anselmo Best RN  Safety Promotion/Fall Prevention:   activity supervised   assistive device/personal items within reach   clutter free environment maintained   increased rounding and observation   increase visualization of patient   lighting adjusted   mobility aid in reach   nonskid shoes/slippers when out of bed   room organization consistent   safety round/check completed  Taken 8/3/2024 0947 by Anselmo Best RN  Safety Promotion/Fall Prevention:   activity supervised   assistive device/personal items within reach   nonskid shoes/slippers when out of bed   patient and family education  Taken 8/3/2024 0730 by Anselmo Best RN  Safety Promotion/Fall Prevention:   activity supervised   assistive device/personal items within reach   clutter free environment maintained   increased rounding and observation   increase visualization of patient   lighting adjusted   nonskid shoes/slippers when out of bed   mobility aid in reach   patient and family education   room organization consistent   safety round/check completed     Problem: Adult Inpatient Plan of Care  Goal: Absence of Hospital-Acquired Illness or Injury  Intervention: Identify and Manage Fall Risk  Recent Flowsheet Documentation  Taken 8/3/2024 1158 by Anselmo Best RN  Safety Promotion/Fall Prevention:   activity supervised   assistive device/personal items within reach   clutter free environment maintained   increased rounding and observation   increase  visualization of patient   lighting adjusted   mobility aid in reach   nonskid shoes/slippers when out of bed   room organization consistent   safety round/check completed  Taken 8/3/2024 0947 by Anselmo Best RN  Safety Promotion/Fall Prevention:   activity supervised   assistive device/personal items within reach   nonskid shoes/slippers when out of bed   patient and family education  Taken 8/3/2024 0730 by Anselmo Best RN  Safety Promotion/Fall Prevention:   activity supervised   assistive device/personal items within reach   clutter free environment maintained   increased rounding and observation   increase visualization of patient   lighting adjusted   nonskid shoes/slippers when out of bed   mobility aid in reach   patient and family education   room organization consistent   safety round/check completed  Intervention: Prevent Skin Injury  Recent Flowsheet Documentation  Taken 8/3/2024 0730 by Anselmo Best RN  Body Position:   position changed independently   weight shifting   log-rolled   legs elevated   heels elevated   Goal Outcome Evaluation:

## 2024-08-03 NOTE — PROGRESS NOTES
[facility]  Hospitalist Progress Note  Lito Skinner MD 08/03/2024    Reason for Stay (Diagnosis): placement         Assessment and Plan:      Summary of Stay: Jemal Gray is a 88-year-old male who was transitioned to long term care on 6/5.  He has history of dementia and family is unable to provide care for him.  During his stay we was treated for right foot cellulitis which has resolved.  Patient is MA pending, awaiting placement in long term care.       Medically stable for discharge when disposition plan in place     [facility] care admission.  Social work consulted.  Looking for LTC.  Is MA pending, SW working on. Once a bed is available patient can go anytime      Constipation  Started/continue on senna, have as needed miralax and dulcolax available     Right foot cellulitis.  Resolved with Keflex 4 times daily through 6/11.  Swelling and redness resolved.    Venous ultrasound negative for DVT.     Dementia with chronic aphasia.  Not on any medications.   As needed olanzapine ordered for agitation.placement     5.   Bradycardia            Patient was noted to have HR in 50s.  Asymptomatic     6. Paper work            Guardianship papers filled on 7/13 for the daughter.         Diet: Combination Diet Regular Diet  Room Service    DVT Prophylaxis: Pneumatic Compression Devices  Maddox Catheter: Not present  Lines: None     Cardiac Monitoring: None  Code Status: No CPR- Do NOT Intubate  Dispo: Anticipate discharge when safe disposition plan in place; LTC facility being sought    Medically Ready for Discharge: Ready Now            Interval History (Subjective):      No clinical change documented by nursing overnight.  Today, when I entered the room, the patient seemed very confused.  He wanted to eat lunch and did not seem to understand that he needed to wait in the room for his lunch to come.  He clearly is not oriented to the situation.  He also could not be reassured.  Fortunately, he did not  "become severely agitated or combative.           Physical Exam:      Last Vital Signs:  BP (!) 143/73 (BP Location: Left arm)   Pulse 58   Temp 98.5  F (36.9  C) (Axillary)   Resp 16   SpO2 96%       Intake/Output Summary (Last 24 hours) at 7/29/2024 1155  Last data filed at 7/29/2024 0930  Gross per 24 hour   Intake 320 ml   Output --   Net 320 ml       Constitutional: Awake, alert, cooperative, no apparent distress.  Pleasantly interactive.     Respiratory: No increased WOB   Cardiovascular: Well-perfused   Abdomen:    Skin: No rashes, no cyanosis, dry to touch   Neuro: Alert and awake, discourse is meandering.   Extremities: No edema or lesions   Other(s):        All other systems: Negative          Medications:      All current medications were reviewed with changes reflected in problem list.         Data:      All new lab and imaging data was reviewed.   Labs:       No results found for: \"NA\" No results found for: \"CHLORIDE\" No results found for: \"BUN\"   No results found for: \"POTASSIUM\" No results found for: \"CO2\" No results found for: \"CR\"     No results for input(s): \"WBC\", \"HGB\", \"HCT\", \"MCV\", \"PLT\" in the last 168 hours.   Imaging:   No results found for this or any previous visit (from the past 24 hour(s)).   "

## 2024-08-04 PROCEDURE — 101N000002 HC CUSTODIAL CARE DAILY

## 2024-08-04 ASSESSMENT — ACTIVITIES OF DAILY LIVING (ADL)
ADLS_ACUITY_SCORE: 45
ADLS_ACUITY_SCORE: 51
ADLS_ACUITY_SCORE: 45
ADLS_ACUITY_SCORE: 51
ADLS_ACUITY_SCORE: 45
ADLS_ACUITY_SCORE: 45
ADLS_ACUITY_SCORE: 51
ADLS_ACUITY_SCORE: 51
ADLS_ACUITY_SCORE: 45
ADLS_ACUITY_SCORE: 51
ADLS_ACUITY_SCORE: 45
ADLS_ACUITY_SCORE: 45
ADLS_ACUITY_SCORE: 51

## 2024-08-04 NOTE — PLAN OF CARE
Vss A&O to person confused. Denies pain. Ambulated in quiles with staff. Pleasant. Eating and drinking well. Bed alarm on. Waiting placement   Problem: Fall Injury Risk  Goal: Absence of Fall and Fall-Related Injury  Outcome: Not Progressing  Intervention: Identify and Manage Contributors  Recent Flowsheet Documentation  Taken 8/4/2024 0953 by Anselmo Best, RN  Medication Review/Management: medications reviewed  Intervention: Promote Injury-Free Environment  Recent Flowsheet Documentation  Taken 8/4/2024 1156 by Anselmo Best, RN  Safety Promotion/Fall Prevention:   activity supervised   assistive device/personal items within reach   nonskid shoes/slippers when out of bed   patient and family education   room organization consistent   safety round/check completed  Taken 8/4/2024 0953 by Anselmo Best, RN  Safety Promotion/Fall Prevention:   activity supervised   assistive device/personal items within reach   clutter free environment maintained   lighting adjusted   nonskid shoes/slippers when out of bed   patient and family education   room organization consistent   safety round/check completed   Goal Outcome Evaluation:

## 2024-08-04 NOTE — PLAN OF CARE
VSS A&O to person. Bed alarm on. Pt impulsive. Floor uday next to bed. Poor appetite at dinner. .     Goal Outcome Evaluation:    Outcomes Reviewed with: Patient        Plan of Care Reviewed With: patient     Overall Patient Progress: no change     Outcome Evaluation: Waiting Placement.       Problem: Adult Inpatient Plan of Care  Goal: Absence of Hospital-Acquired Illness or Injury  Intervention: Identify and Manage Fall Risk  Recent Flowsheet Documentation  Taken 8/4/2024 0030 by Flores Sears RN  Safety Promotion/Fall Prevention:   safety round/check completed   activity supervised   clutter free environment maintained   increased rounding and observation   increase visualization of patient   nonskid shoes/slippers when out of bed   room door open   room near nurse's station   room organization consistent  Taken 8/3/2024 2330 by Flores Sears RN  Safety Promotion/Fall Prevention:   safety round/check completed   activity supervised   clutter free environment maintained   increased rounding and observation   increase visualization of patient   nonskid shoes/slippers when out of bed   room door open   room near nurse's station   room organization consistent  Taken 8/3/2024 2200 by Flores Sears RN  Safety Promotion/Fall Prevention:   safety round/check completed   activity supervised   clutter free environment maintained   increased rounding and observation   increase visualization of patient   nonskid shoes/slippers when out of bed   room door open   room near nurse's station   room organization consistent  Taken 8/3/2024 2045 by Flores Sears, RN  Safety Promotion/Fall Prevention:   safety round/check completed   activity supervised   clutter free environment maintained   increased rounding and observation   increase visualization of patient   nonskid shoes/slippers when out of bed   room door open   room near nurse's station   room organization consistent  Taken 8/3/2024 1930 by Flores Sears,  RN  Safety Promotion/Fall Prevention: safety round/check completed  Intervention: Prevent Skin Injury  Recent Flowsheet Documentation  Taken 8/4/2024 0030 by Flores Sears RN  Body Position:   position changed independently   supine, head elevated   supine, legs elevated  Taken 8/3/2024 2330 by Flores Sears RN  Body Position:   position changed independently   supine, head elevated   supine, legs elevated  Taken 8/3/2024 2200 by Flores Sears RN  Body Position:   position changed independently   supine, legs elevated   supine, head elevated  Taken 8/3/2024 2045 by Flores Sears RN  Body Position:   position changed independently   supine, legs elevated   supine, head elevated  Skin Protection: adhesive use limited  Device Skin Pressure Protection: absorbent pad utilized/changed  Taken 8/3/2024 1939 by Flores Sears RN  Body Position:   position changed independently   supine, head elevated   supine, legs elevated  Taken 8/3/2024 1930 by Flores Sears RN  Body Position:   position changed independently   supine, head elevated  Intervention: Prevent Infection  Recent Flowsheet Documentation  Taken 8/3/2024 2045 by Flores Sears RN  Infection Prevention:   single patient room provided   rest/sleep promoted   hand hygiene promoted     Problem: Delirium  Goal: Optimal Coping  Intervention: Optimize Psychosocial Adjustment to Delirium  Recent Flowsheet Documentation  Taken 8/3/2024 2045 by Flores Sears RN  Supportive Measures: active listening utilized  Goal: Improved Behavioral Control  Intervention: Minimize Safety Risk  Recent Flowsheet Documentation  Taken 8/4/2024 0030 by Floers Sears RN  Enhanced Safety Measures:   room near unit station   review medications for side effects with activity   pain management  Taken 8/3/2024 2330 by Flores Sears RN  Enhanced Safety Measures:   room near unit station   review medications for side effects with activity   pain management  Taken  8/3/2024 2200 by Flores Sears RN  Enhanced Safety Measures:   room near unit station   review medications for side effects with activity   pain management  Taken 8/3/2024 2045 by Flores Sears RN  Enhanced Safety Measures:   room near unit station   review medications for side effects with activity   pain management  Goal: Improved Attention and Thought Clarity  Intervention: Maximize Cognitive Function  Recent Flowsheet Documentation  Taken 8/3/2024 2045 by Flores Sears RN  Sensory Stimulation Regulation:   care clustered   lighting decreased  Goal: Improved Sleep  Intervention: Promote Sleep  Recent Flowsheet Documentation  Taken 8/3/2024 2045 by Flores Sears RN  Sleep/Rest Enhancement: awakenings minimized     Problem: Dementia Signs/Symptoms  Goal: Improved Behavioral Control (Dementia Signs/Symptoms)  Intervention: Manage Behavior  Recent Flowsheet Documentation  Taken 8/3/2024 2045 by Flores Sears RN  Environmental Support: calm environment promoted  Goal: Improved Mood Symptoms (Dementia Signs/Symptoms)  Intervention: Optimize Emotion and Mood  Recent Flowsheet Documentation  Taken 8/3/2024 2045 by Flores Sears RN  Supportive Measures: active listening utilized     Problem: Dementia Signs/Symptoms  Goal: Improved Behavioral Control (Dementia Signs/Symptoms)  Intervention: Manage Behavior  Recent Flowsheet Documentation  Taken 8/3/2024 2045 by Flores Sears RN  Environmental Support: calm environment promoted  Goal: Improved Mood Symptoms (Dementia Signs/Symptoms)  Intervention: Optimize Emotion and Mood  Recent Flowsheet Documentation  Taken 8/3/2024 2045 by Flores Sears RN  Supportive Measures: active listening utilized     Problem: Adult Inpatient Plan of Care  Goal: Absence of Hospital-Acquired Illness or Injury  Intervention: Identify and Manage Fall Risk  Recent Flowsheet Documentation  Taken 8/4/2024 0030 by Flores Sears RN  Safety Promotion/Fall Prevention:    safety round/check completed   activity supervised   clutter free environment maintained   increased rounding and observation   increase visualization of patient   nonskid shoes/slippers when out of bed   room door open   room near nurse's station   room organization consistent  Taken 8/3/2024 2330 by Flores Sears RN  Safety Promotion/Fall Prevention:   safety round/check completed   activity supervised   clutter free environment maintained   increased rounding and observation   increase visualization of patient   nonskid shoes/slippers when out of bed   room door open   room near nurse's station   room organization consistent  Taken 8/3/2024 2200 by Flores Sears RN  Safety Promotion/Fall Prevention:   safety round/check completed   activity supervised   clutter free environment maintained   increased rounding and observation   increase visualization of patient   nonskid shoes/slippers when out of bed   room door open   room near nurse's station   room organization consistent  Taken 8/3/2024 2045 by Flores Sears RN  Safety Promotion/Fall Prevention:   safety round/check completed   activity supervised   clutter free environment maintained   increased rounding and observation   increase visualization of patient   nonskid shoes/slippers when out of bed   room door open   room near nurse's station   room organization consistent  Taken 8/3/2024 1930 by Flores Sears RN  Safety Promotion/Fall Prevention: safety round/check completed  Intervention: Prevent Skin Injury  Recent Flowsheet Documentation  Taken 8/4/2024 0030 by Flores Sears RN  Body Position:   position changed independently   supine, head elevated   supine, legs elevated  Taken 8/3/2024 2330 by Flores Sears RN  Body Position:   position changed independently   supine, head elevated   supine, legs elevated  Taken 8/3/2024 2200 by Flores Sears RN  Body Position:   position changed independently   supine, legs elevated   supine,  head elevated  Taken 8/3/2024 2045 by Flores Sears RN  Body Position:   position changed independently   supine, legs elevated   supine, head elevated  Skin Protection: adhesive use limited  Device Skin Pressure Protection: absorbent pad utilized/changed  Taken 8/3/2024 1939 by Flores Sears RN  Body Position:   position changed independently   supine, head elevated   supine, legs elevated  Taken 8/3/2024 1930 by Flores Sears RN  Body Position:   position changed independently   supine, head elevated  Intervention: Prevent Infection  Recent Flowsheet Documentation  Taken 8/3/2024 2045 by Flores Sears RN  Infection Prevention:   single patient room provided   rest/sleep promoted   hand hygiene promoted     Problem: Fall Injury Risk  Goal: Absence of Fall and Fall-Related Injury  Intervention: Identify and Manage Contributors  Recent Flowsheet Documentation  Taken 8/3/2024 2045 by Flores Sears RN  Medication Review/Management: medications reviewed  Intervention: Promote Injury-Free Environment  Recent Flowsheet Documentation  Taken 8/4/2024 0030 by Flores Sears RN  Safety Promotion/Fall Prevention:   safety round/check completed   activity supervised   clutter free environment maintained   increased rounding and observation   increase visualization of patient   nonskid shoes/slippers when out of bed   room door open   room near nurse's station   room organization consistent  Taken 8/3/2024 2330 by Flores Sears RN  Safety Promotion/Fall Prevention:   safety round/check completed   activity supervised   clutter free environment maintained   increased rounding and observation   increase visualization of patient   nonskid shoes/slippers when out of bed   room door open   room near nurse's station   room organization consistent  Taken 8/3/2024 2200 by Flores Sears RN  Safety Promotion/Fall Prevention:   safety round/check completed   activity supervised   clutter free environment  maintained   increased rounding and observation   increase visualization of patient   nonskid shoes/slippers when out of bed   room door open   room near nurse's station   room organization consistent  Taken 8/3/2024 2045 by Flores Sears RN  Safety Promotion/Fall Prevention:   safety round/check completed   activity supervised   clutter free environment maintained   increased rounding and observation   increase visualization of patient   nonskid shoes/slippers when out of bed   room door open   room near nurse's station   room organization consistent  Taken 8/3/2024 1930 by Flores Sears RN  Safety Promotion/Fall Prevention: safety round/check completed     Problem: Adult Inpatient Plan of Care  Goal: Absence of Hospital-Acquired Illness or Injury  Intervention: Identify and Manage Fall Risk  Recent Flowsheet Documentation  Taken 8/4/2024 0030 by Flores Sears RN  Safety Promotion/Fall Prevention:   safety round/check completed   activity supervised   clutter free environment maintained   increased rounding and observation   increase visualization of patient   nonskid shoes/slippers when out of bed   room door open   room near nurse's station   room organization consistent  Taken 8/3/2024 2330 by Flores Sears, RN  Safety Promotion/Fall Prevention:   safety round/check completed   activity supervised   clutter free environment maintained   increased rounding and observation   increase visualization of patient   nonskid shoes/slippers when out of bed   room door open   room near nurse's station   room organization consistent  Taken 8/3/2024 2200 by Flores Sears RN  Safety Promotion/Fall Prevention:   safety round/check completed   activity supervised   clutter free environment maintained   increased rounding and observation   increase visualization of patient   nonskid shoes/slippers when out of bed   room door open   room near nurse's station   room organization consistent  Taken 8/3/2024 2045  by Sears, Flores S, RN  Safety Promotion/Fall Prevention:   safety round/check completed   activity supervised   clutter free environment maintained   increased rounding and observation   increase visualization of patient   nonskid shoes/slippers when out of bed   room door open   room near nurse's station   room organization consistent  Taken 8/3/2024 1930 by Flores Sears RN  Safety Promotion/Fall Prevention: safety round/check completed  Intervention: Prevent Skin Injury  Recent Flowsheet Documentation  Taken 8/4/2024 0030 by Flores Sears RN  Body Position:   position changed independently   supine, head elevated   supine, legs elevated  Taken 8/3/2024 2330 by Flores Sears RN  Body Position:   position changed independently   supine, head elevated   supine, legs elevated  Taken 8/3/2024 2200 by Flores Sears RN  Body Position:   position changed independently   supine, legs elevated   supine, head elevated  Taken 8/3/2024 2045 by Flores Sears RN  Body Position:   position changed independently   supine, legs elevated   supine, head elevated  Skin Protection: adhesive use limited  Device Skin Pressure Protection: absorbent pad utilized/changed  Taken 8/3/2024 1939 by Flores Sears RN  Body Position:   position changed independently   supine, head elevated   supine, legs elevated  Taken 8/3/2024 1930 by Flores Sears RN  Body Position:   position changed independently   supine, head elevated  Intervention: Prevent Infection  Recent Flowsheet Documentation  Taken 8/3/2024 2045 by Flores Sears RN  Infection Prevention:   single patient room provided   rest/sleep promoted   hand hygiene promoted     Problem: Fall Injury Risk  Goal: Absence of Fall and Fall-Related Injury  Intervention: Identify and Manage Contributors  Recent Flowsheet Documentation  Taken 8/3/2024 2045 by Flores Sears RN  Medication Review/Management: medications reviewed  Intervention: Promote Injury-Free  Environment  Recent Flowsheet Documentation  Taken 8/4/2024 0030 by Flores Sears RN  Safety Promotion/Fall Prevention:   safety round/check completed   activity supervised   clutter free environment maintained   increased rounding and observation   increase visualization of patient   nonskid shoes/slippers when out of bed   room door open   room near nurse's station   room organization consistent  Taken 8/3/2024 2330 by Flores Sears RN  Safety Promotion/Fall Prevention:   safety round/check completed   activity supervised   clutter free environment maintained   increased rounding and observation   increase visualization of patient   nonskid shoes/slippers when out of bed   room door open   room near nurse's station   room organization consistent  Taken 8/3/2024 2200 by Flores Sears RN  Safety Promotion/Fall Prevention:   safety round/check completed   activity supervised   clutter free environment maintained   increased rounding and observation   increase visualization of patient   nonskid shoes/slippers when out of bed   room door open   room near nurse's station   room organization consistent  Taken 8/3/2024 2045 by Flores Sears RN  Safety Promotion/Fall Prevention:   safety round/check completed   activity supervised   clutter free environment maintained   increased rounding and observation   increase visualization of patient   nonskid shoes/slippers when out of bed   room door open   room near nurse's station   room organization consistent  Taken 8/3/2024 1930 by Flores Sears, RN  Safety Promotion/Fall Prevention: safety round/check completed     Problem: Fall Injury Risk  Goal: Absence of Fall and Fall-Related Injury  Intervention: Identify and Manage Contributors  Recent Flowsheet Documentation  Taken 8/3/2024 2045 by Flores Sears, RN  Medication Review/Management: medications reviewed  Intervention: Promote Injury-Free Environment  Recent Flowsheet Documentation  Taken 8/4/2024  0030 by Flores Sears RN  Safety Promotion/Fall Prevention:   safety round/check completed   activity supervised   clutter free environment maintained   increased rounding and observation   increase visualization of patient   nonskid shoes/slippers when out of bed   room door open   room near nurse's station   room organization consistent  Taken 8/3/2024 2330 by Flores Sears RN  Safety Promotion/Fall Prevention:   safety round/check completed   activity supervised   clutter free environment maintained   increased rounding and observation   increase visualization of patient   nonskid shoes/slippers when out of bed   room door open   room near nurse's station   room organization consistent  Taken 8/3/2024 2200 by Flores Sears RN  Safety Promotion/Fall Prevention:   safety round/check completed   activity supervised   clutter free environment maintained   increased rounding and observation   increase visualization of patient   nonskid shoes/slippers when out of bed   room door open   room near nurse's station   room organization consistent  Taken 8/3/2024 2045 by Flores Sears RN  Safety Promotion/Fall Prevention:   safety round/check completed   activity supervised   clutter free environment maintained   increased rounding and observation   increase visualization of patient   nonskid shoes/slippers when out of bed   room door open   room near nurse's station   room organization consistent  Taken 8/3/2024 1930 by Flores Sears RN  Safety Promotion/Fall Prevention: safety round/check completed     Problem: Adult Inpatient Plan of Care  Goal: Absence of Hospital-Acquired Illness or Injury  Intervention: Identify and Manage Fall Risk  Recent Flowsheet Documentation  Taken 8/4/2024 0030 by Flores Sears, RN  Safety Promotion/Fall Prevention:   safety round/check completed   activity supervised   clutter free environment maintained   increased rounding and observation   increase visualization of  patient   nonskid shoes/slippers when out of bed   room door open   room near nurse's station   room organization consistent  Taken 8/3/2024 2330 by Flores Sears RN  Safety Promotion/Fall Prevention:   safety round/check completed   activity supervised   clutter free environment maintained   increased rounding and observation   increase visualization of patient   nonskid shoes/slippers when out of bed   room door open   room near nurse's station   room organization consistent  Taken 8/3/2024 2200 by Flores Sears RN  Safety Promotion/Fall Prevention:   safety round/check completed   activity supervised   clutter free environment maintained   increased rounding and observation   increase visualization of patient   nonskid shoes/slippers when out of bed   room door open   room near nurse's station   room organization consistent  Taken 8/3/2024 2045 by Flores Sears RN  Safety Promotion/Fall Prevention:   safety round/check completed   activity supervised   clutter free environment maintained   increased rounding and observation   increase visualization of patient   nonskid shoes/slippers when out of bed   room door open   room near nurse's station   room organization consistent  Taken 8/3/2024 1930 by Flores Sears RN  Safety Promotion/Fall Prevention: safety round/check completed  Intervention: Prevent Skin Injury  Recent Flowsheet Documentation  Taken 8/4/2024 0030 by Flores Sears RN  Body Position:   position changed independently   supine, head elevated   supine, legs elevated  Taken 8/3/2024 2330 by Flores Sears RN  Body Position:   position changed independently   supine, head elevated   supine, legs elevated  Taken 8/3/2024 2200 by Flores Sears RN  Body Position:   position changed independently   supine, legs elevated   supine, head elevated  Taken 8/3/2024 2045 by Flores Sears RN  Body Position:   position changed independently   supine, legs elevated   supine, head  elevated  Skin Protection: adhesive use limited  Device Skin Pressure Protection: absorbent pad utilized/changed  Taken 8/3/2024 1939 by Flores Sears RN  Body Position:   position changed independently   supine, head elevated   supine, legs elevated  Taken 8/3/2024 1930 by Flores Sears RN  Body Position:   position changed independently   supine, head elevated  Intervention: Prevent Infection  Recent Flowsheet Documentation  Taken 8/3/2024 2045 by Flores Sears RN  Infection Prevention:   single patient room provided   rest/sleep promoted   hand hygiene promoted     Problem: Adult Inpatient Plan of Care  Goal: Absence of Hospital-Acquired Illness or Injury  Intervention: Identify and Manage Fall Risk  Recent Flowsheet Documentation  Taken 8/4/2024 0030 by Flores Sears RN  Safety Promotion/Fall Prevention:   safety round/check completed   activity supervised   clutter free environment maintained   increased rounding and observation   increase visualization of patient   nonskid shoes/slippers when out of bed   room door open   room near nurse's station   room organization consistent  Taken 8/3/2024 2330 by Flores Sears RN  Safety Promotion/Fall Prevention:   safety round/check completed   activity supervised   clutter free environment maintained   increased rounding and observation   increase visualization of patient   nonskid shoes/slippers when out of bed   room door open   room near nurse's station   room organization consistent  Taken 8/3/2024 2200 by Flores Sears RN  Safety Promotion/Fall Prevention:   safety round/check completed   activity supervised   clutter free environment maintained   increased rounding and observation   increase visualization of patient   nonskid shoes/slippers when out of bed   room door open   room near nurse's station   room organization consistent  Taken 8/3/2024 2045 by Flores Sears RN  Safety Promotion/Fall Prevention:   safety round/check  completed   activity supervised   clutter free environment maintained   increased rounding and observation   increase visualization of patient   nonskid shoes/slippers when out of bed   room door open   room near nurse's station   room organization consistent  Taken 8/3/2024 1930 by Flores Sears RN  Safety Promotion/Fall Prevention: safety round/check completed  Intervention: Prevent Skin Injury  Recent Flowsheet Documentation  Taken 8/4/2024 0030 by Flores Sears RN  Body Position:   position changed independently   supine, head elevated   supine, legs elevated  Taken 8/3/2024 2330 by Flores Sears RN  Body Position:   position changed independently   supine, head elevated   supine, legs elevated  Taken 8/3/2024 2200 by Flores Sears RN  Body Position:   position changed independently   supine, legs elevated   supine, head elevated  Taken 8/3/2024 2045 by Flores Sears RN  Body Position:   position changed independently   supine, legs elevated   supine, head elevated  Skin Protection: adhesive use limited  Device Skin Pressure Protection: absorbent pad utilized/changed  Taken 8/3/2024 1939 by Flores Sears RN  Body Position:   position changed independently   supine, head elevated   supine, legs elevated  Taken 8/3/2024 1930 by Flores Sears RN  Body Position:   position changed independently   supine, head elevated  Intervention: Prevent Infection  Recent Flowsheet Documentation  Taken 8/3/2024 2045 by Flores Sears RN  Infection Prevention:   single patient room provided   rest/sleep promoted   hand hygiene promoted     Problem: Dementia Signs/Symptoms  Goal: Improved Behavioral Control (Dementia Signs/Symptoms)  Intervention: Manage Behavior  Recent Flowsheet Documentation  Taken 8/3/2024 2045 by Flores Sears RN  Environmental Support: calm environment promoted  Goal: Improved Mood Symptoms (Dementia Signs/Symptoms)  Intervention: Optimize Emotion and Mood  Recent Flowsheet  Documentation  Taken 8/3/2024 2045 by Flores Sears RN  Supportive Measures: active listening utilized     Problem: Fall Injury Risk  Goal: Absence of Fall and Fall-Related Injury  Intervention: Identify and Manage Contributors  Recent Flowsheet Documentation  Taken 8/3/2024 2045 by Flores Sears RN  Medication Review/Management: medications reviewed  Intervention: Promote Injury-Free Environment  Recent Flowsheet Documentation  Taken 8/4/2024 0030 by Flores Sears RN  Safety Promotion/Fall Prevention:   safety round/check completed   activity supervised   clutter free environment maintained   increased rounding and observation   increase visualization of patient   nonskid shoes/slippers when out of bed   room door open   room near nurse's station   room organization consistent  Taken 8/3/2024 2330 by Flores Sears RN  Safety Promotion/Fall Prevention:   safety round/check completed   activity supervised   clutter free environment maintained   increased rounding and observation   increase visualization of patient   nonskid shoes/slippers when out of bed   room door open   room near nurse's station   room organization consistent  Taken 8/3/2024 2200 by Flores Sears RN  Safety Promotion/Fall Prevention:   safety round/check completed   activity supervised   clutter free environment maintained   increased rounding and observation   increase visualization of patient   nonskid shoes/slippers when out of bed   room door open   room near nurse's station   room organization consistent  Taken 8/3/2024 2045 by Flores Sears RN  Safety Promotion/Fall Prevention:   safety round/check completed   activity supervised   clutter free environment maintained   increased rounding and observation   increase visualization of patient   nonskid shoes/slippers when out of bed   room door open   room near nurse's station   room organization consistent  Taken 8/3/2024 1930 by Flores Sears RN  Safety  Promotion/Fall Prevention: safety round/check completed     Problem: Fall Injury Risk  Goal: Absence of Fall and Fall-Related Injury  Intervention: Identify and Manage Contributors  Recent Flowsheet Documentation  Taken 8/3/2024 2045 by Flores Sears RN  Medication Review/Management: medications reviewed  Intervention: Promote Injury-Free Environment  Recent Flowsheet Documentation  Taken 8/4/2024 0030 by Flores Sears RN  Safety Promotion/Fall Prevention:   safety round/check completed   activity supervised   clutter free environment maintained   increased rounding and observation   increase visualization of patient   nonskid shoes/slippers when out of bed   room door open   room near nurse's station   room organization consistent  Taken 8/3/2024 2330 by Flores Sears RN  Safety Promotion/Fall Prevention:   safety round/check completed   activity supervised   clutter free environment maintained   increased rounding and observation   increase visualization of patient   nonskid shoes/slippers when out of bed   room door open   room near nurse's station   room organization consistent  Taken 8/3/2024 2200 by Flores Sears RN  Safety Promotion/Fall Prevention:   safety round/check completed   activity supervised   clutter free environment maintained   increased rounding and observation   increase visualization of patient   nonskid shoes/slippers when out of bed   room door open   room near nurse's station   room organization consistent  Taken 8/3/2024 2045 by Flores Sears RN  Safety Promotion/Fall Prevention:   safety round/check completed   activity supervised   clutter free environment maintained   increased rounding and observation   increase visualization of patient   nonskid shoes/slippers when out of bed   room door open   room near nurse's station   room organization consistent  Taken 8/3/2024 1930 by Flores Sears RN  Safety Promotion/Fall Prevention: safety round/check completed      Problem: Adult Inpatient Plan of Care  Goal: Absence of Hospital-Acquired Illness or Injury  Intervention: Identify and Manage Fall Risk  Recent Flowsheet Documentation  Taken 8/4/2024 0030 by Flores Sears RN  Safety Promotion/Fall Prevention:   safety round/check completed   activity supervised   clutter free environment maintained   increased rounding and observation   increase visualization of patient   nonskid shoes/slippers when out of bed   room door open   room near nurse's station   room organization consistent  Taken 8/3/2024 2330 by Flores Sears RN  Safety Promotion/Fall Prevention:   safety round/check completed   activity supervised   clutter free environment maintained   increased rounding and observation   increase visualization of patient   nonskid shoes/slippers when out of bed   room door open   room near nurse's station   room organization consistent  Taken 8/3/2024 2200 by Flores Sears RN  Safety Promotion/Fall Prevention:   safety round/check completed   activity supervised   clutter free environment maintained   increased rounding and observation   increase visualization of patient   nonskid shoes/slippers when out of bed   room door open   room near nurse's station   room organization consistent  Taken 8/3/2024 2045 by Flores Sears RN  Safety Promotion/Fall Prevention:   safety round/check completed   activity supervised   clutter free environment maintained   increased rounding and observation   increase visualization of patient   nonskid shoes/slippers when out of bed   room door open   room near nurse's station   room organization consistent  Taken 8/3/2024 1930 by Flores Sears RN  Safety Promotion/Fall Prevention: safety round/check completed  Intervention: Prevent Skin Injury  Recent Flowsheet Documentation  Taken 8/4/2024 0030 by Flores Sears RN  Body Position:   position changed independently   supine, head elevated   supine, legs elevated  Taken 8/3/2024  2330 by Flores Sears RN  Body Position:   position changed independently   supine, head elevated   supine, legs elevated  Taken 8/3/2024 2200 by Flores Sears RN  Body Position:   position changed independently   supine, legs elevated   supine, head elevated  Taken 8/3/2024 2045 by Flores Sears RN  Body Position:   position changed independently   supine, legs elevated   supine, head elevated  Skin Protection: adhesive use limited  Device Skin Pressure Protection: absorbent pad utilized/changed  Taken 8/3/2024 1939 by Flores Sears RN  Body Position:   position changed independently   supine, head elevated   supine, legs elevated  Taken 8/3/2024 1930 by Flores Sears RN  Body Position:   position changed independently   supine, head elevated  Intervention: Prevent Infection  Recent Flowsheet Documentation  Taken 8/3/2024 2045 by Flores Sears RN  Infection Prevention:   single patient room provided   rest/sleep promoted   hand hygiene promoted     Problem: Adult Inpatient Plan of Care  Goal: Absence of Hospital-Acquired Illness or Injury  Intervention: Identify and Manage Fall Risk  Recent Flowsheet Documentation  Taken 8/4/2024 0030 by Flores Sears RN  Safety Promotion/Fall Prevention:   safety round/check completed   activity supervised   clutter free environment maintained   increased rounding and observation   increase visualization of patient   nonskid shoes/slippers when out of bed   room door open   room near nurse's station   room organization consistent  Taken 8/3/2024 2330 by Flores Sears RN  Safety Promotion/Fall Prevention:   safety round/check completed   activity supervised   clutter free environment maintained   increased rounding and observation   increase visualization of patient   nonskid shoes/slippers when out of bed   room door open   room near nurse's station   room organization consistent  Taken 8/3/2024 2200 by Flores Sears RN  Safety Promotion/Fall  Prevention:   safety round/check completed   activity supervised   clutter free environment maintained   increased rounding and observation   increase visualization of patient   nonskid shoes/slippers when out of bed   room door open   room near nurse's station   room organization consistent  Taken 8/3/2024 2045 by Flores Sears RN  Safety Promotion/Fall Prevention:   safety round/check completed   activity supervised   clutter free environment maintained   increased rounding and observation   increase visualization of patient   nonskid shoes/slippers when out of bed   room door open   room near nurse's station   room organization consistent  Taken 8/3/2024 1930 by Flores Sears RN  Safety Promotion/Fall Prevention: safety round/check completed  Intervention: Prevent Skin Injury  Recent Flowsheet Documentation  Taken 8/4/2024 0030 by Flores Sears RN  Body Position:   position changed independently   supine, head elevated   supine, legs elevated  Taken 8/3/2024 2330 by Flores Sears RN  Body Position:   position changed independently   supine, head elevated   supine, legs elevated  Taken 8/3/2024 2200 by Flores Sears RN  Body Position:   position changed independently   supine, legs elevated   supine, head elevated  Taken 8/3/2024 2045 by Flores Sears RN  Body Position:   position changed independently   supine, legs elevated   supine, head elevated  Skin Protection: adhesive use limited  Device Skin Pressure Protection: absorbent pad utilized/changed  Taken 8/3/2024 1939 by Flores Sears RN  Body Position:   position changed independently   supine, head elevated   supine, legs elevated  Taken 8/3/2024 1930 by Flores Sears RN  Body Position:   position changed independently   supine, head elevated  Intervention: Prevent Infection  Recent Flowsheet Documentation  Taken 8/3/2024 2045 by Flores Sears RN  Infection Prevention:   single patient room provided   rest/sleep promoted    hand hygiene promoted     Problem: Comorbidity Management  Goal: Maintenance of Asthma Control  Intervention: Maintain Asthma Symptom Control  Recent Flowsheet Documentation  Taken 8/3/2024 2045 by Flores Sears RN  Medication Review/Management: medications reviewed  Goal: Maintenance of Behavioral Health Symptom Control  Intervention: Maintain Behavioral Health Symptom Control  Recent Flowsheet Documentation  Taken 8/3/2024 2045 by Flores Sears RN  Medication Review/Management: medications reviewed  Goal: Maintenance of COPD Symptom Control  Intervention: Maintain COPD Symptom Control  Recent Flowsheet Documentation  Taken 8/3/2024 2045 by Flores Sears RN  Supportive Measures: active listening utilized  Medication Review/Management: medications reviewed  Goal: Blood Glucose Levels Within Targeted Range  Intervention: Monitor and Manage Glycemia  Recent Flowsheet Documentation  Taken 8/3/2024 2045 by Flores Sears RN  Medication Review/Management: medications reviewed  Goal: Maintenance of Heart Failure Symptom Control  Intervention: Maintain Heart Failure Management  Recent Flowsheet Documentation  Taken 8/3/2024 2045 by Flores Sears RN  Medication Review/Management: medications reviewed  Goal: Blood Pressure in Desired Range  Intervention: Maintain Blood Pressure Management  Recent Flowsheet Documentation  Taken 8/3/2024 2045 by Flores Sears RN  Medication Review/Management: medications reviewed  Goal: Maintenance of Osteoarthritis Symptom Control  Intervention: Maintain Osteoarthritis Symptom Control  Recent Flowsheet Documentation  Taken 8/4/2024 0030 by Flores Sears RN  Assistive Device Utilized:   gait belt   walker  Activity Management: ambulated to bathroom  Taken 8/3/2024 2330 by Flores Sears RN  Activity Management: activity adjusted per tolerance  Taken 8/3/2024 2045 by Flores Sears RN  Assistive Device Utilized:   gait belt   walker  Activity Management:  activity adjusted per tolerance  Medication Review/Management: medications reviewed  Goal: Bariatric Home Regimen Maintained  Intervention: Maintain and Manage Postbariatric Surgery Care  Recent Flowsheet Documentation  Taken 8/3/2024 2045 by Flores Sears RN  Medication Review/Management: medications reviewed  Goal: Maintenance of Seizure Control  Intervention: Maintain Seizure Symptom Control  Recent Flowsheet Documentation  Taken 8/3/2024 2045 by Flores Sears RN  Sensory Stimulation Regulation:   care clustered   lighting decreased  Medication Review/Management: medications reviewed     Problem: Pain Acute  Goal: Optimal Pain Control and Function  Intervention: Prevent or Manage Pain  Recent Flowsheet Documentation  Taken 8/3/2024 2045 by Flores Sears RN  Sensory Stimulation Regulation:   care clustered   lighting decreased  Sleep/Rest Enhancement: awakenings minimized  Medication Review/Management: medications reviewed  Intervention: Optimize Psychosocial Wellbeing  Recent Flowsheet Documentation  Taken 8/3/2024 2045 by Flores Sears RN  Supportive Measures: active listening utilized     Problem: Adult Inpatient Plan of Care  Goal: Absence of Hospital-Acquired Illness or Injury  Intervention: Identify and Manage Fall Risk  Recent Flowsheet Documentation  Taken 8/4/2024 0030 by Flores Sears RN  Safety Promotion/Fall Prevention:   safety round/check completed   activity supervised   clutter free environment maintained   increased rounding and observation   increase visualization of patient   nonskid shoes/slippers when out of bed   room door open   room near nurse's station   room organization consistent  Taken 8/3/2024 2330 by Flores Sears RN  Safety Promotion/Fall Prevention:   safety round/check completed   activity supervised   clutter free environment maintained   increased rounding and observation   increase visualization of patient   nonskid shoes/slippers when out of bed   room  door open   room near nurse's station   room organization consistent  Taken 8/3/2024 2200 by Flores Sears RN  Safety Promotion/Fall Prevention:   safety round/check completed   activity supervised   clutter free environment maintained   increased rounding and observation   increase visualization of patient   nonskid shoes/slippers when out of bed   room door open   room near nurse's station   room organization consistent  Taken 8/3/2024 2045 by Flores Sears RN  Safety Promotion/Fall Prevention:   safety round/check completed   activity supervised   clutter free environment maintained   increased rounding and observation   increase visualization of patient   nonskid shoes/slippers when out of bed   room door open   room near nurse's station   room organization consistent  Taken 8/3/2024 1930 by Flores Sears RN  Safety Promotion/Fall Prevention: safety round/check completed  Intervention: Prevent Skin Injury  Recent Flowsheet Documentation  Taken 8/4/2024 0030 by Flores Sears RN  Body Position:   position changed independently   supine, head elevated   supine, legs elevated  Taken 8/3/2024 2330 by Flores Sears RN  Body Position:   position changed independently   supine, head elevated   supine, legs elevated  Taken 8/3/2024 2200 by Flores Sears RN  Body Position:   position changed independently   supine, legs elevated   supine, head elevated  Taken 8/3/2024 2045 by Flores Sears RN  Body Position:   position changed independently   supine, legs elevated   supine, head elevated  Skin Protection: adhesive use limited  Device Skin Pressure Protection: absorbent pad utilized/changed  Taken 8/3/2024 1939 by Flores Sears RN  Body Position:   position changed independently   supine, head elevated   supine, legs elevated  Taken 8/3/2024 1930 by Flores Sears RN  Body Position:   position changed independently   supine, head elevated  Intervention: Prevent Infection  Recent Flowsheet  Documentation  Taken 8/3/2024 2045 by Flores Sears RN  Infection Prevention:   single patient room provided   rest/sleep promoted   hand hygiene promoted     Problem: Fall Injury Risk  Goal: Absence of Fall and Fall-Related Injury  Intervention: Identify and Manage Contributors  Recent Flowsheet Documentation  Taken 8/3/2024 2045 by Flores Sears RN  Medication Review/Management: medications reviewed  Intervention: Promote Injury-Free Environment  Recent Flowsheet Documentation  Taken 8/4/2024 0030 by Folres Sears RN  Safety Promotion/Fall Prevention:   safety round/check completed   activity supervised   clutter free environment maintained   increased rounding and observation   increase visualization of patient   nonskid shoes/slippers when out of bed   room door open   room near nurse's station   room organization consistent  Taken 8/3/2024 2330 by Flores Sears RN  Safety Promotion/Fall Prevention:   safety round/check completed   activity supervised   clutter free environment maintained   increased rounding and observation   increase visualization of patient   nonskid shoes/slippers when out of bed   room door open   room near nurse's station   room organization consistent  Taken 8/3/2024 2200 by Flores Sears RN  Safety Promotion/Fall Prevention:   safety round/check completed   activity supervised   clutter free environment maintained   increased rounding and observation   increase visualization of patient   nonskid shoes/slippers when out of bed   room door open   room near nurse's station   room organization consistent  Taken 8/3/2024 2045 by Flores Sears RN  Safety Promotion/Fall Prevention:   safety round/check completed   activity supervised   clutter free environment maintained   increased rounding and observation   increase visualization of patient   nonskid shoes/slippers when out of bed   room door open   room near nurse's station   room organization consistent  Taken  8/3/2024 1930 by Flores Sears RN  Safety Promotion/Fall Prevention: safety round/check completed     Problem: Adult Inpatient Plan of Care  Goal: Absence of Hospital-Acquired Illness or Injury  Intervention: Identify and Manage Fall Risk  Recent Flowsheet Documentation  Taken 8/4/2024 0030 by Flores Sears RN  Safety Promotion/Fall Prevention:   safety round/check completed   activity supervised   clutter free environment maintained   increased rounding and observation   increase visualization of patient   nonskid shoes/slippers when out of bed   room door open   room near nurse's station   room organization consistent  Taken 8/3/2024 2330 by Flores Sears RN  Safety Promotion/Fall Prevention:   safety round/check completed   activity supervised   clutter free environment maintained   increased rounding and observation   increase visualization of patient   nonskid shoes/slippers when out of bed   room door open   room near nurse's station   room organization consistent  Taken 8/3/2024 2200 by Flores Sears RN  Safety Promotion/Fall Prevention:   safety round/check completed   activity supervised   clutter free environment maintained   increased rounding and observation   increase visualization of patient   nonskid shoes/slippers when out of bed   room door open   room near nurse's station   room organization consistent  Taken 8/3/2024 2045 by Flores Sears RN  Safety Promotion/Fall Prevention:   safety round/check completed   activity supervised   clutter free environment maintained   increased rounding and observation   increase visualization of patient   nonskid shoes/slippers when out of bed   room door open   room near nurse's station   room organization consistent  Taken 8/3/2024 1930 by Flores Sears, RN  Safety Promotion/Fall Prevention: safety round/check completed  Intervention: Prevent Skin Injury  Recent Flowsheet Documentation  Taken 8/4/2024 0030 by Flores Sears, RN  Body  Position:   position changed independently   supine, head elevated   supine, legs elevated  Taken 8/3/2024 2330 by Flores Sears RN  Body Position:   position changed independently   supine, head elevated   supine, legs elevated  Taken 8/3/2024 2200 by Flores Sears RN  Body Position:   position changed independently   supine, legs elevated   supine, head elevated  Taken 8/3/2024 2045 by Flores Sears RN  Body Position:   position changed independently   supine, legs elevated   supine, head elevated  Skin Protection: adhesive use limited  Device Skin Pressure Protection: absorbent pad utilized/changed  Taken 8/3/2024 1939 by Flores Sears RN  Body Position:   position changed independently   supine, head elevated   supine, legs elevated  Taken 8/3/2024 1930 by Flores Sears RN  Body Position:   position changed independently   supine, head elevated  Intervention: Prevent Infection  Recent Flowsheet Documentation  Taken 8/3/2024 2045 by Flores Sears RN  Infection Prevention:   single patient room provided   rest/sleep promoted   hand hygiene promoted     Problem: Fall Injury Risk  Goal: Absence of Fall and Fall-Related Injury  Intervention: Identify and Manage Contributors  Recent Flowsheet Documentation  Taken 8/3/2024 2045 by Flores Sears RN  Medication Review/Management: medications reviewed  Intervention: Promote Injury-Free Environment  Recent Flowsheet Documentation  Taken 8/4/2024 0030 by Flores Sears RN  Safety Promotion/Fall Prevention:   safety round/check completed   activity supervised   clutter free environment maintained   increased rounding and observation   increase visualization of patient   nonskid shoes/slippers when out of bed   room door open   room near nurse's station   room organization consistent  Taken 8/3/2024 2330 by Flores Sears RN  Safety Promotion/Fall Prevention:   safety round/check completed   activity supervised   clutter free environment  maintained   increased rounding and observation   increase visualization of patient   nonskid shoes/slippers when out of bed   room door open   room near nurse's station   room organization consistent  Taken 8/3/2024 2200 by Flores Sears, RN  Safety Promotion/Fall Prevention:   safety round/check completed   activity supervised   clutter free environment maintained   increased rounding and observation   increase visualization of patient   nonskid shoes/slippers when out of bed   room door open   room near nurse's station   room organization consistent  Taken 8/3/2024 2045 by Flores Sears, RN  Safety Promotion/Fall Prevention:   safety round/check completed   activity supervised   clutter free environment maintained   increased rounding and observation   increase visualization of patient   nonskid shoes/slippers when out of bed   room door open   room near nurse's station   room organization consistent  Taken 8/3/2024 1930 by Flores Sears, RN  Safety Promotion/Fall Prevention: safety round/check completed

## 2024-08-04 NOTE — PROGRESS NOTES
Care Management Follow Up    Length of Stay (days): 0    Additional Information:    SW received an call from pt's daughter (Cheri). The pt's daughter reported she emailed guardianship paperwork. SW confirmed the email and sent paperwork to [employer] on the behalf of the pt.    KARLA Weston, [facility]   Care Management

## 2024-08-04 NOTE — PLAN OF CARE
A&O to person only   impulsive does not call   SBA with transfers   Denies Pain, SoB, N/V  Floor uday next to bed       BP (!) 143/73 (BP Location: Left arm)   Pulse 58   Temp 98.5  F (36.9  C) (Axillary)   Resp 16   SpO2 96%

## 2024-08-04 NOTE — PROGRESS NOTES
[facility]  Hospitalist Progress Note  Lito Skinner MD 08/04/2024    Reason for Stay (Diagnosis): placement         Assessment and Plan:      Summary of Stay: Jemal Gray is a 88-year-old male who was transitioned to long-term care on 6/5.  He has history of dementia and family is unable to provide care for him.  During his stay we was treated for right foot cellulitis which has resolved.  Patient is MA pending, awaiting placement in long term care.       Medically stable for discharge when disposition plan in place     [facility] care admission.  Social work consulted.  Looking for LTC.  Is MA pending, SW working on. Once a bed is available patient can go anytime      Constipation  Started/continue on senna, have as needed miralax and dulcolax available     Right foot cellulitis.  Resolved with Keflex 4 times daily through 6/11.  Swelling and redness resolved.    Venous ultrasound negative for DVT.     Dementia with chronic aphasia.  Not on any medications.   As needed olanzapine ordered for agitation. Impulsive.     5.   Bradycardia            Patient was noted to have HR in 50s.  Asymptomatic     6. Paper work            Guardianship papers filled on 7/13 for the daughter.         Diet: Combination Diet Regular Diet  Room Service    DVT Prophylaxis: Pneumatic Compression Devices  Maddox Catheter: Not present  Lines: None     Cardiac Monitoring: None  Code Status: No CPR- Do NOT Intubate  Dispo: Anticipate discharge when safe disposition plan in place; LTC facility being sought    Medically Ready for Discharge: Ready Now            Interval History (Subjective):      No clinical change documented by nursing overnight.  Today, when I entered the room, the patient seemed very confused.  He wanted to eat lunch and did not seem to understand that he needed to wait in the room for his lunch to come.  He clearly is not oriented to the situation.  He also could not be reassured.  Fortunately, he did not  "become severely agitated or combative.           Physical Exam:      Last Vital Signs:  /63 (BP Location: Left arm)   Pulse 53   Temp 98.3  F (36.8  C) (Oral)   Resp 16   SpO2 99%       Intake/Output Summary (Last 24 hours) at 7/29/2024 1155  Last data filed at 7/29/2024 0930  Gross per 24 hour   Intake 320 ml   Output --   Net 320 ml       Constitutional: Awake, alert, cooperative, no apparent distress.  Pleasantly interactive.     Respiratory: No increased WOB   Cardiovascular: Well-perfused   Abdomen:    Skin: No rashes, no cyanosis, dry to touch   Neuro: Alert and awake, discourse is meandering.   Extremities: No edema or lesions   Other(s):        All other systems: Negative          Medications:      All current medications were reviewed with changes reflected in problem list.         Data:      All new lab and imaging data was reviewed.   Labs:       No results found for: \"NA\" No results found for: \"CHLORIDE\" No results found for: \"BUN\"   No results found for: \"POTASSIUM\" No results found for: \"CO2\" No results found for: \"CR\"     No results for input(s): \"WBC\", \"HGB\", \"HCT\", \"MCV\", \"PLT\" in the last 168 hours.   Imaging:   No results found for this or any previous visit (from the past 24 hour(s)).   "

## 2024-08-04 NOTE — PLAN OF CARE
VSS A&O to person. Bed alarm on. Pt impulsive. Floor uday next to bed.     Goal Outcome Evaluation:     Outcomes Reviewed with: Patient         Plan of Care Reviewed With: patient     Overall Patient Progress: no change     Outcome Evaluation: Patient remains injury free.       Problem: Fall Injury Risk  Goal: Absence of Fall and Fall-Related Injury  8/4/2024 0622 by Flores Sears RN  Outcome: Progressing  8/4/2024 0056 by Flores Sears RN  Outcome: Progressing  Intervention: Identify and Manage Contributors  Recent Flowsheet Documentation  Taken 8/4/2024 0511 by Flores Sears RN  Medication Review/Management: medications reviewed  Taken 8/3/2024 2045 by Flores Sears RN  Medication Review/Management: medications reviewed  Intervention: Promote Injury-Free Environment  Recent Flowsheet Documentation  Taken 8/4/2024 0511 by Folres Sears RN  Safety Promotion/Fall Prevention:   safety round/check completed   activity supervised   clutter free environment maintained   increased rounding and observation   increase visualization of patient   nonskid shoes/slippers when out of bed   room door open   room near nurse's station   room organization consistent  Taken 8/4/2024 0030 by Flores Sears RN  Safety Promotion/Fall Prevention:   safety round/check completed   activity supervised   clutter free environment maintained   increased rounding and observation   increase visualization of patient   nonskid shoes/slippers when out of bed   room door open   room near nurse's station   room organization consistent  Taken 8/3/2024 2330 by Flores Sears RN  Safety Promotion/Fall Prevention:   safety round/check completed   activity supervised   clutter free environment maintained   increased rounding and observation   increase visualization of patient   nonskid shoes/slippers when out of bed   room door open   room near nurse's station   room organization consistent  Taken 8/3/2024 2200 by Flores Sears  S, RN  Safety Promotion/Fall Prevention:   safety round/check completed   activity supervised   clutter free environment maintained   increased rounding and observation   increase visualization of patient   nonskid shoes/slippers when out of bed   room door open   room near nurse's station   room organization consistent  Taken 8/3/2024 2045 by Flores Sears, RN  Safety Promotion/Fall Prevention:   safety round/check completed   activity supervised   clutter free environment maintained   increased rounding and observation   increase visualization of patient   nonskid shoes/slippers when out of bed   room door open   room near nurse's station   room organization consistent  Taken 8/3/2024 1930 by Flores Sears, RN  Safety Promotion/Fall Prevention: safety round/check completed

## 2024-08-05 PROCEDURE — 101N000002 HC CUSTODIAL CARE DAILY

## 2024-08-05 PROCEDURE — 250N000013 HC RX MED GY IP 250 OP 250 PS 637: Performed by: INTERNAL MEDICINE

## 2024-08-05 RX ADMIN — SENNOSIDES AND DOCUSATE SODIUM 1 TABLET: 8.6; 5 TABLET ORAL at 21:37

## 2024-08-05 ASSESSMENT — ACTIVITIES OF DAILY LIVING (ADL)
ADLS_ACUITY_SCORE: 47
ADLS_ACUITY_SCORE: 51
ADLS_ACUITY_SCORE: 47
ADLS_ACUITY_SCORE: 47
ADLS_ACUITY_SCORE: 45
ADLS_ACUITY_SCORE: 51
ADLS_ACUITY_SCORE: 51
ADLS_ACUITY_SCORE: 47
ADLS_ACUITY_SCORE: 47
ADLS_ACUITY_SCORE: 51
ADLS_ACUITY_SCORE: 47
ADLS_ACUITY_SCORE: 51
ADLS_ACUITY_SCORE: 47
ADLS_ACUITY_SCORE: 51
ADLS_ACUITY_SCORE: 47
ADLS_ACUITY_SCORE: 51
ADLS_ACUITY_SCORE: 45
ADLS_ACUITY_SCORE: 45
ADLS_ACUITY_SCORE: 47
ADLS_ACUITY_SCORE: 47
ADLS_ACUITY_SCORE: 51

## 2024-08-05 NOTE — PLAN OF CARE
Vss A&O to person only. Pleasantly confused. Alarms on. Impulsive to bathroom. Eating well. [place]. Awaiting placement   Problem: Adult Inpatient Plan of Care  Goal: Absence of Hospital-Acquired Illness or Injury  Intervention: Identify and Manage Fall Risk  Recent Flowsheet Documentation  Taken 8/5/2024 1223 by Anselmo Best RN  Safety Promotion/Fall Prevention:   activity supervised   assistive device/personal items within reach   nonskid shoes/slippers when out of bed   patient and family education   safety round/check completed  Taken 8/5/2024 0842 by Anselmo Best RN  Safety Promotion/Fall Prevention:   activity supervised   assistive device/personal items within reach   mobility aid in reach   nonskid shoes/slippers when out of bed   patient and family education   room organization consistent   safety round/check completed  Intervention: Prevent Skin Injury  Recent Flowsheet Documentation  Taken 8/5/2024 0842 by Anselmo Best RN  Device Skin Pressure Protection: absorbent pad utilized/changed  Intervention: Prevent and Manage VTE (Venous Thromboembolism) Risk  Recent Flowsheet Documentation  Taken 8/5/2024 0842 by Anselmo Best RN  VTE Prevention/Management: SCDs off (sequential compression devices)     Problem: Adult Inpatient Plan of Care  Goal: Absence of Hospital-Acquired Illness or Injury  Intervention: Identify and Manage Fall Risk  Recent Flowsheet Documentation  Taken 8/5/2024 1223 by Anselmo Best RN  Safety Promotion/Fall Prevention:   activity supervised   assistive device/personal items within reach   nonskid shoes/slippers when out of bed   patient and family education   safety round/check completed  Taken 8/5/2024 0842 by Anselmo Best RN  Safety Promotion/Fall Prevention:   activity supervised   assistive device/personal items within reach   mobility aid in reach   nonskid shoes/slippers when out of bed   patient and family education   room organization consistent    safety round/check completed  Intervention: Prevent Skin Injury  Recent Flowsheet Documentation  Taken 8/5/2024 0842 by Anselmo Best RN  Device Skin Pressure Protection: absorbent pad utilized/changed  Intervention: Prevent and Manage VTE (Venous Thromboembolism) Risk  Recent Flowsheet Documentation  Taken 8/5/2024 0842 by Anselmo Best RN  VTE Prevention/Management: SCDs off (sequential compression devices)     Problem: Fall Injury Risk  Goal: Absence of Fall and Fall-Related Injury  Intervention: Promote Injury-Free Environment  Recent Flowsheet Documentation  Taken 8/5/2024 1223 by Anselmo Best RN  Safety Promotion/Fall Prevention:   activity supervised   assistive device/personal items within reach   nonskid shoes/slippers when out of bed   patient and family education   safety round/check completed  Taken 8/5/2024 0842 by Anselmo Best RN  Safety Promotion/Fall Prevention:   activity supervised   assistive device/personal items within reach   mobility aid in reach   nonskid shoes/slippers when out of bed   patient and family education   room organization consistent   safety round/check completed     Problem: Adult Inpatient Plan of Care  Goal: Absence of Hospital-Acquired Illness or Injury  Intervention: Identify and Manage Fall Risk  Recent Flowsheet Documentation  Taken 8/5/2024 1223 by Anselmo Best RN  Safety Promotion/Fall Prevention:   activity supervised   assistive device/personal items within reach   nonskid shoes/slippers when out of bed   patient and family education   safety round/check completed  Taken 8/5/2024 0842 by Anselmo Best RN  Safety Promotion/Fall Prevention:   activity supervised   assistive device/personal items within reach   mobility aid in reach   nonskid shoes/slippers when out of bed   patient and family education   room organization consistent   safety round/check completed  Intervention: Prevent Skin Injury  Recent Flowsheet Documentation  Taken  8/5/2024 0842 by Anselmo Best RN  Device Skin Pressure Protection: absorbent pad utilized/changed  Intervention: Prevent and Manage VTE (Venous Thromboembolism) Risk  Recent Flowsheet Documentation  Taken 8/5/2024 0842 by Anselmo Best RN  VTE Prevention/Management: SCDs off (sequential compression devices)     Problem: Fall Injury Risk  Goal: Absence of Fall and Fall-Related Injury  Intervention: Promote Injury-Free Environment  Recent Flowsheet Documentation  Taken 8/5/2024 1223 by Anselmo Best RN  Safety Promotion/Fall Prevention:   activity supervised   assistive device/personal items within reach   nonskid shoes/slippers when out of bed   patient and family education   safety round/check completed  Taken 8/5/2024 0842 by Anselmo Best RN  Safety Promotion/Fall Prevention:   activity supervised   assistive device/personal items within reach   mobility aid in reach   nonskid shoes/slippers when out of bed   patient and family education   room organization consistent   safety round/check completed     Problem: Fall Injury Risk  Goal: Absence of Fall and Fall-Related Injury  Intervention: Promote Injury-Free Environment  Recent Flowsheet Documentation  Taken 8/5/2024 1223 by Anselmo Best RN  Safety Promotion/Fall Prevention:   activity supervised   assistive device/personal items within reach   nonskid shoes/slippers when out of bed   patient and family education   safety round/check completed  Taken 8/5/2024 0842 by Anselmo Best RN  Safety Promotion/Fall Prevention:   activity supervised   assistive device/personal items within reach   mobility aid in reach   nonskid shoes/slippers when out of bed   patient and family education   room organization consistent   safety round/check completed     Problem: Adult Inpatient Plan of Care  Goal: Absence of Hospital-Acquired Illness or Injury  Intervention: Identify and Manage Fall Risk  Recent Flowsheet Documentation  Taken 8/5/2024 1223  by Capelle, Anselmo J, RN  Safety Promotion/Fall Prevention:   activity supervised   assistive device/personal items within reach   nonskid shoes/slippers when out of bed   patient and family education   safety round/check completed  Taken 8/5/2024 0842 by Anselmo Best RN  Safety Promotion/Fall Prevention:   activity supervised   assistive device/personal items within reach   mobility aid in reach   nonskid shoes/slippers when out of bed   patient and family education   room organization consistent   safety round/check completed  Intervention: Prevent Skin Injury  Recent Flowsheet Documentation  Taken 8/5/2024 0842 by Anselmo Best RN  Device Skin Pressure Protection: absorbent pad utilized/changed  Intervention: Prevent and Manage VTE (Venous Thromboembolism) Risk  Recent Flowsheet Documentation  Taken 8/5/2024 0842 by Anselmo Best RN  VTE Prevention/Management: SCDs off (sequential compression devices)     Problem: Adult Inpatient Plan of Care  Goal: Absence of Hospital-Acquired Illness or Injury  Intervention: Identify and Manage Fall Risk  Recent Flowsheet Documentation  Taken 8/5/2024 1223 by Anselmo Best RN  Safety Promotion/Fall Prevention:   activity supervised   assistive device/personal items within reach   nonskid shoes/slippers when out of bed   patient and family education   safety round/check completed  Taken 8/5/2024 0842 by Anselmo Best RN  Safety Promotion/Fall Prevention:   activity supervised   assistive device/personal items within reach   mobility aid in reach   nonskid shoes/slippers when out of bed   patient and family education   room organization consistent   safety round/check completed  Intervention: Prevent Skin Injury  Recent Flowsheet Documentation  Taken 8/5/2024 0842 by Anselmo Best RN  Device Skin Pressure Protection: absorbent pad utilized/changed  Intervention: Prevent and Manage VTE (Venous Thromboembolism) Risk  Recent Flowsheet  Documentation  Taken 8/5/2024 0842 by Anselmo Best RN  VTE Prevention/Management: SCDs off (sequential compression devices)     Problem: Fall Injury Risk  Goal: Absence of Fall and Fall-Related Injury  Intervention: Promote Injury-Free Environment  Recent Flowsheet Documentation  Taken 8/5/2024 1223 by Anselmo Best RN  Safety Promotion/Fall Prevention:   activity supervised   assistive device/personal items within reach   nonskid shoes/slippers when out of bed   patient and family education   safety round/check completed  Taken 8/5/2024 0842 by Anselmo Best RN  Safety Promotion/Fall Prevention:   activity supervised   assistive device/personal items within reach   mobility aid in reach   nonskid shoes/slippers when out of bed   patient and family education   room organization consistent   safety round/check completed     Problem: Fall Injury Risk  Goal: Absence of Fall and Fall-Related Injury  Intervention: Promote Injury-Free Environment  Recent Flowsheet Documentation  Taken 8/5/2024 1223 by Anselmo Best RN  Safety Promotion/Fall Prevention:   activity supervised   assistive device/personal items within reach   nonskid shoes/slippers when out of bed   patient and family education   safety round/check completed  Taken 8/5/2024 0842 by Anselmo Best RN  Safety Promotion/Fall Prevention:   activity supervised   assistive device/personal items within reach   mobility aid in reach   nonskid shoes/slippers when out of bed   patient and family education   room organization consistent   safety round/check completed     Problem: Adult Inpatient Plan of Care  Goal: Absence of Hospital-Acquired Illness or Injury  Intervention: Identify and Manage Fall Risk  Recent Flowsheet Documentation  Taken 8/5/2024 1223 by Anselmo Best RN  Safety Promotion/Fall Prevention:   activity supervised   assistive device/personal items within reach   nonskid shoes/slippers when out of bed   patient and family  education   safety round/check completed  Taken 8/5/2024 0842 by Anselmo Best RN  Safety Promotion/Fall Prevention:   activity supervised   assistive device/personal items within reach   mobility aid in reach   nonskid shoes/slippers when out of bed   patient and family education   room organization consistent   safety round/check completed  Intervention: Prevent Skin Injury  Recent Flowsheet Documentation  Taken 8/5/2024 0842 by Anselmo Best RN  Device Skin Pressure Protection: absorbent pad utilized/changed  Intervention: Prevent and Manage VTE (Venous Thromboembolism) Risk  Recent Flowsheet Documentation  Taken 8/5/2024 0842 by Anselmo Best RN  VTE Prevention/Management: SCDs off (sequential compression devices)     Problem: Adult Inpatient Plan of Care  Goal: Absence of Hospital-Acquired Illness or Injury  Intervention: Identify and Manage Fall Risk  Recent Flowsheet Documentation  Taken 8/5/2024 1223 by Anselmo Best RN  Safety Promotion/Fall Prevention:   activity supervised   assistive device/personal items within reach   nonskid shoes/slippers when out of bed   patient and family education   safety round/check completed  Taken 8/5/2024 0842 by Anselmo Best RN  Safety Promotion/Fall Prevention:   activity supervised   assistive device/personal items within reach   mobility aid in reach   nonskid shoes/slippers when out of bed   patient and family education   room organization consistent   safety round/check completed  Intervention: Prevent Skin Injury  Recent Flowsheet Documentation  Taken 8/5/2024 0842 by Anselmo Best RN  Device Skin Pressure Protection: absorbent pad utilized/changed  Intervention: Prevent and Manage VTE (Venous Thromboembolism) Risk  Recent Flowsheet Documentation  Taken 8/5/2024 0842 by Anselmo Best RN  VTE Prevention/Management: SCDs off (sequential compression devices)     Problem: Comorbidity Management  Goal: Maintenance of Osteoarthritis  Symptom Control  Intervention: Maintain Osteoarthritis Symptom Control  Recent Flowsheet Documentation  Taken 8/5/2024 0842 by Anselmo Best RN  Assistive Device Utilized:   walker   gait belt  Activity Management:   ambulated in room   ambulated to bathroom   up in chair     Problem: Adult Inpatient Plan of Care  Goal: Absence of Hospital-Acquired Illness or Injury  Intervention: Identify and Manage Fall Risk  Recent Flowsheet Documentation  Taken 8/5/2024 1223 by Anselmo Best RN  Safety Promotion/Fall Prevention:   activity supervised   assistive device/personal items within reach   nonskid shoes/slippers when out of bed   patient and family education   safety round/check completed  Taken 8/5/2024 0842 by Anselmo Best RN  Safety Promotion/Fall Prevention:   activity supervised   assistive device/personal items within reach   mobility aid in reach   nonskid shoes/slippers when out of bed   patient and family education   room organization consistent   safety round/check completed  Intervention: Prevent Skin Injury  Recent Flowsheet Documentation  Taken 8/5/2024 0842 by Anselmo Best RN  Device Skin Pressure Protection: absorbent pad utilized/changed  Intervention: Prevent and Manage VTE (Venous Thromboembolism) Risk  Recent Flowsheet Documentation  Taken 8/5/2024 0842 by Anselmo Best RN  VTE Prevention/Management: SCDs off (sequential compression devices)     Problem: Fall Injury Risk  Goal: Absence of Fall and Fall-Related Injury  Intervention: Promote Injury-Free Environment  Recent Flowsheet Documentation  Taken 8/5/2024 1223 by Anselmo Best RN  Safety Promotion/Fall Prevention:   activity supervised   assistive device/personal items within reach   nonskid shoes/slippers when out of bed   patient and family education   safety round/check completed  Taken 8/5/2024 0842 by Anselmo Best RN  Safety Promotion/Fall Prevention:   activity supervised   assistive device/personal  items within reach   mobility aid in reach   nonskid shoes/slippers when out of bed   patient and family education   room organization consistent   safety round/check completed     Problem: Adult Inpatient Plan of Care  Goal: Absence of Hospital-Acquired Illness or Injury  Intervention: Identify and Manage Fall Risk  Recent Flowsheet Documentation  Taken 8/5/2024 1223 by Anselmo Best RN  Safety Promotion/Fall Prevention:   activity supervised   assistive device/personal items within reach   nonskid shoes/slippers when out of bed   patient and family education   safety round/check completed  Taken 8/5/2024 0842 by Anselmo Best RN  Safety Promotion/Fall Prevention:   activity supervised   assistive device/personal items within reach   mobility aid in reach   nonskid shoes/slippers when out of bed   patient and family education   room organization consistent   safety round/check completed  Intervention: Prevent Skin Injury  Recent Flowsheet Documentation  Taken 8/5/2024 0842 by Anselmo Best RN  Device Skin Pressure Protection: absorbent pad utilized/changed  Intervention: Prevent and Manage VTE (Venous Thromboembolism) Risk  Recent Flowsheet Documentation  Taken 8/5/2024 0842 by Anselmo Best RN  VTE Prevention/Management: SCDs off (sequential compression devices)     Problem: Fall Injury Risk  Goal: Absence of Fall and Fall-Related Injury  Intervention: Promote Injury-Free Environment  Recent Flowsheet Documentation  Taken 8/5/2024 1223 by Anselmo Best RN  Safety Promotion/Fall Prevention:   activity supervised   assistive device/personal items within reach   nonskid shoes/slippers when out of bed   patient and family education   safety round/check completed  Taken 8/5/2024 0842 by Anselmo Best RN  Safety Promotion/Fall Prevention:   activity supervised   assistive device/personal items within reach   mobility aid in reach   nonskid shoes/slippers when out of bed   patient and family  education   room organization consistent   safety round/check completed   Goal Outcome Evaluation:

## 2024-08-05 NOTE — PLAN OF CARE
Confused, hard of hearing, gets up to bathroom & sets off alarm, fall mats on floor, loose BMs this shift, held senna, pending LTC.     Goal Outcome Evaluation:         Problem: Fall Injury Risk  Goal: Absence of Fall and Fall-Related Injury  Outcome: Progressing  Intervention: Promote Injury-Free Environment  Recent Flowsheet Documentation  Taken 8/4/2024 1832 by Joyce Kapadia, RN  Safety Promotion/Fall Prevention:   nonskid shoes/slippers when out of bed   room door open

## 2024-08-05 NOTE — PROGRESS NOTES
[facility]  Hospitalist Progress Note  Lito Skinner MD 08/05/2024    Reason for Stay (Diagnosis): placement         Assessment and Plan:      Summary of Stay: Jemal Gray is a 88-year-old male who was transitioned to long-term care on 6/5.  He has history of dementia and family is unable to provide care for him.  During his stay we was treated for right foot cellulitis which has resolved.  Patient is MA pending, awaiting placement in long term care.       Medically stable for discharge when disposition plan in place     residential care admission.  Social work consulted.  Looking for LTC.  Is MA pending, SW working on. Once a bed is available patient can go anytime      Constipation  Started/continue on senna, have as needed miralax and dulcolax available     Right foot cellulitis.  Resolved with Keflex 4 times daily through 6/11.  Swelling and redness resolved.    Venous ultrasound negative for DVT.     Dementia with chronic aphasia.  Not on any medications.   As needed olanzapine ordered for agitation. Impulsive.     5.   Bradycardia            Patient was noted to have HR in 50s.  Asymptomatic     6. Paper work            Guardianship papers filled on 7/13 for the daughter.         Diet: Combination Diet Regular Diet  Room Service    DVT Prophylaxis: Pneumatic Compression Devices  Maddox Catheter: Not present  Lines: None     Cardiac Monitoring: None  Code Status: No CPR- Do NOT Intubate  Dispo: Anticipate discharge when safe disposition plan in place; LTC facility being sought    Medically Ready for Discharge: Ready Now            Interval History (Subjective):      No clinical change documented by nursing overnight.  Sitting in chair comfortably.           Physical Exam:      Last Vital Signs:  /76 (BP Location: Left arm)   Pulse 57   Temp 98.1  F (36.7  C) (Oral)   Resp 16   SpO2 96%       Intake/Output Summary (Last 24 hours) at 7/29/2024 5774  Last data filed at 7/29/2024  "0930  Gross per 24 hour   Intake 320 ml   Output --   Net 320 ml       Constitutional: Awake, alert, cooperative, no apparent distress.  Pleasantly interactive.     Respiratory: No increased WOB   Cardiovascular: Well-perfused   Abdomen:    Skin: No rashes, no cyanosis, dry to touch   Neuro: Alert and awake, discourse is meandering.   Extremities: No edema or lesions   Other(s):        All other systems: Negative          Medications:      All current medications were reviewed with changes reflected in problem list.         Data:      All new lab and imaging data was reviewed.   Labs:       No results found for: \"NA\" No results found for: \"CHLORIDE\" No results found for: \"BUN\"   No results found for: \"POTASSIUM\" No results found for: \"CO2\" No results found for: \"CR\"     No results for input(s): \"WBC\", \"HGB\", \"HCT\", \"MCV\", \"PLT\" in the last 168 hours.   Imaging:   No results found for this or any previous visit (from the past 24 hour(s)).   "

## 2024-08-05 NOTE — PLAN OF CARE
Pt slept well throughout the night, up once to go to the bathroom. Pt remained injury free.     Goal Outcome Evaluation:    Plan of Care Reviewed With: patient     Overall Patient Progress: no change     Outcome Evaluation: Patient remains injury free.     Problem: Fall Injury Risk  Goal: Absence of Fall and Fall-Related Injury  Outcome: Progressing  Intervention: Identify and Manage Contributors  Recent Flowsheet Documentation  Taken 8/5/2024 0100 by Flores Sears RN  Medication Review/Management: medications reviewed  Intervention: Promote Injury-Free Environment  Recent Flowsheet Documentation  Taken 8/5/2024 0500 by Flores Sears RN  Safety Promotion/Fall Prevention: safety round/check completed  Taken 8/5/2024 0300 by Flores Sears RN  Safety Promotion/Fall Prevention: safety round/check completed  Taken 8/5/2024 0100 by Flores Sears RN  Safety Promotion/Fall Prevention:   safety round/check completed   room near nurse's station   room door open   nonskid shoes/slippers when out of bed   clutter free environment maintained   increased rounding and observation   increase visualization of patient   supervised activity  Taken 8/4/2024 2330 by Flores Sears, RN  Safety Promotion/Fall Prevention: safety round/check completed

## 2024-08-06 PROCEDURE — 250N000013 HC RX MED GY IP 250 OP 250 PS 637: Performed by: INTERNAL MEDICINE

## 2024-08-06 PROCEDURE — 101N000002 HC CUSTODIAL CARE DAILY

## 2024-08-06 RX ADMIN — SENNOSIDES AND DOCUSATE SODIUM 1 TABLET: 8.6; 5 TABLET ORAL at 21:46

## 2024-08-06 ASSESSMENT — ACTIVITIES OF DAILY LIVING (ADL)
ADLS_ACUITY_SCORE: 45
ADLS_ACUITY_SCORE: 48
ADLS_ACUITY_SCORE: 52
ADLS_ACUITY_SCORE: 48
ADLS_ACUITY_SCORE: 45
ADLS_ACUITY_SCORE: 45
ADLS_ACUITY_SCORE: 49
ADLS_ACUITY_SCORE: 45
ADLS_ACUITY_SCORE: 45
ADLS_ACUITY_SCORE: 48
ADLS_ACUITY_SCORE: 49
ADLS_ACUITY_SCORE: 48
ADLS_ACUITY_SCORE: 48
ADLS_ACUITY_SCORE: 45

## 2024-08-06 NOTE — PLAN OF CARE
[id number]  Bedrest t/o the shift. Can be impulsive when getting up the bathroom. SBA.     Goal Outcome Evaluation: No change

## 2024-08-06 NOTE — PLAN OF CARE
Goal Outcome Evaluation:       Pt ambulated in halls, good appetite. Pleasantly confused. Vitals taken in AM stable.

## 2024-08-06 NOTE — PROGRESS NOTES
BP (!) 150/83 (BP Location: Left arm)   Pulse 55   Temp 97.8  F (36.6  C) (Axillary)   Resp 16   SpO2 96%       General - VSS on RA. Alert, not decision making. Cognition at baseline. Responds to yes / no questions. Denies pain.  Neuro - AxO to self  Resp - Lungs clear, dim, RA  Cards - WDL  GI - BS normoactive, appetite is fair, tray set up   - x. Incontinent at times. Ambulates to bathroom Ax1  Skin - WDL, all pressure areas assessed  No IV - MD approved     Plan - safety, USP care continues

## 2024-08-06 NOTE — PROGRESS NOTES
[facility]  Hospitalist Progress Note  Lito Skinner MD 08/06/2024    Reason for Stay (Diagnosis): placement         Assessment and Plan:      Summary of Stay: Jemal Gray is a 88-year-old male who was transitioned to long-term care on 6/5.  He has history of dementia and family is unable to provide care for him.  During his stay we was treated for right foot cellulitis which has resolved.  Patient is MA pending, awaiting placement in long term care.       Medically stable for discharge when disposition plan in place     prison care admission.  Social work consulted.  Looking for LTC.  Is MA pending, SW working on. Once a bed is available patient can go anytime      Constipation  Started/continue on senna, have as needed miralax and dulcolax available     Right foot cellulitis.  Resolved with Keflex 4 times daily through 6/11.  Swelling and redness resolved.    Venous ultrasound negative for DVT.     Dementia with chronic aphasia.  Not on any medications.   As needed olanzapine ordered for agitation. Impulsive.     5.   Bradycardia            Patient was noted to have HR in 50s.  Asymptomatic     6. Paper work            Guardianship papers filled on 7/13 for the daughter.         Diet: Combination Diet Regular Diet  Room Service    DVT Prophylaxis: Pneumatic Compression Devices  Maddox Catheter: Not present  Lines: None     Cardiac Monitoring: None  Code Status: No CPR- Do NOT Intubate  Dispo: Anticipate discharge when safe disposition plan in place; LTC facility being sought    Medically Ready for Discharge: Ready Now            Interval History (Subjective):      No clinical change documented by nursing overnight.  Sitting in chair comfortably.  Noted to be impulsive intermittently.           Physical Exam:      Last Vital Signs:  BP (!) 150/83 (BP Location: Left arm)   Pulse 55   Temp 97.8  F (36.6  C) (Axillary)   Resp 16   SpO2 96%       Intake/Output Summary (Last 24 hours) at  "7/29/2024 1155  Last data filed at 7/29/2024 0930  Gross per 24 hour   Intake 320 ml   Output --   Net 320 ml       Constitutional: Awake, alert, cooperative, no apparent distress.  Pleasantly interactive.     Respiratory: No increased WOB   Cardiovascular: Well-perfused   Abdomen:    Skin: No rashes, no cyanosis, dry to touch   Neuro: Alert and awake, discourse is meandering.   Extremities: No edema or lesions   Other(s):        All other systems: Negative          Medications:      All current medications were reviewed with changes reflected in problem list.         Data:      All new lab and imaging data was reviewed.   Labs:       No results found for: \"NA\" No results found for: \"CHLORIDE\" No results found for: \"BUN\"   No results found for: \"POTASSIUM\" No results found for: \"CO2\" No results found for: \"CR\"     No results for input(s): \"WBC\", \"HGB\", \"HCT\", \"MCV\", \"PLT\" in the last 168 hours.   Imaging:   No results found for this or any previous visit (from the past 24 hour(s)).   "

## 2024-08-07 PROCEDURE — 101N000002 HC CUSTODIAL CARE DAILY

## 2024-08-07 PROCEDURE — 250N000013 HC RX MED GY IP 250 OP 250 PS 637: Performed by: NURSE PRACTITIONER

## 2024-08-07 PROCEDURE — 250N000013 HC RX MED GY IP 250 OP 250 PS 637: Performed by: INTERNAL MEDICINE

## 2024-08-07 RX ADMIN — QUETIAPINE FUMARATE 12.5 MG: 25 TABLET ORAL at 03:31

## 2024-08-07 RX ADMIN — SENNOSIDES AND DOCUSATE SODIUM 1 TABLET: 8.6; 5 TABLET ORAL at 21:03

## 2024-08-07 ASSESSMENT — ACTIVITIES OF DAILY LIVING (ADL)
ADLS_ACUITY_SCORE: 49
ADLS_ACUITY_SCORE: 51
ADLS_ACUITY_SCORE: 49
ADLS_ACUITY_SCORE: 51
ADLS_ACUITY_SCORE: 49
ADLS_ACUITY_SCORE: 51
ADLS_ACUITY_SCORE: 51
ADLS_ACUITY_SCORE: 49
ADLS_ACUITY_SCORE: 51
ADLS_ACUITY_SCORE: 49
ADLS_ACUITY_SCORE: 49
ADLS_ACUITY_SCORE: 51
ADLS_ACUITY_SCORE: 49

## 2024-08-07 NOTE — PLAN OF CARE
"No change. Denies pain. SBA. Safety maintained throughout shift. Waiting for insurance/placement, SW following.     Goal Outcome Evaluation:      Plan of Care Reviewed With: patient    Overall Patient Progress: no changeOverall Patient Progress: no change    Outcome Evaluation: MA pending, looking for placement          Problem: Adult Inpatient Plan of Care  Goal: Plan of Care Review  Description: The Plan of Care Review/Shift note should be completed every shift.  The Outcome Evaluation is a brief statement about your assessment that the patient is improving, declining, or no change.  This information will be displayed automatically on your shift  note.  Outcome: Progressing  Flowsheets (Taken 8/7/2024 1146)  Outcome Evaluation: MA pending, looking for placement  Plan of Care Reviewed With: patient  Overall Patient Progress: no change  Goal: Patient-Specific Goal (Individualized)  Description: You can add care plan individualizations to a care plan. Examples of Individualization might be:  \"Parent requests to be called daily at 9am for status\", \"I have a hard time hearing out of my right ear\", or \"Do not touch me to wake me up as it startles  me\".  Outcome: Progressing  Goal: Absence of Hospital-Acquired Illness or Injury  Outcome: Progressing  Intervention: Identify and Manage Fall Risk  Recent Flowsheet Documentation  Taken 8/7/2024 0745 by Nela Garcia, RN  Safety Promotion/Fall Prevention:   activity supervised   assistive device/personal items within reach   clutter free environment maintained   increased rounding and observation   increase visualization of patient   lighting adjusted   nonskid shoes/slippers when out of bed   patient and family education   room near nurse's station   safety round/check completed   supervised activity  Intervention: Prevent Skin Injury  Recent Flowsheet Documentation  Taken 8/7/2024 0745 by Nela Garcia, RN  Body Position: position changed independently  Goal: Optimal " Comfort and Wellbeing  Outcome: Progressing  Goal: Readiness for Transition of Care  Outcome: Progressing

## 2024-08-07 NOTE — PROGRESS NOTES
[facility]  Hospitalist Progress Note  Lito Skinner MD 08/07/2024    Reason for Stay (Diagnosis): placement         Assessment and Plan:      Summary of Stay: Jemal Gray is a 88-year-old male who was transitioned to group home care on 6/5.  He has history of dementia and family is unable to provide care for him.  During his stay we was treated for right foot cellulitis which has resolved.  Patient is MA pending, awaiting placement in long term care.       Medically stable for discharge when disposition plan in place     assisted care admission.  Social work consulted.  Looking for LTC.  Is MA pending, SW working on. Once a bed is available patient can go anytime      Constipation  Started/continue on senna, have as needed miralax and dulcolax available     Right foot cellulitis.  Resolved with Keflex 4 times daily through 6/11.  Swelling and redness resolved.    Venous ultrasound negative for DVT.     Dementia with chronic aphasia.  Not on any medications.   As needed olanzapine ordered for agitation. Impulsive.     5.   Bradycardia            Patient was noted to have HR in 50s.  Asymptomatic     6. Paper work            Guardianship papers filled on 7/13 for the daughter.         Diet: Combination Diet Regular Diet  Room Service    DVT Prophylaxis: Pneumatic Compression Devices  Maddox Catheter: Not present  Lines: None     Cardiac Monitoring: None  Code Status: No CPR- Do NOT Intubate  Dispo: Anticipate discharge when safe disposition plan in place; LTC facility being sought    Medically Ready for Discharge: Ready Now            Interval History (Subjective):      No clinical change documented by nursing overnight.  Sitting in chair comfortably.  Noted to be impulsive intermittently.           Physical Exam:      Last Vital Signs:  /72 (BP Location: Left arm)   Pulse 63   Temp 97.9  F (36.6  C) (Oral)   Resp 18   SpO2 97%       Intake/Output Summary (Last 24 hours) at 7/29/2024  "1155  Last data filed at 7/29/2024 0930  Gross per 24 hour   Intake 320 ml   Output --   Net 320 ml       Constitutional: Awake, alert, cooperative, no apparent distress.  Pleasantly interactive.     Respiratory: No increased WOB   Cardiovascular: Well-perfused   Abdomen:    Skin: No rashes, no cyanosis, dry to touch   Neuro: Alert and awake, discourse is meandering.   Extremities: No edema or lesions   Other(s):        All other systems: Negative          Medications:      All current medications were reviewed with changes reflected in problem list.         Data:      All new lab and imaging data was reviewed.   Labs:       No results found for: \"NA\" No results found for: \"CHLORIDE\" No results found for: \"BUN\"   No results found for: \"POTASSIUM\" No results found for: \"CO2\" No results found for: \"CR\"     No results for input(s): \"WBC\", \"HGB\", \"HCT\", \"MCV\", \"PLT\" in the last 168 hours.   Imaging:   No results found for this or any previous visit (from the past 24 hour(s)).   "

## 2024-08-07 NOTE — PLAN OF CARE
"Cared for [id number]    intermediate cares. SBA with spontaneous events of getting off the bed to use the bathroom. Pt was refusing back to bed after using the bathroom and continued to tidy up bed instead. Difficult and unable to redirect. PRN Zyprexa given. VSS on RA. No acute changes overnight, SW following with placement pending.    Major Shift Events:      Plan: Continue with plan of care  For vital signs and complete assessments, please see documentation under flowsheets.    Garima Tucker RN      Goal Outcome Evaluation:      Plan of Care Reviewed With: patient     Overall Patient Progress: no change    Outcome Evaluation: no acute changes overnight, pending placement      Problem: Adult Inpatient Plan of Care  Goal: Plan of Care Review  Description: The Plan of Care Review/Shift note should be completed every shift.  The Outcome Evaluation is a brief statement about your assessment that the patient is improving, declining, or no change.  This information will be displayed automatically on your shift  note.  Outcome: Progressing  Flowsheets (Taken 8/7/2024 0345)  Outcome Evaluation: no acute changes overnight, pending placement  Plan of Care Reviewed With: patient  Overall Patient Progress: no change  Goal: Patient-Specific Goal (Individualized)  Description: You can add care plan individualizations to a care plan. Examples of Individualization might be:  \"Parent requests to be called daily at 9am for status\", \"I have a hard time hearing out of my right ear\", or \"Do not touch me to wake me up as it startles  me\".  Outcome: Progressing  Goal: Absence of Hospital-Acquired Illness or Injury  Outcome: Progressing  Intervention: Identify and Manage Fall Risk  Recent Flowsheet Documentation  Taken 8/7/2024 0246 by Prieto Tucker RN  Safety Promotion/Fall Prevention:   activity supervised   safety round/check completed  Taken 8/7/2024 0225 by Prieto Tucker RN  Safety Promotion/Fall Prevention:   activity supervised   " safety round/check completed  Taken 8/6/2024 2325 by Prieto Tucker RN  Safety Promotion/Fall Prevention:   safety round/check completed   room organization consistent   room near nurse's station   room door open   nonskid shoes/slippers when out of bed   increased rounding and observation   clutter free environment maintained   activity supervised  Intervention: Prevent Skin Injury  Recent Flowsheet Documentation  Taken 8/6/2024 2325 by Prieto Tucker RN  Body Position: position changed independently  Goal: Optimal Comfort and Wellbeing  Outcome: Progressing  Goal: Readiness for Transition of Care  Outcome: Progressing

## 2024-08-07 NOTE — PROGRESS NOTES
Aler to self. shelter care status. VSS stable. Denied pain. Up to bathroom with assist of 1  on gait belt and walker. LS clear. Reg diet. Waiting for placement. Continue POC and monitoring.

## 2024-08-08 PROCEDURE — 101N000002 HC CUSTODIAL CARE DAILY

## 2024-08-08 ASSESSMENT — ACTIVITIES OF DAILY LIVING (ADL)
ADLS_ACUITY_SCORE: 53
ADLS_ACUITY_SCORE: 52
ADLS_ACUITY_SCORE: 52
ADLS_ACUITY_SCORE: 53
ADLS_ACUITY_SCORE: 51
ADLS_ACUITY_SCORE: 53
ADLS_ACUITY_SCORE: 51
ADLS_ACUITY_SCORE: 53
ADLS_ACUITY_SCORE: 51
ADLS_ACUITY_SCORE: 53

## 2024-08-08 NOTE — PLAN OF CARE
"Pt is alert to self with confusion. Pt denies pain or discomfort. Pt is hard of hearing. Pt has no IV access. Pt is SBA in transfer and care. Bed alarm in place and call light within reach. Plan is LTC placement. SW following. Will continue to monitor and assess pt.       Goal Outcome Evaluation:      Plan of Care Reviewed With: patient    Overall Patient Progress: improvingOverall Patient Progress: improving    Outcome Evaluation: pt is confuse. pt denies pain. pt is waiting for placement.      Problem: Adult Inpatient Plan of Care  Goal: Plan of Care Review  Description: The Plan of Care Review/Shift note should be completed every shift.  The Outcome Evaluation is a brief statement about your assessment that the patient is improving, declining, or no change.  This information will be displayed automatically on your shift  note.  8/8/2024 0153 by Reji Duron RN  Outcome: Progressing  Flowsheets (Taken 8/8/2024 0153)  Outcome Evaluation: pt is confuse. pt denies pain. pt is waiting for placement.  Plan of Care Reviewed With: patient  Overall Patient Progress: improving  8/8/2024 0151 by Reji Duron RN  Outcome: Progressing  Flowsheets (Taken 8/8/2024 0151)  Outcome Evaluation: pt is on comfort cares. pt was given Ativan and Melatonin during the shift.  Plan of Care Reviewed With: patient  Overall Patient Progress: no change  Goal: Patient-Specific Goal (Individualized)  Description: You can add care plan individualizations to a care plan. Examples of Individualization might be:  \"Parent requests to be called daily at 9am for status\", \"I have a hard time hearing out of my right ear\", or \"Do not touch me to wake me up as it startles  me\".  8/8/2024 0153 by Reji Duron, RN  Outcome: Progressing  8/8/2024 0153 by Reji Duron RN  Outcome: Progressing  8/8/2024 0151 by Reji Duron, RN  Outcome: Progressing  Goal: Absence of Hospital-Acquired Illness or " Injury  8/8/2024 0153 by Reji Duron RN  Outcome: Progressing  8/8/2024 0153 by Reji Duron RN  Outcome: Progressing  8/8/2024 0151 by Reji Duron RN  Outcome: Progressing  Intervention: Identify and Manage Fall Risk  Recent Flowsheet Documentation  Taken 8/7/2024 1957 by Reji Duron RN  Safety Promotion/Fall Prevention:   assistive device/personal items within reach   activity supervised   increased rounding and observation   clutter free environment maintained   increase visualization of patient   lighting adjusted   nonskid shoes/slippers when out of bed   patient and family education   safety round/check completed  Intervention: Prevent Skin Injury  Recent Flowsheet Documentation  Taken 8/7/2024 1957 by Reji Duron RN  Body Position: position changed independently  Intervention: Prevent Infection  Recent Flowsheet Documentation  Taken 8/7/2024 1957 by Reji Duron RN  Infection Prevention:   rest/sleep promoted   single patient room provided   hand hygiene promoted  Goal: Optimal Comfort and Wellbeing  8/8/2024 0153 by Reji Duron RN  Outcome: Progressing  8/8/2024 0153 by Reji Duron RN  Outcome: Progressing  8/8/2024 0151 by Reji Duron RN  Outcome: Progressing  Goal: Readiness for Transition of Care  8/8/2024 0153 by Reji Duron RN  Outcome: Progressing  8/8/2024 0153 by Reji Duron RN  Outcome: Progressing  8/8/2024 0151 by Reji Duron RN  Outcome: Progressing

## 2024-08-08 NOTE — PROGRESS NOTES
[facility]  Hospitalist Progress Note  Lito Skinner MD 08/08/2024    Reason for Stay (Diagnosis): placement         Assessment and Plan:      Summary of Stay: Jemal Gray is a 88-year-old male who [facility]  He has history of dementia and family is unable to provide care for him.  During his stay we was treated for right foot cellulitis which has resolved.  Patient is MA pending, awaiting placement in long term care.       Medically stable for discharge when disposition plan in place     intermediate care admission.  Social work consulted.  Looking for LTC.  Is MA pending, SW working on. Once a bed is available patient can go anytime      Constipation  Started/continue on senna, have as needed miralax and dulcolax available     Right foot cellulitis.  Resolved with Keflex 4 times daily through 6/11.  Swelling and redness resolved.    Venous ultrasound negative for DVT.     Dementia with chronic aphasia.  Not on any medications.   As needed olanzapine ordered for agitation. Impulsive.     5.   Bradycardia            Patient was noted to have HR in 50s.  Asymptomatic     6. Paper work            Guardianship papers filled on 7/13 for the daughter.         Diet: Combination Diet Regular Diet  Room Service    DVT Prophylaxis: Pneumatic Compression Devices  Maddox Catheter: Not present  Lines: None     Cardiac Monitoring: None  Code Status: No CPR- Do NOT Intubate  Dispo: Anticipate discharge when safe disposition plan in place; LTC facility being sought    Medically Ready for Discharge: Ready Now            Interval History (Subjective):      No clinical change documented by nursing overnight.  Sitting in chair comfortably.  Noted to be impulsive intermittently.           Physical Exam:      Last Vital Signs:  /60 (BP Location: Left arm)   Pulse 50   Temp 98  F (36.7  C) (Oral)   Resp 18   SpO2 98%       Intake/Output Summary (Last 24 hours) at 7/29/2024  "1155  Last data filed at 7/29/2024 0930  Gross per 24 hour   Intake 320 ml   Output --   Net 320 ml       Constitutional: Awake, alert, cooperative, no apparent distress.  Pleasantly interactive.     Respiratory: No increased WOB   Cardiovascular: Well-perfused   Abdomen:    Skin: No rashes, no cyanosis, dry to touch   Neuro: Alert and awake, discourse is meandering.   Extremities: No edema or lesions   Other(s):        All other systems: Negative          Medications:      All current medications were reviewed with changes reflected in problem list.         Data:      All new lab and imaging data was reviewed.   Labs:       No results found for: \"NA\" No results found for: \"CHLORIDE\" No results found for: \"BUN\"   No results found for: \"POTASSIUM\" No results found for: \"CO2\" No results found for: \"CR\"     No results for input(s): \"WBC\", \"HGB\", \"HCT\", \"MCV\", \"PLT\" in the last 168 hours.   Imaging:   No results found for this or any previous visit (from the past 24 hour(s)).   "

## 2024-08-08 NOTE — PLAN OF CARE
"Daily vitals stable, denies pain and PAINAD=0, alert to self only pleasantly confused. SBA alarms arms for impulsivity, meal service scheduled regular diet eats best when tray set up, wears brief but continent of bowel and bladder. Awaiting LTC placement and insurance per .      Goal Outcome Evaluation:    Plan of Care Reviewed With: patient  Overall Patient Progress: no changeOverall Patient Progress: no change  Outcome Evaluation: assisted care awaiting placement    Problem: Adult Inpatient Plan of Care  Goal: Plan of Care Review  Description: The Plan of Care Review/Shift note should be completed every shift.  The Outcome Evaluation is a brief statement about your assessment that the patient is improving, declining, or no change.  This information will be displayed automatically on your shift  note.  Outcome: Progressing  Flowsheets (Taken 8/8/2024 6648)  Outcome Evaluation: assisted care awaiting placement  Plan of Care Reviewed With: patient  Overall Patient Progress: no change  Goal: Patient-Specific Goal (Individualized)  Description: You can add care plan individualizations to a care plan. Examples of Individualization might be:  \"Parent requests to be called daily at 9am for status\", \"I have a hard time hearing out of my right ear\", or \"Do not touch me to wake me up as it startles  me\".  Outcome: Progressing  Goal: Absence of Hospital-Acquired Illness or Injury  Outcome: Progressing  Goal: Optimal Comfort and Wellbeing  Outcome: Progressing  Goal: Readiness for Transition of Care  Outcome: Progressing     Problem: Dementia Signs/Symptoms  Goal: Improved Behavioral Control (Dementia Signs/Symptoms)  Outcome: Progressing  Goal: Improved Mood Symptoms (Dementia Signs/Symptoms)  Outcome: Progressing  Goal: Optimized Cognitive Function (Dementia Signs/Symptoms)  Outcome: Progressing  Goal: Optimized Oral Intake (Dementia Signs/Symptoms)  Outcome: Progressing  Goal: Improved Sleep (Dementia " Signs/Symptoms)  Outcome: Progressing  Goal: Enhanced Social or Functional Skills and Ability (Dementia Signs/Symptoms)  Outcome: Progressing

## 2024-08-09 PROCEDURE — 250N000013 HC RX MED GY IP 250 OP 250 PS 637: Performed by: INTERNAL MEDICINE

## 2024-08-09 PROCEDURE — 101N000002 HC CUSTODIAL CARE DAILY

## 2024-08-09 RX ADMIN — SENNOSIDES AND DOCUSATE SODIUM 1 TABLET: 8.6; 5 TABLET ORAL at 21:35

## 2024-08-09 ASSESSMENT — ACTIVITIES OF DAILY LIVING (ADL)
ADLS_ACUITY_SCORE: 54
ADLS_ACUITY_SCORE: 51
ADLS_ACUITY_SCORE: 54
ADLS_ACUITY_SCORE: 53
ADLS_ACUITY_SCORE: 54
ADLS_ACUITY_SCORE: 54
ADLS_ACUITY_SCORE: 53
ADLS_ACUITY_SCORE: 54
ADLS_ACUITY_SCORE: 54

## 2024-08-09 NOTE — PLAN OF CARE
Care from [id number]    Pt is A/O to self but pleasant and cooperative with staff. Up with SBA. Pt is impulsive, all alarms on and floor mat in place. Pt is very [place] and uses a pocket talker. Waiting for LTC placement, SW following.     Goal Outcome Evaluation:      Plan of Care Reviewed With: patient    Overall Patient Progress: no change  Overall Patient Progress: no change    Outcome Evaluation: Waiting placement.      Problem: Adult Inpatient Plan of Care  Goal: Plan of Care Review  Description: The Plan of Care Review/Shift note should be completed every shift.  The Outcome Evaluation is a brief statement about your assessment that the patient is improving, declining, or no change.  This information will be displayed automatically on your shift  note.  Recent Flowsheet Documentation  Taken 8/9/2024 1649 by Rozina Liriano, RN  Outcome Evaluation: Waiting placement.  Plan of Care Reviewed With: patient  Overall Patient Progress: no change

## 2024-08-09 NOTE — PLAN OF CARE
Goal Outcome Evaluation:    Confused. Impulsive getting oob, floor mat in place.      Plan of Care Reviewed With: patient    Overall Patient Progress: no changeOverall Patient Progress: no change    Outcome Evaluation: Awaits placement      Problem: Adult Inpatient Plan of Care  Goal: Plan of Care Review  Description: The Plan of Care Review/Shift note should be completed every shift.  The Outcome Evaluation is a brief statement about your assessment that the patient is improving, declining, or no change.  This information will be displayed automatically on your shift  note.  Recent Flowsheet Documentation  Taken 8/9/2024 0241 by Manoj Palencia, RN  Outcome Evaluation: Awaits placement  Plan of Care Reviewed With: patient  Overall Patient Progress: no change  Goal: Absence of Hospital-Acquired Illness or Injury  Intervention: Identify and Manage Fall Risk  Recent Flowsheet Documentation  Taken 8/8/2024 2014 by Manoj Palencia, RN  Safety Promotion/Fall Prevention:   treat underlying cause   toileting scheduled   supervised activity   safety round/check completed   room door open   room near nurse's station     Problem: Delirium  Goal: Improved Behavioral Control  Intervention: Minimize Safety Risk  Recent Flowsheet Documentation  Taken 8/8/2024 2014 by Manoj Palencia, RN  Enhanced Safety Measures:   floor mats   room near unit station     Problem: Adult Inpatient Plan of Care  Goal: Plan of Care Review  Description: The Plan of Care Review/Shift note should be completed every shift.  The Outcome Evaluation is a brief statement about your assessment that the patient is improving, declining, or no change.  This information will be displayed automatically on your shift  note.  Recent Flowsheet Documentation  Taken 8/9/2024 0241 by Manoj Palencia, RN  Outcome Evaluation: Awaits placement  Plan of Care Reviewed With: patient  Overall Patient Progress: no change  Goal: Absence of Hospital-Acquired Illness or Injury  Intervention: Identify and Manage  Fall Risk  Recent Flowsheet Documentation  Taken 8/8/2024 2014 by Manoj Palencia RN  Safety Promotion/Fall Prevention:   treat underlying cause   toileting scheduled   supervised activity   safety round/check completed   room door open   room near nurse's station     Problem: Fall Injury Risk  Goal: Absence of Fall and Fall-Related Injury  Intervention: Identify and Manage Contributors  Recent Flowsheet Documentation  Taken 8/8/2024 2014 by Manoj Palencia RN  Medication Review/Management: medications reviewed  Intervention: Promote Injury-Free Environment  Recent Flowsheet Documentation  Taken 8/8/2024 2014 by Manoj Palencia RN  Safety Promotion/Fall Prevention:   treat underlying cause   toileting scheduled   supervised activity   safety round/check completed   room door open   room near nurse's station     Problem: Adult Inpatient Plan of Care  Goal: Absence of Hospital-Acquired Illness or Injury  Intervention: Identify and Manage Fall Risk  Recent Flowsheet Documentation  Taken 8/8/2024 2014 by Manoj Palencia RN  Safety Promotion/Fall Prevention:   treat underlying cause   toileting scheduled   supervised activity   safety round/check completed   room door open   room near nurse's station     Problem: Fall Injury Risk  Goal: Absence of Fall and Fall-Related Injury  Intervention: Identify and Manage Contributors  Recent Flowsheet Documentation  Taken 8/8/2024 2014 by Manoj Palencia RN  Medication Review/Management: medications reviewed  Intervention: Promote Injury-Free Environment  Recent Flowsheet Documentation  Taken 8/8/2024 2014 by Manoj Palencia RN  Safety Promotion/Fall Prevention:   treat underlying cause   toileting scheduled   supervised activity   safety round/check completed   room door open   room near nurse's station     Problem: Fall Injury Risk  Goal: Absence of Fall and Fall-Related Injury  Intervention: Identify and Manage Contributors  Recent Flowsheet Documentation  Taken 8/8/2024 2014 by Manoj Palencia, RN  Medication  Review/Management: medications reviewed  Intervention: Promote Injury-Free Environment  Recent Flowsheet Documentation  Taken 8/8/2024 2014 by Manoj Palencia RN  Safety Promotion/Fall Prevention:   treat underlying cause   toileting scheduled   supervised activity   safety round/check completed   room door open   room near nurse's station     Problem: Adult Inpatient Plan of Care  Goal: Plan of Care Review  Description: The Plan of Care Review/Shift note should be completed every shift.  The Outcome Evaluation is a brief statement about your assessment that the patient is improving, declining, or no change.  This information will be displayed automatically on your shift  note.  Recent Flowsheet Documentation  Taken 8/9/2024 0241 by Manoj Palencia RN  Outcome Evaluation: Awaits placement  Plan of Care Reviewed With: patient  Overall Patient Progress: no change  Goal: Absence of Hospital-Acquired Illness or Injury  Intervention: Identify and Manage Fall Risk  Recent Flowsheet Documentation  Taken 8/8/2024 2014 by Manoj Palencia RN  Safety Promotion/Fall Prevention:   treat underlying cause   toileting scheduled   supervised activity   safety round/check completed   room door open   room near nurse's station     Problem: Adult Inpatient Plan of Care  Goal: Plan of Care Review  Description: The Plan of Care Review/Shift note should be completed every shift.  The Outcome Evaluation is a brief statement about your assessment that the patient is improving, declining, or no change.  This information will be displayed automatically on your shift  note.  Recent Flowsheet Documentation  Taken 8/9/2024 0241 by Manoj Palencia RN  Outcome Evaluation: Awaits placement  Plan of Care Reviewed With: patient  Overall Patient Progress: no change  Goal: Absence of Hospital-Acquired Illness or Injury  Intervention: Identify and Manage Fall Risk  Recent Flowsheet Documentation  Taken 8/8/2024 2014 by Manoj Palencia RN  Safety Promotion/Fall Prevention:   treat  underlying cause   toileting scheduled   supervised activity   safety round/check completed   room door open   room near nurse's station     Problem: Fall Injury Risk  Goal: Absence of Fall and Fall-Related Injury  Intervention: Identify and Manage Contributors  Recent Flowsheet Documentation  Taken 8/8/2024 2014 by Manoj Palencia RN  Medication Review/Management: medications reviewed  Intervention: Promote Injury-Free Environment  Recent Flowsheet Documentation  Taken 8/8/2024 2014 by Manoj Palencia RN  Safety Promotion/Fall Prevention:   treat underlying cause   toileting scheduled   supervised activity   safety round/check completed   room door open   room near nurse's station     Problem: Fall Injury Risk  Goal: Absence of Fall and Fall-Related Injury  Intervention: Identify and Manage Contributors  Recent Flowsheet Documentation  Taken 8/8/2024 2014 by Manoj Palencia RN  Medication Review/Management: medications reviewed  Intervention: Promote Injury-Free Environment  Recent Flowsheet Documentation  Taken 8/8/2024 2014 by Manoj Palencia RN  Safety Promotion/Fall Prevention:   treat underlying cause   toileting scheduled   supervised activity   safety round/check completed   room door open   room near nurse's station     Problem: Adult Inpatient Plan of Care  Goal: Plan of Care Review  Description: The Plan of Care Review/Shift note should be completed every shift.  The Outcome Evaluation is a brief statement about your assessment that the patient is improving, declining, or no change.  This information will be displayed automatically on your shift  note.  Recent Flowsheet Documentation  Taken 8/9/2024 0241 by Manoj Palencia RN  Outcome Evaluation: Awaits placement  Plan of Care Reviewed With: patient  Overall Patient Progress: no change  Goal: Absence of Hospital-Acquired Illness or Injury  Intervention: Identify and Manage Fall Risk  Recent Flowsheet Documentation  Taken 8/8/2024 2014 by Manoj Palencia RN  Safety Promotion/Fall Prevention:    treat underlying cause   toileting scheduled   supervised activity   safety round/check completed   room door open   room near nurse's station     Problem: Adult Inpatient Plan of Care  Goal: Plan of Care Review  Description: The Plan of Care Review/Shift note should be completed every shift.  The Outcome Evaluation is a brief statement about your assessment that the patient is improving, declining, or no change.  This information will be displayed automatically on your shift  note.  Recent Flowsheet Documentation  Taken 8/9/2024 0241 by Manoj Palencia RN  Outcome Evaluation: Awaits placement  Plan of Care Reviewed With: patient  Overall Patient Progress: no change  Goal: Absence of Hospital-Acquired Illness or Injury  Intervention: Identify and Manage Fall Risk  Recent Flowsheet Documentation  Taken 8/8/2024 2014 by Manoj Palencia RN  Safety Promotion/Fall Prevention:   treat underlying cause   toileting scheduled   supervised activity   safety round/check completed   room door open   room near nurse's station     Problem: Comorbidity Management  Goal: Maintenance of Asthma Control  Intervention: Maintain Asthma Symptom Control  Recent Flowsheet Documentation  Taken 8/8/2024 2014 by Manoj Palencia RN  Medication Review/Management: medications reviewed  Goal: Maintenance of Behavioral Health Symptom Control  Intervention: Maintain Behavioral Health Symptom Control  Recent Flowsheet Documentation  Taken 8/8/2024 2014 by Manoj Palenica RN  Medication Review/Management: medications reviewed  Goal: Maintenance of COPD Symptom Control  Intervention: Maintain COPD Symptom Control  Recent Flowsheet Documentation  Taken 8/8/2024 2014 by Manoj Palencia RN  Medication Review/Management: medications reviewed  Goal: Blood Glucose Levels Within Targeted Range  Intervention: Monitor and Manage Glycemia  Recent Flowsheet Documentation  Taken 8/8/2024 2014 by Manoj Palencia RN  Medication Review/Management: medications reviewed  Goal: Maintenance of Heart Failure  Symptom Control  Intervention: Maintain Heart Failure Management  Recent Flowsheet Documentation  Taken 8/8/2024 2014 by Manoj Palencia RN  Medication Review/Management: medications reviewed  Goal: Blood Pressure in Desired Range  Intervention: Maintain Blood Pressure Management  Recent Flowsheet Documentation  Taken 8/8/2024 2014 by Manoj Palencia RN  Medication Review/Management: medications reviewed  Goal: Maintenance of Osteoarthritis Symptom Control  Intervention: Maintain Osteoarthritis Symptom Control  Recent Flowsheet Documentation  Taken 8/8/2024 2014 by Manoj Palencia RN  Medication Review/Management: medications reviewed  Taken 8/8/2024 1948 by Manoj Palencia RN  Activity Management:   ambulated to bathroom   up in chair  Goal: Bariatric Home Regimen Maintained  Intervention: Maintain and Manage Postbariatric Surgery Care  Recent Flowsheet Documentation  Taken 8/8/2024 2014 by Manoj Palencia RN  Medication Review/Management: medications reviewed  Goal: Maintenance of Seizure Control  Intervention: Maintain Seizure Symptom Control  Recent Flowsheet Documentation  Taken 8/8/2024 2014 by Manoj Palencia RN  Medication Review/Management: medications reviewed     Problem: Pain Acute  Goal: Optimal Pain Control and Function  Intervention: Prevent or Manage Pain  Recent Flowsheet Documentation  Taken 8/8/2024 2014 by Manoj Palencia RN  Medication Review/Management: medications reviewed     Problem: Adult Inpatient Plan of Care  Goal: Plan of Care Review  Description: The Plan of Care Review/Shift note should be completed every shift.  The Outcome Evaluation is a brief statement about your assessment that the patient is improving, declining, or no change.  This information will be displayed automatically on your shift  note.  Recent Flowsheet Documentation  Taken 8/9/2024 0241 by Manoj Palencia RN  Outcome Evaluation: Awaits placement  Plan of Care Reviewed With: patient  Overall Patient Progress: no change  Goal: Absence of Hospital-Acquired Illness or  Injury  Intervention: Identify and Manage Fall Risk  Recent Flowsheet Documentation  Taken 8/8/2024 2014 by Manoj Palencia RN  Safety Promotion/Fall Prevention:   treat underlying cause   toileting scheduled   supervised activity   safety round/check completed   room door open   room near nurse's station     Problem: Fall Injury Risk  Goal: Absence of Fall and Fall-Related Injury  Intervention: Identify and Manage Contributors  Recent Flowsheet Documentation  Taken 8/8/2024 2014 by Manoj Palencia RN  Medication Review/Management: medications reviewed  Intervention: Promote Injury-Free Environment  Recent Flowsheet Documentation  Taken 8/8/2024 2014 by Manoj Palencia RN  Safety Promotion/Fall Prevention:   treat underlying cause   toileting scheduled   supervised activity   safety round/check completed   room door open   room near nurse's station     Problem: Adult Inpatient Plan of Care  Goal: Plan of Care Review  Description: The Plan of Care Review/Shift note should be completed every shift.  The Outcome Evaluation is a brief statement about your assessment that the patient is improving, declining, or no change.  This information will be displayed automatically on your shift  note.  Recent Flowsheet Documentation  Taken 8/9/2024 0241 by Manoj Palencia RN  Outcome Evaluation: Awaits placement  Plan of Care Reviewed With: patient  Overall Patient Progress: no change  Goal: Absence of Hospital-Acquired Illness or Injury  Intervention: Identify and Manage Fall Risk  Recent Flowsheet Documentation  Taken 8/8/2024 2014 by Manoj Palencia RN  Safety Promotion/Fall Prevention:   treat underlying cause   toileting scheduled   supervised activity   safety round/check completed   room door open   room near nurse's station     Problem: Fall Injury Risk  Goal: Absence of Fall and Fall-Related Injury  Intervention: Identify and Manage Contributors  Recent Flowsheet Documentation  Taken 8/8/2024 2014 by Manoj Palencia RN  Medication Review/Management: medications  reviewed  Intervention: Promote Injury-Free Environment  Recent Flowsheet Documentation  Taken 8/8/2024 2014 by Manoj Palencia, RN  Safety Promotion/Fall Prevention:   treat underlying cause   toileting scheduled   supervised activity   safety round/check completed   room door open   room near nurse's station     Problem: Adult Inpatient Plan of Care  Goal: Plan of Care Review  Description: The Plan of Care Review/Shift note should be completed every shift.  The Outcome Evaluation is a brief statement about your assessment that the patient is improving, declining, or no change.  This information will be displayed automatically on your shift  note.  Outcome: Progressing  Flowsheets (Taken 8/9/2024 0241)  Outcome Evaluation: Awaits placement  Plan of Care Reviewed With: patient  Overall Patient Progress: no change  Goal: Absence of Hospital-Acquired Illness or Injury  Intervention: Identify and Manage Fall Risk  Recent Flowsheet Documentation  Taken 8/8/2024 2014 by Manoj Palencia, RN  Safety Promotion/Fall Prevention:   treat underlying cause   toileting scheduled   supervised activity   safety round/check completed   room door open   room near nurse's station

## 2024-08-09 NOTE — PROGRESS NOTES
[facility]  Hospitalist Progress Note  Lito Skinner MD 08/09/2024    Reason for Stay (Diagnosis): placement         Assessment and Plan:      Summary of Stay: Jemal Gray is a 88-year-old male who [facility].  He has history of dementia and family is unable to provide care for him.  During his stay we was treated for right foot cellulitis which has resolved.  Patient is MA pending, awaiting placement in long term care.       Medically stable for discharge when disposition plan in place     [facility] care admission.  Social work consulted.  Looking for LTC.  Is MA pending, SW working on. Once a bed is available patient can go anytime      Constipation  Started/continue on senna, have as needed miralax and dulcolax available     Right foot cellulitis.  Resolved with Keflex 4 times daily through 6/11.  Swelling and redness resolved.    Venous ultrasound negative for DVT.     Dementia with chronic aphasia.  Not on any medications.   As needed olanzapine ordered for agitation. Impulsive.     5.   Bradycardia            Patient was noted to have HR in 50s.  Asymptomatic     6. Paper work            Guardianship papers filled on 7/13 for the daughter.         Diet: Combination Diet Regular Diet  Room Service    DVT Prophylaxis: Pneumatic Compression Devices  [place] Catheter: Not present  Lines: None     Cardiac Monitoring: None  Code Status: No CPR- Do NOT Intubate  Dispo: Anticipate discharge when safe disposition plan in place; LTC facility being sought    Medically Ready for Discharge: Ready Now            Interval History (Subjective):      No clinical change documented by nursing overnight.  Sitting in chair comfortably.             Physical Exam:      Last Vital Signs:  /64 (BP Location: Left arm)   Pulse 55   Temp 98.6  F (37  C) (Oral)   Resp 20   SpO2 97%       Intake/Output Summary (Last 24 hours) at 7/29/2024 9638  Last data filed at 7/29/2024  "0930  Gross per 24 hour   Intake 320 ml   Output --   Net 320 ml       Constitutional: Awake, alert, cooperative, no apparent distress.  Pleasantly interactive.     Respiratory: No increased WOB   Cardiovascular: Well-perfused   Abdomen:    Skin: No rashes, no cyanosis, dry to touch   Neuro: Alert and awake, discourse is meandering.   Extremities: No edema or lesions   Other(s):        All other systems: Negative          Medications:      All current medications were reviewed with changes reflected in problem list.         Data:      All new lab and imaging data was reviewed.   Labs:       No results found for: \"NA\" No results found for: \"CHLORIDE\" No results found for: \"BUN\"   No results found for: \"POTASSIUM\" No results found for: \"CO2\" No results found for: \"CR\"     No results for input(s): \"WBC\", \"HGB\", \"HCT\", \"MCV\", \"PLT\" in the last 168 hours.   Imaging:   No results found for this or any previous visit (from the past 24 hour(s)).   "

## 2024-08-10 PROCEDURE — 99207 PR NO BILLABLE SERVICE THIS VISIT: CPT | Performed by: STUDENT IN AN ORGANIZED HEALTH CARE EDUCATION/TRAINING PROGRAM

## 2024-08-10 PROCEDURE — 250N000013 HC RX MED GY IP 250 OP 250 PS 637: Performed by: NURSE PRACTITIONER

## 2024-08-10 PROCEDURE — 101N000002 HC CUSTODIAL CARE DAILY

## 2024-08-10 RX ADMIN — ACETAMINOPHEN 650 MG: 325 TABLET, FILM COATED ORAL at 15:28

## 2024-08-10 ASSESSMENT — ACTIVITIES OF DAILY LIVING (ADL)
ADLS_ACUITY_SCORE: 53
ADLS_ACUITY_SCORE: 53
ADLS_ACUITY_SCORE: 47
ADLS_ACUITY_SCORE: 53
ADLS_ACUITY_SCORE: 49
ADLS_ACUITY_SCORE: 47
ADLS_ACUITY_SCORE: 53
ADLS_ACUITY_SCORE: 47
ADLS_ACUITY_SCORE: 53
ADLS_ACUITY_SCORE: 46
ADLS_ACUITY_SCORE: 53
ADLS_ACUITY_SCORE: 46
ADLS_ACUITY_SCORE: 49
ADLS_ACUITY_SCORE: 46
ADLS_ACUITY_SCORE: 53
ADLS_ACUITY_SCORE: 47
ADLS_ACUITY_SCORE: 53

## 2024-08-10 NOTE — PLAN OF CARE
Pt a&ox0. Awaiting placement. BM this shift, voiding adequately. No pain per PAINAD scale. Up SBA-A1 GB W. Alarms on.  Problem: Adult Inpatient Plan of Care  Goal: Plan of Care Review  Description: The Plan of Care Review/Shift note should be completed every shift.  The Outcome Evaluation is a brief statement about your assessment that the patient is improving, declining, or no change.  This information will be displayed automatically on your shift  note.  Recent Flowsheet Documentation  Taken 8/10/2024 0729 by Maria De Jesus Bruce RN  Plan of Care Reviewed With: patient  Overall Patient Progress: no change  Goal: Absence of Hospital-Acquired Illness or Injury  Intervention: Identify and Manage Fall Risk  Recent Flowsheet Documentation  Taken 8/9/2024 2100 by Maria De Jesus Bruce RN  Safety Promotion/Fall Prevention: safety round/check completed  Intervention: Prevent Infection  Recent Flowsheet Documentation  Taken 8/9/2024 2100 by Maria De Jesus Bruce RN  Infection Prevention:   environmental surveillance performed   equipment surfaces disinfected   rest/sleep promoted   hand hygiene promoted   single patient room provided   Goal Outcome Evaluation:      Plan of Care Reviewed With: patient    Overall Patient Progress: no changeOverall Patient Progress: no change

## 2024-08-10 NOTE — PLAN OF CARE
Per MD, unable to test for C.diff until 24 hours off stool softeners (last dose 2100 8/9). Pt had loose stool x2.    Impulsive, alarms on and floor mat in place. A/O to self, garbled speech. [place], pocket talker in room.   Waiting for placement.

## 2024-08-10 NOTE — PROGRESS NOTES
[facility]  Hospitalist Progress Note  Alexandre Kurtz MD 08/10/2024    Reason for Stay (Diagnosis): placement         Assessment and Plan:      Summary of Stay: Jemal Gray is a 88-year-old male who was transitioned to detention care on 6/5.  He has history of dementia and family is unable to provide care for him.  During his stay we was treated for right foot cellulitis which has resolved.  Patient is MA pending, awaiting placement in long term care.     Medically stable for discharge when disposition plan in place     residential care admission.  Social work consulted.  Looking for LTC.  Is MA pending, SW working on this. Once a bed is available patient can go anytime      Constipation  Started/continue on senna, have as needed miralax and dulcolax available     Right foot cellulitis.  Resolved with Keflex 4 times daily through 6/11.  Swelling and redness resolved.    Venous ultrasound negative for DVT.     Dementia with chronic aphasia.  Not on any medications.   As needed olanzapine ordered for agitation. Impulsive.     5.   Bradycardia            Patient was noted to have HR in 50s.  Asymptomatic     6. Paper work            Guardianship papers filled on 7/13 for the daughter.         Diet: Combination Diet Regular Diet  Room Service    DVT Prophylaxis: Pneumatic Compression Devices  Maddox Catheter: Not present  Lines: None     Cardiac Monitoring: None  Code Status: No CPR- Do NOT Intubate  Dispo: Anticipate discharge when safe disposition plan in place; LTC facility being sought    Medically Ready for Discharge: Ready Now            Interval History (Subjective):      No clinical change documented by nursing overnight.  Sitting in chair comfortably.             Physical Exam:      Last Vital Signs:  BP (!) 155/70 (BP Location: Left arm)   Pulse 55   Temp 98.6  F (37  C) (Oral)   Resp 16   SpO2 98%       Intake/Output Summary (Last 24 hours) at 7/29/2024 1580  Last data filed at 7/29/2024  "0930  Gross per 24 hour   Intake 320 ml   Output --   Net 320 ml       Constitutional: Awake, alert, cooperative, no apparent distress.  Pleasantly interactive.     Respiratory: No increased WOB   Cardiovascular: Well-perfused   Abdomen:    Skin: No rashes, no cyanosis, dry to touch   Neuro: Alert and awake, discourse is meandering.   Extremities: No edema or lesions   Other(s):        All other systems: Negative          Medications:      All current medications were reviewed with changes reflected in problem list.         Data:      All new lab and imaging data was reviewed.   Labs:       No results found for: \"NA\" No results found for: \"CHLORIDE\" No results found for: \"BUN\"   No results found for: \"POTASSIUM\" No results found for: \"CO2\" No results found for: \"CR\"     No results for input(s): \"WBC\", \"HGB\", \"HCT\", \"MCV\", \"PLT\" in the last 168 hours.   Imaging:   No results found for this or any previous visit (from the past 24 hour(s)).   "

## 2024-08-10 NOTE — PLAN OF CARE
"Cared for [id number]     Patient has had two large, loose/watery stools today. I have cancelled his scheduled Senna tonight - MD notified.    Pt Alert to self only, VSS; Pt denies pain, headache, dizziness, N/V & SOB. Pt up Asst: 1 w/gait belt & walker. Lung sounds clear, RA. Blanchable redness on sacrum. Social Work following. Plan to discharge LTC, pending placement. Will continue with plan of care.    ++++++++++++++++++++++++++++++++    Goal Outcome Evaluation:      Plan of Care Reviewed With: patient    Overall Patient Progress: no changeOverall Patient Progress: no change    Outcome Evaluation: Waiting for placement      Problem: Adult Inpatient Plan of Care  Goal: Plan of Care Review  Description: The Plan of Care Review/Shift note should be completed every shift.  The Outcome Evaluation is a brief statement about your assessment that the patient is improving, declining, or no change.  This information will be displayed automatically on your shift  note.  Outcome: Adequate for Care Transition  Flowsheets (Taken 8/10/2024 1305)  Outcome Evaluation: Waiting for placement  Plan of Care Reviewed With: patient  Overall Patient Progress: no change  Goal: Patient-Specific Goal (Individualized)  Description: You can add care plan individualizations to a care plan. Examples of Individualization might be:  \"Parent requests to be called daily at 9am for status\", \"I have a hard time hearing out of my right ear\", or \"Do not touch me to wake me up as it startles  me\".  Outcome: Adequate for Care Transition  Goal: Absence of Hospital-Acquired Illness or Injury  Outcome: Adequate for Care Transition  Intervention: Identify and Manage Fall Risk  Recent Flowsheet Documentation  Taken 8/10/2024 3658 by Olga Anderson, RN  Safety Promotion/Fall Prevention:   activity supervised   assistive device/personal items within reach   increased rounding and observation   increase visualization of patient   safety round/check " completed   supervised activity  Intervention: Prevent Skin Injury  Recent Flowsheet Documentation  Taken 8/10/2024 0830 by Olga Anderson RN  Body Position: position changed independently  Skin Protection: adhesive use limited  Device Skin Pressure Protection:   absorbent pad utilized/changed   adhesive use limited  Intervention: Prevent and Manage VTE (Venous Thromboembolism) Risk  Recent Flowsheet Documentation  Taken 8/10/2024 0830 by Olga Anderson RN  VTE Prevention/Management: patient refused intervention  Intervention: Prevent Infection  Recent Flowsheet Documentation  Taken 8/10/2024 0830 by Olga Anderson RN  Infection Prevention:   hand hygiene promoted   single patient room provided   rest/sleep promoted  Goal: Optimal Comfort and Wellbeing  Outcome: Adequate for Care Transition  Goal: Readiness for Transition of Care  Outcome: Adequate for Care Transition     Problem: Dementia Signs/Symptoms  Goal: Improved Behavioral Control (Dementia Signs/Symptoms)  Outcome: Adequate for Care Transition  Goal: Improved Mood Symptoms (Dementia Signs/Symptoms)  Outcome: Adequate for Care Transition  Goal: Optimized Cognitive Function (Dementia Signs/Symptoms)  Outcome: Adequate for Care Transition  Goal: Optimized Oral Intake (Dementia Signs/Symptoms)  Outcome: Adequate for Care Transition  Goal: Improved Sleep (Dementia Signs/Symptoms)  Outcome: Adequate for Care Transition  Goal: Enhanced Social or Functional Skills and Ability (Dementia Signs/Symptoms)  Outcome: Adequate for Care Transition

## 2024-08-11 PROCEDURE — 99207 PR NO BILLABLE SERVICE THIS VISIT: CPT | Performed by: STUDENT IN AN ORGANIZED HEALTH CARE EDUCATION/TRAINING PROGRAM

## 2024-08-11 PROCEDURE — 101N000002 HC CUSTODIAL CARE DAILY

## 2024-08-11 ASSESSMENT — ACTIVITIES OF DAILY LIVING (ADL)
ADLS_ACUITY_SCORE: 46
ADLS_ACUITY_SCORE: 44
ADLS_ACUITY_SCORE: 46
ADLS_ACUITY_SCORE: 44
ADLS_ACUITY_SCORE: 46
ADLS_ACUITY_SCORE: 44
ADLS_ACUITY_SCORE: 46
ADLS_ACUITY_SCORE: 44
ADLS_ACUITY_SCORE: 46
ADLS_ACUITY_SCORE: 44
ADLS_ACUITY_SCORE: 46
ADLS_ACUITY_SCORE: 46
ADLS_ACUITY_SCORE: 44
ADLS_ACUITY_SCORE: 44
ADLS_ACUITY_SCORE: 46

## 2024-08-11 NOTE — PROGRESS NOTES
[facility]  Hospitalist Progress Note  Alexandre Kurtz MD 08/11/2024    Reason for Stay (Diagnosis): placement         Assessment and Plan:      Summary of Stay: Jemal Gray is a 88-year-old male who was transitioned to group home care on 6/5.  He has history of dementia and family is unable to provide care for him.  During his stay we was treated for right foot cellulitis which has resolved.  Patient is MA pending, awaiting placement in long term care.     Medically stable for discharge when disposition plan in place     penitentiary care admission.  Social work consulted.  Looking for LTC.  Is MA pending, SW working on this. Once a bed is available patient can go anytime      Constipation  Started/continue on senna, have as needed miralax and dulcolax available     Right foot cellulitis.  Resolved with Keflex 4 times daily through 6/11.  Swelling and redness resolved.    Venous ultrasound negative for DVT.     Dementia with chronic aphasia.  Not on any medications.   As needed olanzapine ordered for agitation. Impulsive.     5.   Bradycardia            Patient was noted to have HR in 50s.  Asymptomatic     6. Paper work            Guardianship papers filled on 7/13 for the daughter.         Diet: Combination Diet Regular Diet  Room Service    DVT Prophylaxis: Pneumatic Compression Devices  Maddox Catheter: Not present  Lines: None     Cardiac Monitoring: None  Code Status: No CPR- Do NOT Intubate  Dispo: Anticipate discharge when safe disposition plan in place; LTC facility being sought    Medically Ready for Discharge: Ready Now            Interval History (Subjective):      No clinical change documented by nursing overnight.  Sitting in chair comfortably.  Eating well.            Physical Exam:      Last Vital Signs:  /60 (BP Location: Left arm)   Pulse 57   Temp 97.3  F (36.3  C) (Oral)   Resp 17   SpO2 97%       Intake/Output Summary (Last 24 hours) at 7/29/2024 5670  Last data filed at  "7/29/2024 0930  Gross per 24 hour   Intake 320 ml   Output --   Net 320 ml       Constitutional: Awake, alert, cooperative, no apparent distress.  Pleasantly interactive.     Respiratory: No increased WOB   Cardiovascular: Well-perfused   Abdomen:    Skin: No rashes, no cyanosis, dry to touch   Neuro: Alert and awake, discourse is meandering.   Extremities: No edema or lesions   Other(s):        All other systems: Negative          Medications:      All current medications were reviewed with changes reflected in problem list.         Data:      All new lab and imaging data was reviewed.   Labs:       No results found for: \"NA\" No results found for: \"CHLORIDE\" No results found for: \"BUN\"   No results found for: \"POTASSIUM\" No results found for: \"CO2\" No results found for: \"CR\"     No results for input(s): \"WBC\", \"HGB\", \"HCT\", \"MCV\", \"PLT\" in the last 168 hours.   Imaging:   No results found for this or any previous visit (from the past 24 hour(s)).   "

## 2024-08-11 NOTE — PROGRESS NOTES
Pt alert to self. Denies pain. A1 gb/w. Ambulates to bathroom. Foam dressing on sacrum. No bowel movement this shift. Plan for LTC - awaiting placement.

## 2024-08-12 ENCOUNTER — DOCUMENTATION ONLY (OUTPATIENT)
Dept: OTHER | Facility: CLINIC | Age: 89
End: 2024-08-12
Payer: COMMERCIAL

## 2024-08-12 PROCEDURE — 101N000002 HC CUSTODIAL CARE DAILY

## 2024-08-12 PROCEDURE — 99207 PR NO BILLABLE SERVICE THIS VISIT: CPT | Performed by: STUDENT IN AN ORGANIZED HEALTH CARE EDUCATION/TRAINING PROGRAM

## 2024-08-12 ASSESSMENT — ACTIVITIES OF DAILY LIVING (ADL)
ADLS_ACUITY_SCORE: 44
ADLS_ACUITY_SCORE: 48
ADLS_ACUITY_SCORE: 44
ADLS_ACUITY_SCORE: 47
ADLS_ACUITY_SCORE: 48
ADLS_ACUITY_SCORE: 47
ADLS_ACUITY_SCORE: 48
ADLS_ACUITY_SCORE: 48
ADLS_ACUITY_SCORE: 44
ADLS_ACUITY_SCORE: 44
ADLS_ACUITY_SCORE: 47
ADLS_ACUITY_SCORE: 48
ADLS_ACUITY_SCORE: 47
ADLS_ACUITY_SCORE: 47
ADLS_ACUITY_SCORE: 44
ADLS_ACUITY_SCORE: 47
ADLS_ACUITY_SCORE: 44

## 2024-08-12 NOTE — PLAN OF CARE
"Cared for [id number]     Pt Alert to self only, VSS; Pt denies pain, headache, dizziness, N/V & SOB. Pt up Asst: 1 w/gait belt & walker. Lung sounds clear, RA. Blanchable redness on sacrum. Social Work following. Plan to discharge LTC, pending placement. Will continue with plan of care.    +++++++++++++++++++++++++++++++++++    Goal Outcome Evaluation:      Plan of Care Reviewed With: patient    Overall Patient Progress: no changeOverall Patient Progress: no change    Outcome Evaluation: Waiting for placement    Problem: Adult Inpatient Plan of Care  Goal: Plan of Care Review  Description: The Plan of Care Review/Shift note should be completed every shift.  The Outcome Evaluation is a brief statement about your assessment that the patient is improving, declining, or no change.  This information will be displayed automatically on your shift  note.  Outcome: Adequate for Care Transition  Flowsheets (Taken 8/12/2024 1006)  Outcome Evaluation: Waiting for placement  Plan of Care Reviewed With: patient  Overall Patient Progress: no change  Goal: Patient-Specific Goal (Individualized)  Description: You can add care plan individualizations to a care plan. Examples of Individualization might be:  \"Parent requests to be called daily at 9am for status\", \"I have a hard time hearing out of my right ear\", or \"Do not touch me to wake me up as it startles  me\".  Outcome: Adequate for Care Transition  Goal: Absence of Hospital-Acquired Illness or Injury  Outcome: Adequate for Care Transition  Intervention: Identify and Manage Fall Risk  Recent Flowsheet Documentation  Taken 8/12/2024 0900 by Olga Anderson, RN  Safety Promotion/Fall Prevention:   activity supervised   assistive device/personal items within reach   increased rounding and observation   increase visualization of patient   safety round/check completed   supervised activity  Intervention: Prevent Skin Injury  Recent Flowsheet Documentation  Taken 8/12/2024 0900 by " Ogla Anderson RN  Body Position: position changed independently  Skin Protection: adhesive use limited  Device Skin Pressure Protection:   absorbent pad utilized/changed   adhesive use limited  Intervention: Prevent and Manage VTE (Venous Thromboembolism) Risk  Recent Flowsheet Documentation  Taken 8/12/2024 0900 by Olga Andersno RN  VTE Prevention/Management: patient refused intervention  Intervention: Prevent Infection  Recent Flowsheet Documentation  Taken 8/12/2024 0900 by Olga Anderson RN  Infection Prevention:   hand hygiene promoted   single patient room provided   rest/sleep promoted  Goal: Optimal Comfort and Wellbeing  Outcome: Adequate for Care Transition  Goal: Readiness for Transition of Care  Outcome: Adequate for Care Transition     Problem: Delirium  Goal: Optimal Coping  Outcome: Adequate for Care Transition  Goal: Improved Behavioral Control  Outcome: Adequate for Care Transition  Intervention: Minimize Safety Risk  Recent Flowsheet Documentation  Taken 8/12/2024 0900 by Olga Anderson RN  Enhanced Safety Measures:   floor mats   pain management   review medications for side effects with activity  Goal: Improved Attention and Thought Clarity  Outcome: Adequate for Care Transition  Goal: Improved Sleep  Outcome: Adequate for Care Transition     Problem: Dementia Signs/Symptoms  Goal: Improved Behavioral Control (Dementia Signs/Symptoms)  Outcome: Adequate for Care Transition  Goal: Improved Mood Symptoms (Dementia Signs/Symptoms)  Outcome: Adequate for Care Transition  Goal: Optimized Cognitive Function (Dementia Signs/Symptoms)  Outcome: Adequate for Care Transition  Goal: Optimized Oral Intake (Dementia Signs/Symptoms)  Outcome: Adequate for Care Transition  Goal: Improved Sleep (Dementia Signs/Symptoms)  Outcome: Adequate for Care Transition  Goal: Enhanced Social or Functional Skills and Ability (Dementia Signs/Symptoms)  Outcome: Adequate for Care Transition

## 2024-08-12 NOTE — PROGRESS NOTES
[facility]  Hospitalist Progress Note  Alexandre Kurtz MD 08/12/2024    Reason for Stay (Diagnosis): placement         Assessment and Plan:      Summary of Stay: Jemal Gray is a 88-year-old male who [facility].  He has history of dementia and family is unable to provide care for him.  During his stay we was treated for right foot cellulitis which has resolved.  Patient is MA pending, awaiting placement in long term care.     Medically stable for discharge when disposition plan in place     residential care admission.  Social work consulted.  Looking for LTC.  Is MA pending, SW working on this. Once a bed is available patient can go anytime      Constipation  Started/continue on senna, have as needed miralax and dulcolax available     Right foot cellulitis.  Resolved with Keflex 4 times daily through 6/11.  Swelling and redness resolved.    Venous ultrasound negative for DVT.     Dementia with chronic aphasia.  Not on any medications.   As needed olanzapine ordered for agitation. Impulsive.     5.   Bradycardia            Patient was noted to have HR in 50s.  Asymptomatic     6. Paper work            Guardianship papers filled on 7/13 for the daughter.         Diet: Combination Diet Regular Diet  Room Service    DVT Prophylaxis: Pneumatic Compression Devices  Maddox Catheter: Not present  Lines: None     Cardiac Monitoring: None  Code Status: No CPR- Do NOT Intubate  Dispo: Anticipate discharge when safe disposition plan in place; LTC facility being sought    Medically Ready for Discharge: Ready Now            Interval History (Subjective):      No clinical change documented by nursing overnight.  Sitting in bed, appears comfortable.  Eating well.            Physical Exam:      Last Vital Signs:  BP (!) 145/74 (BP Location: Left arm)   Pulse 55   Temp 98.1  F (36.7  C) (Axillary)   Resp 16   SpO2 97%       Intake/Output Summary (Last 24 hours) at 7/29/2024 1155  Last  "data filed at 7/29/2024 0930  Gross per 24 hour   Intake 320 ml   Output --   Net 320 ml       Constitutional: Awake, alert, cooperative, no apparent distress.  Pleasantly interactive.     Respiratory: No increased WOB   Cardiovascular: Well-perfused   Abdomen:    Skin: No rashes, no cyanosis, dry to touch   Neuro: Alert and awake, discourse is meandering.   Extremities: No edema or lesions   Other(s):        All other systems: Negative          Medications:      All current medications were reviewed with changes reflected in problem list.         Data:      All new lab and imaging data was reviewed.   Labs:       No results found for: \"NA\" No results found for: \"CHLORIDE\" No results found for: \"BUN\"   No results found for: \"POTASSIUM\" No results found for: \"CO2\" No results found for: \"CR\"     No results for input(s): \"WBC\", \"HGB\", \"HCT\", \"MCV\", \"PLT\" in the last 168 hours.   Imaging:   No results found for this or any previous visit (from the past 24 hour(s)).   "

## 2024-08-12 NOTE — PLAN OF CARE
Goal Outcome Evaluation:         Pt alert to self, denied pain, on RA, voided adequately, ate 100% of he's meal, SBA for mobility, has no IV access, CMS intact, awaiting LTC placement.    BP (!) 145/74 (BP Location: Left arm)   Pulse 55   Temp 98.1  F (36.7  C) (Axillary)   Resp 16   SpO2 97%    Problem: Adult Inpatient Plan of Care  Goal: Absence of Hospital-Acquired Illness or Injury  Intervention: Prevent Infection  Recent Flowsheet Documentation  Taken 8/12/2024 1642 by Dayan Forde RN  Infection Prevention:   hand hygiene promoted   rest/sleep promoted     Problem: Adult Inpatient Plan of Care  Goal: Absence of Hospital-Acquired Illness or Injury  Intervention: Prevent Infection  Recent Flowsheet Documentation  Taken 8/12/2024 1642 by Dayan Forde RN  Infection Prevention:   hand hygiene promoted   rest/sleep promoted     Problem: Adult Inpatient Plan of Care  Goal: Absence of Hospital-Acquired Illness or Injury  Intervention: Prevent Infection  Recent Flowsheet Documentation  Taken 8/12/2024 1642 by Dayan Forde RN  Infection Prevention:   hand hygiene promoted   rest/sleep promoted     Problem: Adult Inpatient Plan of Care  Goal: Absence of Hospital-Acquired Illness or Injury  Intervention: Prevent Infection  Recent Flowsheet Documentation  Taken 8/12/2024 1642 by Dayan Forde RN  Infection Prevention:   hand hygiene promoted   rest/sleep promoted     Problem: Adult Inpatient Plan of Care  Goal: Absence of Hospital-Acquired Illness or Injury  Intervention: Prevent Infection  Recent Flowsheet Documentation  Taken 8/12/2024 1642 by Dayan Forde RN  Infection Prevention:   hand hygiene promoted   rest/sleep promoted     Problem: Adult Inpatient Plan of Care  Goal: Absence of Hospital-Acquired Illness or Injury  Intervention: Prevent Infection  Recent Flowsheet Documentation  Taken 8/12/2024 1642 by Dayan Forde RN  Infection Prevention:   hand hygiene promoted    rest/sleep promoted     Problem: Adult Inpatient Plan of Care  Goal: Absence of Hospital-Acquired Illness or Injury  Intervention: Prevent Infection  Recent Flowsheet Documentation  Taken 8/12/2024 1642 by Dayan Forde RN  Infection Prevention:   hand hygiene promoted   rest/sleep promoted     Problem: Adult Inpatient Plan of Care  Goal: Absence of Hospital-Acquired Illness or Injury  Intervention: Prevent Infection  Recent Flowsheet Documentation  Taken 8/12/2024 1642 by Dayan Forde RN  Infection Prevention:   hand hygiene promoted   rest/sleep promoted     Problem: Adult Inpatient Plan of Care  Goal: Absence of Hospital-Acquired Illness or Injury  Intervention: Prevent Infection  Recent Flowsheet Documentation  Taken 8/12/2024 1642 by Dayan Forde RN  Infection Prevention:   hand hygiene promoted   rest/sleep promoted     Problem: Adult Inpatient Plan of Care  Goal: Absence of Hospital-Acquired Illness or Injury  Intervention: Prevent Infection  Recent Flowsheet Documentation  Taken 8/12/2024 1642 by Dayan Forde RN  Infection Prevention:   hand hygiene promoted   rest/sleep promoted     Problem: Adult Inpatient Plan of Care  Goal: Absence of Hospital-Acquired Illness or Injury  Intervention: Prevent Infection  Recent Flowsheet Documentation  Taken 8/12/2024 1642 by Dayan Forde RN  Infection Prevention:   hand hygiene promoted   rest/sleep promoted

## 2024-08-13 PROCEDURE — 99207 PR NO BILLABLE SERVICE THIS VISIT: CPT | Performed by: STUDENT IN AN ORGANIZED HEALTH CARE EDUCATION/TRAINING PROGRAM

## 2024-08-13 PROCEDURE — 101N000002 HC CUSTODIAL CARE DAILY

## 2024-08-13 ASSESSMENT — ACTIVITIES OF DAILY LIVING (ADL)
ADLS_ACUITY_SCORE: 47
ADLS_ACUITY_SCORE: 43
ADLS_ACUITY_SCORE: 47
ADLS_ACUITY_SCORE: 43
ADLS_ACUITY_SCORE: 47
ADLS_ACUITY_SCORE: 43
ADLS_ACUITY_SCORE: 47

## 2024-08-13 NOTE — PLAN OF CARE
Goal Outcome Evaluation:  Pt confused. had 1 BM. Moves to bathroom and back. Bed alarm. Waiting placement.

## 2024-08-13 NOTE — PROGRESS NOTES
[facility]  Hospitalist Progress Note  Alexandre Kurtz MD 08/13/2024    Reason for Stay (Diagnosis): placement         Assessment and Plan:      Summary of Stay: Jemal Gray is a 88-year-old male who was transitioned to senior living care on 6/5.  He has history of dementia and family is unable to provide care for him.  During his stay we was treated for right foot cellulitis which has resolved.  Patient is MA pending, awaiting placement in long term care.     Medically stable for discharge when disposition plan in place.     longterm care admission.  Social work consulted.  Looking for LTC.  Is MA pending, SW working on this. Once a bed is available patient can go anytime      Constipation  Started/continue on senna, have as needed miralax and dulcolax available     Right foot cellulitis.  Resolved with Keflex 4 times daily through 6/11.  Swelling and redness resolved.    Venous ultrasound negative for DVT.     Dementia with chronic aphasia.  Not on any medications.   As needed olanzapine ordered for agitation. Impulsive.     5.   Bradycardia            Patient was noted to have HR in 50s.  Asymptomatic     6. Paper work            Guardianship papers filled on 7/13 for the daughter.         Diet: Combination Diet Regular Diet  Room Service    DVT Prophylaxis: Pneumatic Compression Devices  Maddox Catheter: Not present  Lines: None     Cardiac Monitoring: None  Code Status: No CPR- Do NOT Intubate  Dispo: Anticipate discharge when safe disposition plan in place; LTC facility being sought    Medically Ready for Discharge: Ready Now            Interval History (Subjective):      No clinical change documented by nursing overnight.  Sitting in bed, appears comfortable.  Eating well.            Physical Exam:      Last Vital Signs:  /64 (BP Location: Left arm)   Pulse 54   Temp 99  F (37.2  C) (Oral)   Resp 18   SpO2 96%       Intake/Output Summary (Last 24 hours) at 7/29/2024 1155  Last data  "filed at 7/29/2024 0930  Gross per 24 hour   Intake 320 ml   Output --   Net 320 ml       Constitutional: Awake, alert, cooperative, no apparent distress.  Pleasantly interactive.     Respiratory: No increased WOB   Cardiovascular: Well-perfused   Abdomen:    Skin: No rashes, no cyanosis, dry to touch   Neuro: Alert and awake, discourse is meandering.   Extremities: No edema or lesions   Other(s):        All other systems: Negative          Medications:      All current medications were reviewed with changes reflected in problem list.         Data:      All new lab and imaging data was reviewed.   Labs:       No results found for: \"NA\" No results found for: \"CHLORIDE\" No results found for: \"BUN\"   No results found for: \"POTASSIUM\" No results found for: \"CO2\" No results found for: \"CR\"     No results for input(s): \"WBC\", \"HGB\", \"HCT\", \"MCV\", \"PLT\" in the last 168 hours.   Imaging:   No results found for this or any previous visit (from the past 24 hour(s)).   "

## 2024-08-13 NOTE — PLAN OF CARE
"Cared for [id number]     Pt Alert to self only, VSS; Pt denies pain, headache, dizziness, N/V & SOB. Pt up Asst: 1 w/gait belt & walker. Lung sounds clear, RA. Blanchable redness on sacrum. Social Work following. Plan to discharge LTC, pending placement. Will continue with plan of care    +++++++++++++++++++++++++++++++++++    Goal Outcome Evaluation:      Plan of Care Reviewed With: patient    Overall Patient Progress: no changeOverall Patient Progress: no change    Outcome Evaluation: Waiting for placement    Problem: Adult Inpatient Plan of Care  Goal: Plan of Care Review  Description: The Plan of Care Review/Shift note should be completed every shift.  The Outcome Evaluation is a brief statement about your assessment that the patient is improving, declining, or no change.  This information will be displayed automatically on your shift  note.  Outcome: Progressing  Flowsheets (Taken 8/13/2024 1431)  Outcome Evaluation: Waiting for placement  Plan of Care Reviewed With: patient  Overall Patient Progress: no change  Goal: Patient-Specific Goal (Individualized)  Description: You can add care plan individualizations to a care plan. Examples of Individualization might be:  \"Parent requests to be called daily at 9am for status\", \"I have a hard time hearing out of my right ear\", or \"Do not touch me to wake me up as it startles  me\".  Outcome: Progressing  Goal: Absence of Hospital-Acquired Illness or Injury  Outcome: Progressing  Intervention: Identify and Manage Fall Risk  Recent Flowsheet Documentation  Taken 8/13/2024 0830 by Olga Anderson RN  Safety Promotion/Fall Prevention:   activity supervised   assistive device/personal items within reach   increased rounding and observation   increase visualization of patient   safety round/check completed   supervised activity  Intervention: Prevent Skin Injury  Recent Flowsheet Documentation  Taken 8/13/2024 0830 by Olga Anderson, RN  Body Position: position " changed independently  Skin Protection: adhesive use limited  Device Skin Pressure Protection:   absorbent pad utilized/changed   adhesive use limited  Taken 8/13/2024 0700 by Olga Anderson RN  Body Position: position changed independently  Intervention: Prevent and Manage VTE (Venous Thromboembolism) Risk  Recent Flowsheet Documentation  Taken 8/13/2024 0830 by Olga Anderson RN  VTE Prevention/Management: patient refused intervention  Intervention: Prevent Infection  Recent Flowsheet Documentation  Taken 8/13/2024 0830 by Olga Anderson RN  Infection Prevention:   hand hygiene promoted   single patient room provided   rest/sleep promoted  Goal: Optimal Comfort and Wellbeing  Outcome: Progressing  Goal: Readiness for Transition of Care  Outcome: Progressing

## 2024-08-14 PROCEDURE — 101N000002 HC CUSTODIAL CARE DAILY

## 2024-08-14 PROCEDURE — 250N000013 HC RX MED GY IP 250 OP 250 PS 637: Performed by: STUDENT IN AN ORGANIZED HEALTH CARE EDUCATION/TRAINING PROGRAM

## 2024-08-14 PROCEDURE — 99207 PR NO BILLABLE SERVICE THIS VISIT: CPT | Performed by: STUDENT IN AN ORGANIZED HEALTH CARE EDUCATION/TRAINING PROGRAM

## 2024-08-14 RX ADMIN — SENNOSIDES AND DOCUSATE SODIUM 1 TABLET: 8.6; 5 TABLET ORAL at 22:36

## 2024-08-14 ASSESSMENT — ACTIVITIES OF DAILY LIVING (ADL)
ADLS_ACUITY_SCORE: 47

## 2024-08-14 NOTE — PLAN OF CARE
Neuro: A&Ox1, pleasant and confused.   Cardiac: VSS.   Respiratory: RA.  GI/: Adequate urine output. No BM.   Diet/appetite: Tolerating Regular diet. Eating well.  Activity:  Assist of SBA, up to chair and in halls.  Pain: Denied.  LDA's: N/A    Plan: Encouraged daily routine and Activity. Updated care plan.     Goal Outcome Evaluation:      Plan of Care Reviewed With: patient    Overall Patient Progress: improvingOverall Patient Progress: improving    Outcome Evaluation: [facility] care-Awaiting placement.      Problem: Adult Inpatient Plan of Care  Goal: Plan of Care Review  Description: The Plan of Care Review/Shift note should be completed every shift.  The Outcome Evaluation is a brief statement about your assessment that the patient is improving, declining, or no change.  This information will be displayed automatically on your shift  note.  Recent Flowsheet Documentation  Taken 8/14/2024 1321 by Andria Wilkerson RN  Outcome Evaluation: [facility] care-Awaiting placement.  Plan of Care Reviewed With: patient  Overall Patient Progress: improving  Goal: Absence of Hospital-Acquired Illness or Injury  Intervention: Identify and Manage Fall Risk  Recent Flowsheet Documentation  Taken 8/14/2024 0800 by Andria Wilkerson RN  Safety Promotion/Fall Prevention:   supervised activity   safety round/check completed   activity supervised   clutter free environment maintained   nonskid shoes/slippers when out of bed  Intervention: Prevent Skin Injury  Recent Flowsheet Documentation  Taken 8/14/2024 1000 by Andria Wilkerson RN  Body Position: position changed independently  Taken 8/14/2024 0800 by Andria Wilkerson RN  Body Position: position changed independently     Problem: Adult Inpatient Plan of Care  Goal: Plan of Care Review  Description: The Plan of Care Review/Shift note should be completed every shift.  The Outcome Evaluation is a brief statement about your assessment that the patient is improving, declining, or no  change.  This information will be displayed automatically on your shift  note.  Recent Flowsheet Documentation  Taken 8/14/2024 1321 by Andria Wilkerson RN  Outcome Evaluation: longterm care-Awaiting placement.  Plan of Care Reviewed With: patient  Overall Patient Progress: improving  Goal: Absence of Hospital-Acquired Illness or Injury  Intervention: Identify and Manage Fall Risk  Recent Flowsheet Documentation  Taken 8/14/2024 0800 by Andria Wilkerson RN  Safety Promotion/Fall Prevention:   supervised activity   safety round/check completed   activity supervised   clutter free environment maintained   nonskid shoes/slippers when out of bed  Intervention: Prevent Skin Injury  Recent Flowsheet Documentation  Taken 8/14/2024 1000 by Andria Wilkerson RN  Body Position: position changed independently  Taken 8/14/2024 0800 by Andria Wilkerson RN  Body Position: position changed independently     Problem: Adult Inpatient Plan of Care  Goal: Absence of Hospital-Acquired Illness or Injury  Intervention: Identify and Manage Fall Risk  Recent Flowsheet Documentation  Taken 8/14/2024 0800 by Andria Wilkerson RN  Safety Promotion/Fall Prevention:   supervised activity   safety round/check completed   activity supervised   clutter free environment maintained   nonskid shoes/slippers when out of bed  Intervention: Prevent Skin Injury  Recent Flowsheet Documentation  Taken 8/14/2024 1000 by Andria Wilkerson RN  Body Position: position changed independently  Taken 8/14/2024 0800 by Andria Wilkerson RN  Body Position: position changed independently     Problem: Adult Inpatient Plan of Care  Goal: Plan of Care Review  Description: The Plan of Care Review/Shift note should be completed every shift.  The Outcome Evaluation is a brief statement about your assessment that the patient is improving, declining, or no change.  This information will be displayed automatically on your shift  note.  Recent Flowsheet Documentation  Taken  8/14/2024 1321 by Andria Wilkerson RN  Outcome Evaluation: assisted care-Awaiting placement.  Plan of Care Reviewed With: patient  Overall Patient Progress: improving  Goal: Absence of Hospital-Acquired Illness or Injury  Intervention: Identify and Manage Fall Risk  Recent Flowsheet Documentation  Taken 8/14/2024 0800 by Andria Wilkerson RN  Safety Promotion/Fall Prevention:   supervised activity   safety round/check completed   activity supervised   clutter free environment maintained   nonskid shoes/slippers when out of bed  Intervention: Prevent Skin Injury  Recent Flowsheet Documentation  Taken 8/14/2024 1000 by Andria Wilkerson RN  Body Position: position changed independently  Taken 8/14/2024 0800 by Andria Wilkerson RN  Body Position: position changed independently     Problem: Adult Inpatient Plan of Care  Goal: Plan of Care Review  Description: The Plan of Care Review/Shift note should be completed every shift.  The Outcome Evaluation is a brief statement about your assessment that the patient is improving, declining, or no change.  This information will be displayed automatically on your shift  note.  Recent Flowsheet Documentation  Taken 8/14/2024 1321 by Andria Wilkerson RN  Outcome Evaluation: assisted care-Awaiting placement.  Plan of Care Reviewed With: patient  Overall Patient Progress: improving  Goal: Absence of Hospital-Acquired Illness or Injury  Intervention: Identify and Manage Fall Risk  Recent Flowsheet Documentation  Taken 8/14/2024 0800 by Andria Wilkerson RN  Safety Promotion/Fall Prevention:   supervised activity   safety round/check completed   activity supervised   clutter free environment maintained   nonskid shoes/slippers when out of bed  Intervention: Prevent Skin Injury  Recent Flowsheet Documentation  Taken 8/14/2024 1000 by Andria Wilkerson RN  Body Position: position changed independently  Taken 8/14/2024 0800 by Andria Wilkerson RN  Body Position: position changed  independently     Problem: Adult Inpatient Plan of Care  Goal: Plan of Care Review  Description: The Plan of Care Review/Shift note should be completed every shift.  The Outcome Evaluation is a brief statement about your assessment that the patient is improving, declining, or no change.  This information will be displayed automatically on your shift  note.  Recent Flowsheet Documentation  Taken 8/14/2024 1321 by Andria Wilkerson RN  Outcome Evaluation: [facility] care-Awaiting placement.  Plan of Care Reviewed With: patient  Overall Patient Progress: improving  Goal: Absence of Hospital-Acquired Illness or Injury  Intervention: Identify and Manage Fall Risk  Recent Flowsheet Documentation  Taken 8/14/2024 0800 by Andria Wilkerson RN  Safety Promotion/Fall Prevention:   supervised activity   safety round/check completed   activity supervised   clutter free environment maintained   nonskid shoes/slippers when out of bed  Intervention: Prevent Skin Injury  Recent Flowsheet Documentation  Taken 8/14/2024 1000 by Andria Wilkerson RN  Body Position: position changed independently  Taken 8/14/2024 0800 by Andria Wilkerson RN  Body Position: position changed independently     Problem: Adult Inpatient Plan of Care  Goal: Plan of Care Review  Description: The Plan of Care Review/Shift note should be completed every shift.  The Outcome Evaluation is a brief statement about your assessment that the patient is improving, declining, or no change.  This information will be displayed automatically on your shift  note.  Recent Flowsheet Documentation  Taken 8/14/2024 1321 by Andria Wilkerson RN  Outcome Evaluation: [facility] care-Awaiting placement.  Plan of Care Reviewed With: patient  Overall Patient Progress: improving  Goal: Absence of Hospital-Acquired Illness or Injury  Intervention: Identify and Manage Fall Risk  Recent Flowsheet Documentation  Taken 8/14/2024 0800 by Andria Wilkerson RN  Safety Promotion/Fall Prevention:    supervised activity   safety round/check completed   activity supervised   clutter free environment maintained   nonskid shoes/slippers when out of bed  Intervention: Prevent Skin Injury  Recent Flowsheet Documentation  Taken 8/14/2024 1000 by Andria Wilkerson RN  Body Position: position changed independently  Taken 8/14/2024 0800 by Andria Wilkerson RN  Body Position: position changed independently     Problem: Adult Inpatient Plan of Care  Goal: Plan of Care Review  Description: The Plan of Care Review/Shift note should be completed every shift.  The Outcome Evaluation is a brief statement about your assessment that the patient is improving, declining, or no change.  This information will be displayed automatically on your shift  note.  Recent Flowsheet Documentation  Taken 8/14/2024 1321 by Andria Wilkerson RN  Outcome Evaluation: [facility] care-Awaiting placement.  Plan of Care Reviewed With: patient  Overall Patient Progress: improving  Goal: Absence of Hospital-Acquired Illness or Injury  Intervention: Identify and Manage Fall Risk  Recent Flowsheet Documentation  Taken 8/14/2024 0800 by Andria Wilkerson RN  Safety Promotion/Fall Prevention:   supervised activity   safety round/check completed   activity supervised   clutter free environment maintained   nonskid shoes/slippers when out of bed  Intervention: Prevent Skin Injury  Recent Flowsheet Documentation  Taken 8/14/2024 1000 by Andria Wilkerson RN  Body Position: position changed independently  Taken 8/14/2024 0800 by Andria Wilkerson RN  Body Position: position changed independently     Problem: Adult Inpatient Plan of Care  Goal: Plan of Care Review  Description: The Plan of Care Review/Shift note should be completed every shift.  The Outcome Evaluation is a brief statement about your assessment that the patient is improving, declining, or no change.  This information will be displayed automatically on your shift  note.  Recent Flowsheet  Documentation  Taken 8/14/2024 1321 by Andria Wilkerson RN  Outcome Evaluation: senior living care-Awaiting placement.  Plan of Care Reviewed With: patient  Overall Patient Progress: improving  Goal: Absence of Hospital-Acquired Illness or Injury  Intervention: Identify and Manage Fall Risk  Recent Flowsheet Documentation  Taken 8/14/2024 0800 by Andria Wilkerson RN  Safety Promotion/Fall Prevention:   supervised activity   safety round/check completed   activity supervised   clutter free environment maintained   nonskid shoes/slippers when out of bed  Intervention: Prevent Skin Injury  Recent Flowsheet Documentation  Taken 8/14/2024 1000 by Andria Wilkerson RN  Body Position: position changed independently  Taken 8/14/2024 0800 by Andria Wilkerson RN  Body Position: position changed independently     Problem: Adult Inpatient Plan of Care  Goal: Plan of Care Review  Description: The Plan of Care Review/Shift note should be completed every shift.  The Outcome Evaluation is a brief statement about your assessment that the patient is improving, declining, or no change.  This information will be displayed automatically on your shift  note.  Recent Flowsheet Documentation  Taken 8/14/2024 1321 by Andria Wilkerson RN  Outcome Evaluation: senior living care-Awaiting placement.  Plan of Care Reviewed With: patient  Overall Patient Progress: improving  Goal: Absence of Hospital-Acquired Illness or Injury  Intervention: Identify and Manage Fall Risk  Recent Flowsheet Documentation  Taken 8/14/2024 0800 by Andria Wilkerson RN  Safety Promotion/Fall Prevention:   supervised activity   safety round/check completed   activity supervised   clutter free environment maintained   nonskid shoes/slippers when out of bed  Intervention: Prevent Skin Injury  Recent Flowsheet Documentation  Taken 8/14/2024 1000 by Andria Wilkerson RN  Body Position: position changed independently  Taken 8/14/2024 0800 by Andria Wilkerson RN  Body Position:  position changed independently     Problem: Adult Inpatient Plan of Care  Goal: Plan of Care Review  Description: The Plan of Care Review/Shift note should be completed every shift.  The Outcome Evaluation is a brief statement about your assessment that the patient is improving, declining, or no change.  This information will be displayed automatically on your shift  note.  Recent Flowsheet Documentation  Taken 8/14/2024 1321 by Andria Wilkerson RN  Outcome Evaluation: [facility] care-Awaiting placement.  Plan of Care Reviewed With: patient  Overall Patient Progress: improving  Goal: Absence of Hospital-Acquired Illness or Injury  Intervention: Identify and Manage Fall Risk  Recent Flowsheet Documentation  Taken 8/14/2024 0800 by Andria Wilkerson RN  Safety Promotion/Fall Prevention:   supervised activity   safety round/check completed   activity supervised   clutter free environment maintained   nonskid shoes/slippers when out of bed  Intervention: Prevent Skin Injury  Recent Flowsheet Documentation  Taken 8/14/2024 1000 by Andria Wilkerson RN  Body Position: position changed independently  Taken 8/14/2024 0800 by Andria Wilkerson RN  Body Position: position changed independently     Problem: Adult Inpatient Plan of Care  Goal: Plan of Care Review  Description: The Plan of Care Review/Shift note should be completed every shift.  The Outcome Evaluation is a brief statement about your assessment that the patient is improving, declining, or no change.  This information will be displayed automatically on your shift  note.  Outcome: Adequate for Care Transition  Flowsheets (Taken 8/14/2024 1321)  Outcome Evaluation: [facility] care-Awaiting placement.  Plan of Care Reviewed With: patient  Overall Patient Progress: improving

## 2024-08-14 NOTE — PLAN OF CARE
"Goal Outcome Evaluation:      Plan of Care Reviewed With: patient    Overall Patient Progress: improvingOverall Patient Progress: improving    Outcome Evaluation: [facility]- [facility].        Problem: Adult Inpatient Plan of Care  Goal: Plan of Care Review  Description: The Plan of Care Review/Shift note should be completed every shift.  The Outcome Evaluation is a brief statement about your assessment that the patient is improving, declining, or no change.  This information will be displayed automatically on your shift  note.  Outcome: Progressing  Flowsheets (Taken 8/14/2024 3777)  Outcome Evaluation: [facility]- [facility]ing placement.  Plan of Care Reviewed With: patient  Overall Patient Progress: improving  Goal: Patient-Specific Goal (Individualized)  Description: You can add care plan individualizations to a care plan. Examples of Individualization might be:  \"Parent requests to be called daily at 9am for status\", \"I have a hard time hearing out of my right ear\", or \"Do not touch me to wake me up as it startles  me\".  Outcome: Progressing  Goal: Absence of Hospital-Acquired Illness or Injury  Outcome: Progressing  Goal: Optimal Comfort and Wellbeing  Outcome: Progressing  Goal: Readiness for Transition of Care  Outcome: Progressing       "

## 2024-08-14 NOTE — PROGRESS NOTES
[facility]  Hospitalist Progress Note  Alexandre Kurtz MD 08/14/2024    Reason for Stay (Diagnosis): placement         Assessment and Plan:      Summary of Stay: Jemal Gray is a 88-year-old male who [facility] on 6/[facility].  He has history of dementia and family is unable to provide care for him.  During his stay we was treated for right foot cellulitis which has resolved.  Patient is MA pending, awaiting placement in long term care.     Medically stable for discharge when disposition plan in place.     detention care admission.  Social work consulted.  Looking for LTC.  Is MA pending, SW working on this. Once a bed is available patient can go anytime      Constipation  Started/continue on senna, have as needed miralax and dulcolax available     Right foot cellulitis.  Resolved with Keflex 4 times daily through 6/11.  Swelling and redness resolved.    Venous ultrasound negative for DVT.     Dementia with chronic aphasia.  Not on any medications.   As needed olanzapine ordered for agitation. Impulsive.     5.   Bradycardia            Patient was noted to have HR in 50s.  Asymptomatic     6. Paper work            Guardianship papers filled on 7/13 for the daughter.         Diet: Combination Diet Regular Diet  Room Service    DVT Prophylaxis: Pneumatic Compression Devices  Maddox Catheter: Not present  Lines: None     Cardiac Monitoring: None  Code Status: No CPR- Do NOT Intubate  Dispo: Anticipate discharge when safe disposition plan in place; LTC facility being sought    Medically Ready for Discharge: Ready Now            Interval History (Subjective):      No clinical change documented by nursing overnight.  Sitting in bed, appears comfortable.  Eating well.            Physical Exam:      Last Vital Signs:  BP (!) 146/76 (BP Location: Left arm, Cuff Size: Adult Regular)   Pulse 57   Temp 97.8  F (36.6  C) (Oral)   Resp 20   SpO2 97%       Intake/Output Summary (Last 24 hours)  "at 7/29/2024 1155  Last data filed at 7/29/2024 0930  Gross per 24 hour   Intake 320 ml   Output --   Net 320 ml       Constitutional: Awake, alert, cooperative, no apparent distress.  Pleasantly interactive.     Respiratory: No increased WOB   Cardiovascular: Well-perfused   Abdomen:    Skin: No rashes, no cyanosis, dry to touch   Neuro: Alert and awake, discourse is meandering.   Extremities: No edema or lesions   Other(s):        All other systems: Negative          Medications:      All current medications were reviewed with changes reflected in problem list.         Data:      All new lab and imaging data was reviewed.   Labs:       No results found for: \"NA\" No results found for: \"CHLORIDE\" No results found for: \"BUN\"   No results found for: \"POTASSIUM\" No results found for: \"CO2\" No results found for: \"CR\"     No results for input(s): \"WBC\", \"HGB\", \"HCT\", \"MCV\", \"PLT\" in the last 168 hours.   Imaging:   No results found for this or any previous visit (from the past 24 hour(s)).   "

## 2024-08-14 NOTE — PLAN OF CARE
"Pt remains on retirement cares. VSS, disorient to place, time, situation. Up spontaneously to void, ambulate in room. Bed alarm on, reinforced walker use, Pt refuses GB. Appetite/intake fair, prefers snacks to meals. Ambulated on unit x2, 150 meters total, gb/walker.    Goal Outcome Evaluation:      Plan of Care Reviewed With: patient    Overall Patient Progress: no changeOverall Patient Progress: no change    Outcome Evaluation: Pt on retirement care, placement pending.      Problem: Adult Inpatient Plan of Care  Goal: Plan of Care Review  Description: The Plan of Care Review/Shift note should be completed every shift.  The Outcome Evaluation is a brief statement about your assessment that the patient is improving, declining, or no change.  This information will be displayed automatically on your shift  note.  8/13/2024 2146 by Sergey Sandoval RN  Outcome: Progressing  Flowsheets (Taken 8/13/2024 2146)  Outcome Evaluation: Pt on retirement care, placement pending.  Plan of Care Reviewed With: patient  Overall Patient Progress: no change  8/13/2024 2132 by Sergey Sandoval RN  Outcome: Progressing  Flowsheets (Taken 8/13/2024 2132)  Outcome Evaluation: Pt on retirement care, placement pending  Plan of Care Reviewed With: patient  Overall Patient Progress: no change  8/13/2024 2130 by Sergey Sandoval RN  Outcome: Progressing  Flowsheets (Taken 8/13/2024 2130)  Outcome Evaluation: Pt on retirement care, placement pending.  Plan of Care Reviewed With: patient  Overall Patient Progress: no change  Goal: Patient-Specific Goal (Individualized)  Description: You can add care plan individualizations to a care plan. Examples of Individualization might be:  \"Parent requests to be called daily at 9am for status\", \"I have a hard time hearing out of my right ear\", or \"Do not touch me to wake me up as it startles  me\".  8/13/2024 2146 by Sergey Sandoval RN  Outcome: Progressing  8/13/2024 2132 by Sergey Sadnoval RN  Outcome: " Progressing  8/13/2024 2130 by Sergey Sandoval RN  Outcome: Progressing  Goal: Absence of Hospital-Acquired Illness or Injury  8/13/2024 2146 by Sergey Sandoval RN  Outcome: Progressing  8/13/2024 2132 by Sergey Sandoval RN  Outcome: Progressing  8/13/2024 2130 by Sergey Sandoval RN  Outcome: Progressing  Intervention: Identify and Manage Fall Risk  Recent Flowsheet Documentation  Taken 8/13/2024 1700 by Sergey Sandoval RN  Safety Promotion/Fall Prevention:   supervised activity   room organization consistent   room near nurse's station   room door open   nonskid shoes/slippers when out of bed   mobility aid in reach   increase visualization of patient   clutter free environment maintained   safety round/check completed  Intervention: Prevent Skin Injury  Recent Flowsheet Documentation  Taken 8/13/2024 1700 by Sergey Sandoval RN  Body Position: position changed independently  Intervention: Prevent and Manage VTE (Venous Thromboembolism) Risk  Recent Flowsheet Documentation  Taken 8/13/2024 1700 by Sergey Sandoval RN  VTE Prevention/Management: SCDs off (sequential compression devices)  Intervention: Prevent Infection  Recent Flowsheet Documentation  Taken 8/13/2024 1700 by Sergey Sandoval RN  Infection Prevention: single patient room provided  Goal: Optimal Comfort and Wellbeing  8/13/2024 2146 by Sergey Sandoval RN  Outcome: Progressing  8/13/2024 2132 by Sergey Sandoval RN  Outcome: Progressing  8/13/2024 2130 by Sergey Sandoval RN  Outcome: Progressing  Goal: Readiness for Transition of Care  8/13/2024 2146 by Sergey Sandoval RN  Outcome: Progressing  8/13/2024 2132 by Sergey Sandoval RN  Outcome: Progressing  8/13/2024 2130 by Sergey Sandoval RN  Outcome: Progressing     Problem: Adult Inpatient Plan of Care  Goal: Plan of Care Review  Description: The Plan of Care Review/Shift note should be completed every shift.  The Outcome Evaluation is a brief statement about your assessment that the patient is improving, declining, or  no change.  This information will be displayed automatically on your shift  note.  Recent Flowsheet Documentation  Taken 8/13/2024 2146 by Sergey Sandoval RN  Outcome Evaluation: Pt on group home care, placement pending.  Plan of Care Reviewed With: patient  Overall Patient Progress: no change  Taken 8/13/2024 2132 by Sergey Sandoval RN  Outcome Evaluation: Pt on group home care, placement pending  Plan of Care Reviewed With: patient  Overall Patient Progress: no change  Taken 8/13/2024 2130 by Sergey Sandoval RN  Outcome Evaluation: Pt on group home care, placement pending.  Plan of Care Reviewed With: patient  Overall Patient Progress: no change  Goal: Absence of Hospital-Acquired Illness or Injury  Intervention: Identify and Manage Fall Risk  Recent Flowsheet Documentation  Taken 8/13/2024 1700 by Sergey Sandoval RN  Safety Promotion/Fall Prevention:   supervised activity   room organization consistent   room near nurse's station   room door open   nonskid shoes/slippers when out of bed   mobility aid in reach   increase visualization of patient   clutter free environment maintained   safety round/check completed  Intervention: Prevent Skin Injury  Recent Flowsheet Documentation  Taken 8/13/2024 1700 by Sergey Sandoval RN  Body Position: position changed independently  Intervention: Prevent and Manage VTE (Venous Thromboembolism) Risk  Recent Flowsheet Documentation  Taken 8/13/2024 1700 by Sergey Sandoval RN  VTE Prevention/Management: SCDs off (sequential compression devices)  Intervention: Prevent Infection  Recent Flowsheet Documentation  Taken 8/13/2024 1700 by Sergey Sandoval RN  Infection Prevention: single patient room provided     Problem: Delirium  Goal: Improved Behavioral Control  Intervention: Minimize Safety Risk  Recent Flowsheet Documentation  Taken 8/13/2024 1700 by Sergey Sandoval RN  Enhanced Safety Measures: floor mats     Problem: Adult Inpatient Plan of Care  Goal: Plan of Care Review  Description: The  Plan of Care Review/Shift note should be completed every shift.  The Outcome Evaluation is a brief statement about your assessment that the patient is improving, declining, or no change.  This information will be displayed automatically on your shift  note.  Recent Flowsheet Documentation  Taken 8/13/2024 2146 by Sergey Sandoval RN  Outcome Evaluation: Pt on assisted care, placement pending.  Plan of Care Reviewed With: patient  Overall Patient Progress: no change  Taken 8/13/2024 2132 by Sergey Sandoval RN  Outcome Evaluation: Pt on assisted care, placement pending  Plan of Care Reviewed With: patient  Overall Patient Progress: no change  Taken 8/13/2024 2130 by Sergey Sandoval RN  Outcome Evaluation: Pt on assisted care, placement pending.  Plan of Care Reviewed With: patient  Overall Patient Progress: no change  Goal: Absence of Hospital-Acquired Illness or Injury  Intervention: Identify and Manage Fall Risk  Recent Flowsheet Documentation  Taken 8/13/2024 1700 by Sergey Sandoval RN  Safety Promotion/Fall Prevention:   supervised activity   room organization consistent   room near nurse's station   room door open   nonskid shoes/slippers when out of bed   mobility aid in reach   increase visualization of patient   clutter free environment maintained   safety round/check completed  Intervention: Prevent Skin Injury  Recent Flowsheet Documentation  Taken 8/13/2024 1700 by Sergey Sandoval RN  Body Position: position changed independently  Intervention: Prevent and Manage VTE (Venous Thromboembolism) Risk  Recent Flowsheet Documentation  Taken 8/13/2024 1700 by Sergey Sandoval RN  VTE Prevention/Management: SCDs off (sequential compression devices)  Intervention: Prevent Infection  Recent Flowsheet Documentation  Taken 8/13/2024 1700 by Sergey Sandoval RN  Infection Prevention: single patient room provided     Problem: Fall Injury Risk  Goal: Absence of Fall and Fall-Related Injury  Intervention: Identify and Manage  Contributors  Recent Flowsheet Documentation  Taken 8/13/2024 1700 by Sergey Sandoval RN  Medication Review/Management: medications reviewed  Intervention: Promote Injury-Free Environment  Recent Flowsheet Documentation  Taken 8/13/2024 1700 by Sergey Sandoval RN  Safety Promotion/Fall Prevention:   supervised activity   room organization consistent   room near nurse's station   room door open   nonskid shoes/slippers when out of bed   mobility aid in reach   increase visualization of patient   clutter free environment maintained   safety round/check completed     Problem: Adult Inpatient Plan of Care  Goal: Absence of Hospital-Acquired Illness or Injury  Intervention: Identify and Manage Fall Risk  Recent Flowsheet Documentation  Taken 8/13/2024 1700 by Sergey Sandoval RN  Safety Promotion/Fall Prevention:   supervised activity   room organization consistent   room near nurse's station   room door open   nonskid shoes/slippers when out of bed   mobility aid in reach   increase visualization of patient   clutter free environment maintained   safety round/check completed  Intervention: Prevent Skin Injury  Recent Flowsheet Documentation  Taken 8/13/2024 1700 by Sergey Sandoval RN  Body Position: position changed independently  Intervention: Prevent and Manage VTE (Venous Thromboembolism) Risk  Recent Flowsheet Documentation  Taken 8/13/2024 1700 by Sergey Sandoval RN  VTE Prevention/Management: SCDs off (sequential compression devices)  Intervention: Prevent Infection  Recent Flowsheet Documentation  Taken 8/13/2024 1700 by Sergey Sandoval RN  Infection Prevention: single patient room provided     Problem: Fall Injury Risk  Goal: Absence of Fall and Fall-Related Injury  Intervention: Identify and Manage Contributors  Recent Flowsheet Documentation  Taken 8/13/2024 1700 by Sergey Sandoval RN  Medication Review/Management: medications reviewed  Intervention: Promote Injury-Free Environment  Recent Flowsheet  Documentation  Taken 8/13/2024 1700 by Sergey Sandoval RN  Safety Promotion/Fall Prevention:   supervised activity   room organization consistent   room near nurse's station   room door open   nonskid shoes/slippers when out of bed   mobility aid in reach   increase visualization of patient   clutter free environment maintained   safety round/check completed     Problem: Fall Injury Risk  Goal: Absence of Fall and Fall-Related Injury  Intervention: Identify and Manage Contributors  Recent Flowsheet Documentation  Taken 8/13/2024 1700 by Sergey Sandoval RN  Medication Review/Management: medications reviewed  Intervention: Promote Injury-Free Environment  Recent Flowsheet Documentation  Taken 8/13/2024 1700 by Sergey Sandoval, RN  Safety Promotion/Fall Prevention:   supervised activity   room organization consistent   room near nurse's station   room door open   nonskid shoes/slippers when out of bed   mobility aid in reach   increase visualization of patient   clutter free environment maintained   safety round/check completed     Problem: Adult Inpatient Plan of Care  Goal: Plan of Care Review  Description: The Plan of Care Review/Shift note should be completed every shift.  The Outcome Evaluation is a brief statement about your assessment that the patient is improving, declining, or no change.  This information will be displayed automatically on your shift  note.  Recent Flowsheet Documentation  Taken 8/13/2024 2146 by Sergey Sandoval, RN  Outcome Evaluation: Pt on residential care, placement pending.  Plan of Care Reviewed With: patient  Overall Patient Progress: no change  Taken 8/13/2024 2132 by Sergey Sandoval, RN  Outcome Evaluation: Pt on residential care, placement pending  Plan of Care Reviewed With: patient  Overall Patient Progress: no change  Taken 8/13/2024 2130 by Sergey Sandoval RN  Outcome Evaluation: Pt on residential care, placement pending.  Plan of Care Reviewed With: patient  Overall Patient Progress: no  change  Goal: Absence of Hospital-Acquired Illness or Injury  Intervention: Identify and Manage Fall Risk  Recent Flowsheet Documentation  Taken 8/13/2024 1700 by Sergey Sandoval RN  Safety Promotion/Fall Prevention:   supervised activity   room organization consistent   room near nurse's station   room door open   nonskid shoes/slippers when out of bed   mobility aid in reach   increase visualization of patient   clutter free environment maintained   safety round/check completed  Intervention: Prevent Skin Injury  Recent Flowsheet Documentation  Taken 8/13/2024 1700 by Sergey Sandoval RN  Body Position: position changed independently  Intervention: Prevent and Manage VTE (Venous Thromboembolism) Risk  Recent Flowsheet Documentation  Taken 8/13/2024 1700 by Sergey Sandoval RN  VTE Prevention/Management: SCDs off (sequential compression devices)  Intervention: Prevent Infection  Recent Flowsheet Documentation  Taken 8/13/2024 1700 by Sergey Sandoval RN  Infection Prevention: single patient room provided     Problem: Adult Inpatient Plan of Care  Goal: Plan of Care Review  Description: The Plan of Care Review/Shift note should be completed every shift.  The Outcome Evaluation is a brief statement about your assessment that the patient is improving, declining, or no change.  This information will be displayed automatically on your shift  note.  Recent Flowsheet Documentation  Taken 8/13/2024 2146 by Sergey Sandoval RN  Outcome Evaluation: Pt on intermediate care, placement pending.  Plan of Care Reviewed With: patient  Overall Patient Progress: no change  Taken 8/13/2024 2132 by Sergey Sandoval RN  Outcome Evaluation: Pt on intermediate care, placement pending  Plan of Care Reviewed With: patient  Overall Patient Progress: no change  Taken 8/13/2024 2130 by Sergey Sandoval RN  Outcome Evaluation: Pt on intermediate care, placement pending.  Plan of Care Reviewed With: patient  Overall Patient Progress: no change  Goal: Absence of  Hospital-Acquired Illness or Injury  Intervention: Identify and Manage Fall Risk  Recent Flowsheet Documentation  Taken 8/13/2024 1700 by Sergey Sandoval RN  Safety Promotion/Fall Prevention:   supervised activity   room organization consistent   room near nurse's station   room door open   nonskid shoes/slippers when out of bed   mobility aid in reach   increase visualization of patient   clutter free environment maintained   safety round/check completed  Intervention: Prevent Skin Injury  Recent Flowsheet Documentation  Taken 8/13/2024 1700 by Sergey Sandoval RN  Body Position: position changed independently  Intervention: Prevent and Manage VTE (Venous Thromboembolism) Risk  Recent Flowsheet Documentation  Taken 8/13/2024 1700 by Sergey Sandoval RN  VTE Prevention/Management: SCDs off (sequential compression devices)  Intervention: Prevent Infection  Recent Flowsheet Documentation  Taken 8/13/2024 1700 by Sergey Sandoval RN  Infection Prevention: single patient room provided     Problem: Fall Injury Risk  Goal: Absence of Fall and Fall-Related Injury  Intervention: Identify and Manage Contributors  Recent Flowsheet Documentation  Taken 8/13/2024 1700 by Sergey Sandoval RN  Medication Review/Management: medications reviewed  Intervention: Promote Injury-Free Environment  Recent Flowsheet Documentation  Taken 8/13/2024 1700 by Sergey Sandoval RN  Safety Promotion/Fall Prevention:   supervised activity   room organization consistent   room near nurse's station   room door open   nonskid shoes/slippers when out of bed   mobility aid in reach   increase visualization of patient   clutter free environment maintained   safety round/check completed     Problem: Fall Injury Risk  Goal: Absence of Fall and Fall-Related Injury  Intervention: Identify and Manage Contributors  Recent Flowsheet Documentation  Taken 8/13/2024 1700 by Sergey Sandoval RN  Medication Review/Management: medications reviewed  Intervention: Promote  Injury-Free Environment  Recent Flowsheet Documentation  Taken 8/13/2024 1700 by Sergey Sandoval RN  Safety Promotion/Fall Prevention:   supervised activity   room organization consistent   room near nurse's station   room door open   nonskid shoes/slippers when out of bed   mobility aid in reach   increase visualization of patient   clutter free environment maintained   safety round/check completed     Problem: Adult Inpatient Plan of Care  Goal: Plan of Care Review  Description: The Plan of Care Review/Shift note should be completed every shift.  The Outcome Evaluation is a brief statement about your assessment that the patient is improving, declining, or no change.  This information will be displayed automatically on your shift  note.  Recent Flowsheet Documentation  Taken 8/13/2024 2146 by Sergey Sandoval RN  Outcome Evaluation: Pt on [facility] care, placement pending.  Plan of Care Reviewed With: patient  Overall Patient Progress: no change  Taken 8/13/2024 2132 by Sergey Sandoval RN  Outcome Evaluation: Pt on [facility] care, placement pending  Plan of Care Reviewed With: patient  Overall Patient Progress: no change  Taken 8/13/2024 2130 by Sergey Sandoval RN  Outcome Evaluation: Pt on [facility] care, placement pending.  Plan of Care Reviewed With: patient  Overall Patient Progress: no change  Goal: Absence of Hospital-Acquired Illness or Injury  Intervention: Identify and Manage Fall Risk  Recent Flowsheet Documentation  Taken 8/13/2024 1700 by Sergey Sandoval RN  Safety Promotion/Fall Prevention:   supervised activity   room organization consistent   room near nurse's station   room door open   nonskid shoes/slippers when out of bed   mobility aid in reach   increase visualization of patient   clutter free environment maintained   safety round/check completed  Intervention: Prevent Skin Injury  Recent Flowsheet Documentation  Taken 8/13/2024 1700 by Sergey Sandoval, RN  Body Position: position changed  independently  Intervention: Prevent and Manage VTE (Venous Thromboembolism) Risk  Recent Flowsheet Documentation  Taken 8/13/2024 1700 by Sergey Sandoval RN  VTE Prevention/Management: SCDs off (sequential compression devices)  Intervention: Prevent Infection  Recent Flowsheet Documentation  Taken 8/13/2024 1700 by Sergey Sandoval RN  Infection Prevention: single patient room provided     Problem: Adult Inpatient Plan of Care  Goal: Plan of Care Review  Description: The Plan of Care Review/Shift note should be completed every shift.  The Outcome Evaluation is a brief statement about your assessment that the patient is improving, declining, or no change.  This information will be displayed automatically on your shift  note.  Recent Flowsheet Documentation  Taken 8/13/2024 2146 by Sergey Sandoval RN  Outcome Evaluation: Pt on longterm care, placement pending.  Plan of Care Reviewed With: patient  Overall Patient Progress: no change  Taken 8/13/2024 2132 by Sergey Sandoval RN  Outcome Evaluation: Pt on longterm care, placement pending  Plan of Care Reviewed With: patient  Overall Patient Progress: no change  Taken 8/13/2024 2130 by Sergey Sandoval RN  Outcome Evaluation: Pt on longterm care, placement pending.  Plan of Care Reviewed With: patient  Overall Patient Progress: no change  Goal: Absence of Hospital-Acquired Illness or Injury  Intervention: Identify and Manage Fall Risk  Recent Flowsheet Documentation  Taken 8/13/2024 1700 by Sergey Sandoval RN  Safety Promotion/Fall Prevention:   supervised activity   room organization consistent   room near nurse's station   room door open   nonskid shoes/slippers when out of bed   mobility aid in reach   increase visualization of patient   clutter free environment maintained   safety round/check completed  Intervention: Prevent Skin Injury  Recent Flowsheet Documentation  Taken 8/13/2024 1700 by Sergey Sandoval RN  Body Position: position changed independently  Intervention:  Prevent and Manage VTE (Venous Thromboembolism) Risk  Recent Flowsheet Documentation  Taken 8/13/2024 1700 by Sergey Sandoval RN  VTE Prevention/Management: SCDs off (sequential compression devices)  Intervention: Prevent Infection  Recent Flowsheet Documentation  Taken 8/13/2024 1700 by Sergey Sandoval RN  Infection Prevention: single patient room provided     Problem: Comorbidity Management  Goal: Maintenance of Asthma Control  Intervention: Maintain Asthma Symptom Control  Recent Flowsheet Documentation  Taken 8/13/2024 1700 by Sergey Sandoval RN  Medication Review/Management: medications reviewed  Goal: Maintenance of Behavioral Health Symptom Control  Intervention: Maintain Behavioral Health Symptom Control  Recent Flowsheet Documentation  Taken 8/13/2024 1700 by Sergey Sandoval RN  Medication Review/Management: medications reviewed  Goal: Maintenance of COPD Symptom Control  Intervention: Maintain COPD Symptom Control  Recent Flowsheet Documentation  Taken 8/13/2024 1700 by Sergey Sandoval RN  Medication Review/Management: medications reviewed  Goal: Blood Glucose Levels Within Targeted Range  Intervention: Monitor and Manage Glycemia  Recent Flowsheet Documentation  Taken 8/13/2024 1700 by Sergey Sandoval RN  Medication Review/Management: medications reviewed  Goal: Maintenance of Heart Failure Symptom Control  Intervention: Maintain Heart Failure Management  Recent Flowsheet Documentation  Taken 8/13/2024 1700 by Sergey Sandoval RN  Medication Review/Management: medications reviewed  Goal: Blood Pressure in Desired Range  Intervention: Maintain Blood Pressure Management  Recent Flowsheet Documentation  Taken 8/13/2024 1700 by Sergey Sandoval RN  Medication Review/Management: medications reviewed  Goal: Maintenance of Osteoarthritis Symptom Control  Intervention: Maintain Osteoarthritis Symptom Control  Recent Flowsheet Documentation  Taken 8/13/2024 1900 by Sergey Sandoval RN  Assistive Device Utilized:   walker    gait belt  Activity Management: (Per support) ambulated outside room  Taken 8/13/2024 1700 by Sergey Sandoval RN  Activity Management: ambulated to bathroom  Medication Review/Management: medications reviewed  Goal: Bariatric Home Regimen Maintained  Intervention: Maintain and Manage Postbariatric Surgery Care  Recent Flowsheet Documentation  Taken 8/13/2024 1700 by Sergey Sandoval RN  Medication Review/Management: medications reviewed  Goal: Maintenance of Seizure Control  Intervention: Maintain Seizure Symptom Control  Recent Flowsheet Documentation  Taken 8/13/2024 1700 by Sergey Sandoval RN  Medication Review/Management: medications reviewed     Problem: Pain Acute  Goal: Optimal Pain Control and Function  Intervention: Prevent or Manage Pain  Recent Flowsheet Documentation  Taken 8/13/2024 1700 by Sergey Sandoval RN  Medication Review/Management: medications reviewed     Problem: Adult Inpatient Plan of Care  Goal: Plan of Care Review  Description: The Plan of Care Review/Shift note should be completed every shift.  The Outcome Evaluation is a brief statement about your assessment that the patient is improving, declining, or no change.  This information will be displayed automatically on your shift  note.  Recent Flowsheet Documentation  Taken 8/13/2024 2146 by Sergey Sandoval RN  Outcome Evaluation: Pt on detention care, placement pending.  Plan of Care Reviewed With: patient  Overall Patient Progress: no change  Taken 8/13/2024 2132 by Sergey Sandoval RN  Outcome Evaluation: Pt on detention care, placement pending  Plan of Care Reviewed With: patient  Overall Patient Progress: no change  Taken 8/13/2024 2130 by Sergey Sandoval RN  Outcome Evaluation: Pt on detention care, placement pending.  Plan of Care Reviewed With: patient  Overall Patient Progress: no change  Goal: Absence of Hospital-Acquired Illness or Injury  Intervention: Identify and Manage Fall Risk  Recent Flowsheet Documentation  Taken 8/13/2024 1700 by  Jaime, Sergey, RN  Safety Promotion/Fall Prevention:   supervised activity   room organization consistent   room near nurse's station   room door open   nonskid shoes/slippers when out of bed   mobility aid in reach   increase visualization of patient   clutter free environment maintained   safety round/check completed  Intervention: Prevent Skin Injury  Recent Flowsheet Documentation  Taken 8/13/2024 1700 by Sergey Sandoval RN  Body Position: position changed independently  Intervention: Prevent and Manage VTE (Venous Thromboembolism) Risk  Recent Flowsheet Documentation  Taken 8/13/2024 1700 by Sergey Sandoval RN  VTE Prevention/Management: SCDs off (sequential compression devices)  Intervention: Prevent Infection  Recent Flowsheet Documentation  Taken 8/13/2024 1700 by Sergey Sandoval RN  Infection Prevention: single patient room provided     Problem: Fall Injury Risk  Goal: Absence of Fall and Fall-Related Injury  Intervention: Identify and Manage Contributors  Recent Flowsheet Documentation  Taken 8/13/2024 1700 by Sergey Sandoval RN  Medication Review/Management: medications reviewed  Intervention: Promote Injury-Free Environment  Recent Flowsheet Documentation  Taken 8/13/2024 1700 by Sergey Sandoval RN  Safety Promotion/Fall Prevention:   supervised activity   room organization consistent   room near nurse's station   room door open   nonskid shoes/slippers when out of bed   mobility aid in reach   increase visualization of patient   clutter free environment maintained   safety round/check completed     Problem: Adult Inpatient Plan of Care  Goal: Plan of Care Review  Description: The Plan of Care Review/Shift note should be completed every shift.  The Outcome Evaluation is a brief statement about your assessment that the patient is improving, declining, or no change.  This information will be displayed automatically on your shift  note.  Recent Flowsheet Documentation  Taken 8/13/2024 2146 by Sergey Sandoval  RN  Outcome Evaluation: Pt on [facility] care, placement pending.  Plan of Care Reviewed With: patient  Overall Patient Progress: no change  Taken 8/13/2024 2132 by Sergey Sandoval RN  Outcome Evaluation: Pt on [facility] care, placement pending  Plan of Care Reviewed With: patient  Overall Patient Progress: no change  Taken 8/13/2024 2130 by Sergey Sandoval RN  Outcome Evaluation: Pt on [facility] care, placement pending.  Plan of Care Reviewed With: patient  Overall Patient Progress: no change  Goal: Absence of Hospital-Acquired Illness or Injury  Intervention: Identify and Manage Fall Risk  Recent Flowsheet Documentation  Taken 8/13/2024 1700 by Sergey Sandoval RN  Safety Promotion/Fall Prevention:   supervised activity   room organization consistent   room near nurse's station   room door open   nonskid shoes/slippers when out of bed   mobility aid in reach   increase visualization of patient   clutter free environment maintained   safety round/check completed  Intervention: Prevent Skin Injury  Recent Flowsheet Documentation  Taken 8/13/2024 1700 by Sergey Sandoval RN  Body Position: position changed independently  Intervention: Prevent and Manage VTE (Venous Thromboembolism) Risk  Recent Flowsheet Documentation  Taken 8/13/2024 1700 by Sergey Sandoval RN  VTE Prevention/Management: SCDs off (sequential compression devices)  Intervention: Prevent Infection  Recent Flowsheet Documentation  Taken 8/13/2024 1700 by Sergey Sandoval RN  Infection Prevention: single patient room provided     Problem: Fall Injury Risk  Goal: Absence of Fall and Fall-Related Injury  Intervention: Identify and Manage Contributors  Recent Flowsheet Documentation  Taken 8/13/2024 1700 by Sergey Sandoval RN  Medication Review/Management: medications reviewed  Intervention: Promote Injury-Free Environment  Recent Flowsheet Documentation  Taken 8/13/2024 1700 by Sergey Sandoval RN  Safety Promotion/Fall Prevention:   supervised activity   room  organization consistent   room near nurse's station   room door open   nonskid shoes/slippers when out of bed   mobility aid in reach   increase visualization of patient   clutter free environment maintained   safety round/check completed     Problem: Adult Inpatient Plan of Care  Goal: Plan of Care Review  Description: The Plan of Care Review/Shift note should be completed every shift.  The Outcome Evaluation is a brief statement about your assessment that the patient is improving, declining, or no change.  This information will be displayed automatically on your shift  note.  Recent Flowsheet Documentation  Taken 8/13/2024 2146 by Sergey Sandoval RN  Outcome Evaluation: Pt on senior care care, placement pending.  Plan of Care Reviewed With: patient  Overall Patient Progress: no change  Taken 8/13/2024 2132 by Sergey Sandoval RN  Outcome Evaluation: Pt on senior care care, placement pending  Plan of Care Reviewed With: patient  Overall Patient Progress: no change  Taken 8/13/2024 2130 by Sergey Sandoval RN  Outcome Evaluation: Pt on senior care care, placement pending.  Plan of Care Reviewed With: patient  Overall Patient Progress: no change  Goal: Absence of Hospital-Acquired Illness or Injury  Intervention: Identify and Manage Fall Risk  Recent Flowsheet Documentation  Taken 8/13/2024 1700 by Sergey Sandoval RN  Safety Promotion/Fall Prevention:   supervised activity   room organization consistent   room near nurse's station   room door open   nonskid shoes/slippers when out of bed   mobility aid in reach   increase visualization of patient   clutter free environment maintained   safety round/check completed  Intervention: Prevent Skin Injury  Recent Flowsheet Documentation  Taken 8/13/2024 1700 by Sergey Sandoval RN  Body Position: position changed independently  Intervention: Prevent and Manage VTE (Venous Thromboembolism) Risk  Recent Flowsheet Documentation  Taken 8/13/2024 1700 by Sergey Sandoval RN  VTE  Prevention/Management: SCDs off (sequential compression devices)  Intervention: Prevent Infection  Recent Flowsheet Documentation  Taken 8/13/2024 1700 by Sergey Sandoval RN  Infection Prevention: single patient room provided     Problem: Adult Inpatient Plan of Care  Goal: Plan of Care Review  Description: The Plan of Care Review/Shift note should be completed every shift.  The Outcome Evaluation is a brief statement about your assessment that the patient is improving, declining, or no change.  This information will be displayed automatically on your shift  note.  Recent Flowsheet Documentation  Taken 8/13/2024 2146 by Sergey Sandoval RN  Outcome Evaluation: Pt on senior living care, placement pending.  Plan of Care Reviewed With: patient  Overall Patient Progress: no change  Taken 8/13/2024 2132 by Sergey Sandoval RN  Outcome Evaluation: Pt on senior living care, placement pending  Plan of Care Reviewed With: patient  Overall Patient Progress: no change  Taken 8/13/2024 2130 by Sergey Sandoval RN  Outcome Evaluation: Pt on senior living care, placement pending.  Plan of Care Reviewed With: patient  Overall Patient Progress: no change  Goal: Absence of Hospital-Acquired Illness or Injury  Intervention: Identify and Manage Fall Risk  Recent Flowsheet Documentation  Taken 8/13/2024 1700 by Sergey Sandoval RN  Safety Promotion/Fall Prevention:   supervised activity   room organization consistent   room near nurse's station   room door open   nonskid shoes/slippers when out of bed   mobility aid in reach   increase visualization of patient   clutter free environment maintained   safety round/check completed  Intervention: Prevent Skin Injury  Recent Flowsheet Documentation  Taken 8/13/2024 1700 by Sergey Sandoval RN  Body Position: position changed independently  Intervention: Prevent and Manage VTE (Venous Thromboembolism) Risk  Recent Flowsheet Documentation  Taken 8/13/2024 1700 by Sergey Sandoval RN  VTE Prevention/Management: SCDs  off (sequential compression devices)  Intervention: Prevent Infection  Recent Flowsheet Documentation  Taken 8/13/2024 1700 by Sergey Sandoval, RN  Infection Prevention: single patient room provided     Problem: Delirium  Goal: Improved Behavioral Control  Intervention: Minimize Safety Risk  Recent Flowsheet Documentation  Taken 8/13/2024 1700 by Sergey Sandoval, RN  Enhanced Safety Measures: floor mats

## 2024-08-15 PROCEDURE — 99207 PR APP CREDIT; MD BILLING SHARED VISIT: CPT | Performed by: HOSPITALIST

## 2024-08-15 PROCEDURE — 101N000002 HC CUSTODIAL CARE DAILY

## 2024-08-15 ASSESSMENT — ACTIVITIES OF DAILY LIVING (ADL)
ADLS_ACUITY_SCORE: 47

## 2024-08-15 NOTE — PLAN OF CARE
Temp: 97.9  F (36.6  C) Temp src: Oral BP: 113/65 Pulse: 60   Resp: 20 SpO2: 97 % O2 Device: None (Room air)       Orientation:  A&O xSelf  VS: VSS  Pain: Denies pain   Activity:  SBA  Resp:  RA  GI:  Appears to not be in discomfort  : Voiding without difficulty  Skin:  WDL  Lines:  No IV  Diet: Regular  Plan: LTC  Discharge: Pending     Problem: Adult Inpatient Plan of Care  Goal: Absence of Hospital-Acquired Illness or Injury  Intervention: Identify and Manage Fall Risk  Recent Flowsheet Documentation  Taken 8/15/2024 0954 by Ina Garvey RN  Safety Promotion/Fall Prevention: safety round/check completed     Goal Outcome Evaluation:  No falls during shift

## 2024-08-15 NOTE — PROGRESS NOTES
Care Management Follow Up    Length of Stay (days): 0    Expected Discharge Date: 08/15/2024     Concerns to be Addressed: basic needs, care coordination/care conferences, decision making, discharge planning, financial/insurance     Patient plan of care discussed at interdisciplinary rounds: Yes    Anticipated Discharge Disposition:  LTC/memory care, lacking funding for placement.     Anticipated Discharge Services:   LTC/memory care,  Anticipated Discharge DME:  TCU will provide    Patient/family educated on Medicare website which has current facility and service quality ratings:  yes   Patient/Family in Agreement with the Plan:  yes     Referrals Placed by CM/SW:  Not at this time as we do not have a payer source  Private pay costs discussed: private room/amenity fees    Additional Information:  Spoke with [facility] [phone] regarding patient's MA application. Case # [id number]. It is still in process in a que waiting to be assigned. Left message for daughter/guardian to discuss disposition.    NALDO Morris   Inpatient Care Coordination   Supervisor  [facility]  [phone]      NALDO Puentes

## 2024-08-15 NOTE — CARE PLAN
Continues pleasantly confused. Meals ordered for him. skilled nursing care. Assisted to bathroom with assistance. Ate well.  Denies pain.. assisted to bathroom to void.

## 2024-08-15 NOTE — PLAN OF CARE
Pt is alert to self with confusion. Pt denies pain or discomfort. Pt is hard of hearing. Pt has no IV access. Pt is SBA in transfer and care. Pt had a shower. Bed alarm in place and call light within reach. Plan is LTC placement. SW following. Will continue to monitor and assess pt.

## 2024-08-15 NOTE — PROGRESS NOTES
[facility]    Hospitalist Progress Note      Assessment & Plan   Summary of Stay: Jemal Gray is a 88-year-old male who was transitioned to long term care on 6/5.  He has history of dementia and family is unable to provide care for him.  During his stay we was treated for right foot cellulitis which has resolved.  Patient is MA pending, awaiting placement in long term care.     Medically stable for discharge when disposition plan in place.     [facility] care admission.  Social work consulted.  Looking for LTC.  Is MA pending, SW working on this. Once a bed is available patient can go anytime      Constipation  Started/continue on senna, have as needed miralax and dulcolax available     Right foot cellulitis.  Resolved with Keflex 4 times daily through 6/11.  Swelling and redness resolved.    Venous ultrasound negative for DVT.     Dementia with chronic aphasia.  Not on any medications.   As needed olanzapine ordered for agitation. Impulsive.     5.   Bradycardia            Patient was noted to have HR in 50s.  Asymptomatic     6. Paper work            Guardianship papers filled on 7/13 for the daughter.         Diet: Combination Diet Regular Diet  Room Service    DVT Prophylaxis: Pneumatic Compression Devices  Maddox Catheter: Not present  Lines: None     Cardiac Monitoring: None  Code Status: No CPR- Do NOT Intubate  Dispo: Anticipate discharge when safe disposition plan in place; LTC facility being sought    Med Barillas MD    Interval History   No events.  Patient eating lunch.    -Data reviewed today: I reviewed all new labs and imaging results over the last 24 hours. I personally reviewed     Physical Exam   Temp: 97.9  F (36.6  C) Temp src: Oral BP: 113/65 Pulse: 60   Resp: 20 SpO2: 97 % O2 Device: None (Room air)    There were no vitals filed for this visit.  Vital Signs with Ranges  Temp:  [97.6  F (36.4  C)-97.9  F (36.6  C)] 97.9  F (36.6  C)  Pulse:  [60-64] 60  Resp:  [20] 20  BP:  (113-166)/(65-83) 113/65  SpO2:  [96 %-97 %] 97 %  I/O last 3 completed shifts:  In: 880 [P.O.:880]  Out: -     Sitting up in bed.  Eating some lunch.  No acute distress.      Medications   Current Facility-Administered Medications   Medication Dose Route Frequency Provider Last Rate Last Admin     Current Facility-Administered Medications   Medication Dose Route Frequency Provider Last Rate Last Admin    senna-docusate (SENOKOT-S/PERICOLACE) 8.6-50 MG per tablet 1 tablet  1 tablet Oral At Bedtime Alexandre Kurtz MD   1 tablet at 08/14/24 6316       Data   No lab results found in last 7 days.    No results found for this or any previous visit (from the past 24 hour(s)).

## 2024-08-16 PROCEDURE — 250N000013 HC RX MED GY IP 250 OP 250 PS 637: Performed by: STUDENT IN AN ORGANIZED HEALTH CARE EDUCATION/TRAINING PROGRAM

## 2024-08-16 PROCEDURE — 101N000002 HC CUSTODIAL CARE DAILY

## 2024-08-16 PROCEDURE — 99231 SBSQ HOSP IP/OBS SF/LOW 25: CPT | Performed by: HOSPITALIST

## 2024-08-16 RX ADMIN — SENNOSIDES AND DOCUSATE SODIUM 1 TABLET: 8.6; 5 TABLET ORAL at 21:48

## 2024-08-16 ASSESSMENT — ACTIVITIES OF DAILY LIVING (ADL)
ADLS_ACUITY_SCORE: 47

## 2024-08-16 NOTE — PROGRESS NOTES
Pt a&ox0. Awaiting placement. BM this shift, voiding adequately. No pain per PAINAD scale. Up SBA-A1 GB W. Alarms on.

## 2024-08-16 NOTE — PLAN OF CARE
Pt alert. Disoriented x3. Denies pain. Pt ambulates to bathroom. Bed alarm on. Bowel movement today. Discharge date TBD.        Goal Outcome Evaluation:    Problem: Adult Inpatient Plan of Care  Goal: Absence of Hospital-Acquired Illness or Injury  Intervention: Identify and Manage Fall Risk  Recent Flowsheet Documentation  Taken 8/16/2024 1154 by Rebekah Bello, RN  Safety Promotion/Fall Prevention: safety round/check completed  Taken 8/16/2024 1138 by Rebekah Bello, RN  Safety Promotion/Fall Prevention: safety round/check completed

## 2024-08-16 NOTE — PROGRESS NOTES
[facility]    Hospitalist Progress Note      Assessment & Plan   Summary of Stay: Jemal Gray is a 88-year-old male who was transitioned to [facility] care on 6/5.  He has history of dementia and family is unable to provide care for him.  During his stay we was treated for right foot cellulitis which has resolved.  Patient is MA pending, awaiting placement in long term care.     Medically stable for discharge when disposition plan in place.     [facility] care admission.  Social work consulted.  Looking for LTC.  Is MA pending, SW working on this. Once a bed is available patient can go anytime      Constipation  Started/continue on senna, have as needed miralax and dulcolax available     Right foot cellulitis.  Resolved with Keflex 4 times daily through 6/11.  Swelling and redness resolved.    Venous ultrasound negative for DVT.     Dementia with chronic aphasia.  Not on any medications.   As needed olanzapine ordered for agitation. Impulsive.     5.   Bradycardia            Patient was noted to have HR in 50s.  Asymptomatic     6. Paper work            Guardianship papers filled on 7/13 for the daughter.         Diet: Combination Diet Regular Diet  Room Service    DVT Prophylaxis: Pneumatic Compression Devices  Maddox Catheter: Not present  Lines: None     Cardiac Monitoring: None  Code Status: No CPR- Do NOT Intubate  Dispo: Anticipate discharge when safe disposition plan in place; LTC facility being sought    Med Barillas MD    Interval History   No events. Comfortable and sleeping.     -Data reviewed today: I reviewed all new labs and imaging results over the last 24 hours. I personally reviewed     Physical Exam                      There were no vitals filed for this visit.  Vital Signs with Ranges     I/O last 3 completed shifts:  In: 120 [P.O.:120]  Out: -     Pt sleeping comfortably. Not in distress. I did not awaken him.     Medications   Current Facility-Administered Medications    Medication Dose Route Frequency Provider Last Rate Last Admin     Current Facility-Administered Medications   Medication Dose Route Frequency Provider Last Rate Last Admin    senna-docusate (SENOKOT-S/PERICOLACE) 8.6-50 MG per tablet 1 tablet  1 tablet Oral At Bedtime Alexandre Kurtz MD   1 tablet at 08/14/24 1263       Data   No lab results found in last 7 days.    No results found for this or any previous visit (from the past 24 hour(s)).

## 2024-08-17 PROCEDURE — 101N000002 HC CUSTODIAL CARE DAILY

## 2024-08-17 PROCEDURE — 99231 SBSQ HOSP IP/OBS SF/LOW 25: CPT | Performed by: HOSPITALIST

## 2024-08-17 PROCEDURE — 250N000013 HC RX MED GY IP 250 OP 250 PS 637: Performed by: STUDENT IN AN ORGANIZED HEALTH CARE EDUCATION/TRAINING PROGRAM

## 2024-08-17 RX ADMIN — SENNOSIDES AND DOCUSATE SODIUM 1 TABLET: 8.6; 5 TABLET ORAL at 21:00

## 2024-08-17 ASSESSMENT — ACTIVITIES OF DAILY LIVING (ADL)
ADLS_ACUITY_SCORE: 47

## 2024-08-17 NOTE — PROGRESS NOTES
[facility]    Hospitalist Progress Note      Assessment & Plan   Summary of Stay: Jemal Gray is a 88-year-old male who was transitioned to shelter care on 6/5.  He has history of dementia and family is unable to provide care for him.  During his stay we was treated for right foot cellulitis which has resolved.  Patient is MA pending, awaiting placement in long term care.     Medically stable for discharge when disposition plan in place.     intermediate care admission.  Social work consulted.  Looking for LTC.  Is MA pending, SW working on this. Once a bed is available patient can go anytime      Constipation  Started/continue on senna, have as needed miralax and dulcolax available     Right foot cellulitis.  Resolved with Keflex 4 times daily through 6/11.  Swelling and redness resolved.    Venous ultrasound negative for DVT.     Dementia with chronic aphasia.  Not on any medications.   As needed olanzapine ordered for agitation. Impulsive.     5.   Bradycardia            Patient was noted to have HR in 50s.  Asymptomatic     6. Paper work            Guardianship papers filled on 7/13 for the daughter.         Diet: Combination Diet Regular Diet  Room Service    DVT Prophylaxis: Pneumatic Compression Devices  Maddox Catheter: Not present  Lines: None     Cardiac Monitoring: None  Code Status: No CPR- Do NOT Intubate  Dispo: Anticipate discharge when safe disposition plan in place; LTC facility being sought    Med Barillas MD    Interval History   Eating bfast. No complaints. Comfortable.     -Data reviewed today: I reviewed all new labs and imaging results over the last 24 hours. I personally reviewed     Physical Exam   Temp: 99  F (37.2  C) Temp src: Oral BP: (!) 140/67 Pulse: 50   Resp: 18 SpO2: 96 % O2 Device: None (Room air)    There were no vitals filed for this visit.  Vital Signs with Ranges  Temp:  [99  F (37.2  C)] 99  F (37.2  C)  Pulse:  [50] 50  Resp:  [18] 18  BP: (140)/(67)  140/67  SpO2:  [96 %] 96 %  I/O last 3 completed shifts:  In: 120 [P.O.:120]  Out: -     Pt eating bfast.  Moves extremities and not in distress. Extremities WWP. Nl WOB.  No tremor.     Medications   Current Facility-Administered Medications   Medication Dose Route Frequency Provider Last Rate Last Admin     Current Facility-Administered Medications   Medication Dose Route Frequency Provider Last Rate Last Admin    senna-docusate (SENOKOT-S/PERICOLACE) 8.6-50 MG per tablet 1 tablet  1 tablet Oral At Bedtime Alexandre Kurtz MD   1 tablet at 08/16/24 9391       Data   No lab results found in last 7 days.    No results found for this or any previous visit (from the past 24 hour(s)).

## 2024-08-17 NOTE — PROGRESS NOTES
Pt disoriented. Pt safety maintained - bed alarm in use, up SBA. Pt voiding and had a BM today. 75% of breakfast and dinner. Pending placement - [facility] care. BP (!) 140/67 (BP Location: Left arm)   Pulse 50   Temp 99  F (37.2  C) (Oral)   Resp 18   SpO2 96%

## 2024-08-18 PROCEDURE — 99231 SBSQ HOSP IP/OBS SF/LOW 25: CPT | Performed by: HOSPITALIST

## 2024-08-18 PROCEDURE — 101N000002 HC CUSTODIAL CARE DAILY

## 2024-08-18 ASSESSMENT — ACTIVITIES OF DAILY LIVING (ADL)
ADLS_ACUITY_SCORE: 47
ADLS_ACUITY_SCORE: 53
ADLS_ACUITY_SCORE: 49
ADLS_ACUITY_SCORE: 49
ADLS_ACUITY_SCORE: 47
ADLS_ACUITY_SCORE: 47
ADLS_ACUITY_SCORE: 49
ADLS_ACUITY_SCORE: 47
ADLS_ACUITY_SCORE: 47
ADLS_ACUITY_SCORE: 53
ADLS_ACUITY_SCORE: 49
ADLS_ACUITY_SCORE: 47
ADLS_ACUITY_SCORE: 49
ADLS_ACUITY_SCORE: 47
ADLS_ACUITY_SCORE: 49
ADLS_ACUITY_SCORE: 47

## 2024-08-18 NOTE — PROGRESS NOTES
[facility]    Hospitalist Progress Note      Assessment & Plan   Summary of Stay: Jemal Gray is a 88-year-old male who was transitioned to skilled nursing care on 6/5.  He has history of dementia and family is unable to provide care for him.  During his stay we was treated for right foot cellulitis which has resolved.  Patient is MA pending, awaiting placement in long term care.     Medically stable for discharge when disposition plan in place.     [facility] care admission.  Social work consulted.  Looking for LTC.  Is MA pending, SW working on this. Once a bed is available patient can go anytime      Constipation  Started/continue on senna, have as needed miralax and dulcolax available     Right foot cellulitis.  Resolved with Keflex 4 times daily through 6/11.  Swelling and redness resolved.    Venous ultrasound negative for DVT.     Dementia with chronic aphasia.  Not on any medications.   As needed olanzapine ordered for agitation. Impulsive.     5.   Bradycardia            Patient was noted to have HR in 50s.  Asymptomatic     6. Paper work            Guardianship papers filled on 7/13 for the daughter.         Diet: Combination Diet Regular Diet  Room Service    DVT Prophylaxis: Pneumatic Compression Devices  Maddox Catheter: Not present  Lines: None     Cardiac Monitoring: None  Code Status: No CPR- Do NOT Intubate  Dispo: Anticipate discharge when safe disposition plan in place; LTC facility being sought    Med Barillas MD    Interval History   Pleasantly confused. Eating bfast. Not in any pain or distress.     -Data reviewed today: I reviewed all new labs and imaging results over the last 24 hours. I personally reviewed     Physical Exam   Temp: 97.8  F (36.6  C) Temp src: Oral BP: (!) 155/74 Pulse: 50   Resp: 18 SpO2: 97 % O2 Device: None (Room air)    There were no vitals filed for this visit.  Vital Signs with Ranges  Temp:  [97.8  F (36.6  C)] 97.8  F (36.6  C)  Pulse:  [50] 50  Resp:  " [18] 18  BP: (155)/(74) 155/74  SpO2:  [97 %] 97 %  I/O last 3 completed shifts:  In: 820 [P.O.:820]  Out: -     Sitting up in bed. Repeatedly says \"I'm in an old man.\"  Moves extremities. Pleasantly confused. Ext warm.     Medications   Current Facility-Administered Medications   Medication Dose Route Frequency Provider Last Rate Last Admin     Current Facility-Administered Medications   Medication Dose Route Frequency Provider Last Rate Last Admin    senna-docusate (SENOKOT-S/PERICOLACE) 8.6-50 MG per tablet 1 tablet  1 tablet Oral At Bedtime Alexandre Kurtz MD   1 tablet at 08/17/24 2100       Data   No lab results found in last 7 days.    No results found for this or any previous visit (from the past 24 hour(s)).    "

## 2024-08-18 NOTE — PLAN OF CARE
Goal Outcome Evaluation:No changes . Pt is calm and easily directable  but confused and intermittently get off the bed and tried to ambulated to bathroom without standby assistance . Denied pain or discomfort .

## 2024-08-18 NOTE — PLAN OF CARE
Alert to self. Still very pleasant. Impulsive, sets off alarm often to get up to the bathroom. No indications of pain. No Falls or injuries on shift. Continue shelter care.                Goal Outcome Evaluation:         Problem: Adult Inpatient Plan of Care  Goal: Absence of Hospital-Acquired Illness or Injury  Intervention: Identify and Manage Fall Risk  Recent Flowsheet Documentation  Taken 8/18/2024 1800 by Wilson Beal RN  Safety Promotion/Fall Prevention: safety round/check completed  Taken 8/18/2024 1530 by Wilson Beal RN  Safety Promotion/Fall Prevention: safety round/check completed  Taken 8/18/2024 0930 by Wilson Beal RN  Safety Promotion/Fall Prevention: safety round/check completed     Problem: Adult Inpatient Plan of Care  Goal: Absence of Hospital-Acquired Illness or Injury  Intervention: Identify and Manage Fall Risk  Recent Flowsheet Documentation  Taken 8/18/2024 1800 by Wilson Beal RN  Safety Promotion/Fall Prevention: safety round/check completed  Taken 8/18/2024 1530 by Wilson Beal RN  Safety Promotion/Fall Prevention: safety round/check completed  Taken 8/18/2024 0930 by Wilson Beal RN  Safety Promotion/Fall Prevention: safety round/check completed     Problem: Fall Injury Risk  Goal: Absence of Fall and Fall-Related Injury  Intervention: Promote Injury-Free Environment  Recent Flowsheet Documentation  Taken 8/18/2024 1800 by Wilson Beal RN  Safety Promotion/Fall Prevention: safety round/check completed  Taken 8/18/2024 1530 by Wilson Beal RN  Safety Promotion/Fall Prevention: safety round/check completed  Taken 8/18/2024 0930 by Wilson Beal RN  Safety Promotion/Fall Prevention: safety round/check completed     Problem: Adult Inpatient Plan of Care  Goal: Absence of Hospital-Acquired Illness or Injury  Intervention: Identify and Manage Fall Risk  Recent Flowsheet Documentation  Taken 8/18/2024 1800 by Wilson Beal RN  Safety  Promotion/Fall Prevention: safety round/check completed  Taken 8/18/2024 1530 by Wilson Beal RN  Safety Promotion/Fall Prevention: safety round/check completed  Taken 8/18/2024 0930 by Wilson Beal RN  Safety Promotion/Fall Prevention: safety round/check completed     Problem: Fall Injury Risk  Goal: Absence of Fall and Fall-Related Injury  Intervention: Promote Injury-Free Environment  Recent Flowsheet Documentation  Taken 8/18/2024 1800 by Wilson Beal RN  Safety Promotion/Fall Prevention: safety round/check completed  Taken 8/18/2024 1530 by Wilson Beal RN  Safety Promotion/Fall Prevention: safety round/check completed  Taken 8/18/2024 0930 by Wilson Beal RN  Safety Promotion/Fall Prevention: safety round/check completed     Problem: Fall Injury Risk  Goal: Absence of Fall and Fall-Related Injury  Intervention: Promote Injury-Free Environment  Recent Flowsheet Documentation  Taken 8/18/2024 1800 by Wilson Beal RN  Safety Promotion/Fall Prevention: safety round/check completed  Taken 8/18/2024 1530 by Wilson Beal RN  Safety Promotion/Fall Prevention: safety round/check completed  Taken 8/18/2024 0930 by Wilson Beal RN  Safety Promotion/Fall Prevention: safety round/check completed     Problem: Adult Inpatient Plan of Care  Goal: Absence of Hospital-Acquired Illness or Injury  Intervention: Identify and Manage Fall Risk  Recent Flowsheet Documentation  Taken 8/18/2024 1800 by Wilson Beal RN  Safety Promotion/Fall Prevention: safety round/check completed  Taken 8/18/2024 1530 by Wilson Beal RN  Safety Promotion/Fall Prevention: safety round/check completed  Taken 8/18/2024 0930 by Wilson Beal RN  Safety Promotion/Fall Prevention: safety round/check completed     Problem: Adult Inpatient Plan of Care  Goal: Absence of Hospital-Acquired Illness or Injury  Intervention: Identify and Manage Fall Risk  Recent Flowsheet Documentation  Taken 8/18/2024 1800 by  Lian, Wilson R, RN  Safety Promotion/Fall Prevention: safety round/check completed  Taken 8/18/2024 1530 by Wilson Beal RN  Safety Promotion/Fall Prevention: safety round/check completed  Taken 8/18/2024 0930 by Wilson Beal RN  Safety Promotion/Fall Prevention: safety round/check completed     Problem: Fall Injury Risk  Goal: Absence of Fall and Fall-Related Injury  Intervention: Promote Injury-Free Environment  Recent Flowsheet Documentation  Taken 8/18/2024 1800 by Wilson Beal RN  Safety Promotion/Fall Prevention: safety round/check completed  Taken 8/18/2024 1530 by Wilson Beal RN  Safety Promotion/Fall Prevention: safety round/check completed  Taken 8/18/2024 0930 by Wilson Beal RN  Safety Promotion/Fall Prevention: safety round/check completed     Problem: Fall Injury Risk  Goal: Absence of Fall and Fall-Related Injury  Intervention: Promote Injury-Free Environment  Recent Flowsheet Documentation  Taken 8/18/2024 1800 by Wilson Beal RN  Safety Promotion/Fall Prevention: safety round/check completed  Taken 8/18/2024 1530 by Wilson Beal RN  Safety Promotion/Fall Prevention: safety round/check completed  Taken 8/18/2024 0930 by Wilson Beal RN  Safety Promotion/Fall Prevention: safety round/check completed     Problem: Adult Inpatient Plan of Care  Goal: Absence of Hospital-Acquired Illness or Injury  Intervention: Identify and Manage Fall Risk  Recent Flowsheet Documentation  Taken 8/18/2024 1800 by Wilson Beal RN  Safety Promotion/Fall Prevention: safety round/check completed  Taken 8/18/2024 1530 by Wilson Beal RN  Safety Promotion/Fall Prevention: safety round/check completed  Taken 8/18/2024 0930 by Wilson Beal RN  Safety Promotion/Fall Prevention: safety round/check completed     Problem: Adult Inpatient Plan of Care  Goal: Absence of Hospital-Acquired Illness or Injury  Intervention: Identify and Manage Fall Risk  Recent Flowsheet  Documentation  Taken 8/18/2024 1800 by Wilson Beal RN  Safety Promotion/Fall Prevention: safety round/check completed  Taken 8/18/2024 1530 by Wilson Beal RN  Safety Promotion/Fall Prevention: safety round/check completed  Taken 8/18/2024 0930 by Wilson Beal RN  Safety Promotion/Fall Prevention: safety round/check completed     Problem: Comorbidity Management  Goal: Maintenance of Osteoarthritis Symptom Control  Intervention: Maintain Osteoarthritis Symptom Control  Recent Flowsheet Documentation  Taken 8/18/2024 0930 by Wilson Beal RN  Assistive Device Utilized: (refuses) --  Activity Management: activity adjusted per tolerance     Problem: Adult Inpatient Plan of Care  Goal: Absence of Hospital-Acquired Illness or Injury  Intervention: Identify and Manage Fall Risk  Recent Flowsheet Documentation  Taken 8/18/2024 1800 by Wilson Beal RN  Safety Promotion/Fall Prevention: safety round/check completed  Taken 8/18/2024 1530 by iWlson Beal RN  Safety Promotion/Fall Prevention: safety round/check completed  Taken 8/18/2024 0930 by Wilson Beal RN  Safety Promotion/Fall Prevention: safety round/check completed     Problem: Fall Injury Risk  Goal: Absence of Fall and Fall-Related Injury  Intervention: Promote Injury-Free Environment  Recent Flowsheet Documentation  Taken 8/18/2024 1800 by Wilson Beal RN  Safety Promotion/Fall Prevention: safety round/check completed  Taken 8/18/2024 1530 by Wilson Beal RN  Safety Promotion/Fall Prevention: safety round/check completed  Taken 8/18/2024 0930 by Wilson Beal RN  Safety Promotion/Fall Prevention: safety round/check completed     Problem: Adult Inpatient Plan of Care  Goal: Absence of Hospital-Acquired Illness or Injury  Intervention: Identify and Manage Fall Risk  Recent Flowsheet Documentation  Taken 8/18/2024 1800 by Wilson Beal RN  Safety Promotion/Fall Prevention: safety round/check completed  Taken 8/18/2024  1530 by Wilson Beal RN  Safety Promotion/Fall Prevention: safety round/check completed  Taken 8/18/2024 0930 by Wilson Beal RN  Safety Promotion/Fall Prevention: safety round/check completed     Problem: Fall Injury Risk  Goal: Absence of Fall and Fall-Related Injury  Intervention: Promote Injury-Free Environment  Recent Flowsheet Documentation  Taken 8/18/2024 1800 by Wilson Beal RN  Safety Promotion/Fall Prevention: safety round/check completed  Taken 8/18/2024 1530 by Wilson Beal RN  Safety Promotion/Fall Prevention: safety round/check completed  Taken 8/18/2024 0930 by Wilson Beal RN  Safety Promotion/Fall Prevention: safety round/check completed     Problem: Adult Inpatient Plan of Care  Goal: Absence of Hospital-Acquired Illness or Injury  Intervention: Identify and Manage Fall Risk  Recent Flowsheet Documentation  Taken 8/18/2024 1800 by Wilson Beal RN  Safety Promotion/Fall Prevention: safety round/check completed  Taken 8/18/2024 1530 by Wilson Beal RN  Safety Promotion/Fall Prevention: safety round/check completed  Taken 8/18/2024 0930 by Wilson Beal RN  Safety Promotion/Fall Prevention: safety round/check completed     Problem: Adult Inpatient Plan of Care  Goal: Absence of Hospital-Acquired Illness or Injury  Intervention: Identify and Manage Fall Risk  Recent Flowsheet Documentation  Taken 8/18/2024 1800 by Wilson Beal RN  Safety Promotion/Fall Prevention: safety round/check completed  Taken 8/18/2024 1530 by Wilson Beal RN  Safety Promotion/Fall Prevention: safety round/check completed  Taken 8/18/2024 0930 by Wilson Beal RN  Safety Promotion/Fall Prevention: safety round/check completed     Problem: Adult Inpatient Plan of Care  Goal: Absence of Hospital-Acquired Illness or Injury  Intervention: Identify and Manage Fall Risk  Recent Flowsheet Documentation  Taken 8/18/2024 1800 by Wilson Beal RN  Safety Promotion/Fall Prevention:  safety round/check completed  Taken 8/18/2024 1530 by Wilson Beal RN  Safety Promotion/Fall Prevention: safety round/check completed  Taken 8/18/2024 0930 by Wilson Beal RN  Safety Promotion/Fall Prevention: safety round/check completed     Problem: Fall Injury Risk  Goal: Absence of Fall and Fall-Related Injury  Outcome: Progressing  Intervention: Promote Injury-Free Environment  Recent Flowsheet Documentation  Taken 8/18/2024 1800 by Wilson Beal RN  Safety Promotion/Fall Prevention: safety round/check completed  Taken 8/18/2024 1530 by Wilson Beal, RN  Safety Promotion/Fall Prevention: safety round/check completed  Taken 8/18/2024 0930 by Wilson Beal RN  Safety Promotion/Fall Prevention: safety round/check completed

## 2024-08-18 NOTE — PLAN OF CARE
Goal Outcome Evaluation:    No changes. Pt calm, pleasantly confused. Ambulated in quiles. Impulsive. Alarms on. Continue senior living care. Awaiting placement, SW following.    Problem: Adult Inpatient Plan of Care  Goal: Absence of Hospital-Acquired Illness or Injury  Intervention: Identify and Manage Fall Risk  Recent Flowsheet Documentation  Taken 8/17/2024 1530 by Laury Rios, RN  Safety Promotion/Fall Prevention:   activity supervised   clutter free environment maintained   safety round/check completed   nonskid shoes/slippers when out of bed     Problem: Fall Injury Risk  Goal: Absence of Fall and Fall-Related Injury  Intervention: Promote Injury-Free Environment  Recent Flowsheet Documentation  Taken 8/17/2024 1530 by Laury Rios, RN  Safety Promotion/Fall Prevention:   activity supervised   clutter free environment maintained   safety round/check completed   nonskid shoes/slippers when out of bed

## 2024-08-18 NOTE — PLAN OF CARE
[id number]: Alert. Confused. Denies pain. Up SBA. care home care.  Problem: Adult Inpatient Plan of Care  Goal: Absence of Hospital-Acquired Illness or Injury  Intervention: Identify and Manage Fall Risk  Recent Flowsheet Documentation  Taken 8/17/2024 2317 by Katie Paniagua, RN  Safety Promotion/Fall Prevention:   activity supervised   clutter free environment maintained   nonskid shoes/slippers when out of bed   safety round/check completed  Intervention: Prevent Skin Injury  Recent Flowsheet Documentation  Taken 8/17/2024 2317 by Katie Paniagua, RN  Body Position: position changed independently  Intervention: Prevent Infection  Recent Flowsheet Documentation  Taken 8/17/2024 2317 by Katie Paniagua, RN  Infection Prevention: rest/sleep promoted   Goal Outcome Evaluation:

## 2024-08-19 PROCEDURE — 250N000013 HC RX MED GY IP 250 OP 250 PS 637: Performed by: STUDENT IN AN ORGANIZED HEALTH CARE EDUCATION/TRAINING PROGRAM

## 2024-08-19 PROCEDURE — 101N000002 HC CUSTODIAL CARE DAILY

## 2024-08-19 PROCEDURE — 99231 SBSQ HOSP IP/OBS SF/LOW 25: CPT | Performed by: INTERNAL MEDICINE

## 2024-08-19 RX ADMIN — SENNOSIDES AND DOCUSATE SODIUM 1 TABLET: 8.6; 5 TABLET ORAL at 21:47

## 2024-08-19 ASSESSMENT — ACTIVITIES OF DAILY LIVING (ADL)
ADLS_ACUITY_SCORE: 53

## 2024-08-19 NOTE — PROGRESS NOTES
[facility]    Hospitalist Progress Note      Assessment & Plan   Summary of Stay: Jemal Gray is a 88-year-old male who was transitioned to shelter care on 6/5.  He has history of dementia and family is unable to provide care for him.  During his stay we was treated for right foot cellulitis which has resolved.  Patient is MA pending, awaiting placement in long term care.     Medically stable for discharge when disposition plan in place.     penitentiary care admission.  Social work consulted.  Looking for LTC.  Is MA pending, SW working on this. Once a bed is available patient can go anytime      Constipation  Started/continue on senna, have as needed miralax and dulcolax available     Right foot cellulitis.  Resolved with Keflex 4 times daily through 6/11.  Swelling and redness resolved.    Venous ultrasound negative for DVT.     Dementia with chronic aphasia.  Not on any medications.   As needed olanzapine ordered for agitation. Impulsive.     5.   Bradycardia            Patient was noted to have HR in 50s.  Asymptomatic     6. Paper work            Guardianship papers filled on 7/13 for the daughter.         Diet: Combination Diet Regular Diet  Room Service    DVT Prophylaxis: Pneumatic Compression Devices  Maddox Catheter: Not present  Lines: None     Cardiac Monitoring: None  Code Status: No CPR- Do NOT Intubate  Dispo: Anticipate discharge when safe disposition plan in place; LTC facility being sought    Regina Fernando MD    Interval History   Pleasantly confused. Eating bfast. Not in any pain or distress.     -Data reviewed today: I reviewed all new labs and imaging results over the last 24 hours. I personally reviewed     Physical Exam   Temp: 98.3  F (36.8  C) Temp src: Oral BP: 120/64 Pulse: 58     SpO2: 96 % O2 Device: None (Room air)    There were no vitals filed for this visit.  Vital Signs with Ranges  Temp:  [98.3  F (36.8  C)] 98.3  F (36.8  C)  Pulse:  [58] 58  BP:  (120)/(64) 120/64  SpO2:  [96 %] 96 %  No intake/output data recorded.    Sitting up in bed.    .Medically Ready for Discharge: Ready Now      Time spent 15 minutes reviewing epic including notes/labs/prior hx, current medications.  In addition to interviewing and examining the patient, updated patient and family regarding plan of care       Medications   Current Facility-Administered Medications   Medication Dose Route Frequency Provider Last Rate Last Admin     Current Facility-Administered Medications   Medication Dose Route Frequency Provider Last Rate Last Admin    senna-docusate (SENOKOT-S/PERICOLACE) 8.6-50 MG per tablet 1 tablet  1 tablet Oral At Bedtime Alexandre Kurtz MD   1 tablet at 08/17/24 2100       Data   No lab results found in last 7 days.    No results found for this or any previous visit (from the past 24 hour(s)).

## 2024-08-19 NOTE — PLAN OF CARE
Goal Outcome Evaluation:     Alert and oriented only to self , intermittently get up and and sets off alarm  to go to bathroom . Denied pain or discomfort . Slept well during the shift .   [facility] care.

## 2024-08-19 NOTE — PLAN OF CARE
Disoriented x4. No indicators of pain. A1 w/ walker and gb when able (pt quick to set off bed alarm and get to bathroom). LS: clear. No tele. Waiting for insurance auth and placement, SW following.    Goal Outcome Evaluation:    Problem: Fall Injury Risk  Goal: Absence of Fall and Fall-Related Injury  Outcome: Progressing  Intervention: Identify and Manage Contributors  Recent Flowsheet Documentation  Taken 8/19/2024 0845 by Nela Garcia, RN  Medication Review/Management: medications reviewed  Intervention: Promote Injury-Free Environment  Recent Flowsheet Documentation  Taken 8/19/2024 0845 by Nela Garcia, RN  Safety Promotion/Fall Prevention:   activity supervised   assistive device/personal items within reach   clutter free environment maintained   increased rounding and observation   increase visualization of patient   lighting adjusted   nonskid shoes/slippers when out of bed   patient and family education   room door open   room near nurse's station   safety round/check completed   supervised activity

## 2024-08-20 PROCEDURE — 250N000013 HC RX MED GY IP 250 OP 250 PS 637: Performed by: NURSE PRACTITIONER

## 2024-08-20 PROCEDURE — 99231 SBSQ HOSP IP/OBS SF/LOW 25: CPT | Performed by: INTERNAL MEDICINE

## 2024-08-20 PROCEDURE — 101N000002 HC CUSTODIAL CARE DAILY

## 2024-08-20 RX ADMIN — QUETIAPINE FUMARATE 12.5 MG: 25 TABLET ORAL at 23:37

## 2024-08-20 ASSESSMENT — ACTIVITIES OF DAILY LIVING (ADL)
ADLS_ACUITY_SCORE: 49
ADLS_ACUITY_SCORE: 49
ADLS_ACUITY_SCORE: 53
ADLS_ACUITY_SCORE: 53
ADLS_ACUITY_SCORE: 49
ADLS_ACUITY_SCORE: 53
ADLS_ACUITY_SCORE: 49
ADLS_ACUITY_SCORE: 53
ADLS_ACUITY_SCORE: 49
ADLS_ACUITY_SCORE: 53
ADLS_ACUITY_SCORE: 49
ADLS_ACUITY_SCORE: 53
ADLS_ACUITY_SCORE: 49
ADLS_ACUITY_SCORE: 53
ADLS_ACUITY_SCORE: 53

## 2024-08-20 NOTE — PLAN OF CARE
Pt confused at baseline. Getting out of bed and setting off bed alarm. Fall bundle in place and as well as floor mats. No head to toe done per orders. No IV access. Regular diet w/ room services. Voiding well. Last BM 8/18. SBA w/ GB and walker, though pt sometimes refuses one or both. Lights off and room darkened to promote sleep. TV left on per pt request. Plan: MA pending. Discharge to LTC once MA approved. Continue w/ POC.     Goal Outcome Evaluation:           Problem: Adult Inpatient Plan of Care  Goal: Absence of Hospital-Acquired Illness or Injury  Intervention: Identify and Manage Fall Risk  Recent Flowsheet Documentation  Taken 8/19/2024 2337 by Columba Thompson, RN  Safety Promotion/Fall Prevention: safety round/check completed  Intervention: Prevent Skin Injury  Recent Flowsheet Documentation  Taken 8/19/2024 2341 by Columba Thompson, RN  Body Position: position changed independently  Intervention: Prevent Infection  Recent Flowsheet Documentation  Taken 8/19/2024 2337 by Columba Thompson RN  Infection Prevention:   single patient room provided   rest/sleep promoted   personal protective equipment utilized   hand hygiene promoted   equipment surfaces disinfected   environmental surveillance performed   cohorting utilized     Problem: Adult Inpatient Plan of Care  Goal: Absence of Hospital-Acquired Illness or Injury  Intervention: Identify and Manage Fall Risk  Recent Flowsheet Documentation  Taken 8/19/2024 2337 by Columba Thompson RN  Safety Promotion/Fall Prevention: safety round/check completed  Intervention: Prevent Skin Injury  Recent Flowsheet Documentation  Taken 8/19/2024 2341 by Columba Thompson, RN  Body Position: position changed independently  Intervention: Prevent Infection  Recent Flowsheet Documentation  Taken 8/19/2024 2337 by Columba Thompson, RN  Infection Prevention:   single patient room provided   rest/sleep promoted   personal protective equipment utilized   hand hygiene promoted    equipment surfaces disinfected   environmental surveillance performed   cohorting utilized     Problem: Adult Inpatient Plan of Care  Goal: Absence of Hospital-Acquired Illness or Injury  Intervention: Identify and Manage Fall Risk  Recent Flowsheet Documentation  Taken 8/19/2024 2337 by Columba Thompson RN  Safety Promotion/Fall Prevention: safety round/check completed  Intervention: Prevent Skin Injury  Recent Flowsheet Documentation  Taken 8/19/2024 2341 by Columba Thompson RN  Body Position: position changed independently  Intervention: Prevent Infection  Recent Flowsheet Documentation  Taken 8/19/2024 2337 by Columba Thompson RN  Infection Prevention:   single patient room provided   rest/sleep promoted   personal protective equipment utilized   hand hygiene promoted   equipment surfaces disinfected   environmental surveillance performed   cohorting utilized     Problem: Adult Inpatient Plan of Care  Goal: Absence of Hospital-Acquired Illness or Injury  Intervention: Identify and Manage Fall Risk  Recent Flowsheet Documentation  Taken 8/19/2024 2337 by Columba Thompson RN  Safety Promotion/Fall Prevention: safety round/check completed  Intervention: Prevent Skin Injury  Recent Flowsheet Documentation  Taken 8/19/2024 2341 by Columba Thompson RN  Body Position: position changed independently  Intervention: Prevent Infection  Recent Flowsheet Documentation  Taken 8/19/2024 2337 by Columba Thompson RN  Infection Prevention:   single patient room provided   rest/sleep promoted   personal protective equipment utilized   hand hygiene promoted   equipment surfaces disinfected   environmental surveillance performed   cohorting utilized     Problem: Adult Inpatient Plan of Care  Goal: Absence of Hospital-Acquired Illness or Injury  Intervention: Identify and Manage Fall Risk  Recent Flowsheet Documentation  Taken 8/19/2024 2337 by Columba Thompson RN  Safety Promotion/Fall Prevention: safety round/check  completed  Intervention: Prevent Skin Injury  Recent Flowsheet Documentation  Taken 8/19/2024 2341 by Columba Thompson RN  Body Position: position changed independently  Intervention: Prevent Infection  Recent Flowsheet Documentation  Taken 8/19/2024 2337 by Columba Thompson RN  Infection Prevention:   single patient room provided   rest/sleep promoted   personal protective equipment utilized   hand hygiene promoted   equipment surfaces disinfected   environmental surveillance performed   cohorting utilized     Problem: Adult Inpatient Plan of Care  Goal: Absence of Hospital-Acquired Illness or Injury  Intervention: Identify and Manage Fall Risk  Recent Flowsheet Documentation  Taken 8/19/2024 2337 by Columba Thompson RN  Safety Promotion/Fall Prevention: safety round/check completed  Intervention: Prevent Skin Injury  Recent Flowsheet Documentation  Taken 8/19/2024 2341 by Columba Thompson RN  Body Position: position changed independently  Intervention: Prevent Infection  Recent Flowsheet Documentation  Taken 8/19/2024 2337 by Columba Thompson RN  Infection Prevention:   single patient room provided   rest/sleep promoted   personal protective equipment utilized   hand hygiene promoted   equipment surfaces disinfected   environmental surveillance performed   cohorting utilized     Problem: Adult Inpatient Plan of Care  Goal: Absence of Hospital-Acquired Illness or Injury  Intervention: Identify and Manage Fall Risk  Recent Flowsheet Documentation  Taken 8/19/2024 2337 by Columba Thompson RN  Safety Promotion/Fall Prevention: safety round/check completed  Intervention: Prevent Skin Injury  Recent Flowsheet Documentation  Taken 8/19/2024 2341 by Columba Thompson RN  Body Position: position changed independently  Intervention: Prevent Infection  Recent Flowsheet Documentation  Taken 8/19/2024 2337 by Columba Thompson RN  Infection Prevention:   single patient room provided   rest/sleep promoted   personal protective  equipment utilized   hand hygiene promoted   equipment surfaces disinfected   environmental surveillance performed   cohorting utilized     Problem: Adult Inpatient Plan of Care  Goal: Absence of Hospital-Acquired Illness or Injury  Intervention: Identify and Manage Fall Risk  Recent Flowsheet Documentation  Taken 8/19/2024 2337 by Columba Thompson RN  Safety Promotion/Fall Prevention: safety round/check completed  Intervention: Prevent Skin Injury  Recent Flowsheet Documentation  Taken 8/19/2024 2341 by Columba Thompson RN  Body Position: position changed independently  Intervention: Prevent Infection  Recent Flowsheet Documentation  Taken 8/19/2024 2337 by Columba Thompson RN  Infection Prevention:   single patient room provided   rest/sleep promoted   personal protective equipment utilized   hand hygiene promoted   equipment surfaces disinfected   environmental surveillance performed   cohorting utilized     Problem: Adult Inpatient Plan of Care  Goal: Absence of Hospital-Acquired Illness or Injury  Intervention: Identify and Manage Fall Risk  Recent Flowsheet Documentation  Taken 8/19/2024 2337 by Columba Thompson RN  Safety Promotion/Fall Prevention: safety round/check completed  Intervention: Prevent Skin Injury  Recent Flowsheet Documentation  Taken 8/19/2024 2341 by Columba Thompson RN  Body Position: position changed independently  Intervention: Prevent Infection  Recent Flowsheet Documentation  Taken 8/19/2024 2337 by Columba Thompson RN  Infection Prevention:   single patient room provided   rest/sleep promoted   personal protective equipment utilized   hand hygiene promoted   equipment surfaces disinfected   environmental surveillance performed   cohorting utilized     Problem: Adult Inpatient Plan of Care  Goal: Absence of Hospital-Acquired Illness or Injury  Intervention: Identify and Manage Fall Risk  Recent Flowsheet Documentation  Taken 8/19/2024 2337 by Columba Thompson RN  Safety Promotion/Fall  Prevention: safety round/check completed  Intervention: Prevent Skin Injury  Recent Flowsheet Documentation  Taken 8/19/2024 2341 by Columba Thompson RN  Body Position: position changed independently  Intervention: Prevent Infection  Recent Flowsheet Documentation  Taken 8/19/2024 2337 by Columba Thompson RN  Infection Prevention:   single patient room provided   rest/sleep promoted   personal protective equipment utilized   hand hygiene promoted   equipment surfaces disinfected   environmental surveillance performed   cohorting utilized     Problem: Adult Inpatient Plan of Care  Goal: Absence of Hospital-Acquired Illness or Injury  Intervention: Identify and Manage Fall Risk  Recent Flowsheet Documentation  Taken 8/19/2024 2337 by Columba Thompson RN  Safety Promotion/Fall Prevention: safety round/check completed  Intervention: Prevent Skin Injury  Recent Flowsheet Documentation  Taken 8/19/2024 2341 by Columba Thompson RN  Body Position: position changed independently  Intervention: Prevent Infection  Recent Flowsheet Documentation  Taken 8/19/2024 2337 by Columba Thompson RN  Infection Prevention:   single patient room provided   rest/sleep promoted   personal protective equipment utilized   hand hygiene promoted   equipment surfaces disinfected   environmental surveillance performed   cohorting utilized     Problem: Adult Inpatient Plan of Care  Goal: Absence of Hospital-Acquired Illness or Injury  Intervention: Identify and Manage Fall Risk  Recent Flowsheet Documentation  Taken 8/19/2024 2337 by Columba Thompson RN  Safety Promotion/Fall Prevention: safety round/check completed  Intervention: Prevent Skin Injury  Recent Flowsheet Documentation  Taken 8/19/2024 2341 by Columba Thompson RN  Body Position: position changed independently  Intervention: Prevent Infection  Recent Flowsheet Documentation  Taken 8/19/2024 2337 by Columba Thompson RN  Infection Prevention:   single patient room provided   rest/sleep  promoted   personal protective equipment utilized   hand hygiene promoted   equipment surfaces disinfected   environmental surveillance performed   cohorting utilized     Problem: Delirium  Goal: Improved Behavioral Control  Intervention: Minimize Safety Risk  Recent Flowsheet Documentation  Taken 8/19/2024 2337 by Columba Thompson RN  Enhanced Safety Measures: floor mats     Problem: Delirium  Goal: Improved Behavioral Control  Intervention: Minimize Safety Risk  Recent Flowsheet Documentation  Taken 8/19/2024 2337 by Columba Thompson RN  Enhanced Safety Measures: floor mats     Problem: Fall Injury Risk  Goal: Absence of Fall and Fall-Related Injury  Intervention: Identify and Manage Contributors  Recent Flowsheet Documentation  Taken 8/19/2024 2337 by Columba Thompson RN  Medication Review/Management: medications reviewed  Intervention: Promote Injury-Free Environment  Recent Flowsheet Documentation  Taken 8/19/2024 2337 by Columba Thompson RN  Safety Promotion/Fall Prevention: safety round/check completed     Problem: Fall Injury Risk  Goal: Absence of Fall and Fall-Related Injury  Intervention: Identify and Manage Contributors  Recent Flowsheet Documentation  Taken 8/19/2024 2337 by Columba Thompson RN  Medication Review/Management: medications reviewed  Intervention: Promote Injury-Free Environment  Recent Flowsheet Documentation  Taken 8/19/2024 2337 by Columba Thompson RN  Safety Promotion/Fall Prevention: safety round/check completed     Problem: Fall Injury Risk  Goal: Absence of Fall and Fall-Related Injury  Intervention: Identify and Manage Contributors  Recent Flowsheet Documentation  Taken 8/19/2024 2337 by Columba Thompson, RN  Medication Review/Management: medications reviewed  Intervention: Promote Injury-Free Environment  Recent Flowsheet Documentation  Taken 8/19/2024 2337 by Columba Thompson RN  Safety Promotion/Fall Prevention: safety round/check completed     Problem: Fall Injury Risk  Goal:  Absence of Fall and Fall-Related Injury  Intervention: Identify and Manage Contributors  Recent Flowsheet Documentation  Taken 8/19/2024 2337 by Columba Thompson RN  Medication Review/Management: medications reviewed  Intervention: Promote Injury-Free Environment  Recent Flowsheet Documentation  Taken 8/19/2024 2337 by Columba Thompson RN  Safety Promotion/Fall Prevention: safety round/check completed     Problem: Fall Injury Risk  Goal: Absence of Fall and Fall-Related Injury  Intervention: Identify and Manage Contributors  Recent Flowsheet Documentation  Taken 8/19/2024 2337 by Columba Thompson RN  Medication Review/Management: medications reviewed  Intervention: Promote Injury-Free Environment  Recent Flowsheet Documentation  Taken 8/19/2024 2337 by Columba Thompson RN  Safety Promotion/Fall Prevention: safety round/check completed     Problem: Fall Injury Risk  Goal: Absence of Fall and Fall-Related Injury  Intervention: Identify and Manage Contributors  Recent Flowsheet Documentation  Taken 8/19/2024 2337 by Columba Thompson RN  Medication Review/Management: medications reviewed  Intervention: Promote Injury-Free Environment  Recent Flowsheet Documentation  Taken 8/19/2024 2337 by Columba Thompson RN  Safety Promotion/Fall Prevention: safety round/check completed     Problem: Fall Injury Risk  Goal: Absence of Fall and Fall-Related Injury  Intervention: Identify and Manage Contributors  Recent Flowsheet Documentation  Taken 8/19/2024 2337 by Columba Thompson RN  Medication Review/Management: medications reviewed  Intervention: Promote Injury-Free Environment  Recent Flowsheet Documentation  Taken 8/19/2024 2337 by Columba Thompson RN  Safety Promotion/Fall Prevention: safety round/check completed     Problem: Fall Injury Risk  Goal: Absence of Fall and Fall-Related Injury  Outcome: Progressing  Intervention: Identify and Manage Contributors  Recent Flowsheet Documentation  Taken 8/19/2024 2337 by Columba Thompson  JAIMIE, RN  Medication Review/Management: medications reviewed  Intervention: Promote Injury-Free Environment  Recent Flowsheet Documentation  Taken 8/19/2024 2337 by Columba Thompson, RN  Safety Promotion/Fall Prevention: safety round/check completed     Problem: Comorbidity Management  Goal: Maintenance of Asthma Control  Intervention: Maintain Asthma Symptom Control  Recent Flowsheet Documentation  Taken 8/19/2024 2337 by Columba Thompson RN  Medication Review/Management: medications reviewed  Goal: Maintenance of Behavioral Health Symptom Control  Intervention: Maintain Behavioral Health Symptom Control  Recent Flowsheet Documentation  Taken 8/19/2024 2337 by Columba Thompson, RN  Medication Review/Management: medications reviewed  Goal: Maintenance of COPD Symptom Control  Intervention: Maintain COPD Symptom Control  Recent Flowsheet Documentation  Taken 8/19/2024 2337 by Columba Thompson RN  Medication Review/Management: medications reviewed  Goal: Blood Glucose Levels Within Targeted Range  Intervention: Monitor and Manage Glycemia  Recent Flowsheet Documentation  Taken 8/19/2024 2337 by Columba Thompson RN  Medication Review/Management: medications reviewed  Goal: Maintenance of Heart Failure Symptom Control  Intervention: Maintain Heart Failure Management  Recent Flowsheet Documentation  Taken 8/19/2024 2337 by Columba Thompson, RN  Medication Review/Management: medications reviewed  Goal: Blood Pressure in Desired Range  Intervention: Maintain Blood Pressure Management  Recent Flowsheet Documentation  Taken 8/19/2024 2337 by Columba Thompson, RN  Medication Review/Management: medications reviewed  Goal: Maintenance of Osteoarthritis Symptom Control  Intervention: Maintain Osteoarthritis Symptom Control  Recent Flowsheet Documentation  Taken 8/19/2024 2341 by Columba Thompson, RN  Activity Management: activity adjusted per tolerance  Taken 8/19/2024 2337 by Columba Thompson, RN  Medication Review/Management:  medications reviewed  Goal: Bariatric Home Regimen Maintained  Intervention: Maintain and Manage Postbariatric Surgery Care  Recent Flowsheet Documentation  Taken 8/19/2024 2337 by Columba Thompson RN  Medication Review/Management: medications reviewed  Goal: Maintenance of Seizure Control  Intervention: Maintain Seizure Symptom Control  Recent Flowsheet Documentation  Taken 8/19/2024 2337 by Columba Thompson, RN  Medication Review/Management: medications reviewed     Problem: Pain Acute  Goal: Optimal Pain Control and Function  Intervention: Prevent or Manage Pain  Recent Flowsheet Documentation  Taken 8/19/2024 2337 by Columba Thompson, RN  Medication Review/Management: medications reviewed

## 2024-08-20 NOTE — PLAN OF CARE
Goal Outcome Evaluation:    Disoriented X4. Pleasantly confused. Bed alarm on for safety. Takes meds w/o difficulty. Pain Ad 0. Staff anticipates his needs.   Problem: Fall Injury Risk  Goal: Absence of Fall and Fall-Related Injury  Outcome: Progressing     Problem: Adult Inpatient Plan of Care  Goal: Absence of Hospital-Acquired Illness or Injury  Intervention: Prevent Skin Injury  Recent Flowsheet Documentation  Taken 8/19/2024 2150 by Flores Gauthier RN  Body Position: position changed independently     Problem: Adult Inpatient Plan of Care  Goal: Absence of Hospital-Acquired Illness or Injury  Intervention: Prevent Skin Injury  Recent Flowsheet Documentation  Taken 8/19/2024 2150 by Flores Gauthier RN  Body Position: position changed independently     Problem: Adult Inpatient Plan of Care  Goal: Absence of Hospital-Acquired Illness or Injury  Intervention: Prevent Skin Injury  Recent Flowsheet Documentation  Taken 8/19/2024 2150 by Flores Gauthier RN  Body Position: position changed independently     Problem: Adult Inpatient Plan of Care  Goal: Absence of Hospital-Acquired Illness or Injury  Intervention: Prevent Skin Injury  Recent Flowsheet Documentation  Taken 8/19/2024 2150 by Flores Gauthier RN  Body Position: position changed independently     Problem: Adult Inpatient Plan of Care  Goal: Absence of Hospital-Acquired Illness or Injury  Intervention: Prevent Skin Injury  Recent Flowsheet Documentation  Taken 8/19/2024 2150 by Flores Gauthier RN  Body Position: position changed independently     Problem: Adult Inpatient Plan of Care  Goal: Absence of Hospital-Acquired Illness or Injury  Intervention: Prevent Skin Injury  Recent Flowsheet Documentation  Taken 8/19/2024 2150 by Flores Gauthier RN  Body Position: position changed independently     Problem: Adult Inpatient Plan of Care  Goal: Absence of Hospital-Acquired Illness or Injury  Intervention: Prevent Skin Injury  Recent Flowsheet Documentation  Taken  8/19/2024 2150 by Flores Gauthier RN  Body Position: position changed independently     Problem: Comorbidity Management  Goal: Maintenance of Osteoarthritis Symptom Control  Intervention: Maintain Osteoarthritis Symptom Control  Recent Flowsheet Documentation  Taken 8/19/2024 2150 by Flores Gauthier RN  Activity Management: ambulated to bathroom     Problem: Adult Inpatient Plan of Care  Goal: Absence of Hospital-Acquired Illness or Injury  Intervention: Prevent Skin Injury  Recent Flowsheet Documentation  Taken 8/19/2024 2150 by Flores Gauthier RN  Body Position: position changed independently     Problem: Adult Inpatient Plan of Care  Goal: Absence of Hospital-Acquired Illness or Injury  Intervention: Prevent Skin Injury  Recent Flowsheet Documentation  Taken 8/19/2024 2150 by Flores Gauthier RN  Body Position: position changed independently     Problem: Adult Inpatient Plan of Care  Goal: Absence of Hospital-Acquired Illness or Injury  Intervention: Prevent Skin Injury  Recent Flowsheet Documentation  Taken 8/19/2024 2150 by Flores Gauthier RN  Body Position: position changed independently     Problem: Adult Inpatient Plan of Care  Goal: Absence of Hospital-Acquired Illness or Injury  Intervention: Prevent Skin Injury  Recent Flowsheet Documentation  Taken 8/19/2024 2150 by Flores Gauthier RN  Body Position: position changed independently     Problem: Adult Inpatient Plan of Care  Goal: Absence of Hospital-Acquired Illness or Injury  Intervention: Prevent Skin Injury  Recent Flowsheet Documentation  Taken 8/19/2024 2150 by Flores Gauthier RN  Body Position: position changed independently

## 2024-08-20 NOTE — PLAN OF CARE
Vss A&O to person only. Follows some commands. Pleasantly confused. Easily redirected. Denies pain. Feeds self. Needs assist setting up meal tray   Problem: Fall Injury Risk  Goal: Absence of Fall and Fall-Related Injury  Outcome: Not Progressing  Intervention: Promote Injury-Free Environment  Recent Flowsheet Documentation  Taken 8/20/2024 0954 by Anselmo Best RN  Safety Promotion/Fall Prevention:   activity supervised   assistive device/personal items within reach   mobility aid in reach   nonskid shoes/slippers when out of bed   patient and family education   room organization consistent   safety round/check completed   Goal Outcome Evaluation:

## 2024-08-20 NOTE — PROGRESS NOTES
[facility]    Hospitalist Progress Note      Assessment & Plan   Summary of Stay: Jemal Gray is a 88-year-old male who [facility].  He has history of dementia and family is unable to provide care for him.  During his stay we was treated for right foot cellulitis which has resolved.  Patient is MA pending, awaiting placement in long term care.     Medically stable for discharge when disposition plan in place.     shelter care admission.  Social work consulted.  Looking for LTC.  Is MA pending, SW working on this. Once a bed is available patient can go anytime      Constipation  Started/continue on senna, have as needed miralax and dulcolax available     Right foot cellulitis.  Resolved with Keflex 4 times daily through 6/11.  Swelling and redness resolved.    Venous ultrasound negative for DVT.     Dementia with chronic aphasia.  Not on any medications.   As needed olanzapine ordered for agitation. Impulsive.     5.   Bradycardia            Patient was noted to have HR in 50s.  Asymptomatic     6. Paper work            Guardianship papers filled on 7/13 for the daughter.         Diet: Combination Diet Regular Diet  Room Service    DVT Prophylaxis: Pneumatic Compression Devices  Maddox Catheter: Not present  Lines: None     Cardiac Monitoring: None  Code Status: No CPR- Do NOT Intubate  Dispo: Anticipate discharge when safe disposition plan in place; LTC facility being sought    Regina Fernando MD    Interval History   Pleasantly confused states he is feeling well.  Just finished all of his breakfast     -Data reviewed today: I reviewed all new labs and imaging results over the last 24 hours. I personally reviewed     Physical Exam   Temp: 97.8  F (36.6  C) Temp src: Oral BP: (!) 150/72 Pulse: 50   Resp: 16        Vitals:    08/20/24 1155   Weight: 73.6 kg (162 lb 3.2 oz)     Vital Signs with Ranges  Temp:  [97.8  F (36.6  C)] 97.8  F (36.6  C)  Pulse:  [50-53]  50  Resp:  [16-20] 16  BP: (150)/(72) 150/72  I/O last 3 completed shifts:  In: 360 [P.O.:360]  Out: -     Sitting up in bed.    .Medically Ready for Discharge: Ready Now      Time spent 10 minutes reviewing epic including notes/labs/prior hx, current medications.  In addition to interviewing and examining the patient, updated patient and family regarding plan of care       Medications   Current Facility-Administered Medications   Medication Dose Route Frequency Provider Last Rate Last Admin     Current Facility-Administered Medications   Medication Dose Route Frequency Provider Last Rate Last Admin    senna-docusate (SENOKOT-S/PERICOLACE) 8.6-50 MG per tablet 1 tablet  1 tablet Oral At Bedtime Alexandre Kurtz MD   1 tablet at 08/19/24 3329       Data   No lab results found in last 7 days.    No results found for this or any previous visit (from the past 24 hour(s)).

## 2024-08-21 PROCEDURE — 250N000013 HC RX MED GY IP 250 OP 250 PS 637: Performed by: STUDENT IN AN ORGANIZED HEALTH CARE EDUCATION/TRAINING PROGRAM

## 2024-08-21 PROCEDURE — 101N000002 HC CUSTODIAL CARE DAILY

## 2024-08-21 RX ADMIN — SENNOSIDES AND DOCUSATE SODIUM 1 TABLET: 8.6; 5 TABLET ORAL at 21:45

## 2024-08-21 ASSESSMENT — ACTIVITIES OF DAILY LIVING (ADL)
ADLS_ACUITY_SCORE: 49
ADLS_ACUITY_SCORE: 53
ADLS_ACUITY_SCORE: 49
ADLS_ACUITY_SCORE: 53
ADLS_ACUITY_SCORE: 53
ADLS_ACUITY_SCORE: 49
ADLS_ACUITY_SCORE: 53
ADLS_ACUITY_SCORE: 53
ADLS_ACUITY_SCORE: 49
ADLS_ACUITY_SCORE: 53
ADLS_ACUITY_SCORE: 49
ADLS_ACUITY_SCORE: 53
ADLS_ACUITY_SCORE: 53
ADLS_ACUITY_SCORE: 49
ADLS_ACUITY_SCORE: 53

## 2024-08-21 NOTE — PLAN OF CARE
Goal Outcome Evaluation:    Patient alert to self only. Walked several times with assist of 1 gait belt and walker in the hallway. Helped set up dinner tray, fed himself. Patient refused to take senna tablet. Washed up at night by nursing assistant. Awaiting LTC placement.     Problem: Fall Injury Risk  Goal: Absence of Fall and Fall-Related Injury  Outcome: Progressing  Intervention: Identify and Manage Contributors  Recent Flowsheet Documentation  Taken 8/20/2024 2127 by Radha aTm RN  Medication Review/Management: medications reviewed  Taken 8/20/2024 2023 by Radha Tam, RN  Medication Review/Management: medications reviewed  Taken 8/20/2024 1915 by Radha Tam RN  Medication Review/Management: medications reviewed  Taken 8/20/2024 1800 by Radha Tam RN  Medication Review/Management: medications reviewed  Taken 8/20/2024 1645 by Radha Tam RN  Medication Review/Management: medications reviewed  Taken 8/20/2024 1610 by Radha Tam RN  Medication Review/Management: medications reviewed  Taken 8/20/2024 1530 by Radha Tam RN  Medication Review/Management: medications reviewed  Intervention: Promote Injury-Free Environment  Recent Flowsheet Documentation  Taken 8/20/2024 2127 by Radha Tam RN  Safety Promotion/Fall Prevention: safety round/check completed  Taken 8/20/2024 2023 by Radha Tam RN  Safety Promotion/Fall Prevention: safety round/check completed  Taken 8/20/2024 1915 by Yonatan, Radha N, RN  Safety Promotion/Fall Prevention: safety round/check completed  Taken 8/20/2024 1800 by Radha Tam, RN  Safety Promotion/Fall Prevention: safety round/check completed  Taken 8/20/2024 1645 by Radha Tam, RN  Safety Promotion/Fall Prevention: safety round/check completed  Taken 8/20/2024 1610 by Radha Tam, RN  Safety Promotion/Fall Prevention: safety round/check completed  Taken 8/20/2024 1530 by  Radha Tam, RN  Safety Promotion/Fall Prevention: safety round/check completed

## 2024-08-21 NOTE — PLAN OF CARE
Neuro: A&Ox1, pleasant.   Cardiac: HR regular. VSS.   Respiratory: Sating >92% on RA.  GI/: Adequate urine output. BM X1  Diet/appetite: Tolerating Regular diet. Eating well.  Activity:  Assist of SBA, up to chair and in halls.  Pain: Denied  Skin: No new deficits noted.  LDA's: N/A    Plan: Daily ADLs completed. Pt cheerful, ambulated in halls multiple times. Staff took pt on wheelchair ride outside. Updated care plan.

## 2024-08-21 NOTE — PROGRESS NOTES
[facility]     Hospitalist Progress Note     Assessment & Plan     ASSESSMENT    Summary of Stay: Jemal Gray is a 88-year-old male who was transitioned to [facility] care on 6/5.  He has history of dementia and family is unable to provide care for him.  During his stay we was treated for right foot cellulitis which has resolved.  Patient is MA pending, awaiting placement in long term care.     Medically stable for discharge when disposition plan in place.    PLAN     care home care admission.  Social work consulted.  Looking for LTC.  Is MA pending, SW working on this. Once a bed is available patient can go anytime      Constipation  Started/continue on senna, have as needed miralax and dulcolax available     Right foot cellulitis.  Resolved with Keflex 4 times daily through 6/11.  Swelling and redness resolved.    Venous ultrasound negative for DVT     Dementia with chronic aphasia.  Not on any medications.   As needed olanzapine ordered for agitation. Impulsive.     5.   Bradycardia  Patient was noted to have HR in 50s.  Asymptomatic     6. Paper work  Guardianship papers filled on 7/13 for the daughter.    7. Disposition  Medically Ready for Discharge: Ready Now       Bandar Lomeli MD    Subjective     Seen at bedside. No new events. Awaiting placement.        Objective   Blood pressure 121/64, pulse 89, temperature 98  F (36.7  C), temperature source Oral, resp. rate 18, weight 73.6 kg (162 lb 3.2 oz), SpO2 98%.    PHYSICAL EXAM  General: In no acute distress, pleasantly demented  CV: RRR.  Lungs: CTAB. Nl WOB.  Abd: Non-tender.  Ext: No edema.    LABS AND IMAGING  Reviewed and pertinent results discussed in assessment and plan.

## 2024-08-21 NOTE — PLAN OF CARE
[id number]  Alert to self. Get agitated and very impulsive at the beginning of the shift, very hard to redirect, Seroquel given x1 with improvement. Denies pain.     Plan: Waiting for LTC placement    Goal Outcome Evaluation:    Problem: Fall Injury Risk  Goal: Absence of Fall and Fall-Related Injury  Outcome: Progressing  Intervention: Identify and Manage Contributors  Recent Flowsheet Documentation  Taken 8/20/2024 2346 by Fidelina Hdz RN  Medication Review/Management: medications reviewed  Intervention: Promote Injury-Free Environment  Recent Flowsheet Documentation  Taken 8/20/2024 2346 by Fidelina Hdz RN  Safety Promotion/Fall Prevention:   safety round/check completed   supervised activity   nonskid shoes/slippers when out of bed   patient and family education     Problem: Adult Inpatient Plan of Care  Goal: Absence of Hospital-Acquired Illness or Injury  Intervention: Identify and Manage Fall Risk  Recent Flowsheet Documentation  Taken 8/20/2024 2346 by Fidelina Hdz RN  Safety Promotion/Fall Prevention:   safety round/check completed   supervised activity   nonskid shoes/slippers when out of bed   patient and family education  Intervention: Prevent Skin Injury  Recent Flowsheet Documentation  Taken 8/21/2024 0138 by Fidelina Hdz RN  Body Position: position changed independently  Intervention: Prevent Infection  Recent Flowsheet Documentation  Taken 8/20/2024 2346 by Fidelina Hdz RN  Infection Prevention:   rest/sleep promoted   single patient room provided

## 2024-08-22 PROCEDURE — 250N000013 HC RX MED GY IP 250 OP 250 PS 637: Performed by: NURSE PRACTITIONER

## 2024-08-22 PROCEDURE — 250N000013 HC RX MED GY IP 250 OP 250 PS 637: Performed by: STUDENT IN AN ORGANIZED HEALTH CARE EDUCATION/TRAINING PROGRAM

## 2024-08-22 PROCEDURE — 101N000002 HC CUSTODIAL CARE DAILY

## 2024-08-22 RX ADMIN — QUETIAPINE FUMARATE 12.5 MG: 25 TABLET ORAL at 21:49

## 2024-08-22 RX ADMIN — SENNOSIDES AND DOCUSATE SODIUM 1 TABLET: 8.6; 5 TABLET ORAL at 21:12

## 2024-08-22 ASSESSMENT — ACTIVITIES OF DAILY LIVING (ADL)
ADLS_ACUITY_SCORE: 46
ADLS_ACUITY_SCORE: 48
ADLS_ACUITY_SCORE: 46
ADLS_ACUITY_SCORE: 48
ADLS_ACUITY_SCORE: 46
ADLS_ACUITY_SCORE: 48
ADLS_ACUITY_SCORE: 46
ADLS_ACUITY_SCORE: 48
ADLS_ACUITY_SCORE: 48
ADLS_ACUITY_SCORE: 46

## 2024-08-22 NOTE — PLAN OF CARE
Goal Outcome Evaluation:    Pt A&O to self. Denies pain. Tolerating diet, denies N/V. Transfers with [facility] shelter cares, awaiting placement.    Problem: Fall Injury Risk  Goal: Absence of Fall and Fall-Related Injury  Outcome: Progressing  Intervention: Identify and Manage Contributors  Recent Flowsheet Documentation  Taken 8/22/2024 1030 by Lisa Stapleton, RN  Medication Review/Management: medications reviewed  Intervention: Promote Injury-Free Environment  Recent Flowsheet Documentation  Taken 8/22/2024 1030 by Lisa Stapleton, RN  Safety Promotion/Fall Prevention:   activity supervised   increase visualization of patient   increased rounding and observation   clutter free environment maintained   lighting adjusted   safety round/check completed   patient and family education

## 2024-08-22 NOTE — PROGRESS NOTES
A&O to self only. Confused but pleasant, VSS stable. Afebrile. Up with assist of 1 with gait belt and walker. Ambulated to bathroom and sets off bed alarm frequently. Regular diet with fair appetite. Denied pain at the time of assessment. Daughter visited and was updated on patient's progress. Continue to monitor.

## 2024-08-22 NOTE — PROGRESS NOTES
[facility]     Hospitalist Progress Note     Assessment & Plan     ASSESSMENT    Summary of Stay: Jemal Gray is a 88-year-old male who was transitioned to [facility] care on 6/5.  He has history of dementia and family is unable to provide care for him.  During his stay we was treated for right foot cellulitis which has resolved.  Patient is MA pending, awaiting placement in long term care.     Medically stable for discharge when disposition plan in place.    PLAN     long term care admission.  Social work consulted.  Looking for LTC.  Is MA pending, SW working on this. Once a bed is available patient can go anytime      Constipation  Started/continue on senna, have as needed miralax and dulcolax available     Right foot cellulitis.  Resolved with Keflex 4 times daily through 6/11.  Swelling and redness resolved.    Venous ultrasound negative for DVT     Dementia with chronic aphasia.  Not on any medications.   As needed olanzapine ordered for agitation. Impulsive.     5.   Bradycardia  Patient was noted to have HR in 50s.  Asymptomatic     6. Paper work  Guardianship papers filled on 7/13 for the daughter.    7. Disposition  Medically Ready for Discharge: Ready Now       Bandar Lomeli MD    Subjective     No events overnight. Awaiting placement.        Objective   Blood pressure 120/68, pulse 89, temperature 98  F (36.7  C), temperature source Oral, resp. rate 18, weight 73.6 kg (162 lb 3.2 oz), SpO2 94%.    PHYSICAL EXAM  General: In no acute distress, pleasantly demented  CV: RRR.  Lungs: CTAB. Nl WOB.  Abd: Non-tender.  Ext: No edema.    LABS AND IMAGING  Reviewed and pertinent results discussed in assessment and plan.

## 2024-08-22 NOTE — PLAN OF CARE
Alert/confused. Impulsive. Up SBA/walker. Continue [facility] cares.    Goal Outcome Evaluation:    Problem: Adult Inpatient Plan of Care  Goal: Absence of Hospital-Acquired Illness or Injury  Intervention: Identify and Manage Fall Risk  Recent Flowsheet Documentation  Taken 8/22/2024 0300 by Katie Painagua RN  Safety Promotion/Fall Prevention: safety round/check completed  Taken 8/22/2024 0100 by Katie Paniagua RN  Safety Promotion/Fall Prevention:   assistive device/personal items within reach   clutter free environment maintained   nonskid shoes/slippers when out of bed   safety round/check completed   room near nurse's station  Intervention: Prevent Infection  Recent Flowsheet Documentation  Taken 8/22/2024 0100 by Katie Paniagua RN  Infection Prevention: rest/sleep promoted       Problem: Adult Inpatient Plan of Care  Goal: Absence of Hospital-Acquired Illness or Injury  Intervention: Identify and Manage Fall Risk  Recent Flowsheet Documentation  Taken 8/22/2024 0300 by Katie Paniagua RN  Safety Promotion/Fall Prevention: safety round/check completed  Taken 8/22/2024 0100 by Katie Paniagua RN  Safety Promotion/Fall Prevention:   assistive device/personal items within reach   clutter free environment maintained   nonskid shoes/slippers when out of bed   safety round/check completed   room near nurse's station  Intervention: Prevent Infection  Recent Flowsheet Documentation  Taken 8/22/2024 0100 by Katie Paniagua RN  Infection Prevention: rest/sleep promoted

## 2024-08-23 PROCEDURE — 101N000002 HC CUSTODIAL CARE DAILY

## 2024-08-23 ASSESSMENT — ACTIVITIES OF DAILY LIVING (ADL)
ADLS_ACUITY_SCORE: 44
ADLS_ACUITY_SCORE: 46
ADLS_ACUITY_SCORE: 46
ADLS_ACUITY_SCORE: 44
ADLS_ACUITY_SCORE: 46
ADLS_ACUITY_SCORE: 44
ADLS_ACUITY_SCORE: 46
ADLS_ACUITY_SCORE: 44

## 2024-08-23 NOTE — PLAN OF CARE
Pt alert and oriented to self. SBA. Does not call for assistance. Garbled speech. Pleasant. Slept well.       Goal Outcome Evaluation:  No falls. Fall mats done. Alarms on.

## 2024-08-23 NOTE — PLAN OF CARE
[id number]  Pleasantly confused. Seroquel given x1 for agitation.       Goal Outcome Evaluation:    Problem: Fall Injury Risk  Goal: Absence of Fall and Fall-Related Injury  Outcome: Progressing  Intervention: Identify and Manage Contributors  Recent Flowsheet Documentation  Taken 8/22/2024 2240 by Fidelina Hdz, RN  Medication Review/Management: medications reviewed  Intervention: Promote Injury-Free Environment  Recent Flowsheet Documentation  Taken 8/22/2024 2240 by Fidelina Hdz RN  Safety Promotion/Fall Prevention:   activity supervised   safety round/check completed

## 2024-08-23 NOTE — PROGRESS NOTES
Care Management Follow Up    Length of Stay (days): 0     Expected Discharge Date: 08/15/2024     Concerns to be Addressed: basic needs, care coordination/care conferences, decision making, discharge planning, financial/insurance     Patient plan of care discussed at interdisciplinary rounds: Yes     Anticipated Discharge Disposition:  LTC/memory care, lacking funding for placement.     Anticipated Discharge Services:   LTC/memory care,  Anticipated Discharge DME:  TCU will provide     Patient/family educated on Medicare website which has current facility and service quality ratings:  yes   Patient/Family in Agreement with the Plan:  yes      Referrals Placed by CM/SW:  Not at this time as we do not have a payer source  Private pay costs discussed: private room/amenity fees     Additional Information:  Spoke with [facility] [phone] regarding patient's MA application. Case # [id number]. MA application has been assigned to Rukhsana [phone] Left message for her.. Left message for daughter/guardian to discuss disposition.    Columba Hollingsworth [facility] JAMAR   Inpatient Care Coordination   Supervisor  [facility]  [phone]

## 2024-08-23 NOTE — PROGRESS NOTES
[facility]     Hospitalist Progress Note     Assessment & Plan     ASSESSMENT    Summary of Stay: Jemal Gray is a 88-year-old male who was transitioned to alf care on 6/5.  He has history of dementia and family is unable to provide care for him.  During his stay we was treated for right foot cellulitis which has resolved.  Patient is MA pending, awaiting placement in long term care.     Medically stable for discharge when disposition plan in place.    PLAN     [facility] care admission.  Social work consulted.  Looking for LTC.  Is MA pending, SW working on this. Once a bed is available patient can go anytime      Constipation  Started/continue on senna, have as needed miralax and dulcolax available     Right foot cellulitis.  Resolved with Keflex 4 times daily through 6/11.  Swelling and redness resolved.    Venous ultrasound negative for DVT     Dementia with chronic aphasia.  Not on any medications.   As needed olanzapine ordered for agitation. Impulsive.     5.   Bradycardia  Patient was noted to have HR in 50s.  Asymptomatic     6. Paper work  Guardianship papers filled on 7/13 for the daughter.    7. Disposition  Medically Ready for Discharge: Ready Now       Bandar Lomeli MD    Subjective     No events overnight. Doing well this morning. Awaiting placement.        Objective   Blood pressure 123/67, pulse 50, temperature 97.7  F (36.5  C), temperature source Oral, resp. rate 18, weight 73.6 kg (162 lb 3.2 oz), SpO2 97%.    PHYSICAL EXAM  General: In no acute distress, pleasantly demented  CV: RRR.  Lungs: CTAB. Nl WOB.  Abd: Non-tender.  Ext: No edema.    LABS AND IMAGING  Reviewed and pertinent results discussed in assessment and plan.

## 2024-08-24 PROCEDURE — 250N000013 HC RX MED GY IP 250 OP 250 PS 637: Performed by: INTERNAL MEDICINE

## 2024-08-24 PROCEDURE — 250N000013 HC RX MED GY IP 250 OP 250 PS 637: Performed by: STUDENT IN AN ORGANIZED HEALTH CARE EDUCATION/TRAINING PROGRAM

## 2024-08-24 PROCEDURE — 101N000002 HC CUSTODIAL CARE DAILY

## 2024-08-24 RX ORDER — KETOCONAZOLE 20 MG/ML
SHAMPOO TOPICAL
Status: DISCONTINUED | OUTPATIENT
Start: 2024-08-24 | End: 2024-10-02 | Stop reason: HOSPADM

## 2024-08-24 RX ORDER — BETAMETHASONE DIPROPIONATE 0.05 %
OINTMENT (GRAM) TOPICAL 2 TIMES DAILY
Status: DISCONTINUED | OUTPATIENT
Start: 2024-08-24 | End: 2024-10-02 | Stop reason: HOSPADM

## 2024-08-24 RX ADMIN — KETOCONAZOLE: 20.5 SHAMPOO, SUSPENSION TOPICAL at 21:14

## 2024-08-24 RX ADMIN — BETAMETHASONE DIPROPIONATE: 0.5 OINTMENT TOPICAL at 21:13

## 2024-08-24 RX ADMIN — SENNOSIDES AND DOCUSATE SODIUM 1 TABLET: 8.6; 5 TABLET ORAL at 21:13

## 2024-08-24 ASSESSMENT — ACTIVITIES OF DAILY LIVING (ADL)
ADLS_ACUITY_SCORE: 44
ADLS_ACUITY_SCORE: 48
ADLS_ACUITY_SCORE: 44

## 2024-08-24 NOTE — PLAN OF CARE
Pt awake x's 1. No issues over night. D/C plan: awaiting SNF placement after pt receives MA funding.    Goal Outcome Evaluation:    Plan of Care Reviewed With: patient    Overall Patient Progress: progressing    Outcome Evaluation: patient remained fall free.       Problem: Fall Injury Risk  Goal: Absence of Fall and Fall-Related Injury  Outcome: Progressing  Intervention: Identify and Manage Contributors  Recent Flowsheet Documentation  Taken 8/24/2024 0000 by Flores Sears RN  Medication Review/Management: medications reviewed  Intervention: Promote Injury-Free Environment  Recent Flowsheet Documentation  Taken 8/24/2024 0430 by Flores Sears, RN  Safety Promotion/Fall Prevention:   safety round/check completed   supervised activity   room near nurse's station   room door open   nonskid shoes/slippers when out of bed   mobility aid in reach   increase visualization of patient   clutter free environment maintained   activity supervised  Taken 8/24/2024 0300 by Flores Sears RN  Safety Promotion/Fall Prevention:   safety round/check completed   supervised activity   room near nurse's station   room door open   nonskid shoes/slippers when out of bed   mobility aid in reach   increase visualization of patient   clutter free environment maintained   activity supervised  Taken 8/24/2024 0000 by Flores Sears, RN  Safety Promotion/Fall Prevention:   safety round/check completed   supervised activity   room near nurse's station   room door open   nonskid shoes/slippers when out of bed   mobility aid in reach   increase visualization of patient   clutter free environment maintained   activity supervised

## 2024-08-24 NOTE — PROGRESS NOTES
[facility]     Hospitalist Progress Note     Assessment & Plan     ASSESSMENT    Summary of Stay: Jemal Gray is a 88-year-old male who was transitioned to group home care on 6/5.  He has history of dementia and family is unable to provide care for him.  During his stay we was treated for right foot cellulitis which has resolved.  Patient is MA pending, awaiting placement in long term care.     Medically stable for discharge when disposition plan in place.    PLAN     intermediate care admission.  Social work consulted.  Looking for LTC.  Is MA pending, SW working on this. Once a bed is available patient can go anytime      Constipation  Started/continue on senna, have as needed miralax and dulcolax available     Right foot cellulitis.  Resolved with Keflex 4 times daily through 6/11.  Swelling and redness resolved.    Venous ultrasound negative for DVT     Dementia with chronic aphasia.  Not on any medications.   As needed olanzapine ordered for agitation. Impulsive.     5.   Bradycardia  Patient was noted to have HR in 50s.  Asymptomatic     6. Paper work  Guardianship papers filled on 7/13 for the daughter.    7. Disposition  Medically Ready for Discharge: Ready Now       Bandar Lomeli MD    Subjective     Doing well this morning. No new complaints. Awaiting placement.        Objective   Blood pressure (!) 140/65, pulse 51, temperature 98.4  F (36.9  C), temperature source Oral, resp. rate 20, weight 73.6 kg (162 lb 3.2 oz), SpO2 97%.    PHYSICAL EXAM  General: In no acute distress, pleasantly demented  CV: RRR.  Lungs: CTAB. Nl WOB.  Abd: Non-tender.  Ext: No edema.    LABS AND IMAGING  Reviewed and pertinent results discussed in assessment and plan.

## 2024-08-24 NOTE — PLAN OF CARE
Goal Outcome Evaluation:  Pain free, no falls, floor mat in place, sw following for LTC placement. Medically stable, no acute changes this shift, daughters x2 visited.

## 2024-08-24 NOTE — PLAN OF CARE
Pt cognition baseline. Alert, confused, pleasant. No pain. Updated daughter. Enjoys sweet finger foods. Set off bed alarm to get up, ambulated in hallways x3. Up SBA w/ gb and walker. Plan: SW following for LTC placement.          Goal Outcome Evaluation:    Problem: Fall Injury Risk  Goal: Absence of Fall and Fall-Related Injury  Outcome: Progressing no change

## 2024-08-25 PROCEDURE — 101N000002 HC CUSTODIAL CARE DAILY

## 2024-08-25 RX ADMIN — BETAMETHASONE DIPROPIONATE: 0.5 OINTMENT TOPICAL at 08:19

## 2024-08-25 RX ADMIN — BETAMETHASONE DIPROPIONATE: 0.5 OINTMENT TOPICAL at 23:01

## 2024-08-25 ASSESSMENT — ACTIVITIES OF DAILY LIVING (ADL)
ADLS_ACUITY_SCORE: 47
ADLS_ACUITY_SCORE: 48
ADLS_ACUITY_SCORE: 47

## 2024-08-25 NOTE — PLAN OF CARE
Pt cognition baseline. Alert, confused, pleasant. No pain. A lot of singing in room this shift. Enjoys sweet finger foods. Sets off bed alarm to get up. Up SBA w/ gb and walker. Plan: SW following for LTC placement, MA pending.         Problem: Fall Injury Risk  Goal: Absence of Fall and Fall-Related Injury  Outcome: Progressing, no change

## 2024-08-25 NOTE — PLAN OF CARE
Assumed care [id number]. Confused at baseline. Ax1 with a gait belt and walker when OOB. On RA. On a regular diet. No PIV access. PAINAD score of 0 overnight and appeared comfortable. Got up to the bathroom a few times overnight and would go right back to bed, was able to redirect effectively. Hopeful discharge to LTC when a bed is available    Problem: Fall Injury Risk  Goal: Absence of Fall and Fall-Related Injury  Outcome: Progressing  Intervention: Identify and Manage Contributors  Recent Flowsheet Documentation  Taken 8/25/2024 0103 by Arabella Yates, RN  Medication Review/Management: medications reviewed  Intervention: Promote Injury-Free Environment  Recent Flowsheet Documentation  Taken 8/25/2024 0103 by Arabella Yates, RN  Safety Promotion/Fall Prevention:   activity supervised   lighting adjusted   nonskid shoes/slippers when out of bed   room door open   room near nurse's station   safety round/check completed

## 2024-08-25 NOTE — PLAN OF CARE
Goal Outcome Evaluation:  Pain free, no falls, floor mat in place, sw following for LTC placement. Medically stable, remains confused, ointment applied to scalp this am, no acute changes this shift.     Problem: Fall Injury Risk  Goal: Absence of Fall and Fall-Related Injury  Outcome: Progressing  Intervention: Identify and Manage Contributors  Recent Flowsheet Documentation  Taken 8/25/2024 0808 by Fariha Weathers, RN  Medication Review/Management: medications reviewed  Intervention: Promote Injury-Free Environment  Recent Flowsheet Documentation  Taken 8/25/2024 1237 by Fariha Weathers, RN  Safety Promotion/Fall Prevention: safety round/check completed  Taken 8/25/2024 1213 by Fariha Weathers RN  Safety Promotion/Fall Prevention: safety round/check completed  Taken 8/25/2024 1112 by Fariha Weathers RN  Safety Promotion/Fall Prevention: safety round/check completed  Taken 8/25/2024 1058 by Fariha Weathers RN  Safety Promotion/Fall Prevention: safety round/check completed  Taken 8/25/2024 0925 by Fariha Weathers RN  Safety Promotion/Fall Prevention: safety round/check completed  Taken 8/25/2024 0808 by Fariha Weathers RN  Safety Promotion/Fall Prevention:   activity supervised   assistive device/personal items within reach   supervised activity   safety round/check completed   room organization consistent   room near nurse's station   room door open   nonskid shoes/slippers when out of bed   mobility aid in reach   lighting adjusted   increase visualization of patient   increased rounding and observation   clutter free environment maintained  Taken 8/25/2024 0730 by Fariha Waethers, RN  Safety Promotion/Fall Prevention: safety round/check completed

## 2024-08-25 NOTE — PROGRESS NOTES
[facility]     Hospitalist Progress Note     Assessment & Plan     ASSESSMENT    Summary of Stay: Jemal Gray is a 88-year-old male who was transitioned to skilled nursing care on 6/5.  He has history of dementia and family is unable to provide care for him.  During his stay we was treated for right foot cellulitis which has resolved.  Patient is MA pending, awaiting placement in long term care.     Medically stable for discharge when disposition plan in place.    PLAN     skilled nursing care admission.  Social work consulted.  Looking for LTC.  Is MA pending, SW working on this. Once a bed is available patient can go anytime      Constipation  Started/continue on senna, have as needed miralax and dulcolax available     Right foot cellulitis.  Resolved with Keflex 4 times daily through 6/11.  Swelling and redness resolved.    Venous ultrasound negative for DVT     Dementia with chronic aphasia.  Not on any medications.   As needed olanzapine ordered for agitation. Impulsive.     5.   Bradycardia  Patient was noted to have HR in 50s.  Asymptomatic     6. Paper work  Guardianship papers filled on 7/13 for the daughter.    7.  Actinic Keratosis   Had surgery for this and prescribed steroids cream and ketoconazole cream, will restart    8. Disposition  Medically Ready for Discharge: Ready Now       Bandar Lomeli MD    Subjective     Discussed case with daughters yesterday. Worried about actinic keratosis of scalp coming back.        Objective   Blood pressure (!) 140/65, pulse 51, temperature 98.4  F (36.9  C), temperature source Oral, resp. rate 20, weight 73.6 kg (162 lb 3.2 oz), SpO2 97%.    PHYSICAL EXAM  General: In no acute distress, pleasantly demented  CV: RRR.  Lungs: CTAB. Nl WOB.  Abd: Non-tender.  Ext: No edema.    LABS AND IMAGING  Reviewed and pertinent results discussed in assessment and plan.

## 2024-08-25 NOTE — PLAN OF CARE
Pt cognition baseline. Alert, confused, pleasant. No pain. A lot of singing in room this shift. Enjoys sweet finger foods. Shower done. Sets off bed alarm to get up. Up SBA w/ gb and walker. Plan: SW following for LTC placement, MA pending.

## 2024-08-26 PROCEDURE — 250N000013 HC RX MED GY IP 250 OP 250 PS 637: Performed by: STUDENT IN AN ORGANIZED HEALTH CARE EDUCATION/TRAINING PROGRAM

## 2024-08-26 PROCEDURE — 250N000013 HC RX MED GY IP 250 OP 250 PS 637: Performed by: INTERNAL MEDICINE

## 2024-08-26 PROCEDURE — 101N000002 HC CUSTODIAL CARE DAILY

## 2024-08-26 RX ADMIN — SENNOSIDES AND DOCUSATE SODIUM 1 TABLET: 8.6; 5 TABLET ORAL at 20:12

## 2024-08-26 RX ADMIN — BETAMETHASONE DIPROPIONATE: 0.5 OINTMENT TOPICAL at 10:12

## 2024-08-26 RX ADMIN — BETAMETHASONE DIPROPIONATE: 0.5 OINTMENT TOPICAL at 20:13

## 2024-08-26 RX ADMIN — KETOCONAZOLE: 20.5 SHAMPOO, SUSPENSION TOPICAL at 10:53

## 2024-08-26 RX ADMIN — Medication 5 MG: at 20:12

## 2024-08-26 ASSESSMENT — ACTIVITIES OF DAILY LIVING (ADL)
ADLS_ACUITY_SCORE: 49
ADLS_ACUITY_SCORE: 54
ADLS_ACUITY_SCORE: 54
ADLS_ACUITY_SCORE: 47
ADLS_ACUITY_SCORE: 49
ADLS_ACUITY_SCORE: 47
ADLS_ACUITY_SCORE: 54
ADLS_ACUITY_SCORE: 47
ADLS_ACUITY_SCORE: 49
ADLS_ACUITY_SCORE: 54
ADLS_ACUITY_SCORE: 47
ADLS_ACUITY_SCORE: 54
ADLS_ACUITY_SCORE: 49
ADLS_ACUITY_SCORE: 54

## 2024-08-26 NOTE — PROGRESS NOTES
[facility]     Hospitalist Progress Note     Assessment & Plan     ASSESSMENT    Summary of Stay: Jemal Gray is a 88-year-old male who was transitioned to [facility] care on 6/5.  He has history of dementia and family is unable to provide care for him.  During his stay we was treated for right foot cellulitis which has resolved.  Patient is MA pending, awaiting placement in long term care.     Medically stable for discharge when disposition plan in place.    PLAN     senior care care admission.  Social work consulted.  Looking for LTC.  Is MA pending, SW working on this. Once a bed is available patient can go anytime      Constipation  Started/continue on senna, have as needed miralax and dulcolax available     Right foot cellulitis.  Resolved with Keflex 4 times daily through 6/11.  Swelling and redness resolved.    Venous ultrasound negative for DVT     Dementia with chronic aphasia.  Not on any medications.   As needed olanzapine ordered for agitation. Impulsive.     5.   Bradycardia  Patient was noted to have HR in 50s.  Asymptomatic     6. Paper work  Guardianship papers filled on 7/13 for the daughter.    7.  Actinic Keratosis   Had surgery for this and prescribed steroids cream and ketoconazole cream, will restart per daughter request    8. Disposition  Medically Ready for Discharge: Ready Now       Bandar Lomeli MD    Subjective     Patient seen at bedside.  No events overnight.  Awaiting for placement.        Objective   Blood pressure (!) 140/65, pulse 51, temperature 98.4  F (36.9  C), temperature source Oral, resp. rate 20, weight 73.6 kg (162 lb 3.2 oz), SpO2 97%.    PHYSICAL EXAM  General: In no acute distress, pleasantly demented  CV: RRR.  Lungs: CTAB. Nl WOB.  Abd: Non-tender.  Ext: No edema.    LABS AND IMAGING  Reviewed and pertinent results discussed in assessment and plan.

## 2024-08-26 NOTE — PLAN OF CARE
Cared for [id number]    senior living cares. No acute changes and fall events overnight. Pt was asleep throughout shift, no distress noted. Eyes closed, respirations even and unlabored. Call light within reach, bed alarms on. Awaiting placement.    Daughter called ~ 0540: Stated Tess Caban from [facility] will be coming to see pt. Asking writer to write name on the white board and notify staff.    Plan: Continue with plan of care  For vital signs and complete assessments, please see documentation under flowsheets.    Garima Tucker RN      Goal Outcome Evaluation:      Plan of Care Reviewed With: patient     Overall Patient Progress: no change    Outcome Evaluation: no acute changes overnight, awaiting placement      Problem: Adult Inpatient Plan of Care  Goal: Plan of Care Review  Description: The Plan of Care Review/Shift note should be completed every shift.  The Outcome Evaluation is a brief statement about your assessment that the patient is improving, declining, or no change.  This information will be displayed automatically on your shift  note.  8/26/2024 0221 by Prieto Tucker RN  Outcome: Progressing  Flowsheets (Taken 8/26/2024 0221)  Outcome Evaluation: no acute changes overnight, awaiting placement  Plan of Care Reviewed With: patient  Overall Patient Progress: no change  8/26/2024 0220 by Prieto Tucker RN  Reactivated     Problem: Fall Injury Risk  Goal: Absence of Fall and Fall-Related Injury  8/26/2024 0221 by Prieto Tucker RN  Outcome: Progressing  8/26/2024 0220 by Prieto Tucker RN  Outcome: Progressing

## 2024-08-27 PROCEDURE — 250N000013 HC RX MED GY IP 250 OP 250 PS 637: Performed by: STUDENT IN AN ORGANIZED HEALTH CARE EDUCATION/TRAINING PROGRAM

## 2024-08-27 PROCEDURE — 101N000002 HC CUSTODIAL CARE DAILY

## 2024-08-27 RX ADMIN — SENNOSIDES AND DOCUSATE SODIUM 1 TABLET: 8.6; 5 TABLET ORAL at 21:39

## 2024-08-27 RX ADMIN — BETAMETHASONE DIPROPIONATE: 0.5 OINTMENT TOPICAL at 21:40

## 2024-08-27 RX ADMIN — BETAMETHASONE DIPROPIONATE: 0.5 OINTMENT TOPICAL at 11:13

## 2024-08-27 ASSESSMENT — ACTIVITIES OF DAILY LIVING (ADL)
ADLS_ACUITY_SCORE: 54

## 2024-08-27 NOTE — CONSULTS
SPIRITUAL HEALTH SERVICES Progress Note  MS 3    Met with patient regarding staff consult for emotional support.    Medical record states that patient is experiencing dementia and aphasia.      He was concerned about having urinated.  I called his nurse for personal cares and bedding change.    Patient indicated that the food was good; although he had not eaten much.  He particularly enjoyed his coffee.    Medical record states no Worship preference.    When I asked about daughters, he pointed to a  intern who accompanied me and smiled.    Patient was unable to hold a conversation, but when we said [place], he smiled and waved.    Patient is awaiting LTC placement.      [facility] remains available for visits for socialization upon request.    Rev. Alejandra Francisco M.Div.  Staff   Phone  [phone]

## 2024-08-27 NOTE — PLAN OF CARE
Shift summary (0700-1930)    Pt oriented to self. No e/o pain. Up to chair and bathroom Ax1 GB W. Showered this shift. Discharge plan TBD, SW following.    Goal Outcome Evaluation:      Plan of Care Reviewed With: patient    Overall Patient Progress: no changeOverall Patient Progress: no change    Outcome Evaluation: No e/o pain. Pt remains comfortable, good PO intake. Awaiting discharge placement.      Problem: Adult Inpatient Plan of Care  Goal: Plan of Care Review  Description: The Plan of Care Review/Shift note should be completed every shift.  The Outcome Evaluation is a brief statement about your assessment that the patient is improving, declining, or no change.  This information will be displayed automatically on your shift  note.  Outcome: Not Progressing  Flowsheets (Taken 8/26/2024 1944)  Outcome Evaluation: No e/o pain. Pt remains comfortable, good PO intake. Awaiting discharge placement.  Plan of Care Reviewed With: patient  Overall Patient Progress: no change  Goal: Absence of Hospital-Acquired Illness or Injury  Intervention: Identify and Manage Fall Risk  Recent Flowsheet Documentation  Taken 8/26/2024 1358 by Marva Collins RN  Safety Promotion/Fall Prevention: safety round/check completed  Taken 8/26/2024 1055 by Marva Collins RN  Safety Promotion/Fall Prevention: safety round/check completed  Taken 8/26/2024 1002 by Marva Collins RN  Safety Promotion/Fall Prevention:   activity supervised   assistive device/personal items within reach   clutter free environment maintained   increased rounding and observation   increase visualization of patient   lighting adjusted   mobility aid in reach   nonskid shoes/slippers when out of bed   patient and family education   room door open   room near nurse's station   room organization consistent   safety round/check completed   supervised activity   treat reversible contributory factors   treat underlying cause  Taken 8/26/2024 0726 by  Marva Collins RN  Safety Promotion/Fall Prevention: safety round/check completed  Intervention: Prevent Skin Injury  Recent Flowsheet Documentation  Taken 8/26/2024 1002 by Marva Collins RN  Body Position:   position changed independently   supine, head elevated  Skin Protection:   adhesive use limited   incontinence pads utilized  Device Skin Pressure Protection:   absorbent pad utilized/changed   adhesive use limited   pressure points protected   tubing/devices free from skin contact  Intervention: Prevent Infection  Recent Flowsheet Documentation  Taken 8/26/2024 1002 by Marva Collins RN  Infection Prevention:   equipment surfaces disinfected   hand hygiene promoted   personal protective equipment utilized   rest/sleep promoted   single patient room provided

## 2024-08-27 NOTE — PLAN OF CARE
Cared for 2075-2098    No acute changes and falls events on shift. Call light within reach, bed alarms on. Offered to ambulate the hallways, pt refused. Evening meds provided. Pt was asleep throughout shift, no distress noted. Eyes closed, respirations even and unlabored. Discharge plan TBD, SW following    Plan: Continue with plan of care  For vital signs and complete assessments, please see documentation under flowsheets.    Garima Tucker RN      Goal Outcome Evaluation:      Plan of Care Reviewed With: patient    Overall Patient Progress: no changeOverall Patient Progress: no change    Outcome Evaluation: no acute changes overnight      Problem: Adult Inpatient Plan of Care  Goal: Plan of Care Review  Description: The Plan of Care Review/Shift note should be completed every shift.  The Outcome Evaluation is a brief statement about your assessment that the patient is improving, declining, or no change.  This information will be displayed automatically on your shift  note.  Outcome: Progressing  Flowsheets (Taken 8/27/2024 0255)  Outcome Evaluation: no acute changes overnight  Plan of Care Reviewed With: patient  Overall Patient Progress: no change     Problem: Fall Injury Risk  Goal: Absence of Fall and Fall-Related Injury  Outcome: Progressing

## 2024-08-27 NOTE — PLAN OF CARE
Goal Outcome Evaluation:      Plan of Care Reviewed With: patient    Overall Patient Progress: no changeOverall Patient Progress: no change    Outcome Evaluation: Pt on half-way care. A&O to self only. No SOB noted. Denies pain. On room air. Assist x 1 with gait belt and walker. Tolerating regular diet.     Awaiting LTC placement. JAMAR following.      Problem: Adult Inpatient Plan of Care  Goal: Plan of Care Review  Description: The Plan of Care Review/Shift note should be completed every shift.  The Outcome Evaluation is a brief statement about your assessment that the patient is improving, declining, or no change.  This information will be displayed automatically on your shift  note.  Outcome: Progressing  Flowsheets (Taken 8/27/2024 3456)  Outcome Evaluation: Pt on half-way care. A&O to self only. No SOB noted. Denies pain. On room air. Assist x 1 with gait belt and walker. Tolerating regular diet.     Awaiting LTC placement. SW following.  Plan of Care Reviewed With: patient  Overall Patient Progress: no change

## 2024-08-27 NOTE — PROGRESS NOTES
[facility]     Hospitalist Progress Note     Assessment & Plan     ASSESSMENT    Summary of Stay: Jemal Gray is a 88-year-old male who was transitioned to long-term care on 6/5.  He has history of dementia and family is unable to provide care for him.  During his stay we was treated for right foot cellulitis which has resolved.  Patient is MA pending, awaiting placement in long term care.     Medically stable for discharge when disposition plan in place.    PLAN     care home care admission.  Social work consulted.  Looking for LTC.  Is MA pending, SW working on this. Once a bed is available patient can go anytime      Constipation  Started/continue on senna, have as needed miralax and dulcolax available     Right foot cellulitis.  Resolved with Keflex 4 times daily through 6/11.  Swelling and redness resolved.    Venous ultrasound negative for DVT     Dementia with chronic aphasia.  Not on any medications.   As needed olanzapine ordered for agitation. Impulsive.     5.   Bradycardia  Patient was noted to have HR in 50s.  Asymptomatic     6. Paper work  Guardianship papers filled on 7/13 for the daughter.    7.  Actinic Keratosis   Had surgery for this and prescribed steroids cream and ketoconazole cream, will restart per daughter request    8. Disposition  Medically Ready for Discharge: Ready Now       Bandar Lomeli MD    Subjective     No events overnight.  Seen at bedside. No new complaints. Awaiting for placement.        Objective   Blood pressure (!) 159/76, pulse 57, temperature 98.1  F (36.7  C), temperature source Oral, resp. rate 16, weight 73.6 kg (162 lb 3.2 oz), SpO2 97%.    PHYSICAL EXAM  General: In no acute distress, pleasantly demented  CV: RRR.  Lungs: CTAB. Nl WOB.  Abd: Non-tender.  Ext: No edema.    LABS AND IMAGING  Reviewed and pertinent results discussed in assessment and plan.

## 2024-08-28 PROCEDURE — 250N000013 HC RX MED GY IP 250 OP 250 PS 637: Performed by: STUDENT IN AN ORGANIZED HEALTH CARE EDUCATION/TRAINING PROGRAM

## 2024-08-28 PROCEDURE — 101N000002 HC CUSTODIAL CARE DAILY

## 2024-08-28 RX ADMIN — BETAMETHASONE DIPROPIONATE: 0.5 OINTMENT TOPICAL at 22:00

## 2024-08-28 RX ADMIN — BETAMETHASONE DIPROPIONATE: 0.5 OINTMENT TOPICAL at 10:58

## 2024-08-28 RX ADMIN — KETOCONAZOLE: 20.5 SHAMPOO, SUSPENSION TOPICAL at 10:58

## 2024-08-28 ASSESSMENT — ACTIVITIES OF DAILY LIVING (ADL)
ADLS_ACUITY_SCORE: 52
ADLS_ACUITY_SCORE: 54
ADLS_ACUITY_SCORE: 52

## 2024-08-28 NOTE — PLAN OF CARE
Goal Outcome Evaluation:      Plan of Care Reviewed With: patient    Overall Patient Progress: no changeOverall Patient Progress: no change    Outcome Evaluation: Pt on [facility] care. A&O to self only. No SOB noted. Denies pain. On room air. Assist x 1 with gait belt and walker. Tolerating regular diet.     Awaiting LTC placement. SW following.    Pt laying comfortably in bed and call light is within reach.      Problem: Adult Inpatient Plan of Care  Goal: Plan of Care Review  Description: The Plan of Care Review/Shift note should be completed every shift.  The Outcome Evaluation is a brief statement about your assessment that the patient is improving, declining, or no change.  This information will be displayed automatically on your shift  note.  Outcome: Progressing  Flowsheets (Taken 8/27/2024 1346)  Outcome Evaluation: Pt on [facility] care. A&O to self only. No SOB noted. Denies pain. On room air. Assist x 1 with gait belt and walker. Tolerating regular diet.     Awaiting LTC placement. SW following.  Plan of Care Reviewed With: patient  Overall Patient Progress: no change  Goal: Absence of Hospital-Acquired Illness or Injury  Intervention: Prevent Infection  Recent Flowsheet Documentation  Taken 8/27/2024 1608 by Mckayla Flores, RN  Infection Prevention:   hand hygiene promoted   single patient room provided

## 2024-08-28 NOTE — PLAN OF CARE
Goal Outcome Evaluation:      Plan of Care Reviewed With: patient    Overall Patient Progress: no changeOverall Patient Progress: no change    Outcome Evaluation: continues with [facility] cares.    Alert, calm this shift, on RA, VSS, pending discharge, continues with poc.     Problem: Adult Inpatient Plan of Care  Goal: Plan of Care Review  Description: The Plan of Care Review/Shift note should be completed every shift.  The Outcome Evaluation is a brief statement about your assessment that the patient is improving, declining, or no change.  This information will be displayed automatically on your shift  note.  Flowsheets (Taken 8/28/2024 1457)  Outcome Evaluation: continues with [facility] cares.  Plan of Care Reviewed With: patient  Overall Patient Progress: no change     Problem: Adult Inpatient Plan of Care  Goal: Plan of Care Review  Description: The Plan of Care Review/Shift note should be completed every shift.  The Outcome Evaluation is a brief statement about your assessment that the patient is improving, declining, or no change.  This information will be displayed automatically on your shift  note.  Recent Flowsheet Documentation  Taken 8/28/2024 1457 by Vince Rodriguez RN  Outcome Evaluation: continues with [facility] cares.  Plan of Care Reviewed With: patient  Overall Patient Progress: no change     Problem: Adult Inpatient Plan of Care  Goal: Plan of Care Review  Description: The Plan of Care Review/Shift note should be completed every shift.  The Outcome Evaluation is a brief statement about your assessment that the patient is improving, declining, or no change.  This information will be displayed automatically on your shift  note.  Recent Flowsheet Documentation  Taken 8/28/2024 1457 by Vince Rodriguez RN  Outcome Evaluation: continues with [facility] cares.  Plan of Care Reviewed With: patient  Overall Patient Progress: no change     Problem: Adult Inpatient Plan of Care  Goal: Plan of Care  Review  Description: The Plan of Care Review/Shift note should be completed every shift.  The Outcome Evaluation is a brief statement about your assessment that the patient is improving, declining, or no change.  This information will be displayed automatically on your shift  note.  Recent Flowsheet Documentation  Taken 8/28/2024 1457 by Vince Rodriguez RN  Outcome Evaluation: continues with care home cares.  Plan of Care Reviewed With: patient  Overall Patient Progress: no change     Problem: Adult Inpatient Plan of Care  Goal: Plan of Care Review  Description: The Plan of Care Review/Shift note should be completed every shift.  The Outcome Evaluation is a brief statement about your assessment that the patient is improving, declining, or no change.  This information will be displayed automatically on your shift  note.  Recent Flowsheet Documentation  Taken 8/28/2024 1457 by Vince Rodriguez RN  Outcome Evaluation: continues with care home cares.  Plan of Care Reviewed With: patient  Overall Patient Progress: no change     Problem: Adult Inpatient Plan of Care  Goal: Plan of Care Review  Description: The Plan of Care Review/Shift note should be completed every shift.  The Outcome Evaluation is a brief statement about your assessment that the patient is improving, declining, or no change.  This information will be displayed automatically on your shift  note.  Recent Flowsheet Documentation  Taken 8/28/2024 1457 by Vince Rodriguez RN  Outcome Evaluation: continues with care home cares.  Plan of Care Reviewed With: patient  Overall Patient Progress: no change     Problem: Adult Inpatient Plan of Care  Goal: Plan of Care Review  Description: The Plan of Care Review/Shift note should be completed every shift.  The Outcome Evaluation is a brief statement about your assessment that the patient is improving, declining, or no change.  This information will be displayed automatically on your shift  note.  Recent  Flowsheet Documentation  Taken 8/28/2024 1457 by Vince Rodriguez RN  Outcome Evaluation: continues with [facility] cares.  Plan of Care Reviewed With: patient  Overall Patient Progress: no change     Problem: Adult Inpatient Plan of Care  Goal: Plan of Care Review  Description: The Plan of Care Review/Shift note should be completed every shift.  The Outcome Evaluation is a brief statement about your assessment that the patient is improving, declining, or no change.  This information will be displayed automatically on your shift  note.  Recent Flowsheet Documentation  Taken 8/28/2024 1457 by Vince Rodriguez RN  Outcome Evaluation: continues with [facility] cares.  Plan of Care Reviewed With: patient  Overall Patient Progress: no change     Problem: Adult Inpatient Plan of Care  Goal: Plan of Care Review  Description: The Plan of Care Review/Shift note should be completed every shift.  The Outcome Evaluation is a brief statement about your assessment that the patient is improving, declining, or no change.  This information will be displayed automatically on your shift  note.  Recent Flowsheet Documentation  Taken 8/28/2024 1457 by Vince Rodriguez RN  Outcome Evaluation: continues with [facility] cares.  Plan of Care Reviewed With: patient  Overall Patient Progress: no change     Problem: Adult Inpatient Plan of Care  Goal: Plan of Care Review  Description: The Plan of Care Review/Shift note should be completed every shift.  The Outcome Evaluation is a brief statement about your assessment that the patient is improving, declining, or no change.  This information will be displayed automatically on your shift  note.  Recent Flowsheet Documentation  Taken 8/28/2024 1457 by Vince Rodriguez RN  Outcome Evaluation: continues with [facility] cares.  Plan of Care Reviewed With: patient  Overall Patient Progress: no change     Problem: Adult Inpatient Plan of Care  Goal: Plan of Care Review  Description: The Plan of  Care Review/Shift note should be completed every shift.  The Outcome Evaluation is a brief statement about your assessment that the patient is improving, declining, or no change.  This information will be displayed automatically on your shift  note.  Recent Flowsheet Documentation  Taken 8/28/2024 4594 by Vince Rodriguez, RN  Outcome Evaluation: continues with [facility] cares.  Plan of Care Reviewed With: patient  Overall Patient Progress: no change

## 2024-08-28 NOTE — PLAN OF CARE
[id number]    Falls risk, alarms set, floor mats in place. Pleasant, singing, coloring sheets given. Walking halls encourged during days w/ staff assistance.     Goal Outcome Evaluation:    Overall Patient Progress: no changeOverall Patient Progress: no change    Outcome Evaluation: [facility] cares continue      Problem: Adult Inpatient Plan of Care  Goal: Plan of Care Review  Description: The Plan of Care Review/Shift note should be completed every shift.  The Outcome Evaluation is a brief statement about your assessment that the patient is improving, declining, or no change.  This information will be displayed automatically on your shift  note.  Outcome: Not Progressing  Flowsheets (Taken 8/28/2024 0637)  Outcome Evaluation: [facility] cares continue  Overall Patient Progress: no change     Problem: Fall Injury Risk  Goal: Absence of Fall and Fall-Related Injury  Outcome: Not Progressing

## 2024-08-29 PROCEDURE — 101N000002 HC CUSTODIAL CARE DAILY

## 2024-08-29 PROCEDURE — 250N000013 HC RX MED GY IP 250 OP 250 PS 637: Performed by: STUDENT IN AN ORGANIZED HEALTH CARE EDUCATION/TRAINING PROGRAM

## 2024-08-29 RX ADMIN — SENNOSIDES AND DOCUSATE SODIUM 1 TABLET: 8.6; 5 TABLET ORAL at 21:01

## 2024-08-29 RX ADMIN — BETAMETHASONE DIPROPIONATE: 0.5 OINTMENT TOPICAL at 21:01

## 2024-08-29 RX ADMIN — BETAMETHASONE DIPROPIONATE: 0.5 OINTMENT TOPICAL at 10:07

## 2024-08-29 ASSESSMENT — ACTIVITIES OF DAILY LIVING (ADL)
ADLS_ACUITY_SCORE: 52
ADLS_ACUITY_SCORE: 52
ADLS_ACUITY_SCORE: 47
ADLS_ACUITY_SCORE: 52
ADLS_ACUITY_SCORE: 47
ADLS_ACUITY_SCORE: 52
ADLS_ACUITY_SCORE: 52
ADLS_ACUITY_SCORE: 47
ADLS_ACUITY_SCORE: 52

## 2024-08-29 NOTE — PLAN OF CARE
Care from 1400-1422  Inpatient Progress Note - MS3  For vital signs and complete assessments, please see documentation flowsheets.     ORIENTATION: Alert to self. Pleasant, listening to music and singing between cares and sleeping. Falls risk, floor mat in place, alarms on.  PAIN: No indications of pain or discomfort, denies pain.  O2: RA  GI/: Ambulates to restroom, no BM overnight.  ACTIVITY: A1 GB, walker.  DIET: Regular  PLAN: Awaiting LTC placement, MA pending.    Goal Outcome Evaluation:      Plan of Care Reviewed With: patient    Overall Patient Progress: no changeOverall Patient Progress: no change    Outcome Evaluation: halfway cares, no acute changes overnight, AO to self, pleasantly confused, resting between cares.    Problem: Adult Inpatient Plan of Care  Goal: Plan of Care Review  Description: The Plan of Care Review/Shift note should be completed every shift.  The Outcome Evaluation is a brief statement about your assessment that the patient is improving, declining, or no change.  This information will be displayed automatically on your shift  note.  Outcome: Progressing  Flowsheets (Taken 8/29/2024 0136)  Outcome Evaluation: halfway cares, no acute changes overnight, AO to self, pleasantly confused, resting between cares.  Plan of Care Reviewed With: patient  Overall Patient Progress: no change  Goal: Absence of Hospital-Acquired Illness or Injury  Intervention: Identify and Manage Fall Risk  Recent Flowsheet Documentation  Taken 8/29/2024 0020 by Donna Peoples, RN  Safety Promotion/Fall Prevention:   safety round/check completed   supervised activity   room organization consistent   room near nurse's station   room door open   nonskid shoes/slippers when out of bed   patient and family education   mobility aid in reach   lighting adjusted   increase visualization of patient   increased rounding and observation   clutter free environment maintained   activity supervised   assistive  device/personal items within reach  Taken 8/28/2024 2320 by Donna Peoples, RN  Safety Promotion/Fall Prevention: safety round/check completed  Intervention: Prevent Skin Injury  Recent Flowsheet Documentation  Taken 8/29/2024 0020 by Donna Peoples RN  Body Position: position maintained  Intervention: Prevent Infection  Recent Flowsheet Documentation  Taken 8/29/2024 0020 by Donna Peoples, RN  Infection Prevention: hand hygiene promoted

## 2024-08-29 NOTE — PLAN OF CARE
"Goal Outcome Evaluation:      Plan of Care Reviewed With: patient    Overall Patient Progress: improvingOverall Patient Progress: improving    Outcome Evaluation: continues with retirement cares. watches TV,  eats what he likes off meal tray. drinks offered. often sings out loud, can be heard at nurses station vocalizing in room in cheerful way. asks if we can hear him out there. laughs and sings to nurse. states, \"stay silly\" and continues to sing out loud.      Problem: Adult Inpatient Plan of Care  Goal: Plan of Care Review  Description: The Plan of Care Review/Shift note should be completed every shift.  The Outcome Evaluation is a brief statement about your assessment that the patient is improving, declining, or no change.  This information will be displayed automatically on your shift  note.  Outcome: Progressing  Flowsheets (Taken 8/28/2024 2058)  Outcome Evaluation: continues with retirement cares. watches TV,  eats what he likes off meal tray. drinks offered. often sings out loud, can be heard at nurses station vocalizing in room in cheerful way. asks if we can hear him out there. laughs and sings to nurse. states, \"stay silly\" and continues to sing out loud.  Plan of Care Reviewed With: patient  Overall Patient Progress: improving     "

## 2024-08-29 NOTE — PROGRESS NOTES
Care Management Follow Up    Length of Stay (days): 0       Expected Discharge Date: 09/15/2024     Concerns to be Addressed: basic needs, care coordination/care conferences, decision making, discharge planning, financial/insurance     Patient plan of care discussed at interdisciplinary rounds: Yes     Anticipated Discharge Disposition:  LTC/memory care, lacking funding for placement.     Anticipated Discharge Services:   LTC/memory care,  Anticipated Discharge DME:  TCU will provide     Patient/family educated on Medicare website which has current facility and service quality ratings:  yes   Patient/Family in Agreement with the Plan:  yes      Referrals Placed by CM/SW:  Not at this time as we do not have a payer source  Private pay costs discussed: private room/amenity fees     Additional Information:  Left message for [facility] Economic assistance workerRukhsana [phone]   regarding patient's MA application. Case # [id number]. SW asking for copy of MA application to give facilities for placement. Left message for daughter/guardianCheri asking for assistance with MA application.    Next Steps: Attempting  to get copy of MA application so we can start looking for placement.    Addendum: Daughter/guardianCheri returned SW call. Discussed disposition for patient. She reported that she has not gotten the bank statements back to the financial worker yet. SW stressed the importance of getting the MA paperwork in and completed. We can not look for a place for patient until the MA is pending. Cheri reported that she would get the paperwork in today.    NALDO Morris   Inpatient Care Coordination   Supervisor  [facility]  [phone]      NALDO Puentes

## 2024-08-29 NOTE — PLAN OF CARE
Goal Outcome Evaluation:      Plan of Care Reviewed With: patient    Overall Patient Progress: no changeOverall Patient Progress: no change    Outcome Evaluation: skilled nursing cares, pt happy and cooperative. tolerating regular diet.      Problem: Adult Inpatient Plan of Care  Goal: Plan of Care Review  Description: The Plan of Care Review/Shift note should be completed every shift.  The Outcome Evaluation is a brief statement about your assessment that the patient is improving, declining, or no change.  This information will be displayed automatically on your shift  note.  Outcome: Progressing  Flowsheets (Taken 8/29/2024 7534)  Outcome Evaluation: skilled nursing cares, pt happy and cooperative. tolerating regular diet.  Plan of Care Reviewed With: patient  Overall Patient Progress: no change     Pt alert to self, happy and cooperative.  Assist of one to bathroom. Tolerating regular diet.  Coccyx red, blanchable with two small pinpoint spots open. Mepilex applied.

## 2024-08-30 PROCEDURE — 101N000002 HC CUSTODIAL CARE DAILY

## 2024-08-30 RX ADMIN — BETAMETHASONE DIPROPIONATE: 0.5 OINTMENT TOPICAL at 09:23

## 2024-08-30 RX ADMIN — BETAMETHASONE DIPROPIONATE: 0.5 OINTMENT TOPICAL at 20:58

## 2024-08-30 RX ADMIN — KETOCONAZOLE: 20.5 SHAMPOO, SUSPENSION TOPICAL at 09:23

## 2024-08-30 ASSESSMENT — ACTIVITIES OF DAILY LIVING (ADL)
ADLS_ACUITY_SCORE: 45
ADLS_ACUITY_SCORE: 47
ADLS_ACUITY_SCORE: 45
ADLS_ACUITY_SCORE: 46
ADLS_ACUITY_SCORE: 45
ADLS_ACUITY_SCORE: 46
ADLS_ACUITY_SCORE: 45
ADLS_ACUITY_SCORE: 47
ADLS_ACUITY_SCORE: 45
ADLS_ACUITY_SCORE: 46
ADLS_ACUITY_SCORE: 47
ADLS_ACUITY_SCORE: 45
ADLS_ACUITY_SCORE: 47
ADLS_ACUITY_SCORE: 45

## 2024-08-30 NOTE — PROGRESS NOTES
Care Management Follow Up    Length of Stay (days): 0    Expected Discharge Date: 09/04/2024     Concerns to be Addressed: basic needs, care coordination/care conferences, decision making, discharge planning, financial/insurance     Patient plan of care discussed at interdisciplinary rounds: Yes    Additional Information:  Received paperwork from [facility]. The patient sohuld not have any contact with [facility] via social media, mail, phone and no physical interaction between [facility] and patient. Paperwork sent to [facility].    NALDO Morris   Inpatient Care Coordination   Supervisor  [facility]  [phone]    NALDO Puentes

## 2024-08-30 NOTE — PLAN OF CARE
Pt resting in bed and very joyful. Singing and making noises. No head to toe or vitals done per orders. Voiding well and BM 8/29. Regular diet. SBA. Refusing GB and walker at times. Plan: Discharge to LTC once MA approved. SW working with daughter concerning paperwork. Continue w/ POC.     Goal Outcome Evaluation:      Plan of Care Reviewed With: patient    Overall Patient Progress: no changeOverall Patient Progress: no change    Outcome Evaluation: Not calling to get up, but fall bundle in place.      Problem: Adult Inpatient Plan of Care  Goal: Plan of Care Review  Description: The Plan of Care Review/Shift note should be completed every shift.  The Outcome Evaluation is a brief statement about your assessment that the patient is improving, declining, or no change.  This information will be displayed automatically on your shift  note.  Outcome: Progressing  Flowsheets (Taken 8/29/2024 2133)  Outcome Evaluation: Not calling to get up, but fall bundle in place.  Plan of Care Reviewed With: patient  Overall Patient Progress: no change  Goal: Absence of Hospital-Acquired Illness or Injury  Intervention: Identify and Manage Fall Risk  Recent Flowsheet Documentation  Taken 8/29/2024 2007 by Columba Thompson, RN  Safety Promotion/Fall Prevention: safety round/check completed  Intervention: Prevent Skin Injury  Recent Flowsheet Documentation  Taken 8/29/2024 2126 by Columba Thompson, RN  Body Position: position changed independently  Taken 8/29/2024 2006 by Columba Thompson, RN  Body Position: position changed independently  Intervention: Prevent Infection  Recent Flowsheet Documentation  Taken 8/29/2024 2007 by Columba Thompson, RN  Infection Prevention:   single patient room provided   rest/sleep promoted   personal protective equipment utilized   hand hygiene promoted   equipment surfaces disinfected   environmental surveillance performed   cohorting utilized     Problem: Adult Inpatient Plan of Care  Goal: Plan of Care  Review  Description: The Plan of Care Review/Shift note should be completed every shift.  The Outcome Evaluation is a brief statement about your assessment that the patient is improving, declining, or no change.  This information will be displayed automatically on your shift  note.  Recent Flowsheet Documentation  Taken 8/29/2024 2133 by Columba Thompson RN  Outcome Evaluation: Not calling to get up, but fall bundle in place.  Plan of Care Reviewed With: patient  Overall Patient Progress: no change  Goal: Absence of Hospital-Acquired Illness or Injury  Intervention: Identify and Manage Fall Risk  Recent Flowsheet Documentation  Taken 8/29/2024 2007 by Columba Thompson RN  Safety Promotion/Fall Prevention: safety round/check completed  Intervention: Prevent Skin Injury  Recent Flowsheet Documentation  Taken 8/29/2024 2126 by Columba Thompson RN  Body Position: position changed independently  Taken 8/29/2024 2006 by Columba Thompson RN  Body Position: position changed independently  Intervention: Prevent Infection  Recent Flowsheet Documentation  Taken 8/29/2024 2007 by Columba Thompson RN  Infection Prevention:   single patient room provided   rest/sleep promoted   personal protective equipment utilized   hand hygiene promoted   equipment surfaces disinfected   environmental surveillance performed   cohorting utilized     Problem: Adult Inpatient Plan of Care  Goal: Absence of Hospital-Acquired Illness or Injury  Intervention: Identify and Manage Fall Risk  Recent Flowsheet Documentation  Taken 8/29/2024 2007 by Columba Thompson RN  Safety Promotion/Fall Prevention: safety round/check completed  Intervention: Prevent Skin Injury  Recent Flowsheet Documentation  Taken 8/29/2024 2126 by Columba Thompson RN  Body Position: position changed independently  Taken 8/29/2024 2006 by Columba Thompson RN  Body Position: position changed independently  Intervention: Prevent Infection  Recent Flowsheet Documentation  Taken  8/29/2024 2007 by Columba Thompson RN  Infection Prevention:   single patient room provided   rest/sleep promoted   personal protective equipment utilized   hand hygiene promoted   equipment surfaces disinfected   environmental surveillance performed   cohorting utilized     Problem: Adult Inpatient Plan of Care  Goal: Plan of Care Review  Description: The Plan of Care Review/Shift note should be completed every shift.  The Outcome Evaluation is a brief statement about your assessment that the patient is improving, declining, or no change.  This information will be displayed automatically on your shift  note.  Recent Flowsheet Documentation  Taken 8/29/2024 2133 by Columba Thompson RN  Outcome Evaluation: Not calling to get up, but fall bundle in place.  Plan of Care Reviewed With: patient  Overall Patient Progress: no change  Goal: Absence of Hospital-Acquired Illness or Injury  Intervention: Identify and Manage Fall Risk  Recent Flowsheet Documentation  Taken 8/29/2024 2007 by Columba Thompson RN  Safety Promotion/Fall Prevention: safety round/check completed  Intervention: Prevent Skin Injury  Recent Flowsheet Documentation  Taken 8/29/2024 2126 by Columba Thompson RN  Body Position: position changed independently  Taken 8/29/2024 2006 by Columba Thompson RN  Body Position: position changed independently  Intervention: Prevent Infection  Recent Flowsheet Documentation  Taken 8/29/2024 2007 by Columba Thompson RN  Infection Prevention:   single patient room provided   rest/sleep promoted   personal protective equipment utilized   hand hygiene promoted   equipment surfaces disinfected   environmental surveillance performed   cohorting utilized     Problem: Adult Inpatient Plan of Care  Goal: Plan of Care Review  Description: The Plan of Care Review/Shift note should be completed every shift.  The Outcome Evaluation is a brief statement about your assessment that the patient is improving, declining, or no change.   This information will be displayed automatically on your shift  note.  Recent Flowsheet Documentation  Taken 8/29/2024 2133 by Columba Thompson RN  Outcome Evaluation: Not calling to get up, but fall bundle in place.  Plan of Care Reviewed With: patient  Overall Patient Progress: no change  Goal: Absence of Hospital-Acquired Illness or Injury  Intervention: Identify and Manage Fall Risk  Recent Flowsheet Documentation  Taken 8/29/2024 2007 by Columba Thompson RN  Safety Promotion/Fall Prevention: safety round/check completed  Intervention: Prevent Skin Injury  Recent Flowsheet Documentation  Taken 8/29/2024 2126 by Columba Thompson RN  Body Position: position changed independently  Taken 8/29/2024 2006 by Columba Thompson RN  Body Position: position changed independently  Intervention: Prevent Infection  Recent Flowsheet Documentation  Taken 8/29/2024 2007 by Columba Thompson RN  Infection Prevention:   single patient room provided   rest/sleep promoted   personal protective equipment utilized   hand hygiene promoted   equipment surfaces disinfected   environmental surveillance performed   cohorting utilized     Problem: Adult Inpatient Plan of Care  Goal: Plan of Care Review  Description: The Plan of Care Review/Shift note should be completed every shift.  The Outcome Evaluation is a brief statement about your assessment that the patient is improving, declining, or no change.  This information will be displayed automatically on your shift  note.  Recent Flowsheet Documentation  Taken 8/29/2024 2133 by Columba Thompson RN  Outcome Evaluation: Not calling to get up, but fall bundle in place.  Plan of Care Reviewed With: patient  Overall Patient Progress: no change  Goal: Absence of Hospital-Acquired Illness or Injury  Intervention: Identify and Manage Fall Risk  Recent Flowsheet Documentation  Taken 8/29/2024 2007 by Columba Thompson RN  Safety Promotion/Fall Prevention: safety round/check completed  Intervention:  Prevent Skin Injury  Recent Flowsheet Documentation  Taken 8/29/2024 2126 by Columba Thompson RN  Body Position: position changed independently  Taken 8/29/2024 2006 by Columba Thompson RN  Body Position: position changed independently  Intervention: Prevent Infection  Recent Flowsheet Documentation  Taken 8/29/2024 2007 by Columba Thompson RN  Infection Prevention:   single patient room provided   rest/sleep promoted   personal protective equipment utilized   hand hygiene promoted   equipment surfaces disinfected   environmental surveillance performed   cohorting utilized     Problem: Adult Inpatient Plan of Care  Goal: Plan of Care Review  Description: The Plan of Care Review/Shift note should be completed every shift.  The Outcome Evaluation is a brief statement about your assessment that the patient is improving, declining, or no change.  This information will be displayed automatically on your shift  note.  Recent Flowsheet Documentation  Taken 8/29/2024 2133 by Columba Thompson RN  Outcome Evaluation: Not calling to get up, but fall bundle in place.  Plan of Care Reviewed With: patient  Overall Patient Progress: no change  Goal: Absence of Hospital-Acquired Illness or Injury  Intervention: Identify and Manage Fall Risk  Recent Flowsheet Documentation  Taken 8/29/2024 2007 by Columba Thompson RN  Safety Promotion/Fall Prevention: safety round/check completed  Intervention: Prevent Skin Injury  Recent Flowsheet Documentation  Taken 8/29/2024 2126 by Columba Thompson RN  Body Position: position changed independently  Taken 8/29/2024 2006 by Columba Thompson RN  Body Position: position changed independently  Intervention: Prevent Infection  Recent Flowsheet Documentation  Taken 8/29/2024 2007 by Columba Thompson RN  Infection Prevention:   single patient room provided   rest/sleep promoted   personal protective equipment utilized   hand hygiene promoted   equipment surfaces disinfected   environmental surveillance  performed   cohorting utilized     Problem: Adult Inpatient Plan of Care  Goal: Plan of Care Review  Description: The Plan of Care Review/Shift note should be completed every shift.  The Outcome Evaluation is a brief statement about your assessment that the patient is improving, declining, or no change.  This information will be displayed automatically on your shift  note.  Recent Flowsheet Documentation  Taken 8/29/2024 2133 by Columba Thompson RN  Outcome Evaluation: Not calling to get up, but fall bundle in place.  Plan of Care Reviewed With: patient  Overall Patient Progress: no change  Goal: Absence of Hospital-Acquired Illness or Injury  Intervention: Identify and Manage Fall Risk  Recent Flowsheet Documentation  Taken 8/29/2024 2007 by Columba Thompson RN  Safety Promotion/Fall Prevention: safety round/check completed  Intervention: Prevent Skin Injury  Recent Flowsheet Documentation  Taken 8/29/2024 2126 by Columba Thompson RN  Body Position: position changed independently  Taken 8/29/2024 2006 by Columba Thompson RN  Body Position: position changed independently  Intervention: Prevent Infection  Recent Flowsheet Documentation  Taken 8/29/2024 2007 by Columba Thompson RN  Infection Prevention:   single patient room provided   rest/sleep promoted   personal protective equipment utilized   hand hygiene promoted   equipment surfaces disinfected   environmental surveillance performed   cohorting utilized     Problem: Adult Inpatient Plan of Care  Goal: Plan of Care Review  Description: The Plan of Care Review/Shift note should be completed every shift.  The Outcome Evaluation is a brief statement about your assessment that the patient is improving, declining, or no change.  This information will be displayed automatically on your shift  note.  Recent Flowsheet Documentation  Taken 8/29/2024 2133 by Columba Thompson RN  Outcome Evaluation: Not calling to get up, but fall bundle in place.  Plan of Care Reviewed  With: patient  Overall Patient Progress: no change  Goal: Absence of Hospital-Acquired Illness or Injury  Intervention: Identify and Manage Fall Risk  Recent Flowsheet Documentation  Taken 8/29/2024 2007 by Columba Thompson RN  Safety Promotion/Fall Prevention: safety round/check completed  Intervention: Prevent Skin Injury  Recent Flowsheet Documentation  Taken 8/29/2024 2126 by Columba Thompson RN  Body Position: position changed independently  Taken 8/29/2024 2006 by Columba Thompson RN  Body Position: position changed independently  Intervention: Prevent Infection  Recent Flowsheet Documentation  Taken 8/29/2024 2007 by Columba Thompson RN  Infection Prevention:   single patient room provided   rest/sleep promoted   personal protective equipment utilized   hand hygiene promoted   equipment surfaces disinfected   environmental surveillance performed   cohorting utilized     Problem: Adult Inpatient Plan of Care  Goal: Plan of Care Review  Description: The Plan of Care Review/Shift note should be completed every shift.  The Outcome Evaluation is a brief statement about your assessment that the patient is improving, declining, or no change.  This information will be displayed automatically on your shift  note.  Recent Flowsheet Documentation  Taken 8/29/2024 2133 by Columba Thompson RN  Outcome Evaluation: Not calling to get up, but fall bundle in place.  Plan of Care Reviewed With: patient  Overall Patient Progress: no change  Goal: Absence of Hospital-Acquired Illness or Injury  Intervention: Identify and Manage Fall Risk  Recent Flowsheet Documentation  Taken 8/29/2024 2007 by Columba Thompson RN  Safety Promotion/Fall Prevention: safety round/check completed  Intervention: Prevent Skin Injury  Recent Flowsheet Documentation  Taken 8/29/2024 2126 by Columba Thompson RN  Body Position: position changed independently  Taken 8/29/2024 2006 by Columba Thompson RN  Body Position: position changed  independently  Intervention: Prevent Infection  Recent Flowsheet Documentation  Taken 8/29/2024 2007 by Columba Thompson RN  Infection Prevention:   single patient room provided   rest/sleep promoted   personal protective equipment utilized   hand hygiene promoted   equipment surfaces disinfected   environmental surveillance performed   cohorting utilized     Problem: Adult Inpatient Plan of Care  Goal: Plan of Care Review  Description: The Plan of Care Review/Shift note should be completed every shift.  The Outcome Evaluation is a brief statement about your assessment that the patient is improving, declining, or no change.  This information will be displayed automatically on your shift  note.  Recent Flowsheet Documentation  Taken 8/29/2024 2133 by Columba Thompson, RN  Outcome Evaluation: Not calling to get up, but fall bundle in place.  Plan of Care Reviewed With: patient  Overall Patient Progress: no change  Goal: Absence of Hospital-Acquired Illness or Injury  Intervention: Identify and Manage Fall Risk  Recent Flowsheet Documentation  Taken 8/29/2024 2007 by Columba Thompson RN  Safety Promotion/Fall Prevention: safety round/check completed  Intervention: Prevent Skin Injury  Recent Flowsheet Documentation  Taken 8/29/2024 2126 by Columba Thompson RN  Body Position: position changed independently  Taken 8/29/2024 2006 by Columba Thompson RN  Body Position: position changed independently  Intervention: Prevent Infection  Recent Flowsheet Documentation  Taken 8/29/2024 2007 by Columba Thompson RN  Infection Prevention:   single patient room provided   rest/sleep promoted   personal protective equipment utilized   hand hygiene promoted   equipment surfaces disinfected   environmental surveillance performed   cohorting utilized     Problem: Adult Inpatient Plan of Care  Goal: Plan of Care Review  Description: The Plan of Care Review/Shift note should be completed every shift.  The Outcome Evaluation is a brief  statement about your assessment that the patient is improving, declining, or no change.  This information will be displayed automatically on your shift  note.  Recent Flowsheet Documentation  Taken 8/29/2024 2133 by Columba Thompson RN  Outcome Evaluation: Not calling to get up, but fall bundle in place.  Plan of Care Reviewed With: patient  Overall Patient Progress: no change  Goal: Absence of Hospital-Acquired Illness or Injury  Intervention: Identify and Manage Fall Risk  Recent Flowsheet Documentation  Taken 8/29/2024 2007 by Columba Thompson RN  Safety Promotion/Fall Prevention: safety round/check completed  Intervention: Prevent Skin Injury  Recent Flowsheet Documentation  Taken 8/29/2024 2126 by Columba Thompson, RN  Body Position: position changed independently  Taken 8/29/2024 2006 by Columba Thompson RN  Body Position: position changed independently  Intervention: Prevent Infection  Recent Flowsheet Documentation  Taken 8/29/2024 2007 by Columba Thompson RN  Infection Prevention:   single patient room provided   rest/sleep promoted   personal protective equipment utilized   hand hygiene promoted   equipment surfaces disinfected   environmental surveillance performed   cohorting utilized     Problem: Delirium  Goal: Improved Behavioral Control  Intervention: Minimize Safety Risk  Recent Flowsheet Documentation  Taken 8/29/2024 2007 by Columba Thompson, RN  Enhanced Safety Measures: room near unit station     Problem: Delirium  Goal: Improved Behavioral Control  Intervention: Minimize Safety Risk  Recent Flowsheet Documentation  Taken 8/29/2024 2007 by Columba Thompson RN  Enhanced Safety Measures: room near unit station     Problem: Fall Injury Risk  Goal: Absence of Fall and Fall-Related Injury  Intervention: Identify and Manage Contributors  Recent Flowsheet Documentation  Taken 8/29/2024 2007 by Columba Thompson RN  Medication Review/Management: medications reviewed  Intervention: Promote Injury-Free  Environment  Recent Flowsheet Documentation  Taken 8/29/2024 2007 by Columba Thompson RN  Safety Promotion/Fall Prevention: safety round/check completed     Problem: Fall Injury Risk  Goal: Absence of Fall and Fall-Related Injury  Intervention: Identify and Manage Contributors  Recent Flowsheet Documentation  Taken 8/29/2024 2007 by Columba Thompson RN  Medication Review/Management: medications reviewed  Intervention: Promote Injury-Free Environment  Recent Flowsheet Documentation  Taken 8/29/2024 2007 by Columba Thompson RN  Safety Promotion/Fall Prevention: safety round/check completed     Problem: Fall Injury Risk  Goal: Absence of Fall and Fall-Related Injury  Intervention: Identify and Manage Contributors  Recent Flowsheet Documentation  Taken 8/29/2024 2007 by Columba Thompson RN  Medication Review/Management: medications reviewed  Intervention: Promote Injury-Free Environment  Recent Flowsheet Documentation  Taken 8/29/2024 2007 by Columba Thompson RN  Safety Promotion/Fall Prevention: safety round/check completed     Problem: Fall Injury Risk  Goal: Absence of Fall and Fall-Related Injury  Intervention: Identify and Manage Contributors  Recent Flowsheet Documentation  Taken 8/29/2024 2007 by Columba Thompson RN  Medication Review/Management: medications reviewed  Intervention: Promote Injury-Free Environment  Recent Flowsheet Documentation  Taken 8/29/2024 2007 by Columba Thompson RN  Safety Promotion/Fall Prevention: safety round/check completed     Problem: Fall Injury Risk  Goal: Absence of Fall and Fall-Related Injury  Intervention: Identify and Manage Contributors  Recent Flowsheet Documentation  Taken 8/29/2024 2007 by Columba Thompson RN  Medication Review/Management: medications reviewed  Intervention: Promote Injury-Free Environment  Recent Flowsheet Documentation  Taken 8/29/2024 2007 by Columba Thompson RN  Safety Promotion/Fall Prevention: safety round/check completed     Problem: Fall Injury  Risk  Goal: Absence of Fall and Fall-Related Injury  Intervention: Identify and Manage Contributors  Recent Flowsheet Documentation  Taken 8/29/2024 2007 by Columba Thompson RN  Medication Review/Management: medications reviewed  Intervention: Promote Injury-Free Environment  Recent Flowsheet Documentation  Taken 8/29/2024 2007 by Columba Thompson RN  Safety Promotion/Fall Prevention: safety round/check completed     Problem: Fall Injury Risk  Goal: Absence of Fall and Fall-Related Injury  Intervention: Identify and Manage Contributors  Recent Flowsheet Documentation  Taken 8/29/2024 2007 by Columba Thompson RN  Medication Review/Management: medications reviewed  Intervention: Promote Injury-Free Environment  Recent Flowsheet Documentation  Taken 8/29/2024 2007 by Columba Thompson RN  Safety Promotion/Fall Prevention: safety round/check completed     Problem: Fall Injury Risk  Goal: Absence of Fall and Fall-Related Injury  Outcome: Progressing  Intervention: Identify and Manage Contributors  Recent Flowsheet Documentation  Taken 8/29/2024 2007 by Columba Thompson RN  Medication Review/Management: medications reviewed  Intervention: Promote Injury-Free Environment  Recent Flowsheet Documentation  Taken 8/29/2024 2007 by Columba Thompson RN  Safety Promotion/Fall Prevention: safety round/check completed     Problem: Comorbidity Management  Goal: Maintenance of Asthma Control  Intervention: Maintain Asthma Symptom Control  Recent Flowsheet Documentation  Taken 8/29/2024 2007 by Columba Thompson RN  Medication Review/Management: medications reviewed  Goal: Maintenance of Behavioral Health Symptom Control  Intervention: Maintain Behavioral Health Symptom Control  Recent Flowsheet Documentation  Taken 8/29/2024 2007 by Columba Thompson RN  Medication Review/Management: medications reviewed  Goal: Maintenance of COPD Symptom Control  Intervention: Maintain COPD Symptom Control  Recent Flowsheet Documentation  Taken  8/29/2024 2007 by Columba Thompson, RN  Medication Review/Management: medications reviewed  Goal: Blood Glucose Levels Within Targeted Range  Intervention: Monitor and Manage Glycemia  Recent Flowsheet Documentation  Taken 8/29/2024 2007 by Columba Thompson RN  Medication Review/Management: medications reviewed  Goal: Maintenance of Heart Failure Symptom Control  Intervention: Maintain Heart Failure Management  Recent Flowsheet Documentation  Taken 8/29/2024 2007 by Columba Thompson, RN  Medication Review/Management: medications reviewed  Goal: Blood Pressure in Desired Range  Intervention: Maintain Blood Pressure Management  Recent Flowsheet Documentation  Taken 8/29/2024 2007 by Columba Thompson, RN  Medication Review/Management: medications reviewed  Goal: Maintenance of Osteoarthritis Symptom Control  Intervention: Maintain Osteoarthritis Symptom Control  Recent Flowsheet Documentation  Taken 8/29/2024 2126 by Columba Thompson, RN  Assistive Device Utilized: (refused walker and GB) --  Activity Management:   ambulated to bathroom   back to bed  Taken 8/29/2024 2007 by Columba Thompson RN  Medication Review/Management: medications reviewed  Taken 8/29/2024 2006 by Columba Thompson, RN  Activity Management: activity adjusted per tolerance  Goal: Bariatric Home Regimen Maintained  Intervention: Maintain and Manage Postbariatric Surgery Care  Recent Flowsheet Documentation  Taken 8/29/2024 2007 by Columba Thompson, RN  Medication Review/Management: medications reviewed  Goal: Maintenance of Seizure Control  Intervention: Maintain Seizure Symptom Control  Recent Flowsheet Documentation  Taken 8/29/2024 2007 by Columba Thompson, RN  Medication Review/Management: medications reviewed     Problem: Pain Acute  Goal: Optimal Pain Control and Function  Intervention: Prevent or Manage Pain  Recent Flowsheet Documentation  Taken 8/29/2024 2007 by Columba Thompson, RN  Medication Review/Management: medications reviewed

## 2024-08-30 NOTE — PLAN OF CARE
Goal Outcome Evaluation:      Plan of Care Reviewed With: patient    Overall Patient Progress: no changeOverall Patient Progress: no change    Outcome Evaluation: Alert to self. listening to music between cares and sleeping. Falls risk, floor mat in place, alarms are on.      Problem: Adult Inpatient Plan of Care  Goal: Plan of Care Review  Description: The Plan of Care Review/Shift note should be completed every shift.  The Outcome Evaluation is a brief statement about your assessment that the patient is improving, declining, or no change.  This information will be displayed automatically on your shift  note.  Outcome: Met  Flowsheets (Taken 8/30/2024 1802)  Outcome Evaluation: Alert to self. listening to music between cares and sleeping. Falls risk, floor mat in place, alarms are on.  Plan of Care Reviewed With: patient  Overall Patient Progress: no change     Problem: Fall Injury Risk  Goal: Absence of Fall and Fall-Related Injury  Outcome: Met  Intervention: Identify and Manage Contributors  Recent Flowsheet Documentation  Taken 8/30/2024 1800 by Niya Overton RN  Medication Review/Management: medications reviewed  Taken 8/30/2024 1000 by Niya Overton RN  Medication Review/Management: medications reviewed  Intervention: Promote Injury-Free Environment  Recent Flowsheet Documentation  Taken 8/30/2024 1800 by Nyia Overton RN  Safety Promotion/Fall Prevention:   assistive device/personal items within reach   activity supervised   clutter free environment maintained   nonskid shoes/slippers when out of bed   safety round/check completed   room organization consistent  Taken 8/30/2024 1000 by Niya Overton RN  Safety Promotion/Fall Prevention:   assistive device/personal items within reach   activity supervised   clutter free environment maintained   nonskid shoes/slippers when out of bed   safety round/check completed   room organization consistent

## 2024-08-31 PROCEDURE — 101N000002 HC CUSTODIAL CARE DAILY

## 2024-08-31 RX ADMIN — BETAMETHASONE DIPROPIONATE: 0.5 OINTMENT TOPICAL at 12:02

## 2024-08-31 RX ADMIN — BETAMETHASONE DIPROPIONATE: 0.5 OINTMENT TOPICAL at 22:02

## 2024-08-31 ASSESSMENT — ACTIVITIES OF DAILY LIVING (ADL)
ADLS_ACUITY_SCORE: 45

## 2024-08-31 NOTE — PROGRESS NOTES
Ambulated in hallway at least twice this shift. Ambulates to bathroom as a standby. Did not eat much of his meals today. Took a nap. Up in chair this morning.

## 2024-08-31 NOTE — PROGRESS NOTES
long term cares continued.     Very pleasant gentleman, has his own tune when he ambulates in the hallways. Slept well.

## 2024-09-01 PROCEDURE — 101N000002 HC CUSTODIAL CARE DAILY

## 2024-09-01 PROCEDURE — 250N000013 HC RX MED GY IP 250 OP 250 PS 637: Performed by: INTERNAL MEDICINE

## 2024-09-01 RX ADMIN — BETAMETHASONE DIPROPIONATE: 0.5 OINTMENT TOPICAL at 09:57

## 2024-09-01 RX ADMIN — Medication 5 MG: at 20:04

## 2024-09-01 RX ADMIN — BETAMETHASONE DIPROPIONATE: 0.5 OINTMENT TOPICAL at 20:04

## 2024-09-01 ASSESSMENT — ACTIVITIES OF DAILY LIVING (ADL)
ADLS_ACUITY_SCORE: 47
ADLS_ACUITY_SCORE: 45
ADLS_ACUITY_SCORE: 47
ADLS_ACUITY_SCORE: 45
ADLS_ACUITY_SCORE: 47
ADLS_ACUITY_SCORE: 45
ADLS_ACUITY_SCORE: 47
ADLS_ACUITY_SCORE: 45
ADLS_ACUITY_SCORE: 47
ADLS_ACUITY_SCORE: 47
ADLS_ACUITY_SCORE: 45
ADLS_ACUITY_SCORE: 47
ADLS_ACUITY_SCORE: 47

## 2024-09-01 NOTE — PROGRESS NOTES
Did not eat any of his dinner. Passing along to days to ensure patient gets a different meal for breakfast. Slept okay, woke up hangry. Gave pt sandwich and cookies, satisfied and smiling.

## 2024-09-01 NOTE — PLAN OF CARE
Goal Outcome Evaluation:         Pt is alert to self and situation, denied pain, ate 100% of his meals, for mobility use SBA with walker, refused gait belt, voided adequately, CMS intact, got BM today, voided adequately.

## 2024-09-02 PROCEDURE — 101N000002 HC CUSTODIAL CARE DAILY

## 2024-09-02 PROCEDURE — 250N000013 HC RX MED GY IP 250 OP 250 PS 637: Performed by: STUDENT IN AN ORGANIZED HEALTH CARE EDUCATION/TRAINING PROGRAM

## 2024-09-02 RX ADMIN — SENNOSIDES AND DOCUSATE SODIUM 1 TABLET: 8.6; 5 TABLET ORAL at 22:20

## 2024-09-02 RX ADMIN — KETOCONAZOLE: 20.5 SHAMPOO, SUSPENSION TOPICAL at 08:10

## 2024-09-02 RX ADMIN — BETAMETHASONE DIPROPIONATE: 0.5 OINTMENT TOPICAL at 22:18

## 2024-09-02 RX ADMIN — BETAMETHASONE DIPROPIONATE: 0.5 OINTMENT TOPICAL at 08:10

## 2024-09-02 ASSESSMENT — ACTIVITIES OF DAILY LIVING (ADL)
ADLS_ACUITY_SCORE: 48
ADLS_ACUITY_SCORE: 47
ADLS_ACUITY_SCORE: 48
ADLS_ACUITY_SCORE: 47
ADLS_ACUITY_SCORE: 48
ADLS_ACUITY_SCORE: 47

## 2024-09-02 NOTE — PLAN OF CARE
Shift summary ([id number])    Pt oriented to self. No reported pain, no e/o pain. Pt is happy in room singing and laughing. Up to bathroom and in halls w/ Ax1 GBW. Good PO intake. Continuing long term cares, SW following for discharge planning.     Problem: Adult Inpatient Plan of Care  Goal: Absence of Hospital-Acquired Illness or Injury  Intervention: Identify and Manage Fall Risk  Recent Flowsheet Documentation  Taken 9/2/2024 1708 by Marva Collins RN  Safety Promotion/Fall Prevention: (dgtr at bedside) safety round/check completed  Taken 9/2/2024 1524 by Marva Collins RN  Safety Promotion/Fall Prevention: safety round/check completed

## 2024-09-02 NOTE — PLAN OF CARE
USP Care Continued    Pt alert to self, Up with Ax1/SBA +GB and walker. Needs meals ordered, denies pain. Walked in hallway

## 2024-09-02 NOTE — PLAN OF CARE
A&Ox2, reoriented to time and place, denied any discomfort, ambulated multiple times throughout the night with 1 assist, walker and gait belt.

## 2024-09-03 PROCEDURE — 101N000002 HC CUSTODIAL CARE DAILY

## 2024-09-03 PROCEDURE — 99231 SBSQ HOSP IP/OBS SF/LOW 25: CPT | Performed by: INTERNAL MEDICINE

## 2024-09-03 PROCEDURE — 250N000013 HC RX MED GY IP 250 OP 250 PS 637: Performed by: STUDENT IN AN ORGANIZED HEALTH CARE EDUCATION/TRAINING PROGRAM

## 2024-09-03 RX ADMIN — SENNOSIDES AND DOCUSATE SODIUM 1 TABLET: 8.6; 5 TABLET ORAL at 21:04

## 2024-09-03 RX ADMIN — BETAMETHASONE DIPROPIONATE: 0.5 OINTMENT TOPICAL at 10:03

## 2024-09-03 RX ADMIN — BETAMETHASONE DIPROPIONATE: 0.5 OINTMENT TOPICAL at 21:04

## 2024-09-03 ASSESSMENT — ACTIVITIES OF DAILY LIVING (ADL)
ADLS_ACUITY_SCORE: 50
ADLS_ACUITY_SCORE: 48
ADLS_ACUITY_SCORE: 50
ADLS_ACUITY_SCORE: 48
ADLS_ACUITY_SCORE: 50
ADLS_ACUITY_SCORE: 48
ADLS_ACUITY_SCORE: 50

## 2024-09-03 NOTE — PLAN OF CARE
Goal Outcome Evaluation:       Assumed cares at 1500.   On retirement cares.   Resting comfortably.   When awake, singing and pleasant.

## 2024-09-03 NOTE — PLAN OF CARE
Care provided from 19:00 to 23:30. Pt on intermediate care, calm and cooperative. Up to restroom for voiding, A1 gb/walker. No BM this shift. Senna given.

## 2024-09-03 NOTE — PROGRESS NOTES
[facility]    Hospitalist Progress Note  Name: Jemal Gray    MRN: [id number]  Provider: Lena Strong MD  Date of Service: 09/03/2024    Assessment & Plan   Summary of Stay: Jemal Gray is a 88-year-old male who was transitioned to [facility] care on 6/5.  He has history of dementia and family is unable to provide care for him.  During his stay we was treated for right foot cellulitis which has resolved.  Patient is MA pending, awaiting placement in long term care.     Medically stable for discharge when disposition plan in place.     PLAN     shelter care admission.  Social work consulted.  Looking for LTC.  Is MA pending, SW working on this. Once a bed is available patient can go anytime      Constipation  Started/continue on senna, have as needed miralax and dulcolax available     Right foot cellulitis.  Resolved with Keflex 4 times daily through 6/11.  Swelling and redness resolved.    Venous ultrasound negative for DVT     Dementia with chronic aphasia.  Not on any medications.   As needed olanzapine ordered for agitation. Impulsive.     5.   Bradycardia  Patient was noted to have HR in 50s.  Asymptomatic     6. Paper work  Guardianship papers filled on 7/13 for the daughter.     7.  Actinic Keratosis                Had surgery for this and prescribed steroids cream and ketoconazole cream, will restart per daughter request     8. Disposition  Medically Ready for Discharge: Ready Now             Code Status: No CPR- Do NOT Intubate          Interval History   Patient evaluated he is awake very cheerful but unable to get meaningful review of systems patient has underlying dementia seems stable vitals are stable.    -Data reviewed today: I reviewed all new labs and imaging reports over the last 24 hours. I personally reviewed no images or EKG's today.    Physical Exam   Temp: 98  F (36.7  C) Temp src: Oral BP: 128/59 Pulse: 53   Resp: 18 SpO2: 97 % O2 Device: None (Room air)    Vitals:     "08/20/24 1155   Weight: 73.6 kg (162 lb 3.2 oz)     Vital Signs with Ranges  Temp:  [98  F (36.7  C)] 98  F (36.7  C)  Pulse:  [53] 53  Resp:  [18] 18  BP: (128)/(59) 128/59  SpO2:  [97 %] 97 %  No intake/output data recorded.    General: In no acute distress, pleasantly demented    Medications   Current Facility-Administered Medications   Medication Dose Route Frequency Provider Last Rate Last Admin     Current Facility-Administered Medications   Medication Dose Route Frequency Provider Last Rate Last Admin    betamethasone dipropionate (DIPROSONE) 0.05 % ointment   Topical BID Lino Phillips MD   Given at 09/03/24 1003    ketoconazole (NIZORAL) 2 % shampoo   Topical Q Mon Wed Fri AM Lino Phillips MD   Given at 09/02/24 0810    senna-docusate (SENOKOT-S/PERICOLACE) 8.6-50 MG per tablet 1 tablet  1 tablet Oral At Bedtime Alexandre Kurtz MD   1 tablet at 09/02/24 2220     Data     No results for input(s): \"WBC\", \"HGB\", \"HCT\", \"MCV\", \"PLT\" in the last 168 hours.  No results for input(s): \"NA\", \"POTASSIUM\", \"CHLORIDE\", \"CO2\", \"ANIONGAP\", \"GLC\", \"BUN\", \"CR\", \"GFRESTIMATED\", \"GFRESTBLACK\", \"LEON\" in the last 168 hours.  7-Day Micro Results       No results found for the last 168 hours.          No results for input(s): \"GLC\", \"BGM\" in the last 168 hours.  No results for input(s): \"HGB\" in the last 168 hours.  No results for input(s): \"AST\", \"ALT\", \"GGT\", \"ALKPHOS\", \"BILITOTAL\", \"BILICONJ\", \"BILIDIRECT\", \"SEBASTIAN\" in the last 168 hours.    Invalid input(s): \"BILIRUBININDIRECT\"  No results for input(s): \"INR\" in the last 168 hours.  No results for input(s): \"LACT\" in the last 168 hours.  No results for input(s): \"BUN\", \"CR\" in the last 168 hours.  No results for input(s): \"TSH\" in the last 168 hours.  No results for input(s): \"TROPONIN\", \"TROPI\", \"TROPR\", \"TROPONINIS\" in the last 168 hours.    Invalid input(s): \"TROPT\", \"TROP\", \"TROPONINIES\", \"TNIH\"  No results for input(s): \"COLOR\", \"APPEARANCE\", " "\"URINEGLC\", \"URINEBILI\", \"URINEKETONE\", \"SG\", \"UBLD\", \"URINEPH\", \"PROTEIN\", \"UROBILINOGEN\", \"NITRITE\", \"LEUKEST\", \"RBCU\", \"WBCU\" in the last 168 hours.    No results found for this or any previous visit (from the past 24 hour(s)).       "

## 2024-09-04 PROCEDURE — 101N000002 HC CUSTODIAL CARE DAILY

## 2024-09-04 PROCEDURE — 99231 SBSQ HOSP IP/OBS SF/LOW 25: CPT | Performed by: INTERNAL MEDICINE

## 2024-09-04 RX ADMIN — BETAMETHASONE DIPROPIONATE: 0.5 OINTMENT TOPICAL at 09:52

## 2024-09-04 RX ADMIN — KETOCONAZOLE: 20.5 SHAMPOO, SUSPENSION TOPICAL at 09:52

## 2024-09-04 ASSESSMENT — ACTIVITIES OF DAILY LIVING (ADL)
ADLS_ACUITY_SCORE: 50
ADLS_ACUITY_SCORE: 51
ADLS_ACUITY_SCORE: 50
ADLS_ACUITY_SCORE: 48
ADLS_ACUITY_SCORE: 50
ADLS_ACUITY_SCORE: 49
ADLS_ACUITY_SCORE: 48
ADLS_ACUITY_SCORE: 50
ADLS_ACUITY_SCORE: 50
ADLS_ACUITY_SCORE: 49
ADLS_ACUITY_SCORE: 50
ADLS_ACUITY_SCORE: 51
ADLS_ACUITY_SCORE: 49
ADLS_ACUITY_SCORE: 50
ADLS_ACUITY_SCORE: 51
ADLS_ACUITY_SCORE: 48
ADLS_ACUITY_SCORE: 50
ADLS_ACUITY_SCORE: 49

## 2024-09-04 NOTE — PROGRESS NOTES
[facility]    Hospitalist Progress Note  Name: Jemal Gray    MRN: [id number]  Provider:  Jemal Arrieta DO  Date of Service: 09/04/2024    Summary of Stay: Jemal Gray is a 88-year-old male who [facility]. He has history of dementia and family is unable to provide care for him. During his stay we was treated for right foot cellulitis which has resolved. Patient is MA pending, awaiting placement in long term care.     TODAY'S PLAN:  No changes.  Pt stable for discharge.    Problem List:   long term care admission.  Social work consulted.  Looking for LTC.  Is MA pending, SW working on this. Once a bed is available patient can go anytime      Constipation  Started/continue on senna, have as needed miralax and dulcolax available     Right foot cellulitis.  Resolved with Keflex 4 times daily through 6/11.  Swelling and redness resolved.    Venous ultrasound negative for DVT     Dementia with chronic aphasia.  Not on any medications.   As needed olanzapine ordered for agitation. Impulsive.     5.   Bradycardia  Patient was noted to have HR in 50s.  Asymptomatic     6. Paper work  Guardianship papers filled on 7/13 for the daughter.     7.  Actinic Keratosis                Had surgery for this and prescribed steroids cream and ketoconazole cream, will restart per daughter request    I spent 30 minutes in reviewing this patient's labs, imaging, medications, medical history.  In addition time was spent interviewing the patient, communicating with family, and medical decision making.      DVT Prophylaxis: Pneumatic Compression Devices  Code Status: No CPR- Do NOT Intubate  Diet: Combination Diet Regular Diet  Room Service    Maddox Catheter: Not present    Disposition: Medically Ready for Discharge: Ready Now  Goals to discharge include: disposition issues resolved  Family updated today: No     Interval History   No changes    -Data reviewed today: I personally  "reviewed all new labs and imaging results over the last 24 hours.     Physical Exam                      Vitals:    08/20/24 1155   Weight: 73.6 kg (162 lb 3.2 oz)     Vital Signs with Ranges     I/O last 3 completed shifts:  In: 60 [P.O.:60]  Out: -     Medications   Current Facility-Administered Medications   Medication Dose Route Frequency Provider Last Rate Last Admin     Current Facility-Administered Medications   Medication Dose Route Frequency Provider Last Rate Last Admin    betamethasone dipropionate (DIPROSONE) 0.05 % ointment   Topical BID Lino Phillips MD   Given at 09/04/24 0952    ketoconazole (NIZORAL) 2 % shampoo   Topical Q Mon Wed Fri AM Lino Phillips MD   Given at 09/04/24 0952    senna-docusate (SENOKOT-S/PERICOLACE) 8.6-50 MG per tablet 1 tablet  1 tablet Oral At Bedtime Alexandre Kurtz MD   1 tablet at 09/03/24 2104     Data     Laboratory:  No results for input(s): \"WBC\", \"HGB\", \"HCT\", \"MCV\", \"PLT\" in the last 168 hours.  No results for input(s): \"NA\", \"POTASSIUM\", \"CHLORIDE\", \"CO2\", \"ANIONGAP\", \"GLC\", \"BUN\", \"CR\", \"GFRESTIMATED\", \"GFRESTBLACK\", \"LEON\" in the last 168 hours.  No results for input(s): \"CULT\" in the last 168 hours.  No results found for: \"TROPI\"    Imaging:  No results found for this or any previous visit (from the past 24 hour(s)).      Jemal Arrieta DO  [facility] Hospitalist  [address] [place] [address]  Securely message with [email or web address] (more info)  Text page via [facility] Paging/Directory   09/04/2024   "

## 2024-09-05 PROCEDURE — 101N000002 HC CUSTODIAL CARE DAILY

## 2024-09-05 PROCEDURE — 250N000013 HC RX MED GY IP 250 OP 250 PS 637: Performed by: STUDENT IN AN ORGANIZED HEALTH CARE EDUCATION/TRAINING PROGRAM

## 2024-09-05 PROCEDURE — 99231 SBSQ HOSP IP/OBS SF/LOW 25: CPT | Performed by: INTERNAL MEDICINE

## 2024-09-05 RX ADMIN — BETAMETHASONE DIPROPIONATE: 0.5 OINTMENT TOPICAL at 00:08

## 2024-09-05 RX ADMIN — SENNOSIDES AND DOCUSATE SODIUM 1 TABLET: 8.6; 5 TABLET ORAL at 22:09

## 2024-09-05 RX ADMIN — BETAMETHASONE DIPROPIONATE: 0.5 OINTMENT TOPICAL at 09:24

## 2024-09-05 RX ADMIN — BETAMETHASONE DIPROPIONATE: 0.5 OINTMENT TOPICAL at 22:10

## 2024-09-05 ASSESSMENT — ACTIVITIES OF DAILY LIVING (ADL)
ADLS_ACUITY_SCORE: 48
ADLS_ACUITY_SCORE: 47
ADLS_ACUITY_SCORE: 48
ADLS_ACUITY_SCORE: 47
ADLS_ACUITY_SCORE: 47
ADLS_ACUITY_SCORE: 48
ADLS_ACUITY_SCORE: 48
ADLS_ACUITY_SCORE: 47
ADLS_ACUITY_SCORE: 48
ADLS_ACUITY_SCORE: 47
ADLS_ACUITY_SCORE: 47
ADLS_ACUITY_SCORE: 48
ADLS_ACUITY_SCORE: 48

## 2024-09-05 NOTE — PROGRESS NOTES
Pt appeared comfortable up on rounding. Up with assist of one, walker and gait belt. No acute changes noted this shift.

## 2024-09-05 NOTE — PROGRESS NOTES
[facility]    Hospitalist Progress Note  Name: Jemal Gray    MRN: [id number]  Provider:  Jemal Arrieta DO  Date of Service: 09/05/2024    Summary of Stay: Jemal Gray is a 88-year-old male who was transitioned to longterm care on 6/5. He has history of dementia and family is unable to provide care for him. During his stay we was treated for right foot cellulitis which has resolved. Patient is MA pending, awaiting placement in long term care.      TODAY'S PLAN:  No changes.  Pt stable for discharge.     Problem List:   [facility] care admission.  Social work consulted.  Looking for LTC.  Is MA pending, SW working on this. Once a bed is available patient can go anytime      Constipation  Started/continue on senna, have as needed miralax and dulcolax available     Right foot cellulitis.  Resolved with Keflex 4 times daily through 6/11.  Swelling and redness resolved.    Venous ultrasound negative for DVT     Dementia with chronic aphasia.  Not on any medications.   As needed olanzapine ordered for agitation. Impulsive.     5.   Bradycardia  Patient was noted to have HR in 50s.  Asymptomatic     6. Paper work  Guardianship papers filled on 7/13 for the daughter.     7.  Actinic Keratosis                Had surgery for this and prescribed steroids cream and ketoconazole cream, will restart per daughter request    I spent 20 minutes in reviewing this patient's labs, imaging, medications, medical history.  In addition time was spent interviewing the patient, communicating with family, and medical decision making.      DVT Prophylaxis: Pneumatic Compression Devices  Code Status: No CPR- Do NOT Intubate  Diet: Combination Diet Regular Diet  Room Service    Maddox Catheter: Not present    Disposition: Medically Ready for Discharge: Ready Now  Goals to discharge include: disposition issues improved  Family updated today: No     Interval History   No issues.    -Data reviewed today: I personally  "reviewed all new labs and imaging results over the last 24 hours.     Physical Exam                      Vitals:    08/20/24 1155   Weight: 73.6 kg (162 lb 3.2 oz)     Vital Signs with Ranges     No intake/output data recorded.    Medications   Current Facility-Administered Medications   Medication Dose Route Frequency Provider Last Rate Last Admin     Current Facility-Administered Medications   Medication Dose Route Frequency Provider Last Rate Last Admin    betamethasone dipropionate (DIPROSONE) 0.05 % ointment   Topical BID Lino Phillips MD   Given at 09/05/24 0924    ketoconazole (NIZORAL) 2 % shampoo   Topical Q Mon Wed Fri AM Lino Phillips MD   Given at 09/04/24 0952    senna-docusate (SENOKOT-S/PERICOLACE) 8.6-50 MG per tablet 1 tablet  1 tablet Oral At Bedtime Alexandre Kurtz MD   1 tablet at 09/03/24 2104     Data     Laboratory:  No results for input(s): \"WBC\", \"HGB\", \"HCT\", \"MCV\", \"PLT\" in the last 168 hours.  No results for input(s): \"NA\", \"POTASSIUM\", \"CHLORIDE\", \"CO2\", \"ANIONGAP\", \"GLC\", \"BUN\", \"CR\", \"GFRESTIMATED\", \"GFRESTBLACK\", \"LEON\" in the last 168 hours.  No results for input(s): \"CULT\" in the last 168 hours.  No results found for: \"TROPI\"    Imaging:  No results found for this or any previous visit (from the past 24 hour(s)).      Jemal Arrieta DO  [facility] Hospitalist  [address] [place] [address]  Securely message with [employer] (more info)  Text page via [employer] Paging/Directory   09/05/2024   "

## 2024-09-05 NOTE — PLAN OF CARE
[id number]  Pleasantly confused. Walk in the hallway x1. Denies pain. Soft/loose stool this shift    Goal Outcome Evaluation:  No fall, alarm activated, floor mat in place

## 2024-09-06 PROCEDURE — 101N000002 HC CUSTODIAL CARE DAILY

## 2024-09-06 PROCEDURE — 99207 PR APP CREDIT; MD BILLING SHARED VISIT: CPT | Performed by: INTERNAL MEDICINE

## 2024-09-06 RX ADMIN — KETOCONAZOLE: 20.5 SHAMPOO, SUSPENSION TOPICAL at 09:55

## 2024-09-06 RX ADMIN — BETAMETHASONE DIPROPIONATE: 0.5 OINTMENT TOPICAL at 09:55

## 2024-09-06 RX ADMIN — BETAMETHASONE DIPROPIONATE: 0.5 OINTMENT TOPICAL at 23:50

## 2024-09-06 ASSESSMENT — ACTIVITIES OF DAILY LIVING (ADL)
ADLS_ACUITY_SCORE: 43
ADLS_ACUITY_SCORE: 47
ADLS_ACUITY_SCORE: 43
ADLS_ACUITY_SCORE: 43
ADLS_ACUITY_SCORE: 47
ADLS_ACUITY_SCORE: 47
ADLS_ACUITY_SCORE: 43
ADLS_ACUITY_SCORE: 47
ADLS_ACUITY_SCORE: 43
ADLS_ACUITY_SCORE: 47
ADLS_ACUITY_SCORE: 43
ADLS_ACUITY_SCORE: 47
ADLS_ACUITY_SCORE: 43
ADLS_ACUITY_SCORE: 43

## 2024-09-06 NOTE — PROGRESS NOTES
[facility]    Hospitalist Progress Note  Name: Jemal Gray    MRN: [id number]  Provider:  Jemal Arrieta DO  Date of Service: 09/06/2024    Summary of Stay:  Jemal Gray is a 88-year-old male who was transitioned to group home care on 6/5. He has history of dementia and family is unable to provide care for him. During his stay we was treated for right foot cellulitis which has resolved. Patient is MA pending, awaiting placement in long term care.      TODAY'S PLAN:  No changes.  Pt stable for discharge.     Problem List:   [facility] care admission.  Social work consulted.  Looking for LTC.  Is MA pending, SW working on this. Once a bed is available patient can go anytime      Constipation  Started/continue on senna, have as needed miralax and dulcolax available     Right foot cellulitis.  Resolved with Keflex 4 times daily through 6/11.  Swelling and redness resolved.    Venous ultrasound negative for DVT     Dementia with chronic aphasia.  Not on any medications.   As needed olanzapine ordered for agitation. Impulsive.     5.   Bradycardia  Patient was noted to have HR in 50s.  Asymptomatic     6. Paper work  Guardianship papers filled on 7/13 for the daughter.     7.  Actinic Keratosis                Had surgery for this and prescribed steroids cream and ketoconazole cream, will restart per daughter request     I spent 20 minutes in reviewing this patient's labs, imaging, medications, medical history.  In addition time was spent interviewing the patient, communicating with family, and medical decision making.       DVT Prophylaxis: Pneumatic Compression Devices  Code Status: No CPR- Do NOT Intubate  Diet: Combination Diet Regular Diet  Room Service    Maddox Catheter: Not present     Disposition: Medically Ready for Discharge: Ready Now  Goals to discharge include: disposition issues improved  Family updated today: No        Interval History  No issues.    -Data reviewed today: I  "personally reviewed all new labs and imaging results over the last 24 hours.     Physical Exam   Temp: 98.3  F (36.8  C) Temp src: Oral BP: 130/61 Pulse: 50   Resp: 20 SpO2: 96 % O2 Device: None (Room air)    Vitals:    08/20/24 1155   Weight: 73.6 kg (162 lb 3.2 oz)     Vital Signs with Ranges  Temp:  [98.3  F (36.8  C)-99.4  F (37.4  C)] 98.3  F (36.8  C)  Pulse:  [50-52] 50  Resp:  [18-20] 20  BP: (115-130)/(57-61) 130/61  SpO2:  [96 %] 96 %  I/O last 3 completed shifts:  In: 720 [P.O.:720]  Out: -     Medications   Current Facility-Administered Medications   Medication Dose Route Frequency Provider Last Rate Last Admin     Current Facility-Administered Medications   Medication Dose Route Frequency Provider Last Rate Last Admin    betamethasone dipropionate (DIPROSONE) 0.05 % ointment   Topical BID Lino Phillips MD   Given at 09/06/24 0955    ketoconazole (NIZORAL) 2 % shampoo   Topical Q Mon Wed Fri AM Lino Phillips MD   Given at 09/06/24 0955    senna-docusate (SENOKOT-S/PERICOLACE) 8.6-50 MG per tablet 1 tablet  1 tablet Oral At Bedtime Alexandre Kurtz MD   1 tablet at 09/05/24 220     Data     Laboratory:  No results for input(s): \"WBC\", \"HGB\", \"HCT\", \"MCV\", \"PLT\" in the last 168 hours.  No results for input(s): \"NA\", \"POTASSIUM\", \"CHLORIDE\", \"CO2\", \"ANIONGAP\", \"GLC\", \"BUN\", \"CR\", \"GFRESTIMATED\", \"GFRESTBLACK\", \"LEON\" in the last 168 hours.  No results for input(s): \"CULT\" in the last 168 hours.  No results found for: \"TROPI\"    Imaging:  No results found for this or any previous visit (from the past 24 hour(s)).      Jemal Arrieta DO  [facility] Hospitalist  [address] [place] [address]  Securely message with [employer] (more info)  Text page via [email or web address] Paging/Directory   09/06/2024   "

## 2024-09-06 NOTE — PROGRESS NOTES
[facility] care continued.    Disorientated x 3, self only. Pleasant. Full body shower completed w/medicated shampoo. Good appetite, up in chair. No acute changes.

## 2024-09-06 NOTE — PLAN OF CARE
On long term cares. Pleasantly confused. Assist of 1, GB, walker, but can refuse the GB or walker at times. No vitals or full assessment done per orders. Voiding well. LBM on 9/5. Denies pain per PAINAD scale. SW following. Plan to discharge to LTC facility once MA is approved.     Goal Outcome Evaluation:     Floor mats in place. No fall on shift. Bed alarm on. Does not call when needing to get up.

## 2024-09-07 PROCEDURE — 101N000002 HC CUSTODIAL CARE DAILY

## 2024-09-07 PROCEDURE — 250N000013 HC RX MED GY IP 250 OP 250 PS 637: Performed by: STUDENT IN AN ORGANIZED HEALTH CARE EDUCATION/TRAINING PROGRAM

## 2024-09-07 RX ADMIN — BETAMETHASONE DIPROPIONATE: 0.5 OINTMENT TOPICAL at 08:44

## 2024-09-07 RX ADMIN — SENNOSIDES AND DOCUSATE SODIUM 1 TABLET: 8.6; 5 TABLET ORAL at 21:00

## 2024-09-07 RX ADMIN — BETAMETHASONE DIPROPIONATE: 0.5 OINTMENT TOPICAL at 20:59

## 2024-09-07 ASSESSMENT — ACTIVITIES OF DAILY LIVING (ADL)
ADLS_ACUITY_SCORE: 43
ADLS_ACUITY_SCORE: 47
ADLS_ACUITY_SCORE: 43

## 2024-09-07 NOTE — PROGRESS NOTES
Pt alert to self only, VSS on RA. Denies pain. Up SBA to bathroom. Regular diet, good appetite. Jimenez betamethasone applied to scalp. No IV access. Showered yesterday. SW following for placement.

## 2024-09-07 NOTE — PLAN OF CARE
Pt is alert to self with confusion. Pt denies pain or discomfort. Pt is hard of hearing. Pt has no IV access. Pt is SBA in transfer and care. Bed alarm in place and call light within reach. Plan is LTC placement. Will continue to monitor and assess pt.

## 2024-09-07 NOTE — PROGRESS NOTES
[facility]    Hospitalist Progress Note  Name: Jemal Gray    MRN: [id number]  Provider:  Jemal Arrieta DO  Date of Service: 09/07/2024    Summary of Stay: Jemal Gray is a 88-year-old male who was transitioned to [facility] care on 6/5. He has history of dementia and family is unable to provide care for him. During his stay we was treated for right foot cellulitis which has resolved. Patient is MA pending, awaiting placement in long term care.      TODAY'S PLAN:  No changes.  Pt stable for discharge.     Problem List:   [facility] admission.  Social work consulted.  Looking for LTC.  Is MA pending, SW working on this. Once a bed is available patient can go anytime      Constipation  Started/continue on senna, have as needed miralax and dulcolax available     Right foot cellulitis.  Resolved with Keflex 4 times daily through 6/11.  Swelling and redness resolved.    Venous ultrasound negative for DVT     Dementia with chronic aphasia.  Not on any medications.   As needed olanzapine ordered for agitation. Impulsive.     5.   Bradycardia  Patient was noted to have HR in 50s.  Asymptomatic     6. Paper work  Guardianship papers filled on 7/13 for the daughter.     7.  Actinic Keratosis                Had surgery for this and prescribed steroids cream and ketoconazole cream, will restart per daughter request     I spent 20 minutes in reviewing this patient's labs, imaging, medications, medical history.  In addition time was spent interviewing the patient, communicating with family, and medical decision making.       DVT Prophylaxis: Pneumatic Compression Devices  Code Status: No CPR- Do NOT Intubate  Diet: Combination Diet Regular Diet  Room Service    Maddox Catheter: Not present     Disposition: Medically Ready for Discharge: Ready Now  Goals to discharge include: disposition issues improved  Family updated today: No        Interval History  No issues.    -Data reviewed today: I personally  "reviewed all new labs and imaging results over the last 24 hours.     Physical Exam                      Vitals:    08/20/24 1155   Weight: 73.6 kg (162 lb 3.2 oz)     Vital Signs with Ranges     I/O last 3 completed shifts:  In: 490 [P.O.:490]  Out: -     Medications   Current Facility-Administered Medications   Medication Dose Route Frequency Provider Last Rate Last Admin     Current Facility-Administered Medications   Medication Dose Route Frequency Provider Last Rate Last Admin    betamethasone dipropionate (DIPROSONE) 0.05 % ointment   Topical BID Lino Phillips MD   Given at 09/07/24 0844    ketoconazole (NIZORAL) 2 % shampoo   Topical Q Mon Wed Fri AM Lino Phillips MD   Given at 09/06/24 0955    senna-docusate (SENOKOT-S/PERICOLACE) 8.6-50 MG per tablet 1 tablet  1 tablet Oral At Bedtime Alexandre Kurtz MD   1 tablet at 09/05/24 2209     Data     Laboratory:  No results for input(s): \"WBC\", \"HGB\", \"HCT\", \"MCV\", \"PLT\" in the last 168 hours.  No results for input(s): \"NA\", \"POTASSIUM\", \"CHLORIDE\", \"CO2\", \"ANIONGAP\", \"GLC\", \"BUN\", \"CR\", \"GFRESTIMATED\", \"GFRESTBLACK\", \"LEON\" in the last 168 hours.  No results for input(s): \"CULT\" in the last 168 hours.  No results found for: \"TROPI\"    Imaging:  No results found for this or any previous visit (from the past 24 hour(s)).      Jemal Arrieta DO  [facility] Hospitalist  [address] [place] [address]  Securely message with [employer] (more info)  Text page via [facility] Paging/Directory   09/07/2024   "

## 2024-09-07 NOTE — PROGRESS NOTES
Pt appears to be comfortable. Denies pain. Ate dinner. SBA GB. No IV access. Waiting for placement.

## 2024-09-08 PROCEDURE — 101N000002 HC CUSTODIAL CARE DAILY

## 2024-09-08 RX ADMIN — BETAMETHASONE DIPROPIONATE: 0.5 OINTMENT TOPICAL at 20:19

## 2024-09-08 RX ADMIN — BETAMETHASONE DIPROPIONATE: 0.5 OINTMENT TOPICAL at 08:04

## 2024-09-08 ASSESSMENT — ACTIVITIES OF DAILY LIVING (ADL)
ADLS_ACUITY_SCORE: 43
ADLS_ACUITY_SCORE: 44
ADLS_ACUITY_SCORE: 43

## 2024-09-08 NOTE — PLAN OF CARE
Still waiting for placement, sister called for an update says that she has not been able to connect with  and [facility] to finalize MA paperwork.  notified, says there will be follow-up tomorrow. Ambulating to the bathroom with SB, does not call appropriately, good appetite, agreeable with cares. Will keep monitoring.

## 2024-09-08 NOTE — PROGRESS NOTES
[facility]    Hospitalist Progress Note  Name: Jemal Gray    MRN: [id number]  Provider:  Jemal Arrieta DO  Date of Service: 09/08/2024    Summary of Stay: Jemal Gray is a 88-year-old male who was transitioned to [facility] care on 6/5. He has history of dementia and family is unable to provide care for him. During his stay we was treated for right foot cellulitis which has resolved. Patient is MA pending, awaiting placement in long term care.      TODAY'S PLAN:  No changes.  Pt stable for discharge.     Problem List:   group home care admission.  Social work consulted.  Looking for LTC.  Is MA pending, SW working on this. Once a bed is available patient can go anytime      Constipation  Started/continue on senna, have as needed miralax and dulcolax available     Right foot cellulitis.  Resolved with Keflex 4 times daily through 6/11.  Swelling and redness resolved.    Venous ultrasound negative for DVT     Dementia with chronic aphasia.  Not on any medications.   As needed olanzapine ordered for agitation. Impulsive.     5.   Bradycardia  Patient was noted to have HR in 50s.  Asymptomatic     6. Paper work  Guardianship papers filled on 7/13 for the daughter.     7.  Actinic Keratosis                Had surgery for this and prescribed steroids cream and ketoconazole cream, will restart per daughter request     I spent 20 minutes in reviewing this patient's labs, imaging, medications, medical history.  In addition time was spent interviewing the patient, communicating with family, and medical decision making.       DVT Prophylaxis: Pneumatic Compression Devices  Code Status: No CPR- Do NOT Intubate  Diet: Combination Diet Regular Diet  Room Service    Maddox Catheter: Not present     Disposition: Medically Ready for Discharge: Ready Now  Goals to discharge include: disposition issues improved  Family updated today: No        Interval History  No issues.    -Data reviewed today: I personally  "reviewed all new labs and imaging results over the last 24 hours.     Physical Exam                      Vitals:    08/20/24 1155   Weight: 73.6 kg (162 lb 3.2 oz)     Vital Signs with Ranges     No intake/output data recorded.    Medications   Current Facility-Administered Medications   Medication Dose Route Frequency Provider Last Rate Last Admin     Current Facility-Administered Medications   Medication Dose Route Frequency Provider Last Rate Last Admin    betamethasone dipropionate (DIPROSONE) 0.05 % ointment   Topical BID Lino Phillips MD   Given at 09/08/24 0804    ketoconazole (NIZORAL) 2 % shampoo   Topical Q Mon Wed Fri AM Lino Phillips MD   Given at 09/06/24 0955    senna-docusate (SENOKOT-S/PERICOLACE) 8.6-50 MG per tablet 1 tablet  1 tablet Oral At Bedtime Alexandre Kurtz MD   1 tablet at 09/07/24 2100     Data     Laboratory:  No results for input(s): \"WBC\", \"HGB\", \"HCT\", \"MCV\", \"PLT\" in the last 168 hours.  No results for input(s): \"NA\", \"POTASSIUM\", \"CHLORIDE\", \"CO2\", \"ANIONGAP\", \"GLC\", \"BUN\", \"CR\", \"GFRESTIMATED\", \"GFRESTBLACK\", \"LEON\" in the last 168 hours.  No results for input(s): \"CULT\" in the last 168 hours.  No results found for: \"TROPI\"    Imaging:  No results found for this or any previous visit (from the past 24 hour(s)).      Jemal Arrieta DO  [facility] Hospitalist  [address] [place] [address]  Securely message with [employer] (more info)  Text page via [employer] Paging/Directory   09/08/2024   "

## 2024-09-08 NOTE — PLAN OF CARE
Pt is alert to self with confusion. Pt denies pain or discomfort. Pt is hard of hearing. Pt has no IV access. Mepilex on coccyx. Pt is SBA in transfer and care. Bed alarm in place and call light within reach. Pt is waiting for placement. Will continue to monitor and assess pt.

## 2024-09-09 PROCEDURE — 101N000002 HC CUSTODIAL CARE DAILY

## 2024-09-09 RX ADMIN — KETOCONAZOLE: 20.5 SHAMPOO, SUSPENSION TOPICAL at 08:32

## 2024-09-09 RX ADMIN — BETAMETHASONE DIPROPIONATE: 0.5 OINTMENT TOPICAL at 08:32

## 2024-09-09 ASSESSMENT — ACTIVITIES OF DAILY LIVING (ADL)
ADLS_ACUITY_SCORE: 52
ADLS_ACUITY_SCORE: 43
ADLS_ACUITY_SCORE: 44
ADLS_ACUITY_SCORE: 46
ADLS_ACUITY_SCORE: 46
ADLS_ACUITY_SCORE: 44
ADLS_ACUITY_SCORE: 52
ADLS_ACUITY_SCORE: 46
ADLS_ACUITY_SCORE: 46
ADLS_ACUITY_SCORE: 44
ADLS_ACUITY_SCORE: 44
ADLS_ACUITY_SCORE: 46
ADLS_ACUITY_SCORE: 46
ADLS_ACUITY_SCORE: 44
ADLS_ACUITY_SCORE: 46
ADLS_ACUITY_SCORE: 43
ADLS_ACUITY_SCORE: 43
ADLS_ACUITY_SCORE: 52
ADLS_ACUITY_SCORE: 44
ADLS_ACUITY_SCORE: 52
ADLS_ACUITY_SCORE: 46
ADLS_ACUITY_SCORE: 52
ADLS_ACUITY_SCORE: 46

## 2024-09-09 NOTE — PLAN OF CARE
Pt on long term outpatient. Denies pain. AUREA GB. RA. Appears to be comfortable. Singing throughout the day.     Goal Outcome Evaluation:  No fall during shift

## 2024-09-09 NOTE — PROGRESS NOTES
[facility]    Medicine Progress Note - Hospitalist Service    Date of Admission:  6/5/2024    Assessment & Plan   Summary of Stay: Jemal Gray is a 88-year-old male who was transitioned to shelter care on 6/5. He has history of dementia and family is unable to provide care for him. During his stay we was treated for right foot cellulitis which has resolved. Patient is MA pending, awaiting placement in long term care.      TODAY'S PLAN:  No changes.  Pt stable for discharge.     Problem List:   [facility] care admission.  Social work consulted.  Looking for LTC.  Is MA pending, SW working on this. Once a bed is available patient can go anytime      Constipation  Started/continue on senna, have as needed miralax and dulcolax available     Right foot cellulitis.  Resolved with Keflex 4 times daily through 6/11.  Swelling and redness resolved.    Venous ultrasound negative for DVT     Dementia with chronic aphasia.  Not on any medications.   As needed olanzapine ordered for agitation. Impulsive.     5.   Bradycardia  Patient was noted to have HR in 50s.  Asymptomatic     6. Paper work  Guardianship papers filled on 7/13 for the daughter.     7.  Actinic Keratosis                Had surgery for this and prescribed steroids cream and ketoconazole cream, restarted per daughter request           Diet: Combination Diet Regular Diet  Room Service    DVT Prophylaxis: Pneumatic Compression Devices  Maddox Catheter: Not present  Lines: None     Cardiac Monitoring: None  Code Status: No CPR- Do NOT Intubate      Clinically Significant Risk Factors Present on Admission                    # Dementia: noted on problem list                        Disposition Plan     Medically Ready for Discharge: Ready Now             Ena Bates DO  Hospitalist Service  [facility]  Securely message with [employer] (more info)  Text page via [employer] Paging/Directory    ______________________________________________________________________    Interval History   No acute overnight events.  Patient pleasant and denies any concerns.      15 MINUTES SPENT BY ME on the date of service doing chart review, history, exam, documentation & further activities per the note.

## 2024-09-09 NOTE — PROGRESS NOTES
Pt appears to be in no form of pain or discomfort .Ambulated to bathroom SBA . Waiting for placement .

## 2024-09-10 PROCEDURE — 101N000002 HC CUSTODIAL CARE DAILY

## 2024-09-10 RX ADMIN — BETAMETHASONE DIPROPIONATE: 0.5 OINTMENT TOPICAL at 10:48

## 2024-09-10 RX ADMIN — BETAMETHASONE DIPROPIONATE: 0.5 OINTMENT TOPICAL at 21:10

## 2024-09-10 ASSESSMENT — ACTIVITIES OF DAILY LIVING (ADL)
ADLS_ACUITY_SCORE: 47
ADLS_ACUITY_SCORE: 52
ADLS_ACUITY_SCORE: 47
ADLS_ACUITY_SCORE: 47
ADLS_ACUITY_SCORE: 48
ADLS_ACUITY_SCORE: 47
ADLS_ACUITY_SCORE: 52
ADLS_ACUITY_SCORE: 48
ADLS_ACUITY_SCORE: 47
ADLS_ACUITY_SCORE: 52
ADLS_ACUITY_SCORE: 47
ADLS_ACUITY_SCORE: 48
ADLS_ACUITY_SCORE: 48
ADLS_ACUITY_SCORE: 47
ADLS_ACUITY_SCORE: 47
ADLS_ACUITY_SCORE: 48
ADLS_ACUITY_SCORE: 48

## 2024-09-10 NOTE — PROGRESS NOTES
[facility]    Medicine Progress Note - Hospitalist Service    Date of Admission:  6/5/2024    Assessment & Plan   Summary of Stay: Jemal Gray is a 88-year-old male who was transitioned to [facility] care on 6/5. He has history of dementia and family is unable to provide care for him. During his stay we was treated for right foot cellulitis which has resolved. Patient is MA pending, awaiting placement in long term care.      TODAY'S PLAN:  No changes.  Pt stable for discharge.     Problem List:   [facility] care admission.  Social work consulted.  Looking for LTC.  Is MA pending, SW working on this. Once a bed is available patient can go anytime      Constipation  Started/continue on senna, have as needed miralax and dulcolax available     Right foot cellulitis.  Resolved with Keflex 4 times daily through 6/11.  Swelling and redness resolved.    Venous ultrasound negative for DVT     Dementia with chronic aphasia.  Not on any medications.   As needed olanzapine ordered for agitation. Impulsive.     5.   Bradycardia  Patient was noted to have HR in 50s.  Asymptomatic     6. Paper work  Guardianship papers filled on 7/13 for the daughter.     7.  Actinic Keratosis                Had surgery for this and prescribed steroids cream and ketoconazole cream, restarted per daughter request           Diet: Combination Diet Regular Diet  Room Service    DVT Prophylaxis: Pneumatic Compression Devices  Maddox Catheter: Not present  Lines: None     Cardiac Monitoring: None  Code Status: No CPR- Do NOT Intubate      Clinically Significant Risk Factors Present on Admission                    # Dementia: noted on problem list                        Disposition Plan     Medically Ready for Discharge: Ready Now             Ena Bates DO  Hospitalist Service  [facility]  Securely message with [employer] (more info)  Text page via [employer] Paging/Directory    ______________________________________________________________________    Interval History   Chart reviewed. No acute changes.     Medical Decision Making       10 MINUTES SPENT BY ME on the date of service doing chart review, history, exam, documentation & further activities per the note.

## 2024-09-10 NOTE — PLAN OF CARE
Goal Outcome Evaluation:    Alert, oriented to self only. Pt slept most of the night. No significant changes over night. Discharge pending LTC placement. Continue with plan of care.

## 2024-09-10 NOTE — PLAN OF CARE
[facility] care. Assessment done yesterday. Up to bathroom SBA with walker. Denies pain. Eating 50% of meals.   Problem: Fall Injury Risk  Goal: Absence of Fall and Fall-Related Injury  Outcome: Not Progressing  Intervention: Promote Injury-Free Environment  Recent Flowsheet Documentation  Taken 9/10/2024 0838 by Anselmo Best RN  Safety Promotion/Fall Prevention:   activity supervised   assistive device/personal items within reach   clutter free environment maintained   increase visualization of patient   increased rounding and observation   lighting adjusted   mobility aid in reach   nonskid shoes/slippers when out of bed   patient and family education   room organization consistent   safety round/check completed   Goal Outcome Evaluation:

## 2024-09-11 PROCEDURE — 101N000002 HC CUSTODIAL CARE DAILY

## 2024-09-11 PROCEDURE — 250N000013 HC RX MED GY IP 250 OP 250 PS 637: Performed by: STUDENT IN AN ORGANIZED HEALTH CARE EDUCATION/TRAINING PROGRAM

## 2024-09-11 RX ADMIN — KETOCONAZOLE: 20.5 SHAMPOO, SUSPENSION TOPICAL at 10:26

## 2024-09-11 RX ADMIN — BETAMETHASONE DIPROPIONATE: 0.5 OINTMENT TOPICAL at 10:27

## 2024-09-11 RX ADMIN — SENNOSIDES AND DOCUSATE SODIUM 1 TABLET: 8.6; 5 TABLET ORAL at 21:36

## 2024-09-11 ASSESSMENT — ACTIVITIES OF DAILY LIVING (ADL)
ADLS_ACUITY_SCORE: 49
ADLS_ACUITY_SCORE: 47
ADLS_ACUITY_SCORE: 49
ADLS_ACUITY_SCORE: 47
ADLS_ACUITY_SCORE: 49
ADLS_ACUITY_SCORE: 47
ADLS_ACUITY_SCORE: 47
ADLS_ACUITY_SCORE: 49
ADLS_ACUITY_SCORE: 49
ADLS_ACUITY_SCORE: 47
ADLS_ACUITY_SCORE: 49
ADLS_ACUITY_SCORE: 47

## 2024-09-11 NOTE — PROGRESS NOTES
Alert to self only. Impulsive and gets to bathroom at will. Had Loose BM X3. Redness to coccyx mapilex applied. Patient continued to be waiting for placement.

## 2024-09-11 NOTE — PROGRESS NOTES
[facility]    Medicine Progress Note - Hospitalist Service    Date of Admission:  6/5/2024    Assessment & Plan   Summary of Stay: Jemal Gray is a 88-year-old male who was transitioned to [facility] care on 6/5. He has history of dementia and family is unable to provide care for him. During his stay we was treated for right foot cellulitis which has resolved. Patient is MA pending, awaiting placement in long term care.      TODAY'S PLAN:  No changes.  Pt stable for discharge.     Problem List:   [facility].  Social work consulted.  Looking for LTC.  Is MA pending, SW working on this. Once a bed is available patient can go anytime      Constipation  Started/continue on senna, have as needed miralax and dulcolax available     Right foot cellulitis.  Resolved with Keflex 4 times daily through 6/11.  Swelling and redness resolved.    Venous ultrasound negative for DVT     Dementia with chronic aphasia.  Not on any medications.   As needed olanzapine ordered for agitation. Impulsive.     5.   Bradycardia  Patient was noted to have HR in 50s.  Asymptomatic     6. Paper work  Guardianship papers filled on 7/13 for the daughter.     7.  Actinic Keratosis                Had surgery for this and prescribed steroids cream and ketoconazole cream, restarted per daughter request           Diet: Combination Diet Regular Diet  Room Service    DVT Prophylaxis: Pneumatic Compression Devices  Maddox Catheter: Not present  Lines: None     Cardiac Monitoring: None  Code Status: No CPR- Do NOT Intubate      Clinically Significant Risk Factors Present on Admission                    # Dementia: noted on problem list                        Disposition Plan     Medically Ready for Discharge: Ready Now             Ena Bates DO  Hospitalist Service  [facility]  Securely message with [employer] (more info)  Text page via [employer] Paging/Directory    ______________________________________________________________________    Interval History   Chart reviewed. No acute overnight events. Sleeping comfortably.    Medical Decision Making       10 MINUTES SPENT BY ME on the date of service doing chart review, history, exam, documentation & further activities per the note.

## 2024-09-11 NOTE — PLAN OF CARE
Goal Outcome Evaluation:    Alert. Confused. Speech garbled at times. Up with SBA. Regular diet. Pleasant. Awaiting placement.

## 2024-09-11 NOTE — PLAN OF CARE
Goal Outcome Evaluation:     Confused. Alert to self only. correction patient.Awaiting placement. No complaints of pain.

## 2024-09-12 PROCEDURE — 101N000002 HC CUSTODIAL CARE DAILY

## 2024-09-12 PROCEDURE — 99231 SBSQ HOSP IP/OBS SF/LOW 25: CPT | Performed by: INTERNAL MEDICINE

## 2024-09-12 PROCEDURE — 250N000013 HC RX MED GY IP 250 OP 250 PS 637: Performed by: STUDENT IN AN ORGANIZED HEALTH CARE EDUCATION/TRAINING PROGRAM

## 2024-09-12 RX ADMIN — SENNOSIDES AND DOCUSATE SODIUM 1 TABLET: 8.6; 5 TABLET ORAL at 22:00

## 2024-09-12 ASSESSMENT — ACTIVITIES OF DAILY LIVING (ADL)
ADLS_ACUITY_SCORE: 48
ADLS_ACUITY_SCORE: 49
ADLS_ACUITY_SCORE: 48
ADLS_ACUITY_SCORE: 49
ADLS_ACUITY_SCORE: 48
ADLS_ACUITY_SCORE: 49
ADLS_ACUITY_SCORE: 48
ADLS_ACUITY_SCORE: 49
ADLS_ACUITY_SCORE: 48
ADLS_ACUITY_SCORE: 49
ADLS_ACUITY_SCORE: 48
ADLS_ACUITY_SCORE: 48

## 2024-09-12 NOTE — PLAN OF CARE
BP (!) 140/72 (BP Location: Left arm)   Pulse 54   Temp 98.2  F (36.8  C) (Oral)   Resp 18   Wt 73.6 kg (162 lb 3.2 oz)   SpO2 95%   BMI 24.66 kg/m      General: Pt unchanged. Not decision making. Alert. VSS on RA.  Continuation of correction Care.    Problem: Fall Injury Risk  Goal: Absence of Fall and Fall-Related Injury  Outcome: Progressing  Intervention: Identify and Manage Contributors  Recent Flowsheet Documentation  Taken 9/12/2024 0717 by Barbi Tam, RN  Medication Review/Management: medications reviewed  Intervention: Promote Injury-Free Environment  Recent Flowsheet Documentation  Taken 9/12/2024 0717 by Barbi Tam, RN  Safety Promotion/Fall Prevention:   supervised activity   safety round/check completed   Goal Outcome Evaluation:

## 2024-09-12 NOTE — PLAN OF CARE
[facility] care. Assessment not completed on shift, per orders. SBA. Denies pain. On ketoconazole cream and steroids for keratosis. Plan to discharge to LTC facility once able.     Goal Outcome Evaluation:    Problem: Fall Injury Risk  Goal: Absence of Fall and Fall-Related Injury  Outcome: Progressing  Intervention: Promote Injury-Free Environment  Recent Flowsheet Documentation  Taken 9/12/2024 0000 by Arabella Key RN  Safety Promotion/Fall Prevention: safety round/check completed

## 2024-09-12 NOTE — PROGRESS NOTES
Patient is alert to self only. Baseline confusion. Impulsive and sets off bed alarm. Incontinent of B&B, but can be continent as well. Denied pain at  the time of assessment. Had no  BM during this shift. Continue to monitor.

## 2024-09-12 NOTE — PROGRESS NOTES
[facility]    Medicine Progress Note - Hospitalist Service    Date of Admission:  6/5/2024    Assessment & Plan   Summary of Stay: Jemal Gray is a 88-year-old male who was transitioned to [facility] care on 6/5. He has history of dementia and family is unable to provide care for him. During his stay we was treated for right foot cellulitis which has resolved. Patient is MA pending, awaiting placement in long term care.      TODAY'S PLAN:  No changes.  Pt stable for discharge.     Problem List:   sn[facility] care admission.  Social work consulted.  Looking for LTC.  Is MA pending, SW working on this. Once a bed is available patient can go anytime      Constipation  Started/continue on senna, have as needed miralax and dulcolax available     Right foot cellulitis.  Resolved with Keflex 4 times daily through 6/11.  Swelling and redness resolved.    Venous ultrasound negative for DVT     Dementia with chronic aphasia.  Not on any medications.   As needed olanzapine ordered for agitation. Impulsive.     5.   Bradycardia  Patient was noted to have HR in 50s.  Asymptomatic     6. Paper work  Guardianship papers filled on 7/13 for the daughter.     7.  Actinic Keratosis                Had surgery for this and prescribed steroids cream and ketoconazole cream, restarted per daughter request           Diet: Combination Diet Regular Diet  Room Service    DVT Prophylaxis: Pneumatic Compression Devices  Maddox Catheter: Not present  Lines: None     Cardiac Monitoring: None  Code Status: No CPR- Do NOT Intubate      Clinically Significant Risk Factors Present on Admission                    # Dementia: noted on problem list                        Disposition Plan     Medically Ready for Discharge: Ready Now             Fred Fonseca MD  Hospitalist Service  [facility]  Securely message with [employer] (more info)  Text page via [employer] Paging/Directory    ______________________________________________________________________    Interval History   Chart reviewed. No acute overnight events.   Ambulatory and singing in the hallway.    Medical Decision Making       10 MINUTES SPENT BY ME on the date of service doing chart review, history, exam, documentation & further activities per the note.

## 2024-09-13 PROCEDURE — 99231 SBSQ HOSP IP/OBS SF/LOW 25: CPT | Performed by: INTERNAL MEDICINE

## 2024-09-13 PROCEDURE — 250N000013 HC RX MED GY IP 250 OP 250 PS 637: Performed by: INTERNAL MEDICINE

## 2024-09-13 PROCEDURE — 250N000013 HC RX MED GY IP 250 OP 250 PS 637: Performed by: STUDENT IN AN ORGANIZED HEALTH CARE EDUCATION/TRAINING PROGRAM

## 2024-09-13 PROCEDURE — 101N000002 HC CUSTODIAL CARE DAILY

## 2024-09-13 RX ADMIN — BETAMETHASONE DIPROPIONATE: 0.5 OINTMENT TOPICAL at 10:36

## 2024-09-13 RX ADMIN — KETOCONAZOLE: 20.5 SHAMPOO, SUSPENSION TOPICAL at 10:37

## 2024-09-13 RX ADMIN — SENNOSIDES AND DOCUSATE SODIUM 1 TABLET: 8.6; 5 TABLET ORAL at 20:09

## 2024-09-13 RX ADMIN — BETAMETHASONE DIPROPIONATE: 0.5 OINTMENT TOPICAL at 20:10

## 2024-09-13 ASSESSMENT — ACTIVITIES OF DAILY LIVING (ADL)
ADLS_ACUITY_SCORE: 46
ADLS_ACUITY_SCORE: 48
ADLS_ACUITY_SCORE: 48
ADLS_ACUITY_SCORE: 46
ADLS_ACUITY_SCORE: 48
ADLS_ACUITY_SCORE: 46
ADLS_ACUITY_SCORE: 48
ADLS_ACUITY_SCORE: 46
ADLS_ACUITY_SCORE: 48
ADLS_ACUITY_SCORE: 47
ADLS_ACUITY_SCORE: 46
ADLS_ACUITY_SCORE: 48
ADLS_ACUITY_SCORE: 46
ADLS_ACUITY_SCORE: 46
ADLS_ACUITY_SCORE: 48

## 2024-09-13 NOTE — PLAN OF CARE
BP (!) 142/70 (BP Location: Left arm, Patient Position: Supine)   Pulse 54   Temp 98.8  F (37.1  C) (Oral)   Resp 17   Wt 73.6 kg (162 lb 3.2 oz)   SpO2 95%   BMI 24.66 kg/m      General: Pt unchanged. Not decision making. Alert. VSS on RA.  Continuation of long-term Care.    Problem: Fall Injury Risk  Goal: Absence of Fall and Fall-Related Injury  Outcome: Progressing  Intervention: Promote Injury-Free Environment  Recent Flowsheet Documentation  Taken 9/13/2024 0814 by Barbi Tam, RN  Safety Promotion/Fall Prevention:   safety round/check completed   room organization consistent   room near nurse's station   room door open   nonskid shoes/slippers when out of bed   increased rounding and observation   clutter free environment maintained   activity supervised  Taken 9/13/2024 0746 by Barbi Tam, RN  Safety Promotion/Fall Prevention:   safety round/check completed   room organization consistent   room near nurse's station   room door open   nonskid shoes/slippers when out of bed   increased rounding and observation   clutter free environment maintained   activity supervised   Goal Outcome Evaluation:

## 2024-09-13 NOTE — PROGRESS NOTES
Patient is impulsive and gets to the bathroom with assist of 1 . Slept most of the night. Denied pain at the time of assessment.. Feeder, Gave scheduled senna. No BM during this shift. Continue to monitor.

## 2024-09-13 NOTE — PROGRESS NOTES
[facility]    Medicine Progress Note - Hospitalist Service    Date of Admission:  6/5/2024    Assessment & Plan   Summary of Stay: Jemal Gray is a 88-year-old male who was transitioned to intermediate care on 6/5. He has history of dementia and family is unable to provide care for him. During his stay we was treated for right foot cellulitis which has resolved. Patient is MA pending, awaiting placement in long term care.      TODAY'S PLAN:  No changes.  Pt stable for discharge.     Problem List:   [facility] care admission.  Social work consulted.  Looking for LTC.  Is MA pending, SW working on this. Once a bed is available patient can go anytime      Constipation  Started/continue on senna, have as needed miralax and dulcolax available     Right foot cellulitis.  Resolved with Keflex 4 times daily through 6/11.  Swelling and redness resolved.    Venous ultrasound negative for DVT     Dementia with chronic aphasia.  Not on any medications.   As needed olanzapine ordered for agitation. Impulsive.     5.   Bradycardia  Patient was noted to have HR in 50s.  Asymptomatic     6. Paper work  Guardianship papers filled on 7/13 for the daughter.     7.  Actinic Keratosis                Had surgery for this and prescribed steroids cream and ketoconazole cream, restarted per daughter request           Diet: Combination Diet Regular Diet  Room Service    DVT Prophylaxis: Pneumatic Compression Devices  Maddox Catheter: Not present  Lines: None     Cardiac Monitoring: None  Code Status: No CPR- Do NOT Intubate      Clinically Significant Risk Factors Present on Admission                    # Dementia: noted on problem list                        Disposition Plan     Medically Ready for Discharge: Ready Now             Fred Fonseca MD  Hospitalist Service  [facility]  Securely message with [employer] (more info)  Text page via [employer] Paging/Directory    ______________________________________________________________________    Interval History   Chart reviewed. No acute overnight events.   Calm, happy.    Medical Decision Making       10 MINUTES SPENT BY ME on the date of service doing chart review, history, exam, documentation & further activities per the note.

## 2024-09-14 PROCEDURE — 250N000013 HC RX MED GY IP 250 OP 250 PS 637: Performed by: STUDENT IN AN ORGANIZED HEALTH CARE EDUCATION/TRAINING PROGRAM

## 2024-09-14 PROCEDURE — 99231 SBSQ HOSP IP/OBS SF/LOW 25: CPT | Performed by: INTERNAL MEDICINE

## 2024-09-14 PROCEDURE — 101N000002 HC CUSTODIAL CARE DAILY

## 2024-09-14 RX ADMIN — SENNOSIDES AND DOCUSATE SODIUM 1 TABLET: 8.6; 5 TABLET ORAL at 21:02

## 2024-09-14 RX ADMIN — BETAMETHASONE DIPROPIONATE: 0.5 OINTMENT TOPICAL at 21:02

## 2024-09-14 RX ADMIN — BETAMETHASONE DIPROPIONATE: 0.5 OINTMENT TOPICAL at 10:02

## 2024-09-14 ASSESSMENT — ACTIVITIES OF DAILY LIVING (ADL)
ADLS_ACUITY_SCORE: 46

## 2024-09-14 NOTE — PROGRESS NOTES
[facility]    Medicine Progress Note - Hospitalist Service    Date of Admission:  6/5/2024    Assessment & Plan   Summary of Stay: Jemal Gray is a 88-year-old male who was transitioned to shelter care on 6/5. He has history of dementia and family is unable to provide care for him. During his stay we was treated for right foot cellulitis which has resolved. Patient is MA pending, awaiting placement in long term care.      TODAY'S PLAN:  No changes.  Pt stable for discharge.     Problem List:   snf care admission.  Social work consulted.  Looking for LTC.  Is MA pending, SW working on this. Once a bed is available patient can go anytime      Constipation  Started/continue on senna, have as needed miralax and dulcolax available     Right foot cellulitis.  Resolved with Keflex 4 times daily through 6/11.  Swelling and redness resolved.    Venous ultrasound negative for DVT     Dementia with chronic aphasia.  Not on any medications.   As needed olanzapine ordered for agitation. Impulsive.     5.   Bradycardia  Patient was noted to have HR in 50s.  Asymptomatic     6. Paper work  Guardianship papers filled on 7/13 for the daughter.     7.  Actinic Keratosis                Had surgery for this and prescribed steroids cream and ketoconazole cream, restarted per daughter request           Diet: Combination Diet Regular Diet  Room Service    DVT Prophylaxis: Pneumatic Compression Devices  Maddox Catheter: Not present  Lines: None     Cardiac Monitoring: None  Code Status: No CPR- Do NOT Intubate      Clinically Significant Risk Factors Present on Admission                    # Dementia: noted on problem list                        Disposition Plan     Medically Ready for Discharge: Ready Now             Fred Fonseca MD  Hospitalist Service  [facility]  Securely message with [employer] (more info)  Text page via [employer] Paging/Directory    ______________________________________________________________________    Interval History   Chart reviewed. No acute overnight events.   Calm, happy.  Updated daughter    Medical Decision Making       10 MINUTES SPENT BY ME on the date of service doing chart review, history, exam, documentation & further activities per the note.

## 2024-09-14 NOTE — PLAN OF CARE
Pt unchanged. Not decision making. Alert. VSS on RA   Problem: Adult Inpatient Plan of Care  Goal: Plan of Care Review  Description: The Plan of Care Review/Shift note should be completed every shift.  The Outcome Evaluation is a brief statement about your assessment that the patient is improving, declining, or no change.  This information will be displayed automatically on your shift  note.  Recent Flowsheet Documentation  Taken 9/14/2024 0658 by Maria De Jesus Bruce RN  Overall Patient Progress: no change   Goal Outcome Evaluation:           Overall Patient Progress: no changeOverall Patient Progress: no change

## 2024-09-14 NOTE — PROGRESS NOTES
Pt unchanged. Not decision making. Alert. VSS on RA   Problem: Adult Inpatient Plan of Care  Goal: Plan of Care Review  Description: The Plan of Care Review/Shift note should be completed every shift.  The Outcome Evaluation is a brief statement about your assessment that the patient is improving, declining, or no change.  This information will be displayed automatically on your shift  note.  Recent Flowsheet Documentation  Taken 9/14/2024 1851 by Maria De Jesus Bruce RN  Overall Patient Progress: no change   Goal Outcome Evaluation:        Overall Patient Progress: no changeOverall Patient Progress: no change

## 2024-09-14 NOTE — PLAN OF CARE
Pt stable, ambulated in halls, good appetite.  Problem: Fall Injury Risk  Goal: Absence of Fall and Fall-Related Injury  Outcome: Met   Goal Outcome Evaluation:

## 2024-09-14 NOTE — PLAN OF CARE
Daughter visited. No changes noted. No pain noted. Eating well.     Problem: Adult Inpatient Plan of Care  Goal: Plan of Care Review  Description: The Plan of Care Review/Shift note should be completed every shift.  The Outcome Evaluation is a brief statement about your assessment that the patient is improving, declining, or no change.  This information will be displayed automatically on your shift  note.  Recent Flowsheet Documentation  Taken 9/14/2024 1048 by Ina Garvey RN  Outcome Evaluation: No changes noted  Plan of Care Reviewed With: patient  Overall Patient Progress: no change       Goal Outcome Evaluation:      Plan of Care Reviewed With: patient    Overall Patient Progress: no changeOverall Patient Progress: no change    Outcome Evaluation: No changes noted

## 2024-09-15 PROCEDURE — 250N000013 HC RX MED GY IP 250 OP 250 PS 637: Performed by: STUDENT IN AN ORGANIZED HEALTH CARE EDUCATION/TRAINING PROGRAM

## 2024-09-15 PROCEDURE — 99207 PR NO BILLABLE SERVICE THIS VISIT: CPT | Performed by: INTERNAL MEDICINE

## 2024-09-15 PROCEDURE — 101N000002 HC CUSTODIAL CARE DAILY

## 2024-09-15 RX ADMIN — SENNOSIDES AND DOCUSATE SODIUM 1 TABLET: 8.6; 5 TABLET ORAL at 22:07

## 2024-09-15 RX ADMIN — BETAMETHASONE DIPROPIONATE: 0.5 OINTMENT TOPICAL at 22:08

## 2024-09-15 RX ADMIN — BETAMETHASONE DIPROPIONATE: 0.5 OINTMENT TOPICAL at 09:43

## 2024-09-15 ASSESSMENT — ACTIVITIES OF DAILY LIVING (ADL)
ADLS_ACUITY_SCORE: 47
ADLS_ACUITY_SCORE: 50
ADLS_ACUITY_SCORE: 49
ADLS_ACUITY_SCORE: 46
ADLS_ACUITY_SCORE: 49
ADLS_ACUITY_SCORE: 46
ADLS_ACUITY_SCORE: 50
ADLS_ACUITY_SCORE: 46
ADLS_ACUITY_SCORE: 50
ADLS_ACUITY_SCORE: 50
ADLS_ACUITY_SCORE: 47
ADLS_ACUITY_SCORE: 46
ADLS_ACUITY_SCORE: 50
ADLS_ACUITY_SCORE: 46
ADLS_ACUITY_SCORE: 50

## 2024-09-15 NOTE — PLAN OF CARE
Goal Outcome Evaluation:           Overall Patient Progress: no changeOverall Patient Progress: no change    Outcome Evaluation: Awaiting placement      Problem: Adult Inpatient Plan of Care  Goal: Plan of Care Review  Description: The Plan of Care Review/Shift note should be completed every shift.  The Outcome Evaluation is a brief statement about your assessment that the patient is improving, declining, or no change.  This information will be displayed automatically on your shift  note.  Recent Flowsheet Documentation  Taken 9/15/2024 0627 by Sheflai Selby RN  Outcome Evaluation: Awaiting placement  Overall Patient Progress: no change

## 2024-09-15 NOTE — PROGRESS NOTES
Pt is pleasantly confused, feeds self with setup. Does not call for help. Did not show any sighs of pain or discomfort. Stable

## 2024-09-15 NOTE — PROGRESS NOTES
[facility]    Medicine Progress Note - Hospitalist Service    Date of Admission:  6/5/2024    Assessment & Plan   Summary of Stay: Jemal Gray is a 88-year-old male who was transitioned to group home care on 6/5. He has history of dementia and family is unable to provide care for him. During his stay we was treated for right foot cellulitis which has resolved. Patient is MA pending, awaiting placement in long term care.      TODAY'S PLAN:  No changes.  Pt stable for discharge.     Problem List:   retirement care admission.  Social work consulted.  Looking for LTC.  Is MA pending, SW working on this. Once a bed is available patient can go anytime      Constipation  Started/continue on senna, have as needed miralax and dulcolax available     Right foot cellulitis.  Resolved with Keflex 4 times daily through 6/11.  Swelling and redness resolved.    Venous ultrasound negative for DVT     Dementia with chronic aphasia.  Not on any medications.   As needed olanzapine ordered for agitation. Impulsive.     5.   Bradycardia  Patient was noted to have HR in 50s.  Asymptomatic     6. Paper work  Guardianship papers filled on 7/13 for the daughter.     7.  Actinic Keratosis                Had surgery for this and prescribed steroids cream and ketoconazole cream, restarted per daughter request           Diet: Combination Diet Regular Diet  Room Service    DVT Prophylaxis: Pneumatic Compression Devices  Maddox Catheter: Not present  Lines: None     Cardiac Monitoring: None  Code Status: No CPR- Do NOT Intubate      Clinically Significant Risk Factors Present on Admission                    # Dementia: noted on problem list                        Disposition Plan     Medically Ready for Discharge: Ready Now             Fred Fonseca MD  Hospitalist Service  [facility]  Securely message with [employer] (more info)  Text page via [employer] Paging/Directory    ______________________________________________________________________    Interval History   Chart reviewed. No acute overnight events.   Calm, happy.  Updated daughter yesterday    Medical Decision Making       10 MINUTES SPENT BY ME on the date of service doing chart review, history, exam, documentation & further activities per the note.

## 2024-09-16 PROCEDURE — 250N000013 HC RX MED GY IP 250 OP 250 PS 637: Performed by: INTERNAL MEDICINE

## 2024-09-16 PROCEDURE — 101N000002 HC CUSTODIAL CARE DAILY

## 2024-09-16 PROCEDURE — 99207 PR NO BILLABLE SERVICE THIS VISIT: CPT | Performed by: INTERNAL MEDICINE

## 2024-09-16 RX ADMIN — BETAMETHASONE DIPROPIONATE: 0.5 OINTMENT TOPICAL at 09:45

## 2024-09-16 RX ADMIN — Medication 5 MG: at 00:47

## 2024-09-16 RX ADMIN — KETOCONAZOLE: 20.5 SHAMPOO, SUSPENSION TOPICAL at 09:45

## 2024-09-16 ASSESSMENT — ACTIVITIES OF DAILY LIVING (ADL)
ADLS_ACUITY_SCORE: 49
ADLS_ACUITY_SCORE: 50
ADLS_ACUITY_SCORE: 49
ADLS_ACUITY_SCORE: 50
ADLS_ACUITY_SCORE: 49
ADLS_ACUITY_SCORE: 50
ADLS_ACUITY_SCORE: 49
ADLS_ACUITY_SCORE: 49
ADLS_ACUITY_SCORE: 50
ADLS_ACUITY_SCORE: 50
ADLS_ACUITY_SCORE: 49
ADLS_ACUITY_SCORE: 50
ADLS_ACUITY_SCORE: 49
ADLS_ACUITY_SCORE: 50

## 2024-09-16 NOTE — PLAN OF CARE
Disoriented x4. Pt appears comfortable w/ no pain. LS-clear. Regular diet w/ good appetite. Up w/ SBA. Voiding ok. Discharge TBD when placement found.     Problem: Adult Inpatient Plan of Care  Goal: Absence of Hospital-Acquired Illness or Injury  Intervention: Identify and Manage Fall Risk  Recent Flowsheet Documentation  Taken 9/16/2024 0951 by Leena Sandy RN  Safety Promotion/Fall Prevention:   activity supervised   assistive device/personal items within reach   nonskid shoes/slippers when out of bed   safety round/check completed  Taken 9/16/2024 0747 by Leena Sandy RN  Safety Promotion/Fall Prevention: safety round/check completed  Intervention: Prevent Skin Injury  Recent Flowsheet Documentation  Taken 9/16/2024 0951 by Leena Sandy RN  Body Position: position changed independently  Intervention: Prevent Infection  Recent Flowsheet Documentation  Taken 9/16/2024 0951 by Leena Sandy RN  Infection Prevention:   hand hygiene promoted   rest/sleep promoted     Problem: Adult Inpatient Plan of Care  Goal: Absence of Hospital-Acquired Illness or Injury  Intervention: Identify and Manage Fall Risk  Recent Flowsheet Documentation  Taken 9/16/2024 0951 by Leena Sandy RN  Safety Promotion/Fall Prevention:   activity supervised   assistive device/personal items within reach   nonskid shoes/slippers when out of bed   safety round/check completed  Taken 9/16/2024 0747 by Leena Sandy RN  Safety Promotion/Fall Prevention: safety round/check completed  Intervention: Prevent Skin Injury  Recent Flowsheet Documentation  Taken 9/16/2024 0951 by Leena Sandy RN  Body Position: position changed independently  Intervention: Prevent Infection  Recent Flowsheet Documentation  Taken 9/16/2024 0951 by Leena Sandy RN  Infection Prevention:   hand hygiene promoted   rest/sleep promoted     Problem: Fall Injury Risk  Goal: Absence of Fall and Fall-Related Injury  Intervention: Identify and  Manage Contributors  Recent Flowsheet Documentation  Taken 9/16/2024 0951 by Leena Sandy RN  Medication Review/Management: medications reviewed  Intervention: Promote Injury-Free Environment  Recent Flowsheet Documentation  Taken 9/16/2024 0951 by Leena Sandy RN  Safety Promotion/Fall Prevention:   activity supervised   assistive device/personal items within reach   nonskid shoes/slippers when out of bed   safety round/check completed  Taken 9/16/2024 0747 by Leena Sandy RN  Safety Promotion/Fall Prevention: safety round/check completed     Problem: Adult Inpatient Plan of Care  Goal: Absence of Hospital-Acquired Illness or Injury  Intervention: Identify and Manage Fall Risk  Recent Flowsheet Documentation  Taken 9/16/2024 0951 by Leena Sandy RN  Safety Promotion/Fall Prevention:   activity supervised   assistive device/personal items within reach   nonskid shoes/slippers when out of bed   safety round/check completed  Taken 9/16/2024 0747 by Leena Sandy RN  Safety Promotion/Fall Prevention: safety round/check completed  Intervention: Prevent Skin Injury  Recent Flowsheet Documentation  Taken 9/16/2024 0951 by Leena Sandy RN  Body Position: position changed independently  Intervention: Prevent Infection  Recent Flowsheet Documentation  Taken 9/16/2024 0951 by Leena Sandy RN  Infection Prevention:   hand hygiene promoted   rest/sleep promoted     Problem: Fall Injury Risk  Goal: Absence of Fall and Fall-Related Injury  Intervention: Identify and Manage Contributors  Recent Flowsheet Documentation  Taken 9/16/2024 0951 by Leena Sandy RN  Medication Review/Management: medications reviewed  Intervention: Promote Injury-Free Environment  Recent Flowsheet Documentation  Taken 9/16/2024 0951 by Leena Sandy RN  Safety Promotion/Fall Prevention:   activity supervised   assistive device/personal items within reach   nonskid shoes/slippers when out of bed   safety  round/check completed  Taken 9/16/2024 0747 by Leena Sandy RN  Safety Promotion/Fall Prevention: safety round/check completed     Problem: Fall Injury Risk  Goal: Absence of Fall and Fall-Related Injury  Intervention: Identify and Manage Contributors  Recent Flowsheet Documentation  Taken 9/16/2024 0951 by Leena Sandy RN  Medication Review/Management: medications reviewed  Intervention: Promote Injury-Free Environment  Recent Flowsheet Documentation  Taken 9/16/2024 0951 by Leena Sandy RN  Safety Promotion/Fall Prevention:   activity supervised   assistive device/personal items within reach   nonskid shoes/slippers when out of bed   safety round/check completed  Taken 9/16/2024 0747 by Leena Snady RN  Safety Promotion/Fall Prevention: safety round/check completed     Problem: Adult Inpatient Plan of Care  Goal: Absence of Hospital-Acquired Illness or Injury  Intervention: Identify and Manage Fall Risk  Recent Flowsheet Documentation  Taken 9/16/2024 0951 by Leena Sandy RN  Safety Promotion/Fall Prevention:   activity supervised   assistive device/personal items within reach   nonskid shoes/slippers when out of bed   safety round/check completed  Taken 9/16/2024 0747 by Leena Snady RN  Safety Promotion/Fall Prevention: safety round/check completed  Intervention: Prevent Skin Injury  Recent Flowsheet Documentation  Taken 9/16/2024 0951 by Leena Sandy RN  Body Position: position changed independently  Intervention: Prevent Infection  Recent Flowsheet Documentation  Taken 9/16/2024 0951 by Leena Sandy RN  Infection Prevention:   hand hygiene promoted   rest/sleep promoted     Problem: Adult Inpatient Plan of Care  Goal: Absence of Hospital-Acquired Illness or Injury  Intervention: Identify and Manage Fall Risk  Recent Flowsheet Documentation  Taken 9/16/2024 0951 by Leena Sandy RN  Safety Promotion/Fall Prevention:   activity supervised   assistive device/personal  items within reach   nonskid shoes/slippers when out of bed   safety round/check completed  Taken 9/16/2024 0747 by Leena Sandy RN  Safety Promotion/Fall Prevention: safety round/check completed  Intervention: Prevent Skin Injury  Recent Flowsheet Documentation  Taken 9/16/2024 0951 by Leena Sandy RN  Body Position: position changed independently  Intervention: Prevent Infection  Recent Flowsheet Documentation  Taken 9/16/2024 0951 by Leena Sandy RN  Infection Prevention:   hand hygiene promoted   rest/sleep promoted     Problem: Fall Injury Risk  Goal: Absence of Fall and Fall-Related Injury  Intervention: Identify and Manage Contributors  Recent Flowsheet Documentation  Taken 9/16/2024 0951 by Leena Sandy RN  Medication Review/Management: medications reviewed  Intervention: Promote Injury-Free Environment  Recent Flowsheet Documentation  Taken 9/16/2024 0951 by Leena Sandy RN  Safety Promotion/Fall Prevention:   activity supervised   assistive device/personal items within reach   nonskid shoes/slippers when out of bed   safety round/check completed  Taken 9/16/2024 0747 by Leena Sandy RN  Safety Promotion/Fall Prevention: safety round/check completed     Problem: Fall Injury Risk  Goal: Absence of Fall and Fall-Related Injury  Intervention: Identify and Manage Contributors  Recent Flowsheet Documentation  Taken 9/16/2024 0951 by Leena Sandy RN  Medication Review/Management: medications reviewed  Intervention: Promote Injury-Free Environment  Recent Flowsheet Documentation  Taken 9/16/2024 0951 by Leena Sandy RN  Safety Promotion/Fall Prevention:   activity supervised   assistive device/personal items within reach   nonskid shoes/slippers when out of bed   safety round/check completed  Taken 9/16/2024 0747 by Leena Sandy RN  Safety Promotion/Fall Prevention: safety round/check completed     Problem: Adult Inpatient Plan of Care  Goal: Absence of  Hospital-Acquired Illness or Injury  Intervention: Identify and Manage Fall Risk  Recent Flowsheet Documentation  Taken 9/16/2024 0951 by Leena Sandy RN  Safety Promotion/Fall Prevention:   activity supervised   assistive device/personal items within reach   nonskid shoes/slippers when out of bed   safety round/check completed  Taken 9/16/2024 0747 by Leena Sandy RN  Safety Promotion/Fall Prevention: safety round/check completed  Intervention: Prevent Skin Injury  Recent Flowsheet Documentation  Taken 9/16/2024 0951 by Leena Sandy RN  Body Position: position changed independently  Intervention: Prevent Infection  Recent Flowsheet Documentation  Taken 9/16/2024 0951 by Leena Sandy RN  Infection Prevention:   hand hygiene promoted   rest/sleep promoted     Problem: Adult Inpatient Plan of Care  Goal: Absence of Hospital-Acquired Illness or Injury  Intervention: Identify and Manage Fall Risk  Recent Flowsheet Documentation  Taken 9/16/2024 0951 by Leena Sandy RN  Safety Promotion/Fall Prevention:   activity supervised   assistive device/personal items within reach   nonskid shoes/slippers when out of bed   safety round/check completed  Taken 9/16/2024 0747 by Leena Sandy RN  Safety Promotion/Fall Prevention: safety round/check completed  Intervention: Prevent Skin Injury  Recent Flowsheet Documentation  Taken 9/16/2024 0951 by Leena Sandy RN  Body Position: position changed independently  Intervention: Prevent Infection  Recent Flowsheet Documentation  Taken 9/16/2024 0951 by Leena Sandy RN  Infection Prevention:   hand hygiene promoted   rest/sleep promoted     Problem: Comorbidity Management  Goal: Maintenance of Asthma Control  Intervention: Maintain Asthma Symptom Control  Recent Flowsheet Documentation  Taken 9/16/2024 0951 by Leena Sandy RN  Medication Review/Management: medications reviewed  Goal: Maintenance of Behavioral Health Symptom  Control  Intervention: Maintain Behavioral Health Symptom Control  Recent Flowsheet Documentation  Taken 9/16/2024 0951 by Leena Sandy RN  Medication Review/Management: medications reviewed  Goal: Maintenance of COPD Symptom Control  Intervention: Maintain COPD Symptom Control  Recent Flowsheet Documentation  Taken 9/16/2024 0951 by Leena Sandy RN  Medication Review/Management: medications reviewed  Goal: Blood Glucose Levels Within Targeted Range  Intervention: Monitor and Manage Glycemia  Recent Flowsheet Documentation  Taken 9/16/2024 0951 by Leena Sandy RN  Medication Review/Management: medications reviewed  Goal: Maintenance of Heart Failure Symptom Control  Intervention: Maintain Heart Failure Management  Recent Flowsheet Documentation  Taken 9/16/2024 0951 by Leena Sandy RN  Medication Review/Management: medications reviewed  Goal: Blood Pressure in Desired Range  Intervention: Maintain Blood Pressure Management  Recent Flowsheet Documentation  Taken 9/16/2024 0951 by Leena Sandy RN  Medication Review/Management: medications reviewed  Goal: Maintenance of Osteoarthritis Symptom Control  Intervention: Maintain Osteoarthritis Symptom Control  Recent Flowsheet Documentation  Taken 9/16/2024 0951 by Leena Sandy RN  Activity Management: activity adjusted per tolerance  Medication Review/Management: medications reviewed  Goal: Bariatric Home Regimen Maintained  Intervention: Maintain and Manage Postbariatric Surgery Care  Recent Flowsheet Documentation  Taken 9/16/2024 0951 by Leena Sandy RN  Medication Review/Management: medications reviewed  Goal: Maintenance of Seizure Control  Intervention: Maintain Seizure Symptom Control  Recent Flowsheet Documentation  Taken 9/16/2024 0951 by Leena Sandy RN  Medication Review/Management: medications reviewed     Problem: Pain Acute  Goal: Optimal Pain Control and Function  Intervention: Prevent or Manage Pain  Recent Flowsheet  Documentation  Taken 9/16/2024 0951 by Leena Sandy RN  Medication Review/Management: medications reviewed     Problem: Adult Inpatient Plan of Care  Goal: Absence of Hospital-Acquired Illness or Injury  Intervention: Identify and Manage Fall Risk  Recent Flowsheet Documentation  Taken 9/16/2024 0951 by Leena Sandy RN  Safety Promotion/Fall Prevention:   activity supervised   assistive device/personal items within reach   nonskid shoes/slippers when out of bed   safety round/check completed  Taken 9/16/2024 0747 by Leena Sandy RN  Safety Promotion/Fall Prevention: safety round/check completed  Intervention: Prevent Skin Injury  Recent Flowsheet Documentation  Taken 9/16/2024 0951 by Leena Sandy RN  Body Position: position changed independently  Intervention: Prevent Infection  Recent Flowsheet Documentation  Taken 9/16/2024 0951 by Leena Sandy RN  Infection Prevention:   hand hygiene promoted   rest/sleep promoted     Problem: Fall Injury Risk  Goal: Absence of Fall and Fall-Related Injury  Intervention: Identify and Manage Contributors  Recent Flowsheet Documentation  Taken 9/16/2024 0951 by Leena Sandy RN  Medication Review/Management: medications reviewed  Intervention: Promote Injury-Free Environment  Recent Flowsheet Documentation  Taken 9/16/2024 0951 by Leena Sandy RN  Safety Promotion/Fall Prevention:   activity supervised   assistive device/personal items within reach   nonskid shoes/slippers when out of bed   safety round/check completed  Taken 9/16/2024 0747 by Leena Sandy RN  Safety Promotion/Fall Prevention: safety round/check completed     Problem: Adult Inpatient Plan of Care  Goal: Absence of Hospital-Acquired Illness or Injury  Intervention: Identify and Manage Fall Risk  Recent Flowsheet Documentation  Taken 9/16/2024 0951 by Leena Sandy RN  Safety Promotion/Fall Prevention:   activity supervised   assistive device/personal items within reach    nonskid shoes/slippers when out of bed   safety round/check completed  Taken 9/16/2024 0747 by Leena Sandy RN  Safety Promotion/Fall Prevention: safety round/check completed  Intervention: Prevent Skin Injury  Recent Flowsheet Documentation  Taken 9/16/2024 0951 by Leena Sandy RN  Body Position: position changed independently  Intervention: Prevent Infection  Recent Flowsheet Documentation  Taken 9/16/2024 0951 by Leena Sandy RN  Infection Prevention:   hand hygiene promoted   rest/sleep promoted     Problem: Fall Injury Risk  Goal: Absence of Fall and Fall-Related Injury  Intervention: Identify and Manage Contributors  Recent Flowsheet Documentation  Taken 9/16/2024 0951 by Leena Sandy RN  Medication Review/Management: medications reviewed  Intervention: Promote Injury-Free Environment  Recent Flowsheet Documentation  Taken 9/16/2024 0951 by Leena Sandy RN  Safety Promotion/Fall Prevention:   activity supervised   assistive device/personal items within reach   nonskid shoes/slippers when out of bed   safety round/check completed  Taken 9/16/2024 0747 by Leena Sandy RN  Safety Promotion/Fall Prevention: safety round/check completed     Problem: Adult Inpatient Plan of Care  Goal: Absence of Hospital-Acquired Illness or Injury  Intervention: Identify and Manage Fall Risk  Recent Flowsheet Documentation  Taken 9/16/2024 0951 by Leena Sandy RN  Safety Promotion/Fall Prevention:   activity supervised   assistive device/personal items within reach   nonskid shoes/slippers when out of bed   safety round/check completed  Taken 9/16/2024 0747 by Leena Sandy RN  Safety Promotion/Fall Prevention: safety round/check completed  Intervention: Prevent Skin Injury  Recent Flowsheet Documentation  Taken 9/16/2024 0951 by Leena Sandy RN  Body Position: position changed independently  Intervention: Prevent Infection  Recent Flowsheet Documentation  Taken 9/16/2024 0951 by  Leena Sandy RN  Infection Prevention:   hand hygiene promoted   rest/sleep promoted     Problem: Adult Inpatient Plan of Care  Goal: Absence of Hospital-Acquired Illness or Injury  Intervention: Identify and Manage Fall Risk  Recent Flowsheet Documentation  Taken 9/16/2024 0951 by Leena Sandy RN  Safety Promotion/Fall Prevention:   activity supervised   assistive device/personal items within reach   nonskid shoes/slippers when out of bed   safety round/check completed  Taken 9/16/2024 0747 by Leena Sandy RN  Safety Promotion/Fall Prevention: safety round/check completed  Intervention: Prevent Skin Injury  Recent Flowsheet Documentation  Taken 9/16/2024 0951 by Leena Sandy RN  Body Position: position changed independently  Intervention: Prevent Infection  Recent Flowsheet Documentation  Taken 9/16/2024 0951 by Leena Sandy RN  Infection Prevention:   hand hygiene promoted   rest/sleep promoted     Problem: Adult Inpatient Plan of Care  Goal: Absence of Hospital-Acquired Illness or Injury  Intervention: Identify and Manage Fall Risk  Recent Flowsheet Documentation  Taken 9/16/2024 0951 by Leena Sandy RN  Safety Promotion/Fall Prevention:   activity supervised   assistive device/personal items within reach   nonskid shoes/slippers when out of bed   safety round/check completed  Taken 9/16/2024 0747 by Leena Sandy RN  Safety Promotion/Fall Prevention: safety round/check completed  Intervention: Prevent Skin Injury  Recent Flowsheet Documentation  Taken 9/16/2024 0951 by Leena Sandy RN  Body Position: position changed independently  Intervention: Prevent Infection  Recent Flowsheet Documentation  Taken 9/16/2024 0951 by Leena Sandy RN  Infection Prevention:   hand hygiene promoted   rest/sleep promoted     Problem: Fall Injury Risk  Goal: Absence of Fall and Fall-Related Injury  Intervention: Identify and Manage Contributors  Recent Flowsheet Documentation  Taken  9/16/2024 0951 by Leena Sandy RN  Medication Review/Management: medications reviewed  Intervention: Promote Injury-Free Environment  Recent Flowsheet Documentation  Taken 9/16/2024 0951 by Leena Sandy RN  Safety Promotion/Fall Prevention:   activity supervised   assistive device/personal items within reach   nonskid shoes/slippers when out of bed   safety round/check completed  Taken 9/16/2024 0747 by Leena Sandy RN  Safety Promotion/Fall Prevention: safety round/check completed     Problem: Fall Injury Risk  Goal: Absence of Fall and Fall-Related Injury  Intervention: Identify and Manage Contributors  Recent Flowsheet Documentation  Taken 9/16/2024 0951 by Leena Sandy RN  Medication Review/Management: medications reviewed  Intervention: Promote Injury-Free Environment  Recent Flowsheet Documentation  Taken 9/16/2024 0951 by Leena Sandy RN  Safety Promotion/Fall Prevention:   activity supervised   assistive device/personal items within reach   nonskid shoes/slippers when out of bed   safety round/check completed  Taken 9/16/2024 0747 by Leena Sandy RN  Safety Promotion/Fall Prevention: safety round/check completed     Problem: Fall Injury Risk  Goal: Absence of Fall and Fall-Related Injury  Intervention: Identify and Manage Contributors  Recent Flowsheet Documentation  Taken 9/16/2024 0951 by Leena Sandy RN  Medication Review/Management: medications reviewed  Intervention: Promote Injury-Free Environment  Recent Flowsheet Documentation  Taken 9/16/2024 0951 by Leena Sandy RN  Safety Promotion/Fall Prevention:   activity supervised   assistive device/personal items within reach   nonskid shoes/slippers when out of bed   safety round/check completed  Taken 9/16/2024 0747 by Leena Sandy RN  Safety Promotion/Fall Prevention: safety round/check completed     Problem: Fall Injury Risk  Goal: Absence of Fall and Fall-Related Injury  Intervention: Identify and Manage  Contributors  Recent Flowsheet Documentation  Taken 9/16/2024 0951 by Leena Sandy RN  Medication Review/Management: medications reviewed  Intervention: Promote Injury-Free Environment  Recent Flowsheet Documentation  Taken 9/16/2024 0951 by Leena Sandy RN  Safety Promotion/Fall Prevention:   activity supervised   assistive device/personal items within reach   nonskid shoes/slippers when out of bed   safety round/check completed  Taken 9/16/2024 0747 by Leena Sandy RN  Safety Promotion/Fall Prevention: safety round/check completed     Problem: Adult Inpatient Plan of Care  Goal: Absence of Hospital-Acquired Illness or Injury  Intervention: Identify and Manage Fall Risk  Recent Flowsheet Documentation  Taken 9/16/2024 0951 by Leena Sandy RN  Safety Promotion/Fall Prevention:   activity supervised   assistive device/personal items within reach   nonskid shoes/slippers when out of bed   safety round/check completed  Taken 9/16/2024 0747 by Leena Sandy RN  Safety Promotion/Fall Prevention: safety round/check completed  Intervention: Prevent Skin Injury  Recent Flowsheet Documentation  Taken 9/16/2024 0951 by Leena Sandy RN  Body Position: position changed independently  Intervention: Prevent Infection  Recent Flowsheet Documentation  Taken 9/16/2024 0951 by Leena Sandy RN  Infection Prevention:   hand hygiene promoted   rest/sleep promoted   Goal Outcome Evaluation:

## 2024-09-16 NOTE — PROGRESS NOTES
Medicine Progress Note - Hospitalist Service    Date of Admission:  6/5/2024    Assessment & Plan   Summary of Stay: Jemal Gray is a 88-year-old male who was transitioned to care home care on 6/5. He has history of dementia and family is unable to provide care for him. During his stay we was treated for right foot cellulitis which has resolved. Patient is MA pending, awaiting placement in long term care.      TODAY'S PLAN:  No changes.  Pt stable for discharge.     Problem List:   care home care admission.  Social work consulted.  Looking for LTC.  Is MA pending, SW working on this. Once a bed is available patient can go anytime      Constipation  Started/continue on senna, have as needed miralax and dulcolax available     Right foot cellulitis.  Resolved with Keflex 4 times daily through 6/11.  Swelling and redness resolved.    Venous ultrasound negative for DVT     Dementia with chronic aphasia.  Not on any medications.   As needed olanzapine ordered for agitation. Impulsive.     5.   Bradycardia  Patient was noted to have HR in 50s.  Asymptomatic     6. Paper work  Guardianship papers filled on 7/13 for the daughter.     7.  Actinic Keratosis                Had surgery for this and prescribed steroids cream and ketoconazole cream, restarted per daughter request           Diet: Combination Diet Regular Diet  Room Service    DVT Prophylaxis: Pneumatic Compression Devices  Maddox Catheter: Not present  Lines: None     Cardiac Monitoring: None  Code Status: No CPR- Do NOT Intubate      Clinically Significant Risk Factors Present on Admission                    # Dementia: noted on problem list                        Disposition Plan     Medically Ready for Discharge: Ready Now             Fred Fonseca MD  Hospitalist Service    Securely message with [facility] (more info)  Text page via [employer] Paging/Directory    ______________________________________________________________________    Interval History   Chart reviewed. No acute overnight events.   Calm, happy.  Updated daughter yesterday    Medical Decision Making       10 MINUTES SPENT BY ME on the date of service doing chart review, history, exam, documentation & further activities per the note.

## 2024-09-16 NOTE — PLAN OF CARE
"Pt disoriented x4. Does not appear to be in distress. Pt stable. On scheduled senna. Awaiting placement to LTC.     Problem: Adult Inpatient Plan of Care  Goal: Plan of Care Review  Description: The Plan of Care Review/Shift note should be completed every shift.  The Outcome Evaluation is a brief statement about your assessment that the patient is improving, declining, or no change.  This information will be displayed automatically on your shift  note.  Outcome: Met  Flowsheets (Taken 9/16/2024 0623)  Outcome Evaluation: Pt on [facility] care.  Plan of Care Reviewed With: patient  Overall Patient Progress: no change  Goal: Patient-Specific Goal (Individualized)  Description: You can add care plan individualizations to a care plan. Examples of Individualization might be:  \"Parent requests to be called daily at 9am for status\", \"I have a hard time hearing out of my right ear\", or \"Do not touch me to wake me up as it startles  me\".  Outcome: Met  Goal: Absence of Hospital-Acquired Illness or Injury  Outcome: Met  Intervention: Identify and Manage Fall Risk  Recent Flowsheet Documentation  Taken 9/16/2024 0026 by Julia Moss RN  Safety Promotion/Fall Prevention:   safety round/check completed   nonskid shoes/slippers when out of bed   clutter free environment maintained  Intervention: Prevent Skin Injury  Recent Flowsheet Documentation  Taken 9/16/2024 0026 by Julia Moss RN  Body Position: position changed independently  Intervention: Prevent Infection  Recent Flowsheet Documentation  Taken 9/16/2024 0026 by Julia Moss, BALDOMERO  Infection Prevention:   rest/sleep promoted   single patient room provided  Goal: Optimal Comfort and Wellbeing  Outcome: Met  Goal: Readiness for Transition of Care  Outcome: Met   Goal Outcome Evaluation:      Plan of Care Reviewed With: patient    Overall Patient Progress: no changeOverall Patient Progress: no change    Outcome Evaluation: Pt on jail care.      "

## 2024-09-16 NOTE — PLAN OF CARE
"Pt remains on [facility] care. Up to restroom to void spontaneously. Appetite/intake fair.    Goal Outcome Evaluation:      Plan of Care Reviewed With: patient    Overall Patient Progress: no changeOverall Patient Progress: no change    Outcome Evaluation: Pt remains [facility] care      Problem: Adult Inpatient Plan of Care  Goal: Plan of Care Review  Description: The Plan of Care Review/Shift note should be completed every shift.  The Outcome Evaluation is a brief statement about your assessment that the patient is improving, declining, or no change.  This information will be displayed automatically on your shift  note.  Outcome: Progressing  Flowsheets (Taken 9/15/2024 2348)  Outcome Evaluation: Pt remains [facility] care  Plan of Care Reviewed With: patient  Overall Patient Progress: no change  Goal: Patient-Specific Goal (Individualized)  Description: You can add care plan individualizations to a care plan. Examples of Individualization might be:  \"Parent requests to be called daily at 9am for status\", \"I have a hard time hearing out of my right ear\", or \"Do not touch me to wake me up as it startles  me\".  Outcome: Progressing  Goal: Absence of Hospital-Acquired Illness or Injury  Outcome: Progressing  Intervention: Identify and Manage Fall Risk  Recent Flowsheet Documentation  Taken 9/15/2024 1600 by Sergey Sandoval, RN  Safety Promotion/Fall Prevention:   supervised activity   safety round/check completed   room organization consistent   room door open   nonskid shoes/slippers when out of bed   mobility aid in reach   lighting adjusted   increase visualization of patient  Intervention: Prevent Skin Injury  Recent Flowsheet Documentation  Taken 9/15/2024 1600 by Sergey Sandoval, RN  Body Position: position changed independently  Intervention: Prevent Infection  Recent Flowsheet Documentation  Taken 9/15/2024 1600 by Sergey Sandoval, RN  Infection Prevention:   single patient room provided   rest/sleep promoted   " environmental surveillance performed  Goal: Optimal Comfort and Wellbeing  Outcome: Progressing  Goal: Readiness for Transition of Care  Outcome: Progressing

## 2024-09-17 PROCEDURE — 250N000013 HC RX MED GY IP 250 OP 250 PS 637: Performed by: STUDENT IN AN ORGANIZED HEALTH CARE EDUCATION/TRAINING PROGRAM

## 2024-09-17 PROCEDURE — 101N000002 HC CUSTODIAL CARE DAILY

## 2024-09-17 PROCEDURE — 99231 SBSQ HOSP IP/OBS SF/LOW 25: CPT | Performed by: INTERNAL MEDICINE

## 2024-09-17 RX ADMIN — BETAMETHASONE DIPROPIONATE: 0.5 OINTMENT TOPICAL at 00:42

## 2024-09-17 RX ADMIN — BETAMETHASONE DIPROPIONATE: 0.5 OINTMENT TOPICAL at 10:13

## 2024-09-17 RX ADMIN — SENNOSIDES AND DOCUSATE SODIUM 1 TABLET: 8.6; 5 TABLET ORAL at 21:40

## 2024-09-17 RX ADMIN — BETAMETHASONE DIPROPIONATE: 0.5 OINTMENT TOPICAL at 21:41

## 2024-09-17 ASSESSMENT — ACTIVITIES OF DAILY LIVING (ADL)
ADLS_ACUITY_SCORE: 49

## 2024-09-17 NOTE — PROGRESS NOTES
[facility]    Medicine Progress Note - Hospitalist Service    Date of Admission:  6/5/2024    Assessment & Plan   Summary of Stay: Jemal Gray is a 88-year-old male who was transitioned to [facility] care on 6/5. He has history of dementia and family is unable to provide care for him. During his stay we was treated for right foot cellulitis which has resolved. Patient is MA pending, awaiting placement in long term care.      TODAY'S PLAN:  No changes.  Pt stable for discharge.     Problem List:   alf care admission.  Social work consulted.  Looking for LTC.  Is MA pending, SW working on this. Once a bed is available patient can go anytime      Constipation  Started/continue on senna, have as needed miralax and dulcolax available     Right foot cellulitis.  Resolved with Keflex 4 times daily through 6/11.  Swelling and redness resolved.    Venous ultrasound negative for DVT     Dementia with chronic aphasia.  Not on any medications.   As needed olanzapine ordered for agitation. Impulsive.     5.   Bradycardia  Patient was noted to have HR in 50s.  Asymptomatic     6. Paper work  Guardianship papers filled on 7/13 for the daughter.     7.  Actinic Keratosis                Had surgery for this and prescribed steroids cream and ketoconazole cream, restarted per daughter request           Diet: Combination Diet Regular Diet  Room Service    DVT Prophylaxis: Ambulate every shift  [place]ox Catheter: Not present  Lines: None     Cardiac Monitoring: None  Code Status: No CPR- Do NOT Intubate      Clinically Significant Risk Factors Present on Admission                    # Dementia: noted on problem list                        Disposition Plan     Medically Ready for Discharge: Ready Now             Johnathan Ordonez MD, MD  Hospitalist Service  [facility]  Securely message with [employer] (more info)  Text page via [employer] P[employer]/Directory    ______________________________________________________________________    Interval History   I assume alf service care  No significant reported events overnight  Pleasantly confused      Physical Exam   Vital Signs: Temp: 98.1  F (36.7  C) Temp src: Oral BP: (!) 151/71 Pulse: 52   Resp: 16 SpO2: 96 % O2 Device: None (Room air)    Weight: 162 lbs 3.2 oz    Limited exam but sleeping and easily aroused with minimal verbal stimuli  Moving all extremities spontaneously    Medical Decision Making       10 MINUTES SPENT BY ME on the date of service doing chart review, history, exam, documentation & further activities per the note.  MANAGEMENT DISCUSSED with the following over the past 24 hours: Yes   NOTE(S)/MEDICAL RECORDS REVIEWED over the past 24 hours: Yes yes       Data         Imaging results reviewed over the past 24 hrs:   No results found for this or any previous visit (from the past 24 hour(s)).

## 2024-09-18 PROCEDURE — 250N000013 HC RX MED GY IP 250 OP 250 PS 637: Performed by: STUDENT IN AN ORGANIZED HEALTH CARE EDUCATION/TRAINING PROGRAM

## 2024-09-18 PROCEDURE — 99231 SBSQ HOSP IP/OBS SF/LOW 25: CPT | Performed by: INTERNAL MEDICINE

## 2024-09-18 PROCEDURE — 101N000002 HC CUSTODIAL CARE DAILY

## 2024-09-18 RX ADMIN — KETOCONAZOLE: 20.5 SHAMPOO, SUSPENSION TOPICAL at 10:59

## 2024-09-18 RX ADMIN — BETAMETHASONE DIPROPIONATE: 0.5 OINTMENT TOPICAL at 11:00

## 2024-09-18 RX ADMIN — SENNOSIDES AND DOCUSATE SODIUM 1 TABLET: 8.6; 5 TABLET ORAL at 21:52

## 2024-09-18 RX ADMIN — BETAMETHASONE DIPROPIONATE: 0.5 OINTMENT TOPICAL at 21:53

## 2024-09-18 ASSESSMENT — ACTIVITIES OF DAILY LIVING (ADL)
ADLS_ACUITY_SCORE: 49
ADLS_ACUITY_SCORE: 47
ADLS_ACUITY_SCORE: 49
ADLS_ACUITY_SCORE: 47
ADLS_ACUITY_SCORE: 49
ADLS_ACUITY_SCORE: 49
ADLS_ACUITY_SCORE: 47
ADLS_ACUITY_SCORE: 49
ADLS_ACUITY_SCORE: 47
ADLS_ACUITY_SCORE: 49
ADLS_ACUITY_SCORE: 47
ADLS_ACUITY_SCORE: 49

## 2024-09-18 NOTE — PLAN OF CARE
"VSS no issues. Up with SBA, gait belt and walker at times. jail care. .       Goal Outcome Evaluation:    Problem: Adult Inpatient Plan of Care  Goal: Plan of Care Review  Description: The Plan of Care Review/Shift note should be completed every shift.  The Outcome Evaluation is a brief statement about your assessment that the patient is improving, declining, or no change.  This information will be displayed automatically on your shift  note.  9/18/2024 1408 by Janine Cantu RN  Flowsheets (Taken 9/18/2024 1408)  Outcome Evaluation: jail care no changes  Plan of Care Reviewed With: patient  Overall Patient Progress: no change  9/18/2024 1406 by Janine Cantu RN  Reactivated  Goal: Patient-Specific Goal (Individualized)  Description: You can add care plan individualizations to a care plan. Examples of Individualization might be:  \"Parent requests to be called daily at 9am for status\", \"I have a hard time hearing out of my right ear\", or \"Do not touch me to wake me up as it startles  me\".  Reactivated  Goal: Absence of Hospital-Acquired Illness or Injury  Reactivated  Intervention: Identify and Manage Fall Risk  Recent Flowsheet Documentation  Taken 9/18/2024 1100 by Janine Cantu RN  Safety Promotion/Fall Prevention:   increase visualization of patient   clutter free environment maintained  Intervention: Prevent Skin Injury  Recent Flowsheet Documentation  Taken 9/18/2024 1100 by Janine Cantu RN  Body Position: position changed independently  Goal: Optimal Comfort and Wellbeing  Reactivated  Goal: Readiness for Transition of Care  Reactivated     Problem: Adult Inpatient Plan of Care  Goal: Plan of Care Review  Description: The Plan of Care Review/Shift note should be completed every shift.  The Outcome Evaluation is a brief statement about your assessment that the patient is improving, declining, or no change.  This information will be displayed automatically on your shift  note.  Recent " "Flowsheet Documentation  Taken 9/18/2024 1408 by Janine Cantu RN  Outcome Evaluation: intermediate care no changes  Plan of Care Reviewed With: patient  Overall Patient Progress: no change  9/18/2024 1406 by Janine Cantu RN  Reactivated  Goal: Patient-Specific Goal (Individualized)  Description: You can add care plan individualizations to a care plan. Examples of Individualization might be:  \"Parent requests to be called daily at 9am for status\", \"I have a hard time hearing out of my right ear\", or \"Do not touch me to wake me up as it startles  me\".  Reactivated  Goal: Absence of Hospital-Acquired Illness or Injury  Reactivated  Intervention: Identify and Manage Fall Risk  Recent Flowsheet Documentation  Taken 9/18/2024 1100 by Janine Cantu RN  Safety Promotion/Fall Prevention:   increase visualization of patient   clutter free environment maintained  Intervention: Prevent Skin Injury  Recent Flowsheet Documentation  Taken 9/18/2024 1100 by Janine Cantu RN  Body Position: position changed independently  Goal: Optimal Comfort and Wellbeing  Reactivated  Goal: Readiness for Transition of Care  Reactivated     Problem: Fall Injury Risk  Goal: Absence of Fall and Fall-Related Injury  Intervention: Identify and Manage Contributors  Recent Flowsheet Documentation  Taken 9/18/2024 1100 by Janine Cantu RN  Medication Review/Management: medications reviewed  Intervention: Promote Injury-Free Environment  Recent Flowsheet Documentation  Taken 9/18/2024 1100 by Janine Cantu RN  Safety Promotion/Fall Prevention:   increase visualization of patient   clutter free environment maintained     Problem: Adult Inpatient Plan of Care  Goal: Absence of Hospital-Acquired Illness or Injury  Reactivated  Intervention: Identify and Manage Fall Risk  Recent Flowsheet Documentation  Taken 9/18/2024 1100 by Janine Cantu RN  Safety Promotion/Fall Prevention:   increase visualization of patient   clutter free environment maintained  Intervention: " "Prevent Skin Injury  Recent Flowsheet Documentation  Taken 9/18/2024 1100 by Janine Cantu RN  Body Position: position changed independently  Goal: Optimal Comfort and Wellbeing  Reactivated     Problem: Fall Injury Risk  Goal: Absence of Fall and Fall-Related Injury  Intervention: Identify and Manage Contributors  Recent Flowsheet Documentation  Taken 9/18/2024 1100 by Janine Cantu RN  Medication Review/Management: medications reviewed  Intervention: Promote Injury-Free Environment  Recent Flowsheet Documentation  Taken 9/18/2024 1100 by Janine Cantu RN  Safety Promotion/Fall Prevention:   increase visualization of patient   clutter free environment maintained     Problem: Fall Injury Risk  Goal: Absence of Fall and Fall-Related Injury  Intervention: Identify and Manage Contributors  Recent Flowsheet Documentation  Taken 9/18/2024 1100 by Janine Cantu RN  Medication Review/Management: medications reviewed  Intervention: Promote Injury-Free Environment  Recent Flowsheet Documentation  Taken 9/18/2024 1100 by Janine Cantu RN  Safety Promotion/Fall Prevention:   increase visualization of patient   clutter free environment maintained     Problem: Adult Inpatient Plan of Care  Goal: Plan of Care Review  Description: The Plan of Care Review/Shift note should be completed every shift.  The Outcome Evaluation is a brief statement about your assessment that the patient is improving, declining, or no change.  This information will be displayed automatically on your shift  note.  Recent Flowsheet Documentation  Taken 9/18/2024 1408 by Janine Cantu RN  Outcome Evaluation: [facility] care no changes  Plan of Care Reviewed With: patient  Overall Patient Progress: no change  9/18/2024 1406 by Janine Cantu RN  Reactivated  Goal: Patient-Specific Goal (Individualized)  Description: You can add care plan individualizations to a care plan. Examples of Individualization might be:  \"Parent requests to be called daily at 9am for status\", " "\"I have a hard time hearing out of my right ear\", or \"Do not touch me to wake me up as it startles  me\".  Reactivated  Goal: Absence of Hospital-Acquired Illness or Injury  Reactivated  Intervention: Identify and Manage Fall Risk  Recent Flowsheet Documentation  Taken 9/18/2024 1100 by Janine Cantu RN  Safety Promotion/Fall Prevention:   increase visualization of patient   clutter free environment maintained  Intervention: Prevent Skin Injury  Recent Flowsheet Documentation  Taken 9/18/2024 1100 by Janine Cantu RN  Body Position: position changed independently  Goal: Optimal Comfort and Wellbeing  Reactivated  Goal: Readiness for Transition of Care  Reactivated     Problem: Adult Inpatient Plan of Care  Goal: Plan of Care Review  Description: The Plan of Care Review/Shift note should be completed every shift.  The Outcome Evaluation is a brief statement about your assessment that the patient is improving, declining, or no change.  This information will be displayed automatically on your shift  note.  Recent Flowsheet Documentation  Taken 9/18/2024 1408 by Janine Cantu RN  Outcome Evaluation: retirement care no changes  Plan of Care Reviewed With: patient  Overall Patient Progress: no change  Goal: Absence of Hospital-Acquired Illness or Injury  Intervention: Identify and Manage Fall Risk  Recent Flowsheet Documentation  Taken 9/18/2024 1100 by Janine Cantu RN  Safety Promotion/Fall Prevention:   increase visualization of patient   clutter free environment maintained  Intervention: Prevent Skin Injury  Recent Flowsheet Documentation  Taken 9/18/2024 1100 by Janine Cantu RN  Body Position: position changed independently     Problem: Fall Injury Risk  Goal: Absence of Fall and Fall-Related Injury  Intervention: Identify and Manage Contributors  Recent Flowsheet Documentation  Taken 9/18/2024 1100 by Janine Cantu RN  Medication Review/Management: medications reviewed  Intervention: Promote Injury-Free Environment  Recent " Flowsheet Documentation  Taken 9/18/2024 1100 by Janine Cantu RN  Safety Promotion/Fall Prevention:   increase visualization of patient   clutter free environment maintained     Problem: Fall Injury Risk  Goal: Absence of Fall and Fall-Related Injury  Intervention: Identify and Manage Contributors  Recent Flowsheet Documentation  Taken 9/18/2024 1100 by Janine Cantu RN  Medication Review/Management: medications reviewed  Intervention: Promote Injury-Free Environment  Recent Flowsheet Documentation  Taken 9/18/2024 1100 by Janine Cantu RN  Safety Promotion/Fall Prevention:   increase visualization of patient   clutter free environment maintained     Problem: Adult Inpatient Plan of Care  Goal: Plan of Care Review  Description: The Plan of Care Review/Shift note should be completed every shift.  The Outcome Evaluation is a brief statement about your assessment that the patient is improving, declining, or no change.  This information will be displayed automatically on your shift  note.  Recent Flowsheet Documentation  Taken 9/18/2024 1408 by Janine Cantu RN  Outcome Evaluation: halfway care no changes  Plan of Care Reviewed With: patient  Overall Patient Progress: no change  Goal: Absence of Hospital-Acquired Illness or Injury  Intervention: Identify and Manage Fall Risk  Recent Flowsheet Documentation  Taken 9/18/2024 1100 by Janine Cantu RN  Safety Promotion/Fall Prevention:   increase visualization of patient   clutter free environment maintained  Intervention: Prevent Skin Injury  Recent Flowsheet Documentation  Taken 9/18/2024 1100 by Janine Cantu RN  Body Position: position changed independently     Problem: Adult Inpatient Plan of Care  Goal: Plan of Care Review  Description: The Plan of Care Review/Shift note should be completed every shift.  The Outcome Evaluation is a brief statement about your assessment that the patient is improving, declining, or no change.  This information will be displayed  automatically on your shift  note.  Recent Flowsheet Documentation  Taken 9/18/2024 1408 by Janine Cantu RN  Outcome Evaluation: care home care no changes  Plan of Care Reviewed With: patient  Overall Patient Progress: no change  Goal: Absence of Hospital-Acquired Illness or Injury  Intervention: Identify and Manage Fall Risk  Recent Flowsheet Documentation  Taken 9/18/2024 1100 by Janine Cantu RN  Safety Promotion/Fall Prevention:   increase visualization of patient   clutter free environment maintained  Intervention: Prevent Skin Injury  Recent Flowsheet Documentation  Taken 9/18/2024 1100 by Janine Cantu RN  Body Position: position changed independently     Problem: Comorbidity Management  Goal: Maintenance of Asthma Control  Intervention: Maintain Asthma Symptom Control  Recent Flowsheet Documentation  Taken 9/18/2024 1100 by Janine Cantu RN  Medication Review/Management: medications reviewed  Goal: Maintenance of Behavioral Health Symptom Control  Intervention: Maintain Behavioral Health Symptom Control  Recent Flowsheet Documentation  Taken 9/18/2024 1100 by Janine Cantu RN  Medication Review/Management: medications reviewed  Goal: Maintenance of COPD Symptom Control  Intervention: Maintain COPD Symptom Control  Recent Flowsheet Documentation  Taken 9/18/2024 1100 by Janine Cantu RN  Medication Review/Management: medications reviewed  Goal: Blood Glucose Levels Within Targeted Range  Intervention: Monitor and Manage Glycemia  Recent Flowsheet Documentation  Taken 9/18/2024 1100 by Janine Cantu RN  Medication Review/Management: medications reviewed  Goal: Maintenance of Heart Failure Symptom Control  Intervention: Maintain Heart Failure Management  Recent Flowsheet Documentation  Taken 9/18/2024 1100 by Janine Cantu RN  Medication Review/Management: medications reviewed  Goal: Blood Pressure in Desired Range  Intervention: Maintain Blood Pressure Management  Recent Flowsheet Documentation  Taken 9/18/2024 1100  by Pepe, Janine, RN  Medication Review/Management: medications reviewed  Goal: Maintenance of Osteoarthritis Symptom Control  Intervention: Maintain Osteoarthritis Symptom Control  Recent Flowsheet Documentation  Taken 9/18/2024 1100 by Janine Cantu RN  Assistive Device Utilized: (depends on pt, sometimes walker sometimes gait belt) other (see comments)  Activity Management: activity adjusted per tolerance  Medication Review/Management: medications reviewed  Goal: Bariatric Home Regimen Maintained  Intervention: Maintain and Manage Postbariatric Surgery Care  Recent Flowsheet Documentation  Taken 9/18/2024 1100 by Janine Cantu RN  Medication Review/Management: medications reviewed  Goal: Maintenance of Seizure Control  Intervention: Maintain Seizure Symptom Control  Recent Flowsheet Documentation  Taken 9/18/2024 1100 by Janine Cantu RN  Medication Review/Management: medications reviewed     Problem: Pain Acute  Goal: Optimal Pain Control and Function  Intervention: Prevent or Manage Pain  Recent Flowsheet Documentation  Taken 9/18/2024 1100 by Jannie Cantu RN  Medication Review/Management: medications reviewed     Problem: Adult Inpatient Plan of Care  Goal: Plan of Care Review  Description: The Plan of Care Review/Shift note should be completed every shift.  The Outcome Evaluation is a brief statement about your assessment that the patient is improving, declining, or no change.  This information will be displayed automatically on your shift  note.  Recent Flowsheet Documentation  Taken 9/18/2024 1408 by Janine Cantu RN  Outcome Evaluation: senior living care no changes  Plan of Care Reviewed With: patient  Overall Patient Progress: no change  Goal: Absence of Hospital-Acquired Illness or Injury  Intervention: Identify and Manage Fall Risk  Recent Flowsheet Documentation  Taken 9/18/2024 1100 by Janine Cantu RN  Safety Promotion/Fall Prevention:   increase visualization of patient   clutter free environment  maintained  Intervention: Prevent Skin Injury  Recent Flowsheet Documentation  Taken 9/18/2024 1100 by Janine Cantu RN  Body Position: position changed independently     Problem: Fall Injury Risk  Goal: Absence of Fall and Fall-Related Injury  Intervention: Identify and Manage Contributors  Recent Flowsheet Documentation  Taken 9/18/2024 1100 by Janine Cantu RN  Medication Review/Management: medications reviewed  Intervention: Promote Injury-Free Environment  Recent Flowsheet Documentation  Taken 9/18/2024 1100 by Janine Cantu RN  Safety Promotion/Fall Prevention:   increase visualization of patient   clutter free environment maintained     Problem: Adult Inpatient Plan of Care  Goal: Plan of Care Review  Description: The Plan of Care Review/Shift note should be completed every shift.  The Outcome Evaluation is a brief statement about your assessment that the patient is improving, declining, or no change.  This information will be displayed automatically on your shift  note.  Recent Flowsheet Documentation  Taken 9/18/2024 1408 by Janine Cantu RN  Outcome Evaluation: [facility] care no changes  Plan of Care Reviewed With: patient  Overall Patient Progress: no change  Goal: Absence of Hospital-Acquired Illness or Injury  Intervention: Identify and Manage Fall Risk  Recent Flowsheet Documentation  Taken 9/18/2024 1100 by Janine Cantu RN  Safety Promotion/Fall Prevention:   increase visualization of patient   clutter free environment maintained  Intervention: Prevent Skin Injury  Recent Flowsheet Documentation  Taken 9/18/2024 1100 by Janine Cantu RN  Body Position: position changed independently     Problem: Fall Injury Risk  Goal: Absence of Fall and Fall-Related Injury  Intervention: Identify and Manage Contributors  Recent Flowsheet Documentation  Taken 9/18/2024 1100 by Janine Cantu RN  Medication Review/Management: medications reviewed  Intervention: Promote Injury-Free Environment  Recent Flowsheet  Documentation  Taken 9/18/2024 1100 by Janine Cantu RN  Safety Promotion/Fall Prevention:   increase visualization of patient   clutter free environment maintained     Problem: Adult Inpatient Plan of Care  Goal: Plan of Care Review  Description: The Plan of Care Review/Shift note should be completed every shift.  The Outcome Evaluation is a brief statement about your assessment that the patient is improving, declining, or no change.  This information will be displayed automatically on your shift  note.  Recent Flowsheet Documentation  Taken 9/18/2024 1408 by Janine Cantu RN  Outcome Evaluation: alf care no changes  Plan of Care Reviewed With: patient  Overall Patient Progress: no change  Goal: Absence of Hospital-Acquired Illness or Injury  Intervention: Identify and Manage Fall Risk  Recent Flowsheet Documentation  Taken 9/18/2024 1100 by Janine Cantu RN  Safety Promotion/Fall Prevention:   increase visualization of patient   clutter free environment maintained  Intervention: Prevent Skin Injury  Recent Flowsheet Documentation  Taken 9/18/2024 1100 by Janine Cantu RN  Body Position: position changed independently     Problem: Adult Inpatient Plan of Care  Goal: Plan of Care Review  Description: The Plan of Care Review/Shift note should be completed every shift.  The Outcome Evaluation is a brief statement about your assessment that the patient is improving, declining, or no change.  This information will be displayed automatically on your shift  note.  Recent Flowsheet Documentation  Taken 9/18/2024 1408 by Janine Cantu RN  Outcome Evaluation: alf care no changes  Plan of Care Reviewed With: patient  Overall Patient Progress: no change  Goal: Absence of Hospital-Acquired Illness or Injury  Intervention: Identify and Manage Fall Risk  Recent Flowsheet Documentation  Taken 9/18/2024 1100 by Janine Cantu RN  Safety Promotion/Fall Prevention:   increase visualization of patient   clutter free environment  maintained  Intervention: Prevent Skin Injury  Recent Flowsheet Documentation  Taken 9/18/2024 1100 by Janine Cantu RN  Body Position: position changed independently     Problem: Adult Inpatient Plan of Care  Goal: Plan of Care Review  Description: The Plan of Care Review/Shift note should be completed every shift.  The Outcome Evaluation is a brief statement about your assessment that the patient is improving, declining, or no change.  This information will be displayed automatically on your shift  note.  Recent Flowsheet Documentation  Taken 9/18/2024 1408 by Janine Cantu RN  Outcome Evaluation: assisted care no changes  Plan of Care Reviewed With: patient  Overall Patient Progress: no change  Goal: Absence of Hospital-Acquired Illness or Injury  Intervention: Identify and Manage Fall Risk  Recent Flowsheet Documentation  Taken 9/18/2024 1100 by Janine Cantu RN  Safety Promotion/Fall Prevention:   increase visualization of patient   clutter free environment maintained  Intervention: Prevent Skin Injury  Recent Flowsheet Documentation  Taken 9/18/2024 1100 by Janine Cantu RN  Body Position: position changed independently     Problem: Fall Injury Risk  Goal: Absence of Fall and Fall-Related Injury  Intervention: Identify and Manage Contributors  Recent Flowsheet Documentation  Taken 9/18/2024 1100 by Janine Cantu RN  Medication Review/Management: medications reviewed  Intervention: Promote Injury-Free Environment  Recent Flowsheet Documentation  Taken 9/18/2024 1100 by Janine Cantu RN  Safety Promotion/Fall Prevention:   increase visualization of patient   clutter free environment maintained     Problem: Fall Injury Risk  Goal: Absence of Fall and Fall-Related Injury  Intervention: Identify and Manage Contributors  Recent Flowsheet Documentation  Taken 9/18/2024 1100 by Janine Cantu RN  Medication Review/Management: medications reviewed  Intervention: Promote Injury-Free Environment  Recent Flowsheet  Documentation  Taken 9/18/2024 1100 by Janine Cantu RN  Safety Promotion/Fall Prevention:   increase visualization of patient   clutter free environment maintained     Problem: Fall Injury Risk  Goal: Absence of Fall and Fall-Related Injury  Intervention: Identify and Manage Contributors  Recent Flowsheet Documentation  Taken 9/18/2024 1100 by Janine Cantu RN  Medication Review/Management: medications reviewed  Intervention: Promote Injury-Free Environment  Recent Flowsheet Documentation  Taken 9/18/2024 1100 by Janine Cantu RN  Safety Promotion/Fall Prevention:   increase visualization of patient   clutter free environment maintained     Problem: Fall Injury Risk  Goal: Absence of Fall and Fall-Related Injury  Intervention: Identify and Manage Contributors  Recent Flowsheet Documentation  Taken 9/18/2024 1100 by Janine Cantu RN  Medication Review/Management: medications reviewed  Intervention: Promote Injury-Free Environment  Recent Flowsheet Documentation  Taken 9/18/2024 1100 by Janine Cantu RN  Safety Promotion/Fall Prevention:   increase visualization of patient   clutter free environment maintained     Problem: Adult Inpatient Plan of Care  Goal: Plan of Care Review  Description: The Plan of Care Review/Shift note should be completed every shift.  The Outcome Evaluation is a brief statement about your assessment that the patient is improving, declining, or no change.  This information will be displayed automatically on your shift  note.  Recent Flowsheet Documentation  Taken 9/18/2024 1408 by Janine Cantu RN  Outcome Evaluation: long-term care no changes  Plan of Care Reviewed With: patient  Overall Patient Progress: no change  Goal: Absence of Hospital-Acquired Illness or Injury  Intervention: Identify and Manage Fall Risk  Recent Flowsheet Documentation  Taken 9/18/2024 1100 by Janine Cantu RN  Safety Promotion/Fall Prevention:   increase visualization of patient   clutter free environment  maintained  Intervention: Prevent Skin Injury  Recent Flowsheet Documentation  Taken 9/18/2024 1100 by Janine Cantu RN  Body Position: position changed independently

## 2024-09-18 NOTE — PLAN OF CARE
VSS. Pt pleasantly confused and singing this shift. Walk in hallway x1. Pt had bed bath and linens changed. Mepilex on coccyx changed. Room service was not delivering-staff must make sure meals are being delivered. Nutrition called to fix this issue. Pt up in chair for breakfast. Fall precautions, bed alarm on.     Goal Outcome Evaluation: met, awaiting discharge placement

## 2024-09-18 NOTE — PLAN OF CARE
[id number]  Pleasantly confused. Barrier cream applied to bottom area. Denies pain. Jumped to the bathroom t/o the night. Staff assist with ambulation as able.     Goal Outcome Evaluation: Awaiting discharge

## 2024-09-18 NOTE — PROGRESS NOTES
Care Management Follow Up    Length of Stay (days): 0    Expected Discharge Date: 09/20/2024     Concerns to be Addressed: basic needs, care coordination/care conferences, decision making, discharge planning, financial/insurance     Patient plan of care discussed at interdisciplinary rounds: Yes    Anticipated Discharge Disposition:  LTC once the MA is approved.     Anticipated Discharge Services:  LTC  Anticipated Discharge DME:  LTC will provide     Patient/family educated on Medicare website which has current facility and service quality ratings:  yes  Education Provided on the Discharge Plan:  yes  Patient/Family in Agreement with the Plan:  yes    Referrals Placed by CM/SW: not appropriate at this time need a financial plan first   Private pay costs discussed: Not applicable @ this time    Discussed  Partnership in Safe Discharge Planning  document with patient/family: No     Handoff Completed: No, handoff not indicated or clinically appropriate    Additional Information:  [phone] inquiring about the MA application and where we are with it. She reported that the [facility] has everything except the cash value of a life insurance policy. SW left message with daughter/guardianJean regarding getting the life insurance information to [facility].    Once the MA is approved we can start making referrals for LTC.    NALDO Morris   Inpatient Care Coordination   Supervisor  [facility]  [phone]      NALDO Puentes

## 2024-09-18 NOTE — PROGRESS NOTES
[facility]    Medicine Progress Note - Hospitalist Service    Date of Admission:  6/5/2024    Assessment & Plan   Summary of Stay: Jemal Gray is a 88-year-old male who was transitioned to long term care on 6/5. He has history of dementia and family is unable to provide care for him. During his stay we was treated for right foot cellulitis which has resolved. Patient is MA pending, awaiting placement in long term care.      TODAY'S PLAN:  No changes.  Pt stable for discharge.     Problem List:   [facility] care admission.  Social work consulted.  Looking for LTC.  Is MA pending, SW working on this. Once a bed is available patient can go anytime   -Appreciate extensive input from social service.  Reviewed most recent notes     Constipation  Started/continue on senna, have as needed miralax and dulcolax available     Right foot cellulitis.  Resolved with Keflex 4 times daily through 6/11.  Swelling and redness resolved.    Venous ultrasound negative for DVT     Dementia with chronic aphasia.  Not on any medications.   As needed olanzapine ordered for agitation. Impulsive.     5.   Bradycardia  Patient was noted to have HR in 50s.  Asymptomatic     6. Paper work  Guardianship papers filled on 7/13 for the daughter.     7.  Actinic Keratosis                Had surgery for this and prescribed steroids cream and ketoconazole cream, restarted per daughter request           Diet: Combination Diet Regular Diet  Room Service    DVT Prophylaxis: Ambulate every shift  Maddox Catheter: Not present  Lines: None     Cardiac Monitoring: None  Code Status: No CPR- Do NOT Intubate      Clinically Significant Risk Factors Present on Admission                    # Dementia: noted on problem list                        Disposition Plan     Medically Ready for Discharge: Ready Now             Johnathan Ordonez MD, MD  Hospitalist Service  [facility]  Securely message with Samantha Peckmore  info)  Text page via [employer] Paging/Directory   ______________________________________________________________________    Interval History   Continuing [facility] service care  No significant reported events overnight  Pleasantly confused  Nursing reported that he is able to tolerate oral diet with no assistance      Physical Exam   Vital Signs: Temp: 97.7  F (36.5  C) Temp src: Oral BP: 136/75 Pulse: 50   Resp: 14 SpO2: 98 % O2 Device: None (Room air)    Weight: 162 lbs 3.2 oz    Limited exam but sleeping and easily aroused with minimal verbal stimuli  Moving all extremities spontaneously    Medical Decision Making       10 MINUTES SPENT BY ME on the date of service doing chart review, history, exam, documentation & further activities per the note.  MANAGEMENT DISCUSSED with the following over the past 24 hours: Yes   NOTE(S)/MEDICAL RECORDS REVIEWED over the past 24 hours: Yes       Data         Imaging results reviewed over the past 24 hrs:   No results found for this or any previous visit (from the past 24 hour(s)).

## 2024-09-19 PROCEDURE — 99231 SBSQ HOSP IP/OBS SF/LOW 25: CPT | Performed by: INTERNAL MEDICINE

## 2024-09-19 PROCEDURE — 101N000002 HC CUSTODIAL CARE DAILY

## 2024-09-19 PROCEDURE — 250N000013 HC RX MED GY IP 250 OP 250 PS 637: Performed by: STUDENT IN AN ORGANIZED HEALTH CARE EDUCATION/TRAINING PROGRAM

## 2024-09-19 RX ADMIN — SENNOSIDES AND DOCUSATE SODIUM 1 TABLET: 8.6; 5 TABLET ORAL at 20:41

## 2024-09-19 RX ADMIN — BETAMETHASONE DIPROPIONATE: 0.5 OINTMENT TOPICAL at 20:41

## 2024-09-19 RX ADMIN — BETAMETHASONE DIPROPIONATE: 0.5 OINTMENT TOPICAL at 09:37

## 2024-09-19 ASSESSMENT — ACTIVITIES OF DAILY LIVING (ADL)
ADLS_ACUITY_SCORE: 47

## 2024-09-19 NOTE — PROGRESS NOTES
[facility]    Medicine Progress Note - Hospitalist Service    Date of Admission:  6/5/2024    Assessment & Plan   Summary of Stay: Jemal Gray is a 88-year-old male who was transitioned to [facility] care on 6/5. He has history of dementia and family is unable to provide care for him. During his stay we was treated for right foot cellulitis which has resolved. Patient is MA pending, awaiting placement in long term care.      TODAY'S PLAN:  No changes.  Pt stable for discharge.  Reviewed most recent social work note with pending MA application     Problem List:   prison care admission.  Social work consulted.  Looking for LTC.  Is MA pending, SW working on this. Once a bed is available patient can go anytime   -Appreciate extensive input from social service.  Reviewed most recent notes     Constipation  Started/continue on senna, have as needed miralax and dulcolax available     Right foot cellulitis.  Resolved with Keflex 4 times daily through 6/11.  Swelling and redness resolved.    Venous ultrasound negative for DVT     Dementia with chronic aphasia.  Not on any medications.   As needed olanzapine ordered for agitation. Impulsive.     5.   Bradycardia  Patient was noted to have HR in 50s.  Asymptomatic     6. Paper work  Guardianship papers filled on 7/13 for the daughter.     7.  Actinic Keratosis                Had surgery for this and prescribed steroids cream and ketoconazole cream, restarted per daughter request           Diet: Combination Diet Regular Diet  Room Service    DVT Prophylaxis: Ambulate every shift  Maddox Catheter: Not present  Lines: None     Cardiac Monitoring: None  Code Status: No CPR- Do NOT Intubate      Clinically Significant Risk Factors Present on Admission                    # Dementia: noted on problem list                        Disposition Plan     Medically Ready for Discharge: Ready Now             Johnathan Ordonez MD, MD  Hospitalist Service    [facility]  Securely message with Samantha (more info)  Text page via [employer] Paging/Directory   ______________________________________________________________________    Interval History   Continuing assisted service care  No significant reported events overnight  Pleasantly confused  Nursing reported that he is able to tolerate oral diet with no assistance      Physical Exam   Vital Signs: Temp: 98.2  F (36.8  C) Temp src: Axillary BP: (!) 152/82 Pulse: 52   Resp: 19 SpO2: 93 % O2 Device: None (Room air)    Weight: 162 lbs 3.2 oz    Awake, alert, at baseline mental state  Moving all extremities spontaneously    Medical Decision Making       10 MINUTES SPENT BY ME on the date of service doing chart review, history, exam, documentation & further activities per the note.  MANAGEMENT DISCUSSED with the following over the past 24 hours: Yes   NOTE(S)/MEDICAL RECORDS REVIEWED over the past 24 hours: Yes       Data         Imaging results reviewed over the past 24 hrs:   No results found for this or any previous visit (from the past 24 hour(s)).

## 2024-09-19 NOTE — PLAN OF CARE
"VSS no issues. Up with SBA, gait belt and walker at times. jail care.  following.          Goal Outcome Evaluation:      Plan of Care Reviewed With: patient    Overall Patient Progress: improvingOverall Patient Progress: improving    Outcome Evaluation: prison care      Problem: Adult Inpatient Plan of Care  Goal: Plan of Care Review  Description: The Plan of Care Review/Shift note should be completed every shift.  The Outcome Evaluation is a brief statement about your assessment that the patient is improving, declining, or no change.  This information will be displayed automatically on your shift  note.  Outcome: Progressing  Flowsheets (Taken 9/19/2024 0941)  Outcome Evaluation: prison care  Plan of Care Reviewed With: patient  Overall Patient Progress: improving  Goal: Patient-Specific Goal (Individualized)  Description: You can add care plan individualizations to a care plan. Examples of Individualization might be:  \"Parent requests to be called daily at 9am for status\", \"I have a hard time hearing out of my right ear\", or \"Do not touch me to wake me up as it startles  me\".  Outcome: Progressing  Goal: Absence of Hospital-Acquired Illness or Injury  Outcome: Progressing  Intervention: Identify and Manage Fall Risk  Recent Flowsheet Documentation  Taken 9/19/2024 0938 by Janine Cantu, RN  Safety Promotion/Fall Prevention:   lighting adjusted   clutter free environment maintained   increase visualization of patient  Intervention: Prevent Skin Injury  Recent Flowsheet Documentation  Taken 9/19/2024 0938 by Janine Cantu, RN  Body Position: position changed independently  Goal: Optimal Comfort and Wellbeing  Outcome: Progressing  Goal: Readiness for Transition of Care  Outcome: Progressing     Problem: Adult Inpatient Plan of Care  Goal: Plan of Care Review  Description: The Plan of Care Review/Shift note should be completed every shift.  The Outcome Evaluation is a brief statement about your " "assessment that the patient is improving, declining, or no change.  This information will be displayed automatically on your shift  note.  Outcome: Progressing  Flowsheets (Taken 9/19/2024 0941)  Outcome Evaluation: [facility] care  Plan of Care Reviewed With: patient  Overall Patient Progress: improving  Goal: Patient-Specific Goal (Individualized)  Description: You can add care plan individualizations to a care plan. Examples of Individualization might be:  \"Parent requests to be called daily at 9am for status\", \"I have a hard time hearing out of my right ear\", or \"Do not touch me to wake me up as it startles  me\".  Outcome: Progressing  Goal: Absence of Hospital-Acquired Illness or Injury  Outcome: Progressing  Intervention: Identify and Manage Fall Risk  Recent Flowsheet Documentation  Taken 9/19/2024 0938 by Janine Cantu RN  Safety Promotion/Fall Prevention:   lighting adjusted   clutter free environment maintained   increase visualization of patient  Intervention: Prevent Skin Injury  Recent Flowsheet Documentation  Taken 9/19/2024 0938 by Janine Cantu RN  Body Position: position changed independently  Goal: Optimal Comfort and Wellbeing  Outcome: Progressing  Goal: Readiness for Transition of Care  Outcome: Progressing     Problem: Adult Inpatient Plan of Care  Goal: Absence of Hospital-Acquired Illness or Injury  Outcome: Progressing  Intervention: Identify and Manage Fall Risk  Recent Flowsheet Documentation  Taken 9/19/2024 0938 by Janine Cantu RN  Safety Promotion/Fall Prevention:   lighting adjusted   clutter free environment maintained   increase visualization of patient  Intervention: Prevent Skin Injury  Recent Flowsheet Documentation  Taken 9/19/2024 0938 by Janine Cantu RN  Body Position: position changed independently  Goal: Optimal Comfort and Wellbeing  Outcome: Progressing     Problem: Adult Inpatient Plan of Care  Goal: Plan of Care Review  Description: The Plan of Care Review/Shift note should " "be completed every shift.  The Outcome Evaluation is a brief statement about your assessment that the patient is improving, declining, or no change.  This information will be displayed automatically on your shift  note.  Outcome: Progressing  Flowsheets (Taken 9/19/2024 0941)  Outcome Evaluation: alf care  Plan of Care Reviewed With: patient  Overall Patient Progress: improving  Goal: Patient-Specific Goal (Individualized)  Description: You can add care plan individualizations to a care plan. Examples of Individualization might be:  \"Parent requests to be called daily at 9am for status\", \"I have a hard time hearing out of my right ear\", or \"Do not touch me to wake me up as it startles  me\".  Outcome: Progressing  Goal: Absence of Hospital-Acquired Illness or Injury  Outcome: Progressing  Intervention: Identify and Manage Fall Risk  Recent Flowsheet Documentation  Taken 9/19/2024 0938 by Janine Cantu, RN  Safety Promotion/Fall Prevention:   lighting adjusted   clutter free environment maintained   increase visualization of patient  Intervention: Prevent Skin Injury  Recent Flowsheet Documentation  Taken 9/19/2024 0938 by Janine Cantu, RN  Body Position: position changed independently  Goal: Optimal Comfort and Wellbeing  Outcome: Progressing  Goal: Readiness for Transition of Care  Outcome: Progressing     "

## 2024-09-19 NOTE — PLAN OF CARE
"  Problem: Adult Inpatient Plan of Care  Goal: Plan of Care Review  Description: The Plan of Care Review/Shift note should be completed every shift.  The Outcome Evaluation is a brief statement about your assessment that the patient is improving, declining, or no change.  This information will be displayed automatically on your shift  note.  Outcome: Progressing  Flowsheets (Taken 9/19/2024 0201)  Outcome Evaluation: assisted care  Plan of Care Reviewed With: patient  Overall Patient Progress: no change  Goal: Patient-Specific Goal (Individualized)  Description: You can add care plan individualizations to a care plan. Examples of Individualization might be:  \"Parent requests to be called daily at 9am for status\", \"I have a hard time hearing out of my right ear\", or \"Do not touch me to wake me up as it startles  me\".  Outcome: Progressing  Goal: Absence of Hospital-Acquired Illness or Injury  Outcome: Progressing  Intervention: Prevent Infection  Recent Flowsheet Documentation  Taken 9/18/2024 2155 by Viki Joya RN  Infection Prevention:   single patient room provided   hand hygiene promoted  Goal: Optimal Comfort and Wellbeing  Outcome: Progressing  Goal: Readiness for Transition of Care  Outcome: Progressing     Problem: Adult Inpatient Plan of Care  Goal: Plan of Care Review  Description: The Plan of Care Review/Shift note should be completed every shift.  The Outcome Evaluation is a brief statement about your assessment that the patient is improving, declining, or no change.  This information will be displayed automatically on your shift  note.  Outcome: Progressing  Flowsheets (Taken 9/19/2024 0201)  Outcome Evaluation: assisted care  Plan of Care Reviewed With: patient  Overall Patient Progress: no change  Goal: Patient-Specific Goal (Individualized)  Description: You can add care plan individualizations to a care plan. Examples of Individualization might be:  \"Parent requests to be called daily at " "9am for status\", \"I have a hard time hearing out of my right ear\", or \"Do not touch me to wake me up as it startles  me\".  Outcome: Progressing  Goal: Absence of Hospital-Acquired Illness or Injury  Outcome: Progressing  Intervention: Prevent Infection  Recent Flowsheet Documentation  Taken 9/18/2024 2155 by Viki Joya RN  Infection Prevention:   single patient room provided   hand hygiene promoted  Goal: Optimal Comfort and Wellbeing  Outcome: Progressing  Goal: Readiness for Transition of Care  Outcome: Progressing     Problem: Adult Inpatient Plan of Care  Goal: Plan of Care Review  Description: The Plan of Care Review/Shift note should be completed every shift.  The Outcome Evaluation is a brief statement about your assessment that the patient is improving, declining, or no change.  This information will be displayed automatically on your shift  note.  Outcome: Progressing  Flowsheets (Taken 9/19/2024 0201)  Outcome Evaluation: jail care  Plan of Care Reviewed With: patient  Overall Patient Progress: no change  Goal: Patient-Specific Goal (Individualized)  Description: You can add care plan individualizations to a care plan. Examples of Individualization might be:  \"Parent requests to be called daily at 9am for status\", \"I have a hard time hearing out of my right ear\", or \"Do not touch me to wake me up as it startles  me\".  Outcome: Progressing  Goal: Absence of Hospital-Acquired Illness or Injury  Outcome: Progressing  Intervention: Prevent Infection  Recent Flowsheet Documentation  Taken 9/18/2024 2155 by Viki Joya RN  Infection Prevention:   single patient room provided   hand hygiene promoted  Goal: Optimal Comfort and Wellbeing  Outcome: Progressing  Goal: Readiness for Transition of Care  Outcome: Progressing     Problem: Adult Inpatient Plan of Care  Goal: Plan of Care Review  Description: The Plan of Care Review/Shift note should be completed every shift.  The Outcome Evaluation " is a brief statement about your assessment that the patient is improving, declining, or no change.  This information will be displayed automatically on your shift  note.  Recent Flowsheet Documentation  Taken 9/19/2024 0201 by Viki Joya RN  Outcome Evaluation: assisted care  Plan of Care Reviewed With: patient  Overall Patient Progress: no change  Goal: Absence of Hospital-Acquired Illness or Injury  Intervention: Prevent Infection  Recent Flowsheet Documentation  Taken 9/18/2024 2155 by Viki Joya RN  Infection Prevention:   single patient room provided   hand hygiene promoted     Problem: Adult Inpatient Plan of Care  Goal: Plan of Care Review  Description: The Plan of Care Review/Shift note should be completed every shift.  The Outcome Evaluation is a brief statement about your assessment that the patient is improving, declining, or no change.  This information will be displayed automatically on your shift  note.  Recent Flowsheet Documentation  Taken 9/19/2024 0201 by Viki Joya RN  Outcome Evaluation: assisted care  Plan of Care Reviewed With: patient  Overall Patient Progress: no change  Goal: Absence of Hospital-Acquired Illness or Injury  Intervention: Prevent Infection  Recent Flowsheet Documentation  Taken 9/18/2024 2155 by Viki Joya RN  Infection Prevention:   single patient room provided   hand hygiene promoted     Problem: Adult Inpatient Plan of Care  Goal: Plan of Care Review  Description: The Plan of Care Review/Shift note should be completed every shift.  The Outcome Evaluation is a brief statement about your assessment that the patient is improving, declining, or no change.  This information will be displayed automatically on your shift  note.  Recent Flowsheet Documentation  Taken 9/19/2024 0201 by Viki Joya RN  Outcome Evaluation: assisted care  Plan of Care Reviewed With: patient  Overall Patient Progress: no change  Goal: Absence of  Hospital-Acquired Illness or Injury  Intervention: Prevent Infection  Recent Flowsheet Documentation  Taken 9/18/2024 2155 by Viki Joya RN  Infection Prevention:   single patient room provided   hand hygiene promoted     Problem: Adult Inpatient Plan of Care  Goal: Plan of Care Review  Description: The Plan of Care Review/Shift note should be completed every shift.  The Outcome Evaluation is a brief statement about your assessment that the patient is improving, declining, or no change.  This information will be displayed automatically on your shift  note.  Recent Flowsheet Documentation  Taken 9/19/2024 0201 by Viki Joya RN  Outcome Evaluation: halfway care  Plan of Care Reviewed With: patient  Overall Patient Progress: no change  Goal: Absence of Hospital-Acquired Illness or Injury  Intervention: Prevent Infection  Recent Flowsheet Documentation  Taken 9/18/2024 2155 by Viki Joya RN  Infection Prevention:   single patient room provided   hand hygiene promoted     Problem: Adult Inpatient Plan of Care  Goal: Plan of Care Review  Description: The Plan of Care Review/Shift note should be completed every shift.  The Outcome Evaluation is a brief statement about your assessment that the patient is improving, declining, or no change.  This information will be displayed automatically on your shift  note.  Recent Flowsheet Documentation  Taken 9/19/2024 0201 by Viki Joya RN  Outcome Evaluation: halfway care  Plan of Care Reviewed With: patient  Overall Patient Progress: no change  Goal: Absence of Hospital-Acquired Illness or Injury  Intervention: Prevent Infection  Recent Flowsheet Documentation  Taken 9/18/2024 2155 by Viki Joya RN  Infection Prevention:   single patient room provided   hand hygiene promoted   Goal Outcome Evaluation:      Plan of Care Reviewed With: patient    Overall Patient Progress: no changeOverall Patient Progress: no change    Outcome  Evaluation: long-term care

## 2024-09-20 PROCEDURE — 101N000002 HC CUSTODIAL CARE DAILY

## 2024-09-20 PROCEDURE — 250N000013 HC RX MED GY IP 250 OP 250 PS 637: Performed by: STUDENT IN AN ORGANIZED HEALTH CARE EDUCATION/TRAINING PROGRAM

## 2024-09-20 RX ADMIN — KETOCONAZOLE: 20.5 SHAMPOO, SUSPENSION TOPICAL at 09:26

## 2024-09-20 RX ADMIN — BETAMETHASONE DIPROPIONATE: 0.5 OINTMENT TOPICAL at 21:08

## 2024-09-20 RX ADMIN — BETAMETHASONE DIPROPIONATE: 0.5 OINTMENT TOPICAL at 09:26

## 2024-09-20 RX ADMIN — SENNOSIDES AND DOCUSATE SODIUM 1 TABLET: 8.6; 5 TABLET ORAL at 21:07

## 2024-09-20 ASSESSMENT — ACTIVITIES OF DAILY LIVING (ADL)
ADLS_ACUITY_SCORE: 47

## 2024-09-20 NOTE — PLAN OF CARE
"Alert/ confused. Impulsive. Up with SBA.    Goal Outcome Evaluation:      Plan of Care Reviewed With: patient    Overall Patient Progress: no changeOverall Patient Progress: no change    Outcome Evaluation: continue [facility] cares    Problem: Adult Inpatient Plan of Care  Goal: Plan of Care Review  Description: The Plan of Care Review/Shift note should be completed every shift.  The Outcome Evaluation is a brief statement about your assessment that the patient is improving, declining, or no change.  This information will be displayed automatically on your shift  note.  Outcome: Progressing  Flowsheets (Taken 9/20/2024 0400)  Outcome Evaluation: continue [facility] cares  Plan of Care Reviewed With: patient  Overall Patient Progress: no change  Goal: Patient-Specific Goal (Individualized)  Description: You can add care plan individualizations to a care plan. Examples of Individualization might be:  \"Parent requests to be called daily at 9am for status\", \"I have a hard time hearing out of my right ear\", or \"Do not touch me to wake me up as it startles  me\".  Outcome: Progressing  Goal: Absence of Hospital-Acquired Illness or Injury  Outcome: Progressing  Intervention: Identify and Manage Fall Risk  Recent Flowsheet Documentation  Taken 9/20/2024 0352 by Katie Paniagua RN  Safety Promotion/Fall Prevention: safety round/check completed  Taken 9/20/2024 0200 by Katie Paniagua RN  Safety Promotion/Fall Prevention: safety round/check completed  Taken 9/19/2024 2332 by Katie Paniagua RN  Safety Promotion/Fall Prevention: safety round/check completed  Intervention: Prevent Skin Injury  Recent Flowsheet Documentation  Taken 9/19/2024 2332 by Katie Paniagua RN  Body Position: position changed independently  Intervention: Prevent Infection  Recent Flowsheet Documentation  Taken 9/19/2024 2332 by Katie Paniagua RN  Infection Prevention:   rest/sleep promoted   single patient room provided  Goal: Optimal Comfort " and Wellbeing  Outcome: Progressing  Goal: Readiness for Transition of Care  Outcome: Progressing

## 2024-09-20 NOTE — PLAN OF CARE
"Goal Outcome Evaluation:           Overall Patient Progress: improvingOverall Patient Progress: improving    Outcome Evaluation: Castodial care, confused, VS stable,      Problem: Adult Inpatient Plan of Care  Goal: Plan of Care Review  Description: The Plan of Care Review/Shift note should be completed every shift.  The Outcome Evaluation is a brief statement about your assessment that the patient is improving, declining, or no change.  This information will be displayed automatically on your shift  note.  Outcome: Progressing  Flowsheets (Taken 9/19/2024 2218)  Outcome Evaluation: Castodial care, confused, VS stable,  Overall Patient Progress: improving  Goal: Patient-Specific Goal (Individualized)  Description: You can add care plan individualizations to a care plan. Examples of Individualization might be:  \"Parent requests to be called daily at 9am for status\", \"I have a hard time hearing out of my right ear\", or \"Do not touch me to wake me up as it startles  me\".  Outcome: Progressing  Goal: Absence of Hospital-Acquired Illness or Injury  Outcome: Progressing  Intervention: Identify and Manage Fall Risk  Recent Flowsheet Documentation  Taken 9/19/2024 1600 by Marisela Robb, RN  Safety Promotion/Fall Prevention:   activity supervised   assistive device/personal items within reach   clutter free environment maintained   nonskid shoes/slippers when out of bed   patient and family education   room door open  Intervention: Prevent Skin Injury  Recent Flowsheet Documentation  Taken 9/19/2024 1600 by Marisela Robb, RN  Body Position: position changed independently  Goal: Optimal Comfort and Wellbeing  Outcome: Progressing  Goal: Readiness for Transition of Care  Outcome: Progressing     Problem: Adult Inpatient Plan of Care  Goal: Plan of Care Review  Description: The Plan of Care Review/Shift note should be completed every shift.  The Outcome Evaluation is a brief statement about your assessment that the " "patient is improving, declining, or no change.  This information will be displayed automatically on your shift  note.  Outcome: Progressing  Flowsheets (Taken 9/19/2024 2218)  Outcome Evaluation: Castodial care, confused, VS stable,  Overall Patient Progress: improving  Goal: Patient-Specific Goal (Individualized)  Description: You can add care plan individualizations to a care plan. Examples of Individualization might be:  \"Parent requests to be called daily at 9am for status\", \"I have a hard time hearing out of my right ear\", or \"Do not touch me to wake me up as it startles  me\".  Outcome: Progressing  Goal: Absence of Hospital-Acquired Illness or Injury  Outcome: Progressing  Intervention: Identify and Manage Fall Risk  Recent Flowsheet Documentation  Taken 9/19/2024 1600 by Marisela Robb RN  Safety Promotion/Fall Prevention:   activity supervised   assistive device/personal items within reach   clutter free environment maintained   nonskid shoes/slippers when out of bed   patient and family education   room door open  Intervention: Prevent Skin Injury  Recent Flowsheet Documentation  Taken 9/19/2024 1600 by Marisela Robb RN  Body Position: position changed independently  Goal: Optimal Comfort and Wellbeing  Outcome: Progressing  Goal: Readiness for Transition of Care  Outcome: Progressing     Problem: Adult Inpatient Plan of Care  Goal: Absence of Hospital-Acquired Illness or Injury  Outcome: Progressing  Intervention: Identify and Manage Fall Risk  Recent Flowsheet Documentation  Taken 9/19/2024 1600 by Marisela Robb RN  Safety Promotion/Fall Prevention:   activity supervised   assistive device/personal items within reach   clutter free environment maintained   nonskid shoes/slippers when out of bed   patient and family education   room door open  Intervention: Prevent Skin Injury  Recent Flowsheet Documentation  Taken 9/19/2024 1600 by Marisela Robb RN  Body Position: position changed " "independently  Goal: Optimal Comfort and Wellbeing  Outcome: Progressing     Problem: Adult Inpatient Plan of Care  Goal: Plan of Care Review  Description: The Plan of Care Review/Shift note should be completed every shift.  The Outcome Evaluation is a brief statement about your assessment that the patient is improving, declining, or no change.  This information will be displayed automatically on your shift  note.  Outcome: Progressing  Flowsheets (Taken 9/19/2024 2218)  Outcome Evaluation: Castodial care, confused, VS stable,  Overall Patient Progress: improving  Goal: Patient-Specific Goal (Individualized)  Description: You can add care plan individualizations to a care plan. Examples of Individualization might be:  \"Parent requests to be called daily at 9am for status\", \"I have a hard time hearing out of my right ear\", or \"Do not touch me to wake me up as it startles  me\".  Outcome: Progressing  Goal: Absence of Hospital-Acquired Illness or Injury  Outcome: Progressing  Intervention: Identify and Manage Fall Risk  Recent Flowsheet Documentation  Taken 9/19/2024 1600 by Marisela Robb, RN  Safety Promotion/Fall Prevention:   activity supervised   assistive device/personal items within reach   clutter free environment maintained   nonskid shoes/slippers when out of bed   patient and family education   room door open  Intervention: Prevent Skin Injury  Recent Flowsheet Documentation  Taken 9/19/2024 1600 by Marisela Robb, RN  Body Position: position changed independently  Goal: Optimal Comfort and Wellbeing  Outcome: Progressing  Goal: Readiness for Transition of Care  Outcome: Progressing     "

## 2024-09-20 NOTE — PROGRESS NOTES
[facility]    Medicine Progress Note - Hospitalist Service    Date of Admission:  6/5/2024    Assessment & Plan   Summary of Stay: Jemal Gray is a 88-year-old male who was transitioned to [facility] care on 6/5. He has history of dementia and family is unable to provide care for him. During his stay we was treated for right foot cellulitis which has resolved. Patient is MA pending, awaiting placement in long term care.      TODAY'S PLAN:  No changes.  Pt stable for discharge.  Reviewed most recent social work note with pending MA application     Problem List:   snf care admission.  Social work consulted.  Looking for LTC.  Is MA pending, SW working on this. Once a bed is available patient can go anytime   -Appreciate extensive input from social service.  Reviewed most recent notes     Constipation  Started/continue on senna, have as needed miralax and dulcolax available     Right foot cellulitis.  Resolved with Keflex 4 times daily through 6/11.  Swelling and redness resolved.    Venous ultrasound negative for DVT     Dementia with chronic aphasia.  Not on any medications.   As needed olanzapine ordered for agitation. Impulsive.     5.   Bradycardia  Patient was noted to have HR in 50s.  Asymptomatic     6. Paper work  Guardianship papers filled on 7/13 for the daughter.     7.  Actinic Keratosis                Had surgery for this and prescribed steroids cream and ketoconazole cream, restarted per daughter request           Diet: Combination Diet Regular Diet  Room Service    DVT Prophylaxis: Low Risk/Ambulatory with no VTE prophylaxis indicated  Maddox Catheter: Not present  Lines: None     Cardiac Monitoring: None  Code Status: No CPR- Do NOT Intubate      Clinically Significant Risk Factors Present on Admission                    # Dementia: noted on problem list                  Disposition Plan     Medically Ready for Discharge: Anticipated Tomorrow             Andi Brown,  MD  Hospitalist Service  [facility]  Securely message with Samantha (more info)  Text page via [employer] Paging/Directory   ______________________________________________________________________    Interval History   NO change in the last 24 hours     Physical Exam   Vital Signs:                    Weight: 162 lbs 3.2 oz    Constitutional: awake, alert, cooperative, no apparent distress, and appears stated age  Respiratory: No increased work of breathing, good air exchange, clear to auscultation bilaterally, no crackles or wheezing  Cardiovascular: Normal apical impulse, regular rate and rhythm, normal S1 and S2, no S3 or S4, and no murmur noted    Medical Decision Making       35 MINUTES SPENT BY ME on the date of service doing chart review, history, exam, documentation & further activities per the note.      Data

## 2024-09-21 PROCEDURE — 101N000002 HC CUSTODIAL CARE DAILY

## 2024-09-21 PROCEDURE — 250N000013 HC RX MED GY IP 250 OP 250 PS 637: Performed by: HOSPITALIST

## 2024-09-21 RX ADMIN — QUETIAPINE FUMARATE 12.5 MG: 25 TABLET ORAL at 10:38

## 2024-09-21 RX ADMIN — BETAMETHASONE DIPROPIONATE: 0.5 OINTMENT TOPICAL at 21:23

## 2024-09-21 RX ADMIN — BETAMETHASONE DIPROPIONATE: 0.5 OINTMENT TOPICAL at 09:56

## 2024-09-21 ASSESSMENT — ACTIVITIES OF DAILY LIVING (ADL)
ADLS_ACUITY_SCORE: 47
ADLS_ACUITY_SCORE: 44
ADLS_ACUITY_SCORE: 47
ADLS_ACUITY_SCORE: 44
ADLS_ACUITY_SCORE: 47
ADLS_ACUITY_SCORE: 44
ADLS_ACUITY_SCORE: 44
ADLS_ACUITY_SCORE: 47
ADLS_ACUITY_SCORE: 44
ADLS_ACUITY_SCORE: 47
ADLS_ACUITY_SCORE: 47
ADLS_ACUITY_SCORE: 44
ADLS_ACUITY_SCORE: 47

## 2024-09-21 NOTE — PROGRESS NOTES
[facility]    Medicine Progress Note - Hospitalist Service    Date of Admission:  6/5/2024    Assessment & Plan   Summary of Stay: Jemal Gray is a 88-year-old male who was transitioned to snf care on 6/5. He has history of dementia and family is unable to provide care for him. During his stay we was treated for right foot cellulitis which has resolved. Patient is MA pending, awaiting placement in long term care.      TODAY'S PLAN:  No changes.  Pt stable for discharge.  Reviewed most recent social work note with pending MA application     Problem List:   long term care admission.  Social work consulted.  Looking for LTC.  Is MA pending, SW working on this. Once a bed is available patient can go anytime   -Appreciate extensive input from social service.  Reviewed most recent notes     Constipation  Started/continue on senna, have as needed miralax and dulcolax available     Right foot cellulitis.  Resolved with Keflex 4 times daily through 6/11.  Swelling and redness resolved.    Venous ultrasound negative for DVT     Dementia with chronic aphasia.  Not on any medications.   As needed olanzapine ordered for agitation. Impulsive.     5.   Bradycardia  Patient was noted to have HR in 50s.  Asymptomatic     6. Paper work  Guardianship papers filled on 7/13 for the daughter.     7.  Actinic Keratosis                Had surgery for this and prescribed steroids cream and ketoconazole cream, restarted per daughter request           Diet: Combination Diet Regular Diet  Room Service    DVT Prophylaxis: Low Risk/Ambulatory with no VTE prophylaxis indicated  Maddox Catheter: Not present  Lines: None     Cardiac Monitoring: None  Code Status: No CPR- Do NOT Intubate      Clinically Significant Risk Factors Present on Admission                    # Dementia: noted on problem list                  Disposition Plan     Medically Ready for Discharge: Anticipated Tomorrow             Andi Brown,  MD  Hospitalist Service  [facility]  Securely message with Samantha (more info)  Text page via [employer] Paging/Directory   ______________________________________________________________________    Interval History   He is feeling well, no change in the last 24 hours     Physical Exam   Vital Signs:                    Weight: 162 lbs 3.2 oz    Constitutional: awake, alert, cooperative, no apparent distress, and appears stated age  Respiratory: No increased work of breathing, good air exchange, clear to auscultation bilaterally, no crackles or wheezing  Cardiovascular: Normal apical impulse, regular rate and rhythm, normal S1 and S2, no S3 or S4, and no murmur noted    Medical Decision Making       20 MINUTES SPENT BY ME on the date of service doing chart review, history, exam, documentation & further activities per the note.      Data

## 2024-09-21 NOTE — PLAN OF CARE
"Alert, confused. Pt neuro baseline, though restless/agitated this afternoon, eventually settled back in bed. Became lethargic latter end of shift, mumbles and groans when shaken, did not rouse enough for bedtime meds. Ambulated in hallways w/ gb and walker. Plan: SW following for LTC placement.            Goal Outcome Evaluation:      Plan of Care Reviewed With: patient    Overall Patient Progress: no changeOverall Patient Progress: no change    Outcome Evaluation: nursing home care. Pt was agitated, ambulated in hallways.      Problem: Adult Inpatient Plan of Care  Goal: Plan of Care Review  Description: The Plan of Care Review/Shift note should be completed every shift.  The Outcome Evaluation is a brief statement about your assessment that the patient is improving, declining, or no change.  This information will be displayed automatically on your shift  note.  Outcome: Progressing  Flowsheets (Taken 9/21/2024 1371)  Outcome Evaluation: nursing home care. Pt was agitated, ambulated in hallways.  Plan of Care Reviewed With: patient  Overall Patient Progress: no change  Goal: Patient-Specific Goal (Individualized)  Description: You can add care plan individualizations to a care plan. Examples of Individualization might be:  \"Parent requests to be called daily at 9am for status\", \"I have a hard time hearing out of my right ear\", or \"Do not touch me to wake me up as it startles  me\".  Outcome: Progressing  Goal: Absence of Hospital-Acquired Illness or Injury  Outcome: Progressing  Goal: Optimal Comfort and Wellbeing  Outcome: Progressing  Goal: Readiness for Transition of Care  Outcome: Progressing       "

## 2024-09-21 NOTE — PLAN OF CARE
"shelter cares. No pain. SBA/A1 w/ walker and gb. Patient increasingly restless and agitated today, MD ordered Seroquel BID. MA pending. Continue POC.    Goal Outcome Evaluation:      Plan of Care Reviewed With: patient, family    Overall Patient Progress: no changeOverall Patient Progress: no change    Outcome Evaluation: shelter cares.            Problem: Adult Inpatient Plan of Care  Goal: Plan of Care Review  Description: The Plan of Care Review/Shift note should be completed every shift.  The Outcome Evaluation is a brief statement about your assessment that the patient is improving, declining, or no change.  This information will be displayed automatically on your shift  note.  Outcome: Not Progressing  Flowsheets (Taken 9/21/2024 1212)  Outcome Evaluation: shelter cares.  Plan of Care Reviewed With: patient  Overall Patient Progress: no change  Goal: Patient-Specific Goal (Individualized)  Description: You can add care plan individualizations to a care plan. Examples of Individualization might be:  \"Parent requests to be called daily at 9am for status\", \"I have a hard time hearing out of my right ear\", or \"Do not touch me to wake me up as it startles  me\".  Outcome: Not Progressing  Goal: Absence of Hospital-Acquired Illness or Injury  Outcome: Not Progressing  Goal: Optimal Comfort and Wellbeing  Outcome: Not Progressing  Goal: Readiness for Transition of Care  Outcome: Not Progressing     "

## 2024-09-21 NOTE — PLAN OF CARE
"Pt is on [facility] cares. Confused/alert. Impulsive at times. SBA w/GB walker. No changes overnight. SW following.    Goal Outcome Evaluation:       Plan of Care Reviewed With: patient      Overall Patient Progress: no change     Outcome Evaluation: [facility] cares. MA pending.    Problem: Adult Inpatient Plan of Care  Goal: Plan of Care Review  Description: The Plan of Care Review/Shift note should be completed every shift.  The Outcome Evaluation is a brief statement about your assessment that the patient is improving, declining, or no change.  This information will be displayed automatically on your shift  note.  9/21/2024 0517 by Donna Shnie RN  Outcome: Progressing  Flowsheets (Taken 9/21/2024 0517)  Outcome Evaluation: [facility] cares. MA pending.  Plan of Care Reviewed With: patient  Overall Patient Progress: no change  9/21/2024 0516 by Donna Shine RN  Outcome: Progressing  9/20/2024 2109 by Donna Shine RN  Outcome: Progressing  Goal: Patient-Specific Goal (Individualized)  Description: You can add care plan individualizations to a care plan. Examples of Individualization might be:  \"Parent requests to be called daily at 9am for status\", \"I have a hard time hearing out of my right ear\", or \"Do not touch me to wake me up as it startles  me\".  9/21/2024 0517 by Donna Shine RN  Outcome: Progressing  9/21/2024 0516 by Donna Shine RN  Outcome: Progressing  9/20/2024 2109 by Donna Shine RN  Outcome: Progressing  Goal: Absence of Hospital-Acquired Illness or Injury  9/21/2024 0517 by Donna Shine RN  Outcome: Progressing  9/21/2024 0516 by Donna Shine RN  Outcome: Progressing  9/20/2024 2109 by Donna Shine RN  Outcome: Progressing  Intervention: Identify and Manage Fall Risk  Recent Flowsheet Documentation  Taken 9/21/2024 0311 by Donna Shine RN  Safety Promotion/Fall Prevention: safety round/check completed  Taken 9/21/2024 0155 by Rl, " Donna J, RN  Safety Promotion/Fall Prevention: safety round/check completed  Taken 9/20/2024 2313 by Donna Shine RN  Safety Promotion/Fall Prevention: safety round/check completed  Taken 9/20/2024 2226 by Donna Shine RN  Safety Promotion/Fall Prevention: safety round/check completed  Taken 9/20/2024 2109 by Donna Shine RN  Safety Promotion/Fall Prevention:   safety round/check completed   room door open  Taken 9/20/2024 1933 by Donna Shine RN  Safety Promotion/Fall Prevention: safety round/check completed  Intervention: Prevent Skin Injury  Recent Flowsheet Documentation  Taken 9/20/2024 2226 by Donna Shine RN  Body Position: position changed independently  Taken 9/20/2024 1933 by Donna Shine RN  Body Position: position changed independently  Goal: Optimal Comfort and Wellbeing  9/21/2024 0517 by Donna Shine RN  Outcome: Progressing  9/21/2024 0516 by Donna Shine RN  Outcome: Progressing  9/20/2024 2109 by Donna Shine RN  Outcome: Progressing  Goal: Readiness for Transition of Care  9/21/2024 0517 by Donna Shine RN  Outcome: Progressing  9/21/2024 0516 by Donna Shine RN  Outcome: Progressing  9/20/2024 2109 by Donna Shine RN  Outcome: Progressing

## 2024-09-22 PROCEDURE — 101N000002 HC CUSTODIAL CARE DAILY

## 2024-09-22 RX ADMIN — BETAMETHASONE DIPROPIONATE: 0.5 OINTMENT TOPICAL at 09:28

## 2024-09-22 RX ADMIN — BETAMETHASONE DIPROPIONATE: 0.5 OINTMENT TOPICAL at 20:18

## 2024-09-22 ASSESSMENT — ACTIVITIES OF DAILY LIVING (ADL)
ADLS_ACUITY_SCORE: 47

## 2024-09-22 NOTE — PROGRESS NOTES
[facility]    Medicine Progress Note - Hospitalist Service    Date of Admission:  6/5/2024    Assessment & Plan   Summary of Stay: Jemal Gray is a 88-year-old male who was transitioned to [facility] care on 6/5. He has history of dementia and family is unable to provide care for him. During his stay we was treated for right foot cellulitis which has resolved. Patient is MA pending, awaiting placement in long term care.      TODAY'S PLAN:  No changes.  Pt stable for discharge.  Reviewed most recent social work note with pending MA application     Problem List:   halfway care admission.  Social work consulted.  Looking for LTC.  Is MA pending, SW working on this. Once a bed is available patient can go anytime   -Appreciate extensive input from social service.  Reviewed most recent notes     Constipation  Started/continue on senna, have as needed miralax and dulcolax available     Right foot cellulitis.  Resolved with Keflex 4 times daily through 6/11.  Swelling and redness resolved.    Venous ultrasound negative for DVT     Dementia with chronic aphasia.  Not on any medications.   As needed olanzapine ordered for agitation. Impulsive.     5.   Bradycardia  Patient was noted to have HR in 50s.  Asymptomatic     6. Paper work  Guardianship papers filled on 7/13 for the daughter.     7.  Actinic Keratosis                Had surgery for this and prescribed steroids cream and ketoconazole cream, restarted per daughter request           Diet: Combination Diet Regular Diet  Room Service    DVT Prophylaxis: Low Risk/Ambulatory with no VTE prophylaxis indicated  Maddox Catheter: Not present  Lines: None     Cardiac Monitoring: None  Code Status: No CPR- Do NOT Intubate      Clinically Significant Risk Factors Present on Admission                    # Dementia: noted on problem list                  Disposition Plan     Medically Ready for Discharge: Anticipated Tomorrow             Andi Brown,  MD  Hospitalist Service  [facility]  Securely message with Samantha (more info)  Text page via [employer] Paging/Directory   ______________________________________________________________________    Interval History   He is feeling well, no change in the last 24 hours     Physical Exam   Vital Signs: Temp: 97.1  F (36.2  C) Temp src: Oral BP: (!) 142/72 Pulse: 50   Resp: 18        Weight: 162 lbs 3.2 oz    Constitutional: awake, alert, cooperative, no apparent distress, and appears stated age  Respiratory: No increased work of breathing, good air exchange, clear to auscultation bilaterally, no crackles or wheezing  Cardiovascular: Normal apical impulse, regular rate and rhythm, normal S1 and S2, no S3 or S4, and no murmur noted    Medical Decision Making       20 MINUTES SPENT BY ME on the date of service doing chart review, history, exam, documentation & further activities per the note.      Data

## 2024-09-22 NOTE — PLAN OF CARE
Goal Outcome Evaluation:      Plan of Care Reviewed With: patient    Outcome Evaluation: Alert to self, confused and impulsive, fall mats in place. Ambulates with Ax1 with walker. Tolerated diet. AUO.  Awaiting placement to LTC.    Problem: Adult Inpatient Plan of Care  Goal: Plan of Care Review  Description: The Plan of Care Review/Shift note should be completed every shift.  The Outcome Evaluation is a brief statement about your assessment that the patient is improving, declining, or no change.  This information will be displayed automatically on your shift  note.  Outcome: Not Progressing  Flowsheets (Taken 9/22/2024 2147)  Outcome Evaluation: Alert to self, confused and impulsive, fall mats in place. Ambulates with Ax1 with walker. Tolerated diet. AUO.  Awaiting placement to LTC.  Plan of Care Reviewed With: patient  Goal: Absence of Hospital-Acquired Illness or Injury  Outcome: Not Progressing  Intervention: Identify and Manage Fall Risk  Recent Flowsheet Documentation  Taken 9/22/2024 3196 by Lisa Stapleton, RN  Safety Promotion/Fall Prevention: safety round/check completed  Goal: Optimal Comfort and Wellbeing  Outcome: Not Progressing  Goal: Readiness for Transition of Care  Outcome: Not Progressing

## 2024-09-22 NOTE — PLAN OF CARE
"Goal Outcome Evaluation:      Plan of Care Reviewed With: patient    Overall Patient Progress: no changeOverall Patient Progress: no change    Outcome Evaluation: Pt alert to self. Lethargic this shift. Ambulated to bathroom - incont. skilled nursing Care. Discharge Plans TBD      Problem: Adult Inpatient Plan of Care  Goal: Plan of Care Review  Description: The Plan of Care Review/Shift note should be completed every shift.  The Outcome Evaluation is a brief statement about your assessment that the patient is improving, declining, or no change.  This information will be displayed automatically on your shift  note.  Outcome: Not Progressing  Flowsheets (Taken 9/22/2024 0442)  Outcome Evaluation: Pt alert to self. Lethargic this shift. Ambulated to bathroom - incont. skilled nursing Care. Discharge Plans TBD  Plan of Care Reviewed With: patient  Overall Patient Progress: no change  Goal: Patient-Specific Goal (Individualized)  Description: You can add care plan individualizations to a care plan. Examples of Individualization might be:  \"Parent requests to be called daily at 9am for status\", \"I have a hard time hearing out of my right ear\", or \"Do not touch me to wake me up as it startles  me\".  Outcome: Not Progressing  Goal: Absence of Hospital-Acquired Illness or Injury  Outcome: Not Progressing  Intervention: Prevent Skin Injury  Recent Flowsheet Documentation  Taken 9/22/2024 0222 by Lima Hartmann RN  Body Position: position changed independently  Goal: Optimal Comfort and Wellbeing  Outcome: Not Progressing  Goal: Readiness for Transition of Care  Outcome: Not Progressing     Problem: Adult Inpatient Plan of Care  Goal: Plan of Care Review  Description: The Plan of Care Review/Shift note should be completed every shift.  The Outcome Evaluation is a brief statement about your assessment that the patient is improving, declining, or no change.  This information will be displayed automatically on your shift  note.  Recent " Flowsheet Documentation  Taken 9/22/2024 0442 by Lima Hartmann RN  Outcome Evaluation: Pt alert to self. Lethargic this shift. Ambulated to bathroom - incont. prison Care. Discharge Plans TBD  Plan of Care Reviewed With: patient  Overall Patient Progress: no change  Goal: Absence of Hospital-Acquired Illness or Injury  Intervention: Prevent Skin Injury  Recent Flowsheet Documentation  Taken 9/22/2024 0222 by Lima Hartmann RN  Body Position: position changed independently     Problem: Adult Inpatient Plan of Care  Goal: Absence of Hospital-Acquired Illness or Injury  Intervention: Prevent Skin Injury  Recent Flowsheet Documentation  Taken 9/22/2024 0222 by Lima Hartmann RN  Body Position: position changed independently     Problem: Adult Inpatient Plan of Care  Goal: Plan of Care Review  Description: The Plan of Care Review/Shift note should be completed every shift.  The Outcome Evaluation is a brief statement about your assessment that the patient is improving, declining, or no change.  This information will be displayed automatically on your shift  note.  Recent Flowsheet Documentation  Taken 9/22/2024 0442 by Lima Hartmann RN  Outcome Evaluation: Pt alert to self. Lethargic this shift. Ambulated to bathroom - incont. prison Care. Discharge Plans TBD  Plan of Care Reviewed With: patient  Overall Patient Progress: no change  Goal: Absence of Hospital-Acquired Illness or Injury  Intervention: Prevent Skin Injury  Recent Flowsheet Documentation  Taken 9/22/2024 0222 by Lima Hartmann RN  Body Position: position changed independently     Problem: Adult Inpatient Plan of Care  Goal: Plan of Care Review  Description: The Plan of Care Review/Shift note should be completed every shift.  The Outcome Evaluation is a brief statement about your assessment that the patient is improving, declining, or no change.  This information will be displayed automatically on your shift  note.  Recent Flowsheet  Documentation  Taken 9/22/2024 0442 by Lima Hartmann RN  Outcome Evaluation: Pt alert to self. Lethargic this shift. Ambulated to bathroom - incont. prison Care. Discharge Plans TBD  Plan of Care Reviewed With: patient  Overall Patient Progress: no change  Goal: Absence of Hospital-Acquired Illness or Injury  Intervention: Prevent Skin Injury  Recent Flowsheet Documentation  Taken 9/22/2024 0222 by Lima Hartmann RN  Body Position: position changed independently     Problem: Adult Inpatient Plan of Care  Goal: Plan of Care Review  Description: The Plan of Care Review/Shift note should be completed every shift.  The Outcome Evaluation is a brief statement about your assessment that the patient is improving, declining, or no change.  This information will be displayed automatically on your shift  note.  Recent Flowsheet Documentation  Taken 9/22/2024 0442 by Lima Hartmann RN  Outcome Evaluation: Pt alert to self. Lethargic this shift. Ambulated to bathroom - incont. prison Care. Discharge Plans TBD  Plan of Care Reviewed With: patient  Overall Patient Progress: no change  Goal: Absence of Hospital-Acquired Illness or Injury  Intervention: Prevent Skin Injury  Recent Flowsheet Documentation  Taken 9/22/2024 0222 by Lima Hartmann RN  Body Position: position changed independently     Problem: Adult Inpatient Plan of Care  Goal: Plan of Care Review  Description: The Plan of Care Review/Shift note should be completed every shift.  The Outcome Evaluation is a brief statement about your assessment that the patient is improving, declining, or no change.  This information will be displayed automatically on your shift  note.  Recent Flowsheet Documentation  Taken 9/22/2024 0442 by Lima Hartmann RN  Outcome Evaluation: Pt alert to self. Lethargic this shift. Ambulated to bathroom - incont. prison Care. Discharge Plans TBD  Plan of Care Reviewed With: patient  Overall Patient Progress: no change  Goal:  Absence of Hospital-Acquired Illness or Injury  Intervention: Prevent Skin Injury  Recent Flowsheet Documentation  Taken 9/22/2024 0222 by Lima Hartmann, RN  Body Position: position changed independently

## 2024-09-23 PROCEDURE — 101N000002 HC CUSTODIAL CARE DAILY

## 2024-09-23 RX ADMIN — KETOCONAZOLE: 20.5 SHAMPOO, SUSPENSION TOPICAL at 13:22

## 2024-09-23 RX ADMIN — BETAMETHASONE DIPROPIONATE: 0.5 OINTMENT TOPICAL at 23:00

## 2024-09-23 RX ADMIN — BETAMETHASONE DIPROPIONATE: 0.5 OINTMENT TOPICAL at 15:44

## 2024-09-23 ASSESSMENT — ACTIVITIES OF DAILY LIVING (ADL)
ADLS_ACUITY_SCORE: 51
ADLS_ACUITY_SCORE: 47
ADLS_ACUITY_SCORE: 51
ADLS_ACUITY_SCORE: 47
ADLS_ACUITY_SCORE: 47
ADLS_ACUITY_SCORE: 51
ADLS_ACUITY_SCORE: 47
ADLS_ACUITY_SCORE: 51
ADLS_ACUITY_SCORE: 51
ADLS_ACUITY_SCORE: 47
ADLS_ACUITY_SCORE: 51
ADLS_ACUITY_SCORE: 47
ADLS_ACUITY_SCORE: 51
ADLS_ACUITY_SCORE: 47

## 2024-09-23 NOTE — PLAN OF CARE
"Pt Confused, alert to self only, up SBA, no non-verbal indications of pain observed, awaiting LTC    Problem: Adult Inpatient Plan of Care  Goal: Plan of Care Review  Description: The Plan of Care Review/Shift note should be completed every shift.  The Outcome Evaluation is a brief statement about your assessment that the patient is improving, declining, or no change.  This information will be displayed automatically on your shift  note.  Outcome: Not Progressing  Flowsheets (Taken 9/22/2024 2139)  Plan of Care Reviewed With: patient  Overall Patient Progress: no change  Goal: Patient-Specific Goal (Individualized)  Description: You can add care plan individualizations to a care plan. Examples of Individualization might be:  \"Parent requests to be called daily at 9am for status\", \"I have a hard time hearing out of my right ear\", or \"Do not touch me to wake me up as it startles  me\".  Outcome: Not Progressing  Goal: Absence of Hospital-Acquired Illness or Injury  Outcome: Not Progressing  Goal: Optimal Comfort and Wellbeing  Outcome: Not Progressing  Goal: Readiness for Transition of Care  Outcome: Not Progressing   Goal Outcome Evaluation:      Plan of Care Reviewed With: patient    Overall Patient Progress: no changeOverall Patient Progress: no change           "

## 2024-09-23 NOTE — PLAN OF CARE
"VSS, PAINAD not indicative of pain, alert to self only, bath complete. [facility] care awaiting LTC, SW on board.    Goal Outcome Evaluation:    Plan of Care Reviewed With: patient  Overall Patient Progress: no changeOverall Patient Progress: no change  Outcome Evaluation: alert to self, bed bath and scalp med, denies pian    Problem: Adult Inpatient Plan of Care  Goal: Plan of Care Review  Description: The Plan of Care Review/Shift note should be completed every shift.  The Outcome Evaluation is a brief statement about your assessment that the patient is improving, declining, or no change.  This information will be displayed automatically on your shift  note.  Outcome: Progressing  Flowsheets (Taken 9/23/2024 1732)  Outcome Evaluation: alert to self, bed bath and scalp med, denies pidarryl  Plan of Care Reviewed With: patient  Overall Patient Progress: no change  Goal: Patient-Specific Goal (Individualized)  Description: You can add care plan individualizations to a care plan. Examples of Individualization might be:  \"Parent requests to be called daily at 9am for status\", \"I have a hard time hearing out of my right ear\", or \"Do not touch me to wake me up as it startles  me\".  Outcome: Progressing  Goal: Absence of Hospital-Acquired Illness or Injury  Outcome: Progressing  Intervention: Identify and Manage Fall Risk  Recent Flowsheet Documentation  Taken 9/23/2024 1110 by Katie Marks RN  Safety Promotion/Fall Prevention:   safety round/check completed   supervised activity  Goal: Optimal Comfort and Wellbeing  Outcome: Progressing  Goal: Readiness for Transition of Care  Outcome: Progressing       "

## 2024-09-23 NOTE — PLAN OF CARE
"Pt is alert and oriented x 1. Pt denies any pain and no S & S of pain observed. Breathing is unlabored and pt on room air.    Ambulates SBA to the bathroom. Ongoing monitoring.    Plan: Awaiting placement.      Problem: Adult Inpatient Plan of Care  Goal: Plan of Care Review  Description: The Plan of Care Review/Shift note should be completed every shift.  The Outcome Evaluation is a brief statement about your assessment that the patient is improving, declining, or no change.  This information will be displayed automatically on your shift  note.  Outcome: Progressing  Flowsheets (Taken 9/23/2024 0656)  Outcome Evaluation: Pt alert to self.  Plan of Care Reviewed With: patient  Overall Patient Progress: no change  Goal: Patient-Specific Goal (Individualized)  Description: You can add care plan individualizations to a care plan. Examples of Individualization might be:  \"Parent requests to be called daily at 9am for status\", \"I have a hard time hearing out of my right ear\", or \"Do not touch me to wake me up as it startles  me\".  Outcome: Progressing  Goal: Absence of Hospital-Acquired Illness or Injury  Outcome: Progressing  Intervention: Identify and Manage Fall Risk  Recent Flowsheet Documentation  Taken 9/23/2024 0600 by Gladis Lancaster RN  Safety Promotion/Fall Prevention: safety round/check completed  Taken 9/23/2024 0500 by Gladis Lancaster RN  Safety Promotion/Fall Prevention: safety round/check completed  Taken 9/23/2024 0400 by Gladis Lancaster RN  Safety Promotion/Fall Prevention: safety round/check completed  Taken 9/23/2024 0300 by Gladis Lancaster RN  Safety Promotion/Fall Prevention: safety round/check completed  Taken 9/23/2024 0200 by Gladis Lancaster RN  Safety Promotion/Fall Prevention: safety round/check completed  Taken 9/23/2024 0100 by Gladis Lancaster RN  Safety Promotion/Fall Prevention: safety round/check completed  Taken 9/23/2024 0000 by Gladis Lancaster RN  Safety Promotion/Fall " Prevention: safety round/check completed  Taken 9/22/2024 2310 by Gladis Lancaster, RN  Safety Promotion/Fall Prevention:   lighting adjusted   assistive device/personal items within reach   clutter free environment maintained   room organization consistent   safety round/check completed  Intervention: Prevent Skin Injury  Recent Flowsheet Documentation  Taken 9/22/2024 2310 by Gladis Lancaster, RN  Body Position: position changed independently  Goal: Optimal Comfort and Wellbeing  Outcome: Progressing  Goal: Readiness for Transition of Care  Outcome: Progressing   Goal Outcome Evaluation:      Plan of Care Reviewed With: patient    Overall Patient Progress: no changeOverall Patient Progress: no change    Outcome Evaluation: Pt alert to self.

## 2024-09-23 NOTE — PROGRESS NOTES
[facility]    Medicine Progress Note - Hospitalist Service    Date of Admission:  6/5/2024    Assessment & Plan   Summary of Stay: Jeaml Gray is a 88-year-old male who was transitioned to [facility] care on 6/5. He has history of dementia and family is unable to provide care for him. During his stay we was treated for right foot cellulitis which has resolved. Patient is MA pending, awaiting placement in long term care.      TODAY'S PLAN:  No changes.  Pt stable for discharge.  Reviewed most recent social work note with pending MA application     Problem List:   long term care admission.  Social work consulted.  Looking for LTC.  Is MA pending, SW working on this. Once a bed is available patient can go anytime   -Appreciate extensive input from social service.  Reviewed most recent notes     Constipation  Started/continue on senna, have as needed miralax and dulcolax available     Right foot cellulitis.  Resolved with Keflex 4 times daily through 6/11.  Swelling and redness resolved.    Venous ultrasound negative for DVT     Dementia with chronic aphasia.  Not on any medications.   As needed olanzapine ordered for agitation. Impulsive.     5.   Bradycardia  Patient was noted to have HR in 50s.  Asymptomatic     6. Paper work  Guardianship papers filled on 7/13 for the daughter.     7.  Actinic Keratosis                Had surgery for this and prescribed steroids cream and ketoconazole cream, restarted per daughter request           Diet: Combination Diet Regular Diet  Room Service    DVT Prophylaxis: Low Risk/Ambulatory with no VTE prophylaxis indicated  Maddox Catheter: Not present  Lines: None     Cardiac Monitoring: None  Code Status: No CPR- Do NOT Intubate      Clinically Significant Risk Factors Present on Admission                    # Dementia: noted on problem list                  Disposition Plan     Medically Ready for Discharge: Anticipated Tomorrow             Andi Brown,  MD  Hospitalist Service  [facility]  Securely message with Samantha (more info)  Text page via "[employer] Paging/Directory   ______________________________________________________________________    Interval History   He is feeling well, no change in the last 24 hours     Physical Exam   Vital Signs: Temp: 98.9  F (37.2  C) Temp src: Oral BP: 136/74 Pulse: 51   Resp: 18 SpO2: 97 % O2 Device: None (Room air)    Weight: 162 lbs 3.2 oz    Constitutional: awake, alert, cooperative, no apparent distress, and appears stated age  Respiratory: No increased work of breathing, good air exchange, clear to auscultation bilaterally, no crackles or wheezing  Cardiovascular: Normal apical impulse, regular rate and rhythm, normal S1 and S2, no S3 or S4, and no murmur noted    Medical Decision Making       20 MINUTES SPENT BY ME on the date of service doing chart review, history, exam, documentation & further activities per the note.      Data

## 2024-09-24 PROCEDURE — 101N000002 HC CUSTODIAL CARE DAILY

## 2024-09-24 PROCEDURE — 250N000013 HC RX MED GY IP 250 OP 250 PS 637: Performed by: STUDENT IN AN ORGANIZED HEALTH CARE EDUCATION/TRAINING PROGRAM

## 2024-09-24 RX ORDER — ACETAMINOPHEN 325 MG/1
975 TABLET ORAL EVERY 6 HOURS PRN
Status: DISCONTINUED | OUTPATIENT
Start: 2024-09-24 | End: 2024-10-02 | Stop reason: HOSPADM

## 2024-09-24 RX ADMIN — BETAMETHASONE DIPROPIONATE: 0.5 OINTMENT TOPICAL at 22:10

## 2024-09-24 RX ADMIN — BETAMETHASONE DIPROPIONATE: 0.5 OINTMENT TOPICAL at 13:30

## 2024-09-24 RX ADMIN — SENNOSIDES AND DOCUSATE SODIUM 1 TABLET: 8.6; 5 TABLET ORAL at 22:09

## 2024-09-24 ASSESSMENT — ACTIVITIES OF DAILY LIVING (ADL)
ADLS_ACUITY_SCORE: 51

## 2024-09-24 NOTE — PLAN OF CARE
"Alert to self only. PAINAD no signs of pain. Up periodically throughout the night. Discharge TBD    Goal Outcome Evaluation:                   Problem: Adult Inpatient Plan of Care  Goal: Plan of Care Review  Description: The Plan of Care Review/Shift note should be completed every shift.  The Outcome Evaluation is a brief statement about your assessment that the patient is improving, declining, or no change.  This information will be displayed automatically on your shift  note.  Outcome: Progressing  Flowsheets (Taken 9/24/2024 0608)  Plan of Care Reviewed With: patient  Overall Patient Progress: no change  Goal: Patient-Specific Goal (Individualized)  Description: You can add care plan individualizations to a care plan. Examples of Individualization might be:  \"Parent requests to be called daily at 9am for status\", \"I have a hard time hearing out of my right ear\", or \"Do not touch me to wake me up as it startles  me\".  Outcome: Progressing  Goal: Absence of Hospital-Acquired Illness or Injury  Outcome: Progressing  Goal: Optimal Comfort and Wellbeing  Outcome: Progressing  Goal: Readiness for Transition of Care  Outcome: Progressing              "

## 2024-09-24 NOTE — PLAN OF CARE
"Alert to self only. PAINAD no signs of pain. Up periodically throughout the night. Discharge TBD    Goal Outcome Evaluation:      Plan of Care Reviewed With: patient    Overall Patient Progress: no changeOverall Patient Progress: no change      Problem: Adult Inpatient Plan of Care  Goal: Plan of Care Review  Description: The Plan of Care Review/Shift note should be completed every shift.  The Outcome Evaluation is a brief statement about your assessment that the patient is improving, declining, or no change.  This information will be displayed automatically on your shift  note.  Outcome: Progressing  Flowsheets (Taken 9/24/2024 0608)  Plan of Care Reviewed With: patient  Overall Patient Progress: no change  Goal: Patient-Specific Goal (Individualized)  Description: You can add care plan individualizations to a care plan. Examples of Individualization might be:  \"Parent requests to be called daily at 9am for status\", \"I have a hard time hearing out of my right ear\", or \"Do not touch me to wake me up as it startles  me\".  Outcome: Progressing  Goal: Absence of Hospital-Acquired Illness or Injury  Outcome: Progressing  Goal: Optimal Comfort and Wellbeing  Outcome: Progressing  Goal: Readiness for Transition of Care  Outcome: Progressing              "

## 2024-09-24 NOTE — PLAN OF CARE
"VSS, PAINAD negative for pain, SBA refused chair but went for a walk off unit with staff.      Goal Outcome Evaluation:    Plan of Care Reviewed With: patient  Overall Patient Progress: no changeOverall Patient Progress: no change  Outcome Evaluation: alert to self, taken for walk off of unit with staff, PAINAD negitive    Problem: Adult Inpatient Plan of Care  Goal: Plan of Care Review  Description: The Plan of Care Review/Shift note should be completed every shift.  The Outcome Evaluation is a brief statement about your assessment that the patient is improving, declining, or no change.  This information will be displayed automatically on your shift  note.  Outcome: Progressing  Flowsheets (Taken 9/24/2024 0119)  Outcome Evaluation: alert to self, taken for walk off of unit with staff, PAINAD negitive  Plan of Care Reviewed With: patient  Overall Patient Progress: no change  Goal: Patient-Specific Goal (Individualized)  Description: You can add care plan individualizations to a care plan. Examples of Individualization might be:  \"Parent requests to be called daily at 9am for status\", \"I have a hard time hearing out of my right ear\", or \"Do not touch me to wake me up as it startles  me\".  Outcome: Progressing  Goal: Absence of Hospital-Acquired Illness or Injury  Outcome: Progressing  Goal: Optimal Comfort and Wellbeing  Outcome: Progressing  Goal: Readiness for Transition of Care  Outcome: Progressing     "

## 2024-09-25 PROCEDURE — 101N000002 HC CUSTODIAL CARE DAILY

## 2024-09-25 PROCEDURE — 99207 PR NO CHARGE LOS: CPT | Performed by: INTERNAL MEDICINE

## 2024-09-25 PROCEDURE — 250N000013 HC RX MED GY IP 250 OP 250 PS 637: Performed by: STUDENT IN AN ORGANIZED HEALTH CARE EDUCATION/TRAINING PROGRAM

## 2024-09-25 RX ADMIN — KETOCONAZOLE: 20.5 SHAMPOO, SUSPENSION TOPICAL at 09:03

## 2024-09-25 RX ADMIN — SENNOSIDES AND DOCUSATE SODIUM 1 TABLET: 8.6; 5 TABLET ORAL at 21:43

## 2024-09-25 RX ADMIN — BETAMETHASONE DIPROPIONATE: 0.5 OINTMENT TOPICAL at 09:05

## 2024-09-25 RX ADMIN — BETAMETHASONE DIPROPIONATE: 0.5 OINTMENT TOPICAL at 21:43

## 2024-09-25 ASSESSMENT — ACTIVITIES OF DAILY LIVING (ADL)
ADLS_ACUITY_SCORE: 51
ADLS_ACUITY_SCORE: 51
ADLS_ACUITY_SCORE: 47
ADLS_ACUITY_SCORE: 51
ADLS_ACUITY_SCORE: 47
ADLS_ACUITY_SCORE: 51
ADLS_ACUITY_SCORE: 47
ADLS_ACUITY_SCORE: 51
ADLS_ACUITY_SCORE: 47
ADLS_ACUITY_SCORE: 51
ADLS_ACUITY_SCORE: 47
ADLS_ACUITY_SCORE: 51

## 2024-09-25 NOTE — PLAN OF CARE
"Pt has dementia. No signs of acute distress. On [facility]. senna. Ready for discharge.     Problem: Adult Inpatient Plan of Care  Goal: Plan of Care Review  Description: The Plan of Care Review/Shift note should be completed every shift.  The Outcome Evaluation is a brief statement about your assessment that the patient is improving, declining, or no change.  This information will be displayed automatically on your shift  note.  Outcome: Met  Flowsheets (Taken 9/25/2024 0247)  Outcome Evaluation: Awaiting placement.  Plan of Care Reviewed With: patient  Overall Patient Progress: no change  Goal: Patient-Specific Goal (Individualized)  Description: You can add care plan individualizations to a care plan. Examples of Individualization might be:  \"Parent requests to be called daily at 9am for status\", \"I have a hard time hearing out of my right ear\", or \"Do not touch me to wake me up as it startles  me\".  Outcome: Met  Goal: Absence of Hospital-Acquired Illness or Injury  Outcome: Met  Intervention: Identify and Manage Fall Risk  Recent Flowsheet Documentation  Taken 9/24/2024 2210 by Julia Moss RN  Safety Promotion/Fall Prevention:   safety round/check completed   clutter free environment maintained   nonskid shoes/slippers when out of bed  Intervention: Prevent Skin Injury  Recent Flowsheet Documentation  Taken 9/24/2024 2210 by Julia Moss RN  Body Position: position changed independently  Goal: Optimal Comfort and Wellbeing  Outcome: Met  Goal: Readiness for Transition of Care  Outcome: Met   Goal Outcome Evaluation:      Plan of Care Reviewed With: patient    Overall Patient Progress: no changeOverall Patient Progress: no change    Outcome Evaluation: Awaiting placement.      "

## 2024-09-25 NOTE — PROGRESS NOTES
[facility]    Medicine Progress Note - Hospitalist Service    Date of Admission:  6/5/2024    Assessment & Plan   Summary of Stay: Jemal Gray is a 88-year-old male who was transitioned to half-way care on 6/5. He has history of dementia and family is unable to provide care for him. During his stay we was treated for right foot cellulitis which has resolved. Patient is MA pending, awaiting placement in long term care.      TODAY'S PLAN:  No changes.  Pt stable for discharge.  Reviewed most recent social work note with pending MA application     Problem List:   assisted care admission.  Social work consulted.  Looking for LTC.  Is MA pending, SW working on this. Once a bed is available patient can go anytime   -Appreciate extensive input from social service.  Reviewed most recent notes     Constipation  Started/continue on senna, have as needed miralax and dulcolax available     Right foot cellulitis.  Resolved with Keflex 4 times daily through 6/11.  Swelling and redness resolved.    Venous ultrasound negative for DVT     Dementia with chronic aphasia.  Not on any medications.   As needed olanzapine ordered for agitation. Impulsive.     5.   Bradycardia  Patient was noted to have HR in 50s.  Asymptomatic     6. Paper work  Guardianship papers filled on 7/13 for the daughter.     7.  Actinic Keratosis                Had surgery for this and prescribed steroids cream and ketoconazole cream, restarted per daughter request           Diet: Combination Diet Regular Diet  Room Service    DVT Prophylaxis: Low Risk/Ambulatory with no VTE prophylaxis indicated  Maddox Catheter: Not present  Lines: None     Cardiac Monitoring: None  Code Status: No CPR- Do NOT Intubate      Clinically Significant Risk Factors Present on Admission                    # Dementia: noted on problem list                  Disposition Plan       Medically Ready for Discharge: Ready Now             Brando Arreguin  MD  Hospitalist Service  [facility]  Securely message with Samantha (more info)  Text page via [employer] Paging/Directory   ______________________________________________________________________    Interval History   He is feeling well, no change in the last 24 hours     Physical Exam   Vital Signs:                    Weight: 162 lbs 3.2 oz    Constitutional: awake, alert, cooperative, no apparent distress, and appears stated age  Respiratory: No increased work of breathing, good air exchange, clear to auscultation bilaterally, no crackles or wheezing  Cardiovascular: Normal apical impulse, regular rate and rhythm, normal S1 and S2, no S3 or S4, and no murmur noted    Medical Decision Making       20 MINUTES SPENT BY ME on the date of service doing chart review, history, exam, documentation & further activities per the note.      Data

## 2024-09-26 PROCEDURE — 101N000002 HC CUSTODIAL CARE DAILY

## 2024-09-26 PROCEDURE — 99207 PR NO CHARGE LOS: CPT | Performed by: INTERNAL MEDICINE

## 2024-09-26 PROCEDURE — 250N000013 HC RX MED GY IP 250 OP 250 PS 637: Performed by: STUDENT IN AN ORGANIZED HEALTH CARE EDUCATION/TRAINING PROGRAM

## 2024-09-26 RX ADMIN — BETAMETHASONE DIPROPIONATE: 0.5 OINTMENT TOPICAL at 08:57

## 2024-09-26 RX ADMIN — BETAMETHASONE DIPROPIONATE: 0.5 OINTMENT TOPICAL at 20:37

## 2024-09-26 RX ADMIN — SENNOSIDES AND DOCUSATE SODIUM 1 TABLET: 8.6; 5 TABLET ORAL at 20:37

## 2024-09-26 ASSESSMENT — ACTIVITIES OF DAILY LIVING (ADL)
ADLS_ACUITY_SCORE: 47
ADLS_ACUITY_SCORE: 46
ADLS_ACUITY_SCORE: 47
ADLS_ACUITY_SCORE: 46
ADLS_ACUITY_SCORE: 46
ADLS_ACUITY_SCORE: 47
ADLS_ACUITY_SCORE: 46
ADLS_ACUITY_SCORE: 47
ADLS_ACUITY_SCORE: 46
ADLS_ACUITY_SCORE: 47
ADLS_ACUITY_SCORE: 46
ADLS_ACUITY_SCORE: 47
ADLS_ACUITY_SCORE: 46
ADLS_ACUITY_SCORE: 47

## 2024-09-26 NOTE — PROGRESS NOTES
[facility]    Medicine Progress Note - Hospitalist Service    Date of Admission:  6/5/2024    Assessment & Plan   Summary of Stay: Jemal Gray is a 88-year-old male who was transitioned to [facility] care on 6/5. He has history of dementia and family is unable to provide care for him. During his stay we was treated for right foot cellulitis which has resolved. Patient is MA pending, awaiting placement in long term care.      TODAY'S PLAN:  No changes.  Pt stable for discharge.  Reviewed most recent social work note with pending MA application     Problem List:   [facility] care admission.  Social work consulted.  Looking for LTC.  Is MA pending, SW working on this. Once a bed is available patient can go anytime   -Appreciate extensive input from social service.  Reviewed most recent notes     Constipation  Started/continue on senna, have as needed miralax and dulcolax available     Right foot cellulitis.  Resolved with Keflex 4 times daily through 6/11.  Swelling and redness resolved.    Venous ultrasound negative for DVT     Dementia with chronic aphasia.  Not on any medications.   As needed olanzapine ordered for agitation. Impulsive.     5.   Bradycardia  Patient was noted to have HR in 50s.  Asymptomatic     6. Paper work  Guardianship papers filled on 7/13 for the daughter.     7.  Actinic Keratosis                Had surgery for this and prescribed steroids cream and ketoconazole cream, restarted per daughter request           Diet: Combination Diet Regular Diet  Room Service    DVT Prophylaxis: Low Risk/Ambulatory with no VTE prophylaxis indicated  Maddox Catheter: Not present  Lines: None     Cardiac Monitoring: None  Code Status: No CPR- Do NOT Intubate      Clinically Significant Risk Factors Present on Admission                    # Dementia: noted on problem list                  Disposition Plan       Medically Ready for Discharge: Ready Now             Brando Arreguin  MD  Hospitalist Service  [facility] [place]  Securely message with Samantha (more info)  Text page via [employer] Paging/Directory   ______________________________________________________________________    Interval History   He is feeling well, no change in the last 24 hours     Physical Exam   Vital Signs:                    Weight: 162 lbs 3.2 oz    Constitutional: awake, alert, cooperative, no apparent distress, and appears stated age  Respiratory: No increased work of breathing, good air exchange, clear to auscultation bilaterally, no crackles or wheezing  Cardiovascular: Normal apical impulse, regular rate and rhythm, normal S1 and S2, no S3 or S4, and no murmur noted    Medical Decision Making       20 MINUTES SPENT BY ME on the date of service doing chart review, history, exam, documentation & further activities per the note.      Data

## 2024-09-26 NOTE — PROGRESS NOTES
Pt unchanged. Not decision making. Alert. VSS on RA   Problem: Adult Inpatient Plan of Care  Goal: Plan of Care Review  Description: The Plan of Care Review/Shift note should be completed every shift.  The Outcome Evaluation is a brief statement about your assessment that the patient is improving, declining, or no change.  This information will be displayed automatically on your shift  note.  Recent Flowsheet Documentation  Taken 9/14/2024 1851 by Maria De Jesus Bruce RN  Overall Patient Progress: no change

## 2024-09-27 PROCEDURE — 101N000002 HC CUSTODIAL CARE DAILY

## 2024-09-27 PROCEDURE — 99207 PR NO CHARGE LOS: CPT | Performed by: INTERNAL MEDICINE

## 2024-09-27 RX ADMIN — BETAMETHASONE DIPROPIONATE: 0.5 OINTMENT TOPICAL at 20:57

## 2024-09-27 RX ADMIN — KETOCONAZOLE: 20.5 SHAMPOO, SUSPENSION TOPICAL at 10:22

## 2024-09-27 RX ADMIN — BETAMETHASONE DIPROPIONATE: 0.5 OINTMENT TOPICAL at 10:22

## 2024-09-27 ASSESSMENT — ACTIVITIES OF DAILY LIVING (ADL)
ADLS_ACUITY_SCORE: 46
ADLS_ACUITY_SCORE: 52
ADLS_ACUITY_SCORE: 46
ADLS_ACUITY_SCORE: 48
ADLS_ACUITY_SCORE: 46

## 2024-09-27 NOTE — PROGRESS NOTES
[facility]    Medicine Progress Note - Hospitalist Service    Date of Admission:  6/5/2024    Assessment & Plan   Summary of Stay: Jemal Gray is a 88-year-old male who was transitioned to skilled nursing care on 6/5. He has history of dementia and family is unable to provide care for him. During his stay we was treated for right foot cellulitis which has resolved. Patient is MA pending, awaiting placement in long term care.      TODAY'S PLAN:  No changes.  Pt stable for discharge.  Reviewed most recent social work note with pending MA application     Problem List:   [facility] care admission.  Social work consulted.  Looking for LTC.  Is MA pending, SW working on this. Once a bed is available patient can go anytime   -Appreciate extensive input from social service.  Reviewed most recent notes     Constipation  Started/continue on senna, have as needed miralax and dulcolax available     Right foot cellulitis.  Resolved with Keflex 4 times daily through 6/11.  Swelling and redness resolved.    Venous ultrasound negative for DVT     Dementia with chronic aphasia.  Not on any medications.   As needed olanzapine ordered for agitation. Impulsive.     5.   Bradycardia  Patient was noted to have HR in 50s.  Asymptomatic     6. Paper work  Guardianship papers filled on 7/13 for the daughter.     7.  Actinic Keratosis                Had surgery for this and prescribed steroids cream and ketoconazole cream, restarted per daughter request           Diet: Combination Diet Regular Diet  Room Service    DVT Prophylaxis: Low Risk/Ambulatory with no VTE prophylaxis indicated  Maddox Catheter: Not present  Lines: None     Cardiac Monitoring: None  Code Status: No CPR- Do NOT Intubate      Clinically Significant Risk Factors Present on Admission                    # Dementia: noted on problem list                  Disposition Plan       Medically Ready for Discharge: Ready Now             Brando Arreguin  MD  Hospitalist Service    Securely message with [employer] (more info)  Text page via [employer] Paging/Directory   ______________________________________________________________________    Interval History   He is feeling well, no change in the last 24 hours     Physical Exam   Vital Signs: Temp: 97.7  F (36.5  C) Temp src: Oral BP: (!) 157/73 Pulse: 53   Resp: 18 SpO2: 97 % O2 Device: None (Room air)    Weight: 162 lbs 3.2 oz    Constitutional: awake, alert, cooperative, no apparent distress, and appears stated age  Respiratory: No increased work of breathing, good air exchange, clear to auscultation bilaterally, no crackles or wheezing  Cardiovascular: Normal apical impulse, regular rate and rhythm, normal S1 and S2, no S3 or S4, and no murmur noted    Medical Decision Making       20 MINUTES SPENT BY ME on the date of service doing chart review, history, exam, documentation & further activities per the note.      Data

## 2024-09-28 PROCEDURE — 99207 PR NO CHARGE LOS: CPT | Performed by: INTERNAL MEDICINE

## 2024-09-28 PROCEDURE — 250N000013 HC RX MED GY IP 250 OP 250 PS 637: Performed by: STUDENT IN AN ORGANIZED HEALTH CARE EDUCATION/TRAINING PROGRAM

## 2024-09-28 PROCEDURE — 101N000002 HC CUSTODIAL CARE DAILY

## 2024-09-28 RX ADMIN — BETAMETHASONE DIPROPIONATE: 0.5 OINTMENT TOPICAL at 23:30

## 2024-09-28 RX ADMIN — SENNOSIDES AND DOCUSATE SODIUM 1 TABLET: 8.6; 5 TABLET ORAL at 23:30

## 2024-09-28 RX ADMIN — BETAMETHASONE DIPROPIONATE: 0.5 OINTMENT TOPICAL at 11:17

## 2024-09-28 ASSESSMENT — ACTIVITIES OF DAILY LIVING (ADL)
ADLS_ACUITY_SCORE: 44
ADLS_ACUITY_SCORE: 48
ADLS_ACUITY_SCORE: 44

## 2024-09-28 NOTE — PLAN OF CARE
"Temp: 98.2  F (36.8  C) Temp src: Oral BP: (!) 153/74 Pulse: 56   Resp: 18 SpO2: 96 % O2 Device: None (Room air)      Alert to self. Denies pain. Good appetite. Up to bathroom and walked in quiles. Continue senior care care plan.    Goal Outcome Evaluation:      Plan of Care Reviewed With: patient    Overall Patient Progress: no changeOverall Patient Progress: no change    Outcome Evaluation: long term Care. Denies pain.      Problem: Adult Inpatient Plan of Care  Goal: Plan of Care Review  Description: The Plan of Care Review/Shift note should be completed every shift.  The Outcome Evaluation is a brief statement about your assessment that the patient is improving, declining, or no change.  This information will be displayed automatically on your shift  note.  Outcome: Progressing  Flowsheets (Taken 9/28/2024 1320)  Outcome Evaluation: long term Care. Denies pain.  Plan of Care Reviewed With: patient  Overall Patient Progress: no change  Goal: Patient-Specific Goal (Individualized)  Description: You can add care plan individualizations to a care plan. Examples of Individualization might be:  \"Parent requests to be called daily at 9am for status\", \"I have a hard time hearing out of my right ear\", or \"Do not touch me to wake me up as it startles  me\".  Outcome: Progressing  Goal: Absence of Hospital-Acquired Illness or Injury  Outcome: Progressing  Intervention: Identify and Manage Fall Risk  Recent Flowsheet Documentation  Taken 9/28/2024 1100 by Wilson Beal RN  Safety Promotion/Fall Prevention: safety round/check completed  Intervention: Prevent Infection  Recent Flowsheet Documentation  Taken 9/28/2024 1100 by Wilson Beal RN  Infection Prevention: rest/sleep promoted  Goal: Optimal Comfort and Wellbeing  Outcome: Progressing  Goal: Readiness for Transition of Care  Outcome: Progressing     "

## 2024-09-28 NOTE — PROGRESS NOTES
jail Care continued. Denies pain. Still very pleasant. Sets off alarm periodically but redirectable. Still awaiting MA and LTC placement.

## 2024-09-28 NOTE — PLAN OF CARE
Pt alert and oriented to self. [place]. Loose bm x 2. No senna given. Barrier paste applied to red bottom. Reg diet. One assist. No PIV.     Goal Outcome Evaluation:      Plan of Care Reviewed With: patient    Overall Patient Progress: no changeOverall Patient Progress: no change    Outcome Evaluation: senior care care      Problem: Adult Inpatient Plan of Care  Goal: Plan of Care Review  Description: The Plan of Care Review/Shift note should be completed every shift.  The Outcome Evaluation is a brief statement about your assessment that the patient is improving, declining, or no change.  This information will be displayed automatically on your shift  note.  Recent Flowsheet Documentation  Taken 9/28/2024 0411 by Orin Vazquez, RN  Outcome Evaluation: senior care care  Plan of Care Reviewed With: patient  Overall Patient Progress: no change  Goal: Absence of Hospital-Acquired Illness or Injury  Intervention: Prevent Skin Injury  Recent Flowsheet Documentation  Taken 9/27/2024 2200 by Orin Vazquez, RN  Body Position:   position changed independently   supine, head elevated

## 2024-09-28 NOTE — PROGRESS NOTES
[facility]    Medicine Progress Note - Hospitalist Service    Date of Admission:  6/5/2024    Assessment & Plan   Summary of Stay: Jemal Gray is a 88-year-old male who was transitioned to skilled nursing care on 6/5. He has history of dementia and family is unable to provide care for him. During his stay we was treated for right foot cellulitis which has resolved. Patient is MA pending, awaiting placement in long term care.      TODAY'S PLAN:  No changes.  Pt stable for discharge.  Reviewed most recent social work note with pending MA application     Problem List:   prison care admission.  Social work consulted.  Looking for LTC.  Is MA pending, SW working on this. Once a bed is available patient can go anytime   -Appreciate extensive input from social service.  Reviewed most recent notes     Constipation  Started/continue on senna, have as needed miralax and dulcolax available     Right foot cellulitis.  Resolved with Keflex 4 times daily through 6/11.  Swelling and redness resolved.    Venous ultrasound negative for DVT     Dementia with chronic aphasia.  Not on any medications.   As needed olanzapine ordered for agitation. Impulsive.     5.   Bradycardia  Patient was noted to have HR in 50s.  Asymptomatic     6. Paper work  Guardianship papers filled on 7/13 for the daughter.     7.  Actinic Keratosis                Had surgery for this and prescribed steroids cream and ketoconazole cream, restarted per daughter request           Diet: Combination Diet Regular Diet  Room Service    DVT Prophylaxis: Low Risk/Ambulatory with no VTE prophylaxis indicated  Maddox Catheter: Not present  Lines: None     Cardiac Monitoring: None  Code Status: No CPR- Do NOT Intubate      Clinically Significant Risk Factors Present on Admission                    # Dementia: noted on problem list                  Disposition Plan       Medically Ready for Discharge: Ready Now             Brando Arreguin  MD  Hospitalist Service  [facility] [place]  Securely message with [employer] (more info)  Text page via [employer] Paging/Directory   ______________________________________________________________________    Interval History   He is feeling well, no change in the last 24 hours     Physical Exam   Vital Signs: Temp: 98.2  F (36.8  C) Temp src: Oral BP: (!) 153/74 Pulse: 56   Resp: 18 SpO2: 96 % O2 Device: None (Room air)    Weight: 157 lbs 6.54 oz    Constitutional: awake, alert, cooperative, no apparent distress, and appears stated age  Respiratory: No increased work of breathing, good air exchange, clear to auscultation bilaterally, no crackles or wheezing  Cardiovascular: Normal apical impulse, regular rate and rhythm, normal S1 and S2, no S3 or S4, and no murmur noted    Medical Decision Making       20 MINUTES SPENT BY ME on the date of service doing chart review, history, exam, documentation & further activities per the note.      Data

## 2024-09-29 PROCEDURE — 99221 1ST HOSP IP/OBS SF/LOW 40: CPT | Performed by: INTERNAL MEDICINE

## 2024-09-29 PROCEDURE — 101N000002 HC CUSTODIAL CARE DAILY

## 2024-09-29 PROCEDURE — 250N000013 HC RX MED GY IP 250 OP 250 PS 637: Performed by: STUDENT IN AN ORGANIZED HEALTH CARE EDUCATION/TRAINING PROGRAM

## 2024-09-29 RX ADMIN — BETAMETHASONE DIPROPIONATE: 0.5 OINTMENT TOPICAL at 09:04

## 2024-09-29 RX ADMIN — SENNOSIDES AND DOCUSATE SODIUM 1 TABLET: 8.6; 5 TABLET ORAL at 22:18

## 2024-09-29 RX ADMIN — BETAMETHASONE DIPROPIONATE: 0.5 OINTMENT TOPICAL at 22:19

## 2024-09-29 ASSESSMENT — ACTIVITIES OF DAILY LIVING (ADL)
ADLS_ACUITY_SCORE: 43
ADLS_ACUITY_SCORE: 43
ADLS_ACUITY_SCORE: 44
ADLS_ACUITY_SCORE: 43
ADLS_ACUITY_SCORE: 44
ADLS_ACUITY_SCORE: 44
ADLS_ACUITY_SCORE: 43
ADLS_ACUITY_SCORE: 44
ADLS_ACUITY_SCORE: 43
ADLS_ACUITY_SCORE: 44
ADLS_ACUITY_SCORE: 43
ADLS_ACUITY_SCORE: 44
ADLS_ACUITY_SCORE: 43
ADLS_ACUITY_SCORE: 43

## 2024-09-29 NOTE — PLAN OF CARE
Pt alert and oriented to self. Pleasant. [place]. Barrier paste applied to red bottom. Reg diet. SBA/One assist. No PIV.     Goal Outcome Evaluation:      Plan of Care Reviewed With: patient    Overall Patient Progress: no changeOverall Patient Progress: no change    Outcome Evaluation: senior living care.      Problem: Adult Inpatient Plan of Care  Goal: Plan of Care Review  Description: The Plan of Care Review/Shift note should be completed every shift.  The Outcome Evaluation is a brief statement about your assessment that the patient is improving, declining, or no change.  This information will be displayed automatically on your shift  note.  Recent Flowsheet Documentation  Taken 9/29/2024 0506 by Orin Vazquez, RN  Outcome Evaluation: senior living care.  Plan of Care Reviewed With: patient  Overall Patient Progress: no change  Goal: Absence of Hospital-Acquired Illness or Injury  Intervention: Prevent Skin Injury  Recent Flowsheet Documentation  Taken 9/28/2024 3610 by Orin Vazquez, RN  Body Position: position changed independently

## 2024-09-29 NOTE — PROGRESS NOTES
[facility]    Medicine Progress Note - Hospitalist Service    Date of Admission:  6/5/2024    Assessment & Plan   Summary of Stay: Jemal Gray is a 88-year-old male who was transitioned to shelter care on 6/5. He has history of dementia and family is unable to provide care for him. During his stay we was treated for right foot cellulitis which has resolved. Patient is MA pending, awaiting placement in long term care.      TODAY'S PLAN:  No changes.  Pt stable for discharge.  Reviewed most recent social work note with pending MA application     Problem List:   [facility] care admission.  Social work consulted.  Looking for LTC.  Is MA pending, SW working on this. Once a bed is available patient can go anytime   -Appreciate extensive input from social service.  Reviewed most recent notes     Constipation  Started/continue on senna, have as needed miralax and dulcolax available     Right foot cellulitis.- resolved  Resolved with Keflex 4 times daily through 6/11.  Swelling and redness resolved.    Venous ultrasound negative for DVT     Dementia with chronic aphasia.  Not on any medications.   As needed olanzapine ordered for agitation. Impulsive.     5.   Bradycardia  Patient was noted to have HR in 50s.  Asymptomatic     6. Paper work  Guardianship papers filled on 7/13 for the daughter.     7.  Actinic Keratosis                Had surgery for this and prescribed steroids cream and ketoconazole cream, restarted per daughter request           Diet: Combination Diet Regular Diet  Room Service  Snacks/Supplements Adult: Ensure Enlive; With Meals    DVT Prophylaxis: Low Risk/Ambulatory with no VTE prophylaxis indicated  Maddox Catheter: Not present  Lines: None     Cardiac Monitoring: None  Code Status: No CPR- Do NOT Intubate      Clinically Significant Risk Factors Present on Admission                    # Dementia: noted on problem list                  Disposition Plan       Medically Ready  for Discharge: Ready Now             Johnathan Ordonez MD, MD  Hospitalist Service  [facility]  Securely message with [employer] (more info)  Text page via [employer] Paging/Directory   ______________________________________________________________________    Interval History   No significant reported issues overnight    Physical Exam   Vital Signs: Temp: 98.3  F (36.8  C) Temp src: Oral BP: (!) 164/75 Pulse: 50   Resp: 18 SpO2: 96 % O2 Device: None (Room air)    Weight: 157 lbs 6.54 oz    Limited exam.  I saw him this morning sleeping comfortably    Medical Decision Making       10 MINUTES SPENT BY ME on the date of service doing chart review, history, exam, documentation & further activities per the note.      Data

## 2024-09-29 NOTE — PLAN OF CARE
"  Alert to self. Happy amd cooperative. Set bed alarm off intermittently to get up to the bathroom. Good appetite. Likes the chocolate insures added to his meals.        Goal Outcome Evaluation:      Plan of Care Reviewed With: patient    Overall Patient Progress: no changeOverall Patient Progress: no change    Outcome Evaluation: skilled nursing care. Pleasant, denies pain. No signs of discomfort      Problem: Adult Inpatient Plan of Care  Goal: Plan of Care Review  Description: The Plan of Care Review/Shift note should be completed every shift.  The Outcome Evaluation is a brief statement about your assessment that the patient is improving, declining, or no change.  This information will be displayed automatically on your shift  note.  Outcome: Progressing  Flowsheets (Taken 9/29/2024 1453)  Outcome Evaluation: skilled nursing care. Pleasant, denies pain. No signs of discomfort  Plan of Care Reviewed With: patient  Overall Patient Progress: no change  Goal: Patient-Specific Goal (Individualized)  Description: You can add care plan individualizations to a care plan. Examples of Individualization might be:  \"Parent requests to be called daily at 9am for status\", \"I have a hard time hearing out of my right ear\", or \"Do not touch me to wake me up as it startles  me\".  Outcome: Progressing  Goal: Absence of Hospital-Acquired Illness or Injury  Outcome: Progressing  Intervention: Identify and Manage Fall Risk  Recent Flowsheet Documentation  Taken 9/29/2024 0901 by Wilson Beal, RN  Safety Promotion/Fall Prevention: safety round/check completed  Intervention: Prevent Skin Injury  Recent Flowsheet Documentation  Taken 9/29/2024 0900 by Wilson Beal RN  Body Position: position changed independently  Goal: Optimal Comfort and Wellbeing  Outcome: Progressing  Goal: Readiness for Transition of Care  Outcome: Progressing     "

## 2024-09-30 PROCEDURE — 250N000013 HC RX MED GY IP 250 OP 250 PS 637: Performed by: INTERNAL MEDICINE

## 2024-09-30 PROCEDURE — 101N000002 HC CUSTODIAL CARE DAILY

## 2024-09-30 PROCEDURE — 250N000013 HC RX MED GY IP 250 OP 250 PS 637: Performed by: STUDENT IN AN ORGANIZED HEALTH CARE EDUCATION/TRAINING PROGRAM

## 2024-09-30 RX ADMIN — SENNOSIDES AND DOCUSATE SODIUM 1 TABLET: 8.6; 5 TABLET ORAL at 21:35

## 2024-09-30 RX ADMIN — BETAMETHASONE DIPROPIONATE: 0.5 OINTMENT TOPICAL at 08:32

## 2024-09-30 RX ADMIN — KETOCONAZOLE: 20.5 SHAMPOO, SUSPENSION TOPICAL at 08:32

## 2024-09-30 RX ADMIN — BETAMETHASONE DIPROPIONATE: 0.5 OINTMENT TOPICAL at 21:37

## 2024-09-30 ASSESSMENT — ACTIVITIES OF DAILY LIVING (ADL)
ADLS_ACUITY_SCORE: 44

## 2024-09-30 NOTE — PLAN OF CARE
Pt on [facility] care. Does mot appear to be uncomfortable. Pt singing. Pt ate breakfast and lunch. No concerns noted.    Problem: Adult Inpatient Plan of Care  Goal: Absence of Hospital-Acquired Illness or Injury  Intervention: Identify and Manage Fall Risk  Recent Flowsheet Documentation  Taken 9/30/2024 0926 by Ina Garvey, RN  Safety Promotion/Fall Prevention: safety round/check completed  Intervention: Prevent Infection  Recent Flowsheet Documentation  Taken 9/30/2024 0936 by Ina Garvey, RN  Infection Prevention:   hand hygiene promoted   single patient room provided   personal protective equipment utilized

## 2024-09-30 NOTE — PROGRESS NOTES
[facility]    Medicine Progress Note - Hospitalist Service    Date of Admission:  6/5/2024    Assessment & Plan   Summary of Stay: Jemal Gray is a 88-year-old male who was transitioned to senior care care on 6/5. He has history of dementia and family is unable to provide care for him. During his stay we was treated for right foot cellulitis which has resolved. Patient is MA pending, awaiting placement in long term care.      TODAY'S PLAN:  No changes.  Pt stable for discharge.  Reviewed most recent social work note with pending MA application     Problem List:   intermediate care admission.  Social work consulted.  Looking for LTC.  Is MA pending, SW working on this. Once a bed is available patient can go anytime   -Appreciate input from social service.  Reviewed most recent notes     Constipation  Started/continue on senna, have as needed miralax and dulcolax available     Right foot cellulitis.- resolved  Resolved with Keflex 4 times daily through 6/11.  Swelling and redness resolved.    Venous ultrasound negative for DVT     Dementia with chronic aphasia.  Not on any medications.   As needed olanzapine ordered for agitation. Impulsive.     5.   Bradycardia  Patient was noted to have HR in 50s.  Asymptomatic     6. Paper work  Guardianship papers filled on 7/13 for the daughter.     7.  Actinic Keratosis                Had surgery for this and prescribed steroids cream and ketoconazole cream, restarted per daughter request           Diet: Combination Diet Regular Diet  Room Service  Snacks/Supplements Adult: Ensure Enlive; With Meals    DVT Prophylaxis: Low Risk/Ambulatory with no VTE prophylaxis indicated  Maddox Catheter: Not present  Lines: None     Cardiac Monitoring: None  Code Status: No CPR- Do NOT Intubate      Clinically Significant Risk Factors Present on Admission                    # Dementia: noted on problem list                  Disposition Plan       Medically Ready for  Discharge: Ready Now             Hector Marie MD  Hospitalist Service  [facility]  Securely message with [employer] (more info)  Text page via [employer] Paging/Directory   ______________________________________________________________________    Interval History   Patient seen and examined, no new reported overnight issues, he denied any complaint.    Physical Exam   Vital Signs: Temp: 99.3  F (37.4  C) Temp src: Oral BP: 125/62 Pulse: 56   Resp: 18 SpO2: 95 % O2 Device: None (Room air)    Weight: 157 lbs 6.54 oz    Limited exam.  Awake and alert, not in any form of distress, cooperative and pleasant    Medical Decision Making       12 MINUTES SPENT BY ME on the date of service doing chart review, history, exam, documentation & further activities per the note.      Data

## 2024-09-30 NOTE — PLAN OF CARE
Pt alert and oriented to self. Pleasant. [place]. Barrier paste applied to red bottom. Reg diet. SBA/One assist. No PIV.     Goal Outcome Evaluation:      Plan of Care Reviewed With: patient    Overall Patient Progress: no changeOverall Patient Progress: no change    Outcome Evaluation: assisted Care      Problem: Adult Inpatient Plan of Care  Goal: Plan of Care Review  Description: The Plan of Care Review/Shift note should be completed every shift.  The Outcome Evaluation is a brief statement about your assessment that the patient is improving, declining, or no change.  This information will be displayed automatically on your shift  note.  Recent Flowsheet Documentation  Taken 9/30/2024 0315 by Orin Vazquez, RN  Outcome Evaluation: assisted Care  Plan of Care Reviewed With: patient  Overall Patient Progress: no change

## 2024-10-01 PROCEDURE — 250N000013 HC RX MED GY IP 250 OP 250 PS 637: Performed by: STUDENT IN AN ORGANIZED HEALTH CARE EDUCATION/TRAINING PROGRAM

## 2024-10-01 PROCEDURE — 101N000002 HC CUSTODIAL CARE DAILY

## 2024-10-01 RX ADMIN — BETAMETHASONE DIPROPIONATE: 0.5 OINTMENT TOPICAL at 21:15

## 2024-10-01 RX ADMIN — SENNOSIDES AND DOCUSATE SODIUM 1 TABLET: 8.6; 5 TABLET ORAL at 21:15

## 2024-10-01 RX ADMIN — BETAMETHASONE DIPROPIONATE: 0.5 OINTMENT TOPICAL at 10:42

## 2024-10-01 ASSESSMENT — ACTIVITIES OF DAILY LIVING (ADL)
ADLS_ACUITY_SCORE: 44

## 2024-10-01 NOTE — PROGRESS NOTES
[facility]    Medicine Progress Note - Hospitalist Service    Date of Admission:  6/5/2024    Assessment & Plan   Summary of Stay: Jemal Gray is a 88-year-old male who was transitioned to senior living care on 6/5. He has history of dementia and family is unable to provide care for him. During his stay we was treated for right foot cellulitis which has resolved. Patient is MA pending, awaiting placement in long term care.      TODAY'S PLAN: Patient remained stable, ready for discharge when safe plan is in place for him.   working on safe discharge plan and processing medical assistant     Problem List:   alf care admission.  Social work consulted.  Looking for LTC.  Is MA pending, SW working on this. Once a bed is available patient can go anytime   -Appreciate input from social service.  Reviewed most recent notes     Constipation  Started/continue on senna, have as needed miralax and dulcolax available     Right foot cellulitis.- resolved  Resolved with Keflex 4 times daily through 6/11.  Swelling and redness resolved.    Venous ultrasound negative for DVT     Dementia with chronic aphasia.  Not on any medications.   As needed olanzapine ordered for agitation. Impulsive.     5.   Bradycardia  Patient was noted to have HR in 50s.  Asymptomatic     6. Paper work  Guardianship papers filled on 7/13 for the daughter.     7.  Actinic Keratosis                Had surgery for this and prescribed steroids cream and ketoconazole cream, restarted per daughter request           Diet: Combination Diet Regular Diet  Room Service  Snacks/Supplements Adult: Ensure Enlive; With Meals    DVT Prophylaxis: Low Risk/Ambulatory with no VTE prophylaxis indicated  Maddox Catheter: Not present  Lines: None     Cardiac Monitoring: None  Code Status: No CPR- Do NOT Intubate      Clinically Significant Risk Factors Present on Admission                    # Dementia: noted on problem list                   Disposition Plan       Medically Ready for Discharge: Ready Now             Hector Marie MD  Hospitalist Service  [facility]  Securely message with Samantha (more info)  Text page via [employer] Paging/Directory   ______________________________________________________________________    Interval History   Patient seen and examined, no new reported overnight issues, he denied any complaint.    Physical Exam   Vital Signs:                    Weight: 157 lbs 6.54 oz    Limited exam.  Awake and alert, not in any form of distress, cooperative and pleasant    Medical Decision Making       12 MINUTES SPENT BY ME on the date of service doing chart review, history, exam, documentation & further activities per the note.      Data

## 2024-10-01 NOTE — PROGRESS NOTES
Care Management Discharge Note    Discharge Date: 10/07/2024       Discharge Disposition:  Discharging to LTC @ [facility] tomorrow, 10/2/24    Discharge Services:  LTC    Discharge DME:  LTC will provide    Discharge Transportation:  family to transport  Private pay costs discussed: Not applicable    Does the patient's insurance plan have a 3 day qualifying hospital stay waiver?  No    PAS Confirmation Code:    Patient/family educated on Medicare website which has current facility and service quality ratings:  yes    Education Provided on the Discharge Plan:  yes  Persons Notified of Discharge Plans: guardian,daughter, Columba Jurado  Patient/Family in Agreement with the Plan:  yes    Handoff Referral Completed: No, handoff not indicated or clinically appropriate    Additional Information:  [facility] has accepted patient for admission for LTC with a spend down for MA. Updated the guardian, daughter Rhiannon. The family is on board for this transfer.    Wheelchair transport set for tomorrow between [id number].    Updated MD and unit.    Columba Hollingsworth, CLAUDIASW

## 2024-10-01 NOTE — PLAN OF CARE
"Goal Outcome Evaluation:      Plan of Care Reviewed With: patient    Overall Patient Progress: no changeOverall Patient Progress: no change    Outcome Evaluation: Up to bathroom to void. Eating well. Will try to take for walk this afternoon. Pt declining walk until his food settles after lunch.      Problem: Adult Inpatient Plan of Care  Goal: Plan of Care Review  Description: The Plan of Care Review/Shift note should be completed every shift.  The Outcome Evaluation is a brief statement about your assessment that the patient is improving, declining, or no change.  This information will be displayed automatically on your shift  note.  Outcome: Progressing  Flowsheets (Taken 10/1/2024 1310)  Outcome Evaluation: Up to bathroom to void. Eating well. Will try to take for walk this afternoon. Pt declining walk until his food settles after lunch.  Plan of Care Reviewed With: patient  Overall Patient Progress: no change  Goal: Patient-Specific Goal (Individualized)  Description: You can add care plan individualizations to a care plan. Examples of Individualization might be:  \"Parent requests to be called daily at 9am for status\", \"I have a hard time hearing out of my right ear\", or \"Do not touch me to wake me up as it startles  me\".  Outcome: Progressing  Goal: Absence of Hospital-Acquired Illness or Injury  Outcome: Progressing  Goal: Optimal Comfort and Wellbeing  Outcome: Progressing  Goal: Readiness for Transition of Care  Outcome: Progressing     "

## 2024-10-01 NOTE — PLAN OF CARE
Alert to self only. SBA to bathroom. Continent of urine. Regular diet. Plan: discharge to [facility] once MA approved. Continue w/ POC.     PRIMARY DIAGNOSIS: [facility] outpatient  OUTPATIENT/OBSERVATION GOALS TO BE MET BEFORE DISCHARGE:  1. Stable vital signs Yes  2. Tolerating diet:Yes  3. Pain controlled with oral pain medications:  Yes  4. Positive bowel sounds:  Yes  5. Voiding without difficulty:  Yes  6. Able to ambulate:  Yes  7. Provider specific discharge goals met:  Yes    Discharge Planner Nurse   Safe discharge environment identified: No  Barriers to discharge: Yes       Entered by: Columba Thompson RN 10/01/2024 4:09 AM     Please review provider order for any additional goals.   Nurse to notify provider when observation goals have been met and patient is ready for discharge.    Goal Outcome Evaluation:      Plan of Care Reviewed With: patient    Overall Patient Progress: no changeOverall Patient Progress: no change    Outcome Evaluation: Pt up to bathroom to void multiple times on NOC.      Problem: Adult Inpatient Plan of Care  Goal: Plan of Care Review  Description: The Plan of Care Review/Shift note should be completed every shift.  The Outcome Evaluation is a brief statement about your assessment that the patient is improving, declining, or no change.  This information will be displayed automatically on your shift  note.  Recent Flowsheet Documentation  Taken 10/1/2024 0126 by Columba Thompson RN  Outcome Evaluation: Pt up to bathroom to void multiple times on NOC.  Plan of Care Reviewed With: patient  Overall Patient Progress: no change  Goal: Absence of Hospital-Acquired Illness or Injury  Intervention: Identify and Manage Fall Risk  Recent Flowsheet Documentation  Taken 10/1/2024 0109 by Columba Thompson RN  Safety Promotion/Fall Prevention: safety round/check completed  Intervention: Prevent Skin Injury  Recent Flowsheet Documentation  Taken 10/1/2024 0108 by Columba Thompson RN  Body Position:  position changed independently  Intervention: Prevent and Manage VTE (Venous Thromboembolism) Risk  Recent Flowsheet Documentation  Taken 10/1/2024 0109 by Columba Thompson RN  VTE Prevention/Management:   SCDs off (sequential compression devices)   patient refused intervention  Intervention: Prevent Infection  Recent Flowsheet Documentation  Taken 10/1/2024 0109 by Columba Thompson RN  Infection Prevention:   single patient room provided   rest/sleep promoted   personal protective equipment utilized   hand hygiene promoted   environmental surveillance performed   equipment surfaces disinfected   cohorting utilized     Problem: Adult Inpatient Plan of Care  Goal: Plan of Care Review  Description: The Plan of Care Review/Shift note should be completed every shift.  The Outcome Evaluation is a brief statement about your assessment that the patient is improving, declining, or no change.  This information will be displayed automatically on your shift  note.  Recent Flowsheet Documentation  Taken 10/1/2024 0126 by Columba Thompson RN  Outcome Evaluation: Pt up to bathroom to void multiple times on NOC.  Plan of Care Reviewed With: patient  Overall Patient Progress: no change  Goal: Absence of Hospital-Acquired Illness or Injury  Intervention: Identify and Manage Fall Risk  Recent Flowsheet Documentation  Taken 10/1/2024 0109 by Columba Thompson RN  Safety Promotion/Fall Prevention: safety round/check completed  Intervention: Prevent Skin Injury  Recent Flowsheet Documentation  Taken 10/1/2024 0108 by Columba Thompson RN  Body Position: position changed independently  Intervention: Prevent and Manage VTE (Venous Thromboembolism) Risk  Recent Flowsheet Documentation  Taken 10/1/2024 0109 by Columba Thompson RN  VTE Prevention/Management:   SCDs off (sequential compression devices)   patient refused intervention  Intervention: Prevent Infection  Recent Flowsheet Documentation  Taken 10/1/2024 0109 by Columba Thompson  RN  Infection Prevention:   single patient room provided   rest/sleep promoted   personal protective equipment utilized   hand hygiene promoted   environmental surveillance performed   equipment surfaces disinfected   cohorting utilized     Problem: Adult Inpatient Plan of Care  Goal: Absence of Hospital-Acquired Illness or Injury  Intervention: Identify and Manage Fall Risk  Recent Flowsheet Documentation  Taken 10/1/2024 0109 by Columba Thompson RN  Safety Promotion/Fall Prevention: safety round/check completed  Intervention: Prevent Skin Injury  Recent Flowsheet Documentation  Taken 10/1/2024 0108 by Columba Thompson RN  Body Position: position changed independently  Intervention: Prevent and Manage VTE (Venous Thromboembolism) Risk  Recent Flowsheet Documentation  Taken 10/1/2024 0109 by Columba Thompson RN  VTE Prevention/Management:   SCDs off (sequential compression devices)   patient refused intervention  Intervention: Prevent Infection  Recent Flowsheet Documentation  Taken 10/1/2024 0109 by Columba Thompson RN  Infection Prevention:   single patient room provided   rest/sleep promoted   personal protective equipment utilized   hand hygiene promoted   environmental surveillance performed   equipment surfaces disinfected   cohorting utilized     Problem: Adult Inpatient Plan of Care  Goal: Plan of Care Review  Description: The Plan of Care Review/Shift note should be completed every shift.  The Outcome Evaluation is a brief statement about your assessment that the patient is improving, declining, or no change.  This information will be displayed automatically on your shift  note.  Recent Flowsheet Documentation  Taken 10/1/2024 0126 by Columba Thompson RN  Outcome Evaluation: Pt up to bathroom to void multiple times on NOC.  Plan of Care Reviewed With: patient  Overall Patient Progress: no change  Goal: Absence of Hospital-Acquired Illness or Injury  Intervention: Identify and Manage Fall Risk  Recent  Flowsheet Documentation  Taken 10/1/2024 0109 by Columba Thompson RN  Safety Promotion/Fall Prevention: safety round/check completed  Intervention: Prevent Skin Injury  Recent Flowsheet Documentation  Taken 10/1/2024 0108 by Columba Thompson RN  Body Position: position changed independently  Intervention: Prevent and Manage VTE (Venous Thromboembolism) Risk  Recent Flowsheet Documentation  Taken 10/1/2024 0109 by Columba Thompson RN  VTE Prevention/Management:   SCDs off (sequential compression devices)   patient refused intervention  Intervention: Prevent Infection  Recent Flowsheet Documentation  Taken 10/1/2024 0109 by Columba Thompson RN  Infection Prevention:   single patient room provided   rest/sleep promoted   personal protective equipment utilized   hand hygiene promoted   environmental surveillance performed   equipment surfaces disinfected   cohorting utilized     Problem: Adult Inpatient Plan of Care  Goal: Plan of Care Review  Description: The Plan of Care Review/Shift note should be completed every shift.  The Outcome Evaluation is a brief statement about your assessment that the patient is improving, declining, or no change.  This information will be displayed automatically on your shift  note.  Recent Flowsheet Documentation  Taken 10/1/2024 0126 by Columba Thompson RN  Outcome Evaluation: Pt up to bathroom to void multiple times on NOC.  Plan of Care Reviewed With: patient  Overall Patient Progress: no change  Goal: Absence of Hospital-Acquired Illness or Injury  Intervention: Identify and Manage Fall Risk  Recent Flowsheet Documentation  Taken 10/1/2024 0109 by Columba Thompson RN  Safety Promotion/Fall Prevention: safety round/check completed  Intervention: Prevent Skin Injury  Recent Flowsheet Documentation  Taken 10/1/2024 0108 by Columba Thompson RN  Body Position: position changed independently  Intervention: Prevent and Manage VTE (Venous Thromboembolism) Risk  Recent Flowsheet  Documentation  Taken 10/1/2024 0109 by Columba Thompson RN  VTE Prevention/Management:   SCDs off (sequential compression devices)   patient refused intervention  Intervention: Prevent Infection  Recent Flowsheet Documentation  Taken 10/1/2024 0109 by Columba Thompson RN  Infection Prevention:   single patient room provided   rest/sleep promoted   personal protective equipment utilized   hand hygiene promoted   environmental surveillance performed   equipment surfaces disinfected   cohorting utilized     Problem: Adult Inpatient Plan of Care  Goal: Plan of Care Review  Description: The Plan of Care Review/Shift note should be completed every shift.  The Outcome Evaluation is a brief statement about your assessment that the patient is improving, declining, or no change.  This information will be displayed automatically on your shift  note.  Recent Flowsheet Documentation  Taken 10/1/2024 0126 by Columba Thompson RN  Outcome Evaluation: Pt up to bathroom to void multiple times on NOC.  Plan of Care Reviewed With: patient  Overall Patient Progress: no change  Goal: Absence of Hospital-Acquired Illness or Injury  Intervention: Identify and Manage Fall Risk  Recent Flowsheet Documentation  Taken 10/1/2024 0109 by Columba Thompson RN  Safety Promotion/Fall Prevention: safety round/check completed  Intervention: Prevent Skin Injury  Recent Flowsheet Documentation  Taken 10/1/2024 0108 by Columba Thompson RN  Body Position: position changed independently  Intervention: Prevent and Manage VTE (Venous Thromboembolism) Risk  Recent Flowsheet Documentation  Taken 10/1/2024 0109 by Columba Thompson RN  VTE Prevention/Management:   SCDs off (sequential compression devices)   patient refused intervention  Intervention: Prevent Infection  Recent Flowsheet Documentation  Taken 10/1/2024 0109 by Columba Thompson RN  Infection Prevention:   single patient room provided   rest/sleep promoted   personal protective equipment utilized    hand hygiene promoted   environmental surveillance performed   equipment surfaces disinfected   cohorting utilized     Problem: Adult Inpatient Plan of Care  Goal: Plan of Care Review  Description: The Plan of Care Review/Shift note should be completed every shift.  The Outcome Evaluation is a brief statement about your assessment that the patient is improving, declining, or no change.  This information will be displayed automatically on your shift  note.  Recent Flowsheet Documentation  Taken 10/1/2024 0126 by Columba Thompson RN  Outcome Evaluation: Pt up to bathroom to void multiple times on NOC.  Plan of Care Reviewed With: patient  Overall Patient Progress: no change  Goal: Absence of Hospital-Acquired Illness or Injury  Intervention: Identify and Manage Fall Risk  Recent Flowsheet Documentation  Taken 10/1/2024 0109 by Columba Thompson RN  Safety Promotion/Fall Prevention: safety round/check completed  Intervention: Prevent Skin Injury  Recent Flowsheet Documentation  Taken 10/1/2024 0108 by Columba Thompson RN  Body Position: position changed independently  Intervention: Prevent and Manage VTE (Venous Thromboembolism) Risk  Recent Flowsheet Documentation  Taken 10/1/2024 0109 by Columba Thompson RN  VTE Prevention/Management:   SCDs off (sequential compression devices)   patient refused intervention  Intervention: Prevent Infection  Recent Flowsheet Documentation  Taken 10/1/2024 0109 by Columba Thompson RN  Infection Prevention:   single patient room provided   rest/sleep promoted   personal protective equipment utilized   hand hygiene promoted   environmental surveillance performed   equipment surfaces disinfected   cohorting utilized     Problem: Adult Inpatient Plan of Care  Goal: Plan of Care Review  Description: The Plan of Care Review/Shift note should be completed every shift.  The Outcome Evaluation is a brief statement about your assessment that the patient is improving, declining, or no change.   This information will be displayed automatically on your shift  note.  Recent Flowsheet Documentation  Taken 10/1/2024 0126 by Columba Thompson RN  Outcome Evaluation: Pt up to bathroom to void multiple times on NOC.  Plan of Care Reviewed With: patient  Overall Patient Progress: no change  Goal: Absence of Hospital-Acquired Illness or Injury  Intervention: Identify and Manage Fall Risk  Recent Flowsheet Documentation  Taken 10/1/2024 0109 by Columba Thompson RN  Safety Promotion/Fall Prevention: safety round/check completed  Intervention: Prevent Skin Injury  Recent Flowsheet Documentation  Taken 10/1/2024 0108 by Columba Thompson RN  Body Position: position changed independently  Intervention: Prevent and Manage VTE (Venous Thromboembolism) Risk  Recent Flowsheet Documentation  Taken 10/1/2024 0109 by Columba Thompson RN  VTE Prevention/Management:   SCDs off (sequential compression devices)   patient refused intervention  Intervention: Prevent Infection  Recent Flowsheet Documentation  Taken 10/1/2024 0109 by Columba Thompson RN  Infection Prevention:   single patient room provided   rest/sleep promoted   personal protective equipment utilized   hand hygiene promoted   environmental surveillance performed   equipment surfaces disinfected   cohorting utilized     Problem: Adult Inpatient Plan of Care  Goal: Plan of Care Review  Description: The Plan of Care Review/Shift note should be completed every shift.  The Outcome Evaluation is a brief statement about your assessment that the patient is improving, declining, or no change.  This information will be displayed automatically on your shift  note.  Recent Flowsheet Documentation  Taken 10/1/2024 0126 by Columba Thompson RN  Outcome Evaluation: Pt up to bathroom to void multiple times on NOC.  Plan of Care Reviewed With: patient  Overall Patient Progress: no change  Goal: Absence of Hospital-Acquired Illness or Injury  Intervention: Identify and Manage Fall  Risk  Recent Flowsheet Documentation  Taken 10/1/2024 0109 by Columba Thompson RN  Safety Promotion/Fall Prevention: safety round/check completed  Intervention: Prevent Skin Injury  Recent Flowsheet Documentation  Taken 10/1/2024 0108 by Columba Thompson RN  Body Position: position changed independently  Intervention: Prevent and Manage VTE (Venous Thromboembolism) Risk  Recent Flowsheet Documentation  Taken 10/1/2024 0109 by Columba Thompson RN  VTE Prevention/Management:   SCDs off (sequential compression devices)   patient refused intervention  Intervention: Prevent Infection  Recent Flowsheet Documentation  Taken 10/1/2024 0109 by Columba Thompson RN  Infection Prevention:   single patient room provided   rest/sleep promoted   personal protective equipment utilized   hand hygiene promoted   environmental surveillance performed   equipment surfaces disinfected   cohorting utilized     Problem: Adult Inpatient Plan of Care  Goal: Plan of Care Review  Description: The Plan of Care Review/Shift note should be completed every shift.  The Outcome Evaluation is a brief statement about your assessment that the patient is improving, declining, or no change.  This information will be displayed automatically on your shift  note.  Recent Flowsheet Documentation  Taken 10/1/2024 0126 by Columba Thompson RN  Outcome Evaluation: Pt up to bathroom to void multiple times on NOC.  Plan of Care Reviewed With: patient  Overall Patient Progress: no change  Goal: Absence of Hospital-Acquired Illness or Injury  Intervention: Identify and Manage Fall Risk  Recent Flowsheet Documentation  Taken 10/1/2024 0109 by Columba Thompson RN  Safety Promotion/Fall Prevention: safety round/check completed  Intervention: Prevent Skin Injury  Recent Flowsheet Documentation  Taken 10/1/2024 0108 by Columba Thompson RN  Body Position: position changed independently  Intervention: Prevent and Manage VTE (Venous Thromboembolism) Risk  Recent Flowsheet  This information will be displayed automatically on your shift  note.  Recent Flowsheet Documentation  Taken 10/1/2024 0126 by Columba Thompson RN  Outcome Evaluation: Pt up to bathroom to void multiple times on NOC.  Plan of Care Reviewed With: patient  Overall Patient Progress: no change  Goal: Absence of Hospital-Acquired Illness or Injury  Intervention: Identify and Manage Fall Risk  Recent Flowsheet Documentation  Taken 10/1/2024 0109 by Columba Thompson RN  Safety Promotion/Fall Prevention: safety round/check completed  Intervention: Prevent Skin Injury  Recent Flowsheet Documentation  Taken 10/1/2024 0108 by Columba Thompson RN  Body Position: position changed independently  Intervention: Prevent and Manage VTE (Venous Thromboembolism) Risk  Recent Flowsheet Documentation  Taken 10/1/2024 0109 by Columba Thompson RN  VTE Prevention/Management:   SCDs off (sequential compression devices)   patient refused intervention  Intervention: Prevent Infection  Recent Flowsheet Documentation  Taken 10/1/2024 0109 by Columba Thompson RN  Infection Prevention:   single patient room provided   rest/sleep promoted   personal protective equipment utilized   hand hygiene promoted   environmental surveillance performed   equipment surfaces disinfected   cohorting utilized     Problem: Adult Inpatient Plan of Care  Goal: Plan of Care Review  Description: The Plan of Care Review/Shift note should be completed every shift.  The Outcome Evaluation is a brief statement about your assessment that the patient is improving, declining, or no change.  This information will be displayed automatically on your shift  note.  Outcome: Progressing  Flowsheets (Taken 10/1/2024 0126)  Outcome Evaluation: Pt up to bathroom to void multiple times on NOC.  Plan of Care Reviewed With: patient  Overall Patient Progress: no change  Goal: Patient-Specific Goal (Individualized)  Description: You can add care plan individualizations to a care plan.  "Examples of Individualization might be:  \"Parent requests to be called daily at 9am for status\", \"I have a hard time hearing out of my right ear\", or \"Do not touch me to wake me up as it startles  me\".  Outcome: Progressing  Goal: Absence of Hospital-Acquired Illness or Injury  Outcome: Progressing  Intervention: Identify and Manage Fall Risk  Recent Flowsheet Documentation  Taken 10/1/2024 0109 by Columba Thompson RN  Safety Promotion/Fall Prevention: safety round/check completed  Intervention: Prevent Skin Injury  Recent Flowsheet Documentation  Taken 10/1/2024 0108 by Columba Thompson RN  Body Position: position changed independently  Intervention: Prevent and Manage VTE (Venous Thromboembolism) Risk  Recent Flowsheet Documentation  Taken 10/1/2024 0109 by Columba Thompson RN  VTE Prevention/Management:   SCDs off (sequential compression devices)   patient refused intervention  Intervention: Prevent Infection  Recent Flowsheet Documentation  Taken 10/1/2024 0109 by Columba Thompson RN  Infection Prevention:   single patient room provided   rest/sleep promoted   personal protective equipment utilized   hand hygiene promoted   environmental surveillance performed   equipment surfaces disinfected   cohorting utilized  Goal: Optimal Comfort and Wellbeing  Outcome: Progressing  Goal: Readiness for Transition of Care  Outcome: Progressing     Problem: Delirium  Goal: Improved Behavioral Control  Intervention: Minimize Safety Risk  Recent Flowsheet Documentation  Taken 10/1/2024 0109 by Columba Thompson RN  Enhanced Safety Measures: review medications for side effects with activity     Problem: Delirium  Goal: Improved Behavioral Control  Intervention: Minimize Safety Risk  Recent Flowsheet Documentation  Taken 10/1/2024 0109 by Columba Thompson RN  Enhanced Safety Measures: review medications for side effects with activity     Problem: Fall Injury Risk  Goal: Absence of Fall and Fall-Related Injury  Intervention: " Identify and Manage Contributors  Recent Flowsheet Documentation  Taken 10/1/2024 0109 by Columba Thompson RN  Medication Review/Management: medications reviewed  Intervention: Promote Injury-Free Environment  Recent Flowsheet Documentation  Taken 10/1/2024 0109 by Columba Thompson RN  Safety Promotion/Fall Prevention: safety round/check completed     Problem: Fall Injury Risk  Goal: Absence of Fall and Fall-Related Injury  Intervention: Identify and Manage Contributors  Recent Flowsheet Documentation  Taken 10/1/2024 0109 by Columba Thompson RN  Medication Review/Management: medications reviewed  Intervention: Promote Injury-Free Environment  Recent Flowsheet Documentation  Taken 10/1/2024 0109 by Columba Thompson RN  Safety Promotion/Fall Prevention: safety round/check completed     Problem: Fall Injury Risk  Goal: Absence of Fall and Fall-Related Injury  Intervention: Identify and Manage Contributors  Recent Flowsheet Documentation  Taken 10/1/2024 0109 by Columba Thompson RN  Medication Review/Management: medications reviewed  Intervention: Promote Injury-Free Environment  Recent Flowsheet Documentation  Taken 10/1/2024 0109 by Columba Thompson RN  Safety Promotion/Fall Prevention: safety round/check completed     Problem: Fall Injury Risk  Goal: Absence of Fall and Fall-Related Injury  Intervention: Identify and Manage Contributors  Recent Flowsheet Documentation  Taken 10/1/2024 0109 by Columba Thompson RN  Medication Review/Management: medications reviewed  Intervention: Promote Injury-Free Environment  Recent Flowsheet Documentation  Taken 10/1/2024 0109 by Columba Thompson RN  Safety Promotion/Fall Prevention: safety round/check completed     Problem: Fall Injury Risk  Goal: Absence of Fall and Fall-Related Injury  Intervention: Identify and Manage Contributors  Recent Flowsheet Documentation  Taken 10/1/2024 0109 by Columba Thompson RN  Medication Review/Management: medications reviewed  Intervention:  Promote Injury-Free Environment  Recent Flowsheet Documentation  Taken 10/1/2024 0109 by Columba Thompson RN  Safety Promotion/Fall Prevention: safety round/check completed     Problem: Fall Injury Risk  Goal: Absence of Fall and Fall-Related Injury  Intervention: Identify and Manage Contributors  Recent Flowsheet Documentation  Taken 10/1/2024 0109 by Columba Thompson RN  Medication Review/Management: medications reviewed  Intervention: Promote Injury-Free Environment  Recent Flowsheet Documentation  Taken 10/1/2024 0109 by Columba Thompson RN  Safety Promotion/Fall Prevention: safety round/check completed     Problem: Fall Injury Risk  Goal: Absence of Fall and Fall-Related Injury  Intervention: Identify and Manage Contributors  Recent Flowsheet Documentation  Taken 10/1/2024 0109 by Columba Thompson RN  Medication Review/Management: medications reviewed  Intervention: Promote Injury-Free Environment  Recent Flowsheet Documentation  Taken 10/1/2024 0109 by Columba Thompson RN  Safety Promotion/Fall Prevention: safety round/check completed     Problem: Fall Injury Risk  Goal: Absence of Fall and Fall-Related Injury  Intervention: Identify and Manage Contributors  Recent Flowsheet Documentation  Taken 10/1/2024 0109 by Columba Thompson RN  Medication Review/Management: medications reviewed  Intervention: Promote Injury-Free Environment  Recent Flowsheet Documentation  Taken 10/1/2024 0109 by Columba Thompson RN  Safety Promotion/Fall Prevention: safety round/check completed     Problem: Fall Injury Risk  Goal: Absence of Fall and Fall-Related Injury  Intervention: Identify and Manage Contributors  Recent Flowsheet Documentation  Taken 10/1/2024 0109 by Columba Thompson RN  Medication Review/Management: medications reviewed  Intervention: Promote Injury-Free Environment  Recent Flowsheet Documentation  Taken 10/1/2024 0109 by Columba Thompson RN  Safety Promotion/Fall Prevention: safety round/check completed      Problem: Fall Injury Risk  Goal: Absence of Fall and Fall-Related Injury  Intervention: Identify and Manage Contributors  Recent Flowsheet Documentation  Taken 10/1/2024 0109 by Columba Thompson RN  Medication Review/Management: medications reviewed  Intervention: Promote Injury-Free Environment  Recent Flowsheet Documentation  Taken 10/1/2024 0109 by Columba Thompson RN  Safety Promotion/Fall Prevention: safety round/check completed     Problem: Fall Injury Risk  Goal: Absence of Fall and Fall-Related Injury  Intervention: Identify and Manage Contributors  Recent Flowsheet Documentation  Taken 10/1/2024 0109 by Columba Thompson RN  Medication Review/Management: medications reviewed  Intervention: Promote Injury-Free Environment  Recent Flowsheet Documentation  Taken 10/1/2024 0109 by Columba Thompson RN  Safety Promotion/Fall Prevention: safety round/check completed     Problem: Comorbidity Management  Goal: Maintenance of Asthma Control  Intervention: Maintain Asthma Symptom Control  Recent Flowsheet Documentation  Taken 10/1/2024 0109 by Columba Thompson RN  Medication Review/Management: medications reviewed  Goal: Maintenance of Behavioral Health Symptom Control  Intervention: Maintain Behavioral Health Symptom Control  Recent Flowsheet Documentation  Taken 10/1/2024 0109 by Columba Thompson RN  Medication Review/Management: medications reviewed  Goal: Maintenance of COPD Symptom Control  Intervention: Maintain COPD Symptom Control  Recent Flowsheet Documentation  Taken 10/1/2024 0109 by Columba Thompson RN  Medication Review/Management: medications reviewed  Goal: Blood Glucose Levels Within Targeted Range  Intervention: Monitor and Manage Glycemia  Recent Flowsheet Documentation  Taken 10/1/2024 0109 by Columba Thompson RN  Medication Review/Management: medications reviewed  Goal: Maintenance of Heart Failure Symptom Control  Intervention: Maintain Heart Failure Management  Recent Flowsheet  Documentation  Taken 10/1/2024 0109 by Columba Thompson RN  Medication Review/Management: medications reviewed  Goal: Blood Pressure in Desired Range  Intervention: Maintain Blood Pressure Management  Recent Flowsheet Documentation  Taken 10/1/2024 0109 by Columba Thompson RN  Medication Review/Management: medications reviewed  Goal: Maintenance of Osteoarthritis Symptom Control  Intervention: Maintain Osteoarthritis Symptom Control  Recent Flowsheet Documentation  Taken 10/1/2024 0109 by Columba Thompson RN  Medication Review/Management: medications reviewed  Taken 10/1/2024 0108 by Columba Thompson RN  Activity Management: ambulated to bathroom  Goal: Bariatric Home Regimen Maintained  Intervention: Maintain and Manage Postbariatric Surgery Care  Recent Flowsheet Documentation  Taken 10/1/2024 0109 by Columba Thompson RN  Medication Review/Management: medications reviewed  Goal: Maintenance of Seizure Control  Intervention: Maintain Seizure Symptom Control  Recent Flowsheet Documentation  Taken 10/1/2024 0109 by Columba Thompson, RN  Medication Review/Management: medications reviewed     Problem: Pain Acute  Goal: Optimal Pain Control and Function  Intervention: Prevent or Manage Pain  Recent Flowsheet Documentation  Taken 10/1/2024 0109 by Columba Thompson RN  Medication Review/Management: medications reviewed

## 2024-10-01 NOTE — PLAN OF CARE
"Shift Summary ([id number] - [id number]):    care home cares. Denies pain. Pleasant and cooperative. Pt appears comfortable, no signs of discomfort/pain noted. Good appetite. One BM this shift. Bed alarm on for safety.     Goal Outcome Evaluation:      Plan of Care Reviewed With: patient    Overall Patient Progress: no change    Outcome Evaluation: no acute events.      Problem: Adult Inpatient Plan of Care  Goal: Plan of Care Review  Description: The Plan of Care Review/Shift note should be completed every shift.  The Outcome Evaluation is a brief statement about your assessment that the patient is improving, declining, or no change.  This information will be displayed automatically on your shift  note.  Outcome: Progressing  Flowsheets (Taken 9/30/2024 1948)  Outcome Evaluation: no acute events.  Plan of Care Reviewed With: patient  Overall Patient Progress: no change  Goal: Patient-Specific Goal (Individualized)  Description: You can add care plan individualizations to a care plan. Examples of Individualization might be:  \"Parent requests to be called daily at 9am for status\", \"I have a hard time hearing out of my right ear\", or \"Do not touch me to wake me up as it startles  me\".  Outcome: Progressing  Goal: Absence of Hospital-Acquired Illness or Injury  Outcome: Progressing  Goal: Optimal Comfort and Wellbeing  Outcome: Progressing  Goal: Readiness for Transition of Care  Outcome: Progressing     "

## 2024-10-02 ENCOUNTER — HOSPITAL ENCOUNTER (EMERGENCY)
Facility: CLINIC | Age: 89
Discharge: HOME | End: 2024-10-02
Attending: EMERGENCY MEDICINE | Admitting: EMERGENCY MEDICINE
Payer: COMMERCIAL

## 2024-10-02 ENCOUNTER — HOSPITAL ENCOUNTER (OUTPATIENT)
Facility: CLINIC | Age: 89
End: 2024-10-02
Attending: EMERGENCY MEDICINE | Admitting: HOSPITALIST

## 2024-10-02 ENCOUNTER — LAB REQUISITION (OUTPATIENT)
Dept: LAB | Facility: CLINIC | Age: 89
End: 2024-10-02
Payer: COMMERCIAL

## 2024-10-02 ENCOUNTER — MEDICAL CORRESPONDENCE (OUTPATIENT)
Dept: HEALTH INFORMATION MANAGEMENT | Facility: CLINIC | Age: 89
End: 2024-10-02

## 2024-10-02 VITALS
RESPIRATION RATE: 16 BRPM | DIASTOLIC BLOOD PRESSURE: 65 MMHG | TEMPERATURE: 98.1 F | HEART RATE: 53 BPM | OXYGEN SATURATION: 97 % | SYSTOLIC BLOOD PRESSURE: 122 MMHG

## 2024-10-02 VITALS
OXYGEN SATURATION: 97 % | HEART RATE: 50 BPM | DIASTOLIC BLOOD PRESSURE: 71 MMHG | SYSTOLIC BLOOD PRESSURE: 132 MMHG | RESPIRATION RATE: 16 BRPM | TEMPERATURE: 99.3 F | WEIGHT: 157.41 LBS | BODY MASS INDEX: 23.93 KG/M2

## 2024-10-02 DIAGNOSIS — R53.81 PHYSICAL DECONDITIONING: ICD-10-CM

## 2024-10-02 DIAGNOSIS — E55.9 VITAMIN D DEFICIENCY: ICD-10-CM

## 2024-10-02 DIAGNOSIS — L21.0 DANDRUFF, ADULT: ICD-10-CM

## 2024-10-02 DIAGNOSIS — F03.90 DEMENTIA, UNSPECIFIED DEMENTIA SEVERITY, UNSPECIFIED DEMENTIA TYPE, UNSPECIFIED WHETHER BEHAVIORAL, PSYCHOTIC, OR MOOD DISTURBANCE OR ANXIETY (H): Primary | ICD-10-CM

## 2024-10-02 DIAGNOSIS — Z11.1 ENCOUNTER FOR SCREENING FOR RESPIRATORY TUBERCULOSIS: ICD-10-CM

## 2024-10-02 PROCEDURE — G0378 HOSPITAL OBSERVATION PER HR: HCPCS

## 2024-10-02 PROCEDURE — 99223 1ST HOSP IP/OBS HIGH 75: CPT | Performed by: PHYSICIAN ASSISTANT

## 2024-10-02 PROCEDURE — 250N000013 HC RX MED GY IP 250 OP 250 PS 637: Performed by: PHYSICIAN ASSISTANT

## 2024-10-02 PROCEDURE — 99207 PR NO BILLABLE SERVICE THIS VISIT: CPT | Performed by: INTERNAL MEDICINE

## 2024-10-02 PROCEDURE — 99283 EMERGENCY DEPT VISIT LOW MDM: CPT

## 2024-10-02 RX ORDER — KETOCONAZOLE 20 MG/ML
SHAMPOO, SUSPENSION TOPICAL
Status: DISCONTINUED | OUTPATIENT
Start: 2024-10-04 | End: 2024-12-02

## 2024-10-02 RX ORDER — ONDANSETRON 4 MG/1
4 TABLET, ORALLY DISINTEGRATING ORAL EVERY 6 HOURS PRN
Status: DISCONTINUED | OUTPATIENT
Start: 2024-10-02 | End: 2024-12-18 | Stop reason: HOSPADM

## 2024-10-02 RX ORDER — OLANZAPINE 10 MG/2ML
2.5 INJECTION, POWDER, FOR SOLUTION INTRAMUSCULAR DAILY PRN
Status: DISCONTINUED | OUTPATIENT
Start: 2024-10-02 | End: 2024-10-04

## 2024-10-02 RX ORDER — AMOXICILLIN 250 MG
1 CAPSULE ORAL 2 TIMES DAILY PRN
Status: DISCONTINUED | OUTPATIENT
Start: 2024-10-02 | End: 2024-12-18 | Stop reason: HOSPADM

## 2024-10-02 RX ORDER — ACETAMINOPHEN 650 MG/1
650 SUPPOSITORY RECTAL EVERY 4 HOURS PRN
Status: DISCONTINUED | OUTPATIENT
Start: 2024-10-02 | End: 2024-12-18 | Stop reason: HOSPADM

## 2024-10-02 RX ORDER — AMOXICILLIN 250 MG
1 CAPSULE ORAL AT BEDTIME
Status: ON HOLD | DISCHARGE
Start: 2024-10-02

## 2024-10-02 RX ORDER — ACETAMINOPHEN 325 MG/1
650 TABLET ORAL EVERY 4 HOURS PRN
Status: DISCONTINUED | OUTPATIENT
Start: 2024-10-02 | End: 2024-12-18 | Stop reason: HOSPADM

## 2024-10-02 RX ORDER — BETAMETHASONE DIPROPIONATE 0.05 %
OINTMENT (GRAM) TOPICAL 2 TIMES DAILY
Status: ON HOLD | DISCHARGE
Start: 2024-10-02 | End: 2024-10-09

## 2024-10-02 RX ORDER — QUETIAPINE FUMARATE 25 MG/1
12.5 TABLET, FILM COATED ORAL 2 TIMES DAILY PRN
Status: ON HOLD | DISCHARGE
Start: 2024-10-02

## 2024-10-02 RX ORDER — ACETAMINOPHEN 325 MG/1
975 TABLET ORAL EVERY 6 HOURS PRN
Status: ON HOLD | DISCHARGE
Start: 2024-10-02

## 2024-10-02 RX ORDER — AMOXICILLIN 250 MG
2 CAPSULE ORAL 2 TIMES DAILY PRN
Status: DISCONTINUED | OUTPATIENT
Start: 2024-10-02 | End: 2024-12-18 | Stop reason: HOSPADM

## 2024-10-02 RX ORDER — LIDOCAINE 40 MG/G
CREAM TOPICAL
Status: DISCONTINUED | OUTPATIENT
Start: 2024-10-02 | End: 2024-12-18 | Stop reason: HOSPADM

## 2024-10-02 RX ORDER — ASCORBIC ACID 500 MG
1000 TABLET ORAL 2 TIMES DAILY
Status: DISCONTINUED | OUTPATIENT
Start: 2024-10-02 | End: 2024-10-09

## 2024-10-02 RX ORDER — KETOCONAZOLE 20 MG/ML
SHAMPOO TOPICAL
Status: ON HOLD | DISCHARGE
Start: 2024-10-02

## 2024-10-02 RX ORDER — HALOPERIDOL 5 MG/ML
2 INJECTION INTRAMUSCULAR EVERY 6 HOURS PRN
Status: DISCONTINUED | OUTPATIENT
Start: 2024-10-02 | End: 2024-10-04

## 2024-10-02 RX ORDER — ONDANSETRON 2 MG/ML
4 INJECTION INTRAMUSCULAR; INTRAVENOUS EVERY 6 HOURS PRN
Status: DISCONTINUED | OUTPATIENT
Start: 2024-10-02 | End: 2024-12-18 | Stop reason: HOSPADM

## 2024-10-02 RX ORDER — POLYETHYLENE GLYCOL 3350 17 G/17G
17 POWDER, FOR SOLUTION ORAL DAILY PRN
Status: ON HOLD | DISCHARGE
Start: 2024-10-02

## 2024-10-02 RX ORDER — POLYETHYLENE GLYCOL 3350 17 G/17G
17 POWDER, FOR SOLUTION ORAL 2 TIMES DAILY PRN
Status: DISCONTINUED | OUTPATIENT
Start: 2024-10-02 | End: 2024-12-18 | Stop reason: HOSPADM

## 2024-10-02 RX ORDER — VITAMIN B COMPLEX
50 TABLET ORAL 2 TIMES DAILY
Status: DISCONTINUED | OUTPATIENT
Start: 2024-10-02 | End: 2024-10-09

## 2024-10-02 RX ADMIN — QUETIAPINE FUMARATE 12.5 MG: 25 TABLET ORAL at 21:11

## 2024-10-02 RX ADMIN — QUETIAPINE FUMARATE 12.5 MG: 25 TABLET ORAL at 23:48

## 2024-10-02 ASSESSMENT — ACTIVITIES OF DAILY LIVING (ADL)
ADLS_ACUITY_SCORE: 38
ADLS_ACUITY_SCORE: 44
ADLS_ACUITY_SCORE: 48
ADLS_ACUITY_SCORE: 44
ADLS_ACUITY_SCORE: 38
ADLS_ACUITY_SCORE: 48
ADLS_ACUITY_SCORE: 44
ADLS_ACUITY_SCORE: 48
ADLS_ACUITY_SCORE: 48
ADLS_ACUITY_SCORE: 44
ADLS_ACUITY_SCORE: 40
ADLS_ACUITY_SCORE: 38
ADLS_ACUITY_SCORE: 38
ADLS_ACUITY_SCORE: 44
ADLS_ACUITY_SCORE: 48
ADLS_ACUITY_SCORE: 40
ADLS_ACUITY_SCORE: 38
ADLS_ACUITY_SCORE: 48
ADLS_ACUITY_SCORE: 44
ADLS_ACUITY_SCORE: 48
ADLS_ACUITY_SCORE: 44
ADLS_ACUITY_SCORE: 48

## 2024-10-02 NOTE — ED TRIAGE NOTES
Pt was discharged from a prolonged hospital stay to [facility], Pt was at facility for 45 min and patient was agitated and wondering in rooms, not redirectable. PT has memory care needs.  EMS was called, DTR was contacted and gave permission to bring patient here. Do to memory defecits pt is a flight risk, sitter initiated.  Loly FERNANDEZ Son, RN        Triage Assessment (Adult)       Row Name 10/02/24 4850          Triage Assessment    Airway WDL WDL        Respiratory WDL    Respiratory WDL WDL        Skin Circulation/Temperature WDL    Skin Circulation/Temperature WDL WDL        Cardiac WDL    Cardiac WDL WDL        Peripheral/Neurovascular WDL    Peripheral Neurovascular WDL WDL        Cognitive/Neuro/Behavioral WDL    Cognitive/Neuro/Behavioral WDL WDL

## 2024-10-02 NOTE — PLAN OF CARE
Patient oriented to self only. Ate 25% of dinner. Prompting utilized to encourage intake. Floor mat and bed alarm utilized as patient not oriented to own abilities. SBA to bathroom. Denies pain.     Goal Outcome Evaluation:      Plan of Care Reviewed With: patient    Overall Patient Progress: no changeOverall Patient Progress: no change    Outcome Evaluation: Patient denies pain. Ate 25% of dinner. Safety maintained.      Problem: Adult Inpatient Plan of Care  Goal: Plan of Care Review  Description: The Plan of Care Review/Shift note should be completed every shift.  The Outcome Evaluation is a brief statement about your assessment that the patient is improving, declining, or no change.  This information will be displayed automatically on your shift  note.  Recent Flowsheet Documentation  Taken 10/1/2024 2206 by Tan Sharma RN  Outcome Evaluation: Patient denies pain. Ate 25% of dinner. Safety maintained.  Plan of Care Reviewed With: patient  Overall Patient Progress: no change  Goal: Absence of Hospital-Acquired Illness or Injury  Intervention: Identify and Manage Fall Risk  Recent Flowsheet Documentation  Taken 10/1/2024 2143 by Tan Sharma RN  Safety Promotion/Fall Prevention:   clutter free environment maintained   safety round/check completed  Taken 10/1/2024 1739 by Tan Sharma RN  Safety Promotion/Fall Prevention: safety round/check completed     Problem: Delirium  Goal: Improved Behavioral Control  Intervention: Minimize Safety Risk  Recent Flowsheet Documentation  Taken 10/1/2024 2143 by Tan Sharma RN  Enhanced Safety Measures: floor mats     Problem: Adult Inpatient Plan of Care  Goal: Plan of Care Review  Description: The Plan of Care Review/Shift note should be completed every shift.  The Outcome Evaluation is a brief statement about your assessment that the patient is improving, declining, or no change.  This information will be displayed automatically on your  shift  note.  Recent Flowsheet Documentation  Taken 10/1/2024 2206 by Tan Sharma RN  Outcome Evaluation: Patient denies pain. Ate 25% of dinner. Safety maintained.  Plan of Care Reviewed With: patient  Overall Patient Progress: no change  Goal: Absence of Hospital-Acquired Illness or Injury  Intervention: Identify and Manage Fall Risk  Recent Flowsheet Documentation  Taken 10/1/2024 2143 by Tan Sharma RN  Safety Promotion/Fall Prevention:   clutter free environment maintained   safety round/check completed  Taken 10/1/2024 1739 by Tan Sharma RN  Safety Promotion/Fall Prevention: safety round/check completed     Problem: Fall Injury Risk  Goal: Absence of Fall and Fall-Related Injury  Intervention: Promote Injury-Free Environment  Recent Flowsheet Documentation  Taken 10/1/2024 2143 by Tan Sharma RN  Safety Promotion/Fall Prevention:   clutter free environment maintained   safety round/check completed  Taken 10/1/2024 1739 by Tan Sharma RN  Safety Promotion/Fall Prevention: safety round/check completed     Problem: Adult Inpatient Plan of Care  Goal: Absence of Hospital-Acquired Illness or Injury  Intervention: Identify and Manage Fall Risk  Recent Flowsheet Documentation  Taken 10/1/2024 2143 by Tan Sharma RN  Safety Promotion/Fall Prevention:   clutter free environment maintained   safety round/check completed  Taken 10/1/2024 1739 by Tan Sharma RN  Safety Promotion/Fall Prevention: safety round/check completed     Problem: Fall Injury Risk  Goal: Absence of Fall and Fall-Related Injury  Intervention: Promote Injury-Free Environment  Recent Flowsheet Documentation  Taken 10/1/2024 2143 by Tan Sharma RN  Safety Promotion/Fall Prevention:   clutter free environment maintained   safety round/check completed  Taken 10/1/2024 1739 by Tan Sharma RN  Safety Promotion/Fall Prevention: safety round/check completed     Problem: Fall Injury Risk  Goal: Absence of  Fall and Fall-Related Injury  Intervention: Promote Injury-Free Environment  Recent Flowsheet Documentation  Taken 10/1/2024 2143 by Tan Sharma RN  Safety Promotion/Fall Prevention:   clutter free environment maintained   safety round/check completed  Taken 10/1/2024 1739 by Tan Sharma RN  Safety Promotion/Fall Prevention: safety round/check completed     Problem: Adult Inpatient Plan of Care  Goal: Plan of Care Review  Description: The Plan of Care Review/Shift note should be completed every shift.  The Outcome Evaluation is a brief statement about your assessment that the patient is improving, declining, or no change.  This information will be displayed automatically on your shift  note.  Recent Flowsheet Documentation  Taken 10/1/2024 2206 by Tan Sharma RN  Outcome Evaluation: Patient denies pain. Ate 25% of dinner. Safety maintained.  Plan of Care Reviewed With: patient  Overall Patient Progress: no change  Goal: Absence of Hospital-Acquired Illness or Injury  Intervention: Identify and Manage Fall Risk  Recent Flowsheet Documentation  Taken 10/1/2024 2143 by Tan Sharma RN  Safety Promotion/Fall Prevention:   clutter free environment maintained   safety round/check completed  Taken 10/1/2024 1739 by Tan Sharma RN  Safety Promotion/Fall Prevention: safety round/check completed     Problem: Adult Inpatient Plan of Care  Goal: Plan of Care Review  Description: The Plan of Care Review/Shift note should be completed every shift.  The Outcome Evaluation is a brief statement about your assessment that the patient is improving, declining, or no change.  This information will be displayed automatically on your shift  note.  Recent Flowsheet Documentation  Taken 10/1/2024 2206 by Tan Sharma RN  Outcome Evaluation: Patient denies pain. Ate 25% of dinner. Safety maintained.  Plan of Care Reviewed With: patient  Overall Patient Progress: no change  Goal: Absence of Hospital-Acquired  Illness or Injury  Intervention: Identify and Manage Fall Risk  Recent Flowsheet Documentation  Taken 10/1/2024 2143 by Tan Sharma RN  Safety Promotion/Fall Prevention:   clutter free environment maintained   safety round/check completed  Taken 10/1/2024 1739 by Tan Sharma RN  Safety Promotion/Fall Prevention: safety round/check completed     Problem: Fall Injury Risk  Goal: Absence of Fall and Fall-Related Injury  Intervention: Promote Injury-Free Environment  Recent Flowsheet Documentation  Taken 10/1/2024 2143 by Tan Sharma RN  Safety Promotion/Fall Prevention:   clutter free environment maintained   safety round/check completed  Taken 10/1/2024 1739 by Tan Sharma RN  Safety Promotion/Fall Prevention: safety round/check completed     Problem: Fall Injury Risk  Goal: Absence of Fall and Fall-Related Injury  Intervention: Promote Injury-Free Environment  Recent Flowsheet Documentation  Taken 10/1/2024 2143 by Tan Sharma RN  Safety Promotion/Fall Prevention:   clutter free environment maintained   safety round/check completed  Taken 10/1/2024 1739 by Tan Sharma RN  Safety Promotion/Fall Prevention: safety round/check completed     Problem: Adult Inpatient Plan of Care  Goal: Plan of Care Review  Description: The Plan of Care Review/Shift note should be completed every shift.  The Outcome Evaluation is a brief statement about your assessment that the patient is improving, declining, or no change.  This information will be displayed automatically on your shift  note.  Recent Flowsheet Documentation  Taken 10/1/2024 2206 by Tan Sharma RN  Outcome Evaluation: Patient denies pain. Ate 25% of dinner. Safety maintained.  Plan of Care Reviewed With: patient  Overall Patient Progress: no change  Goal: Absence of Hospital-Acquired Illness or Injury  Intervention: Identify and Manage Fall Risk  Recent Flowsheet Documentation  Taken 10/1/2024 2143 by Tan Sharma RN  Safety  Promotion/Fall Prevention:   clutter free environment maintained   safety round/check completed  Taken 10/1/2024 1739 by Tan Sharma RN  Safety Promotion/Fall Prevention: safety round/check completed     Problem: Adult Inpatient Plan of Care  Goal: Plan of Care Review  Description: The Plan of Care Review/Shift note should be completed every shift.  The Outcome Evaluation is a brief statement about your assessment that the patient is improving, declining, or no change.  This information will be displayed automatically on your shift  note.  Recent Flowsheet Documentation  Taken 10/1/2024 2206 by Tan Sharma RN  Outcome Evaluation: Patient denies pain. Ate 25% of dinner. Safety maintained.  Plan of Care Reviewed With: patient  Overall Patient Progress: no change  Goal: Absence of Hospital-Acquired Illness or Injury  Intervention: Identify and Manage Fall Risk  Recent Flowsheet Documentation  Taken 10/1/2024 2143 by Tan Sharma RN  Safety Promotion/Fall Prevention:   clutter free environment maintained   safety round/check completed  Taken 10/1/2024 1739 by Tan Sharma RN  Safety Promotion/Fall Prevention: safety round/check completed     Problem: Adult Inpatient Plan of Care  Goal: Plan of Care Review  Description: The Plan of Care Review/Shift note should be completed every shift.  The Outcome Evaluation is a brief statement about your assessment that the patient is improving, declining, or no change.  This information will be displayed automatically on your shift  note.  Recent Flowsheet Documentation  Taken 10/1/2024 2206 by Tan Sharma RN  Outcome Evaluation: Patient denies pain. Ate 25% of dinner. Safety maintained.  Plan of Care Reviewed With: patient  Overall Patient Progress: no change  Goal: Absence of Hospital-Acquired Illness or Injury  Intervention: Identify and Manage Fall Risk  Recent Flowsheet Documentation  Taken 10/1/2024 2143 by Tan Sharma RN  Safety  Promotion/Fall Prevention:   clutter free environment maintained   safety round/check completed  Taken 10/1/2024 1739 by Tan Sharma RN  Safety Promotion/Fall Prevention: safety round/check completed     Problem: Fall Injury Risk  Goal: Absence of Fall and Fall-Related Injury  Intervention: Promote Injury-Free Environment  Recent Flowsheet Documentation  Taken 10/1/2024 2143 by Tan Sharma RN  Safety Promotion/Fall Prevention:   clutter free environment maintained   safety round/check completed  Taken 10/1/2024 1739 by Tan hSarma RN  Safety Promotion/Fall Prevention: safety round/check completed     Problem: Adult Inpatient Plan of Care  Goal: Plan of Care Review  Description: The Plan of Care Review/Shift note should be completed every shift.  The Outcome Evaluation is a brief statement about your assessment that the patient is improving, declining, or no change.  This information will be displayed automatically on your shift  note.  Recent Flowsheet Documentation  Taken 10/1/2024 2206 by Tan Sharma RN  Outcome Evaluation: Patient denies pain. Ate 25% of dinner. Safety maintained.  Plan of Care Reviewed With: patient  Overall Patient Progress: no change  Goal: Absence of Hospital-Acquired Illness or Injury  Intervention: Identify and Manage Fall Risk  Recent Flowsheet Documentation  Taken 10/1/2024 2143 by Tan Sharma RN  Safety Promotion/Fall Prevention:   clutter free environment maintained   safety round/check completed  Taken 10/1/2024 1739 by Tan Sharma RN  Safety Promotion/Fall Prevention: safety round/check completed     Problem: Fall Injury Risk  Goal: Absence of Fall and Fall-Related Injury  Intervention: Promote Injury-Free Environment  Recent Flowsheet Documentation  Taken 10/1/2024 2143 by Tan Sharma RN  Safety Promotion/Fall Prevention:   clutter free environment maintained   safety round/check completed  Taken 10/1/2024 1739 by Tan Sharma RN  Safety  Promotion/Fall Prevention: safety round/check completed     Problem: Adult Inpatient Plan of Care  Goal: Plan of Care Review  Description: The Plan of Care Review/Shift note should be completed every shift.  The Outcome Evaluation is a brief statement about your assessment that the patient is improving, declining, or no change.  This information will be displayed automatically on your shift  note.  Recent Flowsheet Documentation  Taken 10/1/2024 2206 by Tan Sharma RN  Outcome Evaluation: Patient denies pain. Ate 25% of dinner. Safety maintained.  Plan of Care Reviewed With: patient  Overall Patient Progress: no change  Goal: Absence of Hospital-Acquired Illness or Injury  Intervention: Identify and Manage Fall Risk  Recent Flowsheet Documentation  Taken 10/1/2024 2143 by Tan Sharma RN  Safety Promotion/Fall Prevention:   clutter free environment maintained   safety round/check completed  Taken 10/1/2024 1739 by Tan Sharma RN  Safety Promotion/Fall Prevention: safety round/check completed     Problem: Adult Inpatient Plan of Care  Goal: Plan of Care Review  Description: The Plan of Care Review/Shift note should be completed every shift.  The Outcome Evaluation is a brief statement about your assessment that the patient is improving, declining, or no change.  This information will be displayed automatically on your shift  note.  Recent Flowsheet Documentation  Taken 10/1/2024 2206 by Tan Sharma RN  Outcome Evaluation: Patient denies pain. Ate 25% of dinner. Safety maintained.  Plan of Care Reviewed With: patient  Overall Patient Progress: no change  Goal: Absence of Hospital-Acquired Illness or Injury  Intervention: Identify and Manage Fall Risk  Recent Flowsheet Documentation  Taken 10/1/2024 2143 by Tan Sharma RN  Safety Promotion/Fall Prevention:   clutter free environment maintained   safety round/check completed  Taken 10/1/2024 1739 by Tan Sharma RN  Safety  Promotion/Fall Prevention: safety round/check completed     Problem: Delirium  Goal: Improved Behavioral Control  Intervention: Minimize Safety Risk  Recent Flowsheet Documentation  Taken 10/1/2024 2143 by Tan Sharma RN  Enhanced Safety Measures: floor mats     Problem: Adult Inpatient Plan of Care  Goal: Plan of Care Review  Description: The Plan of Care Review/Shift note should be completed every shift.  The Outcome Evaluation is a brief statement about your assessment that the patient is improving, declining, or no change.  This information will be displayed automatically on your shift  note.  Recent Flowsheet Documentation  Taken 10/1/2024 2206 by Tan Sharma RN  Outcome Evaluation: Patient denies pain. Ate 25% of dinner. Safety maintained.  Plan of Care Reviewed With: patient  Overall Patient Progress: no change  Goal: Absence of Hospital-Acquired Illness or Injury  Intervention: Identify and Manage Fall Risk  Recent Flowsheet Documentation  Taken 10/1/2024 2143 by Tan Sharma RN  Safety Promotion/Fall Prevention:   clutter free environment maintained   safety round/check completed  Taken 10/1/2024 1739 by Tan Sharma RN  Safety Promotion/Fall Prevention: safety round/check completed     Problem: Fall Injury Risk  Goal: Absence of Fall and Fall-Related Injury  Intervention: Promote Injury-Free Environment  Recent Flowsheet Documentation  Taken 10/1/2024 2143 by Tan Sharma RN  Safety Promotion/Fall Prevention:   clutter free environment maintained   safety round/check completed  Taken 10/1/2024 1739 by Tan Sharma RN  Safety Promotion/Fall Prevention: safety round/check completed     Problem: Fall Injury Risk  Goal: Absence of Fall and Fall-Related Injury  Intervention: Promote Injury-Free Environment  Recent Flowsheet Documentation  Taken 10/1/2024 2143 by Tan Sharma RN  Safety Promotion/Fall Prevention:   clutter free environment maintained   safety round/check  completed  Taken 10/1/2024 1739 by Tan Sharma RN  Safety Promotion/Fall Prevention: safety round/check completed     Problem: Fall Injury Risk  Goal: Absence of Fall and Fall-Related Injury  Intervention: Promote Injury-Free Environment  Recent Flowsheet Documentation  Taken 10/1/2024 2143 by Tan Sharma RN  Safety Promotion/Fall Prevention:   clutter free environment maintained   safety round/check completed  Taken 10/1/2024 1739 by Tan Sharma RN  Safety Promotion/Fall Prevention: safety round/check completed     Problem: Fall Injury Risk  Goal: Absence of Fall and Fall-Related Injury  Intervention: Promote Injury-Free Environment  Recent Flowsheet Documentation  Taken 10/1/2024 2143 by Tan Sharma RN  Safety Promotion/Fall Prevention:   clutter free environment maintained   safety round/check completed  Taken 10/1/2024 1739 by Tan Sharma RN  Safety Promotion/Fall Prevention: safety round/check completed     Problem: Adult Inpatient Plan of Care  Goal: Plan of Care Review  Description: The Plan of Care Review/Shift note should be completed every shift.  The Outcome Evaluation is a brief statement about your assessment that the patient is improving, declining, or no change.  This information will be displayed automatically on your shift  note.  Recent Flowsheet Documentation  Taken 10/1/2024 2206 by Tan Sharma RN  Outcome Evaluation: Patient denies pain. Ate 25% of dinner. Safety maintained.  Plan of Care Reviewed With: patient  Overall Patient Progress: no change  Goal: Absence of Hospital-Acquired Illness or Injury  Intervention: Identify and Manage Fall Risk  Recent Flowsheet Documentation  Taken 10/1/2024 2143 by Tan Sharma RN  Safety Promotion/Fall Prevention:   clutter free environment maintained   safety round/check completed  Taken 10/1/2024 1739 by Tan Sharma RN  Safety Promotion/Fall Prevention: safety round/check completed     Problem: Adult Inpatient  Plan of Care  Goal: Plan of Care Review  Description: The Plan of Care Review/Shift note should be completed every shift.  The Outcome Evaluation is a brief statement about your assessment that the patient is improving, declining, or no change.  This information will be displayed automatically on your shift  note.  Outcome: Progressing  Flowsheets (Taken 10/1/2024 2206)  Outcome Evaluation: Patient denies pain. Ate 25% of dinner. Safety maintained.  Plan of Care Reviewed With: patient  Overall Patient Progress: no change  Goal: Absence of Hospital-Acquired Illness or Injury  Intervention: Identify and Manage Fall Risk  Recent Flowsheet Documentation  Taken 10/1/2024 2143 by Tan Sharma, RN  Safety Promotion/Fall Prevention:   clutter free environment maintained   safety round/check completed  Taken 10/1/2024 1739 by Tan Sharma, RN  Safety Promotion/Fall Prevention: safety round/check completed

## 2024-10-02 NOTE — PLAN OF CARE
"Pt is alert to self with confusion. Pt has Dementia. Pt denies pain or discomfort. Pt is hard of hearing. Pt has no IV access. Pt is SBA in transfer and care. Bed alarm in place and call light within reach. Plan is to Discharge to [facility]     .        Goal Outcome Evaluation:      Plan of Care Reviewed With: patient    Overall Patient Progress: improvingOverall Patient Progress: improving    Outcome Evaluation: pt is confused. pt deneis pain.      Problem: Adult Inpatient Plan of Care  Goal: Plan of Care Review  Description: The Plan of Care Review/Shift note should be completed every shift.  The Outcome Evaluation is a brief statement about your assessment that the patient is improving, declining, or no change.  This information will be displayed automatically on your shift  note.  Outcome: Progressing  Flowsheets (Taken 10/2/2024 0302)  Outcome Evaluation: pt is confused. pt deneis pain.  Plan of Care Reviewed With: patient  Overall Patient Progress: improving  Goal: Patient-Specific Goal (Individualized)  Description: You can add care plan individualizations to a care plan. Examples of Individualization might be:  \"Parent requests to be called daily at 9am for status\", \"I have a hard time hearing out of my right ear\", or \"Do not touch me to wake me up as it startles  me\".  Outcome: Progressing  Goal: Absence of Hospital-Acquired Illness or Injury  Outcome: Progressing  Intervention: Identify and Manage Fall Risk  Recent Flowsheet Documentation  Taken 10/2/2024 0010 by Reji Duron RN  Safety Promotion/Fall Prevention:   clutter free environment maintained   safety round/check completed  Intervention: Prevent Skin Injury  Recent Flowsheet Documentation  Taken 10/2/2024 0010 by Reji Duron RN  Body Position: position changed independently  Intervention: Prevent Infection  Recent Flowsheet Documentation  Taken 10/2/2024 0010 by Reji Duron, " RN  Infection Prevention:   single patient room provided   rest/sleep promoted   hand hygiene promoted  Goal: Optimal Comfort and Wellbeing  Outcome: Progressing  Goal: Readiness for Transition of Care  Outcome: Progressing

## 2024-10-02 NOTE — ED NOTES
Called for further report at [facility], they state they received the patient but do to his memory care are not able to take care of him as he needs 1:1 and or a locked  dementia unit  as he is not redirectable and does not stay in his room.  Spoke to Monique Mauricio RN at [phone]

## 2024-10-02 NOTE — CONSULTS
Care Management Follow Up    Length of Stay (days): 0    Expected Discharge Date:       Concerns to be Addressed: placement    Anticipated Discharge Disposition:  [facility] care    Additional Information:  Per chart, Pt was discharged from a prolonged hospital stay to [facility], Pt was at facility for 45 min and patient was agitated and wondering in rooms, not redirectable. PT has memory care needs.     JAMAR spoke with JAMAR supervisor who is very familiar with this pt. Pt will need new placement.    JAMAR called guardian to follow up. Guardian is upset with the discharge and wondered why he didn't go to a memory care. JAMAR shared people with memory problems can go to a LTC facility and pt wasn't exhibiting those behaviors here (per JAMAR supervisor).  Guardian would like to discuss this further with supervisor. JAMAR informed supervisor, who isn't working right now, to contact guardian tomorrow.    Daughter agrees to providers plan to admit under [facility] again.      Next Steps: JAMAR will meet with registration to switch to collateral.    KARLA Christianson, [facility] HospitalSW  Emergency Room  [facility]  [phone]      DACIA RODRIGUEZ, NALDO

## 2024-10-02 NOTE — ED NOTES
Bed: ED21  Expected date: 10/2/24  Expected time: 4:20 PM  Means of arrival:   Comments:  Med3 89M

## 2024-10-02 NOTE — DISCHARGE SUMMARY
[facility]  Hospitalist Discharge Summary      Date of Admission:  6/5/2024  Date of Discharge:  10/2/2024  Discharging Provider: Hector Marie MD  Discharge Service: Hospitalist Service    Discharge Diagnoses    Please see below.  Clinically Significant Risk Factors          Follow-ups Needed After Discharge   Follow-up Appointments     Follow Up and recommended labs and tests      Follow up with retirement physician.  The following labs/tests are   recommended: .            Unresulted Labs Ordered in the Past 30 Days of this Admission       No orders found from 5/6/2024 to 6/6/2024.            Discharge Disposition   Discharged to long-term care facility  Condition at discharge: Stable    Hospital Course   Summary of Stay: Jemal Gray is a 88-year-old male who was transitioned to [facility] care on 6/5. He has history of dementia and family is unable to provide care for him. During his stay we was treated for right foot cellulitis which has resolved. Patient is MA pending, awaiting placement in long term care.      Patient remained stable, ready for discharge to [facility] this afternoon.   working on safe discharge plan and processing medical assistant     Problem List:   [facility] care admission.  Social work consulted.  Looking for LTC.  Is MA pending, SW working on this. Once a bed is available patient can go anytime   -Appreciate input from social service.  Reviewed most recent notes     Constipation  Started/continue on senna, have as needed miralax and dulcolax available     Right foot cellulitis.- resolved  Resolved with Keflex 4 times daily through 6/11.  Swelling and redness resolved.    Venous ultrasound negative for DVT     Dementia with chronic aphasia.  Not on any medications.   As needed olanzapine ordered for agitation. Impulsive.     5.   Bradycardia  Patient was noted to have HR in 50s.  Asymptomatic     6. Paper work  Guardianship papers filled on 7/13  for the daughter.     7.  Actinic Keratosis                Had surgery for this and prescribed steroids cream and ketoconazole cream, restarted per daughter request     I called his daughter, Columba [phone], left a VM.    Consultations This Hospital Stay   CARE MANAGEMENT / SOCIAL WORK IP CONSULT  SPIRITUAL HEALTH SERVICES IP CONSULT    Code Status   No CPR- Do NOT Intubate    Time Spent on this Encounter   I, Hector Marie MD, personally saw the patient today and spent <30 minutes discharging this patient.       Hector Marie MD  [facility] MEDICAL SURGICAL  [address] [place] [address]  Phone: [phone]  Fax: [phone]  ______________________________________________________________________    Physical Exam   Vital Signs:   Temp src: Oral BP: 132/71 Pulse: 50   Resp: 16 SpO2: 97 % O2 Device: None (Room air)    Weight: 157 lbs 6.54 oz  General Appearance: Awake, not in distress  Respiratory: Good air entry bilaterally  Cardiovascular: S1 and S2 well heard.  GI: Soft, NT/ND  Skin: No rash         Primary Care Physician   [facility]    Discharge Orders      General info for SNF    Length of Stay Estimate: Long Term Care  Condition at Discharge: Stable  Level of care:board and care  Rehabilitation Potential: Good  Admission H&P remains valid and up-to-date: Yes  Recent Chemotherapy: N/A  Use Nursing Home Standing Orders: Yes     Mantoux instructions    Give two-step Mantoux (PPD) Per Facility Policy Yes     Follow Up and recommended labs and tests    Follow up with penitentiary physician.  The following labs/tests are recommended: .     Reason for your hospital stay    Dementia, cellultis     Activity - Up with nursing assistance     Fall precautions     Diet    Follow this diet upon discharge: Current Diet:Orders Placed This Encounter      Room Service      Snacks/Supplements Adult: Ensure Enlive; With Meals      Combination Diet  Regular Diet       Significant Results and Procedures   Most Recent 3 CBC's:No lab results found.  Most Recent 3 BMP's:  Recent Labs   Lab Test 07/26/24  0741 06/05/24  2333   GLC 98 97     Most Recent 2 LFT's:No lab results found., No results found for this or any previous visit.    Discharge Medications   Current Discharge Medication List        START taking these medications    Details   acetaminophen (TYLENOL) 325 MG tablet Take 3 tablets (975 mg) by mouth every 6 hours as needed for mild pain.    Associated Diagnoses: Pain      betamethasone dipropionate (DIPROSONE) 0.05 % external ointment Apply topically 2 times daily for 7 days.    Associated Diagnoses: Dandruff, adult      ketoconazole (NIZORAL) 2 % external shampoo Apply topically Every Mon, Wed, Fri Morning.    Associated Diagnoses: Dandruff, adult      melatonin 5 MG tablet Take 1 tablet (5 mg) by mouth nightly as needed for sleep.    Associated Diagnoses: Insomnia, unspecified type      polyethylene glycol (MIRALAX) 17 g packet Take 17 g by mouth daily as needed for constipation.    Associated Diagnoses: Slow transit constipation      QUEtiapine (SEROQUEL) 25 MG tablet Take 0.5 tablets (12.5 mg) by mouth 2 times daily as needed (agitation, anxiety, sleep).    Associated Diagnoses: Dementia, unspecified dementia severity, unspecified dementia type, unspecified whether behavioral, psychotic, or mood disturbance or anxiety (H)      senna-docusate (SENOKOT-S/PERICOLACE) 8.6-50 MG tablet Take 1 tablet by mouth at bedtime.    Associated Diagnoses: Slow transit constipation           CONTINUE these medications which have NOT CHANGED    Details   ascorbic acid (VITAMIN C) 1000 MG tablet [ASCORBIC ACID (VITAMIN C) 1000 MG TABLET] Take 1,000 mg by mouth 2 (two) times a day.       cholecalciferol, vitamin D3, 1,000 unit tablet [CHOLECALCIFEROL, VITAMIN D3, 1,000 UNIT TABLET] Take 2,000 Units by mouth 2 (two) times a day.            STOP taking these medications        cephALEXin (KEFLEX) 250 MG/5ML suspension Comments:   Reason for Stopping:             Allergies   No Known Allergies

## 2024-10-02 NOTE — PROGRESS NOTES
Care Management Discharge Note    Discharge Date: 10/02/2024     Discharge Disposition:  [facility] LT    Discharge Services:  n/a    Discharge DME:  n/a    Discharge Transportation: [facility]     Private pay costs discussed: transportation costs    PAS Confirmation Code: [id number]  Patient/family educated on Medicare website which has current facility and service quality ratings:      Education Provided on the Discharge Plan:  yes  Persons Notified of Discharge Plans: pt's guardian/daughter Cheri, nursing, MD, LTC admissions  Patient/Family in Agreement with the Plan:  yes    Handoff Referral Completed: No, handoff not indicated or clinically appropriate    Additional Information:  SW following for discharge planning and LTC placement.     Pt has been accepted at [facility] for admission today. [facility] arranged for today between [id number].     Signed discharge orders have been faxed to [facility] via [phone] in-basket.     ANTONIA Clark, LSW  Care Coordination    ANTONIA Clements

## 2024-10-02 NOTE — ED PROVIDER NOTES
Emergency Department Note      History of Present Illness     Chief Complaint   Dementia      HPI   Jemal Gray is a 89 year old male with history of dementia presents to the emergency department by EMS after patient was found agitated and wandering around LTC at [facility] today entering other patient's rooms.  Patient was subsequently sent back to the ED due to new agitation after facility contacted patient's daughter.  History very limited due to patient's dementia.  Patient has no other complaints at this time.  Per facility, they are not a memory care facility and patient is inappropriate for care at this facility as patient is not able to be directed and they are unwilling to take patient back as a resident.    Independent Historian   EMS and facility staff    Review of External Notes   Hospital progress note from yesterday    Past Medical History     Medical History and Problem List   No past medical history on file.    Medications   No current outpatient medications on file.      Surgical History   Past Surgical History:   Procedure Laterality Date     JOINT REPLACEMENT  Bilateral hip replacement     PICC LINE INSERTION KIT SUPPLY HE  4/6/2015          TONSILLECTOMY         Physical Exam     Patient Vitals for the past 24 hrs:   BP Temp Temp src Pulse Resp SpO2   10/02/24 1830 122/65 -- -- 53 16 97 %   10/02/24 1629 (!) 154/94 98.1  F (36.7  C) Oral 64 16 97 %   10/02/24 1627 -- -- -- -- -- 96 %     Physical Exam  Constitutional:       General: Not in acute distress.        Appearance: Normal appearance.   HENT:      Head: Normocephalic and atraumatic.   Eyes:      Extraocular Movements: Extraocular movements intact.      Conjunctiva/sclera: Conjunctivae normal.   Cardiovascular:      Rate and Rhythm: Normal rate and regular rhythm.   Pulmonary:      Effort: Pulmonary effort is normal. No respiratory distress.      Breath sounds: Normal breath sounds.   Abdominal:      General: Abdomen is  flat. There is no distension.      Palpations: Abdomen is soft.      Tenderness: There is no abdominal tenderness.   Musculoskeletal:      Cervical back: Normal range of motion. No rigidity.      Right lower leg: No edema.      Left lower leg: No edema.   Skin:     General: Skin is warm and dry.   Neurological:      General: No focal deficit present.      Mental Status: Pleasantly demented.   Psychiatric:         Mood and Affect: Mood normal.         Behavior: Behavior normal.    Diagnostics     Lab Results   Labs Ordered and Resulted from Time of ED Arrival to Time of ED Departure - No data to display    Imaging   No orders to display       EKG   None    Independent Interpretation   None    ED Course      Medications Administered   Medications - No data to display    Procedures   Procedures     Discussion of Management   See ED course    ED Course   ED Course as of 10/02/24 2236   Wed Oct 02, 2024   1707 Discussed case with hospitalist Dr. Marie who discharged the patient earlier, recommend retirement admit again.   1729 Discussed case with JAMAR Sandoval)       Additional Documentation  None    Medical Decision Making / Diagnosis     CMS Diagnoses: None    MIPS       None    MDM   Jemal Gray is a 89 year old male as described above presents to the emergency department for concern for needing for higher level of care.  Per chart review, patient was discharged to [facility] today after a very prolonged hospitalization since June.  Unfortunately, current facility reports that they are unable to care for the patient due to needing higher level of care.  Patient otherwise has no complaints at this time and facility reports no additional complaints.  Patient pleasantly demented at my time of evaluation.  Will consult  regarding appropriate disposition.  Discussed care plan with patient voiced understanding and agreement with plan.  Answered all questions.  Additional workup and orders as listed in  chart.    Ultimately, patient's daughter consented for retirement admit.  Initial plan was for patient to be admitted under retirement care and patient was discussed with hospitalist Dr. Marie and subsequently admitting LAITH Vargas.  Originally, patient underwent retirement care admitted process with discharging from this encounter and opening of a subsequent retirement care encounter.  However, shortly after evaluation by KACY, hospitalist service recommends converting patient to an observation admission instead given need for one-to-one monitoring and agitation medications.    Please refer to ED course above in addition to follow-up ED provider note as part of continuation of MDM for details on the patient's treatment course and any potential changes or updates beyond my initial evaluation and MDM creation.    Disposition   The patient was admitted to the hospital.     Diagnosis     ICD-10-CM    1. Dementia, unspecified dementia severity, unspecified dementia type, unspecified whether behavioral, psychotic, or mood disturbance or anxiety (H)  F03.90            Discharge Medications   Discharge Medication List as of 10/2/2024  7:46 PM            DO Amari ERAZO Ferris, DO  10/02/24 2230

## 2024-10-02 NOTE — ED NOTES
[facility]  ED Nurse Handoff Report    ED Chief complaint: Dementia  . ED Diagnosis:   Final diagnoses:   None       Allergies: No Known Allergies    Code Status: DNR / DNI    Activity level - Baseline/Home:  standby.  Activity Level - Current:   standby.   Lift room needed: No.   Bariatric: No   Needed: No   Isolation: No.   Infection: Not Applicable.     Respiratory status: Room air    Vital Signs (within 30 minutes):   Vitals:    10/02/24 1627 10/02/24 1629   BP:  (!) 154/94   Pulse:  64   Resp:  16   Temp:  98.1  F (36.7  C)   TempSrc:  Oral   SpO2: 96% 97%       Cardiac Rhythm:  ,      Pain level:    Patient confused: Yes.   Patient Falls Risk:  1:1 at bedside  .   Elimination Status:  Has not yet voided       Patient Report - Initial Complaint: Pt was discharged from a prolonged hospital stay to [facility], Pt was at facility for 45 min and patient was agitated and wondering in rooms, not redirectable. PT has memory care needs. EMS was called, DTR was contacted and gave permission to bring patient here. Do to memory defecits pt is a flight risk, sitter initiated. .   Focused Assessment: Pt A&O X1, illogical speech/words, VSS, calm and cooperative at this time.       Abnormal Results:   Labs Ordered and Resulted from Time of ED Arrival to Time of ED Departure - No data to display     No orders to display       Treatments provided: See mAR/notes   Family Comments: N/A  OBS brochure/video discussed/provided to patient:  No  ED Medications: Medications - No data to display    Drips infusing:  No  For the majority of the shift this patient was Green.   Interventions performed were N/A.    Sepsis treatment initiated: No    Cares/treatment/interventions/medications to be completed following ED care: N/A    ED Nurse Name: Michael Nam RN  6:31 PM

## 2024-10-03 LAB
ANION GAP SERPL CALCULATED.3IONS-SCNC: 12 MMOL/L (ref 7–15)
ATRIAL RATE - MUSE: 52 BPM
BUN SERPL-MCNC: 17.4 MG/DL (ref 8–23)
CALCIUM SERPL-MCNC: 8.7 MG/DL (ref 8.8–10.4)
CHLORIDE SERPL-SCNC: 103 MMOL/L (ref 98–107)
CREAT SERPL-MCNC: 0.89 MG/DL (ref 0.67–1.17)
DIASTOLIC BLOOD PRESSURE - MUSE: NORMAL MMHG
EGFRCR SERPLBLD CKD-EPI 2021: 82 ML/MIN/1.73M2
GLUCOSE SERPL-MCNC: 90 MG/DL (ref 70–99)
HCO3 SERPL-SCNC: 23 MMOL/L (ref 22–29)
INTERPRETATION ECG - MUSE: NORMAL
P AXIS - MUSE: 72 DEGREES
POTASSIUM SERPL-SCNC: 3.9 MMOL/L (ref 3.4–5.3)
PR INTERVAL - MUSE: 176 MS
QRS DURATION - MUSE: 88 MS
QT - MUSE: 474 MS
QTC - MUSE: 440 MS
R AXIS - MUSE: -6 DEGREES
SODIUM SERPL-SCNC: 138 MMOL/L (ref 135–145)
SYSTOLIC BLOOD PRESSURE - MUSE: NORMAL MMHG
T AXIS - MUSE: 12 DEGREES
VENTRICULAR RATE- MUSE: 52 BPM

## 2024-10-03 PROCEDURE — 36415 COLL VENOUS BLD VENIPUNCTURE: CPT | Performed by: PHYSICIAN ASSISTANT

## 2024-10-03 PROCEDURE — G0378 HOSPITAL OBSERVATION PER HR: HCPCS

## 2024-10-03 PROCEDURE — 101N000002 HC CUSTODIAL CARE DAILY

## 2024-10-03 PROCEDURE — 80048 BASIC METABOLIC PNL TOTAL CA: CPT | Performed by: PHYSICIAN ASSISTANT

## 2024-10-03 PROCEDURE — 82310 ASSAY OF CALCIUM: CPT | Performed by: PHYSICIAN ASSISTANT

## 2024-10-03 PROCEDURE — 250N000013 HC RX MED GY IP 250 OP 250 PS 637: Performed by: PHYSICIAN ASSISTANT

## 2024-10-03 PROCEDURE — 93010 ELECTROCARDIOGRAM REPORT: CPT | Performed by: INTERNAL MEDICINE

## 2024-10-03 RX ADMIN — Medication 50 MCG: at 09:51

## 2024-10-03 RX ADMIN — OXYCODONE HYDROCHLORIDE AND ACETAMINOPHEN 1000 MG: 500 TABLET ORAL at 21:15

## 2024-10-03 RX ADMIN — OXYCODONE HYDROCHLORIDE AND ACETAMINOPHEN 1000 MG: 500 TABLET ORAL at 09:51

## 2024-10-03 RX ADMIN — Medication 50 MCG: at 21:15

## 2024-10-03 ASSESSMENT — ACTIVITIES OF DAILY LIVING (ADL)
ADLS_ACUITY_SCORE: 44
ADLS_ACUITY_SCORE: 44
ADLS_ACUITY_SCORE: 49
ADLS_ACUITY_SCORE: 44
ADLS_ACUITY_SCORE: 49
ADLS_ACUITY_SCORE: 44
ADLS_ACUITY_SCORE: 49
ADLS_ACUITY_SCORE: 44
ADLS_ACUITY_SCORE: 44
ADLS_ACUITY_SCORE: 49
ADLS_ACUITY_SCORE: 49
ADLS_ACUITY_SCORE: 44
ADLS_ACUITY_SCORE: 42
ADLS_ACUITY_SCORE: 49
ADLS_ACUITY_SCORE: 49
ADLS_ACUITY_SCORE: 44
ADLS_ACUITY_SCORE: 49
ADLS_ACUITY_SCORE: 42

## 2024-10-03 ASSESSMENT — COLUMBIA-SUICIDE SEVERITY RATING SCALE - C-SSRS: IS THE PATIENT NOT ABLE TO COMPLETE C-SSRS: UNABLE TO VERBALIZE

## 2024-10-03 NOTE — PROGRESS NOTES
[facility]    Medicine Progress Note - Hospitalist Service    Date of Admission:  10/2/2024    Assessment & Plan     Jemal Gray is a 89 year old male with Pmhx of Dementia with chronic aphasia, who was admitted on 10/2/2024 after he was brought to the ED by EMS from facility for evaluation of agitation.     The patient discharged from a prolonged hospitalization awaiting long-term care placement on 10/2 to a long-term care facility. He was initially admitted here June 5, 2024 where he was treated for cellulitis which resolved.  He required guardianship with MA pending, social work assisted with discharge planning to long-term care facility on 10/2.      Upon transfer to the long-term care facility the patient was described as agitated, swearing.  He he had been wandering around the long-term care facility and entering other patient's rooms.  The patient was unable to be redirected and required transfer back to the ED.     In the ED patient hypertensive, vitally stable. He was given seroquel.  He requires a bedside attendant as he was agitated and unable to be redirected.  Attempting to leave the emergency department and go into other patient's rooms.     Patient admitted for jail cares.  Patient appears calm and comfortable.  Medically stable and can be discharged whenever however updated by  that patient cannot go back to [facility] as he refused to take him back and recommended that he should go to the memory care.   is assisting to find memory care for the patient.     Acute Agitation, Behavior Disturbances   Dementia with Chronic Aphasia  Suspect triggered by changes in setting and recent move.  No physical complaints or concerns of infection.  Historically required Zyprexa over the summer when he was hospitalized for agitation.  Most recently had been prescribed Seroquel 12.5 mg twice daily as needed although he had not been needing most  recently.    -Bedside attendant as needed  -Restart prior to admission Seroquel at 12.5 mg twice daily as needed, may need to titrate  -Checking QTc when patient cooperative to get EKG completed  -PRN IM Zyprexa, IV Haldol if unable to cooperative with PO medications   -SW consulted     Need for Disposition Assistance  -Patient had prolonged hospitalization due to need for disposition assistance.    -His daughter filed for guardianship paperwork was completed in July   -acquired MA application and long-term care placement   -Ultimately will require 24/7 care and likely Memory care placement      Actinic Keratosis  Had surgery for this and prescribed steroids cream and ketoconazole cream, restarted per daughter request previously.     Sinus Bradycardia  Patient was noted to have HR in 50s.  Asymptomatic.  -no telemetry monitoring needed      History of Constipation  -laxatives PRN           Diet: Combination Diet Regular Diet    DVT Prophylaxis: Ambulate every shift  Maddox Catheter: Not present  Lines: None     Cardiac Monitoring: None  Code Status: No CPR- Do NOT Intubate      Clinically Significant Risk Factors Present on Admission                    # Dementia: noted on problem list                  Disposition Plan     Medically Ready for Discharge: Ready Now             Anjali Leiva MD  Hospitalist Service  [facility]  Securely message with [employer] (more info)  Text page via [employer]ining Secretary Paging/Directory   ______________________________________________________________________    Interval History   Early in the evening patient was agitated, anxious however as time passed by he became calm and was able to sleep through the night.  This morning he was calm and comfortable.  Appears pleasantly confused    Physical Exam   Vital Signs: Temp: 97.3  F (36.3  C) Temp src: Oral BP: 135/78 Pulse: 56   Resp: 16 SpO2: 96 % O2 Device: None (Room air)    Weight: 157 lbs 6.4 oz    General Appearance: Appears calm,  comfortable  Respiratory: On room air, equal air entry bilaterally, no wheezing or crackle  Cardiovascular: Normal rate, regular rhythm, no murmur  GI: Soft, nontender, nondistended  Other: Alert and oriented to self, appropriate mood and affect    Medical Decision Making       25 MINUTES SPENT BY ME on the date of service doing chart review, history, exam, documentation & further activities per the note.      halfway cares patient, nonbillable visit  Data   ------------------------- PAST 24 HR DATA REVIEWED -----------------------------------------------

## 2024-10-03 NOTE — PLAN OF CARE
"Pt was agitated, anxious, and angry at start of the shift. Alert. Oriented to self. LS clear. Sink is pale and dry. Sacrum and coccyx pink. Regular diet. Had a few bites of pudding and sips of water. No PIV. SBA to bathroom. One assist with GB and walker in the halls. VS held d/t patient finally calming down to sleep. Prn seroquel given x1. More like self through the night. Slept well only getting up to use the bathroom.     PRIMARY DIAGNOSIS: \"GENERIC\" NURSING  OUTPATIENT/OBSERVATION GOALS TO BE MET BEFORE DISCHARGE:  ADLs back to baseline: Yes    Activity and level of assistance: Up with standby assistance.    Pain status: Pain free.    Return to near baseline physical activity: Yes     Discharge Planner Nurse   Safe discharge environment identified: No  Barriers to discharge: Yes       Entered by: Orin Vazquez RN 10/03/2024 4:57 AM     Goal Outcome Evaluation:      Plan of Care Reviewed With: patient    Overall Patient Progress: improvingOverall Patient Progress: improving    Outcome Evaluation: Able to sleep decreasing agitation and anxiety.      Problem: Adult Inpatient Plan of Care  Goal: Plan of Care Review  Description: The Plan of Care Review/Shift note should be completed every shift.  The Outcome Evaluation is a brief statement about your assessment that the patient is improving, declining, or no change.  This information will be displayed automatically on your shift  note.  10/3/2024 0312 by Orin Vazquez RN  Outcome: Progressing  Flowsheets (Taken 10/3/2024 0312)  Outcome Evaluation: Able to sleep decreasing agitation and anxiety.  Plan of Care Reviewed With: patient  Overall Patient Progress: improving  10/3/2024 0028 by Orin Vazquez RN  Outcome: Progressing  Flowsheets (Taken 10/3/2024 0028)  Outcome Evaluation: Agitated, anxious and restless.  Plan of Care Reviewed With: patient  Overall Patient Progress: no change  Goal: Patient-Specific Goal " "(Individualized)  Description: You can add care plan individualizations to a care plan. Examples of Individualization might be:  \"Parent requests to be called daily at 9am for status\", \"I have a hard time hearing out of my right ear\", or \"Do not touch me to wake me up as it startles  me\".  10/3/2024 0312 by Orin Vazquez RN  Outcome: Progressing  10/3/2024 0028 by Orin Vazquez RN  Outcome: Progressing  Goal: Absence of Hospital-Acquired Illness or Injury  10/3/2024 0312 by Orin Vazquez RN  Outcome: Progressing  10/3/2024 0028 by Orin Vazquez RN  Outcome: Progressing  Intervention: Identify and Manage Fall Risk  Recent Flowsheet Documentation  Taken 10/2/2024 2339 by Orin Vazquez RN  Safety Promotion/Fall Prevention:   safety round/check completed   room organization consistent   room near nurse's station   room door open   nonskid shoes/slippers when out of bed   clutter free environment maintained   activity supervised  Intervention: Prevent Skin Injury  Recent Flowsheet Documentation  Taken 10/2/2024 2339 by Orin Vazquez RN  Body Position: position changed independently  Goal: Optimal Comfort and Wellbeing  10/3/2024 0312 by Orin Vazquez RN  Outcome: Progressing  10/3/2024 0028 by Orin Vazquez RN  Outcome: Progressing  Goal: Readiness for Transition of Care  10/3/2024 0312 by Orin Vazquez RN  Outcome: Progressing  10/3/2024 0028 by Orin Vazquez RN  Outcome: Progressing  Intervention: Mutually Develop Transition Plan  Recent Flowsheet Documentation  Taken 10/3/2024 0200 by Orin Vazquez RN  Equipment Currently Used at Home: walker, standard     Problem: Fall Injury Risk  Goal: Absence of Fall and Fall-Related Injury  10/3/2024 0312 by Orin Vazquez RN  Outcome: Progressing  10/3/2024 0028 by Orin Vazquez RN  Outcome: Progressing  Intervention: Promote Injury-Free " Environment  Recent Flowsheet Documentation  Taken 10/2/2024 2339 by Orin Vazquez, RN  Safety Promotion/Fall Prevention:   safety round/check completed   room organization consistent   room near nurse's station   room door open   nonskid shoes/slippers when out of bed   clutter free environment maintained   activity supervised     Problem: Skin Injury Risk Increased  Goal: Skin Health and Integrity  10/3/2024 0312 by Orin Vazquez, RN  Outcome: Progressing  10/3/2024 0028 by Orin Vazquez RN  Outcome: Progressing  Intervention: Plan: Nurse Driven Intervention: Moisture Management  Recent Flowsheet Documentation  Taken 10/2/2024 2339 by Orin Vazquez, RN  Moisture Interventions: Incontinence pad  Intervention: Plan: Nurse Driven Intervention: Friction and Shear  Recent Flowsheet Documentation  Taken 10/2/2024 2339 by Orin Vazquez, RN  Friction/Shear Interventions: HOB 30 degrees or less  Intervention: Optimize Skin Protection  Recent Flowsheet Documentation  Taken 10/2/2024 2339 by Orin Vazquez, RN  Activity Management: ambulated to bathroom

## 2024-10-03 NOTE — ED PROVIDER NOTES
Jemal Gray is a 89 year old patient who was entering alf care outpatient admission and thus a new patient encounter was created to facilitate this outpatient admission workflow. However, shortly after alf orders were placed and hospitalist evaluated the patient, they deemed the patient qualifying for observation admit instead. Please see the ED Provider note from the immediately preceding encounter for ED history, physical, and medical decision making and treat as same encounter.     ED Course as of 10/02/24 2047   Wed Oct 02, 2024   2018 Discussed patient with Liliana Sims   2046 Per Liliana Sims, given patient having increasing agitation and requiring 1:1 watch, hospitalist service will admit patient under observation instead of alf admit.     Emil Fitzpatrick, DO  10/02/24 1955       Emil Fitzpatrick, DO  10/02/24 2048

## 2024-10-03 NOTE — PLAN OF CARE
"Temp: 97.1  F (36.2  C) Temp src: Oral BP: 122/67 Pulse: 54   Resp: 16 SpO2: 97 % O2 Device: None (Room air)      senior living care, denies pain. Up to bathroom and back to bed. Regular diet-good appetite. Still pleasant although a little flat today compared to previous times. Probable discharge once placement found in the next few months        Goal Outcome Evaluation:      Plan of Care Reviewed With: patient    Overall Patient Progress: no changeOverall Patient Progress: no change    Outcome Evaluation: senior living care. Denies pain. SBA up to bathroom, sort of unsteady on his feet so need to remain near pt at all times. No signs of infection or skin breakdown.      Problem: Adult Inpatient Plan of Care  Goal: Plan of Care Review  Description: The Plan of Care Review/Shift note should be completed every shift.  The Outcome Evaluation is a brief statement about your assessment that the patient is improving, declining, or no change.  This information will be displayed automatically on your shift  note.  Outcome: Progressing  Flowsheets (Taken 10/3/2024 7004)  Outcome Evaluation: senior living care. Denies pain. SBA up to bathroom, sort of unsteady on his feet so need to remain near pt at all times. No signs of infection or skin breakdown.  Plan of Care Reviewed With: patient  Overall Patient Progress: no change  Goal: Patient-Specific Goal (Individualized)  Description: You can add care plan individualizations to a care plan. Examples of Individualization might be:  \"Parent requests to be called daily at 9am for status\", \"I have a hard time hearing out of my right ear\", or \"Do not touch me to wake me up as it startles  me\".  Outcome: Progressing  Goal: Absence of Hospital-Acquired Illness or Injury  Outcome: Progressing  Goal: Optimal Comfort and Wellbeing  Outcome: Progressing  Goal: Readiness for Transition of Care  Outcome: Progressing     Problem: Fall Injury Risk  Goal: Absence of Fall and Fall-Related Injury  Outcome: " Progressing     Problem: Skin Injury Risk Increased  Goal: Skin Health and Integrity  Outcome: Progressing

## 2024-10-03 NOTE — H&P
[facility]    History and Physical - Hospitalist Service       Date of Admission:  10/2/2024    Assessment & Plan Jemal Gray is a 89 year old male with Pmhx of Dementia with chronic aphasia, who was admitted on 10/2/2024 after he was brought to the ED by EMS from facility for evaluation of agitation.    The patient discharged from a prolonged hospitalization awaiting long-term care placement on 10/2 to a long-term care facility. He was initially admitted here June 5, 2024 where he was treated for cellulitis which resolved.  He required guardianship with MA pending, social work assisted with discharge planning to long-term care facility on 10/2.     Upon transfer to the long-term care facility the patient was described as agitated, swearing.  He he had been wandering around the long-term care facility and entering other patient's rooms.  The patient was unable to be redirected and required transfer back to the ED.    In the ED patient hypertensive, vitally stable. He was given seroquel.  He requires a bedside attendant as he is agitated and unable to be redirected.  Attempting to leave the emergency department and go into other patient's rooms.    Admission was initially requested from the hospitalist service for intermediate cares, however due to the degree of the patient's current behavior disturbances do think he will need further antipsychotic medications and bedside attendant thus would not admit him as a intermediate patient currently.    Acute Agitation, Behavior Disturbances   Dementia with Chronic Aphasia  Suspect triggered by changes in setting and recent move.  No physical complaints or concerns of infection.  Historically required Zyprexa over the summer when he was hospitalized for agitation.  Most recently had been prescribed Seroquel 12.5 mg twice daily as needed although he had not been needing most recently.  -observation to start, admit to inpatient if requiring IV, IM  "medications and restraints etc.  -Bedside attendant as needed  -Restart prior to admission Seroquel at 12.5 mg twice daily as needed, may need to titrate  -Checking QTc when patient cooperative to get EKG completed  -PRN IM Zyprexa, IV Haldol if unable to cooperative with PO medications   -check basic labs if none truly completed since June, will verify in previously entered encounter  -infectious work up if develops any localizing symptoms or fever   -SW consult as noted below     Need for Disposition Assistance  -Patient had prolonged hospitalization due to need for disposition assistance.    -His daughter filed for guardianship paperwork was completed in July   -Quired MA application and long-term care placement   -Ultimately will require 24/7 care and likely Memory care placement     Actinic Keratosis  Had surgery for this and prescribed steroids cream and ketoconazole cream, restarted per daughter request previously.     Sinus Bradycardia  Patient was noted to have HR in 50s.  Asymptomatic.  -no telemetry monitoring needed     History of Constipation  -laxatives PRN     Called and updated patient's daughter, Ms. Gray.     Diet: Combination Diet Regular Diet      DVT Prophylaxis: Pneumatic Compression Devices  Maddox Catheter: Not present    Cardiac Monitoring: None    Code Status: No CPR- Pre-arrest intubation OK      Clinically Significant Risk Factors Present on Admission                    # Dementia: noted on problem list                  Disposition Plan      Expected Discharge Date: 10/03/2024                      Liliana Vargas PA-C    ______________________________________________________________________    Chief Complaint   \"Dementia\"    History is obtained from the patient    History of Present Illness   Jemal Gray is a 89 year old male who was brought to the emergency department by EMS for evaluation of agitation.  The patient discharged from a prolonged hospitalization awaiting long-term care " placement on 10/2 to a long-term care facility.    Upon transfer to the long-term care facility the patient was described as agitated, swearing.  He he had been wandering around the long-term care facility and entering other patient's rooms.  The patient was unable to be redirected and required transfer back to the ED.  He was not given any medications.  Per facility report while in the emergency department the facility was unwilling to take the patient back as a resident.    While in the emergency department the patient continued to be agitated, confused and has required a bedside attendant.  He is attempting to leave the emergency department and enter rooms of other patients.    The patient denies any physical complaints.  The patient's daughter reports that he had been otherwise at his baseline today.  She reports history of occasional agitation and had an receiving as needed Zyprexa while in the hospital from what I can tell back in August.        Past Medical History    Reviewed as above.    Past Surgical History   Past Surgical History:   Procedure Laterality Date    JOINT REPLACEMENT  Bilateral hip replacement    PICC LINE INSERTION KIT SUPPLY HE  4/6/2015         TONSILLECTOMY         Prior to Admission Medications   Prior to Admission Medications   Prescriptions Last Dose Informant Patient Reported? Taking?   QUEtiapine (SEROQUEL) 25 MG tablet   No No   Sig: Take 0.5 tablets (12.5 mg) by mouth 2 times daily as needed (agitation, anxiety, sleep).   acetaminophen (TYLENOL) 325 MG tablet   No No   Sig: Take 3 tablets (975 mg) by mouth every 6 hours as needed for mild pain.   ascorbic acid (VITAMIN C) 1000 MG tablet   Yes No   Sig: [ASCORBIC ACID (VITAMIN C) 1000 MG TABLET] Take 1,000 mg by mouth 2 (two) times a day.    betamethasone dipropionate (DIPROSONE) 0.05 % external ointment   No No   Sig: Apply topically 2 times daily for 7 days.   cholecalciferol, vitamin D3, 1,000 unit tablet   Yes No   Sig:  [CHOLECALCIFEROL, VITAMIN D3, 1,000 UNIT TABLET] Take 2,000 Units by mouth 2 (two) times a day.    ketoconazole (NIZORAL) 2 % external shampoo   No No   Sig: Apply topically Every Mon, Wed, Fri Morning.   melatonin 5 MG tablet   No No   Sig: Take 1 tablet (5 mg) by mouth nightly as needed for sleep.   polyethylene glycol (MIRALAX) 17 g packet   No No   Sig: Take 17 g by mouth daily as needed for constipation.   senna-docusate (SENOKOT-S/PERICOLACE) 8.6-50 MG tablet   No No   Sig: Take 1 tablet by mouth at bedtime.      Facility-Administered Medications: None        Review of Systems    The 10 point Review of Systems is negative other than noted in the HPI or here.     Social History   I have reviewed this patient's social history and updated it with pertinent information if needed.  Social History     Tobacco Use    Smoking status: Former     Current packs/day: 0.00     Types: Cigarettes     Quit date: 1982     Years since quittin.7   Substance Use Topics    Alcohol use: No    Drug use: No        Physical Exam   Vital Signs:     BP: (!) 160/81 Pulse: 63   Resp: 20 SpO2: 96 % O2 Device: None (Room air)    Weight: 0 lbs 0 oz    Constitutional: Awake, alert, agitated.   Eyes: Conjunctiva and pupils examined and normal.  HEENT: Moist mucous membranes, normal dentition.  Respiratory: no abnormal work of breathing.  Skin: No rashes in visualized areas, no cyanosis, +1 bilateral LE edema.   Musculoskeletal: No gross deformities noted.  No erythema or tenderness. Moving all extremities.  Neurologic: Disoriented. Chronic expressive aphasia. Will not cooperate with neuro exam or follow commands. Moving all extremities, ambulatory.   Psychiatric: Unable to fully assess.  Agitated.  No current hallucinations.      Medical Decision Making       75 MINUTES SPENT BY ME on the date of service doing chart review, history, exam, documentation & further activities per the note.      Data   ------------------------- PAST 24 HR  DATA REVIEWED -----------------------------------------------

## 2024-10-03 NOTE — PLAN OF CARE
"PRIMARY DIAGNOSIS: \"GENERIC\" NURSING  OUTPATIENT/OBSERVATION GOALS TO BE MET BEFORE DISCHARGE:  ADLs back to baseline: Yes    Activity and level of assistance: Up with standby assistance.    Pain status: Pain free.    Return to near baseline physical activity: Yes     Discharge Planner Nurse   Safe discharge environment identified: No  Barriers to discharge: Yes       Entered by: Orin Vazquez RN 10/03/2024 12:29 AM    Goal Outcome Evaluation:      Plan of Care Reviewed With: patient    Overall Patient Progress: no changeOverall Patient Progress: no change    Outcome Evaluation: Agitated, anxious and restless.      "

## 2024-10-03 NOTE — PHARMACY-ADMISSION MEDICATION HISTORY
Pharmacy Intern Admission Medication History    Admission medication history is complete. The information provided in this note is only as accurate as the sources available at the time of the update.    Information Source(s): Caregiver and Hospital records via phone    Pertinent Information: The patient was discharged to [facility] for 45 minutes. He did not receive any medications during that time.     Changes made to PTA medication list:  Added: None  Deleted: None  Changed: None    Allergies reviewed with patient and updates made in EHR:  reviewed by RN    Medication History Completed By: Marlene Gutierrez RPH 10/3/2024 8:22 AM    PTA Med List   Medication Sig Last Dose    acetaminophen (TYLENOL) 325 MG tablet Take 3 tablets (975 mg) by mouth every 6 hours as needed for mild pain.     ascorbic acid (VITAMIN C) 1000 MG tablet [ASCORBIC ACID (VITAMIN C) 1000 MG TABLET] Take 1,000 mg by mouth 2 (two) times a day.      betamethasone dipropionate (DIPROSONE) 0.05 % external ointment Apply topically 2 times daily for 7 days.     cholecalciferol, vitamin D3, 1,000 unit tablet [CHOLECALCIFEROL, VITAMIN D3, 1,000 UNIT TABLET] Take 2,000 Units by mouth 2 (two) times a day.      ketoconazole (NIZORAL) 2 % external shampoo Apply topically Every Mon, Wed, Fri Morning.     melatonin 5 MG tablet Take 1 tablet (5 mg) by mouth nightly as needed for sleep.     polyethylene glycol (MIRALAX) 17 g packet Take 17 g by mouth daily as needed for constipation.     QUEtiapine (SEROQUEL) 25 MG tablet Take 0.5 tablets (12.5 mg) by mouth 2 times daily as needed (agitation, anxiety, sleep).     senna-docusate (SENOKOT-S/PERICOLACE) 8.6-50 MG tablet Take 1 tablet by mouth at bedtime.

## 2024-10-03 NOTE — UTILIZATION REVIEW
"Concurrent stay review; Secondary Review Determination - [facility]        Under the authority of the Utilization Management Committee, the utilization review process indicated a secondary review on the above patient.  The review outcome is based on review of the medical records, discussions with staff, and applying clinical experience noted on the date of the review.        (x) Outpatient [facility] status is appropriate       RATIONALE FOR DETERMINATION:     Patient delayed discharge is related to disposition, there is no medical necessity for inpatient admission at the time of this review. If there is a change in patient status, please resend for review.    The information on this document is developed by the utilization review team in order for the business office to ensure compliance.  This only denotes the appropriateness of proper admission status and does not reflect the quality of care rendered.       The definitions of Inpatient Status and Observation Status used in making the determination above are those provided in the CMS Coverage Manual, Chapter 1 and Chapter 6, section 70.4.       Sincerely,    Pascual Cervantes MD    Admission Status; Secondary Review Determination         Under the authority of the Utilization Management Committee, the utilization review process indicated a secondary review on the above patient.  The review outcome is based on review of the medical records, discussions with staff, and applying clinical experience noted on the date of the review.          (x) Observation Status Appropriate - This patient does not meet hospital inpatient criteria and is placed in observation status. If this patient's primary payer is Medicare and was admitted as an inpatient, Condition Code 44 should be used and patient status changed to \"observation\".     RATIONALE FOR DETERMINATION     The severity of illness, intensity of service provided, expected LOS and risk for " adverse outcome make the care appropriate for further observation; however, doesn't meet criteria for hospital inpatient admission. Dr  notified of this determination.    This document was produced using voice recognition software.      The information on this document is developed by the utilization review team in order for the business office to ensure compliance.  This only denotes the appropriateness of proper admission status and does not reflect the quality of care rendered.         The definitions of Inpatient Status and Observation Status used in making the determination above are those provided in the CMS Coverage Manual, Chapter 1 and Chapter 6, section 70.4.      Sincerely,     TIFFANY IZAGUIRRE MD    System Medical Director  Utilization Management  [facility].

## 2024-10-03 NOTE — PHARMACY-ADMISSION MEDICATION HISTORY
Pharmacist Admission Medication History    Admission medication history is complete. The information provided in this note is only as accurate as the sources available at the time of the update.    Information Source(s): Hospital records via  print-out    Pertinent Information: per notes: Today, pt was discharged from a prolonged hospital stay to [facility]. Pt was at facility for 45 min and came back to hospital.  There are multiple Rx meds written today upon discharge to facility, see below.      Changes made to PTA medication list:  Added: None  Deleted: None  Changed: None    Medication History Completed By: Puma Gomez RPH 10/2/2024 7:13 PM    Prior to Admission medications    Medication Sig Last Dose Taking? Auth Provider Long Term End Date   acetaminophen (TYLENOL) 325 MG tablet Take 3 tablets (975 mg) by mouth every 6 hours as needed for mild pain. Rx 10/2/24  Hector Marie MD     ascorbic acid (VITAMIN C) 1000 MG tablet [ASCORBIC ACID (VITAMIN C) 1000 MG TABLET] Take 1,000 mg by mouth 2 (two) times a day.    Provider, Historical     betamethasone dipropionate (DIPROSONE) 0.05 % external ointment Apply topically 2 times daily for 7 days. Rx 10/2/24  Hector Marie MD  10/9/24   cholecalciferol, vitamin D3, 1,000 unit tablet [CHOLECALCIFEROL, VITAMIN D3, 1,000 UNIT TABLET] Take 2,000 Units by mouth 2 (two) times a day.    Provider, Historical     ketoconazole (NIZORAL) 2 % external shampoo Apply topically Every Mon, Wed, Fri Morning. Rx 10/2/24  Hector Marie MD     melatonin 5 MG tablet Take 1 tablet (5 mg) by mouth nightly as needed for sleep. Rx 10/2/24  Hector Marie MD     polyethylene glycol (MIRALAX) 17 g packet Take 17 g by mouth daily as needed for constipation. Rx 10/2/24  Hector Marie MD     QUEtiapine (SEROQUEL) 25 MG tablet Take 0.5 tablets (12.5 mg) by mouth 2 times daily as needed (agitation, anxiety, sleep). Rx 10/2/24  Frank  Hector Myles MD Yes    senna-docusate (SENOKOT-S/PERICOLACE) 8.6-50 MG tablet Take 1 tablet by mouth at bedtime. Rx 10/2/24  Hector Marie MD

## 2024-10-03 NOTE — PLAN OF CARE
"Goal Outcome Evaluation:      Plan of Care Reviewed With: patient    Overall Patient Progress: improvingOverall Patient Progress: improving    Outcome Evaluation: Pt on senior living care. Awaiting placement. SW following. Alert to self only. On room air. Denies pain. Calm and cooperative this morning. Was agitated last night. PRN Seroquel was given by night nurse. Has blanchable redness on buttock and L foot.Tracing edema noted on BLE.  Tolerating regular diet.      Problem: Adult Inpatient Plan of Care  Goal: Plan of Care Review  Description: The Plan of Care Review/Shift note should be completed every shift.  The Outcome Evaluation is a brief statement about your assessment that the patient is improving, declining, or no change.  This information will be displayed automatically on your shift  note.  10/3/2024 1357 by Mckayla Flores RN  Outcome: Progressing  Flowsheets (Taken 10/3/2024 1357)  Outcome Evaluation: Pt on senior living care. Awaiting placement. SW following. Alert to self only. On room air. Denies pain. Calm and cooperative this morning. Was agitated last night. PRN Seroquel was given by night nurse. Has blanchable redness on buttock and L foot.Tracing edema noted on BLE.  Tolerating regular diet.  Plan of Care Reviewed With: patient  Overall Patient Progress: improving  10/3/2024 1356 by Mckayla Flores RN  Outcome: Progressing  Flowsheets (Taken 10/3/2024 1356)  Plan of Care Reviewed With: patient  Overall Patient Progress: improving  Goal: Patient-Specific Goal (Individualized)  Description: You can add care plan individualizations to a care plan. Examples of Individualization might be:  \"Parent requests to be called daily at 9am for status\", \"I have a hard time hearing out of my right ear\", or \"Do not touch me to wake me up as it startles  me\".  10/3/2024 1357 by Mckayla Flores RN  Outcome: Progressing  10/3/2024 1356 by Mckayla Flores RN  Outcome: Progressing  Goal: Absence of Hospital-Acquired " Illness or Injury  10/3/2024 1357 by Mckayla Flores RN  Outcome: Progressing  10/3/2024 1356 by Mckayla Flores RN  Outcome: Progressing  Intervention: Identify and Manage Fall Risk  Recent Flowsheet Documentation  Taken 10/3/2024 0730 by Mckayla Flores RN  Safety Promotion/Fall Prevention:   activity supervised   clutter free environment maintained   nonskid shoes/slippers when out of bed   safety round/check completed  Intervention: Prevent Skin Injury  Recent Flowsheet Documentation  Taken 10/3/2024 0940 by Mckayla Flores RN  Body Position: position changed independently  Intervention: Prevent and Manage VTE (Venous Thromboembolism) Risk  Recent Flowsheet Documentation  Taken 10/3/2024 0940 by Mckayla Flores RN  VTE Prevention/Management: SCDs off (sequential compression devices)  Intervention: Prevent Infection  Recent Flowsheet Documentation  Taken 10/3/2024 0730 by Mckayla Flores RN  Infection Prevention:   hand hygiene promoted   single patient room provided  Goal: Optimal Comfort and Wellbeing  10/3/2024 1357 by Mckayla Flores RN  Outcome: Progressing  10/3/2024 1356 by Mckayla Flores RN  Outcome: Progressing  Goal: Readiness for Transition of Care  10/3/2024 1357 by Mckayla Flores RN  Outcome: Progressing  10/3/2024 1356 by Mckayla Flores RN  Outcome: Progressing

## 2024-10-03 NOTE — ED TRIAGE NOTES
Pt was discharged from a prolonged hospital stay to [facility], Pt was at facility for 45 min and patient was agitated and wondering in rooms, not redirectable. PT has memory care needs. EMS was called, DTR was contacted and gave permission to bring patient here. Do to memory defecits pt is a flight risk, sitter initiated.

## 2024-10-03 NOTE — ED NOTES
[facility]  ED Nurse Handoff Report    ED Chief complaint: Dementia  . ED Diagnosis:   Final diagnoses:   None        Allergies: No Known Allergies     Code Status: DNR / DNI     Activity level - Baseline/Home:  standby.  Activity Level - Current:   standby.   Lift room needed: No.   Bariatric: No   Needed: No   Isolation: No.   Infection: Not Applicable.      Respiratory status: Room air     Vital Signs (within 30 minutes):        Vitals:     10/02/24 1627 10/02/24 1629   BP:   (!) 154/94   Pulse:   64   Resp:   16   Temp:   98.1  F (36.7  C)   TempSrc:   Oral   SpO2: 96% 97%         Cardiac Rhythm:  ,      Pain level:    Patient confused: Yes.   Patient Falls Risk:  1:1 at bedside  .   Elimination Status:  Has not yet voided        Patient Report - Initial Complaint: Pt was discharged from a prolonged hospital stay to [facility], Pt was at facility for 45 min and patient was agitated and wondering in rooms, not redirectable. PT has memory care needs. EMS was called, DTR was contacted and gave permission to bring patient here. Do to memory defecits pt is a flight risk, sitter initiated. .   Focused Assessment: Pt A&O X1, illogical speech/words, VSS, calm and cooperative at this time.        Abnormal Results:   Labs Ordered and Resulted from Time of ED Arrival to Time of ED Departure - No data to display      No orders to display         Treatments provided: See mAR/notes   Family Comments: N/A  OBS brochure/video discussed/provided to patient:  No  ED Medications: Medications - No data to display     Drips infusing:  No  For the majority of the shift this patient was Green.   Interventions performed were N/A.     Sepsis treatment initiated: No     Cares/treatment/interventions/medications to be completed following ED care: N/A     ED Nurse Name: Michael Nam RN  6:31 PM  RECEIVING UNIT ED HANDOFF REVIEW    Above ED Nurse Handoff Report was reviewed: Yes  Reviewed by: Raegan YOON  Severson, RN on October 2, 2024 at 9:55 PM   I Samantha called the ED to inform them the note was read: Yes

## 2024-10-04 ENCOUNTER — DOCUMENTATION ONLY (OUTPATIENT)
Dept: OTHER | Facility: CLINIC | Age: 89
End: 2024-10-04
Payer: COMMERCIAL

## 2024-10-04 PROCEDURE — 101N000002 HC CUSTODIAL CARE DAILY

## 2024-10-04 PROCEDURE — 250N000013 HC RX MED GY IP 250 OP 250 PS 637: Performed by: PHYSICIAN ASSISTANT

## 2024-10-04 RX ADMIN — Medication 50 MCG: at 10:11

## 2024-10-04 RX ADMIN — KETOCONAZOLE: 20 SHAMPOO, SUSPENSION TOPICAL at 10:12

## 2024-10-04 RX ADMIN — OXYCODONE HYDROCHLORIDE AND ACETAMINOPHEN 1000 MG: 500 TABLET ORAL at 10:11

## 2024-10-04 ASSESSMENT — ACTIVITIES OF DAILY LIVING (ADL)
ADLS_ACUITY_SCORE: 48
ADLS_ACUITY_SCORE: 50
ADLS_ACUITY_SCORE: 50
ADLS_ACUITY_SCORE: 48
ADLS_ACUITY_SCORE: 46
ADLS_ACUITY_SCORE: 48
ADLS_ACUITY_SCORE: 49
ADLS_ACUITY_SCORE: 49
ADLS_ACUITY_SCORE: 54
ADLS_ACUITY_SCORE: 49
ADLS_ACUITY_SCORE: 54
ADLS_ACUITY_SCORE: 50
ADLS_ACUITY_SCORE: 54
ADLS_ACUITY_SCORE: 50
ADLS_ACUITY_SCORE: 49
ADLS_ACUITY_SCORE: 48
ADLS_ACUITY_SCORE: 50
ADLS_ACUITY_SCORE: 50
ADLS_ACUITY_SCORE: 49
ADLS_ACUITY_SCORE: 50
ADLS_ACUITY_SCORE: 49

## 2024-10-04 NOTE — PROGRESS NOTES
[facility]    Medicine Progress Note - Hospitalist Service    Date of Admission:  10/2/2024    Assessment & Plan     Jemal Gray is a 89 year old male with Pmhx of Dementia with chronic aphasia, who was admitted on 10/2/2024 after he was brought to the ED by EMS from facility for evaluation of agitation.     The patient discharged from a prolonged hospitalization awaiting long-term care placement on 10/2 to a long-term care facility. He was initially admitted here June 5, 2024 where he was treated for cellulitis which resolved.  He required guardianship with MA pending, social work assisted with discharge planning to long-term care facility on 10/2.      Upon transfer to the long-term care facility the patient was described as agitated, swearing.  He he had been wandering around the long-term care facility and entering other patient's rooms.  The patient was unable to be redirected and required transfer back to the ED.     In the ED patient hypertensive, vitally stable. He was given seroquel.  He requires a bedside attendant as he was agitated and unable to be redirected.  Attempting to leave the emergency department and go into other patient's rooms.     Patient admitted for intermediate cares.  Patient appears calm and comfortable.  Medically stable and can be discharged whenever however updated by  that patient cannot go back to [facility] as he refused to take him back and recommended that he should go to the memory care.   is assisting to find memory care for the patient.    Nonbillable social visit.      Acute Agitation, Behavior Disturbances   Dementia with Chronic Aphasia  Suspect triggered by changes in setting and recent move.  No physical complaints or concerns of infection.  Historically required Zyprexa over the summer when he was hospitalized for agitation.  Most recently had been prescribed Seroquel 12.5 mg twice daily as needed although he had not been  needing most recently.    -Bedside attendant as needed  -Restarted prior to admission Seroquel at 12.5 mg twice daily as needed, may need to titrate  -Checking QTc when patient cooperative to get EKG completed  -SW consulted     Need for Disposition Assistance  -Patient had prolonged hospitalization due to need for disposition assistance.    -His daughter filed for guardianship paperwork was completed in July   -acquired MA application and long-term care placement   -Ultimately will require 24/7 care and likely Memory care placement      Actinic Keratosis  Had surgery for this and prescribed steroids cream and ketoconazole cream, restarted per daughter request previously.     Sinus Bradycardia  Patient was noted to have HR in 50s.  Asymptomatic.  -no telemetry monitoring needed      History of Constipation  -laxatives PRN           Diet: Combination Diet Regular Diet  Snacks/Supplements Pediatric: Ensure Enlive; Between Meals    DVT Prophylaxis: Ambulate every shift  Maddox Catheter: Not present  Lines: None     Cardiac Monitoring: None  Code Status: No CPR- Do NOT Intubate      Clinically Significant Risk Factors Present on Admission                    # Dementia: noted on problem list                  Disposition Plan     Medically Ready for Discharge: Ready Now             Anjali Leiva MD  Hospitalist Service  [facility]  Securely message with [employer] (more info)  Text page via [employer] Paging/Directory   ______________________________________________________________________    Interval History   Nursing notes reviewed.  Patient appear calm and comfortable.  Oriented to self only.  Tolerating oral diet    Physical Exam   Vital Signs: Temp: 98.5  F (36.9  C) Temp src: Oral BP: (!) 140/72 Pulse: 53   Resp: 16 SpO2: 98 % O2 Device: None (Room air)    Weight: 157 lbs 6.4 oz    General Appearance: Appears calm, comfortable  Respiratory: On room air  Other: Alert and oriented to self, appropriate mood  and affect    Medical Decision Making         MINUTES SPENT BY ME on the date of service doing chart review, history, exam, documentation & further activities per the note.      jail cares patient, nonbillable visit  Data   ------------------------- PAST 24 HR DATA REVIEWED -----------------------------------------------

## 2024-10-04 NOTE — PLAN OF CARE
Happy and cooperative. Support assisted patient with a shower. Great appetite. Sets off bed alarm from time to time but is able to be redirected. A1 w/GB + walker.

## 2024-10-04 NOTE — PLAN OF CARE
"Assumed cares 6700-3032: Pt confused, disoriented x4, very pleasant though. Slept peacefully all night. Will be waiting for placement for a bit.    Goal Outcome Evaluation:      Plan of Care Reviewed With: patient    Overall Patient Progress: no changeOverall Patient Progress: no change    Outcome Evaluation: No issues overnight. Slept well      Problem: Adult Inpatient Plan of Care  Goal: Plan of Care Review  Description: The Plan of Care Review/Shift note should be completed every shift.  The Outcome Evaluation is a brief statement about your assessment that the patient is improving, declining, or no change.  This information will be displayed automatically on your shift  note.  Outcome: Progressing  Flowsheets (Taken 10/4/2024 6291)  Outcome Evaluation: No issues overnight. Slept well  Plan of Care Reviewed With: patient  Overall Patient Progress: no change  Goal: Patient-Specific Goal (Individualized)  Description: You can add care plan individualizations to a care plan. Examples of Individualization might be:  \"Parent requests to be called daily at 9am for status\", \"I have a hard time hearing out of my right ear\", or \"Do not touch me to wake me up as it startles  me\".  Outcome: Progressing  Goal: Absence of Hospital-Acquired Illness or Injury  Outcome: Progressing  Goal: Optimal Comfort and Wellbeing  Outcome: Progressing  Goal: Readiness for Transition of Care  Outcome: Progressing     Problem: Fall Injury Risk  Goal: Absence of Fall and Fall-Related Injury  Outcome: Progressing     Problem: Skin Injury Risk Increased  Goal: Skin Health and Integrity  Outcome: Progressing     "

## 2024-10-04 NOTE — PLAN OF CARE
"Pt alert and confused. Pleasant, singing. Ate a pudding and sips of juice. SBA/one assist to the bathroom.       Goal Outcome Evaluation:      Plan of Care Reviewed With: patient    Overall Patient Progress: no changeOverall Patient Progress: no change    Outcome Evaluation: [facility] care.      Problem: Adult Inpatient Plan of Care  Goal: Plan of Care Review  Description: The Plan of Care Review/Shift note should be completed every shift.  The Outcome Evaluation is a brief statement about your assessment that the patient is improving, declining, or no change.  This information will be displayed automatically on your shift  note.  Outcome: Progressing  Flowsheets (Taken 10/3/2024 2252)  Outcome Evaluation: [facility] care.  Plan of Care Reviewed With: patient  Overall Patient Progress: no change  Goal: Patient-Specific Goal (Individualized)  Description: You can add care plan individualizations to a care plan. Examples of Individualization might be:  \"Parent requests to be called daily at 9am for status\", \"I have a hard time hearing out of my right ear\", or \"Do not touch me to wake me up as it startles  me\".  Outcome: Progressing  Goal: Absence of Hospital-Acquired Illness or Injury  Outcome: Progressing  Intervention: Prevent Skin Injury  Recent Flowsheet Documentation  Taken 10/3/2024 2133 by Orin Vazquez, RN  Body Position: position changed independently  Goal: Optimal Comfort and Wellbeing  Outcome: Progressing  Goal: Readiness for Transition of Care  Outcome: Progressing     Problem: Fall Injury Risk  Goal: Absence of Fall and Fall-Related Injury  Outcome: Progressing     Problem: Skin Injury Risk Increased  Goal: Skin Health and Integrity  Outcome: Progressing  Intervention: Optimize Skin Protection  Recent Flowsheet Documentation  Taken 10/3/2024 2133 by Orin Vazquez, RN  Activity Management: activity adjusted per tolerance     Problem: Skin Injury Risk Increased  Goal: Skin Health and " Integrity  Outcome: Progressing  Intervention: Optimize Skin Protection  Recent Flowsheet Documentation  Taken 10/3/2024 2133 by Orin Vazquez, RN  Activity Management: activity adjusted per tolerance     Problem: Skin Injury Risk Increased  Goal: Skin Health and Integrity  Outcome: Progressing  Intervention: Optimize Skin Protection  Recent Flowsheet Documentation  Taken 10/3/2024 2133 by Orin Vazquez, RN  Activity Management: activity adjusted per tolerance

## 2024-10-05 PROCEDURE — 101N000002 HC CUSTODIAL CARE DAILY

## 2024-10-05 PROCEDURE — 250N000013 HC RX MED GY IP 250 OP 250 PS 637: Performed by: PHYSICIAN ASSISTANT

## 2024-10-05 RX ADMIN — Medication 50 MCG: at 10:18

## 2024-10-05 RX ADMIN — OXYCODONE HYDROCHLORIDE AND ACETAMINOPHEN 1000 MG: 500 TABLET ORAL at 10:17

## 2024-10-05 ASSESSMENT — ACTIVITIES OF DAILY LIVING (ADL)
ADLS_ACUITY_SCORE: 50
ADLS_ACUITY_SCORE: 46
ADLS_ACUITY_SCORE: 50

## 2024-10-05 NOTE — PROGRESS NOTES
[facility]    Medicine Progress Note - Hospitalist Service    Date of Admission:  10/2/2024    Assessment & Plan     Jemal Gray is a 89 year old male with Pmhx of Dementia with chronic aphasia, who was admitted on 10/2/2024 after he was brought to the ED by EMS from facility for evaluation of agitation.     The patient discharged from a prolonged hospitalization awaiting long-term care placement on 10/2 to a long-term care facility. He was initially admitted here June 5, 2024 where he was treated for cellulitis which resolved.  He required guardianship with MA pending, social work assisted with discharge planning to long-term care facility on 10/2.      Upon transfer to the long-term care facility the patient was described as agitated, swearing.  He he had been wandering around the long-term care facility and entering other patient's rooms.  The patient was unable to be redirected and required transfer back to the ED.     In the ED patient hypertensive, vitally stable. He was given seroquel.  He requires a bedside attendant as he was agitated and unable to be redirected.  Attempting to leave the emergency department and go into other patient's rooms.     Patient admitted for penitentiary cares.  Patient appears calm and comfortable.  Medically stable and can be discharged whenever however updated by  that patient cannot go back to [facility] as he refused to take him back and recommended that he should go to the memory care.   is assisting to find memory care for the patient.    Nonbillable social visit.      Acute Agitation, Behavior Disturbances   Dementia with Chronic Aphasia  Suspect triggered by changes in setting and recent move.  No physical complaints or concerns of infection.  Historically required Zyprexa over the summer when he was hospitalized for agitation.  Most recently had been prescribed Seroquel 12.5 mg twice daily as needed although he had not been  needing most recently.    -Bedside attendant as needed  -Restarted prior to admission Seroquel at 12.5 mg twice daily as needed, may need to titrate  - ms  -SW consulted     Need for Disposition Assistance  -Patient had prolonged hospitalization due to need for disposition assistance.    -His daughter filed for guardianship paperwork was completed in July   -acquired MA application and long-term care placement   -Ultimately will require 24/7 care and likely Memory care placement      Actinic Keratosis  Had surgery for this and prescribed steroids cream and ketoconazole cream, restarted per daughter request previously.     Sinus Bradycardia  Patient was noted to have HR in 50s.  Asymptomatic.  -no telemetry monitoring needed      History of Constipation  -laxatives PRN           Diet: Combination Diet Regular Diet  Snacks/Supplements Pediatric: Ensure Enlive; Between Meals    DVT Prophylaxis: Ambulate every shift  Maddox Catheter: Not present  Lines: None     Cardiac Monitoring: None  Code Status: No CPR- Do NOT Intubate      Clinically Significant Risk Factors Present on Admission                    # Dementia: noted on problem list                  Disposition Plan     Medically Ready for Discharge: Ready Now             Anjali Leiva MD  Hospitalist Service  [facility]  Securely message with [employer] (more info)  Text page via [facility] Paging/Directory   ______________________________________________________________________    Interval History   Nursing notes reviewed.  Patient appear calm and comfortable.  Oriented to self only.  Tolerating oral diet    Physical Exam   Vital Signs: Temp: 97.7  F (36.5  C) Temp src: Axillary BP: 135/78 Pulse: 54   Resp: 17 SpO2: 97 % O2 Device: None (Room air)    Weight: 157 lbs 6.4 oz    General Appearance: Appears calm, comfortable, singing jingle bells  Respiratory: On room air  Other: Alert and oriented to self, appropriate mood and affect    Medical  Decision Making         MINUTES SPENT BY ME on the date of service doing chart review, history, exam, documentation & further activities per the note.      [facility] cares patient, nonbillable visit  Data   ------------------------- PAST 24 HR DATA REVIEWED -----------------------------------------------

## 2024-10-05 NOTE — PLAN OF CARE
Pt up multiple times to bathroom, attempting to have BM. Refused to take evening medications, offered a stool softner and he refused to take. Otherwise pleasant, bed alarm on. A1 w/gb and walker.  Discharge TBD

## 2024-10-05 NOTE — PROGRESS NOTES
"Pleasantly confused. Well loved on the unit. Does not like his vitamin D nor C. He knows what they are and will say, \"I don't like those!\" Refused them overnight. I tried them in applesauce but he was not a fan. Good appetite. Had a BM. Sets off bed alarm.   "

## 2024-10-06 PROCEDURE — 101N000002 HC CUSTODIAL CARE DAILY

## 2024-10-06 PROCEDURE — 250N000013 HC RX MED GY IP 250 OP 250 PS 637: Performed by: PHYSICIAN ASSISTANT

## 2024-10-06 RX ADMIN — QUETIAPINE FUMARATE 12.5 MG: 25 TABLET ORAL at 20:19

## 2024-10-06 ASSESSMENT — ACTIVITIES OF DAILY LIVING (ADL)
ADLS_ACUITY_SCORE: 50
ADLS_ACUITY_SCORE: 50
ADLS_ACUITY_SCORE: 52
ADLS_ACUITY_SCORE: 50
ADLS_ACUITY_SCORE: 50
ADLS_ACUITY_SCORE: 51
ADLS_ACUITY_SCORE: 52
ADLS_ACUITY_SCORE: 50
ADLS_ACUITY_SCORE: 52
ADLS_ACUITY_SCORE: 50
ADLS_ACUITY_SCORE: 52
ADLS_ACUITY_SCORE: 50

## 2024-10-06 NOTE — PROGRESS NOTES
Alert and pleasantly confused.  Denies pain  Set off bed alarm frequently  Pt refused morning medications  Pt was ambulated in halls and sang some songs.

## 2024-10-06 NOTE — PROGRESS NOTES
"Significantly confused multiple time into bathroom, attempting to redirect him and he would become agitated at staff, no physical, but would yell at staff at times. Refused his evening meds, attempted to give PRN Seroquel, however he picked it out of the pudding and handed it back to me. Told me that \"he did not like that.\" Pt would fall asleep for brief periods of time.   "

## 2024-10-06 NOTE — PROGRESS NOTES
[facility]    Medicine Progress Note - Hospitalist Service    Date of Admission:  10/2/2024    Assessment & Plan     Jemal Gray is a 89 year old male with Pmhx of Dementia with chronic aphasia, who was admitted on 10/2/2024 after he was brought to the ED by EMS from facility for evaluation of agitation.     The patient discharged from a prolonged hospitalization awaiting long-term care placement on 10/2 to a long-term care facility. He was initially admitted here June 5, 2024 where he was treated for cellulitis which resolved.  He required guardianship with MA pending, social work assisted with discharge planning to long-term care facility on 10/2.      Upon transfer to the long-term care facility the patient was described as agitated, swearing.  He he had been wandering around the long-term care facility and entering other patient's rooms.  The patient was unable to be redirected and required transfer back to the ED.     In the ED patient hypertensive, vitally stable. He was given seroquel.  He requires a bedside attendant as he was agitated and unable to be redirected.  Attempting to leave the emergency department and go into other patient's rooms.     Patient admitted for [facility] cares.  Patient appears calm and comfortable.  Medically stable and can be discharged whenever however updated by  that patient cannot go back to [facility] as they refused to take him back and recommended that he should go to the memory care.   is assisting to find memory care for the patient.    Nonbillable social visit.      Acute Agitation, Behavior Disturbances   Dementia with Chronic Aphasia  Suspect triggered by changes in setting and recent move.  No physical complaints or concerns of infection.  Historically required Zyprexa over the summer when he was hospitalized for agitation.  Most recently had been prescribed Seroquel 12.5 mg twice daily as needed although he had not been  needing most recently.    -Bedside attendant as needed  -Restarted prior to admission Seroquel at 12.5 mg twice daily as needed, may need to titrate  - ms  -SW consulted     Need for Disposition Assistance  -Patient had prolonged hospitalization due to need for disposition assistance.    -His daughter filed for guardianship paperwork was completed in July   -acquired MA application and long-term care placement   -Ultimately will require 24/7 care and likely Memory care placement      Actinic Keratosis  Had surgery for this and prescribed steroids cream and ketoconazole cream, restarted per daughter request previously.     Sinus Bradycardia  Patient was noted to have HR in 50s.  Asymptomatic.  -no telemetry monitoring needed      History of Constipation  -laxatives PRN           Diet: Combination Diet Regular Diet  Snacks/Supplements Pediatric: Ensure Enlive; Between Meals    DVT Prophylaxis: Ambulate every shift  Maddox Catheter: Not present  Lines: None     Cardiac Monitoring: None  Code Status: No CPR- Do NOT Intubate      Clinically Significant Risk Factors Present on Admission                    # Dementia: noted on problem list                  Disposition Plan     Medically Ready for Discharge: Ready Now             Anjali Leiva MD  Hospitalist Service  [facility]  Securely message with [employer] (more info)  Text page via [facility] Paging/Directory   ______________________________________________________________________    Interval History   Nursing notes reviewed.  Patient appear calm and comfortable.  Eating donuts, offered me to eat with him. Pleasantly confused.     Physical Exam   Vital Signs: Temp: 97.5  F (36.4  C) Temp src: Oral BP: 97/62 Pulse: 61   Resp: 18 SpO2: 97 % O2 Device: None (Room air)    Weight: 157 lbs 6.4 oz    General Appearance: Appears calm, comfortable, eating donuts  Respiratory: On room air  Other: Alert and oriented to self, appropriate mood and  affect    Medical Decision Making         MINUTES SPENT BY ME on the date of service doing chart review, history, exam, documentation & further activities per the note.      long term cares patient, nonbillable visit  Data   ------------------------- PAST 24 HR DATA REVIEWED -----------------------------------------------

## 2024-10-07 PROCEDURE — 101N000002 HC CUSTODIAL CARE DAILY

## 2024-10-07 PROCEDURE — 250N000013 HC RX MED GY IP 250 OP 250 PS 637: Performed by: INTERNAL MEDICINE

## 2024-10-07 PROCEDURE — 250N000013 HC RX MED GY IP 250 OP 250 PS 637: Performed by: PHYSICIAN ASSISTANT

## 2024-10-07 RX ADMIN — Medication 50 MCG: at 20:09

## 2024-10-07 RX ADMIN — OXYCODONE HYDROCHLORIDE AND ACETAMINOPHEN 1000 MG: 500 TABLET ORAL at 20:09

## 2024-10-07 RX ADMIN — QUETIAPINE FUMARATE 12.5 MG: 25 TABLET ORAL at 20:09

## 2024-10-07 ASSESSMENT — ACTIVITIES OF DAILY LIVING (ADL)
ADLS_ACUITY_SCORE: 51
ADLS_ACUITY_SCORE: 51
ADLS_ACUITY_SCORE: 55
ADLS_ACUITY_SCORE: 51
ADLS_ACUITY_SCORE: 55
ADLS_ACUITY_SCORE: 55
ADLS_ACUITY_SCORE: 51
ADLS_ACUITY_SCORE: 55
ADLS_ACUITY_SCORE: 55
ADLS_ACUITY_SCORE: 51
ADLS_ACUITY_SCORE: 55
ADLS_ACUITY_SCORE: 51
ADLS_ACUITY_SCORE: 55
ADLS_ACUITY_SCORE: 51
ADLS_ACUITY_SCORE: 51

## 2024-10-07 NOTE — PROGRESS NOTES
Pt confused and increase in agitation in evening. Pt refused scheduled medications, PRN Seroquel crushed and offered in pudding which he did take seemed to be effective as he did sleep in comparison to past few nights.

## 2024-10-07 NOTE — PROGRESS NOTES
Care Management Follow Up    Length of Stay (days): 0    Expected Discharge Date:       Concerns to be Addressed:  Disposition     Patient plan of care discussed at interdisciplinary rounds: Yes    Anticipated Discharge Disposition:  LTC/Memory Care     Anticipated Discharge Services:  LTC/Memory Care  Anticipated Discharge DME:  LTC/Memory Care    Patient/family educated on Medicare website which has current facility and service quality ratings:  yes  Education Provided on the Discharge Plan:  yes  Patient/Family in Agreement with the Plan:  yes    Referrals Placed by CM/SW:  yes  Private pay costs discussed: Not applicable @ this time    Discussed  Partnership in Safe Discharge Planning  document with patient/family: No     Handoff Completed: No, handoff not indicated or clinically appropriate    Additional Information:  Referral made to [facility]. Left message for daughter/guardianRhiannon    Next Steps: Waiting for funding for placement.    NALDO Morris   Inpatient Care Coordination   Supervisor  [facility]  [phone]      NALDO Puentes

## 2024-10-07 NOTE — PROGRESS NOTES
[facility]    Medicine Progress Note - Hospitalist Service    Date of Admission:  10/2/2024    Assessment & Plan     Jemal Gray is a 89 year old male with Pmhx of Dementia with chronic aphasia, who was admitted on 10/2/2024 after he was brought to the ED by EMS from facility for evaluation of agitation.     The patient discharged from a prolonged hospitalization awaiting long-term care placement on 10/2 to a long-term care facility. He was initially admitted here June 5, 2024 where he was treated for cellulitis which resolved.  He required guardianship with MA pending, social work assisted with discharge planning to long-term care facility on 10/2.      Upon transfer to the long-term care facility the patient was described as agitated, swearing.  He he had been wandering around the long-term care facility and entering other patient's rooms.  The patient was unable to be redirected and required transfer back to the ED.     In the ED patient hypertensive, vitally stable. He was given seroquel.  He requires a bedside attendant as he was agitated and unable to be redirected.  Attempting to leave the emergency department and go into other patient's rooms.     Patient admitted for [facility] cares.  Patient appears calm and comfortable.  Medically stable and can be discharged whenever however updated by  that patient cannot go back to [facility] as they refused to take him back and recommended that he should go to the memory care.   is assisting to find memory care for the patient.    Nonbillable social visit.      Acute Agitation, Behavior Disturbances   Dementia with Chronic Aphasia  Suspect triggered by changes in setting and recent move.  No physical complaints or concerns of infection.  Historically required Zyprexa over the summer when he was hospitalized for agitation.  Most recently had been prescribed Seroquel 12.5 mg twice daily as needed although he had not been  needing most recently.    -Bedside attendant as needed  -Restarted prior to admission Seroquel at 12.5 mg twice daily as needed, may need to titrate  - ms  -SW consulted  On 10/6/2024 evening patient confused and increasing agitation.  Patient refused scheduled medications.  As needed Seroquel crushed and given in pudding and was effective.     scheduled 12.5 mg of Seroquel every afternoon daily to help with sundowning and increase in agitation in the  evening     Need for Disposition Assistance  -Patient had prolonged hospitalization due to need for disposition assistance.    -His daughter filed for guardianship paperwork was completed in July   -acquired MA application and long-term care placement   -Ultimately will require 24/7 care and likely Memory care placement      Actinic Keratosis  Had surgery for this and prescribed steroids cream and ketoconazole cream, restarted per daughter request previously.     Sinus Bradycardia  Patient was noted to have HR in 50s.  Asymptomatic.  -no telemetry monitoring needed      History of Constipation  -laxatives PRN           Diet: Combination Diet Regular Diet  Snacks/Supplements Pediatric: Ensure Enlive; Between Meals    DVT Prophylaxis: Ambulate every shift  Maddox Catheter: Not present  Lines: None     Cardiac Monitoring: None  Code Status: No CPR- Do NOT Intubate      Clinically Significant Risk Factors Present on Admission                    # Dementia: noted on problem list                  Disposition Plan     Medically Ready for Discharge: Ready Now             Edna Kelly MD  Hospitalist Service  [facility]  Securely message with [employer] (more info)  Text page via [facility] Paging/Directory   ______________________________________________________________________    Interval History   Nursing notes reviewed.  Patient patient with increased confusion and agitation last evening needing Seroquel.  This morning calm and comfortable sitting in  bed.  No other acute issues per bedside RN    Physical Exam   Vital Signs: Temp: 97.5  F (36.4  C) Temp src: Oral BP: 97/62 Pulse: 61   Resp: 18 SpO2: 97 % O2 Device: None (Room air)    Weight: 157 lbs 6.4 oz    General Appearance: Appears calm, comfortable, eating donuts  Respiratory: On room air  Other: Alert and oriented to self, appropriate mood and affect    Medical Decision Making         MINUTES SPENT BY ME on the date of service doing chart review, history, exam, documentation & further activities per the note.      group home cares patient, nonbillable visit  Data   ------------------------- PAST 24 HR DATA REVIEWED -----------------------------------------------

## 2024-10-08 PROCEDURE — 99207 PR NO BILLABLE SERVICE THIS VISIT: CPT | Performed by: INTERNAL MEDICINE

## 2024-10-08 PROCEDURE — 250N000013 HC RX MED GY IP 250 OP 250 PS 637: Performed by: PHYSICIAN ASSISTANT

## 2024-10-08 PROCEDURE — 101N000002 HC CUSTODIAL CARE DAILY

## 2024-10-08 PROCEDURE — 250N000013 HC RX MED GY IP 250 OP 250 PS 637: Performed by: INTERNAL MEDICINE

## 2024-10-08 RX ADMIN — QUETIAPINE FUMARATE 12.5 MG: 25 TABLET ORAL at 21:00

## 2024-10-08 ASSESSMENT — ACTIVITIES OF DAILY LIVING (ADL)
ADLS_ACUITY_SCORE: 55
ADLS_ACUITY_SCORE: 51
ADLS_ACUITY_SCORE: 55
ADLS_ACUITY_SCORE: 55
ADLS_ACUITY_SCORE: 51
ADLS_ACUITY_SCORE: 51
ADLS_ACUITY_SCORE: 55
ADLS_ACUITY_SCORE: 55
ADLS_ACUITY_SCORE: 51
ADLS_ACUITY_SCORE: 55
ADLS_ACUITY_SCORE: 55
ADLS_ACUITY_SCORE: 51
ADLS_ACUITY_SCORE: 55
ADLS_ACUITY_SCORE: 55
ADLS_ACUITY_SCORE: 51
ADLS_ACUITY_SCORE: 55
ADLS_ACUITY_SCORE: 51

## 2024-10-08 NOTE — PROGRESS NOTES
[facility]    Medicine Progress Note - Hospitalist Service    Date of Admission:  10/2/2024    Assessment & Plan     Jemal Gray is a 89 year old male with Pmhx of Dementia with chronic aphasia, who was admitted on 10/2/2024 after he was brought to the ED by EMS from facility for evaluation of agitation.     The patient discharged from a prolonged hospitalization awaiting long-term care placement on 10/2 to a long-term care facility. He was initially admitted here June 5, 2024 where he was treated for cellulitis which resolved.  He required guardianship with MA pending, social work assisted with discharge planning to long-term care facility on 10/2.      Upon transfer to the long-term care facility the patient was described as agitated, swearing.  He he had been wandering around the long-term care facility and entering other patient's rooms.  The patient was unable to be redirected and required transfer back to the ED.     In the ED patient hypertensive, vitally stable. He was given seroquel.  He requires a bedside attendant as he was agitated and unable to be redirected.  Attempting to leave the emergency department and go into other patient's rooms.     Patient admitted for [facility] cares.  Patient appears calm and comfortable.  Medically stable and can be discharged whenever however updated by  that patient cannot go back to [facility] as they refused to take him back and recommended that he should go to the memory care.   is assisting to find memory care for the patient.    Nonbillable social visit.      Acute Agitation, Behavior Disturbances   Dementia with Chronic Aphasia  Suspect triggered by changes in setting and recent move.  No physical complaints or concerns of infection.  Historically required Zyprexa over the summer when he was hospitalized for agitation.  Most recently had been prescribed Seroquel 12.5 mg twice daily as needed although he had not been  needing most recently.    -Bedside attendant as needed  -Restarted prior to admission Seroquel at 12.5 mg twice daily as needed, may need to titrate  - ms  -SW consulted  On 10/6/2024 evening patient confused and increasing agitation.  Patient refused scheduled medications.  As needed Seroquel crushed and given in pudding and was effective.     scheduled 12.5 mg of Seroquel every afternoon daily to help with sundowning and increase in agitation in the  evening     Need for Disposition Assistance  -Patient had prolonged hospitalization due to need for disposition assistance.    -His daughter filed for guardianship paperwork was completed in July   -acquired MA application and long-term care placement   -Ultimately will require 24/7 care and likely Memory care placement      Actinic Keratosis  Had surgery for this and prescribed steroids cream and ketoconazole cream, restarted per daughter request previously.     Sinus Bradycardia  Patient was noted to have HR in 50s.  Asymptomatic.  -no telemetry monitoring needed      History of Constipation  -laxatives PRN           Diet: Combination Diet Regular Diet  Snacks/Supplements Pediatric: Ensure Enlive; Between Meals  Room Service    DVT Prophylaxis: Ambulate every shift  Maddox Catheter: Not present  Lines: None     Cardiac Monitoring: None  Code Status: No CPR- Do NOT Intubate      Clinically Significant Risk Factors Present on Admission                    # Dementia: noted on problem list                  Disposition Plan     Medically Ready for Discharge: Ready Now             Brando Arreguin MD  Hospitalist Service  [facility]  Securely message with [employer] (more info)  Text page via [facility] Paging/Directory   ______________________________________________________________________    Interval History   Patient is seen and examined by me today and medical record reviewed.Overnight events noted and care discussed with nursing staff.  Patient  care assumed.  He is [facility] patient known to me from previous encounter.  No acute events overnight.            Physical Exam   Vital Signs: Temp: 98.2  F (36.8  C) Temp src: Oral BP: 128/68 Pulse: 56   Resp: 18 SpO2: 97 % O2 Device: None (Room air)    Weight: 157 lbs 6.4 oz    General Appearance: Appears calm, comfortable, eating donuts  Respiratory: On room air  Other: Alert and oriented to self, appropriate mood and affect    Medical Decision Making         MINUTES SPENT BY ME on the date of service doing chart review, history, exam, documentation & further activities per the note.      senior living cares patient, nonbillable visit  Data   ------------------------- PAST 24 HR DATA REVIEWED -----------------------------------------------

## 2024-10-08 NOTE — PLAN OF CARE
"Goal Outcome Evaluation:      Plan of Care Reviewed With: patient    Overall Patient Progress: no changeOverall Patient Progress: no change    Outcome Evaluation: denies pain, no issues for this shift.    Pt alert/confused, no IV access, regular diet/ poor appetite. Meds crushed with pudding. Pt up SBA/ gb/w. Plan: SW following.  Will continue POC and monitoring.   Problem: Adult Inpatient Plan of Care  Goal: Plan of Care Review  Description: The Plan of Care Review/Shift note should be completed every shift.  The Outcome Evaluation is a brief statement about your assessment that the patient is improving, declining, or no change.  This information will be displayed automatically on your shift  note.  10/7/2024 2247 by Erin Gifford RN  Outcome: Progressing  Flowsheets (Taken 10/7/2024 2247)  Outcome Evaluation: denies pain, no issues for this shift.  Plan of Care Reviewed With: patient  Overall Patient Progress: no change  10/7/2024 2247 by Erin Gifford RN  Outcome: Progressing  Flowsheets (Taken 10/7/2024 2247)  Outcome Evaluation: denies pain, no issues for this shift.  Plan of Care Reviewed With: patient  Overall Patient Progress: no change  Goal: Patient-Specific Goal (Individualized)  Description: You can add care plan individualizations to a care plan. Examples of Individualization might be:  \"Parent requests to be called daily at 9am for status\", \"I have a hard time hearing out of my right ear\", or \"Do not touch me to wake me up as it startles  me\".  10/7/2024 2247 by Erin Gifford RN  Outcome: Progressing  10/7/2024 2247 by Erin Gifford RN  Outcome: Progressing  Goal: Absence of Hospital-Acquired Illness or Injury  10/7/2024 2247 by Erin Gifford RN  Outcome: Progressing  10/7/2024 2247 by Erin Gifford RN  Outcome: Progressing  Intervention: Identify and Manage Fall Risk  Recent Flowsheet Documentation  Taken 10/7/2024 1600 by Erin Gifford RN  Safety Promotion/Fall " Prevention:   activity supervised   treat underlying cause   treat reversible contributory factors   clutter free environment maintained   nonskid shoes/slippers when out of bed  Intervention: Prevent Skin Injury  Recent Flowsheet Documentation  Taken 10/7/2024 1600 by Erin Gifford RN  Body Position: position changed independently  Goal: Optimal Comfort and Wellbeing  10/7/2024 2247 by Erin Gifford RN  Outcome: Progressing  10/7/2024 2247 by Erin Gifford RN  Outcome: Progressing  Goal: Readiness for Transition of Care  10/7/2024 2247 by Erin Gifford RN  Outcome: Progressing  10/7/2024 2247 by Erin Gifford, RN  Outcome: Progressing

## 2024-10-08 NOTE — PLAN OF CARE
"Pt confused. Denied pain during shift. Walked around unit with staffs. Meals provided. 1 stool occurrence. Voided. Refused meds this morning.      Goal Outcome Evaluation:      Plan of Care Reviewed With: patient    Overall Patient Progress: improvingOverall Patient Progress: improving    Outcome Evaluation: Pt confused. Denies pain. Ref meds.    Pt confused. Denied pain during shift. Walked around unit with staffs. Meals provided. 1 stool occurrence. Voided. Refused meds this morning.      Problem: Adult Inpatient Plan of Care  Goal: Plan of Care Review  Description: The Plan of Care Review/Shift note should be completed every shift.  The Outcome Evaluation is a brief statement about your assessment that the patient is improving, declining, or no change.  This information will be displayed automatically on your shift  note.  Outcome: Progressing  Flowsheets (Taken 10/8/2024 1812)  Outcome Evaluation: Pt confused. Denies pain. Ref meds.  Plan of Care Reviewed With: patient  Overall Patient Progress: improving  Goal: Patient-Specific Goal (Individualized)  Description: You can add care plan individualizations to a care plan. Examples of Individualization might be:  \"Parent requests to be called daily at 9am for status\", \"I have a hard time hearing out of my right ear\", or \"Do not touch me to wake me up as it startles  me\".  Outcome: Progressing  Goal: Absence of Hospital-Acquired Illness or Injury  Outcome: Progressing  Intervention: Identify and Manage Fall Risk  Recent Flowsheet Documentation  Taken 10/8/2024 1137 by Arabella Momin RN  Safety Promotion/Fall Prevention:   safety round/check completed   activity supervised   assistive device/personal items within reach   clutter free environment maintained   mobility aid in reach   nonskid shoes/slippers when out of bed   patient and family education  Intervention: Prevent and Manage VTE (Venous Thromboembolism) Risk  Recent Flowsheet Documentation  Taken 10/8/2024 " 1137 by Arabella Momin, RN  VTE Prevention/Management: SCDs off (sequential compression devices)  Goal: Optimal Comfort and Wellbeing  Outcome: Progressing  Goal: Readiness for Transition of Care  Outcome: Progressing

## 2024-10-08 NOTE — PLAN OF CARE
"Pt is alert to self with confusion. Pt has Dementia. Pt denies pain or discomfort. Pt is hard of hearing. Pt has no IV access. Pt is SBA in transfer and care. Pt takes meds crushed with pudding. Bed alarm in place and call light within reach. Plan is for placement.           Goal Outcome Evaluation:      Plan of Care Reviewed With: patient    Overall Patient Progress: improvingOverall Patient Progress: improving    Outcome Evaluation: pt confused. pt denies pain.      Problem: Adult Inpatient Plan of Care  Goal: Plan of Care Review  Description: The Plan of Care Review/Shift note should be completed every shift.  The Outcome Evaluation is a brief statement about your assessment that the patient is improving, declining, or no change.  This information will be displayed automatically on your shift  note.  Outcome: Progressing  Flowsheets (Taken 10/8/2024 0019)  Outcome Evaluation: pt confused. pt denies pain.  Plan of Care Reviewed With: patient  Overall Patient Progress: improving  Goal: Patient-Specific Goal (Individualized)  Description: You can add care plan individualizations to a care plan. Examples of Individualization might be:  \"Parent requests to be called daily at 9am for status\", \"I have a hard time hearing out of my right ear\", or \"Do not touch me to wake me up as it startles  me\".  Outcome: Progressing  Goal: Absence of Hospital-Acquired Illness or Injury  Outcome: Progressing  Goal: Optimal Comfort and Wellbeing  Outcome: Progressing  Goal: Readiness for Transition of Care  Outcome: Progressing     "

## 2024-10-08 NOTE — PROGRESS NOTES
Care Management Follow Up    Length of Stay (days): 0    Expected Discharge Date:  10/20/24     Concerns to be Addressed:    Disposition   Patient plan of care discussed at interdisciplinary rounds: Yes    Anticipated Discharge Disposition:  LTC/memory care  Anticipated Discharge Services:   LTC/memory care  Anticipated Discharge DME:   LTC/memory care will supply    Patient/family educated on Medicare website which has current facility and service quality ratings:  yes  Education Provided on the Discharge Plan:  yes  Patient/Family in Agreement with the Plan:  yes    Referrals Placed by CM/SW:  yes  Private pay costs discussed: private room/amenity fees and transportation costs    Discussed  Partnership in Safe Discharge Planning  document with patient/family: No     Handoff Completed: No, handoff not indicated or clinically appropriate    Additional Information:  JAMAR had a long conversation with guardian/daughter, Columba Jurado. Informed her that I received a call from the [facility] financial worker Rukhsana  [phone]. Rukhsana reported that she has closed the MA application because Rhiannon did not get her the information she asked for in a timely fashion. Rhiannon reports that Rukhsana has given her another deadline to provide necessary paperwork to get patient's MA application opened and active. JAMAR explained to Rhiannon that we need to have the MA completed to have a payment source for placement. She reported that she was going to get it done today.    JAMAR also discussed appropriate placement for patient. Because patient doesn't have insurance coverage yet it's difficult to find a facility that will accept MA pending and currently he isn't even MA pending because appropriate paperwork wasn't submitted. This daughter/guardian wants patient at a LTC facility with memory care. Jamar explained that facilities that have this kind of special kind are not in this area. Family and guardian have wanted him in this area. Also  explained that he can not go to an assisted living/memory care because he would need an elderly waiver and before you can get an elderly waiver he would need that MA.     Next Steps: SW will continue to communicate with daughter/guardian to get the MA paperwork submitted before deadline. If she doesn't do this an application would have to start all over again.    NALDO Morris   Inpatient Care Coordination   Supervisor  [facility]  [phone]      NALDO Puentes

## 2024-10-09 PROCEDURE — 250N000013 HC RX MED GY IP 250 OP 250 PS 637: Performed by: INTERNAL MEDICINE

## 2024-10-09 PROCEDURE — 99207 PR NO BILLABLE SERVICE THIS VISIT: CPT | Performed by: INTERNAL MEDICINE

## 2024-10-09 PROCEDURE — 101N000002 HC CUSTODIAL CARE DAILY

## 2024-10-09 RX ADMIN — KETOCONAZOLE: 20 SHAMPOO, SUSPENSION TOPICAL at 09:47

## 2024-10-09 RX ADMIN — QUETIAPINE FUMARATE 12.5 MG: 25 TABLET ORAL at 19:55

## 2024-10-09 ASSESSMENT — ACTIVITIES OF DAILY LIVING (ADL)
ADLS_ACUITY_SCORE: 51

## 2024-10-09 NOTE — PLAN OF CARE
"RN shift summary    Pt is disoriented x4 with incoherent thoughts and flight of ideas. Ambulated in halls x2. Pt semi-redirectable this shift. Refused PM medications and unable to redirect other than seroquel. Ate snack at bedtime. Continues to set off bed-alarms vs utilizing call light. Resting well intermittently.      Goal Outcome Evaluation:      Plan of Care Reviewed With: patient    Overall Patient Progress: no changeOverall Patient Progress: no change    Outcome Evaluation: Pt remains confused and disoriented x4. Semi-redirectable though refused majority of evening meds.      Problem: Adult Inpatient Plan of Care  Goal: Plan of Care Review  Description: The Plan of Care Review/Shift note should be completed every shift.  The Outcome Evaluation is a brief statement about your assessment that the patient is improving, declining, or no change.  This information will be displayed automatically on your shift  note.  Outcome: Progressing  Flowsheets (Taken 10/9/2024 0159)  Outcome Evaluation: Pt remains confused and disoriented x4. Semi-redirectable though refused majority of evening meds.  Plan of Care Reviewed With: patient  Overall Patient Progress: no change  Goal: Patient-Specific Goal (Individualized)  Description: You can add care plan individualizations to a care plan. Examples of Individualization might be:  \"Parent requests to be called daily at 9am for status\", \"I have a hard time hearing out of my right ear\", or \"Do not touch me to wake me up as it startles  me\".  Outcome: Progressing  Goal: Absence of Hospital-Acquired Illness or Injury  Outcome: Progressing  Intervention: Identify and Manage Fall Risk  Recent Flowsheet Documentation  Taken 10/9/2024 0000 by Chelle Selby, RN  Safety Promotion/Fall Prevention: safety round/check completed  Taken 10/8/2024 2100 by Chelle Selby, RN  Safety Promotion/Fall Prevention: safety round/check completed  Intervention: Prevent Skin Injury  Recent Flowsheet " Documentation  Taken 10/9/2024 0000 by Chelle Selby RN  Body Position: supine, head elevated  Taken 10/8/2024 2100 by Chelle Selby RN  Body Position:   semi-prone   position changed independently  Goal: Optimal Comfort and Wellbeing  Outcome: Progressing  Goal: Readiness for Transition of Care  Outcome: Progressing

## 2024-10-09 NOTE — PROGRESS NOTES
[facility]    Medicine Progress Note - Hospitalist Service    Date of Admission:  10/2/2024      Problem list  -- residential care  -- Prolonged hospitalization  -- Severe dementia with chronic aphasia admitted with acute encephalopathy    Assessment & Plan     Jemal Gray is a 89 year old male with Pmhx of Dementia with chronic aphasia, who was admitted on 10/2/2024 after he was brought to the ED by EMS from facility for evaluation of agitation.     The patient discharged from a prolonged hospitalization awaiting long-term care placement on 10/2 to a long-term care facility. He was initially admitted here June 5, 2024 where he was treated for cellulitis which resolved.  He required guardianship with MA pending, social work assisted with discharge planning to long-term care facility on 10/2.      Upon transfer to the long-term care facility the patient was described as agitated, swearing.  He he had been wandering around the long-term care facility and entering other patient's rooms.  The patient was unable to be redirected and required transfer back to the ED.     In the ED patient hypertensive, vitally stable. He was given seroquel.  He requires a bedside attendant as he was agitated and unable to be redirected.  Attempting to leave the emergency department and go into other patient's rooms.     Patient admitted for halfway cares.  Patient appears calm and comfortable.  Medically stable and can be discharged whenever however updated by  that patient cannot go back to [facility] as they refused to take him back and recommended that he should go to the memory care.   is assisting to find memory care for the patient.    Nonbillable social visit.      Acute Agitation, Behavior Disturbances   Dementia with Chronic Aphasia  Suspect triggered by changes in setting and recent move.  No physical complaints or concerns of infection.  Historically required Zyprexa over the  summer when he was hospitalized for agitation.  Most recently had been prescribed Seroquel 12.5 mg twice daily as needed although he had not been needing most recently.    -Bedside attendant as needed  -Restarted prior to admission Seroquel at 12.5 mg twice daily as needed, may need to titrate  - ms  -SW consulted  On 10/6/2024 evening patient confused and increasing agitation.  Patient refused scheduled medications.  As needed Seroquel crushed and given in pudding and was effective.  -- scheduled 12.5 mg of Seroquel every evening    Need for Disposition Assistance  -Patient had prolonged hospitalization due to need for disposition assistance.    -His daughter filed for guardianship paperwork was completed in July   -acquired MA application and long-term care placement   -Ultimately will require 24/7 care and likely Memory care placement      Actinic Keratosis  Had surgery for this and prescribed steroids cream and ketoconazole cream, restarted per daughter request previously.     Sinus Bradycardia  Patient was noted to have HR in 50s.  Asymptomatic.  -no telemetry monitoring needed      History of Constipation  -laxatives PRN           Diet: Combination Diet Regular Diet  Snacks/Supplements Pediatric: Ensure Enlive; Between Meals  Room Service    DVT Prophylaxis: Ambulate every shift  Maddox Catheter: Not present  Lines: None     Cardiac Monitoring: None  Code Status: No CPR- Do NOT Intubate      Clinically Significant Risk Factors Present on Admission                     # Dementia: noted on problem list                  Disposition Plan       Medically Ready for Discharge: Ready Now   May discharge anytime when placement is possible      Brando Arreguin MD  Hospitalist Service  [facility]  Securely message with [employer] (more info)  Text page via [employer] Paging/Directory   ______________________________________________________________________    Interval History     Patient is seen and  examined by me today and medical record reviewed.Overnight events noted and care discussed with nursing staff.  No significant overnight events.  Patient is cooperative, pleasant.  He is refusing his multivitamins and I stopped it.          Physical Exam   Vital Signs: Temp: 98  F (36.7  C) Temp src: Axillary BP: (!) 140/78 Pulse: 56   Resp: 18 SpO2: 96 % O2 Device: None (Room air)    Weight: 157 lbs 6.4 oz    General Appearance: Appears calm, comfortable, eating donuts  Respiratory: On room air  Other: Alert and oriented to self, appropriate mood and affect    Medical Decision Making         MINUTES SPENT BY ME on the date of service doing chart review, history, exam, documentation & further activities per the note.      intermediate cares patient, nonbillable visit  Data   ------------------------- PAST 24 HR DATA REVIEWED -----------------------------------------------

## 2024-10-10 PROCEDURE — 101N000002 HC CUSTODIAL CARE DAILY

## 2024-10-10 PROCEDURE — 250N000013 HC RX MED GY IP 250 OP 250 PS 637: Performed by: INTERNAL MEDICINE

## 2024-10-10 PROCEDURE — 99207 PR NO BILLABLE SERVICE THIS VISIT: CPT | Performed by: STUDENT IN AN ORGANIZED HEALTH CARE EDUCATION/TRAINING PROGRAM

## 2024-10-10 RX ADMIN — QUETIAPINE FUMARATE 12.5 MG: 25 TABLET ORAL at 20:13

## 2024-10-10 ASSESSMENT — ACTIVITIES OF DAILY LIVING (ADL)
ADLS_ACUITY_SCORE: 51
ADLS_ACUITY_SCORE: 51
ADLS_ACUITY_SCORE: 49
ADLS_ACUITY_SCORE: 51
ADLS_ACUITY_SCORE: 49
ADLS_ACUITY_SCORE: 51
ADLS_ACUITY_SCORE: 49
ADLS_ACUITY_SCORE: 51

## 2024-10-10 NOTE — PLAN OF CARE
"VSS. Pt slightly agitated at times. He did not want to walk in hallway this afternoon today r/t agitation but went on one walk this evening. Marjorie cares completed. Poor appetite this afternoon and evening. Barrier cream applied to buttocks. Sacral mepilex in place.     Goal Outcome Evaluation:      Plan of Care Reviewed With: patient    Overall Patient Progress: no changeOverall Patient Progress: no change    Outcome Evaluation: Pt did not want to walk in hallway today, he was slightly agitated at times.      Problem: Adult Inpatient Plan of Care  Goal: Plan of Care Review  Description: The Plan of Care Review/Shift note should be completed every shift.  The Outcome Evaluation is a brief statement about your assessment that the patient is improving, declining, or no change.  This information will be displayed automatically on your shift  note.  Outcome: Progressing  Flowsheets (Taken 10/10/2024 1814)  Outcome Evaluation: Pt did not want to walk in hallway today, he was slightly agitated at times.  Plan of Care Reviewed With: patient  Overall Patient Progress: no change  Goal: Patient-Specific Goal (Individualized)  Description: You can add care plan individualizations to a care plan. Examples of Individualization might be:  \"Parent requests to be called daily at 9am for status\", \"I have a hard time hearing out of my right ear\", or \"Do not touch me to wake me up as it startles  me\".  Outcome: Progressing  Goal: Absence of Hospital-Acquired Illness or Injury  Outcome: Progressing  Intervention: Identify and Manage Fall Risk  Recent Flowsheet Documentation  Taken 10/10/2024 1748 by Barbi Felton RN  Safety Promotion/Fall Prevention:   safety round/check completed   room organization consistent   patient and family education   nonskid shoes/slippers when out of bed   clutter free environment maintained   assistive device/personal items within reach   activity supervised  Taken 10/10/2024 1708 by Barbi Felton, " RN  Safety Promotion/Fall Prevention:   safety round/check completed   room organization consistent   patient and family education   nonskid shoes/slippers when out of bed   clutter free environment maintained   assistive device/personal items within reach   activity supervised  Taken 10/10/2024 1036 by Barbi Felton RN  Safety Promotion/Fall Prevention:   safety round/check completed   room organization consistent   patient and family education   nonskid shoes/slippers when out of bed   clutter free environment maintained   assistive device/personal items within reach   activity supervised  Taken 10/10/2024 0836 by Barbi Felton RN  Safety Promotion/Fall Prevention:   safety round/check completed   room organization consistent   patient and family education   nonskid shoes/slippers when out of bed   clutter free environment maintained   assistive device/personal items within reach   activity supervised  Intervention: Prevent Skin Injury  Recent Flowsheet Documentation  Taken 10/10/2024 0836 by Barbi Felton RN  Device Skin Pressure Protection: absorbent pad utilized/changed  Intervention: Prevent and Manage VTE (Venous Thromboembolism) Risk  Recent Flowsheet Documentation  Taken 10/10/2024 0836 by Barbi Felton RN  VTE Prevention/Management: SCDs off (sequential compression devices)  Intervention: Prevent Infection  Recent Flowsheet Documentation  Taken 10/10/2024 0836 by Barbi Felton RN  Infection Prevention:   single patient room provided   personal protective equipment utilized   hand hygiene promoted   equipment surfaces disinfected  Goal: Optimal Comfort and Wellbeing  Outcome: Progressing  Goal: Readiness for Transition of Care  Outcome: Progressing

## 2024-10-10 NOTE — PLAN OF CARE
"Pt pleasently confused.Ambulated in hallway x2 today. Multiple BMs today. Good appetite. Morning meds discontinued because pt refuses to take them. Hair washed, partial bath, and gown changed today.     Goal Outcome Evaluation:      Plan of Care Reviewed With: patient    Overall Patient Progress: no changeOverall Patient Progress: no change    Outcome Evaluation: Pt pleasently confused.Ambulated in hallway x2 today. Multiple BMs today. Good appetite.      Problem: Adult Inpatient Plan of Care  Goal: Plan of Care Review  Description: The Plan of Care Review/Shift note should be completed every shift.  The Outcome Evaluation is a brief statement about your assessment that the patient is improving, declining, or no change.  This information will be displayed automatically on your shift  note.  Outcome: Progressing  Flowsheets (Taken 10/9/2024 1955)  Outcome Evaluation: Pt pleasently confused.Ambulated in hallway x2 today. Multiple BMs today. Good appetite.  Plan of Care Reviewed With: patient  Overall Patient Progress: no change  Goal: Patient-Specific Goal (Individualized)  Description: You can add care plan individualizations to a care plan. Examples of Individualization might be:  \"Parent requests to be called daily at 9am for status\", \"I have a hard time hearing out of my right ear\", or \"Do not touch me to wake me up as it startles  me\".  Outcome: Progressing  Goal: Absence of Hospital-Acquired Illness or Injury  Outcome: Progressing  Intervention: Identify and Manage Fall Risk  Recent Flowsheet Documentation  Taken 10/9/2024 1541 by Barbi Felton, RN  Safety Promotion/Fall Prevention: safety round/check completed  Taken 10/9/2024 1522 by Barbi Felton RN  Safety Promotion/Fall Prevention: safety round/check completed  Taken 10/9/2024 1329 by Barbi Felton, RN  Safety Promotion/Fall Prevention: safety round/check completed  Taken 10/9/2024 1103 by Barbi Felton, RN  Safety Promotion/Fall Prevention:   " safety round/check completed   patient video monitoring   patient and family education   room organization consistent   room door open   clutter free environment maintained   assistive device/personal items within reach   activity supervised  Taken 10/9/2024 1006 by Barbi Felton RN  Safety Promotion/Fall Prevention:   safety round/check completed   patient video monitoring   patient and family education   room organization consistent   room door open   clutter free environment maintained   assistive device/personal items within reach   activity supervised  Goal: Optimal Comfort and Wellbeing  Outcome: Progressing  Goal: Readiness for Transition of Care  Outcome: Progressing

## 2024-10-10 NOTE — PROGRESS NOTES
[facility]    Medicine Progress Note - Hospitalist Service    Date of Admission:  10/2/2024    Assessment & Plan   Problem list  -- half-way care  -- Prolonged hospitalization  -- Severe dementia with chronic aphasia admitted with acute encephalopathy        Assessment & Plan  Jemal Gray is a 89 year old male with Pmhx of Dementia with chronic aphasia, who was admitted on 10/2/2024 after he was brought to the ED by EMS from facility for evaluation of agitation.     The patient discharged from a prolonged hospitalization awaiting long-term care placement on 10/2 to a long-term care facility. He was initially admitted here June 5, 2024 where he was treated for cellulitis which resolved.  He required guardianship with MA pending, social work assisted with discharge planning to long-term care facility on 10/2.      Upon transfer to the long-term care facility the patient was described as agitated, swearing.  He he had been wandering around the long-term care facility and entering other patient's rooms.  The patient was unable to be redirected and required transfer back to the ED.     In the ED patient hypertensive, vitally stable. He was given seroquel.  He requires a bedside attendant as he was agitated and unable to be redirected.  Attempting to leave the emergency department and go into other patient's rooms.     Patient admitted for FDC cares.  Patient appears calm and comfortable.  Medically stable and can be discharged whenever however updated by  that patient cannot go back to [facility] as they refused to take him back and recommended that he should go to the memory care.   is assisting to find memory care for the patient.    Acute Agitation, Behavior Disturbances   Dementia with Chronic Aphasia  Suspect triggered by changes in setting and recent move.  No physical complaints or concerns of infection.  Historically required Zyprexa over the summer when he  was hospitalized for agitation.  Most recently had been prescribed Seroquel 12.5 mg twice daily as needed although he had not been needing most recently.  -Bedside attendant as needed  -Restarted prior to admission Seroquel at 12.5 mg twice daily as needed, may need to titrate  - ms  -SW consulted  On 10/6/2024 evening patient confused and increasing agitation.  Patient refused scheduled medications.  As needed Seroquel crushed and given in pudding and was effective.  -- scheduled 12.5 mg of Seroquel every evening     Need for Disposition Assistance  -Patient had prolonged hospitalization due to need for disposition assistance.    -His daughter filed for guardianship paperwork was completed in July   -acquired MA application and long-term care placement   -Ultimately will require 24/7 care and likely Memory care placement      Actinic Keratosis  Had surgery for this and prescribed steroids cream and ketoconazole cream, restarted per daughter request previously.     Sinus Bradycardia  Patient was noted to have HR in 50s.  Asymptomatic.  -no telemetry monitoring needed      History of Constipation  -laxatives PRN             Diet: Combination Diet Regular Diet  Snacks/Supplements Pediatric: Ensure Enlive; Between Meals  Room Service    DVT Prophylaxis: Ambulate every shift  Maddox Catheter: Not present  Lines: None     Cardiac Monitoring: None  Code Status: No CPR- Do NOT Intubate      Clinically Significant Risk Factors Present on Admission                     # Dementia: noted on problem list                  Disposition Plan           Katie Hester DO  Hospitalist Service  [facility]  Securely message with [employer] (more info)  Text page via [employer] Paging/Directory   ______________________________________________________________________      Physical Exam   Vital Signs: Temp: 97.8  F (36.6  C) Temp src: Axillary BP: 106/58 Pulse: 52   Resp: 18 SpO2: 98 % O2 Device: None (Room air)     Weight: 157 lbs 6.4 oz    General Calm, NAD      Medical Decision Making       10 MINUTES SPENT BY ME on the date of service doing chart review, history, exam, documentation & further activities per the note.      Data

## 2024-10-10 NOTE — PLAN OF CARE
"Agitated and frequently out of bed, frequent redirection effective. Scheduled seroquel given. Up to toilet with 1 assist gb/walker. No IV access.      Goal Outcome Evaluation:      Plan of Care Reviewed With: patient    Overall Patient Progress: no changeOverall Patient Progress: no change    Outcome Evaluation: Confused, agitated and getting out of bed. Scheduled seroquel given      Problem: Adult Inpatient Plan of Care  Goal: Plan of Care Review  Description: The Plan of Care Review/Shift note should be completed every shift.  The Outcome Evaluation is a brief statement about your assessment that the patient is improving, declining, or no change.  This information will be displayed automatically on your shift  note.  Outcome: Progressing  Flowsheets (Taken 10/10/2024 0607)  Outcome Evaluation: Confused, agitated and getting out of bed. Scheduled seroquel given  Plan of Care Reviewed With: patient  Overall Patient Progress: no change  Goal: Patient-Specific Goal (Individualized)  Description: You can add care plan individualizations to a care plan. Examples of Individualization might be:  \"Parent requests to be called daily at 9am for status\", \"I have a hard time hearing out of my right ear\", or \"Do not touch me to wake me up as it startles  me\".  Outcome: Progressing  Goal: Absence of Hospital-Acquired Illness or Injury  Outcome: Progressing  Intervention: Identify and Manage Fall Risk  Recent Flowsheet Documentation  Taken 10/9/2024 1959 by Axel Chavis, RN  Safety Promotion/Fall Prevention:   increase visualization of patient   clutter free environment maintained   increased rounding and observation   nonskid shoes/slippers when out of bed   patient and family education   safety round/check completed   room organization consistent  Intervention: Prevent Skin Injury  Recent Flowsheet Documentation  Taken 10/9/2024 1959 by Axel Chavis, RN  Body Position:   position changed independently   supine, head " elevated  Intervention: Prevent Infection  Recent Flowsheet Documentation  Taken 10/9/2024 1959 by Axel Chavis, RN  Infection Prevention:   single patient room provided   rest/sleep promoted   hand hygiene promoted  Goal: Optimal Comfort and Wellbeing  Outcome: Progressing  Goal: Readiness for Transition of Care  Outcome: Progressing

## 2024-10-11 PROCEDURE — 250N000013 HC RX MED GY IP 250 OP 250 PS 637: Performed by: INTERNAL MEDICINE

## 2024-10-11 PROCEDURE — 101N000002 HC CUSTODIAL CARE DAILY

## 2024-10-11 PROCEDURE — 99207 PR NO BILLABLE SERVICE THIS VISIT: CPT | Performed by: STUDENT IN AN ORGANIZED HEALTH CARE EDUCATION/TRAINING PROGRAM

## 2024-10-11 RX ADMIN — QUETIAPINE FUMARATE 12.5 MG: 25 TABLET ORAL at 19:41

## 2024-10-11 ASSESSMENT — ACTIVITIES OF DAILY LIVING (ADL)
ADLS_ACUITY_SCORE: 49

## 2024-10-11 NOTE — PLAN OF CARE
"Alert but confused. Up to the toilet with 1 assist gb/walker. Takes meds with ice cream/pudding. Bed alarm on.      Goal Outcome Evaluation:      Plan of Care Reviewed With: patient    Overall Patient Progress: no changeOverall Patient Progress: no change    Outcome Evaluation: Up to the toilet. Takes meds whole with ice cream.      Problem: Adult Inpatient Plan of Care  Goal: Plan of Care Review  Description: The Plan of Care Review/Shift note should be completed every shift.  The Outcome Evaluation is a brief statement about your assessment that the patient is improving, declining, or no change.  This information will be displayed automatically on your shift  note.  10/11/2024 0526 by Axel Chavis, RN  Outcome: Progressing  Flowsheets (Taken 10/11/2024 0526)  Outcome Evaluation: Up to the toilet. Takes meds whole with ice cream.  Plan of Care Reviewed With: patient  Overall Patient Progress: no change  10/11/2024 0526 by Axel Chavis, RN  Outcome: Progressing  Flowsheets (Taken 10/11/2024 0526)  Outcome Evaluation: Up to the toilet. Takes meds whole with ice cream.  Plan of Care Reviewed With: patient  Overall Patient Progress: no change  Goal: Patient-Specific Goal (Individualized)  Description: You can add care plan individualizations to a care plan. Examples of Individualization might be:  \"Parent requests to be called daily at 9am for status\", \"I have a hard time hearing out of my right ear\", or \"Do not touch me to wake me up as it startles  me\".  10/11/2024 0526 by Axel Chavis, BALDOMERO  Outcome: Progressing  10/11/2024 0526 by Axel Chavis, RN  Outcome: Progressing  Goal: Absence of Hospital-Acquired Illness or Injury  10/11/2024 0526 by Axel Chavis, RN  Outcome: Progressing  10/11/2024 0526 by Axel Chavis, RN  Outcome: Progressing  Intervention: Identify and Manage Fall Risk  Recent Flowsheet Documentation  Taken 10/10/2024 2000 by Axel Chavis, RN  Safety Promotion/Fall Prevention:   safety round/check " completed   room organization consistent   patient and family education   nonskid shoes/slippers when out of bed   clutter free environment maintained   assistive device/personal items within reach   activity supervised  Intervention: Prevent Skin Injury  Recent Flowsheet Documentation  Taken 10/11/2024 0013 by Axel Chavis, RN  Body Position:   position changed independently   supine, head elevated  Taken 10/10/2024 2000 by Axel Chavis RN  Body Position:   position changed independently   supine, head elevated  Intervention: Prevent Infection  Recent Flowsheet Documentation  Taken 10/10/2024 2000 by Axel Chavis, RN  Infection Prevention:   single patient room provided   personal protective equipment utilized   hand hygiene promoted   equipment surfaces disinfected  Goal: Optimal Comfort and Wellbeing  10/11/2024 0526 by Axel Chavis RN  Outcome: Progressing  10/11/2024 0526 by Axel Chavis, BALDOMERO  Outcome: Progressing  Goal: Readiness for Transition of Care  10/11/2024 0526 by Axel Chavis, BALDOMERO  Outcome: Progressing  10/11/2024 0526 by Axel Chavis RN  Outcome: Progressing

## 2024-10-11 NOTE — PROGRESS NOTES
[facility]    Medicine Progress Note - Hospitalist Service    Date of Admission:  10/2/2024    Assessment & Plan   Problem list  -- long term care  -- Prolonged hospitalization  -- Severe dementia with chronic aphasia admitted with acute encephalopathy        Assessment & Plan  Jemal Gray is a 89 year old male with Pmhx of Dementia with chronic aphasia, who was admitted on 10/2/2024 after he was brought to the ED by EMS from facility for evaluation of agitation.     The patient discharged from a prolonged hospitalization awaiting long-term care placement on 10/2 to a long-term care facility. He was initially admitted here June 5, 2024 where he was treated for cellulitis which resolved.  He required guardianship with MA pending, social work assisted with discharge planning to long-term care facility on 10/2.      Upon transfer to the long-term care facility the patient was described as agitated, swearing.  He he had been wandering around the long-term care facility and entering other patient's rooms.  The patient was unable to be redirected and required transfer back to the ED.     In the ED patient hypertensive, vitally stable. He was given seroquel.  He requires a bedside attendant as he was agitated and unable to be redirected.  Attempting to leave the emergency department and go into other patient's rooms.     Patient admitted for assisted cares.  Patient appears calm and comfortable.  Medically stable and can be discharged whenever however updated by  that patient cannot go back to [facility] as they refused to take him back and recommended that he should go to the memory care.   is assisting to find memory care for the patient.    Acute Agitation, Behavior Disturbances   Dementia with Chronic Aphasia  Suspect triggered by changes in setting and recent move.  No physical complaints or concerns of infection.  Historically required Zyprexa over the summer when he  was hospitalized for agitation.  Most recently had been prescribed Seroquel 12.5 mg twice daily as needed although he had not been needing most recently.  -Bedside attendant as needed  -Restarted prior to admission Seroquel at 12.5 mg twice daily as needed, may need to titrate  - ms  -SW consulted  On 10/6/2024 evening patient confused and increasing agitation.  Patient refused scheduled medications.  As needed Seroquel crushed and given in pudding and was effective.  -- scheduled 12.5 mg of Seroquel every evening     Need for Disposition Assistance  -Patient had prolonged hospitalization due to need for disposition assistance.    -His daughter filed for guardianship paperwork was completed in July   -acquired MA application and long-term care placement   -Ultimately will require 24/7 care and likely Memory care placement      Actinic Keratosis  Had surgery for this and prescribed steroids cream and ketoconazole cream, restarted per daughter request previously.     Sinus Bradycardia  Patient was noted to have HR in 50s.  Asymptomatic.  -no telemetry monitoring needed      History of Constipation  -laxatives PRN         Diet: Combination Diet Regular Diet  Snacks/Supplements Pediatric: Ensure Enlive; Between Meals  Room Service    DVT Prophylaxis: Ambulate every shift  Maddox Catheter: Not present  Lines: None     Cardiac Monitoring: None  Code Status: No CPR- Do NOT Intubate      Clinically Significant Risk Factors Present on Admission                     # Dementia: noted on problem list                  Disposition Plan           Katie Hester DO  Hospitalist Service  [facility]  Securely message with Samantha (more info)  Text page via [employer] Paging/Directory   ______________________________________________________________________      Physical Exam   Vital Signs:                    Weight: 157 lbs 6.4 oz    General Calm, NAD      Medical Decision Making       6 MINUTES SPENT BY ME on  the date of service doing chart review, history, exam, documentation & further activities per the note.      Data

## 2024-10-12 PROCEDURE — 99207 PR NO BILLABLE SERVICE THIS VISIT: CPT | Performed by: STUDENT IN AN ORGANIZED HEALTH CARE EDUCATION/TRAINING PROGRAM

## 2024-10-12 PROCEDURE — 101N000002 HC CUSTODIAL CARE DAILY

## 2024-10-12 PROCEDURE — 250N000013 HC RX MED GY IP 250 OP 250 PS 637: Performed by: INTERNAL MEDICINE

## 2024-10-12 RX ADMIN — QUETIAPINE FUMARATE 12.5 MG: 25 TABLET ORAL at 19:42

## 2024-10-12 ASSESSMENT — ACTIVITIES OF DAILY LIVING (ADL)
ADLS_ACUITY_SCORE: 49
ADLS_ACUITY_SCORE: 53
ADLS_ACUITY_SCORE: 53
ADLS_ACUITY_SCORE: 49
ADLS_ACUITY_SCORE: 53
ADLS_ACUITY_SCORE: 53
ADLS_ACUITY_SCORE: 49
ADLS_ACUITY_SCORE: 53
ADLS_ACUITY_SCORE: 49
ADLS_ACUITY_SCORE: 49
ADLS_ACUITY_SCORE: 53
ADLS_ACUITY_SCORE: 53
ADLS_ACUITY_SCORE: 49
ADLS_ACUITY_SCORE: 53
ADLS_ACUITY_SCORE: 49
ADLS_ACUITY_SCORE: 49
ADLS_ACUITY_SCORE: 53

## 2024-10-12 NOTE — PLAN OF CARE
"Pt. Alert to self, up with stand by assist, Pt. Resting in bed most of the shift, did go for a walk on evening shift. Plan: await memory care placement.     Goal Outcome Evaluation:      Plan of Care Reviewed With: patient    Overall Patient Progress: no changeOverall Patient Progress: no change    Outcome Evaluation: Awaiting memory care placement      Problem: Adult Inpatient Plan of Care  Goal: Plan of Care Review  Description: The Plan of Care Review/Shift note should be completed every shift.  The Outcome Evaluation is a brief statement about your assessment that the patient is improving, declining, or no change.  This information will be displayed automatically on your shift  note.  Outcome: Progressing  Flowsheets (Taken 10/12/2024 0621)  Outcome Evaluation: Awaiting memory care placement  Plan of Care Reviewed With: patient  Overall Patient Progress: no change  Goal: Patient-Specific Goal (Individualized)  Description: You can add care plan individualizations to a care plan. Examples of Individualization might be:  \"Parent requests to be called daily at 9am for status\", \"I have a hard time hearing out of my right ear\", or \"Do not touch me to wake me up as it startles  me\".  Outcome: Progressing  Goal: Absence of Hospital-Acquired Illness or Injury  Outcome: Progressing  Intervention: Identify and Manage Fall Risk  Recent Flowsheet Documentation  Taken 10/11/2024 2040 by Radha Mendoza RN  Safety Promotion/Fall Prevention: activity supervised  Intervention: Prevent Skin Injury  Recent Flowsheet Documentation  Taken 10/11/2024 2040 by Radha Mendoza RN  Body Position: position changed independently  Goal: Optimal Comfort and Wellbeing  Outcome: Progressing  Goal: Readiness for Transition of Care  Outcome: Progressing     "

## 2024-10-12 NOTE — PROGRESS NOTES
[facility]    Medicine Progress Note - Hospitalist Service    Date of Admission:  10/2/2024    Assessment & Plan   Problem list  -- group home care  -- Prolonged hospitalization  -- Severe dementia with chronic aphasia admitted with acute encephalopathy        Assessment & Plan  Jemal Gray is a 89 year old male with Pmhx of Dementia with chronic aphasia, who was admitted on 10/2/2024 after he was brought to the ED by EMS from facility for evaluation of agitation.     The patient discharged from a prolonged hospitalization awaiting long-term care placement on 10/2 to a long-term care facility. He was initially admitted here June 5, 2024 where he was treated for cellulitis which resolved.  He required guardianship with MA pending, social work assisted with discharge planning to long-term care facility on 10/2.      Upon transfer to the long-term care facility the patient was described as agitated, swearing.  He he had been wandering around the long-term care facility and entering other patient's rooms.  The patient was unable to be redirected and required transfer back to the ED.     In the ED patient hypertensive, vitally stable. He was given seroquel.  He requires a bedside attendant as he was agitated and unable to be redirected.  Attempting to leave the emergency department and go into other patient's rooms.     Patient admitted for retirement cares.  Patient appears calm and comfortable.  Medically stable and can be discharged whenever however updated by  that patient cannot go back to [facility] as they refused to take him back and recommended that he should go to the memory care.   is assisting to find memory care for the patient.    Acute Agitation, Behavior Disturbances   Dementia with Chronic Aphasia  Suspect triggered by changes in setting and recent move.  No physical complaints or concerns of infection.  Historically required Zyprexa over the summer when he  was hospitalized for agitation.  Most recently had been prescribed Seroquel 12.5 mg twice daily as needed although he had not been needing most recently.  -Bedside attendant as needed  -Restarted prior to admission Seroquel at 12.5 mg twice daily as needed, may need to titrate  - ms  -SW consulted  On 10/6/2024 evening patient confused and increasing agitation.  Patient refused scheduled medications.  As needed Seroquel crushed and given in pudding and was effective.  -- scheduled 12.5 mg of Seroquel every evening     Need for Disposition Assistance  -Patient had prolonged hospitalization due to need for disposition assistance.    -His daughter filed for guardianship paperwork was completed in July   -acquired MA application and long-term care placement   -Ultimately will require 24/7 care and likely Memory care placement      Actinic Keratosis  Had surgery for this and prescribed steroids cream and ketoconazole cream, restarted per daughter request previously.     Sinus Bradycardia  Patient was noted to have HR in 50s.  Asymptomatic.  -no telemetry monitoring needed      History of Constipation  -laxatives PRN         Diet: Combination Diet Regular Diet  Snacks/Supplements Pediatric: Ensure Enlive; Between Meals  Room Service    DVT Prophylaxis: Ambulate every shift  Maddox Catheter: Not present  Lines: None     Cardiac Monitoring: None  Code Status: No CPR- Do NOT Intubate      Clinically Significant Risk Factors Present on Admission                     # Dementia: noted on problem list                  Disposition Plan           Katie Hester DO  Hospitalist Service  [facility]  Securely message with Samantha (more info)  Text page via [employer] Paging/Directory   ______________________________________________________________________      Physical Exam   Vital Signs: Temp: 98.1  F (36.7  C) Temp src: Oral BP: 136/84 Pulse: 58   Resp: 18 SpO2: 99 % O2 Device: None (Room air)    Weight: 157  lbs 6.4 oz    General Calm, NAD      Medical Decision Making         10 MINUTES SPENT BY ME on the date of service doing chart review, history, exam, documentation & further activities per the note.      Data

## 2024-10-12 NOTE — PLAN OF CARE
"Temp: 98.1  F (36.7  C) Temp src: Oral BP: 136/84 Pulse: 58   Resp: 18 SpO2: 99 % O2 Device: None (Room air)            [facility] care patient. VSS daily and once weekly assessments. Awaiting Memory Care placement.  A1 GB/Walker.  Alert & Oriented to self only. Continue care.          Goal Outcome Evaluation:      Plan of Care Reviewed With: patient    Overall Patient Progress: no changeOverall Patient Progress: no change    Outcome Evaluation: Awaiting memory care placement.      Problem: Adult Inpatient Plan of Care  Goal: Plan of Care Review  Description: The Plan of Care Review/Shift note should be completed every shift.  The Outcome Evaluation is a brief statement about your assessment that the patient is improving, declining, or no change.  This information will be displayed automatically on your shift  note.  Outcome: Progressing  Flowsheets (Taken 10/12/2024 1329)  Outcome Evaluation: Awaiting memory care placement.  Plan of Care Reviewed With: patient  Overall Patient Progress: no change  Goal: Patient-Specific Goal (Individualized)  Description: You can add care plan individualizations to a care plan. Examples of Individualization might be:  \"Parent requests to be called daily at 9am for status\", \"I have a hard time hearing out of my right ear\", or \"Do not touch me to wake me up as it startles  me\".  Outcome: Progressing  Goal: Absence of Hospital-Acquired Illness or Injury  Outcome: Progressing  Goal: Optimal Comfort and Wellbeing  Outcome: Progressing  Goal: Readiness for Transition of Care  Outcome: Progressing     Problem: Adult Inpatient Plan of Care  Goal: Plan of Care Review  Description: The Plan of Care Review/Shift note should be completed every shift.  The Outcome Evaluation is a brief statement about your assessment that the patient is improving, declining, or no change.  This information will be displayed automatically on your shift  note.  Recent Flowsheet Documentation  Taken 10/12/2024 1329 " by Kennedy Cleveland, RN  Outcome Evaluation: Awaiting memory care placement.  Plan of Care Reviewed With: patient  Overall Patient Progress: no change

## 2024-10-12 NOTE — PLAN OF CARE
"senior living care patient. VSS daily and once weekly assessments. Awaiting Memory Care placement.  A1 GB/Walker.  Alert & Oriented to self only. Continue care.    Goal Outcome Evaluation:      Plan of Care Reviewed With: patient    Outcome Evaluation: No changes. Awaiting Memory Care placement.      Problem: Adult Inpatient Plan of Care  Goal: Plan of Care Review  Description: The Plan of Care Review/Shift note should be completed every shift.  The Outcome Evaluation is a brief statement about your assessment that the patient is improving, declining, or no change.  This information will be displayed automatically on your shift  note.  Outcome: Progressing  Flowsheets (Taken 10/11/2024 1944)  Outcome Evaluation: No changes. Awaiting Memory Care placement.  Plan of Care Reviewed With: patient  Goal: Patient-Specific Goal (Individualized)  Description: You can add care plan individualizations to a care plan. Examples of Individualization might be:  \"Parent requests to be called daily at 9am for status\", \"I have a hard time hearing out of my right ear\", or \"Do not touch me to wake me up as it startles  me\".  Outcome: Progressing  Goal: Absence of Hospital-Acquired Illness or Injury  Outcome: Progressing  Intervention: Identify and Manage Fall Risk  Recent Flowsheet Documentation  Taken 10/11/2024 3614 by Kennedy Cleveland, RN  Safety Promotion/Fall Prevention:   safety round/check completed   supervised activity   room door open  Goal: Optimal Comfort and Wellbeing  Outcome: Progressing  Goal: Readiness for Transition of Care  Outcome: Progressing     "

## 2024-10-13 PROCEDURE — 99207 PR NO BILLABLE SERVICE THIS VISIT: CPT | Performed by: STUDENT IN AN ORGANIZED HEALTH CARE EDUCATION/TRAINING PROGRAM

## 2024-10-13 PROCEDURE — 250N000013 HC RX MED GY IP 250 OP 250 PS 637: Performed by: INTERNAL MEDICINE

## 2024-10-13 PROCEDURE — 101N000002 HC CUSTODIAL CARE DAILY

## 2024-10-13 RX ADMIN — QUETIAPINE FUMARATE 12.5 MG: 25 TABLET ORAL at 20:13

## 2024-10-13 ASSESSMENT — ACTIVITIES OF DAILY LIVING (ADL)
ADLS_ACUITY_SCORE: 49
ADLS_ACUITY_SCORE: 53
ADLS_ACUITY_SCORE: 53
ADLS_ACUITY_SCORE: 49
ADLS_ACUITY_SCORE: 53
ADLS_ACUITY_SCORE: 49
ADLS_ACUITY_SCORE: 53
ADLS_ACUITY_SCORE: 49
ADLS_ACUITY_SCORE: 49
ADLS_ACUITY_SCORE: 53
ADLS_ACUITY_SCORE: 49
ADLS_ACUITY_SCORE: 53
ADLS_ACUITY_SCORE: 53
ADLS_ACUITY_SCORE: 49

## 2024-10-13 NOTE — PROGRESS NOTES
Temp: 97.6  F (36.4  C) Temp src: Oral BP: (!) 150/83 Pulse: 53   Resp: 18 SpO2: 98 % O2 Device: None (Room air)       Multiple soft/formed BM's today. nursing home care patient. VSS daily and once weekly assessments. Awaiting Memory Care placement. A1 GB/Walker. Alert & Oriented to self only. Continue care.

## 2024-10-13 NOTE — PROGRESS NOTES
[facility]    Medicine Progress Note - Hospitalist Service    Date of Admission:  10/2/2024    Assessment & Plan   Problem list  -- shelter care  -- Prolonged hospitalization  -- Severe dementia with chronic aphasia admitted with acute encephalopathy        Assessment & Plan  Jemal Gray is a 89 year old male with Pmhx of Dementia with chronic aphasia, who was admitted on 10/2/2024 after he was brought to the ED by EMS from facility for evaluation of agitation.     The patient discharged from a prolonged hospitalization awaiting long-term care placement on 10/2 to a long-term care facility. He was initially admitted here June 5, 2024 where he was treated for cellulitis which resolved.  He required guardianship with MA pending, social work assisted with discharge planning to long-term care facility on 10/2.      Upon transfer to the long-term care facility the patient was described as agitated, swearing.  He he had been wandering around the long-term care facility and entering other patient's rooms.  The patient was unable to be redirected and required transfer back to the ED.     In the ED patient hypertensive, vitally stable. He was given seroquel.  He requires a bedside attendant as he was agitated and unable to be redirected.  Attempting to leave the emergency department and go into other patient's rooms.     Patient admitted for [facility] cares.  Patient appears calm and comfortable.  Medically stable and can be discharged whenever however updated by  that patient cannot go back to [facility] as they refused to take him back and recommended that he should go to the memory care.   is assisting to find memory care for the patient.    Acute Agitation, Behavior Disturbances   Dementia with Chronic Aphasia  Suspect triggered by changes in setting and recent move.  No physical complaints or concerns of infection.  Historically required Zyprexa over the summer when he  was hospitalized for agitation.  Most recently had been prescribed Seroquel 12.5 mg twice daily as needed although he had not been needing most recently.  -Bedside attendant as needed  -Restarted prior to admission Seroquel at 12.5 mg twice daily as needed, may need to titrate  - ms  -SW consulted  On 10/6/2024 evening patient confused and increasing agitation.  Patient refused scheduled medications.  As needed Seroquel crushed and given in pudding and was effective.  -- scheduled 12.5 mg of Seroquel every evening     Need for Disposition Assistance  -Patient had prolonged hospitalization due to need for disposition assistance.    -His daughter filed for guardianship paperwork was completed in July   -acquired MA application and long-term care placement   -Ultimately will require 24/7 care and likely Memory care placement      Actinic Keratosis  Had surgery for this and prescribed steroids cream and ketoconazole cream, restarted per daughter request previously.     Sinus Bradycardia  Patient was noted to have HR in 50s.  Asymptomatic.  -no telemetry monitoring needed      History of Constipation  -laxatives PRN         Diet: Combination Diet Regular Diet  Snacks/Supplements Pediatric: Ensure Enlive; Between Meals  Room Service    DVT Prophylaxis: Ambulate every shift  Maddox Catheter: Not present  Lines: None     Cardiac Monitoring: None  Code Status: No CPR- Do NOT Intubate      Clinically Significant Risk Factors Present on Admission                     # Dementia: noted on problem list                  Disposition Plan           Katie Hester DO  Hospitalist Service  [facility]  Securely message with Samantha (more info)  Text page via [employer] Paging/Directory   ______________________________________________________________________      Physical Exam   Vital Signs: Temp: 97.6  F (36.4  C) Temp src: Oral BP: (!) 150/83 Pulse: 53   Resp: 18 SpO2: 98 % O2 Device: None (Room air)    Weight:  157 lbs 6.4 oz    General Calm, NAD      Medical Decision Making         10 MINUTES SPENT BY ME on the date of service doing chart review, history, exam, documentation & further activities per the note.      Data

## 2024-10-13 NOTE — PLAN OF CARE
"Pt. Confused, up with assist 1, gaitbelt and walker, small BM today. Awaiting memory care placement.    Goal Outcome Evaluation:      Plan of Care Reviewed With: patient    Overall Patient Progress: no changeOverall Patient Progress: no change    Outcome Evaluation: Awaiting memory care placement      Problem: Adult Inpatient Plan of Care  Goal: Plan of Care Review  Description: The Plan of Care Review/Shift note should be completed every shift.  The Outcome Evaluation is a brief statement about your assessment that the patient is improving, declining, or no change.  This information will be displayed automatically on your shift  note.  Outcome: Progressing  Flowsheets (Taken 10/13/2024 0245)  Plan of Care Reviewed With: patient  Goal: Patient-Specific Goal (Individualized)  Description: You can add care plan individualizations to a care plan. Examples of Individualization might be:  \"Parent requests to be called daily at 9am for status\", \"I have a hard time hearing out of my right ear\", or \"Do not touch me to wake me up as it startles  me\".  Outcome: Progressing  Goal: Absence of Hospital-Acquired Illness or Injury  Outcome: Progressing  Intervention: Identify and Manage Fall Risk  Recent Flowsheet Documentation  Taken 10/12/2024 1944 by Radha Mendoza RN  Safety Promotion/Fall Prevention:   activity supervised   lighting adjusted   increase visualization of patient   increased rounding and observation   clutter free environment maintained   safety round/check completed  Intervention: Prevent Skin Injury  Recent Flowsheet Documentation  Taken 10/12/2024 1944 by Radha Mendoza RN  Body Position: weight shifting  Intervention: Prevent and Manage VTE (Venous Thromboembolism) Risk  Recent Flowsheet Documentation  Taken 10/12/2024 1944 by Radha Mendoza RN  VTE Prevention/Management: SCDs off (sequential compression devices)  Intervention: Prevent Infection  Recent Flowsheet Documentation  Taken 10/12/2024 1944 by " Radha Mendoza RN  Infection Prevention: rest/sleep promoted  Goal: Optimal Comfort and Wellbeing  Outcome: Progressing  Goal: Readiness for Transition of Care  Outcome: Progressing

## 2024-10-14 PROCEDURE — 99207 PR NO BILLABLE SERVICE THIS VISIT: CPT | Performed by: STUDENT IN AN ORGANIZED HEALTH CARE EDUCATION/TRAINING PROGRAM

## 2024-10-14 PROCEDURE — 250N000013 HC RX MED GY IP 250 OP 250 PS 637: Performed by: INTERNAL MEDICINE

## 2024-10-14 PROCEDURE — 101N000002 HC CUSTODIAL CARE DAILY

## 2024-10-14 RX ADMIN — QUETIAPINE FUMARATE 12.5 MG: 25 TABLET ORAL at 20:02

## 2024-10-14 RX ADMIN — KETOCONAZOLE: 20 SHAMPOO, SUSPENSION TOPICAL at 18:32

## 2024-10-14 ASSESSMENT — ACTIVITIES OF DAILY LIVING (ADL)
ADLS_ACUITY_SCORE: 51
ADLS_ACUITY_SCORE: 49
ADLS_ACUITY_SCORE: 51
ADLS_ACUITY_SCORE: 49
ADLS_ACUITY_SCORE: 51

## 2024-10-14 NOTE — PLAN OF CARE
Goal Outcome Evaluation:      Plan of Care Reviewed With: patient    Overall Patient Progress: no changeOverall Patient Progress: no change    Outcome Evaluation: Pt is confused, appeared comfortable up on assessment. Had BM this shift. No acute change this shift.      Problem: Adult Inpatient Plan of Care  Goal: Plan of Care Review  Description: The Plan of Care Review/Shift note should be completed every shift.  The Outcome Evaluation is a brief statement about your assessment that the patient is improving, declining, or no change.  This information will be displayed automatically on your shift  note.  Outcome: Progressing  Flowsheets (Taken 10/14/2024 1467)  Outcome Evaluation: Pt is confused, appeared comfortable up on assessment. Had BM this shift. No acute change this shift.  Plan of Care Reviewed With: patient  Overall Patient Progress: no change

## 2024-10-14 NOTE — PROGRESS NOTES
Care Management Follow Up    Length of Stay (days): 0    Expected Discharge Date:  11/18/24     Concerns to be Addressed:  disposition     Patient plan of care discussed at interdisciplinary rounds: Yes    Anticipated Discharge Disposition:  LTC with memory care  Anticipated Discharge Services:  LTC with memory care  Anticipated Discharge DME:  facility will provide    Patient/family educated on Medicare website which has current facility and service quality ratings:  yes  Education Provided on the Discharge Plan:  yes  Patient/Family in Agreement with the Plan:  yes    Referrals Placed by CM/JAMAR:  yes  Private pay costs discussed: private room/amenity fees and transportation costs    Discussed  Partnership in Safe Discharge Planning  document with patient/family: No     Handoff Completed: No, handoff not indicated or clinically appropriate    Additional Information:  Received calls from the daughter/guardian, Rhiannon and from the financial worker @ [facility]. Financial worker reports that patient's MA has been approved for LTC so now we can find LTC until a [place] waiver/[place] choice assessment can be done. Call into [facility] but still waiting for appointment.    Daughter/guardian called to let SW know about the MA and to let us know that she is now the permanent guardian for patient. Asked for paperwork for cart. She will email it to me.    Next Steps: Referrals sent to LTC facilities with memory care units. The goal for patient is to get him to a LTC facility with memory care.    NALDO Morris   Inpatient Care Coordination   Supervisor  [facility]  [phone]      NALDO Puentes

## 2024-10-14 NOTE — PLAN OF CARE
"Pt. Confused, up with assist of 1, gaitbelt and walker. Incontinent of urine and stool at times. Plan for discharge when memory care facility bed available. Pt. Denies any needs at this time. Will continue with POC.    Goal Outcome Evaluation:      Plan of Care Reviewed With: patient          Outcome Evaluation: Awaiting memory care placement      Problem: Adult Inpatient Plan of Care  Goal: Plan of Care Review  Description: The Plan of Care Review/Shift note should be completed every shift.  The Outcome Evaluation is a brief statement about your assessment that the patient is improving, declining, or no change.  This information will be displayed automatically on your shift  note.  10/14/2024 0319 by Radha Mendoza RN  Outcome: Progressing  Flowsheets (Taken 10/14/2024 0319)  Outcome Evaluation: Awaiting memory care placement  Plan of Care Reviewed With: patient  Overall Patient Progress: no change  10/14/2024 0318 by Radha Mendoza RN  Outcome: Progressing  10/14/2024 0318 by Radha Mendoza RN  Outcome: Progressing  Flowsheets (Taken 10/14/2024 0318)  Plan of Care Reviewed With: patient  Overall Patient Progress: no change  10/14/2024 0317 by Radha Mednoza RN  Outcome: Progressing  Flowsheets (Taken 10/14/2024 0317)  Outcome Evaluation: Awaiting memory care placement  Plan of Care Reviewed With: patient  Goal: Patient-Specific Goal (Individualized)  Description: You can add care plan individualizations to a care plan. Examples of Individualization might be:  \"Parent requests to be called daily at 9am for status\", \"I have a hard time hearing out of my right ear\", or \"Do not touch me to wake me up as it startles  me\".  10/14/2024 0319 by Radha Mendoza RN  Outcome: Progressing  10/14/2024 0318 by Radha Mendoza RN  Outcome: Progressing  10/14/2024 0318 by Radha Mendoza RN  Outcome: Progressing  10/14/2024 0317 by Radha Mendoza RN  Outcome: Progressing  Goal: Absence of Hospital-Acquired Illness or " Injury  10/14/2024 0319 by Radha Mendoza RN  Outcome: Progressing  10/14/2024 0318 by Radha Mendoza RN  Outcome: Progressing  10/14/2024 0318 by Radha Mendoza RN  Outcome: Progressing  10/14/2024 0317 by Radha Mendoza RN  Outcome: Progressing  Intervention: Identify and Manage Fall Risk  Recent Flowsheet Documentation  Taken 10/14/2024 0000 by Radha Mendoza RN  Safety Promotion/Fall Prevention:   safety round/check completed   clutter free environment maintained   increased rounding and observation   increase visualization of patient   lighting adjusted   nonskid shoes/slippers when out of bed  Intervention: Prevent Infection  Recent Flowsheet Documentation  Taken 10/14/2024 0000 by Radha Mendoza RN  Infection Prevention: hand hygiene promoted  Taken 10/13/2024 1935 by Radha Mendoza RN  Infection Prevention: hand hygiene promoted  Goal: Optimal Comfort and Wellbeing  10/14/2024 0319 by Radha Mendoza RN  Outcome: Progressing  10/14/2024 0318 by Radha Mendoza RN  Outcome: Progressing  10/14/2024 0318 by Radha Mendoza RN  Outcome: Progressing  10/14/2024 0317 by Radha Mendoza RN  Outcome: Progressing  Goal: Readiness for Transition of Care  10/14/2024 0319 by Radha Mendoza RN  Outcome: Progressing  10/14/2024 0318 by Radha Mendoza RN  Outcome: Progressing  10/14/2024 0318 by Radha Mendoza RN  Outcome: Progressing  10/14/2024 0317 by Radha Mendoza RN  Outcome: Progressing

## 2024-10-14 NOTE — PROGRESS NOTES
[facility]    Medicine Progress Note - Hospitalist Service    Date of Admission:  10/2/2024    Assessment & Plan   Problem list  -- retirement care  -- Prolonged hospitalization  -- Severe dementia with chronic aphasia admitted with acute encephalopathy        Assessment & Plan  Jemal Gray is a 89 year old male with Pmhx of Dementia with chronic aphasia, who was admitted on 10/2/2024 after he was brought to the ED by EMS from facility for evaluation of agitation.     The patient discharged from a prolonged hospitalization awaiting long-term care placement on 10/2 to a long-term care facility. He was initially admitted here June 5, 2024 where he was treated for cellulitis which resolved.  He required guardianship with MA pending, social work assisted with discharge planning to long-term care facility on 10/2.      Upon transfer to the long-term care facility the patient was described as agitated, swearing.  He he had been wandering around the long-term care facility and entering other patient's rooms.  The patient was unable to be redirected and required transfer back to the ED.     In the ED patient hypertensive, vitally stable. He was given seroquel.  He requires a bedside attendant as he was agitated and unable to be redirected.  Attempting to leave the emergency department and go into other patient's rooms.     Patient admitted for senior care cares.  Patient appears calm and comfortable.  Medically stable and can be discharged whenever however updated by  that patient cannot go back to [facility] [place] as they refused to take him back and recommended that he should go to the memory care.   is assisting to find memory care for the patient.    Acute Agitation, Behavior Disturbances   Dementia with Chronic Aphasia  Suspect triggered by changes in setting and recent move.  No physical complaints or concerns of infection.  Historically required Zyprexa over the summer when he  was hospitalized for agitation.  Most recently had been prescribed Seroquel 12.5 mg twice daily as needed although he had not been needing most recently.  -Bedside attendant as needed  -Restarted prior to admission Seroquel at 12.5 mg twice daily as needed, may need to titrate  - ms  -SW consulted  On 10/6/2024 evening patient confused and increasing agitation.  Patient refused scheduled medications.  As needed Seroquel crushed and given in pudding and was effective.  -- scheduled 12.5 mg of Seroquel every evening     Need for Disposition Assistance  -Patient had prolonged hospitalization due to need for disposition assistance.    -His daughter filed for guardianship paperwork was completed in July   -acquired MA application and long-term care placement   -Ultimately will require 24/7 care and likely Memory care placement      Actinic Keratosis  Had surgery for this and prescribed steroids cream and ketoconazole cream, restarted per daughter request previously.     Sinus Bradycardia  Patient was noted to have HR in 50s.  Asymptomatic.  -no telemetry monitoring needed      History of Constipation  -laxatives PRN         Diet: Combination Diet Regular Diet  Snacks/Supplements Pediatric: Ensure Enlive; Between Meals  Room Service    DVT Prophylaxis: Ambulate every shift  Maddox Catheter: Not present  Lines: None     Cardiac Monitoring: None  Code Status: No CPR- Do NOT Intubate      Clinically Significant Risk Factors Present on Admission                     # Dementia: noted on problem list                  Disposition Plan           Katie Hester DO  Hospitalist Service  [facility]  Securely message with [employer] (more info)  Text page via [employer] Paging/Directory   ______________________________________________________________________      Physical Exam   Vital Signs: Temp: 97.6  F (36.4  C) Temp src: Oral BP: (!) 173/90 Pulse: 57   Resp: 16 SpO2: 98 % O2 Device: None (Room air)    Weight:  157 lbs 6.4 oz    General Calm, NAD      Medical Decision Making         10 MINUTES SPENT BY ME on the date of service doing chart review, history, exam, documentation & further activities per the note.      Data

## 2024-10-15 PROCEDURE — 250N000013 HC RX MED GY IP 250 OP 250 PS 637: Performed by: INTERNAL MEDICINE

## 2024-10-15 PROCEDURE — 99232 SBSQ HOSP IP/OBS MODERATE 35: CPT | Performed by: INTERNAL MEDICINE

## 2024-10-15 PROCEDURE — 101N000002 HC CUSTODIAL CARE DAILY

## 2024-10-15 RX ADMIN — QUETIAPINE FUMARATE 12.5 MG: 25 TABLET ORAL at 20:26

## 2024-10-15 ASSESSMENT — ACTIVITIES OF DAILY LIVING (ADL)
ADLS_ACUITY_SCORE: 53
ADLS_ACUITY_SCORE: 53
ADLS_ACUITY_SCORE: 51
ADLS_ACUITY_SCORE: 53
ADLS_ACUITY_SCORE: 51
ADLS_ACUITY_SCORE: 53
ADLS_ACUITY_SCORE: 51
ADLS_ACUITY_SCORE: 53
ADLS_ACUITY_SCORE: 53
ADLS_ACUITY_SCORE: 51
ADLS_ACUITY_SCORE: 51
ADLS_ACUITY_SCORE: 53
ADLS_ACUITY_SCORE: 51
ADLS_ACUITY_SCORE: 53
ADLS_ACUITY_SCORE: 51

## 2024-10-15 NOTE — PROGRESS NOTES
[facility]    Medicine Progress Note - Hospitalist Service    Date of Admission:  10/2/2024    Assessment & Plan   Jemal rGay is an 89 year old male with history of dementia with chronic aphasia. He was admitted on 10/2/2024 after he was brought to the ED by EMS from facility for evaluation of agitation.     He had a prolonged hospitalization from 6/5/24 through 10/2/24 during which he was treated for cellulitis but mostly awaited long-term care placement due to dementia. He required appointment of guardianship.  He discharged to a long-term care facility on 10/2. Upon transfer to the long-term care facility he became agitated with swearing.  He was wandering around the long-term care facility and was entering other patient's rooms.  He was unable to be redirected and required transfer back to the ED.     In the ED he was stable. He was given seroquel.  He required a bedside attendant as he was agitated and unable to be redirected.  He attempted to leave the emergency department and go into other patient's rooms. He was admitted for snf cares.  Patient has improved and is no longer agitated. He is medically stable and can be discharged whenever placement is found. Prior long term care facility is not taking patient back.  is looking into memory care units.     Problem list:     Acute agitation, improved  Behavior disturbances   Dementia with chronic aphasia  Suspect triggered by changes in setting and recent move.  No physical complaints or concerns of infection.  Historically required Zyprexa over the summer when he was hospitalized for agitation.  Most recently had been prescribed Seroquel 12.5 mg twice daily as needed although he had not been needing most recently.  -Bedside attendant as needed  -Restarted prior to admission Seroquel at 12.5 mg twice daily as needed, may need to titrate  - ms  -SW consulted  On 10/6/2024 evening patient confused and  increasing agitation.  Patient refused scheduled medications.  As needed Seroquel crushed and given in pudding and was effective.  -Scheduled 12.5 mg of Seroquel every evening     Need for disposition assistance  -Patient had prolonged hospitalization due to need for disposition assistance.    -His daughter filed for guardianship paperwork was completed in July   -acquired MA application and long-term care placement   -Ultimately will require 24/7 care and likely Memory care placement      Actinic Keratosis  Had surgery for this and prescribed steroids cream and ketoconazole cream, restarted per daughter request previously.     Sinus Bradycardia  Patient was noted to have HR in 50s.  Asymptomatic.  -no telemetry monitoring needed      History of Constipation  -laxatives PRN           Diet: Combination Diet Regular Diet  Snacks/Supplements Pediatric: Ensure Enlive; Between Meals  Room Service    DVT Prophylaxis: Pneumatic Compression Devices  Maddox Catheter: Not present  Lines: None     Cardiac Monitoring: None  Code Status: No CPR- Do NOT Intubate      Clinically Significant Risk Factors Present on Admission                     # Dementia: noted on problem list                  Disposition Plan     Medically Ready for Discharge: Ready Now             Addy Menjivar MD  Hospitalist Service  [facility]  Securely message with [employer] (more info)  Text page via [facility] Paging/Directory   ______________________________________________________________________    Interval History   No new problems. No complaints.     Physical Exam   Vital Signs: Temp: 97.3  F (36.3  C) Temp src: Axillary BP: (!) 151/81 Pulse: 53   Resp: 18 SpO2: 96 % O2 Device: None (Room air)    Weight: 157 lbs 6.4 oz    GENERAL:  Comfortable. Cooperative.  PSYCH: pleasant, disoriented, No acute distress.  EYES: PERRLA, Normal conjunctiva.  HEART:  Regular rate and rhythm. No JVD. Pulses normal. No edema.  LUNGS:  Clear to  auscultation, normal Respiratory effort.  ABDOMEN:  Soft, no hepatosplenomegaly, normal bowel sounds.  EXTREMETIES: No clubbing, cyanosis or ischemia  SKIN:  Dry to touch, No rash.      Medical Decision Making       30 MINUTES SPENT BY ME on the date of service doing chart review, history, exam, documentation & further activities per the note.      Data         Imaging results reviewed over the past 24 hrs:   No results found for this or any previous visit (from the past 24 hour(s)).  No lab results found in last 7 days.

## 2024-10-15 NOTE — PLAN OF CARE
Patient is alert but confused, fall risk, does not call when needs to get up, all fall risk precautions are in place and mats placed on both sides of the bed, assist of 1 with transfers with gait belt and walker.     Goal Outcome Evaluation:      Plan of Care Reviewed With: patient    Overall Patient Progress: no changeOverall Patient Progress: no change    Outcome Evaluation: Confused, assist of 1 with transfers      Problem: Adult Inpatient Plan of Care  Goal: Plan of Care Review  Description: The Plan of Care Review/Shift note should be completed every shift.  The Outcome Evaluation is a brief statement about your assessment that the patient is improving, declining, or no change.  This information will be displayed automatically on your shift  note.  Recent Flowsheet Documentation  Taken 10/14/2024 2037 by Mere Valerio RN  Outcome Evaluation: Confused, assist of 1 with transfers  Plan of Care Reviewed With: patient  Overall Patient Progress: no change  Goal: Absence of Hospital-Acquired Illness or Injury  Intervention: Identify and Manage Fall Risk  Recent Flowsheet Documentation  Taken 10/14/2024 2003 by Mere Valerio, RN  Safety Promotion/Fall Prevention:   activity supervised   clutter free environment maintained   lighting adjusted   nonskid shoes/slippers when out of bed   room near nurse's station   room organization consistent   safety round/check completed   supervised activity  Intervention: Prevent Skin Injury  Recent Flowsheet Documentation  Taken 10/14/2024 2003 by Mere Valerio, RN  Body Position: position changed independently  Intervention: Prevent Infection  Recent Flowsheet Documentation  Taken 10/14/2024 2003 by Mere Valerio, RN  Infection Prevention:   cohorting utilized   hand hygiene promoted   rest/sleep promoted   single patient room provided

## 2024-10-15 NOTE — PLAN OF CARE
"Shift: [id number]    longterm cares. No pain. A1 w/ walker and gb. Looking for LTC placement. Continue POC.     Goal Outcome Evaluation:      Plan of Care Reviewed With: patient    Overall Patient Progress: no changeOverall Patient Progress: no change    Outcome Evaluation: longterm Cares              Problem: Adult Inpatient Plan of Care  Goal: Plan of Care Review  Description: The Plan of Care Review/Shift note should be completed every shift.  The Outcome Evaluation is a brief statement about your assessment that the patient is improving, declining, or no change.  This information will be displayed automatically on your shift  note.  Outcome: Progressing  Flowsheets (Taken 10/15/2024 1208)  Outcome Evaluation: longterm Cares  Plan of Care Reviewed With: patient  Overall Patient Progress: no change  Goal: Patient-Specific Goal (Individualized)  Description: You can add care plan individualizations to a care plan. Examples of Individualization might be:  \"Parent requests to be called daily at 9am for status\", \"I have a hard time hearing out of my right ear\", or \"Do not touch me to wake me up as it startles  me\".  Outcome: Progressing  Goal: Absence of Hospital-Acquired Illness or Injury  Outcome: Progressing  Intervention: Identify and Manage Fall Risk  Recent Flowsheet Documentation  Taken 10/15/2024 0730 by Nela Garcia, RN  Safety Promotion/Fall Prevention:   activity supervised   assistive device/personal items within reach   clutter free environment maintained   increased rounding and observation   increase visualization of patient   lighting adjusted   nonskid shoes/slippers when out of bed   patient and family education   room near nurse's station   safety round/check completed   supervised activity  Intervention: Prevent Skin Injury  Recent Flowsheet Documentation  Taken 10/15/2024 0730 by Nela Garcia, RN  Body Position: position changed independently  Goal: Optimal Comfort and " Wellbeing  Outcome: Progressing  Goal: Readiness for Transition of Care  Outcome: Progressing     Problem: Dementia Signs/Symptoms  Goal: Improved Behavioral Control (Dementia Signs/Symptoms)  Outcome: Progressing  Goal: Improved Mood Symptoms (Dementia Signs/Symptoms)  Outcome: Progressing  Goal: Optimized Cognitive Function (Dementia Signs/Symptoms)  Outcome: Progressing  Goal: Optimized Oral Intake (Dementia Signs/Symptoms)  Outcome: Progressing  Goal: Improved Sleep (Dementia Signs/Symptoms)  Outcome: Progressing  Goal: Enhanced Social or Functional Skills and Ability (Dementia Signs/Symptoms)  Outcome: Progressing

## 2024-10-15 NOTE — PLAN OF CARE
"Pt is alert and confused. Pt denies any pain or shortness of breath and on room air.    Pt was up to bathroom 1 GB + W. Fall mat in place. Ongoing monitoring.    Plan: SW following. Discharge TBD.    BP (!) 173/90 (BP Location: Right arm)   Pulse 57   Temp 97.6  F (36.4  C) (Oral)   Resp 16   Wt 71.4 kg (157 lb 6.4 oz)   SpO2 98%   BMI 23.93 kg/m       Problem: Adult Inpatient Plan of Care  Goal: Plan of Care Review  Description: The Plan of Care Review/Shift note should be completed every shift.  The Outcome Evaluation is a brief statement about your assessment that the patient is improving, declining, or no change.  This information will be displayed automatically on your shift  note.  Outcome: Progressing  Flowsheets (Taken 10/15/2024 0532)  Outcome Evaluation: Pt is alert and confused.  Plan of Care Reviewed With: patient  Overall Patient Progress: no change  Goal: Patient-Specific Goal (Individualized)  Description: You can add care plan individualizations to a care plan. Examples of Individualization might be:  \"Parent requests to be called daily at 9am for status\", \"I have a hard time hearing out of my right ear\", or \"Do not touch me to wake me up as it startles  me\".  Outcome: Progressing  Goal: Absence of Hospital-Acquired Illness or Injury  Outcome: Progressing  Intervention: Identify and Manage Fall Risk  Recent Flowsheet Documentation  Taken 10/15/2024 0500 by Gladis Lancaster RN  Safety Promotion/Fall Prevention: safety round/check completed  Taken 10/15/2024 0400 by Gladis Lancaster RN  Safety Promotion/Fall Prevention: safety round/check completed  Taken 10/15/2024 0300 by Gladis Lancaster RN  Safety Promotion/Fall Prevention: safety round/check completed  Taken 10/15/2024 0237 by Gladis Lancaster RN  Safety Promotion/Fall Prevention:   activity supervised   safety round/check completed   patient and family education   nonskid shoes/slippers when out of bed   mobility aid in reach   lighting " adjusted   assistive device/personal items within reach  Taken 10/15/2024 0200 by Gladis Lancaster RN  Safety Promotion/Fall Prevention: safety round/check completed  Taken 10/15/2024 0100 by Gladis Lancaster RN  Safety Promotion/Fall Prevention: safety round/check completed  Taken 10/15/2024 0000 by Gladis Lancaster RN  Safety Promotion/Fall Prevention: safety round/check completed  Taken 10/14/2024 2300 by Gladis Lancaster RN  Safety Promotion/Fall Prevention: safety round/check completed  Intervention: Prevent Infection  Recent Flowsheet Documentation  Taken 10/15/2024 0237 by Gladis Lancaster RN  Infection Prevention:   single patient room provided   rest/sleep promoted   hand hygiene promoted  Goal: Optimal Comfort and Wellbeing  Outcome: Progressing  Goal: Readiness for Transition of Care  Outcome: Progressing   Goal Outcome Evaluation:      Plan of Care Reviewed With: patient    Overall Patient Progress: no changeOverall Patient Progress: no change    Outcome Evaluation: Pt is alert and confused.

## 2024-10-16 PROCEDURE — 99232 SBSQ HOSP IP/OBS MODERATE 35: CPT | Performed by: INTERNAL MEDICINE

## 2024-10-16 PROCEDURE — 250N000013 HC RX MED GY IP 250 OP 250 PS 637: Performed by: PHYSICIAN ASSISTANT

## 2024-10-16 PROCEDURE — 101N000002 HC CUSTODIAL CARE DAILY

## 2024-10-16 PROCEDURE — 250N000013 HC RX MED GY IP 250 OP 250 PS 637: Performed by: INTERNAL MEDICINE

## 2024-10-16 RX ADMIN — Medication 1 MG: at 21:03

## 2024-10-16 RX ADMIN — QUETIAPINE FUMARATE 12.5 MG: 25 TABLET ORAL at 19:45

## 2024-10-16 RX ADMIN — KETOCONAZOLE: 20 SHAMPOO, SUSPENSION TOPICAL at 09:26

## 2024-10-16 ASSESSMENT — ACTIVITIES OF DAILY LIVING (ADL)
ADLS_ACUITY_SCORE: 57

## 2024-10-16 NOTE — PLAN OF CARE
"VSS afebrile. Confused, alarms in place. Up with A1 walker.   following for placement at LTC with memory care.      Goal Outcome Evaluation:                 Outcome Evaluation: Confused, alarms in place.  following for placement at LTC with memory care.      Problem: Adult Inpatient Plan of Care  Goal: Plan of Care Review  Description: The Plan of Care Review/Shift note should be completed every shift.  The Outcome Evaluation is a brief statement about your assessment that the patient is improving, declining, or no change.  This information will be displayed automatically on your shift  note.  Outcome: Not Progressing  Flowsheets (Taken 10/16/2024 1144)  Outcome Evaluation: Confused, alarms in place.  following for placement at LTC with memory care.  Goal: Patient-Specific Goal (Individualized)  Description: You can add care plan individualizations to a care plan. Examples of Individualization might be:  \"Parent requests to be called daily at 9am for status\", \"I have a hard time hearing out of my right ear\", or \"Do not touch me to wake me up as it startles  me\".  Outcome: Not Progressing  Goal: Absence of Hospital-Acquired Illness or Injury  Outcome: Not Progressing  Intervention: Identify and Manage Fall Risk  Recent Flowsheet Documentation  Taken 10/16/2024 0929 by Janine Cantu RN  Safety Promotion/Fall Prevention:   activity supervised   lighting adjusted   increased rounding and observation   clutter free environment maintained  Intervention: Prevent Skin Injury  Recent Flowsheet Documentation  Taken 10/16/2024 0929 by Janine Cantu, RN  Body Position: position changed independently  Goal: Optimal Comfort and Wellbeing  Outcome: Not Progressing  Goal: Readiness for Transition of Care  Outcome: Not Progressing     Problem: Dementia Signs/Symptoms  Goal: Improved Behavioral Control (Dementia Signs/Symptoms)  Outcome: Not Progressing  Goal: Improved Mood Symptoms (Dementia " Signs/Symptoms)  Outcome: Not Progressing  Intervention: Optimize Emotion and Mood  Recent Flowsheet Documentation  Taken 10/16/2024 0929 by Janine Cantu RN  Supportive Measures: active listening utilized  Goal: Optimized Cognitive Function (Dementia Signs/Symptoms)  Outcome: Not Progressing  Goal: Optimized Oral Intake (Dementia Signs/Symptoms)  Outcome: Not Progressing  Goal: Improved Sleep (Dementia Signs/Symptoms)  Outcome: Not Progressing

## 2024-10-16 NOTE — PROGRESS NOTES
[facility]    Medicine Progress Note - Hospitalist Service    Date of Admission:  10/2/2024    Assessment & Plan   Jemal Gray is an 89 year old male with history of dementia with chronic aphasia. He was admitted on 10/2/2024 after he was brought to the ED by EMS from facility for evaluation of agitation.     He had a prolonged hospitalization from 6/5/24 through 10/2/24 during which he was treated for cellulitis but mostly awaited long-term care placement due to dementia. He required appointment of guardianship.  He discharged to a long-term care facility on 10/2. Upon transfer to the long-term care facility he became agitated with swearing.  He was wandering around the long-term care facility and was entering other patient's rooms.  He was unable to be redirected and required transfer back to the ED.     In the ED he was stable. He was given seroquel.  He required a bedside attendant as he was agitated and unable to be redirected.  He attempted to leave the emergency department and go into other patient's rooms. He was admitted for [facility] cares.  Patient has improved and is no longer agitated. He is medically stable and can be discharged whenever placement is found. Prior long term care facility is not taking patient back.  is looking into memory care units.     Problem list:     Acute agitation, improved  Behavior disturbances   Dementia with chronic aphasia  Suspect triggered by changes in setting and recent move.  No physical complaints or concerns of infection.  Historically required Zyprexa over the summer when he was hospitalized for agitation.  Most recently had been prescribed Seroquel 12.5 mg twice daily as needed although he had not been needing most recently.  -Bedside attendant as needed  -Restarted prior to admission Seroquel at 12.5 mg twice daily as needed, may need to titrate  - ms  -SW consulted  -On 10/6/2024 evening patient confused and  increasing agitation.  Patient refused scheduled medications.  As needed Seroquel crushed and given in pudding and was effective.  -Continue scheduled 12.5 mg of Seroquel every evening     Need for disposition assistance  -Patient had prolonged hospitalization due to need for disposition assistance.    -His daughter filed for guardianship paperwork was completed in July   -acquired MA application and long-term care placement   -Ultimately will require 24/7 care and likely Memory care placement      Actinic Keratosis  Had surgery for this and prescribed steroids cream and ketoconazole cream, restarted per daughter request previously.     Sinus Bradycardia  Patient was noted to have HR in 50s.  Asymptomatic.  -no telemetry monitoring needed      History of Constipation  -laxatives PRN           Diet: Combination Diet Regular Diet  Snacks/Supplements Pediatric: Ensure Enlive; Between Meals  Room Service    DVT Prophylaxis: Pneumatic Compression Devices  Maddox Catheter: Not present  Lines: None     Cardiac Monitoring: None  Code Status: No CPR- Do NOT Intubate      Clinically Significant Risk Factors Present on Admission                     # Dementia: noted on problem list                  Disposition Plan     Medically Ready for Discharge: Ready Now             Addy Menjivar MD  Hospitalist Service  [facility]  Securely message with [employer] (more info)  Text page via [facility] Paging/Directory   ______________________________________________________________________    Interval History   No new problems. Feels well. Pleasantly confused.     Physical Exam   Vital Signs: Temp: 97.6  F (36.4  C) Temp src: Axillary BP: 129/74 Pulse: 57   Resp: 18 SpO2: 98 % O2 Device: None (Room air)    Weight: 157 lbs 6.4 oz    GENERAL:  Comfortable. Cooperative.  PSYCH: pleasant, oriented to person, No acute distress.  EYES: PERRLA, Normal conjunctiva.  HEART:  Regular rate and rhythm. No JVD. Pulses normal. No  edema.  LUNGS:  Clear to auscultation, normal Respiratory effort.  ABDOMEN:  Soft, no hepatosplenomegaly, normal bowel sounds.  EXTREMETIES: No clubbing, cyanosis or ischemia  SKIN:  Dry to touch, No rash.      Medical Decision Making       30 MINUTES SPENT BY ME on the date of service doing chart review, history, exam, documentation & further activities per the note.      Data         Imaging results reviewed over the past 24 hrs:   No results found for this or any previous visit (from the past 24 hour(s)).  No lab results found in last 7 days.

## 2024-10-16 NOTE — PLAN OF CARE
Pt denies pain, CP, and SOB. Last BM 10/16. Voiding spontaneously, but incontinent of urine at times. Regular diet. A1 walker and GB, but sometimes refusing both walker and GB. Setting off bed alarm without calling to use restroom. Plan: Waiting for placement at LTC w/ [facility] unit. Continue w/ POC.     Goal Outcome Evaluation:      Plan of Care Reviewed With: patient    Overall Patient Progress: no changeOverall Patient Progress: no change    Outcome Evaluation: Pt setting off bed alarm to use bathroom frequently during shift. Able to be redirected back to bed.      Problem: Adult Inpatient Plan of Care  Goal: Plan of Care Review  Description: The Plan of Care Review/Shift note should be completed every shift.  The Outcome Evaluation is a brief statement about your assessment that the patient is improving, declining, or no change.  This information will be displayed automatically on your shift  note.  Recent Flowsheet Documentation  Taken 10/16/2024 0103 by Columba Thompson RN  Outcome Evaluation: Pt setting off bed alarm to use bathroom frequently during shift. Able to be redirected back to bed.  Plan of Care Reviewed With: patient  Overall Patient Progress: no change  Taken 10/16/2024 0051 by Columba Thompson, RN  Plan of Care Reviewed With: patient  Overall Patient Progress: no change  Goal: Absence of Hospital-Acquired Illness or Injury  Intervention: Identify and Manage Fall Risk  Recent Flowsheet Documentation  Taken 10/16/2024 0040 by Columba Thompson RN  Safety Promotion/Fall Prevention: safety round/check completed  Intervention: Prevent Skin Injury  Recent Flowsheet Documentation  Taken 10/16/2024 0040 by Columba Thompson RN  Body Position: position changed independently  Intervention: Prevent and Manage VTE (Venous Thromboembolism) Risk  Recent Flowsheet Documentation  Taken 10/16/2024 0040 by Columba Thompson, BALDOMERO  VTE Prevention/Management:   SCDs off (sequential compression devices)   patient refused  "intervention  Intervention: Prevent Infection  Recent Flowsheet Documentation  Taken 10/16/2024 0040 by Columba Thompson, RN  Infection Prevention:   single patient room provided   rest/sleep promoted   personal protective equipment utilized   hand hygiene promoted   equipment surfaces disinfected   environmental surveillance performed   cohorting utilized     Problem: Adult Inpatient Plan of Care  Goal: Plan of Care Review  Description: The Plan of Care Review/Shift note should be completed every shift.  The Outcome Evaluation is a brief statement about your assessment that the patient is improving, declining, or no change.  This information will be displayed automatically on your shift  note.  10/16/2024 0103 by Columba Thompson RN  Outcome: Progressing  Flowsheets (Taken 10/16/2024 0103)  Outcome Evaluation: Pt setting off bed alarm to use bathroom frequently during shift. Able to be redirected back to bed.  Plan of Care Reviewed With: patient  Overall Patient Progress: no change  10/16/2024 0051 by Columba Thompson RN  Outcome: Progressing  Flowsheets (Taken 10/16/2024 0051)  Plan of Care Reviewed With: patient  Overall Patient Progress: no change  Goal: Patient-Specific Goal (Individualized)  Description: You can add care plan individualizations to a care plan. Examples of Individualization might be:  \"Parent requests to be called daily at 9am for status\", \"I have a hard time hearing out of my right ear\", or \"Do not touch me to wake me up as it startles  me\".  10/16/2024 0103 by Columba Thompson, RN  Outcome: Progressing  10/16/2024 0051 by Columba Thompson, RN  Outcome: Progressing  Goal: Absence of Hospital-Acquired Illness or Injury  10/16/2024 0103 by Columba Thompson, RN  Outcome: Progressing  10/16/2024 0051 by Columba Thompson, RN  Outcome: Progressing  Intervention: Identify and Manage Fall Risk  Recent Flowsheet Documentation  Taken 10/16/2024 0040 by Columba Thompson, RN  Safety Promotion/Fall Prevention: " safety round/check completed  Intervention: Prevent Skin Injury  Recent Flowsheet Documentation  Taken 10/16/2024 0040 by Columba Thompson RN  Body Position: position changed independently  Intervention: Prevent and Manage VTE (Venous Thromboembolism) Risk  Recent Flowsheet Documentation  Taken 10/16/2024 0040 by Columba Thompson RN  VTE Prevention/Management:   SCDs off (sequential compression devices)   patient refused intervention  Intervention: Prevent Infection  Recent Flowsheet Documentation  Taken 10/16/2024 0040 by Columba Thompson RN  Infection Prevention:   single patient room provided   rest/sleep promoted   personal protective equipment utilized   hand hygiene promoted   equipment surfaces disinfected   environmental surveillance performed   cohorting utilized  Goal: Optimal Comfort and Wellbeing  10/16/2024 0103 by Columba Thompson RN  Outcome: Progressing  10/16/2024 0051 by Columba Thompson RN  Outcome: Progressing  Goal: Readiness for Transition of Care  10/16/2024 0103 by Columba Thompson RN  Outcome: Progressing  10/16/2024 0051 by Columba Thompson RN  Outcome: Progressing     Problem: Fall Injury Risk  Goal: Absence of Fall and Fall-Related Injury  Intervention: Identify and Manage Contributors  Recent Flowsheet Documentation  Taken 10/16/2024 0040 by Columba Thompson RN  Medication Review/Management: medications reviewed  Intervention: Promote Injury-Free Environment  Recent Flowsheet Documentation  Taken 10/16/2024 0040 by Columba Thompson RN  Safety Promotion/Fall Prevention: safety round/check completed     Problem: Skin Injury Risk Increased  Goal: Skin Health and Integrity  Intervention: Optimize Skin Protection  Recent Flowsheet Documentation  Taken 10/16/2024 0040 by Columba Thompson RN  Activity Management:   ambulated to bathroom   back to bed  Head of Bed (HOB) Positioning: HOB at 15 degrees     Problem: Dementia Signs/Symptoms  Goal: Improved Behavioral Control (Dementia  Signs/Symptoms)  10/16/2024 0103 by Columba Thompson RN  Outcome: Progressing  10/16/2024 0051 by Columba Thompson RN  Outcome: Progressing  Goal: Improved Mood Symptoms (Dementia Signs/Symptoms)  10/16/2024 0103 by Columba Thompson, RN  Outcome: Progressing  10/16/2024 0051 by Columba Thompson RN  Outcome: Progressing  Goal: Optimized Cognitive Function (Dementia Signs/Symptoms)  10/16/2024 0103 by Columba Thompson, RN  Outcome: Progressing  10/16/2024 0051 by Columba Thompson RN  Outcome: Progressing  Goal: Optimized Oral Intake (Dementia Signs/Symptoms)  10/16/2024 0103 by Columba Thompson, RN  Outcome: Progressing  10/16/2024 0051 by Columba Thompson RN  Outcome: Progressing  Goal: Improved Sleep (Dementia Signs/Symptoms)  10/16/2024 0103 by Columba Thompson RN  Outcome: Progressing  10/16/2024 0051 by Columba Thompson RN  Outcome: Progressing  Goal: Enhanced Social or Functional Skills and Ability (Dementia Signs/Symptoms)  Outcome: Adequate for Care Transition

## 2024-10-17 PROCEDURE — 101N000002 HC CUSTODIAL CARE DAILY

## 2024-10-17 PROCEDURE — 250N000013 HC RX MED GY IP 250 OP 250 PS 637: Performed by: INTERNAL MEDICINE

## 2024-10-17 PROCEDURE — 99207 PR NO BILLABLE SERVICE THIS VISIT: CPT | Performed by: INTERNAL MEDICINE

## 2024-10-17 RX ADMIN — QUETIAPINE FUMARATE 12.5 MG: 25 TABLET ORAL at 19:33

## 2024-10-17 ASSESSMENT — ACTIVITIES OF DAILY LIVING (ADL)
ADLS_ACUITY_SCORE: 57
ADLS_ACUITY_SCORE: 55
ADLS_ACUITY_SCORE: 57
ADLS_ACUITY_SCORE: 55
ADLS_ACUITY_SCORE: 57
ADLS_ACUITY_SCORE: 55
ADLS_ACUITY_SCORE: 55
ADLS_ACUITY_SCORE: 57
ADLS_ACUITY_SCORE: 57
ADLS_ACUITY_SCORE: 55
ADLS_ACUITY_SCORE: 57
ADLS_ACUITY_SCORE: 55
ADLS_ACUITY_SCORE: 57
ADLS_ACUITY_SCORE: 55
ADLS_ACUITY_SCORE: 55

## 2024-10-17 NOTE — PLAN OF CARE
"Confused. Assist of 1, GB, walker. Confused. No pain. Social work following. Plan to discharge to LTC facility with memory care once placement is found.     Goal Outcome Evaluation:      Plan of Care Reviewed With: patient    Overall Patient Progress: no changeOverall Patient Progress: no change    Outcome Evaluation: halfway cares. On scheduled seroquel. PRN melatonin given for mild agitation last evening.      Problem: Adult Inpatient Plan of Care  Goal: Plan of Care Review  Description: The Plan of Care Review/Shift note should be completed every shift.  The Outcome Evaluation is a brief statement about your assessment that the patient is improving, declining, or no change.  This information will be displayed automatically on your shift  note.  Outcome: Progressing  Flowsheets (Taken 10/17/2024 0450)  Outcome Evaluation: halfway cares. On scheduled seroquel. PRN melatonin given for mild agitation last evening.  Plan of Care Reviewed With: patient  Overall Patient Progress: no change  Goal: Patient-Specific Goal (Individualized)  Description: You can add care plan individualizations to a care plan. Examples of Individualization might be:  \"Parent requests to be called daily at 9am for status\", \"I have a hard time hearing out of my right ear\", or \"Do not touch me to wake me up as it startles  me\".  Outcome: Progressing  Goal: Absence of Hospital-Acquired Illness or Injury  Outcome: Progressing  Intervention: Identify and Manage Fall Risk  Recent Flowsheet Documentation  Taken 10/16/2024 2220 by Arabella Key, RN  Safety Promotion/Fall Prevention: safety round/check completed  Taken 10/16/2024 2140 by Arabella Key, RN  Safety Promotion/Fall Prevention: safety round/check completed  Taken 10/16/2024 2100 by Arabella Key, RN  Safety Promotion/Fall Prevention:   safety round/check completed   activity supervised  Taken 10/16/2024 2030 by Arabella Key, RN  Safety Promotion/Fall Prevention: safety round/check " completed  Taken 10/16/2024 1947 by Arabella Key RN  Safety Promotion/Fall Prevention:   safety round/check completed   activity supervised  Intervention: Prevent Skin Injury  Recent Flowsheet Documentation  Taken 10/16/2024 1947 by Arabella Key RN  Body Position: position changed independently  Goal: Optimal Comfort and Wellbeing  Outcome: Progressing  Goal: Readiness for Transition of Care  Outcome: Progressing     Problem: Dementia Signs/Symptoms  Goal: Improved Behavioral Control (Dementia Signs/Symptoms)  Outcome: Progressing  Goal: Improved Mood Symptoms (Dementia Signs/Symptoms)  Outcome: Progressing  Goal: Optimized Cognitive Function (Dementia Signs/Symptoms)  Outcome: Progressing  Goal: Optimized Oral Intake (Dementia Signs/Symptoms)  Outcome: Progressing  Goal: Improved Sleep (Dementia Signs/Symptoms)  Outcome: Progressing

## 2024-10-17 NOTE — PLAN OF CARE
"[facility] cares. No pain. A1 w/ walker and gb. Looking for LTC placement. Continue POC.     Goal Outcome Evaluation:      Plan of Care Reviewed With: patient    Overall Patient Progress: no changeOverall Patient Progress: no change    Outcome Evaluation: [facility] cares            Problem: Adult Inpatient Plan of Care  Goal: Plan of Care Review  Description: The Plan of Care Review/Shift note should be completed every shift.  The Outcome Evaluation is a brief statement about your assessment that the patient is improving, declining, or no change.  This information will be displayed automatically on your shift  note.  Outcome: Progressing  Flowsheets (Taken 10/16/2024 1926)  Outcome Evaluation: [facility] cares  Plan of Care Reviewed With: patient  Overall Patient Progress: no change  Goal: Patient-Specific Goal (Individualized)  Description: You can add care plan individualizations to a care plan. Examples of Individualization might be:  \"Parent requests to be called daily at 9am for status\", \"I have a hard time hearing out of my right ear\", or \"Do not touch me to wake me up as it startles  me\".  Outcome: Progressing  Goal: Absence of Hospital-Acquired Illness or Injury  Outcome: Progressing  Intervention: Identify and Manage Fall Risk  Recent Flowsheet Documentation  Taken 10/16/2024 1702 by Nela Garcia, RN  Safety Promotion/Fall Prevention:   activity supervised   lighting adjusted   increased rounding and observation   clutter free environment maintained   assistive device/personal items within reach   increase visualization of patient   nonskid shoes/slippers when out of bed   patient and family education   room near nurse's station   safety round/check completed   supervised activity  Intervention: Prevent Skin Injury  Recent Flowsheet Documentation  Taken 10/16/2024 1702 by Nela Garcia, RN  Body Position: position changed independently  Goal: Optimal Comfort and Wellbeing  Outcome: Progressing  Goal: " Readiness for Transition of Care  Outcome: Progressing     Problem: Dementia Signs/Symptoms  Goal: Improved Behavioral Control (Dementia Signs/Symptoms)  Outcome: Progressing  Goal: Improved Mood Symptoms (Dementia Signs/Symptoms)  Outcome: Progressing  Goal: Optimized Cognitive Function (Dementia Signs/Symptoms)  Outcome: Progressing  Goal: Optimized Oral Intake (Dementia Signs/Symptoms)  Outcome: Progressing  Goal: Improved Sleep (Dementia Signs/Symptoms)  Outcome: Progressing

## 2024-10-17 NOTE — PLAN OF CARE
"VSS afebrile. Confused, alarms in place. Up with A1 walker.   following for placement at LTC with memory care.     Goal Outcome Evaluation:      Plan of Care Reviewed With: patient    Overall Patient Progress: no changeOverall Patient Progress: no change    Outcome Evaluation: [place] mannings. Up with SBA walker.      Problem: Adult Inpatient Plan of Care  Goal: Plan of Care Review  Description: The Plan of Care Review/Shift note should be completed every shift.  The Outcome Evaluation is a brief statement about your assessment that the patient is improving, declining, or no change.  This information will be displayed automatically on your shift  note.  Outcome: Progressing  Flowsheets (Taken 10/17/2024 3044)  Outcome Evaluation: [facility] cares. Up with SBA walker.  Plan of Care Reviewed With: patient  Overall Patient Progress: no change  Goal: Patient-Specific Goal (Individualized)  Description: You can add care plan individualizations to a care plan. Examples of Individualization might be:  \"Parent requests to be called daily at 9am for status\", \"I have a hard time hearing out of my right ear\", or \"Do not touch me to wake me up as it startles  me\".  Outcome: Progressing  Goal: Absence of Hospital-Acquired Illness or Injury  Outcome: Progressing  Goal: Optimal Comfort and Wellbeing  Outcome: Progressing  Goal: Readiness for Transition of Care  Outcome: Progressing     Problem: Dementia Signs/Symptoms  Goal: Improved Behavioral Control (Dementia Signs/Symptoms)  Outcome: Progressing  Goal: Improved Mood Symptoms (Dementia Signs/Symptoms)  Outcome: Progressing  Goal: Optimized Cognitive Function (Dementia Signs/Symptoms)  Outcome: Progressing  Goal: Optimized Oral Intake (Dementia Signs/Symptoms)  Outcome: Progressing  Goal: Improved Sleep (Dementia Signs/Symptoms)  Outcome: Progressing     "

## 2024-10-17 NOTE — PROGRESS NOTES
[facility]    Medicine Progress Note - Hospitalist Service    Date of Admission:  10/2/2024    Assessment & Plan   Jemal Gray is an 89 year old male with history of dementia with chronic aphasia. He was admitted on 10/2/2024 after he was brought to the ED by EMS from facility for evaluation of agitation.     He had a prolonged hospitalization from 6/5/24 through 10/2/24 during which he was treated for cellulitis but mostly awaited long-term care placement due to dementia. He required appointment of guardianship.  He discharged to a long-term care facility on 10/2. Upon transfer to the long-term care facility he became agitated with swearing.  He was wandering around the long-term care facility and was entering other patient's rooms.  He was unable to be redirected and required transfer back to the ED.     In the ED he was stable. He was given seroquel.  He required a bedside attendant as he was agitated and unable to be redirected.  He attempted to leave the emergency department and go into other patient's rooms. He was admitted for long-term cares.  Patient has improved and is no longer agitated. He is medically stable and can be discharged whenever placement is found. Prior long term care facility is not taking patient back.  is looking into memory care units.     Problem list:     Acute agitation, improved  Behavior disturbances   Dementia with chronic aphasia  Suspect triggered by changes in setting and recent move.  No physical complaints or concerns of infection.  Historically required Zyprexa over the summer when he was hospitalized for agitation.  Most recently had been prescribed Seroquel 12.5 mg twice daily as needed although he had not been needing most recently.  -Bedside attendant as needed  -Restarted prior to admission Seroquel at 12.5 mg twice daily as needed, may need to titrate  - ms  -SW consulted  -On 10/6/2024 evening patient confused and  increasing agitation.  Patient refused scheduled medications.  As needed Seroquel crushed and given in pudding and was effective.  -Continue scheduled 12.5 mg of Seroquel every evening     Need for disposition assistance  -Patient had prolonged hospitalization due to need for disposition assistance.    -His daughter filed for guardianship paperwork was completed in July   -acquired MA application and long-term care placement   -Ultimately will require 24/7 care and likely Memory care placement      Actinic Keratosis  Had surgery for this and prescribed steroids cream and ketoconazole cream, restarted per daughter request previously.     Sinus Bradycardia  Patient was noted to have HR in 50s.  Asymptomatic.  -no telemetry monitoring needed      History of Constipation  -laxatives PRN           Diet: Combination Diet Regular Diet  Snacks/Supplements Pediatric: Ensure Enlive; Between Meals  Room Service    DVT Prophylaxis: Pneumatic Compression Devices  Maddox Catheter: Not present  Lines: None     Cardiac Monitoring: None  Code Status: No CPR- Do NOT Intubate      Clinically Significant Risk Factors Present on Admission                     # Dementia: noted on problem list                  Disposition Plan     Medically Ready for Discharge: Ready Now             Addy Menjivar MD  Hospitalist Service  [facility]  Securely message with [employer] (more info)  Text page via [facility] Paging/Directory   ______________________________________________________________________    Interval History   No new problems.     Physical Exam   Vital Signs: Temp: 97.8  F (36.6  C) Temp src: Oral BP: (!) 145/82 Pulse: 53   Resp: 16 SpO2: 95 % O2 Device: None (Room air)    Weight: 157 lbs 6.4 oz    Exam deferred      Medical Decision Making       20 MINUTES SPENT BY ME on the date of service doing chart review, history, exam, documentation & further activities per the note.      Data         Imaging results reviewed  over the past 24 hrs:   No results found for this or any previous visit (from the past 24 hour(s)).  No lab results found in last 7 days.

## 2024-10-18 PROCEDURE — 99207 PR NO CHARGE LOS: CPT | Performed by: INTERNAL MEDICINE

## 2024-10-18 PROCEDURE — 101N000002 HC CUSTODIAL CARE DAILY

## 2024-10-18 PROCEDURE — 250N000013 HC RX MED GY IP 250 OP 250 PS 637: Performed by: INTERNAL MEDICINE

## 2024-10-18 RX ADMIN — QUETIAPINE FUMARATE 12.5 MG: 25 TABLET ORAL at 21:14

## 2024-10-18 RX ADMIN — KETOCONAZOLE: 20 SHAMPOO, SUSPENSION TOPICAL at 09:18

## 2024-10-18 ASSESSMENT — ACTIVITIES OF DAILY LIVING (ADL)
ADLS_ACUITY_SCORE: 53
ADLS_ACUITY_SCORE: 55
ADLS_ACUITY_SCORE: 53
ADLS_ACUITY_SCORE: 55
ADLS_ACUITY_SCORE: 53

## 2024-10-18 NOTE — PLAN OF CARE
Goal Outcome Evaluation:      Plan of Care Reviewed With: patient          Outcome Evaluation: Remains A&Ox1 - Up with SBA and walker - Impulsive at times. Tolerating PO intake. x1 large stool today. Remains in [facility] care until safe disposition can be determined.      Problem: Adult Inpatient Plan of Care  Goal: Plan of Care Review  Description: The Plan of Care Review/Shift note should be completed every shift.  The Outcome Evaluation is a brief statement about your assessment that the patient is improving, declining, or no change.  This information will be displayed automatically on your shift  note.  Recent Flowsheet Documentation  Taken 10/18/2024 1224 by Arian Duque RN  Outcome Evaluation: Remains A&Ox1 - Up with SBA and walker - Impulsive at times. Tolerating PO intake. x1 large stool today. Remains in [facility] care until safe disposition can be determined.  Plan of Care Reviewed With: patient

## 2024-10-18 NOTE — PROGRESS NOTES
[facility]    Medicine Progress Note - Hospitalist Service    Date of Admission:  10/2/2024    Assessment & Plan   Jemal Gray is an 89 year old male with history of dementia with chronic aphasia. He was admitted on 10/2/2024 after he was brought to the ED by EMS from facility for evaluation of agitation.     He had a prolonged hospitalization from 6/5/24 through 10/2/24 during which he was treated for cellulitis but mostly awaited long-term care placement due to dementia. He required appointment of guardianship.  He discharged to a long-term care facility on 10/2. Upon transfer to the long-term care facility he became agitated with swearing.  He was wandering around the long-term care facility and was entering other patient's rooms.  He was unable to be redirected and required transfer back to the ED.     In the ED he was stable. He was given seroquel.  He required a bedside attendant as he was agitated and unable to be redirected.  He attempted to leave the emergency department and go into other patient's rooms. He was admitted for nursing home cares.  Patient has improved and is no longer agitated. He is medically stable and can be discharged whenever placement is found. Prior long term care facility is not taking patient back.  is looking into memory care units.      Problem list:     Acute agitation, improved  Behavior disturbances   Dementia with chronic aphasia  Suspect triggered by changes in setting and recent move.  No physical complaints or concerns of infection.  Historically required Zyprexa over the summer when he was hospitalized for agitation.  Most recently had been prescribed Seroquel 12.5 mg twice daily as needed although he had not been needing most recently.  -Bedside attendant as needed  -Restarted prior to admission Seroquel at 12.5 mg twice daily as needed, may need to titrate  - ms  -SW consulted  -On 10/6/2024 evening patient confused and  increasing agitation.  Patient refused scheduled medications.  As needed Seroquel crushed and given in pudding and was effective.  -Continue scheduled 12.5 mg of Seroquel every evening     Need for disposition assistance  -Patient had prolonged hospitalization due to need for disposition assistance.    -His daughter filed for guardianship paperwork was completed in July   -acquired MA application and long-term care placement   -Ultimately will require 24/7 care and likely Memory care placement      Actinic Keratosis  Had surgery for this and prescribed steroids cream and ketoconazole cream, restarted per daughter request previously.     Sinus Bradycardia  Patient was noted to have HR in 50s.  Asymptomatic.  -no telemetry monitoring needed      History of Constipation  -laxatives PRN           Diet: Combination Diet Regular Diet  Snacks/Supplements Pediatric: Ensure Enlive; Between Meals  Room Service    DVT Prophylaxis: Pneumatic Compression Devices  Maddox Catheter: Not present  Lines: None     Cardiac Monitoring: None  Code Status: No CPR- Do NOT Intubate      Clinically Significant Risk Factors Present on Admission                     # Dementia: noted on problem list                  Disposition Plan     Medically Ready for Discharge: Ready Now             Addy Menjivar MD  Hospitalist Service  [facility]  Securely message with [employer] (more info)  Text page via [facility] Paging/Directory   ______________________________________________________________________    Interval History   No new problems. Social work is working on discharge.     Physical Exam   Vital Signs:                    Weight: 157 lbs 6.4 oz    GENERAL:  Comfortable. Cooperative.  PSYCH: pleasant, oriented, No acute distress.  EYES: PERRLA, Normal conjunctiva.  HEART:  Regular rate and rhythm. No JVD. Pulses normal. No edema.  LUNGS:  Clear to auscultation, normal Respiratory effort.  ABDOMEN:  Soft, no hepatosplenomegaly,  normal bowel sounds.  EXTREMETIES: No clubbing, cyanosis or ischemia  SKIN:  Dry to touch, No rash.      Medical Decision Making       20 MINUTES SPENT BY ME on the date of service doing chart review, history, exam, documentation & further activities per the note.      Data         Imaging results reviewed over the past 24 hrs:   No results found for this or any previous visit (from the past 24 hour(s)).  No lab results found in last 7 days.

## 2024-10-18 NOTE — PROGRESS NOTES
Care Management Follow Up    Length of Stay (days): 0    Expected Discharge Date: 10/25/2024     Concerns to be Addressed:     Difficulty finding LTC/memory care  Patient plan of care discussed at interdisciplinary rounds: Yes    Anticipated Discharge Disposition:  LTC  Anticipated Discharge Services:  LTC  Anticipated Discharge DME:  LTC    Patient/family educated on Medicare website which has current facility and service quality ratings:  yes  Education Provided on the Discharge Plan:  yes  Patient/Family in Agreement with the Plan:  yes    Referrals Placed by CM/SW:  yes  Private pay costs discussed: Not applicable    Discussed  Partnership in Safe Discharge Planning  document with patient/family: No     Handoff Completed: No, handoff not indicated or clinically appropriate    Additional Information:  JAMAR continues to send referrals for LTC.     NALDO Morris   Inpatient Care Coordination   Supervisor  [facility]  [phone]      NALDO Puentes

## 2024-10-18 NOTE — PLAN OF CARE
1500 -0730    Pt denies pain, CP, and SOB. Last BM 10/18. Voiding spontaneously, but incontinent of urine at times. Regular diet. SBA w/ GB and walker, but refusing both at times. Setting off bed alarm without calling to use restroom. Plan: waiting for placement at [place] w[facility]. Continue w/ POC.     Goal Outcome Evaluation:      Plan of Care Reviewed With: patient    Overall Patient Progress: no changeOverall Patient Progress: no change    Outcome Evaluation: Alert and oriented to self only. Setting off bed alarm to ambulate to bathroom. SBA w/ walker, but refusing at times.      Problem: Adult Inpatient Plan of Care  Goal: Plan of Care Review  Description: The Plan of Care Review/Shift note should be completed every shift.  The Outcome Evaluation is a brief statement about your assessment that the patient is improving, declining, or no change.  This information will be displayed automatically on your shift  note.  Recent Flowsheet Documentation  Taken 10/17/2024 2117 by Columba Thompson, RN  Outcome Evaluation: Alert and oriented to self only. Setting off bed alarm to ambulate to bathroom. SBA w/ walker, but refusing at times.  Plan of Care Reviewed With: patient  Overall Patient Progress: no change  Goal: Absence of Hospital-Acquired Illness or Injury  Intervention: Identify and Manage Fall Risk  Recent Flowsheet Documentation  Taken 10/17/2024 1654 by Columba Thompson RN  Safety Promotion/Fall Prevention: safety round/check completed  Intervention: Prevent Skin Injury  Recent Flowsheet Documentation  Taken 10/17/2024 2018 by Columba Thompson, RN  Body Position: position changed independently  Taken 10/17/2024 1726 by Columba Thompson RN  Body Position: position changed independently  Taken 10/17/2024 1653 by Columba Thompson RN  Body Position: position changed independently  Intervention: Prevent and Manage VTE (Venous Thromboembolism) Risk  Recent Flowsheet Documentation  Taken 10/17/2024 1654 by Columba Thompson  "RN  VTE Prevention/Management:   SCDs off (sequential compression devices)   patient refused intervention  Intervention: Prevent Infection  Recent Flowsheet Documentation  Taken 10/17/2024 1654 by Columba Thompson RN  Infection Prevention:   single patient room provided   rest/sleep promoted   personal protective equipment utilized   hand hygiene promoted   environmental surveillance performed   equipment surfaces disinfected   cohorting utilized     Problem: Adult Inpatient Plan of Care  Goal: Plan of Care Review  Description: The Plan of Care Review/Shift note should be completed every shift.  The Outcome Evaluation is a brief statement about your assessment that the patient is improving, declining, or no change.  This information will be displayed automatically on your shift  note.  Outcome: Progressing  Flowsheets (Taken 10/17/2024 2117)  Outcome Evaluation: Alert and oriented to self only. Setting off bed alarm to ambulate to bathroom. SBA w/ walker, but refusing at times.  Plan of Care Reviewed With: patient  Overall Patient Progress: no change  Goal: Patient-Specific Goal (Individualized)  Description: You can add care plan individualizations to a care plan. Examples of Individualization might be:  \"Parent requests to be called daily at 9am for status\", \"I have a hard time hearing out of my right ear\", or \"Do not touch me to wake me up as it startles  me\".  Outcome: Progressing  Goal: Absence of Hospital-Acquired Illness or Injury  Outcome: Progressing  Intervention: Identify and Manage Fall Risk  Recent Flowsheet Documentation  Taken 10/17/2024 1654 by Columba Thompson RN  Safety Promotion/Fall Prevention: safety round/check completed  Intervention: Prevent Skin Injury  Recent Flowsheet Documentation  Taken 10/17/2024 2018 by Columba Thompson, RN  Body Position: position changed independently  Taken 10/17/2024 1726 by Columba Thompson, RN  Body Position: position changed independently  Taken 10/17/2024 1653 by " Columba Thompson RN  Body Position: position changed independently  Intervention: Prevent and Manage VTE (Venous Thromboembolism) Risk  Recent Flowsheet Documentation  Taken 10/17/2024 1654 by Columba Thompson RN  VTE Prevention/Management:   SCDs off (sequential compression devices)   patient refused intervention  Intervention: Prevent Infection  Recent Flowsheet Documentation  Taken 10/17/2024 1654 by Columba Thompson, RN  Infection Prevention:   single patient room provided   rest/sleep promoted   personal protective equipment utilized   hand hygiene promoted   environmental surveillance performed   equipment surfaces disinfected   cohorting utilized  Goal: Optimal Comfort and Wellbeing  Outcome: Progressing  Goal: Readiness for Transition of Care  Outcome: Progressing     Problem: Fall Injury Risk  Goal: Absence of Fall and Fall-Related Injury  Intervention: Identify and Manage Contributors  Recent Flowsheet Documentation  Taken 10/17/2024 1654 by Columba Thompson RN  Medication Review/Management: medications reviewed  Intervention: Promote Injury-Free Environment  Recent Flowsheet Documentation  Taken 10/17/2024 1654 by Columba Thompson RN  Safety Promotion/Fall Prevention: safety round/check completed     Problem: Skin Injury Risk Increased  Goal: Skin Health and Integrity  Intervention: Plan: Nurse Driven Intervention: Moisture Management  Recent Flowsheet Documentation  Taken 10/17/2024 1654 by Columba Thompson RN  Moisture Interventions: Encourage regular toileting  Intervention: Plan: Nurse Driven Intervention: Friction and Shear  Recent Flowsheet Documentation  Taken 10/17/2024 1654 by Columba Thompson RN  Friction/Shear Interventions: HOB 30 degrees or less  Intervention: Optimize Skin Protection  Recent Flowsheet Documentation  Taken 10/17/2024 2018 by Columba Thompson RN  Activity Management:   ambulated to bathroom   back to bed  Head of Bed (HOB) Positioning: HOB at 20-30 degrees  Taken 10/17/2024  1726 by Columba Thompson, RN  Activity Management: (refusued to go to chair for dinner)   ambulated to bathroom   back to bed  Head of Bed (HOB) Positioning: HOB at 30-45 degrees  Taken 10/17/2024 1653 by Columba Thompson, RN  Activity Management: activity adjusted per tolerance  Head of Bed (HOB) Positioning: HOB at 20 degrees     Problem: Dementia Signs/Symptoms  Goal: Improved Behavioral Control (Dementia Signs/Symptoms)  Outcome: Progressing  Goal: Improved Mood Symptoms (Dementia Signs/Symptoms)  Outcome: Progressing  Goal: Optimized Cognitive Function (Dementia Signs/Symptoms)  Outcome: Progressing  Goal: Optimized Oral Intake (Dementia Signs/Symptoms)  Outcome: Progressing  Goal: Improved Sleep (Dementia Signs/Symptoms)  Outcome: Progressing

## 2024-10-19 PROCEDURE — 101N000002 HC CUSTODIAL CARE DAILY

## 2024-10-19 PROCEDURE — 250N000013 HC RX MED GY IP 250 OP 250 PS 637: Performed by: INTERNAL MEDICINE

## 2024-10-19 PROCEDURE — 99232 SBSQ HOSP IP/OBS MODERATE 35: CPT | Performed by: INTERNAL MEDICINE

## 2024-10-19 RX ADMIN — QUETIAPINE FUMARATE 12.5 MG: 25 TABLET ORAL at 19:36

## 2024-10-19 ASSESSMENT — ACTIVITIES OF DAILY LIVING (ADL)
ADLS_ACUITY_SCORE: 50
ADLS_ACUITY_SCORE: 53
ADLS_ACUITY_SCORE: 54
ADLS_ACUITY_SCORE: 54
ADLS_ACUITY_SCORE: 53
ADLS_ACUITY_SCORE: 54
ADLS_ACUITY_SCORE: 54
ADLS_ACUITY_SCORE: 53
ADLS_ACUITY_SCORE: 54
ADLS_ACUITY_SCORE: 54
ADLS_ACUITY_SCORE: 53
ADLS_ACUITY_SCORE: 54
ADLS_ACUITY_SCORE: 53
ADLS_ACUITY_SCORE: 54
ADLS_ACUITY_SCORE: 53
ADLS_ACUITY_SCORE: 54

## 2024-10-19 NOTE — PROGRESS NOTES
[id number]    Pt had a restful night, no issues. Set off bed alarm a few times to go to the bathroom. SBA. Regular diet.

## 2024-10-19 NOTE — PLAN OF CARE
Pt alert to self. Denies pain. Pleasant. Alarms in place. SBA w/ GB and walker. Good appeitite. 4 bowel movements this shift. Awaiting placement for LTC.    Goal Outcome Evaluation:      Plan of Care Reviewed With: patient    Overall Patient Progress: no changeOverall Patient Progress: no change    Outcome Evaluation: SBA, multiple BMs today. no pain. awaiting placement.      Problem: Adult Inpatient Plan of Care  Goal: Plan of Care Review  Description: The Plan of Care Review/Shift note should be completed every shift.  The Outcome Evaluation is a brief statement about your assessment that the patient is improving, declining, or no change.  This information will be displayed automatically on your shift  note.  Recent Flowsheet Documentation  Taken 10/19/2024 1422 by Rebekah Bello RN  Outcome Evaluation: SBA, multiple BMs today. no pain. awaiting placement.  Plan of Care Reviewed With: patient  Overall Patient Progress: no change  Goal: Absence of Hospital-Acquired Illness or Injury  Intervention: Identify and Manage Fall Risk  Recent Flowsheet Documentation  Taken 10/19/2024 1404 by Rebekah Bello RN  Safety Promotion/Fall Prevention: safety round/check completed  Taken 10/19/2024 1334 by Rebekah Bello RN  Safety Promotion/Fall Prevention: safety round/check completed  Taken 10/19/2024 1152 by Rebekah Bello RN  Safety Promotion/Fall Prevention: safety round/check completed  Taken 10/19/2024 0921 by Rebekah Bello RN  Safety Promotion/Fall Prevention: safety round/check completed  Taken 10/19/2024 0730 by Rebekah Bello RN  Safety Promotion/Fall Prevention: safety round/check completed  Intervention: Prevent Skin Injury  Recent Flowsheet Documentation  Taken 10/19/2024 1403 by Rebekah Bello RN  Body Position: position changed independently  Taken 10/19/2024 1334 by Rebekah Bello RN  Body Position: position changed independently

## 2024-10-19 NOTE — PROGRESS NOTES
[facility]    Medicine Progress Note - Hospitalist Service    Date of Admission:  10/2/2024    Assessment & Plan   Jemal Gray is an 89 year old male with history of dementia with chronic aphasia. He was admitted on 10/2/2024 after he was brought to the ED by EMS from facility for evaluation of agitation.     He had a prolonged hospitalization from 6/5/24 through 10/2/24 during which he was treated for cellulitis but mostly awaited long-term care placement due to dementia. He required appointment of guardianship.  He discharged to a long-term care facility on 10/2. Upon transfer to the long-term care facility he became agitated with swearing.  He was wandering around the long-term care facility and was entering other patient's rooms.  He was unable to be redirected and required transfer back to the ED.     In the ED he was stable. He was given seroquel.  He required a bedside attendant as he was agitated and unable to be redirected.  He attempted to leave the emergency department and go into other patient's rooms. He was admitted for [facility] cares.  Patient has improved and is no longer agitated. He is medically stable and can be discharged whenever placement is found. Prior long term care facility is not taking patient back.  is looking into memory care units.      Problem list:     Acute agitation, improved  Behavior disturbances   Dementia with chronic aphasia  Suspect triggered by changes in setting and recent move.  No physical complaints or concerns of infection.  Historically required Zyprexa over the summer when he was hospitalized for agitation.  Most recently had been prescribed Seroquel 12.5 mg twice daily as needed although he had not been needing most recently.  -Bedside attendant as needed  -Restarted prior to admission Seroquel at 12.5 mg twice daily as needed, may need to titrate  - ms  -SW consulted  -On 10/6/2024 evening patient confused and  increasing agitation.  Patient refused scheduled medications.  As needed Seroquel crushed and given in pudding and was effective.  -Continue scheduled 12.5 mg of Seroquel every evening     Need for disposition assistance  -Patient had prolonged hospitalization due to need for disposition assistance.    -His daughter filed for guardianship paperwork was completed in July   -acquired MA application and long-term care placement   -Ultimately will require 24/7 care and likely Memory care placement      Actinic Keratosis  Had surgery for this and prescribed steroids cream and ketoconazole cream, restarted per daughter request previously.     Sinus Bradycardia  Patient was noted to have HR in 50s.  Asymptomatic.  -no telemetry monitoring needed      History of Constipation  -laxatives PRN     Care plan  -Social work working on placement.  -Given patient is unreliable historian will check routine labs in the morning as it has been a while.          Diet: Combination Diet Regular Diet  Snacks/Supplements Pediatric: Ensure Enlive; Between Meals  Room Service    DVT Prophylaxis: Pneumatic Compression Devices  Maddox Catheter: Not present  Lines: None     Cardiac Monitoring: None  Code Status: No CPR- Do NOT Intubate      Clinically Significant Risk Factors Present on Admission                     # Dementia: noted on problem list                  Disposition Plan     Medically Ready for Discharge: Ready Now             Addy Menjivar MD  Hospitalist Service  [facility]  Securely message with [employer] (more info)  Text page via [employer] Paging/Directory   ______________________________________________________________________    Interval History   Remains in hospital.  Stable.  No changes to condition today.  No complaints.    Physical Exam   Vital Signs: Temp: 98.7  F (37.1  C) Temp src: Oral BP: 120/68 Pulse: 51   Resp: 16 SpO2: 97 % O2 Device: None (Room air)    Weight: 157 lbs 6.4 oz  GENERAL:   Comfortable. Cooperative.  Poor memory.  PSYCH: No acute distress.  EYES: PERRLA, Normal conjunctiva.  HEART:  Regular rate and rhythm. No JVD. Pulses normal. No edema.  LUNGS:  Clear to auscultation, normal Respiratory effort.  ABDOMEN:  Soft, no hepatosplenomegaly, normal bowel sounds.  EXTREMETIES: No clubbing, cyanosis or ischemia  SKIN:  Dry to touch, No rash.        Medical Decision Making 25 minutes spent on patient care            Data no new labs or data

## 2024-10-20 LAB
ANION GAP SERPL CALCULATED.3IONS-SCNC: 12 MMOL/L (ref 7–15)
BUN SERPL-MCNC: 23.6 MG/DL (ref 8–23)
CALCIUM SERPL-MCNC: 8.5 MG/DL (ref 8.8–10.4)
CHLORIDE SERPL-SCNC: 105 MMOL/L (ref 98–107)
CREAT SERPL-MCNC: 0.94 MG/DL (ref 0.67–1.17)
EGFRCR SERPLBLD CKD-EPI 2021: 78 ML/MIN/1.73M2
ERYTHROCYTE [DISTWIDTH] IN BLOOD BY AUTOMATED COUNT: 14 % (ref 10–15)
GLUCOSE SERPL-MCNC: 116 MG/DL (ref 70–99)
HCO3 SERPL-SCNC: 24 MMOL/L (ref 22–29)
HCT VFR BLD AUTO: 37.8 % (ref 40–53)
HGB BLD-MCNC: 12.2 G/DL (ref 13.3–17.7)
MCH RBC QN AUTO: 30.8 PG (ref 26.5–33)
MCHC RBC AUTO-ENTMCNC: 32.3 G/DL (ref 31.5–36.5)
MCV RBC AUTO: 96 FL (ref 78–100)
PLATELET # BLD AUTO: 279 10E3/UL (ref 150–450)
POTASSIUM SERPL-SCNC: 3.9 MMOL/L (ref 3.4–5.3)
RBC # BLD AUTO: 3.96 10E6/UL (ref 4.4–5.9)
SODIUM SERPL-SCNC: 141 MMOL/L (ref 135–145)
WBC # BLD AUTO: 9.7 10E3/UL (ref 4–11)

## 2024-10-20 PROCEDURE — 36415 COLL VENOUS BLD VENIPUNCTURE: CPT | Performed by: INTERNAL MEDICINE

## 2024-10-20 PROCEDURE — 101N000002 HC CUSTODIAL CARE DAILY

## 2024-10-20 PROCEDURE — 85027 COMPLETE CBC AUTOMATED: CPT | Performed by: INTERNAL MEDICINE

## 2024-10-20 PROCEDURE — 80048 BASIC METABOLIC PNL TOTAL CA: CPT | Performed by: INTERNAL MEDICINE

## 2024-10-20 PROCEDURE — 250N000013 HC RX MED GY IP 250 OP 250 PS 637: Performed by: PHYSICIAN ASSISTANT

## 2024-10-20 PROCEDURE — 99207 PR NO BILLABLE SERVICE THIS VISIT: CPT | Performed by: INTERNAL MEDICINE

## 2024-10-20 RX ADMIN — QUETIAPINE FUMARATE 12.5 MG: 25 TABLET ORAL at 11:36

## 2024-10-20 ASSESSMENT — ACTIVITIES OF DAILY LIVING (ADL)
ADLS_ACUITY_SCORE: 54
ADLS_ACUITY_SCORE: 55
ADLS_ACUITY_SCORE: 54
ADLS_ACUITY_SCORE: 54
ADLS_ACUITY_SCORE: 55
ADLS_ACUITY_SCORE: 54
ADLS_ACUITY_SCORE: 55
ADLS_ACUITY_SCORE: 55
ADLS_ACUITY_SCORE: 54
ADLS_ACUITY_SCORE: 54
ADLS_ACUITY_SCORE: 55
ADLS_ACUITY_SCORE: 54
ADLS_ACUITY_SCORE: 55
ADLS_ACUITY_SCORE: 55
ADLS_ACUITY_SCORE: 54
ADLS_ACUITY_SCORE: 55
ADLS_ACUITY_SCORE: 54
ADLS_ACUITY_SCORE: 55

## 2024-10-20 NOTE — PLAN OF CARE
"Pt alert to self only. On RA. No signs of pain noted. Agitated this shift, able to redirect, no PRN needed. Up with SBA, gait belt and walker. Voiding adequately, incontinent at times. No IV access. Tolerating diet, poor appetite. Awaiting placement.   Problem: Adult Inpatient Plan of Care  Goal: Plan of Care Review  Description: The Plan of Care Review/Shift note should be completed every shift.  The Outcome Evaluation is a brief statement about your assessment that the patient is improving, declining, or no change.  This information will be displayed automatically on your shift  note.  Outcome: Progressing  Flowsheets (Taken 10/20/2024 0618)  Outcome Evaluation: Pt alert to self only. On RA. No signs of pain noted. Agitated this shift, able to redirect, no PRN needed. Up with SBA, gait belt and walker. Voiding adequately, incontinent at times. No IV access. Tolerating diet, poor appetite. Awaiting placement.  Plan of Care Reviewed With: patient  Overall Patient Progress: no change  Goal: Patient-Specific Goal (Individualized)  Description: You can add care plan individualizations to a care plan. Examples of Individualization might be:  \"Parent requests to be called daily at 9am for status\", \"I have a hard time hearing out of my right ear\", or \"Do not touch me to wake me up as it startles  me\".  Outcome: Progressing  Goal: Absence of Hospital-Acquired Illness or Injury  Outcome: Progressing  Intervention: Identify and Manage Fall Risk  Recent Flowsheet Documentation  Taken 10/19/2024 2153 by Annamarie Nathan RN  Safety Promotion/Fall Prevention:   activity supervised   assistive device/personal items within reach   safety round/check completed   supervised activity  Intervention: Prevent Skin Injury  Recent Flowsheet Documentation  Taken 10/19/2024 2153 by Annamarie Nathan, RN  Body Position: position changed independently  Taken 10/19/2024 2030 by Annamarie Nathan, RN  Skin Protection: adhesive use " limited  Device Skin Pressure Protection: absorbent pad utilized/changed  Intervention: Prevent Infection  Recent Flowsheet Documentation  Taken 10/19/2024 2153 by Annamarie Nathan, RN  Infection Prevention:   environmental surveillance performed   cohorting utilized   equipment surfaces disinfected   hand hygiene promoted   rest/sleep promoted   single patient room provided  Goal: Optimal Comfort and Wellbeing  Outcome: Progressing  Intervention: Monitor Pain and Promote Comfort  Recent Flowsheet Documentation  Taken 10/19/2024 2030 by Annamarie Nathan, RN  Pain Management Interventions: declines  Goal: Readiness for Transition of Care  Outcome: Progressing     Problem: Dementia Signs/Symptoms  Goal: Improved Behavioral Control (Dementia Signs/Symptoms)  Outcome: Progressing  Goal: Improved Mood Symptoms (Dementia Signs/Symptoms)  Outcome: Progressing  Goal: Optimized Cognitive Function (Dementia Signs/Symptoms)  Outcome: Progressing  Goal: Optimized Oral Intake (Dementia Signs/Symptoms)  Outcome: Progressing  Goal: Improved Sleep (Dementia Signs/Symptoms)  Outcome: Progressing     Problem: Infection  Goal: Absence of Infection Signs and Symptoms  Outcome: Progressing   Goal Outcome Evaluation:      Plan of Care Reviewed With: patient    Overall Patient Progress: no changeOverall Patient Progress: no change    Outcome Evaluation: Pt alert to self only. On RA. No signs of pain noted. Agitated this shift, able to redirect, no PRN needed. Up with SBA, gait belt and walker. Voiding adequately, incontinent at times. No IV access. Tolerating diet, poor appetite. Awaiting placement.

## 2024-10-20 NOTE — PLAN OF CARE
Pt alert and disoriented x 3. Confused. Pt agitated this morning - prn Seroquel given. Pt has been calm this afternoon since seroquel. Waiting placement for LTC.     Goal Outcome Evaluation:      Plan of Care Reviewed With: patient    Overall Patient Progress: no changeOverall Patient Progress: no change    Outcome Evaluation: agitated today, prn seroquel given      Problem: Adult Inpatient Plan of Care  Goal: Plan of Care Review  Description: The Plan of Care Review/Shift note should be completed every shift.  The Outcome Evaluation is a brief statement about your assessment that the patient is improving, declining, or no change.  This information will be displayed automatically on your shift  note.  Recent Flowsheet Documentation  Taken 10/20/2024 1138 by Rebekah Bello RN  Outcome Evaluation: agitated today, prn seroquel given  Plan of Care Reviewed With: patient  Overall Patient Progress: no change  Goal: Absence of Hospital-Acquired Illness or Injury  Intervention: Identify and Manage Fall Risk  Recent Flowsheet Documentation  Taken 10/20/2024 1137 by Rebekah Bello, RN  Safety Promotion/Fall Prevention: safety round/check completed  Taken 10/20/2024 0943 by Rebekah Bello, RN  Safety Promotion/Fall Prevention: safety round/check completed  Taken 10/20/2024 0720 by Rebekah Bello, RN  Safety Promotion/Fall Prevention: safety round/check completed

## 2024-10-20 NOTE — PROGRESS NOTES
[facility]    Hospitalist Progress Note  Name: Jemal Gray    MRN: [id number]  Provider:  Jemal Arrieta DO  Date of Service: 10/20/2024    Summary of Stay: Jemal Gray is an 89 year old male with history of dementia with chronic aphasia. He was admitted on 10/2/2024 after he was brought to the ED by EMS from facility for evaluation of agitation.     He had a prolonged hospitalization from 6/5/24 through 10/2/24 during which he was treated for cellulitis but mostly awaited long-term care placement due to dementia. He required appointment of guardianship.  He discharged to a long-term care facility on 10/2. Upon transfer to the long-term care facility he became agitated with swearing.  He was wandering around the long-term care facility and was entering other patient's rooms.  He was unable to be redirected and required transfer back to the ED.     In the ED he was stable. He was given seroquel.  He required a bedside attendant as he was agitated and unable to be redirected.  He attempted to leave the emergency department and go into other patient's rooms. He was admitted for [facility] cares.  Patient has improved and is no longer agitated. He is medically stable and can be discharged whenever placement is found. Prior long term care facility is not taking patient back.  is looking into memory care units.      Problem list:     Acute agitation, improved  Behavior disturbances   Dementia with chronic aphasia  Suspect triggered by changes in setting and recent move.  No physical complaints or concerns of infection.  Historically required Zyprexa over the summer when he was hospitalized for agitation.  Most recently had been prescribed Seroquel 12.5 mg twice daily as needed although he had not been needing most recently.  -Bedside attendant as needed  -Restarted prior to admission Seroquel at 12.5 mg twice daily as needed, may need to titrate  - ms  -SW consulted  -On  10/6/2024 evening patient confused and increasing agitation.  Patient refused scheduled medications.  As needed Seroquel crushed and given in pudding and was effective.  -Continue scheduled 12.5 mg of Seroquel every evening     Need for disposition assistance  -Patient had prolonged hospitalization due to need for disposition assistance.    -His daughter filed for guardianship paperwork was completed in July   -acquired MA application and long-term care placement   -Ultimately will require 24/7 care and likely Memory care placement      Actinic Keratosis  Had surgery for this and prescribed steroids cream and ketoconazole cream, restarted per daughter request previously.     Sinus Bradycardia  Patient was noted to have HR in 50s.  Asymptomatic.  -no telemetry monitoring needed      History of Constipation  -laxatives PRN      Care plan  -Social work working on placement.  -Given patient is unreliable historian will check routine labs in the morning as it has been a while.     I spent 20 minutes in reviewing this patient's labs, imaging, medications, medical history.  In addition time was spent interviewing the patient, communicating with family, and medical decision making.     DVT Prophylaxis: Pneumatic Compression Devices  Code Status: No CPR- Do NOT Intubate  Diet: Combination Diet Regular Diet  Snacks/Supplements Pediatric: Ensure Enlive; Between Meals  Room Service    Maddox Catheter: Not present    Disposition: Medically Ready for Discharge: Ready Now    Goals to discharge include: placement obtained  Family updated today: No     Interval History   Pt not seen nor examined.  No issues.    -Data reviewed today: I personally reviewed all new labs and imaging results over the last 24 hours.     Physical Exam   Temp: 98.4  F (36.9  C) Temp src: Oral BP: 131/70 Pulse: 61   Resp: 18 SpO2: 97 % O2 Device: None (Room air)    Vitals:    10/02/24 2214   Weight: 71.4 kg (157 lb 6.4 oz)     Vital Signs with Ranges  Temp:   "[98.4  F (36.9  C)-98.5  F (36.9  C)] 98.4  F (36.9  C)  Pulse:  [61-74] 61  Resp:  [16-18] 18  BP: (110-131)/(61-70) 131/70  SpO2:  [97 %-99 %] 97 %  No intake/output data recorded.    No physical exam completed    Medications   Current Facility-Administered Medications   Medication Dose Route Frequency Provider Last Rate Last Admin     Current Facility-Administered Medications   Medication Dose Route Frequency Provider Last Rate Last Admin    ketoconazole (NIZORAL) 2 % shampoo   Topical Q Mon Wed Fri AM Liliana Vargas PA-C   Given at 10/18/24 0918    QUEtiapine (SEROquel) half-tab 12.5 mg  12.5 mg Oral QPM Edna Kelly MD   12.5 mg at 10/1935     Data     Laboratory:  Recent Labs   Lab 10/20/24  0558   WBC 9.7   HGB 12.2*   HCT 37.8*   MCV 96        Recent Labs   Lab 10/20/24  0558      POTASSIUM 3.9   CHLORIDE 105   CO2 24   ANIONGAP 12   *   BUN 23.6*   CR 0.94   GFRESTIMATED 78   LEON 8.5*     No results for input(s): \"CULT\" in the last 168 hours.  No results found for: \"TROPI\"    Imaging:  No results found for this or any previous visit (from the past 24 hour(s)).      Jemal Arrieta DO  [facility] Hospitalist  [address] [place] [address]  Securely message with [employer] (more info)  Text page via [employer] Paging/Directory   10/20/2024   "

## 2024-10-21 PROCEDURE — 250N000013 HC RX MED GY IP 250 OP 250 PS 637: Performed by: INTERNAL MEDICINE

## 2024-10-21 PROCEDURE — 250N000013 HC RX MED GY IP 250 OP 250 PS 637: Performed by: PHYSICIAN ASSISTANT

## 2024-10-21 PROCEDURE — 101N000002 HC CUSTODIAL CARE DAILY

## 2024-10-21 PROCEDURE — 99232 SBSQ HOSP IP/OBS MODERATE 35: CPT | Performed by: INTERNAL MEDICINE

## 2024-10-21 RX ADMIN — Medication 1 MG: at 22:09

## 2024-10-21 RX ADMIN — QUETIAPINE FUMARATE 12.5 MG: 25 TABLET ORAL at 22:09

## 2024-10-21 RX ADMIN — KETOCONAZOLE: 20 SHAMPOO, SUSPENSION TOPICAL at 10:55

## 2024-10-21 ASSESSMENT — ACTIVITIES OF DAILY LIVING (ADL)
ADLS_ACUITY_SCORE: 53
ADLS_ACUITY_SCORE: 55
ADLS_ACUITY_SCORE: 53
ADLS_ACUITY_SCORE: 55
ADLS_ACUITY_SCORE: 53
ADLS_ACUITY_SCORE: 55
ADLS_ACUITY_SCORE: 55
ADLS_ACUITY_SCORE: 53
ADLS_ACUITY_SCORE: 55
ADLS_ACUITY_SCORE: 53
ADLS_ACUITY_SCORE: 55
ADLS_ACUITY_SCORE: 55
ADLS_ACUITY_SCORE: 53
ADLS_ACUITY_SCORE: 55
ADLS_ACUITY_SCORE: 55
ADLS_ACUITY_SCORE: 53
ADLS_ACUITY_SCORE: 55

## 2024-10-21 NOTE — PLAN OF CARE
"VSS. Up with walker in hallway. Confused. pleasant. Waiting for placement LTC with Memory care.       Goal Outcome Evaluation:      Plan of Care Reviewed With: patient          Outcome Evaluation: Confused. pleasant. Waiting for placement LTC with Memory care      Problem: Adult Inpatient Plan of Care  Goal: Plan of Care Review  Description: The Plan of Care Review/Shift note should be completed every shift.  The Outcome Evaluation is a brief statement about your assessment that the patient is improving, declining, or no change.  This information will be displayed automatically on your shift  note.  Outcome: Progressing  Flowsheets (Taken 10/21/2024 1500)  Outcome Evaluation: Confused. pleasant. Waiting for placement LTC with Memory care  Plan of Care Reviewed With: patient  Goal: Patient-Specific Goal (Individualized)  Description: You can add care plan individualizations to a care plan. Examples of Individualization might be:  \"Parent requests to be called daily at 9am for status\", \"I have a hard time hearing out of my right ear\", or \"Do not touch me to wake me up as it startles  me\".  Outcome: Progressing  Goal: Absence of Hospital-Acquired Illness or Injury  Outcome: Progressing  Intervention: Identify and Manage Fall Risk  Recent Flowsheet Documentation  Taken 10/21/2024 0830 by Janine Cantu RN  Safety Promotion/Fall Prevention: activity supervised  Intervention: Prevent Skin Injury  Recent Flowsheet Documentation  Taken 10/21/2024 0830 by Janine Cantu RN  Body Position: turned  Goal: Optimal Comfort and Wellbeing  Outcome: Progressing  Goal: Readiness for Transition of Care  Outcome: Progressing     Problem: Dementia Signs/Symptoms  Goal: Improved Behavioral Control (Dementia Signs/Symptoms)  Outcome: Progressing  Goal: Improved Mood Symptoms (Dementia Signs/Symptoms)  Outcome: Progressing  Intervention: Optimize Emotion and Mood  Recent Flowsheet Documentation  Taken 10/21/2024 0830 by Janine Cantu" RN  Supportive Measures: active listening utilized  Goal: Optimized Cognitive Function (Dementia Signs/Symptoms)  Outcome: Progressing  Goal: Optimized Oral Intake (Dementia Signs/Symptoms)  Outcome: Progressing  Goal: Improved Sleep (Dementia Signs/Symptoms)  Outcome: Progressing     Problem: Infection  Goal: Absence of Infection Signs and Symptoms  Outcome: Progressing

## 2024-10-21 NOTE — PLAN OF CARE
"  Problem: Adult Inpatient Plan of Care  Goal: Plan of Care Review  Description: The Plan of Care Review/Shift note should be completed every shift.  The Outcome Evaluation is a brief statement about your assessment that the patient is improving, declining, or no change.  This information will be displayed automatically on your shift  note.  Outcome: Progressing  Goal: Patient-Specific Goal (Individualized)  Description: You can add care plan individualizations to a care plan. Examples of Individualization might be:  \"Parent requests to be called daily at 9am for status\", \"I have a hard time hearing out of my right ear\", or \"Do not touch me to wake me up as it startles  me\".  Outcome: Progressing  Goal: Absence of Hospital-Acquired Illness or Injury  Outcome: Progressing  Goal: Optimal Comfort and Wellbeing  Outcome: Progressing  Goal: Readiness for Transition of Care  Outcome: Progressing     Problem: Dementia Signs/Symptoms  Goal: Improved Behavioral Control (Dementia Signs/Symptoms)  Outcome: Progressing  Goal: Improved Mood Symptoms (Dementia Signs/Symptoms)  Outcome: Progressing  Goal: Optimized Cognitive Function (Dementia Signs/Symptoms)  Outcome: Progressing  Goal: Optimized Oral Intake (Dementia Signs/Symptoms)  Outcome: Progressing  Goal: Improved Sleep (Dementia Signs/Symptoms)  Outcome: Progressing     Problem: Infection  Goal: Absence of Infection Signs and Symptoms  Outcome: Progressing   Goal Outcome Evaluation:         Plan for placement of pt to memory care.                 "

## 2024-10-21 NOTE — PROGRESS NOTES
[facility]    Hospitalist Progress Note  Name: Jemal Gray    MRN: [id number]  Provider:  Fred Fonseca MD    Date of Service: 10/21/2024    Summary of Stay: Jemal Gray is an 89 year old male with history of dementia with chronic aphasia. He was admitted on 10/2/2024 after he was brought to the ED by EMS from facility for evaluation of agitation.     He had a prolonged hospitalization from 6/5/24 through 10/2/24 during which he was treated for cellulitis but mostly awaited long-term care placement due to dementia. He required appointment of guardianship.  He discharged to a long-term care facility on 10/2. Upon transfer to the long-term care facility he became agitated with swearing.  He was wandering around the long-term care facility and was entering other patient's rooms.  He was unable to be redirected and required transfer back to the ED.     In the ED he was stable. He was given seroquel.  He required a bedside attendant as he was agitated and unable to be redirected.  He attempted to leave the emergency department and go into other patient's rooms. He was admitted for [facility] cares.  Patient has improved and is no longer agitated. He is medically stable and can be discharged whenever placement is found. Prior long term care facility is not taking patient back.  is looking into memory care units.      Problem list:     Acute agitation, improved  Behavior disturbances   Dementia with chronic aphasia  Suspect triggered by changes in setting and recent move.  No physical complaints or concerns of infection.  Historically required Zyprexa over the summer when he was hospitalized for agitation.  Most recently had been prescribed Seroquel 12.5 mg twice daily as needed although he had not been needing most recently.  -Bedside attendant as needed  -Restarted prior to admission Seroquel at 12.5 mg twice daily as needed, may need to titrate  - ms  -SW  consulted  -On 10/6/2024 evening patient confused and increasing agitation.  Patient refused scheduled medications.  As needed Seroquel crushed and given in pudding and was effective.  -Continue scheduled 12.5 mg of Seroquel every evening     Need for disposition assistance  -Patient had prolonged hospitalization due to need for disposition assistance.    -His daughter filed for guardianship paperwork was completed in July   -acquired MA application and long-term care placement   -Ultimately will require 24/7 care and likely Memory care placement      Actinic Keratosis  Had surgery for this and prescribed steroids cream and ketoconazole cream, restarted per daughter request previously.     Sinus Bradycardia  Patient was noted to have HR in 50s.  Asymptomatic.  -no telemetry monitoring needed      History of Constipation  -laxatives PRN      Care plan  -Social work working on placement.  -Given patient is unreliable historian will check routine labs in the morning as it has been a while.     I spent 20 minutes in reviewing this patient's labs, imaging, medications, medical history.  In addition time was spent interviewing the patient, communicating with family, and medical decision making.     DVT Prophylaxis: Pneumatic Compression Devices  Code Status: No CPR- Do NOT Intubate  Diet: Combination Diet Regular Diet  Snacks/Supplements Pediatric: Ensure Enlive; Between Meals  Room Service    Maddox Catheter: Not present    Disposition: Medically Ready for Discharge: Ready Now    Goals to discharge include: placement obtained  Family updated today: No     Interval History   No new issues.  Resting comfortably listening to music.    -Data reviewed today: I personally reviewed all new labs and imaging results over the last 24 hours.     Physical Exam   Temp: 98.4  F (36.9  C) Temp src: Oral BP: 131/70 Pulse: 61   Resp: 18 SpO2: 97 % O2 Device: None (Room air)    Vitals:    10/02/24 2214   Weight: 71.4 kg (157 lb 6.4 oz)  "    Vital Signs with Ranges  Temp:  [98.4  F (36.9  C)] 98.4  F (36.9  C)  Pulse:  [61] 61  Resp:  [18] 18  BP: (131)/(70) 131/70  SpO2:  [97 %] 97 %  I/O last 3 completed shifts:  In: 600 [P.O.:600]  Out: -     General: Alert, awake, no acute distress.  HEENT: NC/AT, eyes anicteric, external occular movements intact, face symmetric.   Psych: Appropriate affect.  Calm.      Medications   Current Facility-Administered Medications   Medication Dose Route Frequency Provider Last Rate Last Admin     Current Facility-Administered Medications   Medication Dose Route Frequency Provider Last Rate Last Admin    ketoconazole (NIZORAL) 2 % shampoo   Topical Q Mon Wed Fri AM Liliana Vargas PA-C   Given at 10/18/24 0918    QUEtiapine (SEROquel) half-tab 12.5 mg  12.5 mg Oral QPM Edna Kelly MD   12.5 mg at 10/1935     Data     Laboratory:  Recent Labs   Lab 10/20/24  0558   WBC 9.7   HGB 12.2*   HCT 37.8*   MCV 96        Recent Labs   Lab 10/20/24  0558      POTASSIUM 3.9   CHLORIDE 105   CO2 24   ANIONGAP 12   *   BUN 23.6*   CR 0.94   GFRESTIMATED 78   LEON 8.5*     No results for input(s): \"CULT\" in the last 168 hours.  No results found for: \"TROPI\"    Imaging:  No results found for this or any previous visit (from the past 24 hour(s)).    Fred Fonseca MD    "

## 2024-10-21 NOTE — PLAN OF CARE
Goal Outcome Evaluation:      Plan of Care Reviewed With: patient    Overall Patient Progress: improvingOverall Patient Progress: improving    Outcome Evaluation: Slept during night with periodically use of bathroom. Discharge pending plecement      Problem: Adult Inpatient Plan of Care  Goal: Plan of Care Review  Description: The Plan of Care Review/Shift note should be completed every shift.  The Outcome Evaluation is a brief statement about your assessment that the patient is improving, declining, or no change.  This information will be displayed automatically on your shift  note.  Recent Flowsheet Documentation  Taken 10/21/2024 0639 by Shefali Selby, BALDOMERO  Outcome Evaluation: Slept during night with periodically use of bathroom. Discharge pending plecement  Plan of Care Reviewed With: patient  Overall Patient Progress: improving

## 2024-10-21 NOTE — PLAN OF CARE
"Lethargic, slept most of shift but arousable, did not give bedtime seroquel. Ate some dinner. SBA w/ gb and walker. Plan: continue to monitor, placement.        Goal Outcome Evaluation:      Plan of Care Reviewed With: patient    Overall Patient Progress: no changeOverall Patient Progress: no change    Outcome Evaluation: Slept most of shift.      Problem: Adult Inpatient Plan of Care  Goal: Plan of Care Review  Description: The Plan of Care Review/Shift note should be completed every shift.  The Outcome Evaluation is a brief statement about your assessment that the patient is improving, declining, or no change.  This information will be displayed automatically on your shift  note.  Outcome: Progressing  Flowsheets (Taken 10/20/2024 2320)  Outcome Evaluation: Slept most of shift.  Plan of Care Reviewed With: patient  Overall Patient Progress: no change  Goal: Patient-Specific Goal (Individualized)  Description: You can add care plan individualizations to a care plan. Examples of Individualization might be:  \"Parent requests to be called daily at 9am for status\", \"I have a hard time hearing out of my right ear\", or \"Do not touch me to wake me up as it startles  me\".  Outcome: Progressing  Goal: Absence of Hospital-Acquired Illness or Injury  Outcome: Progressing  Goal: Optimal Comfort and Wellbeing  Outcome: Progressing  Goal: Readiness for Transition of Care  Outcome: Progressing     Problem: Dementia Signs/Symptoms  Goal: Improved Behavioral Control (Dementia Signs/Symptoms)  Outcome: Progressing  Goal: Improved Mood Symptoms (Dementia Signs/Symptoms)  Outcome: Progressing  Goal: Optimized Cognitive Function (Dementia Signs/Symptoms)  Outcome: Progressing  Goal: Optimized Oral Intake (Dementia Signs/Symptoms)  Outcome: Progressing  Goal: Improved Sleep (Dementia Signs/Symptoms)  Outcome: Progressing     Problem: Infection  Goal: Absence of Infection Signs and Symptoms  Outcome: Progressing         "

## 2024-10-22 PROCEDURE — 250N000013 HC RX MED GY IP 250 OP 250 PS 637: Performed by: INTERNAL MEDICINE

## 2024-10-22 PROCEDURE — 250N000013 HC RX MED GY IP 250 OP 250 PS 637: Performed by: PHYSICIAN ASSISTANT

## 2024-10-22 PROCEDURE — 99232 SBSQ HOSP IP/OBS MODERATE 35: CPT | Performed by: INTERNAL MEDICINE

## 2024-10-22 PROCEDURE — 101N000002 HC CUSTODIAL CARE DAILY

## 2024-10-22 RX ADMIN — Medication 1 MG: at 20:40

## 2024-10-22 RX ADMIN — QUETIAPINE FUMARATE 12.5 MG: 25 TABLET ORAL at 20:40

## 2024-10-22 ASSESSMENT — ACTIVITIES OF DAILY LIVING (ADL)
ADLS_ACUITY_SCORE: 53

## 2024-10-22 NOTE — PLAN OF CARE
VSS. Up with walker at times. Confused, alert to self only. Waiting for placement LTC with Memory care   Problem: Adult Inpatient Plan of Care  Goal: Plan of Care Review  Description: The Plan of Care Review/Shift note should be completed every shift.  The Outcome Evaluation is a brief statement about your assessment that the patient is improving, declining, or no change.  This information will be displayed automatically on your shift  note.  Recent Flowsheet Documentation  Taken 10/22/2024 0742 by Maria De Jesus Bruce RN  Plan of Care Reviewed With: patient  Overall Patient Progress: no change  Goal: Absence of Hospital-Acquired Illness or Injury  Intervention: Identify and Manage Fall Risk  Recent Flowsheet Documentation  Taken 10/22/2024 0300 by Maria De Jesus Bruce RN  Safety Promotion/Fall Prevention:   activity supervised   room near nurse's station   nonskid shoes/slippers when out of bed   safety round/check completed  Intervention: Prevent Skin Injury  Recent Flowsheet Documentation  Taken 10/22/2024 0300 by Maria De Jesus Bruce RN  Body Position: turned   Goal Outcome Evaluation:      Plan of Care Reviewed With: patient    Overall Patient Progress: no changeOverall Patient Progress: no change

## 2024-10-22 NOTE — PLAN OF CARE
"Alert. Pleasantly confused. Up A1 walker gaitbelt. Ambulated halls. Bed alarm on. Discharge TBD.     Goal Outcome Evaluation:      Plan of Care Reviewed With: patient    Overall Patient Progress: no changeOverall Patient Progress: no change    Outcome Evaluation: no changes    Problem: Adult Inpatient Plan of Care  Goal: Plan of Care Review  Description: The Plan of Care Review/Shift note should be completed every shift.  The Outcome Evaluation is a brief statement about your assessment that the patient is improving, declining, or no change.  This information will be displayed automatically on your shift  note.  Recent Flowsheet Documentation  Taken 10/22/2024 1728 by Gill Peguero RN  Outcome Evaluation: no changes  Plan of Care Reviewed With: patient  Overall Patient Progress: no change  Goal: Absence of Hospital-Acquired Illness or Injury  Intervention: Identify and Manage Fall Risk  Recent Flowsheet Documentation  Taken 10/22/2024 1010 by Gill Peguero, RN  Safety Promotion/Fall Prevention: safety round/check completed  Intervention: Prevent Skin Injury  Recent Flowsheet Documentation  Taken 10/22/2024 1649 by Gill Peguero RN  Body Position: position changed independently     Problem: Adult Inpatient Plan of Care  Goal: Plan of Care Review  Description: The Plan of Care Review/Shift note should be completed every shift.  The Outcome Evaluation is a brief statement about your assessment that the patient is improving, declining, or no change.  This information will be displayed automatically on your shift  note.  Outcome: Progressing  Flowsheets (Taken 10/22/2024 1728)  Outcome Evaluation: no changes  Plan of Care Reviewed With: patient  Overall Patient Progress: no change  Goal: Patient-Specific Goal (Individualized)  Description: You can add care plan individualizations to a care plan. Examples of Individualization might be:  \"Parent requests to be called daily at 9am for status\", \"I have a hard time " "hearing out of my right ear\", or \"Do not touch me to wake me up as it startles  me\".  Outcome: Progressing  Goal: Absence of Hospital-Acquired Illness or Injury  Outcome: Progressing  Intervention: Identify and Manage Fall Risk  Recent Flowsheet Documentation  Taken 10/22/2024 1010 by Gill Peguero RN  Safety Promotion/Fall Prevention: safety round/check completed  Intervention: Prevent Skin Injury  Recent Flowsheet Documentation  Taken 10/22/2024 1649 by Gill Peguero RN  Body Position: position changed independently  Goal: Optimal Comfort and Wellbeing  Outcome: Progressing  Goal: Readiness for Transition of Care  Outcome: Progressing     Problem: Fall Injury Risk  Goal: Absence of Fall and Fall-Related Injury  Intervention: Promote Injury-Free Environment  Recent Flowsheet Documentation  Taken 10/22/2024 1010 by Gill Peguero RN  Safety Promotion/Fall Prevention: safety round/check completed     Problem: Skin Injury Risk Increased  Goal: Skin Health and Integrity  Intervention: Optimize Skin Protection  Recent Flowsheet Documentation  Taken 10/22/2024 1649 by Gill Peguero RN  Activity Management: ambulated outside room  Taken 10/22/2024 1010 by Gill Peguero RN  Activity Management: back to bed     Problem: Dementia Signs/Symptoms  Goal: Improved Behavioral Control (Dementia Signs/Symptoms)  Outcome: Progressing  Goal: Improved Mood Symptoms (Dementia Signs/Symptoms)  Outcome: Progressing  Goal: Optimized Cognitive Function (Dementia Signs/Symptoms)  Outcome: Progressing  Goal: Optimized Oral Intake (Dementia Signs/Symptoms)  Outcome: Progressing  Goal: Improved Sleep (Dementia Signs/Symptoms)  Outcome: Progressing     Problem: Infection  Goal: Absence of Infection Signs and Symptoms  Outcome: Progressing       "

## 2024-10-22 NOTE — PLAN OF CARE
Goal Outcome Evaluation:      Plan of Care Reviewed With: patient    Overall Patient Progress: no changeOverall Patient Progress: no change     Vitals are Temp: 98.4  F (36.9  C) Temp src: Oral BP: 118/70 Pulse: 62   Resp: 18 SpO2: 96 %.  Patient is Disorientated to, Self, Time, Place, and Situation. Ambulated to bathroom. SBA with Gait Belt and Walker.Pt is a Regular diet.  denying pain.  Patient has no IV access.      Problem: Adult Inpatient Plan of Care  Goal: Plan of Care Review  Description: The Plan of Care Review/Shift note should be completed every shift.  The Outcome Evaluation is a brief statement about your assessment that the patient is improving, declining, or no change.  This information will be displayed automatically on your shift  note.  Recent Flowsheet Documentation  Taken 10/21/2024 2326 by Suresh Richard RN  Plan of Care Reviewed With: patient  Overall Patient Progress: no change  Goal: Absence of Hospital-Acquired Illness or Injury  Intervention: Identify and Manage Fall Risk  Recent Flowsheet Documentation  Taken 10/21/2024 2000 by Suresh Richard RN  Safety Promotion/Fall Prevention: activity supervised  Intervention: Prevent Skin Injury  Recent Flowsheet Documentation  Taken 10/21/2024 2000 by Suresh Richard, RN  Body Position: turned

## 2024-10-22 NOTE — PLAN OF CARE
"Vss A&O to person. Alarms on. [place]. Denies pain. Awaiting placement   Problem: Adult Inpatient Plan of Care  Goal: Plan of Care Review  Description: The Plan of Care Review/Shift note should be completed every shift.  The Outcome Evaluation is a brief statement about your assessment that the patient is improving, declining, or no change.  This information will be displayed automatically on your shift  note.  Outcome: Progressing  Flowsheets (Taken 10/22/2024 7944)  Outcome Evaluation: calm cooperative  Plan of Care Reviewed With: patient  Overall Patient Progress: no change  Goal: Patient-Specific Goal (Individualized)  Description: You can add care plan individualizations to a care plan. Examples of Individualization might be:  \"Parent requests to be called daily at 9am for status\", \"I have a hard time hearing out of my right ear\", or \"Do not touch me to wake me up as it startles  me\".  Outcome: Progressing  Goal: Absence of Hospital-Acquired Illness or Injury  Outcome: Progressing  Intervention: Identify and Manage Fall Risk  Recent Flowsheet Documentation  Taken 10/22/2024 7691 by Anselmo Best RN  Safety Promotion/Fall Prevention:   activity supervised   assistive device/personal items within reach   clutter free environment maintained   lighting adjusted   nonskid shoes/slippers when out of bed   patient and family education   room organization consistent   safety round/check completed  Goal: Optimal Comfort and Wellbeing  Outcome: Progressing  Goal: Readiness for Transition of Care  Outcome: Progressing     Problem: Dementia Signs/Symptoms  Goal: Improved Behavioral Control (Dementia Signs/Symptoms)  Outcome: Progressing  Goal: Improved Mood Symptoms (Dementia Signs/Symptoms)  Outcome: Progressing  Goal: Optimized Cognitive Function (Dementia Signs/Symptoms)  Outcome: Progressing  Goal: Optimized Oral Intake (Dementia Signs/Symptoms)  Outcome: Progressing  Goal: Improved Sleep (Dementia " Signs/Symptoms)  Outcome: Progressing     Problem: Infection  Goal: Absence of Infection Signs and Symptoms  Outcome: Progressing   Goal Outcome Evaluation:      Plan of Care Reviewed With: patient    Overall Patient Progress: no changeOverall Patient Progress: no change    Outcome Evaluation: calm cooperative

## 2024-10-23 PROCEDURE — 250N000013 HC RX MED GY IP 250 OP 250 PS 637: Performed by: PHYSICIAN ASSISTANT

## 2024-10-23 PROCEDURE — 99232 SBSQ HOSP IP/OBS MODERATE 35: CPT | Performed by: INTERNAL MEDICINE

## 2024-10-23 PROCEDURE — 101N000002 HC CUSTODIAL CARE DAILY

## 2024-10-23 RX ADMIN — QUETIAPINE FUMARATE 12.5 MG: 25 TABLET ORAL at 22:47

## 2024-10-23 RX ADMIN — Medication 1 MG: at 22:47

## 2024-10-23 RX ADMIN — KETOCONAZOLE: 20 SHAMPOO, SUSPENSION TOPICAL at 11:14

## 2024-10-23 ASSESSMENT — ACTIVITIES OF DAILY LIVING (ADL)
ADLS_ACUITY_SCORE: 0
ADLS_ACUITY_SCORE: 54
ADLS_ACUITY_SCORE: 0

## 2024-10-23 NOTE — PLAN OF CARE
"Goal Outcome Evaluation:      Plan of Care Reviewed With: patient    Overall Patient Progress: no change    Outcome Evaluation: Vitally stable. No changes. Continuing halfway cares.    Patient calm, cooperative, and pleasant. Disorientated x3, alert to self. Vitally stable. Medicated shampoo given today along with full bed bath, linen change, gown changed, teeth bruised and moisturizer used. Patient did go on a good walk with nursing assistant. No need for PRN Seroquel on AM shift. Adequate appetite. Continue with POC.       Problem: Adult Inpatient Plan of Care  Goal: Plan of Care Review  Description: The Plan of Care Review/Shift note should be completed every shift.  The Outcome Evaluation is a brief statement about your assessment that the patient is improving, declining, or no change.  This information will be displayed automatically on your shift  note.  Outcome: Progressing  Flowsheets (Taken 10/23/2024 1428)  Outcome Evaluation: Vitally stable. No changes. Continuing halfway cares.  Plan of Care Reviewed With: patient  Overall Patient Progress: no change  Goal: Patient-Specific Goal (Individualized)  Description: You can add care plan individualizations to a care plan. Examples of Individualization might be:  \"Parent requests to be called daily at 9am for status\", \"I have a hard time hearing out of my right ear\", or \"Do not touch me to wake me up as it startles  me\".  Outcome: Progressing  Goal: Absence of Hospital-Acquired Illness or Injury  Outcome: Progressing  Intervention: Identify and Manage Fall Risk  Recent Flowsheet Documentation  Taken 10/23/2024 1300 by Radha Tam, RN  Safety Promotion/Fall Prevention: safety round/check completed  Taken 10/23/2024 1114 by Radha Tam, RN  Safety Promotion/Fall Prevention: safety round/check completed  Taken 10/23/2024 0745 by Radha Tam, RN  Safety Promotion/Fall Prevention: safety round/check completed  Intervention: Prevent Skin " Injury  Recent Flowsheet Documentation  Taken 10/23/2024 1114 by Radha Tam RN  Body Position: position changed independently  Taken 10/23/2024 0940 by Radha Tam RN  Skin Protection:   adhesive use limited   incontinence pads utilized  Intervention: Prevent Infection  Recent Flowsheet Documentation  Taken 10/23/2024 1300 by Radha Tam RN  Infection Prevention:   equipment surfaces disinfected   hand hygiene promoted   rest/sleep promoted   single patient room provided  Taken 10/23/2024 1114 by Radha Tam RN  Infection Prevention:   equipment surfaces disinfected   hand hygiene promoted   rest/sleep promoted   single patient room provided  Taken 10/23/2024 0940 by Radha Tma RN  Infection Prevention:   equipment surfaces disinfected   hand hygiene promoted   rest/sleep promoted   single patient room provided  Taken 10/23/2024 0745 by Radha Tam RN  Infection Prevention:   equipment surfaces disinfected   hand hygiene promoted   rest/sleep promoted   single patient room provided  Goal: Optimal Comfort and Wellbeing  Outcome: Progressing  Goal: Readiness for Transition of Care  Outcome: Progressing     Problem: Dementia Signs/Symptoms  Goal: Improved Behavioral Control (Dementia Signs/Symptoms)  Outcome: Progressing  Intervention: Manage Behavior  Recent Flowsheet Documentation  Taken 10/23/2024 0940 by Radha Tam RN  Environmental Support:   calm environment promoted   caregiver consistency promoted   comfort object encouraged   distractions minimized   environmental consistency promoted   personal routine supported   rest periods encouraged  Goal: Improved Mood Symptoms (Dementia Signs/Symptoms)  Outcome: Progressing  Intervention: Optimize Emotion and Mood  Recent Flowsheet Documentation  Taken 10/23/2024 0940 by Radha Tam RN  Supportive Measures:   active listening utilized   decision-making supported   positive reinforcement  provided   relaxation techniques promoted   self-care encouraged   self-reflection promoted   self-responsibility promoted   verbalization of feelings encouraged  Goal: Optimized Cognitive Function (Dementia Signs/Symptoms)  Outcome: Progressing  Goal: Optimized Oral Intake (Dementia Signs/Symptoms)  Outcome: Progressing  Goal: Improved Sleep (Dementia Signs/Symptoms)  Outcome: Progressing     Problem: Infection  Goal: Absence of Infection Signs and Symptoms  Outcome: Progressing

## 2024-10-23 NOTE — PLAN OF CARE
Goal Outcome Evaluation:      Plan of Care Reviewed With: patient    Overall Patient Progress: no changeOverall Patient Progress: no change    Outcome Evaluation: nursing home care. Pt confused, A&O to self. Tolerating reg diet. Transfers with Ax1.      Problem: Adult Inpatient Plan of Care  Goal: Plan of Care Review  Description: The Plan of Care Review/Shift note should be completed every shift.  The Outcome Evaluation is a brief statement about your assessment that the patient is improving, declining, or no change.  This information will be displayed automatically on your shift  note.  Outcome: Not Progressing  Flowsheets (Taken 10/23/2024 1850)  Outcome Evaluation: nursing home care. Pt confused, A&O to self. Tolerating reg diet. Transfers with Ax1.  Plan of Care Reviewed With: patient  Overall Patient Progress: no change  Goal: Absence of Hospital-Acquired Illness or Injury  Outcome: Not Progressing  Intervention: Identify and Manage Fall Risk  Recent Flowsheet Documentation  Taken 10/23/2024 1541 by Lisa Stapleton RN  Safety Promotion/Fall Prevention:   activity supervised   safety round/check completed   treat reversible contributory factors   increased rounding and observation   clutter free environment maintained   increase visualization of patient   lighting adjusted   mobility aid in reach   nonskid shoes/slippers when out of bed   patient and family education   room near nurse's station  Intervention: Prevent Skin Injury  Recent Flowsheet Documentation  Taken 10/23/2024 1541 by Lisa Stapleton RN  Body Position: position changed independently  Intervention: Prevent Infection  Recent Flowsheet Documentation  Taken 10/23/2024 1541 by Lisa Stapleton RN  Infection Prevention: rest/sleep promoted  Goal: Optimal Comfort and Wellbeing  Outcome: Not Progressing  Goal: Readiness for Transition of Care  Outcome: Not Progressing     Problem: Dementia Signs/Symptoms  Goal: Improved Behavioral Control (Dementia  Signs/Symptoms)  Outcome: Not Progressing  Goal: Improved Mood Symptoms (Dementia Signs/Symptoms)  Outcome: Not Progressing  Intervention: Optimize Emotion and Mood  Recent Flowsheet Documentation  Taken 10/23/2024 3801 by Lisa Stapleton RN  Supportive Measures: active listening utilized  Goal: Optimized Cognitive Function (Dementia Signs/Symptoms)  Outcome: Not Progressing  Goal: Optimized Oral Intake (Dementia Signs/Symptoms)  Outcome: Not Progressing  Goal: Improved Sleep (Dementia Signs/Symptoms)  Outcome: Not Progressing     Problem: Infection  Goal: Absence of Infection Signs and Symptoms  Outcome: Not Progressing

## 2024-10-23 NOTE — PROGRESS NOTES
[facility]    Hospitalist Progress Note  Name: Jemal Gray    MRN: [id number]  Provider:  Fred Fonseca MD    Date of Service: 10/23/2024    Summary of Stay: Jemal Gray is an 89 year old male with history of dementia with chronic aphasia. He was admitted on 10/2/2024 after he was brought to the ED by EMS from facility for evaluation of agitation.     He had a prolonged hospitalization from 6/5/24 through 10/2/24 during which he was treated for cellulitis but mostly awaited long-term care placement due to dementia. He required appointment of guardianship.  He discharged to a long-term care facility on 10/2. Upon transfer to the long-term care facility he became agitated with swearing.  He was wandering around the long-term care facility and was entering other patient's rooms.  He was unable to be redirected and required transfer back to the ED.     In the ED he was stable. He was given seroquel.  He required a bedside attendant as he was agitated and unable to be redirected.  He attempted to leave the emergency department and go into other patient's rooms. He was admitted for half-way cares.  Patient has improved and is no longer agitated. He is medically stable and can be discharged whenever placement is found. Prior long term care facility is not taking patient back.  is looking into memory care units.      Problem list:     Acute agitation, improved  Behavior disturbances   Dementia with chronic aphasia  Suspect triggered by changes in setting and recent move.  No physical complaints or concerns of infection.  Historically required Zyprexa over the summer when he was hospitalized for agitation.  Most recently had been prescribed Seroquel 12.5 mg twice daily as needed although he had not been needing most recently.  -Bedside attendant as needed  -Restarted prior to admission Seroquel at 12.5 mg twice daily as needed, may need to titrate  - ms  -SW  consulted  -On 10/6/2024 evening patient confused and increasing agitation.  Patient refused scheduled medications.  As needed Seroquel crushed and given in pudding and was effective.  -Continue scheduled 12.5 mg of Seroquel every evening     Need for disposition assistance  -Patient had prolonged hospitalization due to need for disposition assistance.    -His daughter filed for guardianship paperwork was completed in July   -acquired MA application and long-term care placement   -Ultimately will require 24/7 care and likely Memory care placement      Actinic Keratosis  Had surgery for this and prescribed steroids cream and ketoconazole cream, restarted per daughter request previously.     Sinus Bradycardia  Patient was noted to have HR in 50s.  Asymptomatic.  -no telemetry monitoring needed      History of Constipation  -laxatives PRN      Care plan  -Social work working on placement.  -Given patient is unreliable historian will check routine labs in the morning as it has been a while.     I spent 20 minutes in reviewing this patient's labs, imaging, medications, medical history.  In addition time was spent interviewing the patient, communicating with family, and medical decision making.     DVT Prophylaxis: Pneumatic Compression Devices  Code Status: No CPR- Do NOT Intubate  Diet: Combination Diet Regular Diet  Snacks/Supplements Pediatric: Ensure Enlive; Between Meals  Room Service    Maddox Catheter: Not present    Disposition: Medically Ready for Discharge: Ready Now    Goals to discharge include: placement obtained  Family updated today: No     Interval History   No new issues.  Resting comfortably    -Data reviewed today: I personally reviewed all new labs and imaging results over the last 24 hours.     Physical Exam   Temp: 98.2  F (36.8  C) Temp src: Axillary BP: 135/73 Pulse: 57   Resp: 20 SpO2: 97 % O2 Device: None (Room air)    Vitals:    10/02/24 2214   Weight: 71.4 kg (157 lb 6.4 oz)     Vital Signs  "with Ranges  Temp:  [98.2  F (36.8  C)] 98.2  F (36.8  C)  Pulse:  [57] 57  Resp:  [20] 20  BP: (135)/(73) 135/73  SpO2:  [97 %] 97 %  I/O last 3 completed shifts:  In: 120 [P.O.:120]  Out: -     General: Alert, awake, no acute distress.  HEENT: NC/AT, eyes anicteric, external occular movements intact, face symmetric.   Psych: Appropriate affect.  Calm.      Medications   Current Facility-Administered Medications   Medication Dose Route Frequency Provider Last Rate Last Admin     Current Facility-Administered Medications   Medication Dose Route Frequency Provider Last Rate Last Admin    ketoconazole (NIZORAL) 2 % shampoo   Topical Q Mon Wed Fri AM Liliana Vargas PA-C   Given at 10/21/24 1055    QUEtiapine (SEROquel) half-tab 12.5 mg  12.5 mg Oral QPM Edna Kelly MD   12.5 mg at 10/22/24 2040     Data     Laboratory:  Recent Labs   Lab 10/20/24  0558   WBC 9.7   HGB 12.2*   HCT 37.8*   MCV 96        Recent Labs   Lab 10/20/24  0558      POTASSIUM 3.9   CHLORIDE 105   CO2 24   ANIONGAP 12   *   BUN 23.6*   CR 0.94   GFRESTIMATED 78   LEON 8.5*     No results for input(s): \"CULT\" in the last 168 hours.  No results found for: \"TROPI\"    Imaging:  No results found for this or any previous visit (from the past 24 hours).    Fred Fonseca MD    "

## 2024-10-24 PROCEDURE — 99232 SBSQ HOSP IP/OBS MODERATE 35: CPT | Performed by: INTERNAL MEDICINE

## 2024-10-24 PROCEDURE — 250N000013 HC RX MED GY IP 250 OP 250 PS 637: Performed by: INTERNAL MEDICINE

## 2024-10-24 PROCEDURE — 101N000002 HC CUSTODIAL CARE DAILY

## 2024-10-24 RX ADMIN — QUETIAPINE FUMARATE 12.5 MG: 25 TABLET ORAL at 23:45

## 2024-10-24 NOTE — PLAN OF CARE
"[id number]    Temp: 97.6  F (36.4  C) Temp src: Axillary   Pulse: 62   Resp: 16   O2 Device: None (Room air)      Alert to self, denies pain. SBA. Regular diet.  No IV access. Continue assisted care.        Goal Outcome Evaluation:      Plan of Care Reviewed With: patient    Overall Patient Progress: no changeOverall Patient Progress: no change    Outcome Evaluation: Alert to self, denies ppain. detention care. SBA      Problem: Adult Inpatient Plan of Care  Goal: Plan of Care Review  Description: The Plan of Care Review/Shift note should be completed every shift.  The Outcome Evaluation is a brief statement about your assessment that the patient is improving, declining, or no change.  This information will be displayed automatically on your shift  note.  Outcome: Progressing  Flowsheets (Taken 10/24/2024 1533)  Outcome Evaluation: Alert to self, denies ppain. detention care. SBA  Plan of Care Reviewed With: patient  Overall Patient Progress: no change  Goal: Patient-Specific Goal (Individualized)  Description: You can add care plan individualizations to a care plan. Examples of Individualization might be:  \"Parent requests to be called daily at 9am for status\", \"I have a hard time hearing out of my right ear\", or \"Do not touch me to wake me up as it startles  me\".  Outcome: Progressing  Goal: Absence of Hospital-Acquired Illness or Injury  Outcome: Progressing  Intervention: Identify and Manage Fall Risk  Recent Flowsheet Documentation  Taken 10/24/2024 1515 by Wilson Beal RN  Safety Promotion/Fall Prevention: safety round/check completed  Intervention: Prevent Skin Injury  Recent Flowsheet Documentation  Taken 10/24/2024 1515 by Wilson eBal RN  Body Position: position changed independently  Goal: Optimal Comfort and Wellbeing  Outcome: Progressing  Goal: Readiness for Transition of Care  Outcome: Progressing     Problem: Dementia Signs/Symptoms  Goal: Improved Behavioral Control (Dementia " Signs/Symptoms)  Outcome: Progressing  Goal: Improved Mood Symptoms (Dementia Signs/Symptoms)  Outcome: Progressing  Goal: Optimized Cognitive Function (Dementia Signs/Symptoms)  Outcome: Progressing  Goal: Optimized Oral Intake (Dementia Signs/Symptoms)  Outcome: Progressing  Goal: Improved Sleep (Dementia Signs/Symptoms)  Outcome: Progressing     Problem: Infection  Goal: Absence of Infection Signs and Symptoms  Outcome: Progressing

## 2024-10-24 NOTE — PLAN OF CARE
"Goal Outcome Evaluation:      Plan of Care Reviewed With: patient    Overall Patient Progress: no change    Outcome Evaluation: half-way cares continued.    Patient calm and cooperative. Alert to self, disorientated x3. Vitally stable. Maintaining O2 sats >90% on RA. No signs of SOB and chest pain. No signs or complaints of pain. Patient ate well today. Received a patient care order from MD to allow patient to attend an outdoor hospital event on Saturday and family agreed as well. Continue POC.     Problem: Adult Inpatient Plan of Care  Goal: Plan of Care Review  Description: The Plan of Care Review/Shift note should be completed every shift.  The Outcome Evaluation is a brief statement about your assessment that the patient is improving, declining, or no change.  This information will be displayed automatically on your shift  note.  Outcome: Progressing  Flowsheets (Taken 10/24/2024 1644)  Outcome Evaluation: half-way cares continued.  Plan of Care Reviewed With: patient  Overall Patient Progress: no change  Goal: Patient-Specific Goal (Individualized)  Description: You can add care plan individualizations to a care plan. Examples of Individualization might be:  \"Parent requests to be called daily at 9am for status\", \"I have a hard time hearing out of my right ear\", or \"Do not touch me to wake me up as it startles  me\".  Outcome: Progressing  Goal: Absence of Hospital-Acquired Illness or Injury  Outcome: Progressing  Intervention: Identify and Manage Fall Risk  Recent Flowsheet Documentation  Taken 10/24/2024 1245 by Radha Tam, RN  Safety Promotion/Fall Prevention: safety round/check completed  Taken 10/24/2024 0811 by Radha Tam, RN  Safety Promotion/Fall Prevention: safety round/check completed  Intervention: Prevent Skin Injury  Recent Flowsheet Documentation  Taken 10/24/2024 1245 by Radha Tam, RN  Body Position: position changed independently  Taken 10/24/2024 1200 by Yonatan, " Radha ANDINO RN  Body Position: position changed independently  Taken 10/24/2024 0811 by Radha Tam RN  Body Position: position changed independently  Intervention: Prevent Infection  Recent Flowsheet Documentation  Taken 10/24/2024 1245 by Radha Tam RN  Infection Prevention:   equipment surfaces disinfected   hand hygiene promoted   rest/sleep promoted   single patient room provided  Taken 10/24/2024 0811 by Radha Tam RN  Infection Prevention:   equipment surfaces disinfected   hand hygiene promoted   rest/sleep promoted   single patient room provided  Goal: Optimal Comfort and Wellbeing  Outcome: Progressing  Goal: Readiness for Transition of Care  Outcome: Progressing     Problem: Dementia Signs/Symptoms  Goal: Improved Behavioral Control (Dementia Signs/Symptoms)  Outcome: Progressing  Goal: Improved Mood Symptoms (Dementia Signs/Symptoms)  Outcome: Progressing  Goal: Optimized Cognitive Function (Dementia Signs/Symptoms)  Outcome: Progressing  Goal: Optimized Oral Intake (Dementia Signs/Symptoms)  Outcome: Progressing  Goal: Improved Sleep (Dementia Signs/Symptoms)  Outcome: Progressing     Problem: Infection  Goal: Absence of Infection Signs and Symptoms  Outcome: Progressing

## 2024-10-24 NOTE — PROGRESS NOTES
[facility]    Hospitalist Progress Note  Name: Jemal Gray    MRN: [id number]  Provider:  Fred Fonseca MD    Date of Service: 10/24/2024    Summary of Stay: Jemal Gray is an 89 year old male with history of dementia with chronic aphasia. He was admitted on 10/2/2024 after he was brought to the ED by EMS from facility for evaluation of agitation.     He had a prolonged hospitalization from 6/5/24 through 10/2/24 during which he was treated for cellulitis but mostly awaited long-term care placement due to dementia. He required appointment of guardianship.  He discharged to a long-term care facility on 10/2. Upon transfer to the long-term care facility he became agitated with swearing.  He was wandering around the long-term care facility and was entering other patient's rooms.  He was unable to be redirected and required transfer back to the ED.     In the ED he was stable. He was given seroquel.  He required a bedside attendant as he was agitated and unable to be redirected.  He attempted to leave the emergency department and go into other patient's rooms. He was admitted for prison cares.  Patient has improved and is no longer agitated. He is medically stable and can be discharged whenever placement is found. Prior long term care facility is not taking patient back.  is looking into memory care units.      Problem list:     Acute agitation, improved  Behavior disturbances   Dementia with chronic aphasia  Suspect triggered by changes in setting and recent move.  No physical complaints or concerns of infection.  Historically required Zyprexa over the summer when he was hospitalized for agitation.  Most recently had been prescribed Seroquel 12.5 mg twice daily as needed although he had not been needing most recently.  -Bedside attendant as needed  -Restarted prior to admission Seroquel at 12.5 mg twice daily as needed, may need to titrate  - ms  -SW  consulted  -On 10/6/2024 evening patient confused and increasing agitation.  Patient refused scheduled medications.  As needed Seroquel crushed and given in pudding and was effective.  -Continue scheduled 12.5 mg of Seroquel every evening     Need for disposition assistance  -Patient had prolonged hospitalization due to need for disposition assistance.    -His daughter filed for guardianship paperwork was completed in July   -acquired MA application and long-term care placement   -Ultimately will require 24/7 care and likely Memory care placement      Actinic Keratosis  Had surgery for this and prescribed steroids cream and ketoconazole cream, restarted per daughter request previously.     Sinus Bradycardia  Patient was noted to have HR in 50s.  Asymptomatic.  -no telemetry monitoring needed      History of Constipation  -laxatives PRN      Care plan  -Social work working on placement.  -Given patient is unreliable historian will check routine labs in the morning as it has been a while.     I spent 20 minutes in reviewing this patient's labs, imaging, medications, medical history.  In addition time was spent interviewing the patient, communicating with family, and medical decision making.     DVT Prophylaxis: Pneumatic Compression Devices  Code Status: No CPR- Do NOT Intubate  Diet: Combination Diet Regular Diet  Snacks/Supplements Pediatric: Ensure Enlive; Between Meals  Room Service    Maddox Catheter: Not present    Disposition: Medically Ready for Discharge: Ready Now    Goals to discharge include: placement obtained  Family updated today: No     Interval History   No new issues.  Resting comfortably    -Data reviewed today: I personally reviewed all new labs and imaging results over the last 24 hours.     Physical Exam       BP: 125/76 Pulse: 57   Resp: 18 SpO2: 97 % O2 Device: None (Room air)    Vitals:    10/02/24 2214   Weight: 71.4 kg (157 lb 6.4 oz)     Vital Signs with Ranges  Pulse:  [57] 57  Resp:  [18]  "18  BP: (125)/(76) 125/76  SpO2:  [97 %] 97 %  I/O last 3 completed shifts:  In: 420 [P.O.:420]  Out: -     General: Alert, awake, no acute distress.  HEENT: NC/AT, eyes anicteric, external occular movements intact, face symmetric.   Psych: Appropriate affect.  Calm.      Medications   Current Facility-Administered Medications   Medication Dose Route Frequency Provider Last Rate Last Admin     Current Facility-Administered Medications   Medication Dose Route Frequency Provider Last Rate Last Admin    ketoconazole (NIZORAL) 2 % shampoo   Topical Q Mon Wed Fri AM Liliana Vargas PA-C   Given at 10/23/24 1114    QUEtiapine (SEROquel) half-tab 12.5 mg  12.5 mg Oral QPM Edna Kelly MD   12.5 mg at 10/22/24 2040     Data     Laboratory:  Recent Labs   Lab 10/20/24  0558   WBC 9.7   HGB 12.2*   HCT 37.8*   MCV 96        Recent Labs   Lab 10/20/24  0558      POTASSIUM 3.9   CHLORIDE 105   CO2 24   ANIONGAP 12   *   BUN 23.6*   CR 0.94   GFRESTIMATED 78   LEON 8.5*     No results for input(s): \"CULT\" in the last 168 hours.  No results found for: \"TROPI\"    Imaging:  No results found for this or any previous visit (from the past 24 hours).    Fred Fonseca MD    "

## 2024-10-24 NOTE — PLAN OF CARE
Goal Outcome Evaluation:      Plan of Care Reviewed With: patient    Overall Patient Progress: no changeOverall Patient Progress: no change    Outcome Evaluation: Pt alert to self. on RA. Denies/no signs of pain. half-way cares maintained. Up assist of 1. Two BMs this shift. PRN Seroquel (pt spit out scheduled) and melatonin given.     Problem: Adult Inpatient Plan of Care  Goal: Plan of Care Review  Description: The Plan of Care Review/Shift note should be completed every shift.  The Outcome Evaluation is a brief statement about your assessment that the patient is improving, declining, or no change.  This information will be displayed automatically on your shift  note.  Recent Flowsheet Documentation  Taken 10/24/2024 0506 by Lima Hartmann RN  Outcome Evaluation: Pt alert to self. on RA. Denies/no signs of pain. half-way cares maintained. Up assist of 1. One BM this shift  Plan of Care Reviewed With: patient  Overall Patient Progress: no change  Goal: Absence of Hospital-Acquired Illness or Injury  Intervention: Prevent Skin Injury  Recent Flowsheet Documentation  Taken 10/23/2024 2042 by Lima Hartmann RN  Body Position: position changed independently     Problem: Adult Inpatient Plan of Care  Goal: Plan of Care Review  Description: The Plan of Care Review/Shift note should be completed every shift.  The Outcome Evaluation is a brief statement about your assessment that the patient is improving, declining, or no change.  This information will be displayed automatically on your shift  note.  Outcome: Not Progressing  Flowsheets (Taken 10/24/2024 0506)  Outcome Evaluation: Pt alert to self. on RA. Denies/no signs of pain. half-way cares maintained. Up assist of 1. One BM this shift  Plan of Care Reviewed With: patient  Overall Patient Progress: no change  Goal: Patient-Specific Goal (Individualized)  Description: You can add care plan individualizations to a care plan. Examples of Individualization might  "be:  \"Parent requests to be called daily at 9am for status\", \"I have a hard time hearing out of my right ear\", or \"Do not touch me to wake me up as it startles  me\".  Outcome: Not Progressing  Goal: Absence of Hospital-Acquired Illness or Injury  Outcome: Not Progressing  Intervention: Prevent Skin Injury  Recent Flowsheet Documentation  Taken 10/23/2024 2042 by Lima Hartmann RN  Body Position: position changed independently  Goal: Optimal Comfort and Wellbeing  Outcome: Not Progressing  Goal: Readiness for Transition of Care  Outcome: Not Progressing     Problem: Dementia Signs/Symptoms  Goal: Improved Behavioral Control (Dementia Signs/Symptoms)  Outcome: Not Progressing  Goal: Improved Mood Symptoms (Dementia Signs/Symptoms)  Outcome: Not Progressing  Goal: Optimized Cognitive Function (Dementia Signs/Symptoms)  Outcome: Not Progressing  Goal: Optimized Oral Intake (Dementia Signs/Symptoms)  Outcome: Not Progressing  Goal: Improved Sleep (Dementia Signs/Symptoms)  Outcome: Not Progressing         "

## 2024-10-24 NOTE — PROGRESS NOTES
1534: Called daughter's phone and left a voicemail informing of trunk-or-treat event this weekend that staff would like to bring patient to. MD approved and placed an order. Left call back number.

## 2024-10-25 PROCEDURE — 250N000013 HC RX MED GY IP 250 OP 250 PS 637: Performed by: PHYSICIAN ASSISTANT

## 2024-10-25 PROCEDURE — 99207 PR NO BILLABLE SERVICE THIS VISIT: CPT | Performed by: STUDENT IN AN ORGANIZED HEALTH CARE EDUCATION/TRAINING PROGRAM

## 2024-10-25 PROCEDURE — 250N000013 HC RX MED GY IP 250 OP 250 PS 637: Performed by: INTERNAL MEDICINE

## 2024-10-25 PROCEDURE — 101N000002 HC CUSTODIAL CARE DAILY

## 2024-10-25 RX ADMIN — QUETIAPINE FUMARATE 12.5 MG: 25 TABLET ORAL at 22:32

## 2024-10-25 RX ADMIN — QUETIAPINE FUMARATE 12.5 MG: 25 TABLET ORAL at 16:25

## 2024-10-25 NOTE — PLAN OF CARE
"Goal Outcome Evaluation:    VSS. Confused. Ambulated in quiles with walker. Awaiting placement.      Plan of Care Reviewed With: patient    Overall Patient Progress: no change    Outcome Evaluation: Continue shelter care. Confused. Awaiting placement.    Problem: Adult Inpatient Plan of Care  Goal: Plan of Care Review  Description: The Plan of Care Review/Shift note should be completed every shift.  The Outcome Evaluation is a brief statement about your assessment that the patient is improving, declining, or no change.  This information will be displayed automatically on your shift  note.  Outcome: Progressing  Flowsheets (Taken 10/25/2024 6650)  Outcome Evaluation: Continue shelter care. Confused. Awaiting placement.  Plan of Care Reviewed With: patient  Overall Patient Progress: no change  Goal: Patient-Specific Goal (Individualized)  Description: You can add care plan individualizations to a care plan. Examples of Individualization might be:  \"Parent requests to be called daily at 9am for status\", \"I have a hard time hearing out of my right ear\", or \"Do not touch me to wake me up as it startles  me\".  Outcome: Progressing  Goal: Absence of Hospital-Acquired Illness or Injury  Outcome: Progressing  Goal: Optimal Comfort and Wellbeing  Outcome: Progressing  Goal: Readiness for Transition of Care  Outcome: Progressing     Problem: Dementia Signs/Symptoms  Goal: Improved Behavioral Control (Dementia Signs/Symptoms)  Outcome: Progressing  Goal: Improved Mood Symptoms (Dementia Signs/Symptoms)  Outcome: Progressing  Goal: Optimized Cognitive Function (Dementia Signs/Symptoms)  Outcome: Progressing  Goal: Optimized Oral Intake (Dementia Signs/Symptoms)  Outcome: Progressing  Goal: Improved Sleep (Dementia Signs/Symptoms)  Outcome: Progressing     Problem: Infection  Goal: Absence of Infection Signs and Symptoms  Outcome: Progressing  "

## 2024-10-25 NOTE — PLAN OF CARE
"Goal Outcome Evaluation:      Plan of Care Reviewed With: patient    Overall Patient Progress: no changeOverall Patient Progress: no change    Outcome Evaluation: alf cares. Multiple bowel movements overnight. Pt had frequent bathroom breaks overnight.    Neuro: Alert to and Self  Activity: are SBA. Gait belt recommended.  Telemetry Monitoring: No  Pain: not showing any nonverbal signs of being in pain.  GI: Multiple bowel movements.   : Voiding spontaneously  LDA's: NA  Fluids: has no IV access.  Diet: Regular  Discharge Disposition:  TBD      Problem: Adult Inpatient Plan of Care  Goal: Plan of Care Review  Description: The Plan of Care Review/Shift note should be completed every shift.  The Outcome Evaluation is a brief statement about your assessment that the patient is improving, declining, or no change.  This information will be displayed automatically on your shift  note.  Outcome: Progressing  Flowsheets (Taken 10/25/2024 9254)  Outcome Evaluation: alf cares. Multiple bowel movements overnight. Pt had frequent bathroom breaks overnight.  Plan of Care Reviewed With: patient  Overall Patient Progress: no change  Goal: Patient-Specific Goal (Individualized)  Description: You can add care plan individualizations to a care plan. Examples of Individualization might be:  \"Parent requests to be called daily at 9am for status\", \"I have a hard time hearing out of my right ear\", or \"Do not touch me to wake me up as it startles  me\".  Outcome: Progressing  Goal: Absence of Hospital-Acquired Illness or Injury  Outcome: Progressing  Goal: Optimal Comfort and Wellbeing  Outcome: Progressing  Goal: Readiness for Transition of Care  Outcome: Progressing     Problem: Dementia Signs/Symptoms  Goal: Improved Behavioral Control (Dementia Signs/Symptoms)  Outcome: Progressing  Goal: Improved Mood Symptoms (Dementia Signs/Symptoms)  Outcome: Progressing  Goal: Optimized Cognitive Function (Dementia " Signs/Symptoms)  Outcome: Progressing  Goal: Optimized Oral Intake (Dementia Signs/Symptoms)  Outcome: Progressing  Goal: Improved Sleep (Dementia Signs/Symptoms)  Outcome: Progressing     Problem: Infection  Goal: Absence of Infection Signs and Symptoms  Outcome: Progressing

## 2024-10-25 NOTE — PROGRESS NOTES
[facility]    Hospitalist Progress Note  Name: Jemal Gray    MRN: [id number]  Provider:  Alexandre Kurtz MD    Date of Service: 10/25/2024    Summary of Stay: Jemal Gray is an 89 year old male with history of dementia with chronic aphasia. He was admitted on 10/2/2024 after he was brought to the ED by EMS from facility for evaluation of agitation.     He had a prolonged hospitalization from 6/5/24 through 10/2/24 during which he was treated for cellulitis but mostly awaited long-term care placement due to dementia. He required appointment of guardianship.  He discharged to a long-term care facility on 10/2. Upon transfer to the long-term care facility he became agitated with swearing.  He was wandering around the long-term care facility and was entering other patient's rooms.  He was unable to be redirected and required transfer back to the ED.     In the ED he was stable. He was given seroquel.  He required a bedside attendant as he was agitated and unable to be redirected.  He attempted to leave the emergency department and go into other patient's rooms. He was admitted for [facility] cares.  Patient has improved and is no longer agitated. He is medically stable and can be discharged whenever placement is found. Prior long term care facility is not taking patient back.  is looking into memory care units.     Problem list:     Acute agitation, improved  Behavior disturbances   Dementia with chronic aphasia  Suspect triggered by changes in setting and recent move.  No physical complaints or concerns of infection.  Historically required Zyprexa over the summer when he was hospitalized for agitation.  Most recently had been prescribed Seroquel 12.5 mg twice daily as needed although he had not been needing most recently.  -Bedside attendant as needed  -Restarted prior to admission Seroquel at 12.5 mg twice daily as needed, may need to titrate  - ms  -SW consulted  -On  10/6/2024 evening patient confused and increasing agitation.  Patient refused scheduled medications.  As needed Seroquel crushed and given in pudding and was effective.  -Continue scheduled 12.5 mg of Seroquel every evening     Need for disposition assistance  -Patient had prolonged hospitalization due to need for disposition assistance.    -His daughter filed for guardianship paperwork was completed in July   -acquired MA application and long-term care placement   -Ultimately will require 24/7 care and likely Memory care placement      Actinic Keratosis  Had surgery for this and prescribed steroids cream and ketoconazole cream, restarted per daughter request previously.     Sinus Bradycardia  Patient was noted to have HR in 50s.  Asymptomatic.  -no telemetry monitoring needed      History of Constipation  -laxatives PRN      Care plan  -Social work working on placement.  -Given patient is unreliable historian will check routine labs in the morning as it has been a while.     I spent 20 minutes in reviewing this patient's labs, imaging, medications, medical history.  In addition time was spent interviewing the patient, communicating with family, and medical decision making.     DVT Prophylaxis: Pneumatic Compression Devices  Code Status: No CPR- Do NOT Intubate  Diet: Combination Diet Regular Diet  Snacks/Supplements Pediatric: Ensure Enlive; Between Meals  Room Service    Maddox Catheter: Not present    Disposition: Medically Ready for Discharge: Ready Now    Goals to discharge include: placement obtained  Family updated today: No     Interval History   No new issues.  Resting comfortably    -Data reviewed today: I personally reviewed all new labs and imaging results over the last 24 hours.     Physical Exam   Temp: 98.2  F (36.8  C) Temp src: Oral BP: (!) 144/77 Pulse: 56   Resp: 18 SpO2: 97 % O2 Device: None (Room air)    Vitals:    10/02/24 2214   Weight: 71.4 kg (157 lb 6.4 oz)     Vital Signs with Ranges  Temp:  " [97.6  F (36.4  C)-98.2  F (36.8  C)] 98.2  F (36.8  C)  Pulse:  [56] 56  Resp:  [17-18] 18  BP: (127-144)/(77-85) 144/77  SpO2:  [97 %] 97 %  No intake/output data recorded.    General: Alert, awake, no acute distress.  HEENT: NC/AT, eyes anicteric, external occular movements intact, face symmetric.   Psych: Appropriate affect.  Calm.      Medications   Current Facility-Administered Medications   Medication Dose Route Frequency Provider Last Rate Last Admin     Current Facility-Administered Medications   Medication Dose Route Frequency Provider Last Rate Last Admin    ketoconazole (NIZORAL) 2 % shampoo   Topical Q Mon Wed Fri AM Liliana Vargas PA-C   Given at 10/23/24 1114    QUEtiapine (SEROquel) half-tab 12.5 mg  12.5 mg Oral QPM Edna Kelly MD   12.5 mg at 10/24/24 2345     Data     Laboratory:  Recent Labs   Lab 10/20/24  0558   WBC 9.7   HGB 12.2*   HCT 37.8*   MCV 96        Recent Labs   Lab 10/20/24  0558      POTASSIUM 3.9   CHLORIDE 105   CO2 24   ANIONGAP 12   *   BUN 23.6*   CR 0.94   GFRESTIMATED 78   LEON 8.5*     No results for input(s): \"CULT\" in the last 168 hours.  No results found for: \"TROPI\"    Imaging:  No results found for this or any previous visit (from the past 24 hours).    Alexandre Kurtz MD    "

## 2024-10-26 PROCEDURE — 101N000002 HC CUSTODIAL CARE DAILY

## 2024-10-26 PROCEDURE — 250N000013 HC RX MED GY IP 250 OP 250 PS 637: Performed by: INTERNAL MEDICINE

## 2024-10-26 PROCEDURE — 99207 PR NO BILLABLE SERVICE THIS VISIT: CPT | Performed by: STUDENT IN AN ORGANIZED HEALTH CARE EDUCATION/TRAINING PROGRAM

## 2024-10-26 RX ADMIN — QUETIAPINE FUMARATE 12.5 MG: 25 TABLET ORAL at 21:53

## 2024-10-26 NOTE — PLAN OF CARE
"Goal Outcome Evaluation:           Overall Patient Progress: improvingOverall Patient Progress: improving    Outcome Evaluation: countinue prison cre, confused/ awaiting placement.    Pt alert/confused, denies pain, VSS once a day, LS clear, RA, Regular diet, SBA/gg/w, no PIV/ MD aware, Plan TBD.  Will continue POC  and monitoring.   Problem: Adult Inpatient Plan of Care  Goal: Plan of Care Review  Description: The Plan of Care Review/Shift note should be completed every shift.  The Outcome Evaluation is a brief statement about your assessment that the patient is improving, declining, or no change.  This information will be displayed automatically on your shift  note.  Outcome: Progressing  Flowsheets (Taken 10/25/2024 2317)  Outcome Evaluation: countinue prison cre, confused/ awaiting placement.  Overall Patient Progress: improving  Goal: Patient-Specific Goal (Individualized)  Description: You can add care plan individualizations to a care plan. Examples of Individualization might be:  \"Parent requests to be called daily at 9am for status\", \"I have a hard time hearing out of my right ear\", or \"Do not touch me to wake me up as it startles  me\".  Outcome: Progressing  Goal: Absence of Hospital-Acquired Illness or Injury  Outcome: Progressing  Intervention: Identify and Manage Fall Risk  Recent Flowsheet Documentation  Taken 10/25/2024 1550 by Erin Gifford RN  Safety Promotion/Fall Prevention:   activity supervised   treat underlying cause   treat reversible contributory factors  Intervention: Prevent Skin Injury  Recent Flowsheet Documentation  Taken 10/25/2024 1550 by Erin Gifford RN  Body Position: position changed independently  Goal: Optimal Comfort and Wellbeing  Outcome: Progressing  Goal: Readiness for Transition of Care  Outcome: Progressing     Problem: Dementia Signs/Symptoms  Goal: Improved Behavioral Control (Dementia Signs/Symptoms)  Outcome: Progressing  Goal: Improved Mood Symptoms " (Dementia Signs/Symptoms)  Outcome: Progressing  Goal: Optimized Cognitive Function (Dementia Signs/Symptoms)  Outcome: Progressing  Goal: Optimized Oral Intake (Dementia Signs/Symptoms)  Outcome: Progressing  Goal: Improved Sleep (Dementia Signs/Symptoms)  Outcome: Progressing     Problem: Infection  Goal: Absence of Infection Signs and Symptoms  Outcome: Progressing

## 2024-10-26 NOTE — PROGRESS NOTES
[facility]    Hospitalist Progress Note  Name: Jemal Gray    MRN: [id number]  Provider:  Alexandre Kurtz MD    Date of Service: 10/26/2024    Summary of Stay: Jemal Gray is an 89 year old male with history of dementia with chronic aphasia. He was admitted on 10/2/2024 after he was brought to the ED by EMS from facility for evaluation of agitation.     He had a prolonged hospitalization from 6/5/24 through 10/2/24 during which he was treated for cellulitis but mostly awaited long-term care placement due to dementia. He required appointment of guardianship.  He discharged to a long-term care facility on 10/2. Upon transfer to the long-term care facility he became agitated with swearing.  He was wandering around the long-term care facility and was entering other patient's rooms.  He was unable to be redirected and required transfer back to the ED.     In the ED he was stable. He was given seroquel.  He required a bedside attendant as he was agitated and unable to be redirected.  He attempted to leave the emergency department and go into other patient's rooms. He was admitted for [facility] cares.  Patient has improved and is no longer agitated. He is medically stable and can be discharged whenever placement is found. Prior long term care facility is not taking patient back.  is looking into memory care units.     Problem list:     Acute agitation, improved  Behavior disturbances   Dementia with chronic aphasia  Suspect triggered by changes in setting and recent move.  No physical complaints or concerns of infection.  Historically required Zyprexa over the summer when he was hospitalized for agitation.  Most recently had been prescribed Seroquel 12.5 mg twice daily as needed although he had not been needing most recently.  -Bedside attendant as needed  -Restarted prior to admission Seroquel at 12.5 mg twice daily as needed, may need to titrate  - ms  -SW consulted  -On  10/6/2024 evening patient confused and increasing agitation.  Patient refused scheduled medications.  As needed Seroquel crushed and given in pudding and was effective.  -Continue scheduled 12.5 mg of Seroquel every evening     Need for disposition assistance  -Patient had prolonged hospitalization due to need for disposition assistance.    -His daughter filed for guardianship paperwork was completed in July   -acquired MA application and long-term care placement   -Ultimately will require 24/7 care and likely Memory care placement      Actinic Keratosis  Had surgery for this and prescribed steroids cream and ketoconazole cream, restarted per daughter request previously.     Sinus Bradycardia  Patient was noted to have HR in 50s.  Asymptomatic.  -no telemetry monitoring needed      History of Constipation  -laxatives PRN      Care plan  -Social work working on placement.  -Given patient is unreliable historian will check routine labs in the morning as it has been a while.     I spent 20 minutes in reviewing this patient's labs, imaging, medications, medical history.  In addition time was spent interviewing the patient, communicating with family, and medical decision making.     DVT Prophylaxis: Pneumatic Compression Devices  Code Status: No CPR- Do NOT Intubate  Diet: Combination Diet Regular Diet  Snacks/Supplements Pediatric: Ensure Enlive; Between Meals  Room Service    Maddox Catheter: Not present    Disposition: Medically Ready for Discharge: Ready Now    Goals to discharge include: placement obtained  Family updated today: No     Interval History   No new issues.  Resting comfortably    -Data reviewed today: I personally reviewed all new labs and imaging results over the last 24 hours.     Physical Exam   Temp: 98.2  F (36.8  C) Temp src: Oral BP: (!) 143/83 Pulse: 51   Resp: 18 SpO2: 98 % O2 Device: None (Room air)    Vitals:    10/02/24 2214   Weight: 71.4 kg (157 lb 6.4 oz)     Vital Signs with Ranges  Temp:  " [98.2  F (36.8  C)] 98.2  F (36.8  C)  Pulse:  [51] 51  Resp:  [18] 18  BP: (143)/(83) 143/83  SpO2:  [98 %] 98 %  I/O last 3 completed shifts:  In: 400 [P.O.:400]  Out: -     General: Alert, awake, no acute distress.  HEENT: NC/AT, eyes anicteric, external occular movements intact, face symmetric.   Psych: Appropriate affect.  Calm.      Medications   Current Facility-Administered Medications   Medication Dose Route Frequency Provider Last Rate Last Admin     Current Facility-Administered Medications   Medication Dose Route Frequency Provider Last Rate Last Admin    ketoconazole (NIZORAL) 2 % shampoo   Topical Q Mon Wed Fri AM Liliana Vargas PA-C   Given at 10/23/24 1114    QUEtiapine (SEROquel) half-tab 12.5 mg  12.5 mg Oral QPM Edna Kelly MD   12.5 mg at 10/25/24 2232     Data     Laboratory:  Recent Labs   Lab 10/20/24  0558   WBC 9.7   HGB 12.2*   HCT 37.8*   MCV 96        Recent Labs   Lab 10/20/24  0558      POTASSIUM 3.9   CHLORIDE 105   CO2 24   ANIONGAP 12   *   BUN 23.6*   CR 0.94   GFRESTIMATED 78   LEON 8.5*     No results for input(s): \"CULT\" in the last 168 hours.  No results found for: \"TROPI\"    Imaging:  No results found for this or any previous visit (from the past 24 hours).    Alexandre Kurtz MD    "

## 2024-10-26 NOTE — PLAN OF CARE
"Goal Outcome Evaluation:    VSS. Continues on jail cares. Alarms on. Awaiting placement.      Plan of Care Reviewed With: patient    Overall Patient Progress: no changeOverall Patient Progress: no change    Outcome Evaluation: Continue jail  cares. Awaiting placement.    Problem: Adult Inpatient Plan of Care  Goal: Plan of Care Review  Description: The Plan of Care Review/Shift note should be completed every shift.  The Outcome Evaluation is a brief statement about your assessment that the patient is improving, declining, or no change.  This information will be displayed automatically on your shift  note.  Outcome: Progressing  Flowsheets (Taken 10/26/2024 1233)  Outcome Evaluation: Continue jail  cares. Awaiting placement.  Plan of Care Reviewed With: patient  Overall Patient Progress: no change  Goal: Patient-Specific Goal (Individualized)  Description: You can add care plan individualizations to a care plan. Examples of Individualization might be:  \"Parent requests to be called daily at 9am for status\", \"I have a hard time hearing out of my right ear\", or \"Do not touch me to wake me up as it startles  me\".  Outcome: Progressing  Goal: Absence of Hospital-Acquired Illness or Injury  Outcome: Progressing  Intervention: Identify and Manage Fall Risk  Recent Flowsheet Documentation  Taken 10/26/2024 0712 by Laury Rios, RN  Safety Promotion/Fall Prevention: safety round/check completed  Intervention: Prevent Skin Injury  Recent Flowsheet Documentation  Taken 10/26/2024 0859 by Laury Rios, RN  Body Position: position changed independently  Goal: Optimal Comfort and Wellbeing  Outcome: Progressing  Goal: Readiness for Transition of Care  Outcome: Progressing     Problem: Dementia Signs/Symptoms  Goal: Improved Behavioral Control (Dementia Signs/Symptoms)  Outcome: Progressing  Goal: Improved Mood Symptoms (Dementia Signs/Symptoms)  Outcome: Progressing  Goal: Optimized Cognitive Function " (Dementia Signs/Symptoms)  Outcome: Progressing  Goal: Optimized Oral Intake (Dementia Signs/Symptoms)  Outcome: Progressing  Goal: Improved Sleep (Dementia Signs/Symptoms)  Outcome: Progressing     Problem: Infection  Goal: Absence of Infection Signs and Symptoms  Outcome: Progressing

## 2024-10-27 PROCEDURE — 250N000013 HC RX MED GY IP 250 OP 250 PS 637: Performed by: INTERNAL MEDICINE

## 2024-10-27 PROCEDURE — 101N000002 HC CUSTODIAL CARE DAILY

## 2024-10-27 PROCEDURE — 99207 PR NO BILLABLE SERVICE THIS VISIT: CPT | Performed by: STUDENT IN AN ORGANIZED HEALTH CARE EDUCATION/TRAINING PROGRAM

## 2024-10-27 RX ADMIN — QUETIAPINE FUMARATE 12.5 MG: 25 TABLET ORAL at 19:17

## 2024-10-27 NOTE — PLAN OF CARE
"Goal Outcome Evaluation:    VSS. Continue correction care. Awaiting placement. Up SBA. Alarms on.      Plan of Care Reviewed With: patient    Overall Patient Progress: no change    Outcome Evaluation: Awaiting placement.    Problem: Adult Inpatient Plan of Care  Goal: Plan of Care Review  Description: The Plan of Care Review/Shift note should be completed every shift.  The Outcome Evaluation is a brief statement about your assessment that the patient is improving, declining, or no change.  This information will be displayed automatically on your shift  note.  Outcome: Progressing  Flowsheets (Taken 10/27/2024 2338)  Outcome Evaluation: Awaiting placement.  Plan of Care Reviewed With: patient  Overall Patient Progress: no change  Goal: Patient-Specific Goal (Individualized)  Description: You can add care plan individualizations to a care plan. Examples of Individualization might be:  \"Parent requests to be called daily at 9am for status\", \"I have a hard time hearing out of my right ear\", or \"Do not touch me to wake me up as it startles  me\".  Outcome: Progressing  Goal: Absence of Hospital-Acquired Illness or Injury  Outcome: Progressing  Intervention: Prevent Skin Injury  Recent Flowsheet Documentation  Taken 10/27/2024 5058 by Laury Rios, RN  Body Position: position changed independently  Goal: Optimal Comfort and Wellbeing  Outcome: Progressing  Goal: Readiness for Transition of Care  Outcome: Progressing     Problem: Dementia Signs/Symptoms  Goal: Improved Behavioral Control (Dementia Signs/Symptoms)  Outcome: Progressing  Goal: Improved Mood Symptoms (Dementia Signs/Symptoms)  Outcome: Progressing  Goal: Optimized Cognitive Function (Dementia Signs/Symptoms)  Outcome: Progressing  Goal: Optimized Oral Intake (Dementia Signs/Symptoms)  Outcome: Progressing  Goal: Improved Sleep (Dementia Signs/Symptoms)  Outcome: Progressing     Problem: Infection  Goal: Absence of Infection Signs and Symptoms  Outcome: " Progressing

## 2024-10-27 NOTE — PLAN OF CARE
"Patient is alert and confused, assist of 1 in the room, tolerating combination regular diet and oral medications crushed in pudding, no PIV at this time, sleeping well, frequent checks for safety, will continue to monitor and provide cares.     Problem: Adult Inpatient Plan of Care  Goal: Plan of Care Review  Description: The Plan of Care Review/Shift note should be completed every shift.  The Outcome Evaluation is a brief statement about your assessment that the patient is improving, declining, or no change.  This information will be displayed automatically on your shift  note.  Outcome: Progressing  Flowsheets (Taken 10/27/2024 9348)  Outcome Evaluation: continue alf cares, waiting on placement, sleeping well.  Plan of Care Reviewed With: patient  Overall Patient Progress: no change  Goal: Patient-Specific Goal (Individualized)  Description: You can add care plan individualizations to a care plan. Examples of Individualization might be:  \"Parent requests to be called daily at 9am for status\", \"I have a hard time hearing out of my right ear\", or \"Do not touch me to wake me up as it startles  me\".  Outcome: Progressing  Goal: Absence of Hospital-Acquired Illness or Injury  Outcome: Progressing  Goal: Optimal Comfort and Wellbeing  Outcome: Progressing  Goal: Readiness for Transition of Care  Outcome: Progressing     Problem: Dementia Signs/Symptoms  Goal: Improved Behavioral Control (Dementia Signs/Symptoms)  Outcome: Progressing  Goal: Improved Mood Symptoms (Dementia Signs/Symptoms)  Outcome: Progressing  Goal: Optimized Cognitive Function (Dementia Signs/Symptoms)  Outcome: Progressing  Goal: Optimized Oral Intake (Dementia Signs/Symptoms)  Outcome: Progressing  Goal: Improved Sleep (Dementia Signs/Symptoms)  Outcome: Progressing     Problem: Infection  Goal: Absence of Infection Signs and Symptoms  Outcome: Progressing     Goal Outcome Evaluation:      Plan of Care Reviewed With: patient    Overall Patient " Progress: no changeOverall Patient Progress: no change    Outcome Evaluation: continue [facility] cares, waiting on placement, sleeping well.

## 2024-10-27 NOTE — PROGRESS NOTES
[facility]    Hospitalist Progress Note  Name: Jemal Gray    MRN: [id number]  Provider:  Alexandre Kurtz MD    Date of Service: 10/27/2024    Summary of Stay: Jemal Gray is an 89 year old male with history of dementia with chronic aphasia. He was admitted on 10/2/2024 after he was brought to the ED by EMS from facility for evaluation of agitation.     He had a prolonged hospitalization from 6/5/24 through 10/2/24 during which he was treated for cellulitis but mostly awaited long-term care placement due to dementia. He required appointment of guardianship.  He discharged to a long-term care facility on 10/2. Upon transfer to the long-term care facility he became agitated with swearing.  He was wandering around the long-term care facility and was entering other patient's rooms.  He was unable to be redirected and required transfer back to the ED.     In the ED he was stable. He was given seroquel.  He required a bedside attendant as he was agitated and unable to be redirected.  He attempted to leave the emergency department and go into other patient's rooms. He was admitted for [facility] cares.  Patient has improved and is no longer agitated. He is medically stable and can be discharged whenever placement is found. Prior long term care facility is not taking patient back.  is looking into memory care units.     Problem list:     Acute agitation, improved  Behavior disturbances   Dementia with chronic aphasia  Suspect triggered by changes in setting and recent move.  No physical complaints or concerns of infection.  Historically required Zyprexa over the summer when he was hospitalized for agitation.  Most recently had been prescribed Seroquel 12.5 mg twice daily as needed although he had not been needing most recently.  -Bedside attendant as needed  -Restarted prior to admission Seroquel at 12.5 mg twice daily as needed, may need to titrate  - ms  -SW consulted  -On  10/6/2024 evening patient confused and increasing agitation.  Patient refused scheduled medications.  As needed Seroquel crushed and given in pudding and was effective.  -Continue scheduled 12.5 mg of Seroquel every evening     Need for disposition assistance  -Patient had prolonged hospitalization due to need for disposition assistance.    -His daughter filed for guardianship paperwork was completed in July   -acquired MA application and long-term care placement   -Ultimately will require 24/7 care and likely Memory care placement      Actinic Keratosis  Had surgery for this and prescribed steroids cream and ketoconazole cream, restarted per daughter request previously.     Sinus Bradycardia  Patient was noted to have HR in 50s.  Asymptomatic.  -no telemetry monitoring needed      History of Constipation  -laxatives PRN      Care plan  -Social work working on placement.  -Given patient is unreliable historian will check routine labs in the morning as it has been a while.     I spent 20 minutes in reviewing this patient's labs, imaging, medications, medical history.  In addition time was spent interviewing the patient, communicating with family, and medical decision making.     DVT Prophylaxis: Pneumatic Compression Devices  Code Status: No CPR- Do NOT Intubate  Diet: Combination Diet Regular Diet  Snacks/Supplements Pediatric: Ensure Enlive; Between Meals  Room Service    Maddox Catheter: Not present    Disposition: Medically Ready for Discharge: Ready Now    Goals to discharge include: placement obtained  Family updated today: No     Interval History   No new issues.  Resting comfortably    -Data reviewed today: I personally reviewed all new labs and imaging results over the last 24 hours.     Physical Exam   Temp: 98.6  F (37  C) Temp src: Oral BP: 129/67 Pulse: 60   Resp: 18 SpO2: 91 % O2 Device: None (Room air)    Vitals:    10/02/24 2214   Weight: 71.4 kg (157 lb 6.4 oz)     Vital Signs with Ranges  Temp:   "[98.6  F (37  C)] 98.6  F (37  C)  Pulse:  [60] 60  Resp:  [18] 18  BP: (129)/(67) 129/67  SpO2:  [91 %] 91 %  I/O last 3 completed shifts:  In: 150 [P.O.:150]  Out: -     General: Alert, awake, no acute distress.  HEENT: NC/AT, eyes anicteric, external occular movements intact, face symmetric.   Psych: Appropriate affect.  Calm.      Medications   Current Facility-Administered Medications   Medication Dose Route Frequency Provider Last Rate Last Admin     Current Facility-Administered Medications   Medication Dose Route Frequency Provider Last Rate Last Admin    ketoconazole (NIZORAL) 2 % shampoo   Topical Q Mon Wed Fri AM Liliana Vargas PA-C   Given at 10/23/24 1114    QUEtiapine (SEROquel) half-tab 12.5 mg  12.5 mg Oral QPM Edna Kelly MD   12.5 mg at 10/26/24 2153     Data     Laboratory:  No results for input(s): \"WBC\", \"HGB\", \"HCT\", \"MCV\", \"PLT\" in the last 168 hours.    No results for input(s): \"NA\", \"POTASSIUM\", \"CHLORIDE\", \"CO2\", \"ANIONGAP\", \"GLC\", \"BUN\", \"CR\", \"GFRESTIMATED\", \"GFRESTBLACK\", \"LEON\" in the last 168 hours.    No results for input(s): \"CULT\" in the last 168 hours.  No results found for: \"TROPI\"    Imaging:  No results found for this or any previous visit (from the past 24 hours).    Alexandre Kurtz MD    "

## 2024-10-27 NOTE — PLAN OF CARE
"Goal Outcome Evaluation:      Plan of Care Reviewed With: patient    Overall Patient Progress: no changeOverall Patient Progress: no change    Outcome Evaluation: continue [facility] cares, awaiting plcement. schedule seroquel given.    Alert/confused, SBA/gb/w. Meds crushed with pudding.  Plan waiting placement.  Will continue POC and monitoring.   Problem: Adult Inpatient Plan of Care  Goal: Plan of Care Review  Description: The Plan of Care Review/Shift note should be completed every shift.  The Outcome Evaluation is a brief statement about your assessment that the patient is improving, declining, or no change.  This information will be displayed automatically on your shift  note.  Outcome: Progressing  Flowsheets (Taken 10/26/2024 2317)  Outcome Evaluation: continue [facility] cares, awaiting plcement. schedule seroquel given.  Plan of Care Reviewed With: patient  Overall Patient Progress: no change  Goal: Patient-Specific Goal (Individualized)  Description: You can add care plan individualizations to a care plan. Examples of Individualization might be:  \"Parent requests to be called daily at 9am for status\", \"I have a hard time hearing out of my right ear\", or \"Do not touch me to wake me up as it startles  me\".  Outcome: Progressing  Goal: Absence of Hospital-Acquired Illness or Injury  Outcome: Progressing  Intervention: Identify and Manage Fall Risk  Recent Flowsheet Documentation  Taken 10/26/2024 1545 by Erin Gifford RN  Safety Promotion/Fall Prevention:   activity supervised   treat underlying cause   treat reversible contributory factors   nonskid shoes/slippers when out of bed  Intervention: Prevent Skin Injury  Recent Flowsheet Documentation  Taken 10/26/2024 1545 by Erin Gifford RN  Body Position: position changed independently  Skin Protection: adhesive use limited  Goal: Optimal Comfort and Wellbeing  Outcome: Progressing  Goal: Readiness for Transition of Care  Outcome: Progressing   "   Problem: Dementia Signs/Symptoms  Goal: Improved Behavioral Control (Dementia Signs/Symptoms)  Outcome: Progressing  Goal: Improved Mood Symptoms (Dementia Signs/Symptoms)  Outcome: Progressing  Goal: Optimized Cognitive Function (Dementia Signs/Symptoms)  Outcome: Progressing  Goal: Optimized Oral Intake (Dementia Signs/Symptoms)  Outcome: Progressing  Goal: Improved Sleep (Dementia Signs/Symptoms)  Outcome: Progressing     Problem: Infection  Goal: Absence of Infection Signs and Symptoms  Outcome: Progressing  Intervention: Prevent or Manage Infection  Recent Flowsheet Documentation  Taken 10/26/2024 1545 by Erin Gifford RN  Infection Management: aseptic technique maintained

## 2024-10-28 PROCEDURE — 250N000013 HC RX MED GY IP 250 OP 250 PS 637: Performed by: PHYSICIAN ASSISTANT

## 2024-10-28 PROCEDURE — 250N000013 HC RX MED GY IP 250 OP 250 PS 637: Performed by: INTERNAL MEDICINE

## 2024-10-28 PROCEDURE — 99207 PR NO BILLABLE SERVICE THIS VISIT: CPT | Performed by: STUDENT IN AN ORGANIZED HEALTH CARE EDUCATION/TRAINING PROGRAM

## 2024-10-28 PROCEDURE — 101N000002 HC CUSTODIAL CARE DAILY

## 2024-10-28 RX ADMIN — QUETIAPINE FUMARATE 12.5 MG: 25 TABLET ORAL at 12:29

## 2024-10-28 RX ADMIN — QUETIAPINE FUMARATE 12.5 MG: 25 TABLET ORAL at 20:11

## 2024-10-28 RX ADMIN — KETOCONAZOLE: 20 SHAMPOO, SUSPENSION TOPICAL at 10:27

## 2024-10-28 NOTE — PLAN OF CARE
Goal Outcome Evaluation:      Plan of Care Reviewed With: patient    Overall Patient Progress: improvingOverall Patient Progress: improving    Outcome Evaluation: denies pain, walk in quiles x 2.    Alert/confused, continue care home care. Plan, awaiting placement.  Will continue POC and monitoring.   Problem: Adult Inpatient Plan of Care  Goal: Plan of Care Review  Description: The Plan of Care Review/Shift note should be completed every shift.  The Outcome Evaluation is a brief statement about your assessment that the patient is improving, declining, or no change.  This information will be displayed automatically on your shift  note.  10/27/2024 2239 by Erin Gifford RN  Outcome: Progressing  Flowsheets (Taken 10/27/2024 2239)  Outcome Evaluation: denies pain, walk in quiles x 2.  Plan of Care Reviewed With: patient  Overall Patient Progress: improving  10/27/2024 2239 by Erin Gifford, RN  Outcome: Progressing  Flowsheets (Taken 10/27/2024 2239)  Outcome Evaluation: denies pain, walk in quiles x 2.  Plan of Care Reviewed With: patient  Overall Patient Progress: improving

## 2024-10-28 NOTE — PROGRESS NOTES
[facility]    Hospitalist Progress Note  Name: Jemal Gray    MRN: [id number]  Provider:  Alexandre Kurtz MD    Date of Service: 10/28/2024    Summary of Stay: Jemal Gray is an 89 year old male with history of dementia with chronic aphasia. He was admitted on 10/2/2024 after he was brought to the ED by EMS from facility for evaluation of agitation.     He had a prolonged hospitalization from 6/5/24 through 10/2/24 during which he was treated for cellulitis but mostly awaited long-term care placement due to dementia. He required appointment of guardianship.  He discharged to a long-term care facility on 10/2. Upon transfer to the long-term care facility he became agitated with swearing.  He was wandering around the long-term care facility and was entering other patient's rooms.  He was unable to be redirected and required transfer back to the ED.     In the ED he was stable. He was given seroquel.  He required a bedside attendant as he was agitated and unable to be redirected.  He attempted to leave the emergency department and go into other patient's rooms. He was admitted for detention cares.  Patient has improved and is no longer agitated. He is medically stable and can be discharged whenever placement is found. Prior long term care facility is not taking patient back.  is looking into memory care units.     Problem list:     Acute agitation, improved  Behavior disturbances   Dementia with chronic aphasia  Suspect triggered by changes in setting and recent move.  No physical complaints or concerns of infection.  Historically required Zyprexa over the summer when he was hospitalized for agitation.  Most recently had been prescribed Seroquel 12.5 mg twice daily as needed although he had not been needing most recently.  -Bedside attendant as needed  -Restarted prior to admission Seroquel at 12.5 mg twice daily as needed, may need to titrate  - ms  -SW consulted  -On  10/6/2024 evening patient confused and increasing agitation.  Patient refused scheduled medications.  As needed Seroquel crushed and given in pudding and was effective.  -Continue scheduled 12.5 mg of Seroquel every evening     Need for disposition assistance  -Patient had prolonged hospitalization due to need for disposition assistance.    -His daughter filed for guardianship paperwork was completed in July   -acquired MA application and long-term care placement   -Ultimately will require 24/7 care and likely Memory care placement      Actinic Keratosis  Had surgery for this and prescribed steroids cream and ketoconazole cream, restarted per daughter request previously.     Sinus Bradycardia  Patient was noted to have HR in 50s.  Asymptomatic.  -no telemetry monitoring needed      History of Constipation  -laxatives PRN      Care plan  -Social work working on placement.  -Given patient is unreliable historian will check routine labs in the morning as it has been a while.     I spent 20 minutes in reviewing this patient's labs, imaging, medications, medical history.  In addition time was spent interviewing the patient, communicating with family, and medical decision making.     DVT Prophylaxis: Pneumatic Compression Devices  Code Status: No CPR- Do NOT Intubate  Diet: Combination Diet Regular Diet  Snacks/Supplements Pediatric: Ensure Enlive; Between Meals  Room Service    Maddox Catheter: Not present    Disposition: Medically Ready for Discharge: Ready Now    Goals to discharge include: placement obtained  Family updated today: No     Interval History   No new issues.  Resting comfortably.    -Data reviewed today: I personally reviewed all new labs and imaging results over the last 24 hours.     Physical Exam   Temp: 98.9  F (37.2  C) Temp src: Oral BP: 114/68 Pulse: 69   Resp: 18 SpO2: 96 % O2 Device: None (Room air)    Vitals:    10/02/24 2214   Weight: 71.4 kg (157 lb 6.4 oz)     Vital Signs with Ranges  Temp:   "[97.6  F (36.4  C)-98.9  F (37.2  C)] 98.9  F (37.2  C)  Pulse:  [53-69] 69  Resp:  [18] 18  BP: (114-125)/(64-68) 114/68  SpO2:  [95 %-96 %] 96 %  I/O last 3 completed shifts:  In: 1040 [P.O.:1040]  Out: -     General: Alert, awake, no acute distress.  HEENT: NC/AT, eyes anicteric, external occular movements intact, face symmetric.   Psych: Appropriate affect.  Calm.      Medications   Current Facility-Administered Medications   Medication Dose Route Frequency Provider Last Rate Last Admin     Current Facility-Administered Medications   Medication Dose Route Frequency Provider Last Rate Last Admin    ketoconazole (NIZORAL) 2 % shampoo   Topical Q Mon Wed Fri AM Liliana Vargas PA-C   Given at 10/28/24 1027    QUEtiapine (SEROquel) half-tab 12.5 mg  12.5 mg Oral QPM Edna Kelly MD   12.5 mg at 10/27/24 1917     Data     Laboratory:  No results for input(s): \"WBC\", \"HGB\", \"HCT\", \"MCV\", \"PLT\" in the last 168 hours.    No results for input(s): \"NA\", \"POTASSIUM\", \"CHLORIDE\", \"CO2\", \"ANIONGAP\", \"GLC\", \"BUN\", \"CR\", \"GFRESTIMATED\", \"GFRESTBLACK\", \"LEON\" in the last 168 hours.    No results for input(s): \"CULT\" in the last 168 hours.  No results found for: \"TROPI\"    Imaging:  No results found for this or any previous visit (from the past 24 hours).    Alexandre Kurtz MD    "

## 2024-10-28 NOTE — PLAN OF CARE
"Temp: 98.9  F (37.2  C) Temp src: Oral BP: 114/68 Pulse: 69   Resp: 18 SpO2: 96 % O2 Device: None (Room air)      Alert to self. Denies pain. Head to toe done. Daily vitals WDL. Good appetite for meals. A little agitated this morning during his morning walk-PRN seraquil given. Continue long term plan of care.        Goal Outcome Evaluation:      Plan of Care Reviewed With: patient    Overall Patient Progress: no changeOverall Patient Progress: no change    Outcome Evaluation: Denies pain, VSS, H2T all in range. Some mild agitation this morning, PRN seraquil given. No Signs of infection or skin breakdown.      Problem: Adult Inpatient Plan of Care  Goal: Plan of Care Review  Description: The Plan of Care Review/Shift note should be completed every shift.  The Outcome Evaluation is a brief statement about your assessment that the patient is improving, declining, or no change.  This information will be displayed automatically on your shift  note.  Outcome: Progressing  Flowsheets (Taken 10/28/2024 5528)  Outcome Evaluation: Denies pain, VSS, H2T all in range. Some mild agitation this morning, PRN seraquil given. No Signs of infection or skin breakdown.  Plan of Care Reviewed With: patient  Overall Patient Progress: no change  Goal: Patient-Specific Goal (Individualized)  Description: You can add care plan individualizations to a care plan. Examples of Individualization might be:  \"Parent requests to be called daily at 9am for status\", \"I have a hard time hearing out of my right ear\", or \"Do not touch me to wake me up as it startles  me\".  Outcome: Progressing  Goal: Absence of Hospital-Acquired Illness or Injury  Outcome: Progressing  Intervention: Identify and Manage Fall Risk  Recent Flowsheet Documentation  Taken 10/28/2024 1029 by Wilson Beal RN  Safety Promotion/Fall Prevention: safety round/check completed  Intervention: Prevent Skin Injury  Recent Flowsheet Documentation  Taken 10/28/2024 1029 by Lian, " Wilson PANTOJA RN  Body Position: position changed independently  Goal: Optimal Comfort and Wellbeing  Outcome: Progressing  Goal: Readiness for Transition of Care  Outcome: Progressing     Problem: Dementia Signs/Symptoms  Goal: Improved Behavioral Control (Dementia Signs/Symptoms)  Outcome: Progressing  Goal: Improved Mood Symptoms (Dementia Signs/Symptoms)  Outcome: Progressing  Goal: Optimized Cognitive Function (Dementia Signs/Symptoms)  Outcome: Progressing  Goal: Optimized Oral Intake (Dementia Signs/Symptoms)  Outcome: Progressing  Goal: Improved Sleep (Dementia Signs/Symptoms)  Outcome: Progressing     Problem: Infection  Goal: Absence of Infection Signs and Symptoms  Outcome: Progressing

## 2024-10-28 NOTE — PLAN OF CARE
Goal Outcome Evaluation:      Plan of Care Reviewed With: patient    Overall Patient Progress: improvingOverall Patient Progress: improving    Outcome Evaluation: pt denies any discomfort. walked to the bathroom once x2      Problem: Adult Inpatient Plan of Care  Goal: Plan of Care Review  Description: The Plan of Care Review/Shift note should be completed every shift.  The Outcome Evaluation is a brief statement about your assessment that the patient is improving, declining, or no change.  This information will be displayed automatically on your shift  note.  Outcome: Progressing  Flowsheets (Taken 10/28/2024 0637)  Outcome Evaluation: pt denies any discomfort. walked to the bathroom once x2  Plan of Care Reviewed With: patient  Overall Patient Progress: improving     Problem: Adult Inpatient Plan of Care  Goal: Absence of Hospital-Acquired Illness or Injury  Intervention: Prevent Infection  Recent Flowsheet Documentation  Taken 10/28/2024 0308 by Erica Acosta, RN  Infection Prevention: rest/sleep promoted

## 2024-10-28 NOTE — PROGRESS NOTES
Care Management Follow Up    Length of Stay (days): 0    Expected Discharge Date: 10/31/2024     Concerns to be Addressed:     discharge planning  Patient plan of care discussed at interdisciplinary rounds: Yes    Anticipated Discharge Disposition:  LTC locked unit with memory care. Also waiting for [facility] to call back to schedule elderly waive assessment. They are still weeks out for assessments.  Anticipated Discharge Services:  LTC vs memory care, still need funding for memory care.  Anticipated Discharge DME:  Facility will provide    Patient/family educated on Medicare website which has current facility and service quality ratings: yes   Education Provided on the Discharge Plan:  yes  Patient/Family in Agreement with the Plan:  yes    Referrals Placed by CM/SW:  yes more referrals and calls made to facilities. Having facilities that have denied him to re-assess.  Private pay costs discussed: Not applicable @ this time    Discussed  Partnership in Safe Discharge Planning  document with patient/family: No     Handoff Completed: No, handoff not indicated or clinically appropriate    Additional Information:  See above. Left message for daughter/guardian with update.    Next Steps:  will continue send referrals and follow up with facilities and [facility] with regards to the elderly waiver.    NALDO Morris   Inpatient Care Coordination   Supervisor  [facility]  [phone]      NALDO Puentes

## 2024-10-29 PROCEDURE — 99207 PR NO BILLABLE SERVICE THIS VISIT: CPT | Performed by: STUDENT IN AN ORGANIZED HEALTH CARE EDUCATION/TRAINING PROGRAM

## 2024-10-29 PROCEDURE — 101N000002 HC CUSTODIAL CARE DAILY

## 2024-10-29 PROCEDURE — 250N000013 HC RX MED GY IP 250 OP 250 PS 637: Performed by: PHYSICIAN ASSISTANT

## 2024-10-29 PROCEDURE — 250N000013 HC RX MED GY IP 250 OP 250 PS 637: Performed by: INTERNAL MEDICINE

## 2024-10-29 RX ADMIN — QUETIAPINE FUMARATE 12.5 MG: 25 TABLET ORAL at 20:41

## 2024-10-29 RX ADMIN — QUETIAPINE FUMARATE 12.5 MG: 25 TABLET ORAL at 17:32

## 2024-10-29 NOTE — PROGRESS NOTES
Alert to self. Denies pain , appears in no form of distress .  Slept well during the shift  calm , ambulated to bath room  . Scheduled QUEtiapine - 12.5 mg provided . Continue [facility] plan of care.

## 2024-10-29 NOTE — PLAN OF CARE
"Temp: 98.3  F (36.8  C) Temp src: Oral BP: 123/81 Pulse: 53   Resp: 18 SpO2: 97 % O2 Device: None (Room air)      Alert to self. Good appetite on regular diet. Denies pain, n/v, SOB, CP. Pt very restless this evening and kept setting off bed alarm and trying to wander in quiles. Many walks given as redirection but had to utilize PRN seraquil as well. alf care until LTC found.        Goal Outcome Evaluation:      Plan of Care Reviewed With: patient    Overall Patient Progress: no changeOverall Patient Progress: no change    Outcome Evaluation: Denies pain,  VSS. Pleasant today. No falls. No signs of infection or new skin breakdown.      Problem: Adult Inpatient Plan of Care  Goal: Plan of Care Review  Description: The Plan of Care Review/Shift note should be completed every shift.  The Outcome Evaluation is a brief statement about your assessment that the patient is improving, declining, or no change.  This information will be displayed automatically on your shift  note.  Outcome: Progressing  Flowsheets (Taken 10/29/2024 1309)  Outcome Evaluation: Denies pain,  VSS. Pleasant today. No falls. No signs of infection or new skin breakdown.  Plan of Care Reviewed With: patient  Overall Patient Progress: no change  Goal: Patient-Specific Goal (Individualized)  Description: You can add care plan individualizations to a care plan. Examples of Individualization might be:  \"Parent requests to be called daily at 9am for status\", \"I have a hard time hearing out of my right ear\", or \"Do not touch me to wake me up as it startles  me\".  Outcome: Progressing  Goal: Absence of Hospital-Acquired Illness or Injury  Outcome: Progressing  Intervention: Identify and Manage Fall Risk  Recent Flowsheet Documentation  Taken 10/29/2024 0715 by Wilson Beal RN  Safety Promotion/Fall Prevention: safety round/check completed  Intervention: Prevent Skin Injury  Recent Flowsheet Documentation  Taken 10/29/2024 0715 by Wilson Beal, " RN  Body Position: position changed independently  Goal: Optimal Comfort and Wellbeing  Outcome: Progressing  Goal: Readiness for Transition of Care  Outcome: Progressing     Problem: Dementia Signs/Symptoms  Goal: Improved Behavioral Control (Dementia Signs/Symptoms)  Outcome: Progressing  Goal: Improved Mood Symptoms (Dementia Signs/Symptoms)  Outcome: Progressing  Goal: Optimized Cognitive Function (Dementia Signs/Symptoms)  Outcome: Progressing  Goal: Optimized Oral Intake (Dementia Signs/Symptoms)  Outcome: Progressing  Goal: Improved Sleep (Dementia Signs/Symptoms)  Outcome: Progressing     Problem: Infection  Goal: Absence of Infection Signs and Symptoms  Outcome: Progressing

## 2024-10-29 NOTE — PROGRESS NOTES
[facility]    Hospitalist Progress Note  Name: Jemal Gray    MRN: [id number]  Provider:  Alexandre Kurtz MD    Date of Service: 10/29/2024    Summary of Stay: Jemal Gray is an 89 year old male with history of dementia with chronic aphasia. He was admitted on 10/2/2024 after he was brought to the ED by EMS from facility for evaluation of agitation.     He had a prolonged hospitalization from 6/5/24 through 10/2/24 during which he was treated for cellulitis but mostly awaited long-term care placement due to dementia. He required appointment of guardianship.  He discharged to a long-term care facility on 10/2. Upon transfer to the long-term care facility he became agitated with swearing.  He was wandering around the long-term care facility and was entering other patient's rooms.  He was unable to be redirected and required transfer back to the ED.     In the ED he was stable. He was given seroquel.  He required a bedside attendant as he was agitated and unable to be redirected.  He attempted to leave the emergency department and go into other patient's rooms. He was admitted for [facility] cares.  Patient has improved and is no longer agitated. He is medically stable and can be discharged whenever placement is found. Prior long term care facility is not taking patient back.  is looking into memory care units.     Problem list:     Acute agitation, improved  Behavior disturbances   Dementia with chronic aphasia  Suspect triggered by changes in setting and recent move.  No physical complaints or concerns of infection.  Historically required Zyprexa over the summer when he was hospitalized for agitation.  Most recently had been prescribed Seroquel 12.5 mg twice daily as needed although he had not been needing most recently.  -Bedside attendant as needed  -Restarted prior to admission Seroquel at 12.5 mg twice daily as needed, may need to titrate  - ms  -SW consulted  -On  10/6/2024 evening patient confused and increasing agitation.  Patient refused scheduled medications.  As needed Seroquel crushed and given in pudding and was effective.  -Continue scheduled 12.5 mg of Seroquel every evening     Need for disposition assistance  -Patient had prolonged hospitalization due to need for disposition assistance.    -His daughter filed for guardianship paperwork was completed in July   -acquired MA application and long-term care placement   -Ultimately will require 24/7 care and likely Memory care placement      Actinic Keratosis  Had surgery for this and prescribed steroids cream and ketoconazole cream, restarted per daughter request previously.     Sinus Bradycardia  Patient was noted to have HR in 50s.  Asymptomatic.  -no telemetry monitoring needed      History of Constipation  -laxatives PRN      Care plan  -Social work working on placement.  -Given patient is unreliable historian will check routine labs in the morning as it has been a while.     I spent 20 minutes in reviewing this patient's labs, imaging, medications, medical history.  In addition time was spent interviewing the patient, communicating with family, and medical decision making.     DVT Prophylaxis: Pneumatic Compression Devices  Code Status: No CPR- Do NOT Intubate  Diet: Combination Diet Regular Diet  Snacks/Supplements Pediatric: Ensure Enlive; Between Meals  Room Service    Maddox Catheter: Not present    Disposition: Medically Ready for Discharge: Ready Now    Goals to discharge include: placement obtained  Family updated today: No     Interval History   No new issues.  Resting comfortably.    -Data reviewed today: I personally reviewed all new labs and imaging results over the last 24 hours.     Physical Exam   Temp: 98.3  F (36.8  C) Temp src: Oral BP: 123/81 Pulse: 53   Resp: 18 SpO2: 97 % O2 Device: None (Room air)    Vitals:    10/02/24 2214   Weight: 71.4 kg (157 lb 6.4 oz)     Vital Signs with Ranges  Temp:   "[98.3  F (36.8  C)] 98.3  F (36.8  C)  Pulse:  [53] 53  Resp:  [18] 18  BP: (123)/(81) 123/81  SpO2:  [97 %] 97 %  No intake/output data recorded.    General: Alert, awake, no acute distress.  HEENT: NC/AT, eyes anicteric, external occular movements intact, face symmetric.   Psych: Appropriate affect.  Calm.      Medications   Current Facility-Administered Medications   Medication Dose Route Frequency Provider Last Rate Last Admin     Current Facility-Administered Medications   Medication Dose Route Frequency Provider Last Rate Last Admin    ketoconazole (NIZORAL) 2 % shampoo   Topical Q Mon Wed Fri AM Liliana Vargas PA-C   Given at 10/28/24 1027    QUEtiapine (SEROquel) half-tab 12.5 mg  12.5 mg Oral QPM Edna Kelly MD   12.5 mg at 10/28/24 2011     Data     Laboratory:  No results for input(s): \"WBC\", \"HGB\", \"HCT\", \"MCV\", \"PLT\" in the last 168 hours.    No results for input(s): \"NA\", \"POTASSIUM\", \"CHLORIDE\", \"CO2\", \"ANIONGAP\", \"GLC\", \"BUN\", \"CR\", \"GFRESTIMATED\", \"GFRESTBLACK\", \"LEON\" in the last 168 hours.    No results for input(s): \"CULT\" in the last 168 hours.  No results found for: \"TROPI\"    Imaging:  No results found for this or any previous visit (from the past 24 hours).    Alexandre Kurtz MD    "

## 2024-10-30 PROCEDURE — 96372 THER/PROPH/DIAG INJ SC/IM: CPT | Performed by: NURSE PRACTITIONER

## 2024-10-30 PROCEDURE — 250N000011 HC RX IP 250 OP 636: Performed by: NURSE PRACTITIONER

## 2024-10-30 PROCEDURE — 99232 SBSQ HOSP IP/OBS MODERATE 35: CPT | Performed by: INTERNAL MEDICINE

## 2024-10-30 PROCEDURE — 101N000002 HC CUSTODIAL CARE DAILY

## 2024-10-30 RX ORDER — OLANZAPINE 10 MG/2ML
2.5 INJECTION, POWDER, FOR SOLUTION INTRAMUSCULAR EVERY 6 HOURS PRN
Status: DISCONTINUED | OUTPATIENT
Start: 2024-10-30 | End: 2024-12-18 | Stop reason: HOSPADM

## 2024-10-30 RX ADMIN — OLANZAPINE 2.5 MG: 10 INJECTION, POWDER, FOR SOLUTION INTRAMUSCULAR at 21:48

## 2024-10-30 NOTE — PROGRESS NOTES
[facility]    Hospitalist Progress Note  Name: Jemal Gray    MRN: [id number]  Provider:  Brando Arreguin MD    Date of Service: 10/30/2024    Summary of Stay: Jemal Gray is an 89 year old male with history of dementia with chronic aphasia. He was admitted on 10/2/2024 after he was brought to the ED by EMS from facility for evaluation of agitation.     He had a prolonged hospitalization from 6/5/24 through 10/2/24 during which he was treated for cellulitis but mostly awaited long-term care placement due to dementia. He required appointment of guardianship.  He discharged to a long-term care facility on 10/2. Upon transfer to the long-term care facility he became agitated with swearing.  He was wandering around the long-term care facility and was entering other patient's rooms.  He was unable to be redirected and required transfer back to the ED.     In the ED he was stable. He was given seroquel.  He required a bedside attendant as he was agitated and unable to be redirected.  He attempted to leave the emergency department and go into other patient's rooms. He was admitted for skilled nursing cares.  Patient has improved and is no longer agitated. He is medically stable and can be discharged whenever placement is found. Prior long term care facility is not taking patient back.  is looking into memory care units.     Problem list:     Acute agitation, improved  Behavior disturbances   Dementia with chronic aphasia  Suspect triggered by changes in setting and recent move.  No physical complaints or concerns of infection.  Historically required Zyprexa over the summer when he was hospitalized for agitation.  Most recently had been prescribed Seroquel 12.5 mg twice daily as needed although he had not been needing most recently.  -Bedside attendant as needed  -Restarted prior to admission Seroquel at 12.5 mg twice daily as needed, may need to titrate  - ms  -SW  consulted  -Continue scheduled 12.5 mg of Seroquel every evening     Need for disposition assistance  -Patient had prolonged hospitalization due to need for disposition assistance.    -His daughter filed for guardianship paperwork was completed in July   -acquired MA application and long-term care placement   -Ultimately will require 24/7 care and likely Memory care placement      Actinic Keratosis  Had surgery for this and prescribed steroids cream and ketoconazole cream, restarted per daughter request previously.     Sinus Bradycardia  Patient was noted to have HR in 50s.  Asymptomatic.  -no telemetry monitoring needed      History of Constipation  -laxatives PRN      Care plan  -Social work working on placement.  -Given patient is unreliable historian will check routine labs in the morning as it has been a while.     I spent 20 minutes in reviewing this patient's labs, imaging, medications, medical history.  In addition time was spent interviewing the patient, communicating with family, and medical decision making.     DVT Prophylaxis: Pneumatic Compression Devices  Code Status: No CPR- Do NOT Intubate  Diet: Combination Diet Regular Diet  Snacks/Supplements Pediatric: Ensure Enlive; Between Meals  Room Service    Maddox Catheter: Not present    Disposition: Medically Ready for Discharge: Ready Now    Goals to discharge include: placement obtained  Family updated today: No     Interval History   No new issues.  Resting comfortably.    -Data reviewed today: I personally reviewed all new labs and imaging results over the last 24 hours.     Physical Exam   Temp: 97.8  F (36.6  C) Temp src: Oral BP: 124/65 Pulse: 91   Resp: 18 SpO2: 98 %      Vitals:    10/02/24 2214   Weight: 71.4 kg (157 lb 6.4 oz)     Vital Signs with Ranges  Temp:  [97.8  F (36.6  C)] 97.8  F (36.6  C)  Pulse:  [91] 91  Resp:  [18] 18  BP: (124)/(65) 124/65  SpO2:  [98 %] 98 %  No intake/output data recorded.    General: Alert, awake, no acute  "distress.  HEENT: NC/AT, eyes anicteric, external occular movements intact, face symmetric.   Psych: Appropriate affect.  Calm.      Medications   Current Facility-Administered Medications   Medication Dose Route Frequency Provider Last Rate Last Admin     Current Facility-Administered Medications   Medication Dose Route Frequency Provider Last Rate Last Admin    ketoconazole (NIZORAL) 2 % shampoo   Topical Q Mon Wed Fri AM Liliana Vargas PA-C   Given at 10/28/24 1027    QUEtiapine (SEROquel) half-tab 12.5 mg  12.5 mg Oral QPM Edna Kelly MD   12.5 mg at 10/29/24 2041     Data     Laboratory:  No results for input(s): \"WBC\", \"HGB\", \"HCT\", \"MCV\", \"PLT\" in the last 168 hours.    No results for input(s): \"NA\", \"POTASSIUM\", \"CHLORIDE\", \"CO2\", \"ANIONGAP\", \"GLC\", \"BUN\", \"CR\", \"GFRESTIMATED\", \"GFRESTBLACK\", \"LEON\" in the last 168 hours.    No results for input(s): \"CULT\" in the last 168 hours.  No results found for: \"TROPI\"    Imaging:  No results found for this or any previous visit (from the past 24 hours).    Brando Arreguin MD    "

## 2024-10-30 NOTE — PLAN OF CARE
"Goal Outcome Evaluation:                 Outcome Evaluation: no indicaiton of pain, singing a lot, ambulated for bathroom breaks, sitting in chair for meals.    Shift : 0700 - 1900     Vitals: Temp: 97.8  F (36.6  C) Temp src: Oral BP: 124/65 Pulse: 91   Resp: 18 SpO2: 98 %         Orientation: disoriented x4  Pain: denied / no indication   Activity: ambulated with standby assist to use bathroom, sat in chair to eat meals   Resp: LS clear, stable on RA   Diet: ate all his meals   How to take meds: whole with fluids   GI: no BM this shift   : voids spontaneously  Skin: WDL  Lines: no PIV  Plan: cont with plan of care     Problem: Adult Inpatient Plan of Care  Goal: Plan of Care Review  Description: The Plan of Care Review/Shift note should be completed every shift.  The Outcome Evaluation is a brief statement about your assessment that the patient is improving, declining, or no change.  This information will be displayed automatically on your shift  note.  Outcome: Progressing  Flowsheets (Taken 10/30/2024 1813)  Outcome Evaluation: no indicaiton of pain, singing a lot, ambulated for bathroom breaks, sitting in chair for meals.  Goal: Patient-Specific Goal (Individualized)  Description: You can add care plan individualizations to a care plan. Examples of Individualization might be:  \"Parent requests to be called daily at 9am for status\", \"I have a hard time hearing out of my right ear\", or \"Do not touch me to wake me up as it startles  me\".  Outcome: Progressing  Goal: Absence of Hospital-Acquired Illness or Injury  Outcome: Progressing  Intervention: Identify and Manage Fall Risk  Recent Flowsheet Documentation  Taken 10/30/2024 1811 by Kusum Mcguire, RN  Safety Promotion/Fall Prevention:   safety round/check completed   clutter free environment maintained  Taken 10/30/2024 1235 by Kusum Mcguire, RN  Safety Promotion/Fall Prevention:   safety round/check completed   clutter free environment maintained   " increased rounding and observation  Taken 10/30/2024 0949 by Kusum Mcguire RN  Safety Promotion/Fall Prevention:   safety round/check completed   nonskid shoes/slippers when out of bed   clutter free environment maintained  Intervention: Prevent Skin Injury  Recent Flowsheet Documentation  Taken 10/30/2024 1811 by Kusum Mcguire RN  Body Position: position changed independently  Taken 10/30/2024 1235 by Kusum Mcguire RN  Body Position: position changed independently  Taken 10/30/2024 0949 by Kusum Mcguire RN  Body Position: position changed independently  Intervention: Prevent Infection  Recent Flowsheet Documentation  Taken 10/30/2024 0949 by Kusum Mcguire RN  Infection Prevention:   hand hygiene promoted   rest/sleep promoted  Goal: Optimal Comfort and Wellbeing  Outcome: Progressing  Goal: Readiness for Transition of Care  Outcome: Progressing     Problem: Dementia Signs/Symptoms  Goal: Improved Behavioral Control (Dementia Signs/Symptoms)  Outcome: Progressing  Intervention: Manage Behavior  Recent Flowsheet Documentation  Taken 10/30/2024 0949 by Kusum Mcguire RN  Environmental Support: calm environment promoted  Goal: Improved Mood Symptoms (Dementia Signs/Symptoms)  Outcome: Progressing  Intervention: Optimize Emotion and Mood  Recent Flowsheet Documentation  Taken 10/30/2024 0949 by Kusum Mcguire RN  Supportive Measures: active listening utilized  Goal: Optimized Cognitive Function (Dementia Signs/Symptoms)  Outcome: Progressing  Goal: Optimized Oral Intake (Dementia Signs/Symptoms)  Outcome: Progressing  Goal: Improved Sleep (Dementia Signs/Symptoms)  Outcome: Progressing     Problem: Infection  Goal: Absence of Infection Signs and Symptoms  Outcome: Progressing

## 2024-10-30 NOTE — PLAN OF CARE
Goal Outcome Evaluation:      Plan of Care Reviewed With: patient    Overall Patient Progress: improvingOverall Patient Progress: improving    Outcome Evaluation: denies pain, slept all night waking up only to use the bathroom.

## 2024-10-31 PROCEDURE — 250N000013 HC RX MED GY IP 250 OP 250 PS 637: Performed by: INTERNAL MEDICINE

## 2024-10-31 PROCEDURE — 99232 SBSQ HOSP IP/OBS MODERATE 35: CPT | Performed by: INTERNAL MEDICINE

## 2024-10-31 PROCEDURE — 101N000002 HC CUSTODIAL CARE DAILY

## 2024-10-31 RX ADMIN — QUETIAPINE FUMARATE 12.5 MG: 25 TABLET ORAL at 23:51

## 2024-10-31 NOTE — PROGRESS NOTES
[facility]    Hospitalist Progress Note  Name: Jemal Gray    MRN: [id number]  Provider:  Brando Arreguin MD    Date of Service: 10/31/2024    Summary of Stay: Jemal Gray is an 89 year old male with history of dementia with chronic aphasia. He was admitted on 10/2/2024 after he was brought to the ED by EMS from facility for evaluation of agitation.     He had a prolonged hospitalization from 6/5/24 through 10/2/24 during which he was treated for cellulitis but mostly awaited long-term care placement due to dementia. He required appointment of guardianship.  He discharged to a long-term care facility on 10/2. Upon transfer to the long-term care facility he became agitated with swearing.  He was wandering around the long-term care facility and was entering other patient's rooms.  He was unable to be redirected and required transfer back to the ED.     In the ED he was stable. He was given seroquel.  He required a bedside attendant as he was agitated and unable to be redirected.  He attempted to leave the emergency department and go into other patient's rooms. He was admitted for jail cares.  Patient has improved and is no longer agitated. He is medically stable and can be discharged whenever placement is found. Prior long term care facility is not taking patient back.  is looking into memory care units.     Problem list:     Acute agitation, improved  Behavior disturbances   Dementia with chronic aphasia  Suspect triggered by changes in setting and recent move.  No physical complaints or concerns of infection.  Historically required Zyprexa over the summer when he was hospitalized for agitation.  Most recently had been prescribed Seroquel 12.5 mg twice daily as needed although he had not been needing most recently.  -Bedside attendant as needed  -Restarted prior to admission Seroquel at 12.5 mg twice daily as needed, may need to titrate  - ms  -SW  consulted  -Continue scheduled 12.5 mg of Seroquel every evening     Need for disposition assistance  -Patient had prolonged hospitalization due to need for disposition assistance.    -His daughter filed for guardianship paperwork was completed in July   -acquired MA application and long-term care placement   -Ultimately will require 24/7 care and likely Memory care placement      Actinic Keratosis  Had surgery for this and prescribed steroids cream and ketoconazole cream, restarted per daughter request previously.     Sinus Bradycardia  Patient was noted to have HR in 50s.  Asymptomatic.  -no telemetry monitoring needed      History of Constipation  -laxatives PRN      Care plan  -Social work working on placement.  -Given patient is unreliable historian will check routine labs in the morning as it has been a while.     I spent 20 minutes in reviewing this patient's labs, imaging, medications, medical history.  In addition time was spent interviewing the patient, communicating with family, and medical decision making.     DVT Prophylaxis: Pneumatic Compression Devices  Code Status: No CPR- Do NOT Intubate  Diet: Combination Diet Regular Diet  Snacks/Supplements Pediatric: Ensure Enlive; Between Meals  Room Service    Maddox Catheter: Not present    Disposition: Medically Ready for Discharge: Ready Now    Goals to discharge include: placement obtained  Family updated today: No     Interval History   No new issues.  Resting comfortably.  Last night event noted . Doing well now     -Data reviewed today: I personally reviewed all new labs and imaging results over the last 24 hours.     Physical Exam       BP: (!) 146/85 Pulse: 63   Resp: 17 SpO2: 95 % O2 Device: None (Room air)    Vitals:    10/02/24 2214   Weight: 71.4 kg (157 lb 6.4 oz)     Vital Signs with Ranges  Pulse:  [63] 63  Resp:  [17] 17  BP: (146)/(85) 146/85  SpO2:  [95 %] 95 %  I/O last 3 completed shifts:  In: 300 [P.O.:300]  Out: -     General: Alert,  "awake, no acute distress.  HEENT: NC/AT, eyes anicteric, external occular movements intact, face symmetric.   Psych: Appropriate affect.  Calm.      Medications   Current Facility-Administered Medications   Medication Dose Route Frequency Provider Last Rate Last Admin     Current Facility-Administered Medications   Medication Dose Route Frequency Provider Last Rate Last Admin    ketoconazole (NIZORAL) 2 % shampoo   Topical Q Mon Wed Fri AM Liliana Vargas PA-C   Given at 10/28/24 1027    QUEtiapine (SEROquel) half-tab 12.5 mg  12.5 mg Oral QPM Edna Kelly MD   12.5 mg at 10/29/24 2041     Data     Laboratory:  No results for input(s): \"WBC\", \"HGB\", \"HCT\", \"MCV\", \"PLT\" in the last 168 hours.    No results for input(s): \"NA\", \"POTASSIUM\", \"CHLORIDE\", \"CO2\", \"ANIONGAP\", \"GLC\", \"BUN\", \"CR\", \"GFRESTIMATED\", \"GFRESTBLACK\", \"LEON\" in the last 168 hours.    No results for input(s): \"CULT\" in the last 168 hours.  No results found for: \"TROPI\"    Imaging:  No results found for this or any previous visit (from the past 24 hours).    Brando Arreguin MD    "

## 2024-10-31 NOTE — PLAN OF CARE
"Pt confused. Refused reji. PO seroquel tonight, IM seroquel given. Safety measures maintained.    Problem: Adult Inpatient Plan of Care  Goal: Plan of Care Review  Description: The Plan of Care Review/Shift note should be completed every shift.  The Outcome Evaluation is a brief statement about your assessment that the patient is improving, declining, or no change.  This information will be displayed automatically on your shift  note.  Outcome: Progressing  Flowsheets (Taken 10/31/2024 7325)  Outcome Evaluation: Pt does not appear to be in distress. Ambulated to bathroom several times this shift. Walked pt around the hallways.  Plan of Care Reviewed With: patient  Overall Patient Progress: no change  Goal: Patient-Specific Goal (Individualized)  Description: You can add care plan individualizations to a care plan. Examples of Individualization might be:  \"Parent requests to be called daily at 9am for status\", \"I have a hard time hearing out of my right ear\", or \"Do not touch me to wake me up as it startles  me\".  Outcome: Progressing  Goal: Absence of Hospital-Acquired Illness or Injury  Outcome: Progressing  Intervention: Identify and Manage Fall Risk  Recent Flowsheet Documentation  Taken 10/31/2024 0320 by Julia Moss RN  Safety Promotion/Fall Prevention: safety round/check completed  Taken 10/31/2024 0015 by Julia Moss RN  Safety Promotion/Fall Prevention: safety round/check completed  Taken 10/30/2024 2245 by Julia Moss RN  Safety Promotion/Fall Prevention: safety round/check completed  Taken 10/30/2024 2000 by Julia Moss RN  Safety Promotion/Fall Prevention:   safety round/check completed   clutter free environment maintained   nonskid shoes/slippers when out of bed  Intervention: Prevent Skin Injury  Recent Flowsheet Documentation  Taken 10/30/2024 2000 by Julia Moss RN  Body Position: position changed independently  Intervention: Prevent Infection  Recent Flowsheet Documentation  Taken 10/30/2024 " 2000 by Julia Moss RN  Infection Prevention:   rest/sleep promoted   single patient room provided  Goal: Optimal Comfort and Wellbeing  Outcome: Progressing  Goal: Readiness for Transition of Care  Outcome: Progressing     Problem: Dementia Signs/Symptoms  Goal: Improved Behavioral Control (Dementia Signs/Symptoms)  Outcome: Progressing  Goal: Improved Mood Symptoms (Dementia Signs/Symptoms)  Outcome: Progressing  Goal: Optimized Cognitive Function (Dementia Signs/Symptoms)  Outcome: Progressing  Goal: Optimized Oral Intake (Dementia Signs/Symptoms)  Outcome: Progressing  Goal: Improved Sleep (Dementia Signs/Symptoms)  Outcome: Progressing     Problem: Infection  Goal: Absence of Infection Signs and Symptoms  Outcome: Progressing   Goal Outcome Evaluation:      Plan of Care Reviewed With: patient    Overall Patient Progress: no changeOverall Patient Progress: no change    Outcome Evaluation: Pt does not appear to be in distress. Ambulated to bathroom several times this shift. Walked pt around the hallways tonight.

## 2024-10-31 NOTE — PLAN OF CARE
"VSS Walked hallways few times. No issues. Waiting for placement.       Goal Outcome Evaluation:      Plan of Care Reviewed With: patient    Overall Patient Progress: no changeOverall Patient Progress: no change    Outcome Evaluation: Walked hallways, no change. Waiting for placement      Problem: Adult Inpatient Plan of Care  Goal: Plan of Care Review  Description: The Plan of Care Review/Shift note should be completed every shift.  The Outcome Evaluation is a brief statement about your assessment that the patient is improving, declining, or no change.  This information will be displayed automatically on your shift  note.  Outcome: Not Progressing  Flowsheets (Taken 10/31/2024 1155)  Outcome Evaluation: Walked hallways, no change. Waiting for placement  Plan of Care Reviewed With: patient  Overall Patient Progress: no change  Goal: Patient-Specific Goal (Individualized)  Description: You can add care plan individualizations to a care plan. Examples of Individualization might be:  \"Parent requests to be called daily at 9am for status\", \"I have a hard time hearing out of my right ear\", or \"Do not touch me to wake me up as it startles  me\".  Outcome: Not Progressing  Goal: Absence of Hospital-Acquired Illness or Injury  Outcome: Not Progressing  Intervention: Identify and Manage Fall Risk  Recent Flowsheet Documentation  Taken 10/31/2024 0745 by Janine Cantu, RN  Safety Promotion/Fall Prevention:   lighting adjusted   activity supervised   clutter free environment maintained  Intervention: Prevent Skin Injury  Recent Flowsheet Documentation  Taken 10/31/2024 0745 by Janine Cantu, RN  Body Position: position changed independently  Goal: Optimal Comfort and Wellbeing  Outcome: Not Progressing  Goal: Readiness for Transition of Care  Outcome: Not Progressing     Problem: Dementia Signs/Symptoms  Goal: Improved Behavioral Control (Dementia Signs/Symptoms)  Outcome: Not Progressing  Goal: Improved Mood Symptoms (Dementia " Signs/Symptoms)  Outcome: Not Progressing  Goal: Optimized Cognitive Function (Dementia Signs/Symptoms)  Outcome: Not Progressing  Goal: Optimized Oral Intake (Dementia Signs/Symptoms)  Outcome: Not Progressing  Goal: Improved Sleep (Dementia Signs/Symptoms)  Outcome: Not Progressing     Problem: Infection  Goal: Absence of Infection Signs and Symptoms  Outcome: Not Progressing

## 2024-10-31 NOTE — PROGRESS NOTES
Hospital Medicine X Cover  10/30/2024 9:13 PM     Re: Delirium    89-year-old gentleman history of dementia with chronic aphasia.  Admitted 10/2/2024 after he was brought to the emergency department by EMS from his facility for evaluation of agitation.  To be walking by his room when he was becoming verbally aggressive with nursing staff.  Was able to verbally de-escalate and assisted back to bed.  He refuses oral meds.    /65 (BP Location: Right arm)   Pulse 91   Temp 97.8  F (36.6  C) (Oral)   Resp 18   Wt 71.4 kg (157 lb 6.4 oz)   SpO2 98%   BMI 23.93 kg/m       Has Seroquel oral available.  Has melatonin available.  Refusing oral.  Will add Zyprexa 2.5 mg IM every 6 hours as needed agitation that is unmanageable with oral choices.  Attempt to continue to verbally de-escalate and re orient as able.    eMd Solorio, Jhonathan.D, APRN, CNP

## 2024-10-31 NOTE — PROGRESS NOTES
Care Management Follow Up    Length of Stay (days): 0    Expected Discharge Date: 11/04/2024     Concerns to be Addressed:  Disposition     Patient plan of care discussed at interdisciplinary rounds: Yes    Anticipated Discharge Disposition:  memory care  Anticipated Discharge Services:  Memory Care  Anticipated Discharge DME:  walker    Patient/family educated on Medicare website which has current facility and service quality ratings:  yes  Education Provided on the Discharge Plan: yes   Patient/Family in Agreement with the Plan:  yes    Referrals Placed by CM/SW:  yes  Private pay costs discussed: Not applicable@ this time    Discussed  Partnership in Safe Discharge Planning  document with patient/family: No     Handoff Completed: No, handoff not indicated or clinically appropriate    Additional Information:  Received call from [facility] elderly waiver regarding a date for assessment. Assessment will be 12/9/24 @ 9 am. If patient is discharged prior to appointment we will need to call Fariha [phone] [facility] to let her know change of address.    Next Steps: SW will continue to make referrals.    NALDO Morris   Inpatient Care Coordination   Supervisor  [facility]  [phone]      NALDO Puentes

## 2024-11-01 PROCEDURE — 101N000002 HC CUSTODIAL CARE DAILY

## 2024-11-01 PROCEDURE — 99232 SBSQ HOSP IP/OBS MODERATE 35: CPT | Performed by: INTERNAL MEDICINE

## 2024-11-01 PROCEDURE — 250N000013 HC RX MED GY IP 250 OP 250 PS 637: Performed by: INTERNAL MEDICINE

## 2024-11-01 RX ADMIN — KETOCONAZOLE: 20 SHAMPOO, SUSPENSION TOPICAL at 16:48

## 2024-11-01 RX ADMIN — QUETIAPINE FUMARATE 12.5 MG: 25 TABLET ORAL at 20:18

## 2024-11-01 NOTE — PLAN OF CARE
"Assumed care [id number]. Alert to self only. On RA. On a regular diet. No IV access. Agreeable to taking scheduled Seroquel he had previously refused on evening shift. Can be incontinent of bowel/bladder. PAINAD score of 0 overnight, appeared comfortable. Awaiting placement.    Goal Outcome Evaluation:      Plan of Care Reviewed With: patient    Overall Patient Progress: no changeOverall Patient Progress: no change    Outcome Evaluation: Agreeable to taking scheduled Seroquel, PAINAD score of 0, got some rest      Problem: Dementia Signs/Symptoms  Goal: Improved Behavioral Control (Dementia Signs/Symptoms)  Outcome: Not Progressing  Goal: Improved Mood Symptoms (Dementia Signs/Symptoms)  Outcome: Not Progressing  Goal: Optimized Cognitive Function (Dementia Signs/Symptoms)  Outcome: Not Progressing  Goal: Optimized Oral Intake (Dementia Signs/Symptoms)  Outcome: Not Progressing  Goal: Improved Sleep (Dementia Signs/Symptoms)  Outcome: Not Progressing     Problem: Adult Inpatient Plan of Care  Goal: Plan of Care Review  Description: The Plan of Care Review/Shift note should be completed every shift.  The Outcome Evaluation is a brief statement about your assessment that the patient is improving, declining, or no change.  This information will be displayed automatically on your shift  note.  Outcome: Progressing  Flowsheets (Taken 11/1/2024 0339)  Outcome Evaluation: Agreeable to taking scheduled Seroquel, PAINAD score of 0, got some rest  Plan of Care Reviewed With: patient  Overall Patient Progress: no change  Goal: Patient-Specific Goal (Individualized)  Description: You can add care plan individualizations to a care plan. Examples of Individualization might be:  \"Parent requests to be called daily at 9am for status\", \"I have a hard time hearing out of my right ear\", or \"Do not touch me to wake me up as it startles  me\".  Outcome: Progressing  Goal: Absence of Hospital-Acquired Illness or Injury  Outcome: " Progressing  Intervention: Identify and Manage Fall Risk  Recent Flowsheet Documentation  Taken 10/31/2024 2354 by Arabella Yates, RN  Safety Promotion/Fall Prevention:   activity supervised   lighting adjusted   nonskid shoes/slippers when out of bed   room door open   room near nurse's station   safety round/check completed  Intervention: Prevent Skin Injury  Recent Flowsheet Documentation  Taken 10/31/2024 2354 by Arabella Yates, RN  Body Position: position changed independently  Intervention: Prevent Infection  Recent Flowsheet Documentation  Taken 10/31/2024 2354 by Arabella Yates, RN  Infection Prevention:   equipment surfaces disinfected   hand hygiene promoted   personal protective equipment utilized   rest/sleep promoted   single patient room provided  Goal: Optimal Comfort and Wellbeing  Outcome: Progressing  Goal: Readiness for Transition of Care  Outcome: Progressing     Problem: Infection  Goal: Absence of Infection Signs and Symptoms  Outcome: Progressing

## 2024-11-01 NOTE — PLAN OF CARE
"  Problem: Adult Inpatient Plan of Care  Goal: Plan of Care Review  Description: The Plan of Care Review/Shift note should be completed every shift.  The Outcome Evaluation is a brief statement about your assessment that the patient is improving, declining, or no change.  This information will be displayed automatically on your shift  note.  Recent Flowsheet Documentation  Taken 10/31/2024 2237 by Harriet Huang RN  Outcome Evaluation: Confused and agitated. Refused his PR[facility]quesl and scheduled ones[facility]  Plan of Care Reviewed With: patient  Overall Patient Progress: no change  Goal: Absence of Hospital-Acquired Illness or Injury  Intervention: Prevent Skin Injury  Recent Flowsheet Documentation  Taken 10/31/2024 1703 by Harriet Huang RN  Body Position: position changed independently  Intervention: Prevent Infection  Recent Flowsheet Documentation  Taken 10/31/2024 1703 by Harriet Huang RN  Infection Prevention:   rest/sleep promoted   single patient room provided   hand hygiene promoted     Problem: Adult Inpatient Plan of Care  Goal: Plan of Care Review  Description: The Plan of Care Review/Shift note should be completed every shift.  The Outcome Evaluation is a brief statement about your assessment that the patient is improving, declining, or no change.  This information will be displayed automatically on your shift  note.  Outcome: Progressing  Flowsheets (Taken 10/31/2024 2237)  Outcome Evaluation: Confused and agitated. Refused his PRN seroquesl and scheduled ones. Ambulated the hallwayX2.  Plan of Care Reviewed With: patient  Overall Patient Progress: no change  Goal: Patient-Specific Goal (Individualized)  Description: You can add care plan individualizations to a care plan. Examples of Individualization might be:  \"Parent requests to be called daily at 9am for status\", \"I have a hard time hearing out of my right ear\", or \"Do not touch me to wake me up as it " "startles  me\".  Outcome: Progressing  Goal: Absence of Hospital-Acquired Illness or Injury  Outcome: Progressing  Intervention: Prevent Skin Injury  Recent Flowsheet Documentation  Taken 10/31/2024 1703 by Harriet Huang RN  Body Position: position changed independently  Intervention: Prevent Infection  Recent Flowsheet Documentation  Taken 10/31/2024 1703 by Harriet Huang RN  Infection Prevention:   rest/sleep promoted   single patient room provided   hand hygiene promoted  Goal: Optimal Comfort and Wellbeing  Outcome: Progressing  Goal: Readiness for Transition of Care  Outcome: Progressing     Problem: Skin Injury Risk Increased  Goal: Skin Health and Integrity  Intervention: Optimize Skin Protection  Recent Flowsheet Documentation  Taken 10/31/2024 1703 by Harriet Huang RN  Activity Management:   ambulated to bathroom   ambulated outside room  Head of Bed (HOB) Positioning: HOB at 30 degrees     Problem: Dementia Signs/Symptoms  Goal: Improved Behavioral Control (Dementia Signs/Symptoms)  Outcome: Progressing  Goal: Improved Mood Symptoms (Dementia Signs/Symptoms)  Outcome: Progressing  Goal: Optimized Cognitive Function (Dementia Signs/Symptoms)  Outcome: Progressing  Goal: Optimized Oral Intake (Dementia Signs/Symptoms)  Outcome: Progressing  Goal: Improved Sleep (Dementia Signs/Symptoms)  Outcome: Progressing     Problem: Infection  Goal: Absence of Infection Signs and Symptoms  Outcome: Progressing   Goal Outcome Evaluation:      Plan of Care Reviewed With: patient    Overall Patient Progress: no changeOverall Patient Progress: no change    Outcome Evaluation: Confused and agitated. Refused his PRN seroquesl and scheduled ones. Ambulated the hallwayX2.      "

## 2024-11-01 NOTE — PROGRESS NOTES
[facility]    Hospitalist Progress Note  Name: Jemal Gray    MRN: [id number]  Provider:  Brando Arreguin MD    Date of Service: 11/01/2024    Summary of Stay: Jemal Gray is an 89 year old male with history of dementia with chronic aphasia. He was admitted on 10/2/2024 after he was brought to the ED by EMS from facility for evaluation of agitation.     He had a prolonged hospitalization from 6/5/24 through 10/2/24 during which he was treated for cellulitis but mostly awaited long-term care placement due to dementia. He required appointment of guardianship.  He discharged to a long-term care facility on 10/2. Upon transfer to the long-term care facility he became agitated with swearing.  He was wandering around the long-term care facility and was entering other patient's rooms.  He was unable to be redirected and required transfer back to the ED.     In the ED he was stable. He was given seroquel.  He required a bedside attendant as he was agitated and unable to be redirected.  He attempted to leave the emergency department and go into other patient's rooms. He was admitted for prison cares.  Patient has improved and is no longer agitated. He is medically stable and can be discharged whenever placement is found. Prior long term care facility is not taking patient back.  is looking into memory care units.     Problem list:     Acute agitation, improved  Behavior disturbances   Dementia with chronic aphasia  Suspect triggered by changes in setting and recent move.  No physical complaints or concerns of infection.  Historically required Zyprexa over the summer when he was hospitalized for agitation.  Most recently had been prescribed Seroquel 12.5 mg twice daily as needed although he had not been needing most recently.  -Bedside attendant as needed  -Restarted prior to admission Seroquel at 12.5 mg twice daily as needed, may need to titrate  - ms  -SW  consulted  -Continue scheduled 12.5 mg of Seroquel every evening     Need for disposition assistance  -Patient had prolonged hospitalization due to need for disposition assistance.    -His daughter filed for guardianship paperwork was completed in July   -acquired MA application and long-term care placement   -Ultimately will require 24/7 care and likely Memory care placement      Actinic Keratosis  Had surgery for this and prescribed steroids cream and ketoconazole cream, restarted per daughter request previously.     Sinus Bradycardia  Patient was noted to have HR in 50s.  Asymptomatic.  -no telemetry monitoring needed      History of Constipation  -laxatives PRN      Care plan  -Social work working on placement.  -Given patient is unreliable historian will check routine labs in the morning as it has been a while.     I spent 20 minutes in reviewing this patient's labs, imaging, medications, medical history.  In addition time was spent interviewing the patient, communicating with family, and medical decision making.     DVT Prophylaxis: Pneumatic Compression Devices  Code Status: No CPR- Do NOT Intubate  Diet: Combination Diet Regular Diet  Snacks/Supplements Pediatric: Ensure Enlive; Between Meals  Room Service    Maddox Catheter: Not present    Disposition: Medically Ready for Discharge: Ready Now    Goals to discharge include: placement obtained  Family updated today: No     Interval History   No new issues.  Resting comfortably.  Last night event noted . Doing well now     -Data reviewed today: I personally reviewed all new labs and imaging results over the last 24 hours.     Physical Exam                      Vitals:    10/02/24 2214   Weight: 71.4 kg (157 lb 6.4 oz)     Vital Signs with Ranges     I/O last 3 completed shifts:  In: 300 [P.O.:300]  Out: -     General: Alert, awake, no acute distress.  HEENT: NC/AT, eyes anicteric, external occular movements intact, face symmetric.   Psych: Appropriate affect.   "Calm.      Medications   Current Facility-Administered Medications   Medication Dose Route Frequency Provider Last Rate Last Admin     Current Facility-Administered Medications   Medication Dose Route Frequency Provider Last Rate Last Admin    ketoconazole (NIZORAL) 2 % shampoo   Topical Q Mon Wed Fri AM Liliana Vargas PA-C   Given at 10/28/24 1027    QUEtiapine (SEROquel) half-tab 12.5 mg  12.5 mg Oral QPM Edna Kelly MD   12.5 mg at 10/31/24 5935     Data     Laboratory:  No results for input(s): \"WBC\", \"HGB\", \"HCT\", \"MCV\", \"PLT\" in the last 168 hours.    No results for input(s): \"NA\", \"POTASSIUM\", \"CHLORIDE\", \"CO2\", \"ANIONGAP\", \"GLC\", \"BUN\", \"CR\", \"GFRESTIMATED\", \"GFRESTBLACK\", \"LEON\" in the last 168 hours.    No results for input(s): \"CULT\" in the last 168 hours.  No results found for: \"TROPI\"    Imaging:  No results found for this or any previous visit (from the past 24 hours).    Brando Arreguin MD    "

## 2024-11-01 NOTE — PLAN OF CARE
"    Alert to self only. Disoriented X3. Gave scheduled Seroquel at HS. Confused and agitated at times.  Sets off bed alarm with frequent self transferring.       Problem: Adult Inpatient Plan of Care  Goal: Plan of Care Review  Description: The Plan of Care Review/Shift note should be completed every shift.  The Outcome Evaluation is a brief statement about your assessment that the patient is improving, declining, or no change.  This information will be displayed automatically on your shift  note.  Recent Flowsheet Documentation  Taken 11/1/2024 1837 by Harriet Huang RN  Outcome Evaluation: impulsive and very [place]. Frequent to bathroom.  Plan of Care Reviewed With: patient  Overall Patient Progress: no change  Goal: Absence of Hospital-Acquired Illness or Injury  Intervention: Identify and Manage Fall Risk  Recent Flowsheet Documentation  Taken 11/1/2024 1617 by Harriet Huang RN  Safety Promotion/Fall Prevention:   activity supervised   nonskid shoes/slippers when out of bed   safety round/check completed  Intervention: Prevent Skin Injury  Recent Flowsheet Documentation  Taken 11/1/2024 1617 by Harriet Huang RN  Body Position: position changed independently     Problem: Adult Inpatient Plan of Care  Goal: Plan of Care Review  Description: The Plan of Care Review/Shift note should be completed every shift.  The Outcome Evaluation is a brief statement about your assessment that the patient is improving, declining, or no change.  This information will be displayed automatically on your shift  note.  Outcome: Progressing  Flowsheets (Taken 11/1/2024 1837)  Outcome Evaluation: impulsive and very [place]. Frequent to bathroom.  Plan of Care Reviewed With: patient  Overall Patient Progress: no change  Goal: Patient-Specific Goal (Individualized)  Description: You can add care plan individualizations to a care plan. Examples of Individualization might be:  \"Parent requests to be called daily at 9am for status\", " "\"I have a hard time hearing out of my right ear\", or \"Do not touch me to wake me up as it startles  me\".  Outcome: Progressing  Goal: Absence of Hospital-Acquired Illness or Injury  Outcome: Progressing  Intervention: Identify and Manage Fall Risk  Recent Flowsheet Documentation  Taken 11/1/2024 1617 by Harriet Huang RN  Safety Promotion/Fall Prevention:   activity supervised   nonskid shoes/slippers when out of bed   safety round/check completed  Intervention: Prevent Skin Injury  Recent Flowsheet Documentation  Taken 11/1/2024 1617 by Harriet Huang RN  Body Position: position changed independently  Goal: Optimal Comfort and Wellbeing  Outcome: Progressing  Goal: Readiness for Transition of Care  Outcome: Progressing     Problem: Fall Injury Risk  Goal: Absence of Fall and Fall-Related Injury  Intervention: Identify and Manage Contributors  Recent Flowsheet Documentation  Taken 11/1/2024 1617 by Harriet Huang RN  Medication Review/Management: medications reviewed  Intervention: Promote Injury-Free Environment  Recent Flowsheet Documentation  Taken 11/1/2024 1617 by Harriet Huang RN  Safety Promotion/Fall Prevention:   activity supervised   nonskid shoes/slippers when out of bed   safety round/check completed     Problem: Skin Injury Risk Increased  Goal: Skin Health and Integrity  Intervention: Plan: Nurse Driven Intervention: Moisture Management  Recent Flowsheet Documentation  Taken 11/1/2024 1617 by Harriet Huang RN  Moisture Interventions: Encourage regular toileting  Intervention: Plan: Nurse Driven Intervention: Friction and Shear  Recent Flowsheet Documentation  Taken 11/1/2024 1617 by Harriet Huang RN  Friction/Shear Interventions: HOB 30 degrees or less  Intervention: Optimize Skin Protection  Recent Flowsheet Documentation  Taken 11/1/2024 1617 by Harriet Huang RN  Activity Management:   activity adjusted per tolerance   ambulated to bathroom   ambulated outside " room  Head of Bed (HOB) Positioning: HOB at 30 degrees     Problem: Dementia Signs/Symptoms  Goal: Improved Behavioral Control (Dementia Signs/Symptoms)  Outcome: Progressing  Goal: Improved Mood Symptoms (Dementia Signs/Symptoms)  Outcome: Progressing  Goal: Optimized Cognitive Function (Dementia Signs/Symptoms)  Outcome: Progressing  Goal: Optimized Oral Intake (Dementia Signs/Symptoms)  Outcome: Progressing  Goal: Improved Sleep (Dementia Signs/Symptoms)  Outcome: Progressing     Problem: Infection  Goal: Absence of Infection Signs and Symptoms  Outcome: Progressing   Goal Outcome Evaluation:      Plan of Care Reviewed With: patient    Overall Patient Progress: no changeOverall Patient Progress: no change    Outcome Evaluation: impulsive and very [place]. Frequent to bathroom.

## 2024-11-02 PROCEDURE — 250N000013 HC RX MED GY IP 250 OP 250 PS 637: Performed by: INTERNAL MEDICINE

## 2024-11-02 PROCEDURE — 99232 SBSQ HOSP IP/OBS MODERATE 35: CPT | Performed by: INTERNAL MEDICINE

## 2024-11-02 PROCEDURE — 101N000002 HC CUSTODIAL CARE DAILY

## 2024-11-02 PROCEDURE — 250N000013 HC RX MED GY IP 250 OP 250 PS 637: Performed by: PHYSICIAN ASSISTANT

## 2024-11-02 RX ADMIN — QUETIAPINE FUMARATE 12.5 MG: 25 TABLET ORAL at 16:44

## 2024-11-02 RX ADMIN — QUETIAPINE FUMARATE 12.5 MG: 25 TABLET ORAL at 21:06

## 2024-11-02 NOTE — PROGRESS NOTES
[facility]    Hospitalist Progress Note  Name: Jemal Gray    MRN: [id number]  Provider:  Brando Arreguin MD    Date of Service: 11/02/2024    Summary of Stay: Jemal Gray is an 89 year old male with history of dementia with chronic aphasia. He was admitted on 10/2/2024 after he was brought to the ED by EMS from facility for evaluation of agitation.     He had a prolonged hospitalization from 6/5/24 through 10/2/24 during which he was treated for cellulitis but mostly awaited long-term care placement due to dementia. He required appointment of guardianship.  He discharged to a long-term care facility on 10/2. Upon transfer to the long-term care facility he became agitated with swearing.  He was wandering around the long-term care facility and was entering other patient's rooms.  He was unable to be redirected and required transfer back to the ED.     In the ED he was stable. He was given seroquel.  He required a bedside attendant as he was agitated and unable to be redirected.  He attempted to leave the emergency department and go into other patient's rooms. He was admitted for [facility] cares.  Patient has improved and is no longer agitated. He is medically stable and can be discharged whenever placement is found. Prior long term care facility is not taking patient back.  is looking into memory care units.     Problem list:     Acute agitation, improved  Behavior disturbances   Dementia with chronic aphasia  Suspect triggered by changes in setting and recent move.  No physical complaints or concerns of infection.  Historically required Zyprexa over the summer when he was hospitalized for agitation.  Most recently had been prescribed Seroquel 12.5 mg twice daily as needed although he had not been needing most recently.  -Bedside attendant as needed  -Restarted prior to admission Seroquel at 12.5 mg twice daily as needed, may need to titrate  - ms  -SW  consulted  -Continue scheduled 12.5 mg of Seroquel every evening     Need for disposition assistance  -Patient had prolonged hospitalization due to need for disposition assistance.    -His daughter filed for guardianship paperwork was completed in July   -acquired MA application and long-term care placement   -Ultimately will require 24/7 care and likely Memory care placement      Actinic Keratosis  Had surgery for this and prescribed steroids cream and ketoconazole cream, restarted per daughter request previously.     Sinus Bradycardia  Patient was noted to have HR in 50s.  Asymptomatic.  -no telemetry monitoring needed      History of Constipation  -laxatives PRN      Care plan  -Social work working on placement.  -Given patient is unreliable historian will check routine labs in the morning as it has been a while.     I spent 20 minutes in reviewing this patient's labs, imaging, medications, medical history.  In addition time was spent interviewing the patient, communicating with family, and medical decision making.     DVT Prophylaxis: Pneumatic Compression Devices  Code Status: No CPR- Do NOT Intubate  Diet: Combination Diet Regular Diet  Snacks/Supplements Pediatric: Ensure Enlive; Between Meals  Room Service    Maddox Catheter: Not present    Disposition: Medically Ready for Discharge: Ready Now    Goals to discharge include: placement obtained  Family updated today: No     Interval History   No new issues.  Resting comfortably.  Last night event noted . Doing well now     -Data reviewed today: I personally reviewed all new labs and imaging results over the last 24 hours.     Physical Exam   Temp: 98.6  F (37  C) Temp src: Oral BP: 137/82 Pulse: 52   Resp: 18 SpO2: 96 % O2 Device: None (Room air)    Vitals:    10/02/24 2214   Weight: 71.4 kg (157 lb 6.4 oz)     Vital Signs with Ranges  Temp:  [98.4  F (36.9  C)-98.6  F (37  C)] 98.6  F (37  C)  Pulse:  [52-60] 52  Resp:  [18-20] 18  BP: (137)/(76-82)  "137/82  SpO2:  [96 %] 96 %  I/O last 3 completed shifts:  In: 1040 [P.O.:1040]  Out: -     General: Alert, awake, no acute distress.  HEENT: NC/AT, eyes anicteric, external occular movements intact, face symmetric.   Psych: Appropriate affect.  Calm.      Medications   Current Facility-Administered Medications   Medication Dose Route Frequency Provider Last Rate Last Admin     Current Facility-Administered Medications   Medication Dose Route Frequency Provider Last Rate Last Admin    ketoconazole (NIZORAL) 2 % shampoo   Topical Q Mon Wed Fri AM Liliana Vargas PA-C   Given at 11/01/24 1648    QUEtiapine (SEROquel) half-tab 12.5 mg  12.5 mg Oral QPM Edna Kelly MD   12.5 mg at 11/01/24 2018     Data     Laboratory:  No results for input(s): \"WBC\", \"HGB\", \"HCT\", \"MCV\", \"PLT\" in the last 168 hours.    No results for input(s): \"NA\", \"POTASSIUM\", \"CHLORIDE\", \"CO2\", \"ANIONGAP\", \"GLC\", \"BUN\", \"CR\", \"GFRESTIMATED\", \"GFRESTBLACK\", \"LEON\" in the last 168 hours.    No results for input(s): \"CULT\" in the last 168 hours.  No results found for: \"TROPI\"    Imaging:  No results found for this or any previous visit (from the past 24 hours).    Brando Arreguin MD    "

## 2024-11-02 NOTE — PLAN OF CARE
Pt is alert to self with confusion. Pt has Dementia. Pt denies pain or discomfort. Pt is hard of hearing. Pt has no IV access. Pt is SBA in transfer and care. Pt takes meds crushed with pudding. Bed alarm in place and call light within reach. Plan is for placement.             Goal Outcome Evaluation:      Plan of Care Reviewed With: patient    Overall Patient Progress: improvingOverall Patient Progress: improving    Outcome Evaluation: pt is hard of hearing. pt is incontinent of bowel and bladder. pt waitign for placement.      Problem: Adult Inpatient Plan of Care  Goal: Plan of Care Review  Description: The Plan of Care Review/Shift note should be completed every shift.  The Outcome Evaluation is a brief statement about your assessment that the patient is improving, declining, or no change.  This information will be displayed automatically on your shift  note.  Recent Flowsheet Documentation  Taken 11/2/2024 0059 by Reji Duron RN  Outcome Evaluation: pt is hard of hearing. pt is incontinent of bowel and bladder. pt waitign for placement.  Plan of Care Reviewed With: patient  Overall Patient Progress: improving  Goal: Absence of Hospital-Acquired Illness or Injury  Intervention: Prevent Skin Injury  Recent Flowsheet Documentation  Taken 11/2/2024 0033 by Reji Duron RN  Body Position: position changed independently  Intervention: Prevent Infection  Recent Flowsheet Documentation  Taken 11/2/2024 0033 by Reji Duron RN  Infection Prevention:   personal protective equipment utilized   hand hygiene promoted   single patient room provided     Problem: Skin Injury Risk Increased  Goal: Skin Health and Integrity  Intervention: Plan: Nurse Driven Intervention: Moisture Management  Recent Flowsheet Documentation  Taken 11/2/2024 0033 by Reji Duron RN  Moisture Interventions: Encourage regular toileting  Intervention: Plan: Nurse Driven Intervention: Friction and  Shear  Recent Flowsheet Documentation  Taken 11/2/2024 0033 by Reji Duron RN  Friction/Shear Interventions: HOB 30 degrees or less  Intervention: Optimize Skin Protection  Recent Flowsheet Documentation  Taken 11/2/2024 0033 by Reji Duron RN  Activity Management:   activity adjusted per tolerance   ambulated to bathroom   ambulated outside room  Head of Bed (HOB) Positioning: HOB at 30-45 degrees     Problem: Adult Inpatient Plan of Care  Goal: Plan of Care Review  Description: The Plan of Care Review/Shift note should be completed every shift.  The Outcome Evaluation is a brief statement about your assessment that the patient is improving, declining, or no change.  This information will be displayed automatically on your shift  note.  Outcome: Progressing  Flowsheets (Taken 11/2/2024 0059)  Outcome Evaluation: pt is hard of hearing. pt is incontinent of bowel and bladder. pt waitign for placement.  Plan of Care Reviewed With: patient  Overall Patient Progress: improving  Goal: Absence of Hospital-Acquired Illness or Injury  Intervention: Prevent Skin Injury  Recent Flowsheet Documentation  Taken 11/2/2024 0033 by Reji Duron RN  Body Position: position changed independently  Intervention: Prevent Infection  Recent Flowsheet Documentation  Taken 11/2/2024 0033 by Reji Duron RN  Infection Prevention:   personal protective equipment utilized   hand hygiene promoted   single patient room provided     Problem: Comorbidity Management  Goal: Maintenance of Osteoarthritis Symptom Control  Intervention: Maintain Osteoarthritis Symptom Control  Recent Flowsheet Documentation  Taken 11/2/2024 0033 by Reji Duron RN  Assistive Device Utilized: walker  Activity Management:   activity adjusted per tolerance   ambulated to bathroom   ambulated outside room     Problem: Adult Inpatient Plan of Care  Goal: Plan of Care Review  Description: The Plan of Care  "Review/Shift note should be completed every shift.  The Outcome Evaluation is a brief statement about your assessment that the patient is improving, declining, or no change.  This information will be displayed automatically on your shift  note.  Outcome: Progressing  Flowsheets (Taken 11/2/2024 0059)  Outcome Evaluation: pt is hard of hearing. pt is incontinent of bowel and bladder. pt waitign for placement.  Plan of Care Reviewed With: patient  Overall Patient Progress: improving  Goal: Patient-Specific Goal (Individualized)  Description: You can add care plan individualizations to a care plan. Examples of Individualization might be:  \"Parent requests to be called daily at 9am for status\", \"I have a hard time hearing out of my right ear\", or \"Do not touch me to wake me up as it startles  me\".  Outcome: Progressing  Goal: Absence of Hospital-Acquired Illness or Injury  Outcome: Progressing  Intervention: Prevent Skin Injury  Recent Flowsheet Documentation  Taken 11/2/2024 0033 by Reji Duron RN  Body Position: position changed independently  Intervention: Prevent Infection  Recent Flowsheet Documentation  Taken 11/2/2024 0033 by Reji Duron, RN  Infection Prevention:   personal protective equipment utilized   hand hygiene promoted   single patient room provided  Goal: Optimal Comfort and Wellbeing  Outcome: Progressing  Goal: Readiness for Transition of Care  Outcome: Progressing     Problem: Dementia Signs/Symptoms  Goal: Improved Behavioral Control (Dementia Signs/Symptoms)  Outcome: Progressing  Goal: Improved Mood Symptoms (Dementia Signs/Symptoms)  Outcome: Progressing  Goal: Optimized Cognitive Function (Dementia Signs/Symptoms)  Outcome: Progressing  Goal: Optimized Oral Intake (Dementia Signs/Symptoms)  Outcome: Progressing  Goal: Improved Sleep (Dementia Signs/Symptoms)  Outcome: Progressing     Problem: Infection  Goal: Absence of Infection Signs and Symptoms  Outcome: " Progressing     Problem: Comorbidity Management  Goal: Maintenance of Asthma Control  Outcome: Progressing  Goal: Maintenance of Behavioral Health Symptom Control  Outcome: Progressing  Goal: Maintenance of COPD Symptom Control  Outcome: Progressing  Goal: Blood Glucose Levels Within Targeted Range  Outcome: Progressing  Goal: Maintenance of Heart Failure Symptom Control  Outcome: Progressing  Goal: Blood Pressure in Desired Range  Outcome: Progressing  Goal: Maintenance of Osteoarthritis Symptom Control  Outcome: Progressing  Intervention: Maintain Osteoarthritis Symptom Control  Recent Flowsheet Documentation  Taken 11/2/2024 0033 by Reji Duron RN  Assistive Device Utilized: walker  Activity Management:   activity adjusted per tolerance   ambulated to bathroom   ambulated outside room  Goal: Maintenance of Seizure Control  Outcome: Progressing     Problem: Pain Acute  Goal: Optimal Pain Control and Function  Outcome: Progressing

## 2024-11-03 PROCEDURE — 250N000013 HC RX MED GY IP 250 OP 250 PS 637: Performed by: INTERNAL MEDICINE

## 2024-11-03 PROCEDURE — 101N000002 HC CUSTODIAL CARE DAILY

## 2024-11-03 PROCEDURE — 99207 PR NO BILLABLE SERVICE THIS VISIT: CPT | Performed by: INTERNAL MEDICINE

## 2024-11-03 RX ADMIN — QUETIAPINE FUMARATE 12.5 MG: 25 TABLET ORAL at 19:55

## 2024-11-03 NOTE — PLAN OF CARE
No changes, ambulated halls      Goal Outcome Evaluation:      Plan of Care Reviewed With: patient    Overall Patient Progress: no changeOverall Patient Progress: no change    Problem: Adult Inpatient Plan of Care  Goal: Plan of Care Review  Description: The Plan of Care Review/Shift note should be completed every shift.  The Outcome Evaluation is a brief statement about your assessment that the patient is improving, declining, or no change.  This information will be displayed automatically on your shift  note.  Recent Flowsheet Documentation  Taken 11/3/2024 1758 by Gill Peguero RN  Plan of Care Reviewed With: patient  Overall Patient Progress: no change  Goal: Absence of Hospital-Acquired Illness or Injury  Intervention: Identify and Manage Fall Risk  Recent Flowsheet Documentation  Taken 11/3/2024 1738 by Gill Peguero RN  Safety Promotion/Fall Prevention: safety round/check completed  Intervention: Prevent Skin Injury  Recent Flowsheet Documentation  Taken 11/3/2024 1738 by Gill Peguero RN  Body Position: position changed independently

## 2024-11-03 NOTE — PLAN OF CARE
Alert but confused and agitated. Up with assist of 1 walker/ Refused gait belt. Gave PRN Seroquel and scheduled. No BM during this shift.  Waiting  for placement to memory care unit.     Problem: Adult Inpatient Plan of Care  Goal: Plan of Care Review  Description: The Plan of Care Review/Shift note should be completed every shift.  The Outcome Evaluation is a brief statement about your assessment that the patient is improving, declining, or no change.  This information will be displayed automatically on your shift  note.  Recent Flowsheet Documentation  Taken 11/2/2024 2147 by Harriet Huang RN  Outcome Evaluation: Confused  and agitates . Gave seroquel prn and scheduled one. Up with assist of 1 wth walker. Refused gait belt.  Plan of Care Reviewed With: patient  Overall Patient Progress: no change  Goal: Absence of Hospital-Acquired Illness or Injury  Intervention: Identify and Manage Fall Risk  Recent Flowsheet Documentation  Taken 11/2/2024 1540 by Harriet Huang RN  Safety Promotion/Fall Prevention: activity supervised  Intervention: Prevent Skin Injury  Recent Flowsheet Documentation  Taken 11/2/2024 1540 by Harriet Huang RN  Body Position: position changed independently  Intervention: Prevent Infection  Recent Flowsheet Documentation  Taken 11/2/2024 1540 by Harriet Huang RN  Infection Prevention: hand hygiene promoted     Problem: Adult Inpatient Plan of Care  Goal: Plan of Care Review  Description: The Plan of Care Review/Shift note should be completed every shift.  The Outcome Evaluation is a brief statement about your assessment that the patient is improving, declining, or no change.  This information will be displayed automatically on your shift  note.  Outcome: Progressing  Flowsheets (Taken 11/2/2024 2147)  Outcome Evaluation: Confused  and agitates . Gave seroquel prn and scheduled one. Up with assist of 1 wth walker. Refused gait belt.  Plan of Care Reviewed With: patient  Overall  "Patient Progress: no change  Goal: Patient-Specific Goal (Individualized)  Description: You can add care plan individualizations to a care plan. Examples of Individualization might be:  \"Parent requests to be called daily at 9am for status\", \"I have a hard time hearing out of my right ear\", or \"Do not touch me to wake me up as it startles  me\".  Outcome: Progressing  Goal: Absence of Hospital-Acquired Illness or Injury  Outcome: Progressing  Intervention: Identify and Manage Fall Risk  Recent Flowsheet Documentation  Taken 11/2/2024 1540 by Harriet Huang RN  Safety Promotion/Fall Prevention: activity supervised  Intervention: Prevent Skin Injury  Recent Flowsheet Documentation  Taken 11/2/2024 1540 by Harriet Huang RN  Body Position: position changed independently  Intervention: Prevent Infection  Recent Flowsheet Documentation  Taken 11/2/2024 1540 by Harriet Huang RN  Infection Prevention: hand hygiene promoted  Goal: Optimal Comfort and Wellbeing  Outcome: Progressing  Goal: Readiness for Transition of Care  Outcome: Progressing     Problem: Fall Injury Risk  Goal: Absence of Fall and Fall-Related Injury  Intervention: Identify and Manage Contributors  Recent Flowsheet Documentation  Taken 11/2/2024 1540 by Harriet Huang RN  Medication Review/Management: medications reviewed  Intervention: Promote Injury-Free Environment  Recent Flowsheet Documentation  Taken 11/2/2024 1540 by Harriet Huang RN  Safety Promotion/Fall Prevention: activity supervised     Problem: Dementia Signs/Symptoms  Goal: Improved Behavioral Control (Dementia Signs/Symptoms)  Outcome: Progressing  Goal: Improved Mood Symptoms (Dementia Signs/Symptoms)  Outcome: Progressing  Goal: Optimized Cognitive Function (Dementia Signs/Symptoms)  Outcome: Progressing  Goal: Optimized Oral Intake (Dementia Signs/Symptoms)  Outcome: Progressing  Goal: Improved Sleep (Dementia Signs/Symptoms)  Outcome: Progressing     Problem: " Infection  Goal: Absence of Infection Signs and Symptoms  Outcome: Progressing  Intervention: Prevent or Manage Infection  Recent Flowsheet Documentation  Taken 11/2/2024 1540 by Harriet Huang RN  Infection Management: aseptic technique maintained     Problem: Comorbidity Management  Goal: Maintenance of Asthma Control  Outcome: Progressing  Intervention: Maintain Asthma Symptom Control  Recent Flowsheet Documentation  Taken 11/2/2024 1540 by Harriet Huang RN  Medication Review/Management: medications reviewed  Goal: Maintenance of Behavioral Health Symptom Control  Outcome: Progressing  Intervention: Maintain Behavioral Health Symptom Control  Recent Flowsheet Documentation  Taken 11/2/2024 1540 by Harriet Huang RN  Medication Review/Management: medications reviewed  Goal: Maintenance of COPD Symptom Control  Outcome: Progressing  Intervention: Maintain COPD Symptom Control  Recent Flowsheet Documentation  Taken 11/2/2024 1540 by Harriet Huang RN  Medication Review/Management: medications reviewed  Goal: Blood Glucose Levels Within Targeted Range  Outcome: Progressing  Intervention: Monitor and Manage Glycemia  Recent Flowsheet Documentation  Taken 11/2/2024 1540 by Harriet Huang RN  Medication Review/Management: medications reviewed  Goal: Maintenance of Heart Failure Symptom Control  Outcome: Progressing  Intervention: Maintain Heart Failure Management  Recent Flowsheet Documentation  Taken 11/2/2024 1540 by Harriet Huang RN  Medication Review/Management: medications reviewed  Goal: Blood Pressure in Desired Range  Outcome: Progressing  Intervention: Maintain Blood Pressure Management  Recent Flowsheet Documentation  Taken 11/2/2024 1540 by Harriet Huang RN  Medication Review/Management: medications reviewed  Goal: Maintenance of Osteoarthritis Symptom Control  Outcome: Progressing  Intervention: Maintain Osteoarthritis Symptom Control  Recent Flowsheet Documentation  Taken  11/2/2024 1540 by Harriet Huang RN  Assistive Device Utilized: walker  Activity Management: activity adjusted per tolerance  Medication Review/Management: medications reviewed  Goal: Bariatric Home Regimen Maintained  Intervention: Maintain and Manage Postbariatric Surgery Care  Recent Flowsheet Documentation  Taken 11/2/2024 1540 by Harriet Huang RN  Medication Review/Management: medications reviewed  Goal: Maintenance of Seizure Control  Outcome: Progressing  Intervention: Maintain Seizure Symptom Control  Recent Flowsheet Documentation  Taken 11/2/2024 1540 by Harriet Huang RN  Seizure Precautions: clutter-free environment maintained  Medication Review/Management: medications reviewed     Problem: Pain Acute  Goal: Optimal Pain Control and Function  Outcome: Progressing  Intervention: Prevent or Manage Pain  Recent Flowsheet Documentation  Taken 11/2/2024 1540 by Harriet Huang RN  Medication Review/Management: medications reviewed   Goal Outcome Evaluation:      Plan of Care Reviewed With: patient    Overall Patient Progress: no changeOverall Patient Progress: no change    Outcome Evaluation: Confused  and agitates . Gave seroquel prn and scheduled one. Up with assist of 1 wth walker. Refused gait belt.

## 2024-11-03 NOTE — PLAN OF CARE
"Pt confused. Had shower today and ambulated in hallway x1. Good appetite. Family came to visit today. Awaiting placement.    Goal Outcome Evaluation:      Plan of Care Reviewed With: patient    Overall Patient Progress: no changeOverall Patient Progress: no change    Outcome Evaluation: Pt confused. Had shower today and ambulated in hallway x1. Family came to visit today. Awaiting placement.      Problem: Adult Inpatient Plan of Care  Goal: Plan of Care Review  Description: The Plan of Care Review/Shift note should be completed every shift.  The Outcome Evaluation is a brief statement about your assessment that the patient is improving, declining, or no change.  This information will be displayed automatically on your shift  note.  Outcome: Progressing  Flowsheets (Taken 11/3/2024 1423)  Outcome Evaluation: Pt confused. Had shower today and ambulated in hallway x1. Family came to visit today. Awaiting placement.  Plan of Care Reviewed With: patient  Overall Patient Progress: no change  Goal: Patient-Specific Goal (Individualized)  Description: You can add care plan individualizations to a care plan. Examples of Individualization might be:  \"Parent requests to be called daily at 9am for status\", \"I have a hard time hearing out of my right ear\", or \"Do not touch me to wake me up as it startles  me\".  Outcome: Progressing  Goal: Absence of Hospital-Acquired Illness or Injury  Outcome: Progressing  Intervention: Identify and Manage Fall Risk  Recent Flowsheet Documentation  Taken 11/3/2024 0920 by Barbi Felton, RN  Safety Promotion/Fall Prevention:   safety round/check completed   room organization consistent   patient and family education   nonskid shoes/slippers when out of bed   assistive device/personal items within reach   activity supervised  Taken 11/3/2024 0730 by Barbi Felton, RN  Safety Promotion/Fall Prevention:   safety round/check completed   room organization consistent   patient and family " education   nonskid shoes/slippers when out of bed   assistive device/personal items within reach   activity supervised  Intervention: Prevent Skin Injury  Recent Flowsheet Documentation  Taken 11/3/2024 0730 by Barbi Felton RN  Body Position: position changed independently  Intervention: Prevent and Manage VTE (Venous Thromboembolism) Risk  Recent Flowsheet Documentation  Taken 11/3/2024 0730 by Barbi Felton RN  VTE Prevention/Management:   SCDs off (sequential compression devices)   patient refused intervention  Intervention: Prevent Infection  Recent Flowsheet Documentation  Taken 11/3/2024 0730 by Barbi Felton RN  Infection Prevention:   single patient room provided   hand hygiene promoted   equipment surfaces disinfected   environmental surveillance performed   personal protective equipment utilized  Goal: Optimal Comfort and Wellbeing  Outcome: Progressing  Goal: Readiness for Transition of Care  Outcome: Progressing     Problem: Dementia Signs/Symptoms  Goal: Improved Behavioral Control (Dementia Signs/Symptoms)  Outcome: Progressing  Goal: Improved Mood Symptoms (Dementia Signs/Symptoms)  Outcome: Progressing  Goal: Optimized Cognitive Function (Dementia Signs/Symptoms)  Outcome: Progressing  Goal: Optimized Oral Intake (Dementia Signs/Symptoms)  Outcome: Progressing  Goal: Improved Sleep (Dementia Signs/Symptoms)  Outcome: Progressing     Problem: Infection  Goal: Absence of Infection Signs and Symptoms  Outcome: Progressing     Problem: Comorbidity Management  Goal: Maintenance of Asthma Control  Outcome: Progressing  Intervention: Maintain Asthma Symptom Control  Recent Flowsheet Documentation  Taken 11/3/2024 0730 by Barbi Felton RN  Medication Review/Management: medications reviewed  Goal: Maintenance of Behavioral Health Symptom Control  Outcome: Progressing  Intervention: Maintain Behavioral Health Symptom Control  Recent Flowsheet Documentation  Taken 11/3/2024 0730 by Jose Francisco  Barbi FERNANDEZ RN  Medication Review/Management: medications reviewed  Goal: Maintenance of COPD Symptom Control  Outcome: Progressing  Intervention: Maintain COPD Symptom Control  Recent Flowsheet Documentation  Taken 11/3/2024 0730 by Barbi Felton RN  Medication Review/Management: medications reviewed  Goal: Blood Glucose Levels Within Targeted Range  Outcome: Progressing  Intervention: Monitor and Manage Glycemia  Recent Flowsheet Documentation  Taken 11/3/2024 0730 by Barbi Felton RN  Medication Review/Management: medications reviewed  Goal: Maintenance of Heart Failure Symptom Control  Outcome: Progressing  Intervention: Maintain Heart Failure Management  Recent Flowsheet Documentation  Taken 11/3/2024 0730 by Barbi Felton RN  Medication Review/Management: medications reviewed  Goal: Blood Pressure in Desired Range  Outcome: Progressing  Intervention: Maintain Blood Pressure Management  Recent Flowsheet Documentation  Taken 11/3/2024 0730 by Barbi Felton RN  Medication Review/Management: medications reviewed  Goal: Maintenance of Osteoarthritis Symptom Control  Outcome: Progressing  Intervention: Maintain Osteoarthritis Symptom Control  Recent Flowsheet Documentation  Taken 11/3/2024 0730 by Barbi Felton RN  Assistive Device Utilized: walker  Activity Management: ambulated to bathroom  Medication Review/Management: medications reviewed  Goal: Maintenance of Seizure Control  Outcome: Progressing  Intervention: Maintain Seizure Symptom Control  Recent Flowsheet Documentation  Taken 11/3/2024 0730 by Barbi Felton RN  Medication Review/Management: medications reviewed     Problem: Pain Acute  Goal: Optimal Pain Control and Function  Outcome: Progressing  Intervention: Prevent or Manage Pain  Recent Flowsheet Documentation  Taken 11/3/2024 0730 by Barbi Felton RN  Medication Review/Management: medications reviewed

## 2024-11-03 NOTE — PLAN OF CARE
"Pt was confused and calm. On room air. Denies pain. She is only oriented to person. Assist x1 to the bathroom. Given seroquel to aid with sleep. Regular diet. Awaiting placement to memory care.     Problem: Adult Inpatient Plan of Care  Goal: Plan of Care Review  Description: The Plan of Care Review/Shift note should be completed every shift.  The Outcome Evaluation is a brief statement about your assessment that the patient is improving, declining, or no change.  This information will be displayed automatically on your shift  note.  Outcome: Progressing  Flowsheets (Taken 11/3/2024 0640)  Outcome Evaluation: Pt confused. Assit x1 to bathroom. Seroquel given. Regualr diet. awaiting placement  Plan of Care Reviewed With: patient  Overall Patient Progress: no change  Goal: Patient-Specific Goal (Individualized)  Description: You can add care plan individualizations to a care plan. Examples of Individualization might be:  \"Parent requests to be called daily at 9am for status\", \"I have a hard time hearing out of my right ear\", or \"Do not touch me to wake me up as it startles  me\".  Outcome: Progressing  Goal: Absence of Hospital-Acquired Illness or Injury  Outcome: Progressing  Intervention: Identify and Manage Fall Risk  Recent Flowsheet Documentation  Taken 11/3/2024 0228 by Gale López, RN  Safety Promotion/Fall Prevention:   safety round/check completed   room organization consistent   room near nurse's station   nonskid shoes/slippers when out of bed  Intervention: Prevent Skin Injury  Recent Flowsheet Documentation  Taken 11/3/2024 0228 by Gale López, RN  Body Position: position changed independently  Intervention: Prevent and Manage VTE (Venous Thromboembolism) Risk  Recent Flowsheet Documentation  Taken 11/3/2024 0228 by Gale López, RN  VTE Prevention/Management:   SCDs off (sequential compression devices)   patient refused intervention  Intervention: Prevent Infection  Recent Flowsheet " Documentation  Taken 11/3/2024 0228 by Gale López RN  Infection Prevention:   single patient room provided   rest/sleep promoted   hand hygiene promoted  Goal: Optimal Comfort and Wellbeing  Outcome: Progressing  Goal: Readiness for Transition of Care  Outcome: Progressing     Problem: Dementia Signs/Symptoms  Goal: Improved Behavioral Control (Dementia Signs/Symptoms)  Outcome: Progressing  Goal: Improved Mood Symptoms (Dementia Signs/Symptoms)  Outcome: Progressing  Goal: Optimized Cognitive Function (Dementia Signs/Symptoms)  Outcome: Progressing  Goal: Optimized Oral Intake (Dementia Signs/Symptoms)  Outcome: Progressing  Goal: Improved Sleep (Dementia Signs/Symptoms)  Outcome: Progressing     Problem: Infection  Goal: Absence of Infection Signs and Symptoms  Outcome: Progressing     Problem: Comorbidity Management  Goal: Maintenance of Asthma Control  Outcome: Progressing  Intervention: Maintain Asthma Symptom Control  Recent Flowsheet Documentation  Taken 11/3/2024 0228 by Gale López RN  Medication Review/Management: medications reviewed  Goal: Maintenance of Behavioral Health Symptom Control  Outcome: Progressing  Intervention: Maintain Behavioral Health Symptom Control  Recent Flowsheet Documentation  Taken 11/3/2024 0228 by Gale López RN  Medication Review/Management: medications reviewed  Goal: Maintenance of COPD Symptom Control  Outcome: Progressing  Intervention: Maintain COPD Symptom Control  Recent Flowsheet Documentation  Taken 11/3/2024 0228 by Gale López RN  Medication Review/Management: medications reviewed  Goal: Blood Glucose Levels Within Targeted Range  Outcome: Progressing  Intervention: Monitor and Manage Glycemia  Recent Flowsheet Documentation  Taken 11/3/2024 0228 by Gale López RN  Medication Review/Management: medications reviewed  Goal: Maintenance of Heart Failure Symptom Control  Outcome: Progressing  Intervention: Maintain Heart Failure Management  Recent  Flowsheet Documentation  Taken 11/3/2024 0228 by Gale López RN  Medication Review/Management: medications reviewed  Goal: Blood Pressure in Desired Range  Outcome: Progressing  Intervention: Maintain Blood Pressure Management  Recent Flowsheet Documentation  Taken 11/3/2024 0228 by Gale López RN  Medication Review/Management: medications reviewed  Goal: Maintenance of Osteoarthritis Symptom Control  Outcome: Progressing  Intervention: Maintain Osteoarthritis Symptom Control  Recent Flowsheet Documentation  Taken 11/3/2024 0228 by Gale López RN  Assistive Device Utilized: walker  Activity Management: activity adjusted per tolerance  Medication Review/Management: medications reviewed  Goal: Maintenance of Seizure Control  Outcome: Progressing  Intervention: Maintain Seizure Symptom Control  Recent Flowsheet Documentation  Taken 11/3/2024 0228 by Gale López RN  Medication Review/Management: medications reviewed     Problem: Pain Acute  Goal: Optimal Pain Control and Function  Outcome: Progressing  Intervention: Prevent or Manage Pain  Recent Flowsheet Documentation  Taken 11/3/2024 0228 by Gale López RN  Medication Review/Management: medications reviewed   Goal Outcome Evaluation:      Plan of Care Reviewed With: patient    Overall Patient Progress: no changeOverall Patient Progress: no change    Outcome Evaluation: Pt confused. Assit x1 to bathroom. Seroquel given. Regualr diet. awaiting placement

## 2024-11-03 NOTE — PROGRESS NOTES
[facility]    Hospitalist Progress Note  Name: eJmal Gray    MRN: [id number]  Provider:  Brando Arreguin MD    Date of Service: 11/03/2024    Summary of Stay: Jemal Gray is an 89 year old male with history of dementia with chronic aphasia. He was admitted on 10/2/2024 after he was brought to the ED by EMS from facility for evaluation of agitation.     He had a prolonged hospitalization from 6/5/24 through 10/2/24 during which he was treated for cellulitis but mostly awaited long-term care placement due to dementia. He required appointment of guardianship.  He discharged to a long-term care facility on 10/2. Upon transfer to the long-term care facility he became agitated with swearing.  He was wandering around the long-term care facility and was entering other patient's rooms.  He was unable to be redirected and required transfer back to the ED.     In the ED he was stable. He was given seroquel.  He required a bedside attendant as he was agitated and unable to be redirected.  He attempted to leave the emergency department and go into other patient's rooms. He was admitted for jail cares.  Patient has improved and is no longer agitated. He is medically stable and can be discharged whenever placement is found. Prior long term care facility is not taking patient back.  is looking into memory care units.     Problem list:     Acute agitation, improved  Behavior disturbances   Dementia with chronic aphasia  Suspect triggered by changes in setting and recent move.  No physical complaints or concerns of infection.  Historically required Zyprexa over the summer when he was hospitalized for agitation.  Most recently had been prescribed Seroquel 12.5 mg twice daily as needed although he had not been needing most recently.  -Bedside attendant as needed  -Restarted prior to admission Seroquel at 12.5 mg twice daily as needed, may need to titrate  - ms  -SW  consulted  -Continue scheduled 12.5 mg of Seroquel every evening     Need for disposition assistance  -Patient had prolonged hospitalization due to need for disposition assistance.    -His daughter filed for guardianship paperwork was completed in July   -acquired MA application and long-term care placement   -Ultimately will require 24/7 care and likely Memory care placement      Actinic Keratosis  Had surgery for this and prescribed steroids cream and ketoconazole cream, restarted per daughter request previously.     Sinus Bradycardia  Patient was noted to have HR in 50s.  Asymptomatic.  -no telemetry monitoring needed      History of Constipation  -laxatives PRN      Care plan  -Social work working on placement.  -Given patient is unreliable historian will check routine labs in the morning as it has been a while.     I spent 20 minutes in reviewing this patient's labs, imaging, medications, medical history.  In addition time was spent interviewing the patient, communicating with family, and medical decision making.     DVT Prophylaxis: Pneumatic Compression Devices  Code Status: No CPR- Do NOT Intubate  Diet: Combination Diet Regular Diet  Snacks/Supplements Pediatric: Ensure Enlive; Between Meals  Room Service    Maddox Catheter: Not present    Disposition: Medically Ready for Discharge: Ready Now    Goals to discharge include: placement obtained  Family updated today: No     Interval History   No new issues.  Resting comfortably.  Last night event noted . Doing well now     -Data reviewed today: I personally reviewed all new labs and imaging results over the last 24 hours.     Physical Exam   Temp: 98.2  F (36.8  C) Temp src: Oral BP: (!) 141/84 Pulse: 53   Resp: 16 SpO2: 97 % O2 Device: None (Room air)    Vitals:    10/02/24 2214   Weight: 71.4 kg (157 lb 6.4 oz)     Vital Signs with Ranges  Temp:  [98.2  F (36.8  C)-98.4  F (36.9  C)] 98.2  F (36.8  C)  Pulse:  [53-58] 53  Resp:  [16-20] 16  BP:  "(141-144)/(76-84) 141/84  SpO2:  [97 %-98 %] 97 %  I/O last 3 completed shifts:  In: 880 [P.O.:880]  Out: -     General: Alert, awake, no acute distress.  HEENT: NC/AT, eyes anicteric, external occular movements intact, face symmetric.   Psych: Appropriate affect.  Calm.      Medications   Current Facility-Administered Medications   Medication Dose Route Frequency Provider Last Rate Last Admin     Current Facility-Administered Medications   Medication Dose Route Frequency Provider Last Rate Last Admin    ketoconazole (NIZORAL) 2 % shampoo   Topical Q Mon Wed Fri AM Liliana Vargas PA-C   Given at 11/01/24 1648    QUEtiapine (SEROquel) half-tab 12.5 mg  12.5 mg Oral QPM Edna Kelly MD   12.5 mg at 11/02/24 2106     Data     Laboratory:  No results for input(s): \"WBC\", \"HGB\", \"HCT\", \"MCV\", \"PLT\" in the last 168 hours.    No results for input(s): \"NA\", \"POTASSIUM\", \"CHLORIDE\", \"CO2\", \"ANIONGAP\", \"GLC\", \"BUN\", \"CR\", \"GFRESTIMATED\", \"GFRESTBLACK\", \"LEON\" in the last 168 hours.    No results for input(s): \"CULT\" in the last 168 hours.  No results found for: \"TROPI\"    Imaging:  No results found for this or any previous visit (from the past 24 hours).    Brando Arreguin MD    "

## 2024-11-04 PROCEDURE — 250N000013 HC RX MED GY IP 250 OP 250 PS 637: Performed by: INTERNAL MEDICINE

## 2024-11-04 PROCEDURE — 99207 PR NO BILLABLE SERVICE THIS VISIT: CPT | Performed by: INTERNAL MEDICINE

## 2024-11-04 PROCEDURE — 101N000002 HC CUSTODIAL CARE DAILY

## 2024-11-04 RX ADMIN — KETOCONAZOLE: 20 SHAMPOO, SUSPENSION TOPICAL at 11:17

## 2024-11-04 RX ADMIN — QUETIAPINE FUMARATE 12.5 MG: 25 TABLET ORAL at 20:30

## 2024-11-04 NOTE — PLAN OF CARE
"Goal Outcome Evaluation:      Plan of Care Reviewed With: patient    Overall Patient Progress: no changeOverall Patient Progress: no change    Outcome Evaluation: Pt alert to self. on RA. Denies pain. up assist of 1 gb walker. assisted cares maintained. Awaiting placement.      Problem: Adult Inpatient Plan of Care  Goal: Plan of Care Review  Description: The Plan of Care Review/Shift note should be completed every shift.  The Outcome Evaluation is a brief statement about your assessment that the patient is improving, declining, or no change.  This information will be displayed automatically on your shift  note.  Recent Flowsheet Documentation  Taken 11/4/2024 0515 by Lima Hartmann RN  Outcome Evaluation: Pt alert to self. on RA. Denies pain. up assist of 1 gb walker. assisted cares maintained. Awaiting placement.  Plan of Care Reviewed With: patient  Overall Patient Progress: no change  Goal: Absence of Hospital-Acquired Illness or Injury  Intervention: Prevent Skin Injury  Recent Flowsheet Documentation  Taken 11/3/2024 2011 by Lima Hartmann RN  Body Position: position changed independently     Problem: Adult Inpatient Plan of Care  Goal: Plan of Care Review  Description: The Plan of Care Review/Shift note should be completed every shift.  The Outcome Evaluation is a brief statement about your assessment that the patient is improving, declining, or no change.  This information will be displayed automatically on your shift  note.  Outcome: Not Progressing  Flowsheets (Taken 11/4/2024 0515)  Outcome Evaluation: Pt alert to self. on RA. Denies pain. up assist of 1 gb walker. assisted cares maintained. Awaiting placement.  Plan of Care Reviewed With: patient  Overall Patient Progress: no change  Goal: Patient-Specific Goal (Individualized)  Description: You can add care plan individualizations to a care plan. Examples of Individualization might be:  \"Parent requests to be called daily at 9am for status\", \"I " "have a hard time hearing out of my right ear\", or \"Do not touch me to wake me up as it startles  me\".  Outcome: Not Progressing  Goal: Absence of Hospital-Acquired Illness or Injury  Outcome: Not Progressing  Intervention: Prevent Skin Injury  Recent Flowsheet Documentation  Taken 11/3/2024 2011 by Lima Hartmann, RN  Body Position: position changed independently  Goal: Optimal Comfort and Wellbeing  Outcome: Not Progressing  Goal: Readiness for Transition of Care  Outcome: Not Progressing     Problem: Skin Injury Risk Increased  Goal: Skin Health and Integrity  Intervention: Optimize Skin Protection  Recent Flowsheet Documentation  Taken 11/3/2024 2011 by Lima Hartmann, RN  Activity Management:   ambulated to bathroom   back to bed  Head of Bed (HOB) Positioning: HOB at 20-30 degrees     Problem: Dementia Signs/Symptoms  Goal: Improved Behavioral Control (Dementia Signs/Symptoms)  Outcome: Not Progressing  Goal: Improved Mood Symptoms (Dementia Signs/Symptoms)  Outcome: Not Progressing  Goal: Optimized Cognitive Function (Dementia Signs/Symptoms)  Outcome: Not Progressing  Goal: Optimized Oral Intake (Dementia Signs/Symptoms)  Outcome: Not Progressing  Goal: Improved Sleep (Dementia Signs/Symptoms)  Outcome: Not Progressing     Problem: Infection  Goal: Absence of Infection Signs and Symptoms  Outcome: Not Progressing     Problem: Comorbidity Management  Goal: Maintenance of Asthma Control  Outcome: Not Progressing  Goal: Maintenance of Behavioral Health Symptom Control  Outcome: Not Progressing  Goal: Maintenance of COPD Symptom Control  Outcome: Not Progressing  Goal: Blood Glucose Levels Within Targeted Range  Outcome: Not Progressing  Goal: Maintenance of Heart Failure Symptom Control  Outcome: Not Progressing  Goal: Blood Pressure in Desired Range  Outcome: Not Progressing  Goal: Maintenance of Osteoarthritis Symptom Control  Outcome: Not Progressing  Intervention: Maintain Osteoarthritis Symptom " Control  Recent Flowsheet Documentation  Taken 11/3/2024 2011 by Lima Hartmann, RN  Assistive Device Utilized: walker  Activity Management:   ambulated to bathroom   back to bed  Goal: Maintenance of Seizure Control  Outcome: Not Progressing

## 2024-11-04 NOTE — PLAN OF CARE
"Vss A&O to person and place . Jena. Denies pain. Ambulating with staff. Alarms on bed and soft uday on floor   Problem: Adult Inpatient Plan of Care  Goal: Plan of Care Review  Description: The Plan of Care Review/Shift note should be completed every shift.  The Outcome Evaluation is a brief statement about your assessment that the patient is improving, declining, or no change.  This information will be displayed automatically on your shift  note.  Outcome: Progressing  Flowsheets (Taken 11/4/2024 1414)  Outcome Evaluation: up with A1 belt and walker  Plan of Care Reviewed With: patient  Overall Patient Progress: no change  Goal: Patient-Specific Goal (Individualized)  Description: You can add care plan individualizations to a care plan. Examples of Individualization might be:  \"Parent requests to be called daily at 9am for status\", \"I have a hard time hearing out of my right ear\", or \"Do not touch me to wake me up as it startles  me\".  Outcome: Progressing  Goal: Absence of Hospital-Acquired Illness or Injury  Outcome: Progressing  Intervention: Identify and Manage Fall Risk  Recent Flowsheet Documentation  Taken 11/4/2024 1123 by Anselmo Best RN  Safety Promotion/Fall Prevention:   nonskid shoes/slippers when out of bed   safety round/check completed  Intervention: Prevent Skin Injury  Recent Flowsheet Documentation  Taken 11/4/2024 0747 by Anselmo Best RN  Body Position: position changed independently  Goal: Optimal Comfort and Wellbeing  Outcome: Progressing  Goal: Readiness for Transition of Care  Outcome: Progressing     Problem: Dementia Signs/Symptoms  Goal: Improved Behavioral Control (Dementia Signs/Symptoms)  Outcome: Progressing  Intervention: Manage Behavior  Recent Flowsheet Documentation  Taken 11/4/2024 1123 by Anselmo Best RN  Environmental Support: calm environment promoted  Goal: Improved Mood Symptoms (Dementia Signs/Symptoms)  Outcome: Progressing  Intervention: Optimize Emotion " and Mood  Recent Flowsheet Documentation  Taken 11/4/2024 1123 by Anselmo Best RN  Supportive Measures:   active listening utilized   problem-solving facilitated   relaxation techniques promoted   self-care encouraged  Goal: Optimized Cognitive Function (Dementia Signs/Symptoms)  Outcome: Progressing  Goal: Optimized Oral Intake (Dementia Signs/Symptoms)  Outcome: Progressing  Goal: Improved Sleep (Dementia Signs/Symptoms)  Outcome: Progressing     Problem: Infection  Goal: Absence of Infection Signs and Symptoms  Outcome: Progressing     Problem: Comorbidity Management  Goal: Maintenance of Asthma Control  Outcome: Progressing  Goal: Maintenance of Behavioral Health Symptom Control  Outcome: Progressing  Goal: Maintenance of COPD Symptom Control  Outcome: Progressing  Goal: Blood Glucose Levels Within Targeted Range  Outcome: Progressing  Goal: Maintenance of Heart Failure Symptom Control  Outcome: Progressing  Goal: Blood Pressure in Desired Range  Outcome: Progressing  Goal: Maintenance of Osteoarthritis Symptom Control  Outcome: Progressing  Intervention: Maintain Osteoarthritis Symptom Control  Recent Flowsheet Documentation  Taken 11/4/2024 0747 by Anselmo Best RN  Assistive Device Utilized:   gait belt   walker  Activity Management:   ambulated to bathroom   back to bed  Goal: Maintenance of Seizure Control  Outcome: Progressing     Problem: Pain Acute  Goal: Optimal Pain Control and Function  Outcome: Progressing  Intervention: Optimize Psychosocial Wellbeing  Recent Flowsheet Documentation  Taken 11/4/2024 1123 by Anselmo Best RN  Supportive Measures:   active listening utilized   problem-solving facilitated   relaxation techniques promoted   self-care encouraged   Goal Outcome Evaluation:      Plan of Care Reviewed With: patient    Overall Patient Progress: no changeOverall Patient Progress: no change    Outcome Evaluation: up with A1 belt and walker

## 2024-11-04 NOTE — PROGRESS NOTES
[facility]    Medicine Progress Note - Hospitalist Service    Date of Admission:  10/2/2024    Assessment & Plan    Jemal Gray is an 89 year old male with history of dementia with chronic aphasia. He was admitted on 10/2/2024 after he was brought to the ED by EMS from facility for evaluation of agitation.     He had a prolonged hospitalization from 6/5/24 through 10/2/24 during which he was treated for cellulitis but mostly awaited long-term care placement due to dementia. He required appointment of guardianship.  He discharged to a long-term care facility on 10/2. Upon transfer to the long-term care facility he became agitated with swearing.  He was wandering around the long-term care facility and was entering other patient's rooms.  He was unable to be redirected and required transfer back to the ED.     In the ED he was stable, no major acute medical issue. He was given seroquel.  He required a bedside attendant as he was agitated and unable to be redirected.  He attempted to leave the emergency department and go into other patient's rooms. His facility was not able to take him back and there was no immediate safe disposition option.  He is here under shelter care status.  Patient has improved and is no longer agitated. He remains medically stable and can be discharged whenever placement is found. Prior long term care facility is reportedly not taking patient back.  is looking into memory care units.     Safe Disposition/halfway Care:  -  Patient remains medically stable for discharge when location found.  SW/CM following and working on options.  No current safe disposition option.    -Patient had prolonged hospitalization due to need for disposition assistance in earlier 2024.  His daughter filed for guardianship paperwork which was completed in July.  Acquired MA application and long-term care placement.    -Ultimately will require 24/7 care and likely Memory care  placement      Acute agitation, improved  Behavior disturbances   Dementia with chronic aphasia:  Suspect triggered by changes in setting and recent move.  No physical complaints or concerns of infection.  Historically required Zyprexa over the summer when he was hospitalized for agitation.  Most recently had been prescribed Seroquel 12.5 mg twice daily as needed although he had not been needing most recently.  -Bedside attendant as needed  -Continue Seroquel PRN + 12.5 mg of Seroquel every evening  - ms prior check  -SW consulted    Actinic Keratosis:  Had surgery for this and prescribed steroids cream and ketoconazole cream, restarted per daughter request previously.     Sinus Bradycardia:  Patient was noted to have HR in 50s.  Asymptomatic.  -no telemetry monitoring needed      History of Constipation:  -laxatives PRN.  Had BM 11/4.    PPE Used:  Mask          Diet: Combination Diet Regular Diet  Snacks/Supplements Pediatric: Ensure Enlive; Between Meals  Room Service    DVT Prophylaxis: Ambulate  Maddox Catheter: Not present  Lines: None     Cardiac Monitoring: None  Code Status: No CPR- Do NOT Intubate      Clinically Significant Risk Factors Present on Admission                       # Dementia: noted on problem list                  Disposition Plan     Medically Ready for Discharge: Ready Now     Shashank Rizzo DO  Hospitalist Service  [facility]  Securely message with [employer] (more info)  Text page via [employer] Paging/Directory   ______________________________________________________________________    Interval History   Assumed hospitalist team care, history reviewed.  Patient without new complaints.  Patient remains confused, unable to answer detailed questions.    Physical Exam   Vital Signs: Temp: 98.4  F (36.9  C) Temp src: Oral BP: (!) 140/76 Pulse: 53   Resp: 18 SpO2: 95 % O2 Device: None (Room air)    Weight: 157 lbs 6.4 oz    GEN:  Alert, oriented x 1, appears  comfortable, no overt distress.  Up in the chair when I saw him earlier.  HEENT:  Normocephalic/atraumatic, no scleral icterus, no nasal discharge, mouth moist.  CV:  Antonio, regular, distant.  No rub.  LUNGS:  Clear to auscultation bilaterally without rales/rhonchi/wheezing/retractions.  Symmetric chest rise on inhalation noted.  ABD:  Active bowel sounds, soft, non-tender/non-distended.  No guarding/rigidity.  EXT:  No edema.  No cyanosis.  No new joint synovitis noted.  SKIN:  Dry to touch, no new exanthems noted in the visualized areas.    Medical Decision Making       32 MINUTES SPENT BY ME on the date of service doing chart review, history, exam, documentation & further activities per the note.      Data   Medications   Current Facility-Administered Medications   Medication Dose Route Frequency Provider Last Rate Last Admin     Current Facility-Administered Medications   Medication Dose Route Frequency Provider Last Rate Last Admin    ketoconazole (NIZORAL) 2 % shampoo   Topical Q Mon Wed Fri AM Liliana Vargas PA-C   Given at 11/04/24 1117    QUEtiapine (SEROquel) half-tab 12.5 mg  12.5 mg Oral QPM Edna Kelly MD   12.5 mg at 11/03/24 1955     Labs and Imaging results below reviewed today:    None today

## 2024-11-05 PROCEDURE — 250N000013 HC RX MED GY IP 250 OP 250 PS 637: Performed by: INTERNAL MEDICINE

## 2024-11-05 PROCEDURE — 101N000002 HC CUSTODIAL CARE DAILY

## 2024-11-05 PROCEDURE — 99207 PR NO BILLABLE SERVICE THIS VISIT: CPT | Performed by: INTERNAL MEDICINE

## 2024-11-05 PROCEDURE — 250N000013 HC RX MED GY IP 250 OP 250 PS 637: Performed by: PHYSICIAN ASSISTANT

## 2024-11-05 RX ADMIN — ACETAMINOPHEN 650 MG: 325 TABLET, FILM COATED ORAL at 19:48

## 2024-11-05 RX ADMIN — Medication 1 MG: at 19:48

## 2024-11-05 RX ADMIN — QUETIAPINE FUMARATE 12.5 MG: 25 TABLET ORAL at 19:48

## 2024-11-05 NOTE — PLAN OF CARE
"  Problem: Adult Inpatient Plan of Care  Goal: Plan of Care Review  Description: The Plan of Care Review/Shift note should be completed every shift.  The Outcome Evaluation is a brief statement about your assessment that the patient is improving, declining, or no change.  This information will be displayed automatically on your shift  note.  Outcome: Progressing  Flowsheets (Taken 11/5/2024 0341)  Plan of Care Reviewed With: patient  Overall Patient Progress: no change  Goal: Patient-Specific Goal (Individualized)  Description: You can add care plan individualizations to a care plan. Examples of Individualization might be:  \"Parent requests to be called daily at 9am for status\", \"I have a hard time hearing out of my right ear\", or \"Do not touch me to wake me up as it startles  me\".  Outcome: Progressing  Goal: Absence of Hospital-Acquired Illness or Injury  Outcome: Progressing  Intervention: Identify and Manage Fall Risk  Recent Flowsheet Documentation  Taken 11/5/2024 0340 by Ilan Herman RN  Safety Promotion/Fall Prevention:   nonskid shoes/slippers when out of bed   safety round/check completed  Goal: Optimal Comfort and Wellbeing  Outcome: Progressing  Goal: Readiness for Transition of Care  Outcome: Progressing     Problem: Dementia Signs/Symptoms  Goal: Improved Behavioral Control (Dementia Signs/Symptoms)  Outcome: Progressing  Intervention: Manage Behavior  Recent Flowsheet Documentation  Taken 11/5/2024 0340 by Ilan Herman RN  Environmental Support: calm environment promoted  Goal: Improved Mood Symptoms (Dementia Signs/Symptoms)  Outcome: Progressing  Intervention: Optimize Emotion and Mood  Recent Flowsheet Documentation  Taken 11/5/2024 0340 by Ilan Herman RN  Supportive Measures:   active listening utilized   problem-solving facilitated   relaxation techniques promoted   self-care encouraged  Goal: Optimized Cognitive Function (Dementia Signs/Symptoms)  Outcome: Progressing  Goal: Optimized " Oral Intake (Dementia Signs/Symptoms)  Outcome: Progressing  Goal: Improved Sleep (Dementia Signs/Symptoms)  Outcome: Progressing     Problem: Infection  Goal: Absence of Infection Signs and Symptoms  Outcome: Progressing     Problem: Comorbidity Management  Goal: Maintenance of Asthma Control  Outcome: Progressing  Goal: Maintenance of Behavioral Health Symptom Control  Outcome: Progressing  Goal: Maintenance of COPD Symptom Control  Outcome: Progressing  Goal: Blood Glucose Levels Within Targeted Range  Outcome: Progressing  Goal: Maintenance of Heart Failure Symptom Control  Outcome: Progressing  Goal: Blood Pressure in Desired Range  Outcome: Progressing  Goal: Maintenance of Osteoarthritis Symptom Control  Outcome: Progressing  Goal: Maintenance of Seizure Control  Outcome: Progressing     Problem: Pain Acute  Goal: Optimal Pain Control and Function  Outcome: Progressing  Intervention: Optimize Psychosocial Wellbeing  Recent Flowsheet Documentation  Taken 11/5/2024 0340 by Ilan eHrman, RN  Supportive Measures:   active listening utilized   problem-solving facilitated   relaxation techniques promoted   self-care encouraged   Goal Outcome Evaluation:      Plan of Care Reviewed With: patient    Overall Patient Progress: no changeOverall Patient Progress: no change

## 2024-11-05 NOTE — PROGRESS NOTES
Pt alert to self. Denies pain. Ambulated to bathroom multiple times. Tolerating diet good appetite. Bed alarm on.

## 2024-11-05 NOTE — PLAN OF CARE
"No change in status, pt in nursing home care and awaiting placement. Per order, HTT assessment q week, looks as if HTT already charted for this week so none charted this shift. Pt has good appetite, bed alarm on for impulsivity.     Goal Outcome Evaluation:      Plan of Care Reviewed With: patient    Overall Patient Progress: no changeOverall Patient Progress: no change    Outcome Evaluation: no change, medically clear, nursing home care, awaiting placement.      Problem: Adult Inpatient Plan of Care  Goal: Plan of Care Review  Description: The Plan of Care Review/Shift note should be completed every shift.  The Outcome Evaluation is a brief statement about your assessment that the patient is improving, declining, or no change.  This information will be displayed automatically on your shift  note.  Outcome: Adequate for Care Transition  Flowsheets (Taken 11/5/2024 1411)  Outcome Evaluation: no change, medically clear, nursing home care, awaiting placement.  Plan of Care Reviewed With: patient  Overall Patient Progress: no change  Goal: Patient-Specific Goal (Individualized)  Description: You can add care plan individualizations to a care plan. Examples of Individualization might be:  \"Parent requests to be called daily at 9am for status\", \"I have a hard time hearing out of my right ear\", or \"Do not touch me to wake me up as it startles  me\".  Outcome: Adequate for Care Transition  Goal: Absence of Hospital-Acquired Illness or Injury  Outcome: Adequate for Care Transition  Goal: Optimal Comfort and Wellbeing  Outcome: Adequate for Care Transition  Goal: Readiness for Transition of Care  Outcome: Adequate for Care Transition     Problem: Dementia Signs/Symptoms  Goal: Improved Behavioral Control (Dementia Signs/Symptoms)  Outcome: Adequate for Care Transition  Goal: Improved Mood Symptoms (Dementia Signs/Symptoms)  Outcome: Adequate for Care Transition  Goal: Optimized Cognitive Function (Dementia Signs/Symptoms)  Outcome: " Adequate for Care Transition  Goal: Optimized Oral Intake (Dementia Signs/Symptoms)  Outcome: Adequate for Care Transition  Goal: Improved Sleep (Dementia Signs/Symptoms)  Outcome: Adequate for Care Transition     Problem: Infection  Goal: Absence of Infection Signs and Symptoms  Outcome: Adequate for Care Transition     Problem: Comorbidity Management  Goal: Maintenance of Asthma Control  Outcome: Adequate for Care Transition  Goal: Maintenance of Behavioral Health Symptom Control  Outcome: Adequate for Care Transition  Goal: Maintenance of COPD Symptom Control  Outcome: Adequate for Care Transition  Goal: Blood Glucose Levels Within Targeted Range  Outcome: Adequate for Care Transition  Goal: Maintenance of Heart Failure Symptom Control  Outcome: Adequate for Care Transition  Goal: Blood Pressure in Desired Range  Outcome: Adequate for Care Transition  Goal: Maintenance of Osteoarthritis Symptom Control  Outcome: Adequate for Care Transition  Goal: Maintenance of Seizure Control  Outcome: Adequate for Care Transition     Problem: Pain Acute  Goal: Optimal Pain Control and Function  Outcome: Adequate for Care Transition

## 2024-11-05 NOTE — PROGRESS NOTES
[facility]    Medicine Progress Note - Hospitalist Service    Date of Admission:  10/2/2024    Assessment & Plan    Jemal Gray is an 89 year old male with history of dementia with chronic aphasia. He was admitted on 10/2/2024 after he was brought to the ED by EMS from facility for evaluation of agitation.     He had a prolonged hospitalization from 6/5/24 through 10/2/24 during which he was treated for cellulitis but mostly awaited long-term care placement due to dementia. He required appointment of guardianship.  He discharged to a long-term care facility on 10/2. Upon transfer to the long-term care facility he became agitated with swearing.  He was wandering around the long-term care facility and was entering other patient's rooms.  He was unable to be redirected and required transfer back to the ED.     In the ED he was stable, no major acute medical issue. He was given seroquel.  He required a bedside attendant as he was agitated and unable to be redirected.  He attempted to leave the emergency department and go into other patient's rooms. His facility was not able to take him back and there was no immediate safe disposition option.  He is here under snf care status.  Patient has improved and is no longer agitated. He remains medically stable and can be discharged whenever placement is found. Prior long term care facility is reportedly not taking patient back.  is looking into memory care units.     Safe Disposition/jail Care:  -  Patient remains medically stable for discharge when location found.  SW/CM following and working on options.  No current safe disposition option.    -Patient had prolonged hospitalization due to need for disposition assistance in earlier 2024.  His daughter filed for guardianship paperwork which was completed in July.  Acquired MA application and long-term care placement.    -Ultimately will require 24/7 care and likely Memory care  placement      Acute agitation, improved  Behavior disturbances   Dementia with chronic aphasia:  Suspect triggered by changes in setting and recent move.  No physical complaints or concerns of infection.  Historically required Zyprexa over the summer when he was hospitalized for agitation.  Most recently had been prescribed Seroquel 12.5 mg twice daily as needed although he had not been needing most recently.  -Bedside attendant as needed  -Continue Seroquel PRN + 12.5 mg of Seroquel every evening  - ms prior check  -SW consulted    Actinic Keratosis:  Had surgery for this and prescribed steroids cream and ketoconazole cream, restarted per daughter request previously.     Sinus Bradycardia:  Patient was noted to have HR in 50s.  Asymptomatic.  -no telemetry monitoring needed      History of Constipation:  -laxatives PRN.  Had BM 11/4.    PPE Used:  Mask          Diet: Combination Diet Regular Diet  Snacks/Supplements Pediatric: Ensure Enlive; Between Meals  Room Service    DVT Prophylaxis: Ambulate  Maddox Catheter: Not present  Lines: None     Cardiac Monitoring: None  Code Status: No CPR- Do NOT Intubate      Clinically Significant Risk Factors Present on Admission                       # Dementia: noted on problem list                  Disposition Plan     Medically Ready for Discharge: Ready Now     Shashank Rizzo DO  Hospitalist Service  [facility]  Securely message with [employer] (more info)  Text page via [employer] Paging/Directory   ______________________________________________________________________    Interval History   Patient without new complaints.  Patient remains confused, unable to answer detailed questions.    Physical Exam   Vital Signs:                    Weight: 157 lbs 6.4 oz    GEN:  Alert, oriented x 1, appears comfortable, no overt distress.  Up in the chair again today when I saw him.  HEENT:  Normocephalic/atraumatic, no scleral icterus, no nasal discharge, mouth  moist.  CV:  Antonio, regular, distant.  No rub.  LUNGS:  Clear to auscultation bilaterally without rales/rhonchi/wheezing/retractions.  Symmetric chest rise on inhalation noted.  ABD:  Active bowel sounds, soft, non-tender/non-distended.  No guarding/rigidity.  EXT:  No edema.  No cyanosis.  No new joint synovitis noted.  SKIN:  Dry to touch, no new exanthems noted in the visualized areas.    Medical Decision Making       30 MINUTES SPENT BY ME on the date of service doing chart review, history, exam, documentation & further activities per the note.      Data   Medications   Current Facility-Administered Medications   Medication Dose Route Frequency Provider Last Rate Last Admin     Current Facility-Administered Medications   Medication Dose Route Frequency Provider Last Rate Last Admin    ketoconazole (NIZORAL) 2 % shampoo   Topical Q Mon Wed Fri AM Liliana Vargas PA-C   Given at 11/04/24 1117    QUEtiapine (SEROquel) half-tab 12.5 mg  12.5 mg Oral QPM Edna Kelly MD   12.5 mg at 11/04/24 2030     Labs and Imaging results below reviewed today:    None today

## 2024-11-06 PROCEDURE — 101N000002 HC CUSTODIAL CARE DAILY

## 2024-11-06 PROCEDURE — 99207 PR NO BILLABLE SERVICE THIS VISIT: CPT | Performed by: INTERNAL MEDICINE

## 2024-11-06 RX ADMIN — KETOCONAZOLE: 20 SHAMPOO, SUSPENSION TOPICAL at 10:17

## 2024-11-06 NOTE — PLAN OF CARE
"Goal Outcome Evaluation:      Plan of Care Reviewed With: patient    Overall Patient Progress: improvingOverall Patient Progress: improving    Outcome Evaluation: Pt half-way care , awaiting placement otherwise no concerns noted and no change of condition. Pt stayed in room. .      Problem: Adult Inpatient Plan of Care  Goal: Plan of Care Review  Description: The Plan of Care Review/Shift note should be completed every shift.  The Outcome Evaluation is a brief statement about your assessment that the patient is improving, declining, or no change.  This information will be displayed automatically on your shift  note.  Outcome: Progressing  Flowsheets (Taken 11/5/2024 2222)  Outcome Evaluation: Pt half-way care , awaiting placement otherwise no concerns noted and no change of condition. Pt stayed in room. .  Plan of Care Reviewed With: patient  Overall Patient Progress: improving  Goal: Patient-Specific Goal (Individualized)  Description: You can add care plan individualizations to a care plan. Examples of Individualization might be:  \"Parent requests to be called daily at 9am for status\", \"I have a hard time hearing out of my right ear\", or \"Do not touch me to wake me up as it startles  me\".  Outcome: Progressing  Goal: Absence of Hospital-Acquired Illness or Injury  Outcome: Progressing  Intervention: Prevent Skin Injury  Recent Flowsheet Documentation  Taken 11/5/2024 1700 by Mavis Rodriguez RN  Body Position: position changed independently  Goal: Optimal Comfort and Wellbeing  Outcome: Progressing  Goal: Readiness for Transition of Care  Outcome: Progressing     "

## 2024-11-06 NOTE — PLAN OF CARE
"  Problem: Adult Inpatient Plan of Care  Goal: Plan of Care Review  Description: The Plan of Care Review/Shift note should be completed every shift.  The Outcome Evaluation is a brief statement about your assessment that the patient is improving, declining, or no change.  This information will be displayed automatically on your shift  note.  Outcome: Progressing  Flowsheets (Taken 11/6/2024 0614)  Outcome Evaluation: nursing home care, no issues overnight.  Plan of Care Reviewed With: patient  Overall Patient Progress: no change  Goal: Patient-Specific Goal (Individualized)  Description: You can add care plan individualizations to a care plan. Examples of Individualization might be:  \"Parent requests to be called daily at 9am for status\", \"I have a hard time hearing out of my right ear\", or \"Do not touch me to wake me up as it startles  me\".  Outcome: Progressing  Goal: Absence of Hospital-Acquired Illness or Injury  Outcome: Progressing  Intervention: Prevent Skin Injury  Recent Flowsheet Documentation  Taken 11/6/2024 0000 by Laurence Trevizo RN  Body Position: position changed independently  Goal: Optimal Comfort and Wellbeing  Outcome: Progressing  Goal: Readiness for Transition of Care  Outcome: Progressing   Goal Outcome Evaluation:      Plan of Care Reviewed With: patient    Overall Patient Progress: no changeOverall Patient Progress: no change    Outcome Evaluation: nursing home care, no issues overnight.      "

## 2024-11-06 NOTE — PROGRESS NOTES
[facility]    Medicine Progress Note - Hospitalist Service    Date of Admission:  10/2/2024    Assessment & Plan     Jemal Gray is an 89 year old male with history of dementia with chronic aphasia. He was admitted on 10/2/2024 after he was brought to the ED by EMS from facility for evaluation of agitation.     He had a prolonged hospitalization from 6/5/24 through 10/2/24 during which he was treated for cellulitis but mostly awaited long-term care placement due to dementia. He required appointment of guardianship.  He discharged to a long-term care facility on 10/2. Upon transfer to the long-term care facility he became agitated with swearing.  He was wandering around the long-term care facility and was entering other patient's rooms.  He was unable to be redirected and required transfer back to the ED.     In the ED he was stable, no major acute medical issue. He was given seroquel.  He required a bedside attendant as he was agitated and unable to be redirected.  He attempted to leave the emergency department and go into other patient's rooms. His facility was not able to take him back and there was no immediate safe disposition option.  He is here under [facility] care status.  Patient has improved and is no longer agitated. He remains medically stable and can be discharged whenever placement is found. Prior long term care facility is reportedly not taking patient back.  is looking into memory care units.     No new plans for today:      Safe Disposition/[facility] Care:  -  Patient remains medically stable for discharge when location found.  SW/CM following and working on options.  No current safe disposition option.    -Patient had prolonged hospitalization due to need for disposition assistance in earlier 2024.  His daughter filed for guardianship paperwork which was completed in July.  Acquired MA application and long-term care placement.    -Ultimately will require  24/7 care and likely Memory care placement      Acute agitation, improved  Behavior disturbances   Dementia with chronic aphasia:  Suspect triggered by changes in setting and recent move.  No physical complaints or concerns of infection.  Historically required Zyprexa over the summer when he was hospitalized for agitation.  Most recently had been prescribed Seroquel 12.5 mg twice daily as needed although he had not been needing most recently.  -Bedside attendant as needed  -Continue Seroquel PRN + 12.5 mg of Seroquel every evening  - ms prior check  -SW consulted    Actinic Keratosis:  Had surgery for this and prescribed steroids cream and ketoconazole cream, restarted per daughter request previously.     Sinus Bradycardia:  Patient was noted to have HR in 50s.  Asymptomatic.  -no telemetry monitoring needed      History of Constipation:  -laxatives PRN.  Had BM 11/4.            Diet: Combination Diet Regular Diet  Snacks/Supplements Pediatric: Ensure Enlive; Between Meals  Room Service    DVT Prophylaxis: Ambulate every shift  Maddox Catheter: Not present  Lines: None     Cardiac Monitoring: None  Code Status: No CPR- Do NOT Intubate      Clinically Significant Risk Factors Present on Admission                       # Dementia: noted on problem list                  Disposition Plan     Medically Ready for Discharge: Ready Now             Johnathan Ordonez MD, MD  Hospitalist Service  [facility]  Securely message with [employer] (more info)  Text page via [employer] Paging/Directory   ______________________________________________________________________    Interval History   I assumed medicine service care  Chart reviewed.  Seen and examined  Case discussed with nursing service  I saw Dimitris this morning while he is sleeping comfortably in bed.  No reported issues overnight.  No changes with his baseline mental state of intermittent confusion and disorientation.  No reported agitation or being  combative.    Physical Exam   Vital Signs: Temp: 98.1  F (36.7  C) Temp src: Oral BP: (!) 141/84 Pulse: 57   Resp: 18 SpO2: 96 % O2 Device: None (Room air)    Weight: 157 lbs 6.4 oz    HEENT; Atraumatic, normocephalic, pinkish conjuctiva, pupils bilateral reactive   Skin: warm and moist, no rashes  Lungs: equal chest expansion, clear to auscultation, no wheezes, no stridor, no crackles,   Heart: normal rate, normal rhythm, no rubs or gallops.   Abdomen: normal bowel sounds, no tenderness, no peritoneal signs, no guarding  Extremities: no deformities, no edema   Neuro; limited exam as Dimitris is sleeping but easily aroused, no meaningful conversation, moving extremities spontaneously      Medical Decision Making       20 MINUTES SPENT BY ME on the date of service doing chart review, history, exam, documentation & further activities per the note.  MANAGEMENT DISCUSSED with the following over the past 24 hours: Yes   NOTE(S)/MEDICAL RECORDS REVIEWED over the past 24 hours: Yes       Data         Imaging results reviewed over the past 24 hrs:   No results found for this or any previous visit (from the past 24 hours).

## 2024-11-07 PROCEDURE — 101N000002 HC CUSTODIAL CARE DAILY

## 2024-11-07 PROCEDURE — 250N000013 HC RX MED GY IP 250 OP 250 PS 637: Performed by: PHYSICIAN ASSISTANT

## 2024-11-07 PROCEDURE — 250N000013 HC RX MED GY IP 250 OP 250 PS 637: Performed by: INTERNAL MEDICINE

## 2024-11-07 PROCEDURE — 99232 SBSQ HOSP IP/OBS MODERATE 35: CPT | Performed by: INTERNAL MEDICINE

## 2024-11-07 RX ADMIN — Medication 1 MG: at 20:13

## 2024-11-07 RX ADMIN — ACETAMINOPHEN 650 MG: 325 TABLET, FILM COATED ORAL at 20:13

## 2024-11-07 RX ADMIN — QUETIAPINE FUMARATE 12.5 MG: 25 TABLET ORAL at 20:12

## 2024-11-07 RX ADMIN — Medication 1 MG: at 00:33

## 2024-11-07 NOTE — PROGRESS NOTES
[facility]    Medicine Progress Note - Hospitalist Service    Date of Admission:  10/2/2024    Assessment & Plan     Jemal Gray is an 89 year old male with history of dementia with chronic aphasia. He was admitted on 10/2/2024 after he was brought to the ED by EMS from facility for evaluation of agitation.     He had a prolonged hospitalization from 6/5/24 through 10/2/24 during which he was treated for cellulitis but mostly awaited long-term care placement due to dementia. He required appointment of guardianship.  He discharged to a long-term care facility on 10/2. Upon transfer to the long-term care facility he became agitated with swearing.  He was wandering around the long-term care facility and was entering other patient's rooms.  He was unable to be redirected and required transfer back to the ED.     In the ED he was stable, no major acute medical issue. He was given seroquel.  He required a bedside attendant as he was agitated and unable to be redirected.  He attempted to leave the emergency department and go into other patient's rooms. His facility was not able to take him back and there was no immediate safe disposition option.  He is here under shelter care status.  Patient has improved and is no longer agitated. He remains medically stable and can be discharged whenever placement is found. Prior long term care facility is reportedly not taking patient back.  is looking into memory care units.     No new plans for today:      Safe Disposition/residential Care:  -  Patient remains medically stable for discharge when location found.  SW/CM following and working on options.  No current safe disposition option.    -Patient had prolonged hospitalization due to need for disposition assistance in earlier 2024.  His daughter filed for guardianship paperwork which was completed in July.  Acquired MA application and long-term care placement.    -Ultimately will require  24/7 care and likely Memory care placement      Acute agitation, improved  Behavior disturbances   Dementia with chronic aphasia:  Suspect triggered by changes in setting and recent move.  No physical complaints or concerns of infection.  Historically required Zyprexa over the summer when he was hospitalized for agitation.  Most recently had been prescribed Seroquel 12.5 mg twice daily as needed although he had not been needing most recently.  -Bedside attendant as needed  -Continue Seroquel PRN + 12.5 mg of Seroquel every evening  - ms prior check  -SW consulted    Actinic Keratosis:  Had surgery for this and prescribed steroids cream and ketoconazole cream, restarted per daughter request previously.     Sinus Bradycardia:  Patient was noted to have HR in 50s.  Asymptomatic.  -no telemetry monitoring needed      History of Constipation:  -laxatives PRN.  Had BM 11/4.            Diet: Combination Diet Regular Diet  Snacks/Supplements Pediatric: Ensure Enlive; Between Meals  Room Service    DVT Prophylaxis: Ambulate every shift  Maddox Catheter: Not present  Lines: None     Cardiac Monitoring: None  Code Status: No CPR- Do NOT Intubate      Clinically Significant Risk Factors Present on Admission                       # Dementia: noted on problem list                  Disposition Plan     Medically Ready for Discharge: Ready Now             Johnathan Ordonez MD, MD  Hospitalist Service  [facility]  Securely message with [employer] (more info)  Text page via [employer] Paging/Directory   ______________________________________________________________________    Interval History   Continuing medicine service care.  No significant reported events overnight.  Stable hemodynamics.  Reportedly able to tolerate oral diet.  Ambulating, voiding freely no significant symptomatology reported by nursing service    Physical Exam   Vital Signs: Temp: 98.7  F (37.1  C) Temp src: Oral BP: 125/67 Pulse: 89    Resp: 18 SpO2: 96 % O2 Device: None (Room air)    Weight: 157 lbs 6.4 oz    HEENT; Atraumatic, normocephalic, pinkish conjuctiva, pupils bilateral reactive   Skin: warm and moist, no rashes  Lungs: equal chest expansion, clear to auscultation, no wheezes, no stridor, no crackles,   Heart: normal rate, normal rhythm, no rubs or gallops.   Abdomen: normal bowel sounds, no tenderness, no peritoneal signs, no guarding  Extremities: no deformities, no edema   Neuro; awake, alert, moving all extremity spontaneously, poor historian not following simple verbal instructions      Medical Decision Making       15 MINUTES SPENT BY ME on the date of service doing chart review, history, exam, documentation & further activities per the note.  MANAGEMENT DISCUSSED with the following over the past 24 hours: Yes   NOTE(S)/MEDICAL RECORDS REVIEWED over the past 24 hours: Yes       Data         Imaging results reviewed over the past 24 hrs:   No results found for this or any previous visit (from the past 24 hours).

## 2024-11-07 NOTE — PROGRESS NOTES
Care Management Follow Up    Length of Stay (days): 0    Expected Discharge Date: 12/10/2024     Concerns to be Addressed:     Accepting memory care facility  Patient plan of care discussed at interdisciplinary rounds: Yes    Anticipated Discharge Disposition:  locked memory care  Anticipated Discharge Services:  memory care  Anticipated Discharge DME:  [facility]    Patient/family educated on Medicare website which has current facility and service quality ratings:  yes  Education Provided on the Discharge Plan:  yes daughter/guardian message left  Patient/Family in Agreement with the Plan:  yes    Referrals Placed by CM/SW:  yes  Private pay costs discussed: transportation costs    Discussed  Partnership in Safe Discharge Planning  document with patient/family: No     Handoff Completed: No, handoff not indicated or clinically appropriate    Additional Information:  JAMAR called memory cares for openings with Elderly waiver coverage. Left message for Matias  [place] [phone] and Matias [place] wondering if they would take patient before his waiver is completed. Patient is scheduled for his elderly waiver assessment 12/9/24 @ 900.    Left message for daughter/guardian NALDO Stark   Inpatient Care Coordination   Supervisor  [facility]  [phone]        NALDO Puentes

## 2024-11-07 NOTE — PLAN OF CARE
"End of shift note: Alert, disoriented to time, place, situation. Pt up 1 assist w/ GB and walker. Hair washed. No IV access. Waiting for placement.    Goal Outcome Evaluation:      Plan of Care Reviewed With: patient    Overall Patient Progress: no changeOverall Patient Progress: no change    Outcome Evaluation: Denies pain. VSS, afebrile, sating well on RA.      Problem: Adult Inpatient Plan of Care  Goal: Plan of Care Review  Description: The Plan of Care Review/Shift note should be completed every shift.  The Outcome Evaluation is a brief statement about your assessment that the patient is improving, declining, or no change.  This information will be displayed automatically on your shift  note.  Outcome: Progressing  Flowsheets (Taken 11/6/2024 1837)  Outcome Evaluation: Denies pain. VSS, afebrile, sating well on RA.  Plan of Care Reviewed With: patient  Overall Patient Progress: no change  Goal: Patient-Specific Goal (Individualized)  Description: You can add care plan individualizations to a care plan. Examples of Individualization might be:  \"Parent requests to be called daily at 9am for status\", \"I have a hard time hearing out of my right ear\", or \"Do not touch me to wake me up as it startles  me\".  Outcome: Progressing  Goal: Absence of Hospital-Acquired Illness or Injury  Outcome: Progressing  Intervention: Identify and Manage Fall Risk  Recent Flowsheet Documentation  Taken 11/6/2024 1831 by Donna Mauricio, RN  Safety Promotion/Fall Prevention: safety round/check completed  Taken 11/6/2024 1719 by Donna Mauricio, RN  Safety Promotion/Fall Prevention: safety round/check completed  Taken 11/6/2024 1617 by Donna Mauricio, RN  Safety Promotion/Fall Prevention: safety round/check completed  Taken 11/6/2024 1428 by Donna Mauricio, RN  Safety Promotion/Fall Prevention: safety round/check completed  Taken 11/6/2024 1316 by Donna Mauricio, RN  Safety Promotion/Fall Prevention: safety round/check " completed  Taken 11/6/2024 1245 by Donna Mauricio RN  Safety Promotion/Fall Prevention: safety round/check completed  Taken 11/6/2024 1036 by Donna Mauricio RN  Safety Promotion/Fall Prevention: safety round/check completed  Taken 11/6/2024 0919 by Donna Mauricio RN  Safety Promotion/Fall Prevention: safety round/check completed  Taken 11/6/2024 0800 by Donna Mauricio RN  Safety Promotion/Fall Prevention: safety round/check completed  Taken 11/6/2024 0711 by Donna Mauricio RN  Safety Promotion/Fall Prevention: safety round/check completed  Intervention: Prevent Skin Injury  Recent Flowsheet Documentation  Taken 11/6/2024 1036 by Donna Mauricio RN  Body Position: position changed independently  Intervention: Prevent and Manage VTE (Venous Thromboembolism) Risk  Recent Flowsheet Documentation  Taken 11/6/2024 1036 by Donna Mauricio RN  VTE Prevention/Management:   SCDs off (sequential compression devices)   patient refused intervention  Goal: Optimal Comfort and Wellbeing  Outcome: Progressing  Goal: Readiness for Transition of Care  Outcome: Progressing

## 2024-11-07 NOTE — PLAN OF CARE
"Pt does not appear to be in distress. Singing most of the night.     Problem: Adult Inpatient Plan of Care  Goal: Plan of Care Review  Description: The Plan of Care Review/Shift note should be completed every shift.  The Outcome Evaluation is a brief statement about your assessment that the patient is improving, declining, or no change.  This information will be displayed automatically on your shift  note.  Outcome: Progressing  Flowsheets (Taken 11/7/2024 0242)  Outcome Evaluation: [facility] care. Ambulated to bathroom several times this shift. prn meltonin given.  Plan of Care Reviewed With: patient  Overall Patient Progress: no change  Goal: Patient-Specific Goal (Individualized)  Description: You can add care plan individualizations to a care plan. Examples of Individualization might be:  \"Parent requests to be called daily at 9am for status\", \"I have a hard time hearing out of my right ear\", or \"Do not touch me to wake me up as it startles  me\".  Outcome: Progressing  Goal: Absence of Hospital-Acquired Illness or Injury  Outcome: Progressing  Intervention: Identify and Manage Fall Risk  Recent Flowsheet Documentation  Taken 11/7/2024 0215 by Julia Moss, RN  Safety Promotion/Fall Prevention:   safety round/check completed   clutter free environment maintained   nonskid shoes/slippers when out of bed  Taken 11/7/2024 0006 by Julia Moss, RN  Safety Promotion/Fall Prevention:   safety round/check completed   clutter free environment maintained   nonskid shoes/slippers when out of bed  Taken 11/6/2024 2330 by Julia Moss, RN  Safety Promotion/Fall Prevention:   safety round/check completed   clutter free environment maintained   nonskid shoes/slippers when out of bed  Taken 11/6/2024 2130 by Julia Moss, RN  Safety Promotion/Fall Prevention:   safety round/check completed   clutter free environment maintained   nonskid shoes/slippers when out of bed  Intervention: Prevent Skin Injury  Recent Flowsheet " Documentation  Taken 11/6/2024 2130 by Julia Moss RN  Body Position: position changed independently  Goal: Optimal Comfort and Wellbeing  Outcome: Progressing  Goal: Readiness for Transition of Care  Outcome: Progressing   Goal Outcome Evaluation:      Plan of Care Reviewed With: patient    Overall Patient Progress: no changeOverall Patient Progress: no change    Outcome Evaluation: residential care. Ambulated to bathroom several times this shift. prn melatonin given.

## 2024-11-08 PROCEDURE — 101N000002 HC CUSTODIAL CARE DAILY

## 2024-11-08 PROCEDURE — 250N000013 HC RX MED GY IP 250 OP 250 PS 637: Performed by: INTERNAL MEDICINE

## 2024-11-08 PROCEDURE — 99232 SBSQ HOSP IP/OBS MODERATE 35: CPT | Performed by: INTERNAL MEDICINE

## 2024-11-08 RX ADMIN — QUETIAPINE FUMARATE 12.5 MG: 25 TABLET ORAL at 19:52

## 2024-11-08 NOTE — PLAN OF CARE
"Goal Outcome Evaluation:      Plan of Care Reviewed With: patient    Overall Patient Progress: improvingOverall Patient Progress: improving    Outcome Evaluation: residential care. Pt confused. Denies pain, VSS.    Pt on [facility] cares, awaiting placement. Call placed to [facility] and [facility]. Pt ambulated in hallway. VSS      Problem: Adult Inpatient Plan of Care  Goal: Plan of Care Review  Description: The Plan of Care Review/Shift note should be completed every shift.  The Outcome Evaluation is a brief statement about your assessment that the patient is improving, declining, or no change.  This information will be displayed automatically on your shift  note.  Outcome: Progressing  Flowsheets (Taken 11/8/2024 1152)  Outcome Evaluation: residential care. Pt confused. Denies pain, VSS.  Plan of Care Reviewed With: patient  Overall Patient Progress: improving  Goal: Patient-Specific Goal (Individualized)  Description: You can add care plan individualizations to a care plan. Examples of Individualization might be:  \"Parent requests to be called daily at 9am for status\", \"I have a hard time hearing out of my right ear\", or \"Do not touch me to wake me up as it startles  me\".  Outcome: Progressing  Goal: Absence of Hospital-Acquired Illness or Injury  Outcome: Progressing  Intervention: Identify and Manage Fall Risk  Recent Flowsheet Documentation  Taken 11/8/2024 1022 by Uma Lynch RN  Safety Promotion/Fall Prevention:   activity supervised   safety round/check completed  Intervention: Prevent Skin Injury  Recent Flowsheet Documentation  Taken 11/8/2024 1022 by Uma Lynch RN  Body Position: position changed independently  Intervention: Prevent and Manage VTE (Venous Thromboembolism) Risk  Recent Flowsheet Documentation  Taken 11/8/2024 1122 by Uma Lynch RN  VTE Prevention/Management: SCDs on (sequential compression devices)  Goal: Optimal Comfort and Wellbeing  Outcome: " Progressing  Goal: Readiness for Transition of Care  Outcome: Progressing

## 2024-11-08 NOTE — PLAN OF CARE
"End of shift note: Alert, disoriented to time, place, situation. Pt up 1 assist w/ GB and walker. No IV access. Walked in hallway. Waiting for placement.    Goal Outcome Evaluation:      Plan of Care Reviewed With: patient    Overall Patient Progress: no changeOverall Patient Progress: no change    Outcome Evaluation: care home care. Denies pain. VSS, afebrile, sating well on RA.      Problem: Adult Inpatient Plan of Care  Goal: Plan of Care Review  Description: The Plan of Care Review/Shift note should be completed every shift.  The Outcome Evaluation is a brief statement about your assessment that the patient is improving, declining, or no change.  This information will be displayed automatically on your shift  note.  Outcome: Progressing  Flowsheets (Taken 11/7/2024 1810)  Outcome Evaluation: care home care. Denies pain. VSS, afebrile, sating well on RA.  Plan of Care Reviewed With: patient  Overall Patient Progress: no change  Goal: Patient-Specific Goal (Individualized)  Description: You can add care plan individualizations to a care plan. Examples of Individualization might be:  \"Parent requests to be called daily at 9am for status\", \"I have a hard time hearing out of my right ear\", or \"Do not touch me to wake me up as it startles  me\".  Outcome: Progressing  Goal: Absence of Hospital-Acquired Illness or Injury  Outcome: Progressing  Intervention: Identify and Manage Fall Risk  Recent Flowsheet Documentation  Taken 11/7/2024 1804 by Donna Mauricio, RN  Safety Promotion/Fall Prevention: safety round/check completed  Taken 11/7/2024 1707 by Donna Mauricio, RN  Safety Promotion/Fall Prevention: safety round/check completed  Taken 11/7/2024 1614 by Donna Mauricio, RN  Safety Promotion/Fall Prevention: safety round/check completed  Taken 11/7/2024 1523 by Donna Mauricio, RN  Safety Promotion/Fall Prevention: safety round/check completed  Taken 11/7/2024 1400 by Donna Mauricio, RN  Safety Promotion/Fall " Prevention: safety round/check completed  Taken 11/7/2024 1309 by Donna Mauricio RN  Safety Promotion/Fall Prevention: safety round/check completed  Taken 11/7/2024 1149 by Donna Mauricio RN  Safety Promotion/Fall Prevention: safety round/check completed  Taken 11/7/2024 1108 by Donna Mauricio RN  Safety Promotion/Fall Prevention: safety round/check completed  Taken 11/7/2024 0946 by Donna Mauricio RN  Safety Promotion/Fall Prevention: safety round/check completed  Taken 11/7/2024 0852 by Donna Mauricio RN  Safety Promotion/Fall Prevention: safety round/check completed  Taken 11/7/2024 0741 by Donna Mauricio RN  Safety Promotion/Fall Prevention: safety round/check completed  Intervention: Prevent Skin Injury  Recent Flowsheet Documentation  Taken 11/7/2024 0852 by Donna Mauricio RN  Body Position: weight shifting  Goal: Optimal Comfort and Wellbeing  Outcome: Progressing  Goal: Readiness for Transition of Care  Outcome: Progressing

## 2024-11-08 NOTE — PROGRESS NOTES
[facility]    Medicine detention Care Note - Hospitalist Service    Date of Admission:  10/2/2024    Assessment & Plan     Jemal Gray is an 89 year old male with history of dementia with chronic aphasia. He was admitted on 10/2/2024 after he was brought to the ED by EMS from facility for evaluation of agitation.     He had a prolonged hospitalization from 6/5/24 through 10/2/24 during which he was treated for cellulitis but mostly awaited long-term care placement due to dementia. He required appointment of guardianship.  He discharged to a long-term care facility on 10/2. Upon transfer to the long-term care facility he became agitated with swearing.  He was wandering around the long-term care facility and was entering other patient's rooms.  He was unable to be redirected and required transfer back to the ED.     In the ED he was stable, no major acute medical issue. He was given seroquel.  He required a bedside attendant as he was agitated and unable to be redirected.  He attempted to leave the emergency department and go into other patient's rooms. His facility was not able to take him back and there was no immediate safe disposition option.  He is here under prison care status.  Patient has improved and is no longer agitated. He remains medically stable and can be discharged whenever placement is found. Prior long term care facility is reportedly not taking patient back.  is looking into memory care units.     11/8/24 - No new plans for today    PPE Used:  Mask, gloves             Safe Disposition/detention Care:  -  Patient remains medically stable for discharge when location found.  SW/CM following and working on options.  No current safe disposition option.    -Patient had prolonged hospitalization due to need for disposition assistance in earlier 2024.  His daughter filed for guardianship paperwork which was completed in July.  Acquired MA application and  "long-term care placement.    -Ultimately will require 24/7 care and likely Memory care placement      Acute agitation, improved  Behavior disturbances   Dementia with chronic aphasia:  Suspect triggered by changes in setting and recent move.  No physical complaints or concerns of infection.  Historically required Zyprexa over the summer when he was hospitalized for agitation.    Currently doing well on seroquel 12.5mg/day  -currently is pleasant, calm and not needing bedside attendent    - ms prior EKG (from 10/3/24)  -SW following for safe disposition     Actinic Keratosis:  Had surgery for this and prescribed steroids cream and ketoconazole cream, restarted per daughter request previously.     Sinus Bradycardia:  Patient was noted to have occasional HR in 50s.  Asymptomatic.  -no telemetry monitoring needed      History of Constipation:  -laxatives PRN.      Diet: Combination Diet Regular Diet  Snacks/Supplements Pediatric: Ensure Enlive; Between Meals  Room Service    DVT Prophylaxis: Ambulate every shift, PCD's  Maddox Catheter: Not present  Lines: None     Cardiac Monitoring: None  Code Status: No CPR- Do NOT Intubate      Clinically Significant Risk Factors Present on Admission                       # Dementia: noted on problem list                  Disposition Plan     Medically Ready for Discharge: Ready Now             Vania Jiang DO  Hospitalist Service  [facility]  Securely message with [employer] (more info)  Text page via [employer] Paging/Directory   ______________________________________________________________________    Interval History     \"Doing well\".  Giving me a thumbs-up sign. No new concerns overnight per nursing.      Physical Exam   Vital Signs: Temp: 98.7  F (37.1  C) Temp src: Oral BP: 125/67 Pulse: 89   Resp: 18 SpO2: 96 % O2 Device: None (Room air)    Weight: 157 lbs 6.4 oz    GEN:  Alert, awake, appears to be in no acute respiratory distress  HEENT:  " "Normocephalic/atraumatic, no scleral icterus, no nasal discharge  CV:  Regular rate and rhythm, no clear loud murmur or JVD.  S1 + S2 noted  LUNGS:  Clear to auscultation ant/lat bilaterally without rales/rhonchi/wheezing/retractions.  Symmetric chest rise on inhalation noted.  EXT:  No pretibial edema bilaterally.  No cyanosis.    PSYCH:  calm and cooperative this am    Medical Decision Making       30 MINUTES SPENT BY ME on the date of service doing chart review, history, exam, documentation & further activities per the note.      Data   Medications   Current Facility-Administered Medications   Medication Dose Route Frequency Provider Last Rate Last Admin     Current Facility-Administered Medications   Medication Dose Route Frequency Provider Last Rate Last Admin    ketoconazole (NIZORAL) 2 % shampoo   Topical Q Mon Wed Fri AM Liliana Vargas PA-C   Given at 11/06/24 1017    QUEtiapine (SEROquel) half-tab 12.5 mg  12.5 mg Oral QPM Edna Kelly MD   12.5 mg at 11/07/24 2012     Labs and Imaging results below reviewed today.  No results for input(s): \"WBC\", \"HGB\", \"HCT\", \"MCV\", \"PLT\" in the last 168 hours.  No results for input(s): \"NA\", \"POTASSIUM\", \"CHLORIDE\", \"CO2\", \"ANIONGAP\", \"GLC\", \"BUN\", \"CR\", \"GFRESTIMATED\", \"GFRESTBLACK\", \"LEON\" in the last 168 hours.  7-Day Micro Results       No results found for the last 168 hours.            No results found for this or any previous visit (from the past 24 hours).    "

## 2024-11-09 PROCEDURE — 101N000002 HC CUSTODIAL CARE DAILY

## 2024-11-09 PROCEDURE — 99232 SBSQ HOSP IP/OBS MODERATE 35: CPT | Performed by: INTERNAL MEDICINE

## 2024-11-09 PROCEDURE — 250N000013 HC RX MED GY IP 250 OP 250 PS 637: Performed by: INTERNAL MEDICINE

## 2024-11-09 RX ADMIN — QUETIAPINE FUMARATE 12.5 MG: 25 TABLET ORAL at 20:37

## 2024-11-09 NOTE — PROGRESS NOTES
Shift summary ([id number])    No e/o or reports of pain. Ambulating in halls and up to bathroom Ax1 GB W. Good PO intake. Calm and pleasant. Discharge TBD.

## 2024-11-09 NOTE — PLAN OF CARE
"prison care. VS stable. Confused.    Goal Outcome Evaluation:      Plan of Care Reviewed With: patient    Overall Patient Progress: no changeOverall Patient Progress: no change    Outcome Evaluation: prison care.      Problem: Adult Inpatient Plan of Care  Goal: Plan of Care Review  Description: The Plan of Care Review/Shift note should be completed every shift.  The Outcome Evaluation is a brief statement about your assessment that the patient is improving, declining, or no change.  This information will be displayed automatically on your shift  note.  11/9/2024 0314 by Justine Cheung RN  Outcome: Progressing  Flowsheets (Taken 11/9/2024 0314)  Outcome Evaluation: prison care.  Plan of Care Reviewed With: patient  Overall Patient Progress: no change  11/9/2024 0313 by Justine Cheung RN  Outcome: Progressing  Flowsheets (Taken 11/9/2024 0313)  Plan of Care Reviewed With: patient  Goal: Patient-Specific Goal (Individualized)  Description: You can add care plan individualizations to a care plan. Examples of Individualization might be:  \"Parent requests to be called daily at 9am for status\", \"I have a hard time hearing out of my right ear\", or \"Do not touch me to wake me up as it startles  me\".  11/9/2024 0314 by Justine Cheung RN  Outcome: Progressing  11/9/2024 0313 by Justine Cheung, RN  Outcome: Progressing  Goal: Absence of Hospital-Acquired Illness or Injury  11/9/2024 0314 by Justine Cheung, RN  Outcome: Progressing  11/9/2024 0313 by Justine Cheung RN  Outcome: Progressing  Intervention: Identify and Manage Fall Risk  Recent Flowsheet Documentation  Taken 11/8/2024 2127 by Justine Cheung, RN  Safety Promotion/Fall Prevention:   clutter free environment maintained   lighting adjusted   nonskid shoes/slippers when out of bed  Intervention: Prevent Skin Injury  Recent Flowsheet Documentation  Taken 11/8/2024 2127 by Justine Cheung, RN  Body Position: position changed independently  Goal: " Optimal Comfort and Wellbeing  11/9/2024 0314 by Justine Cheung, RN  Outcome: Progressing  11/9/2024 0313 by Justine Cheung, RN  Outcome: Progressing  Goal: Readiness for Transition of Care  11/9/2024 0314 by Justine Cheung, RN  Outcome: Progressing  11/9/2024 0313 by Justine Cheung, RN  Outcome: Progressing

## 2024-11-09 NOTE — PLAN OF CARE
"11:30 RN shift summary 7AM-11:30AM:  No c/o pain.  Alert.  Oriented to self.  Ambulates with assist of 1, gait belt walker.  Lungs clear.  98% RA. No tele. Regular diet. Voiding without difficulty. No PIV. DNR/DNI.  Plan to discharge to memory care unit when a bed is available.        Problem: Adult Inpatient Plan of Care  Goal: Plan of Care Review  Description: The Plan of Care Review/Shift note should be completed every shift.  The Outcome Evaluation is a brief statement about your assessment that the patient is improving, declining, or no change.  This information will be displayed automatically on your shift  note.  Outcome: Adequate for Care Transition  Flowsheets (Taken 11/9/2024 1129)  Outcome Evaluation: halfway care.  Plan for memeory care when bed available.  Plan of Care Reviewed With: patient  Overall Patient Progress: no change  Goal: Patient-Specific Goal (Individualized)  Description: You can add care plan individualizations to a care plan. Examples of Individualization might be:  \"Parent requests to be called daily at 9am for status\", \"I have a hard time hearing out of my right ear\", or \"Do not touch me to wake me up as it startles  me\".  Outcome: Adequate for Care Transition  Goal: Absence of Hospital-Acquired Illness or Injury  Outcome: Adequate for Care Transition  Intervention: Identify and Manage Fall Risk  Recent Flowsheet Documentation  Taken 11/9/2024 0800 by Fariha Phillips, RN  Safety Promotion/Fall Prevention: safety round/check completed  Intervention: Prevent Skin Injury  Recent Flowsheet Documentation  Taken 11/9/2024 0800 by Fariha Phillips, RN  Body Position: position changed independently  Goal: Optimal Comfort and Wellbeing  Outcome: Adequate for Care Transition  Goal: Readiness for Transition of Care  Outcome: Adequate for Care Transition   Goal Outcome Evaluation:                        "

## 2024-11-09 NOTE — PROGRESS NOTES
[facility]    Medicine Progress Note - Hospitalist Service    Date of Admission:  10/2/2024    Assessment & Plan     Jemal Gray is an 89 year old male with history of dementia with chronic aphasia. He was admitted on 10/2/2024 after he was brought to the ED by EMS from facility for evaluation of agitation.     He had a prolonged hospitalization from 6/5/24 through 10/2/24 during which he was treated for cellulitis but mostly awaited long-term care placement due to dementia. He required appointment of guardianship.  He discharged to a long-term care facility on 10/2. Upon transfer to the long-term care facility he became agitated with swearing.  He was wandering around the long-term care facility and was entering other patient's rooms.  He was unable to be redirected and required transfer back to the ED.     In the ED he was stable, no major acute medical issue. He was given seroquel.  He required a bedside attendant as he was agitated and unable to be redirected.  He attempted to leave the emergency department and go into other patient's rooms. His facility was not able to take him back and there was no immediate safe disposition option.  He is here under group home care status.  Patient has improved and is no longer agitated. He remains medically stable and can be discharged whenever placement is found. Prior long term care facility is reportedly not taking patient back.  is looking into memory care units.     No new plans for today:  Reviewed most recent notes from      Safe Disposition/assisted Care:  -  Patient remains medically stable for discharge when location found.  SW/CM following and working on options.  No current safe disposition option.    -Patient had prolonged hospitalization due to need for disposition assistance in earlier 2024.  His daughter filed for guardianship paperwork which was completed in July.  Acquired MA application and long-term  care placement.    -Ultimately will require 24/7 care and likely Memory care placement      Acute agitation, improved  Behavior disturbances   Dementia with chronic aphasia:  Suspect triggered by changes in setting and recent move.  No physical complaints or concerns of infection.  Historically required Zyprexa over the summer when he was hospitalized for agitation.  Most recently had been prescribed Seroquel 12.5 mg twice daily as needed although he had not been needing most recently.  -Bedside attendant as needed  -Continue Seroquel PRN + 12.5 mg of Seroquel every evening  - ms prior check  -SW consulted    Actinic Keratosis:  Had surgery for this and prescribed steroids cream and ketoconazole cream, restarted per daughter request previously.     Sinus Bradycardia:  Patient was noted to have HR in 50s.  Asymptomatic.  -no telemetry monitoring needed      History of Constipation:  -laxatives PRN.  Had BM 11/4.            Diet: Combination Diet Regular Diet  Snacks/Supplements Pediatric: Ensure Enlive; Between Meals  Room Service    DVT Prophylaxis: Ambulate every shift  Maddox Catheter: Not present  Lines: None     Cardiac Monitoring: None  Code Status: No CPR- Do NOT Intubate      Clinically Significant Risk Factors Present on Admission                       # Dementia: noted on problem list                  Disposition Plan     Medically Ready for Discharge: Ready Now             Johnathan Ordonez MD, MD  Hospitalist Service  [facility]  Securely message with [employer] (more info)  Text page via [employer] Paging/Directory   ______________________________________________________________________    Interval History   Continuing medicine service care.  No significant reported events overnight.  Stable hemodynamics.   I met Dimitris this morning while he is laying comfortably in bed and appears to be singing.  No significant reported events overnight.          Physical Exam   Vital Signs: Temp:  97.7  F (36.5  C) Temp src: Axillary BP: (!) 148/50 Pulse: 51   Resp: 18 SpO2: 98 % O2 Device: None (Room air)    Weight: 157 lbs 6.4 oz    HEENT; Atraumatic, normocephalic, pinkish conjuctiva, pupils bilateral reactive   Skin: warm and moist, no rashes  Lungs: equal chest expansion, clear to auscultation, no wheezes, no stridor, no crackles,   Heart: normal rate, normal rhythm, no rubs or gallops.   Abdomen: normal bowel sounds, no tenderness, no peritoneal signs, no guarding  Extremities: no deformities, no edema   Neuro; awake, alert, moving all extremity spontaneously, poor historian not following simple verbal instructions      Medical Decision Making       15 MINUTES SPENT BY ME on the date of service doing chart review, history, exam, documentation & further activities per the note.  MANAGEMENT DISCUSSED with the following over the past 24 hours: yes   NOTE(S)/MEDICAL RECORDS REVIEWED over the past 24 hours: Yes       Data         Imaging results reviewed over the past 24 hrs:   No results found for this or any previous visit (from the past 24 hours).

## 2024-11-10 PROCEDURE — 250N000013 HC RX MED GY IP 250 OP 250 PS 637: Performed by: INTERNAL MEDICINE

## 2024-11-10 PROCEDURE — 99232 SBSQ HOSP IP/OBS MODERATE 35: CPT | Performed by: INTERNAL MEDICINE

## 2024-11-10 PROCEDURE — 101N000002 HC CUSTODIAL CARE DAILY

## 2024-11-10 RX ADMIN — QUETIAPINE FUMARATE 12.5 MG: 25 TABLET ORAL at 20:28

## 2024-11-10 NOTE — PROGRESS NOTES
[facility]    Medicine Progress Note - Hospitalist Service    Date of Admission:  10/2/2024    Assessment & Plan     Jemal Gray is an 89 year old male with history of dementia with chronic aphasia. He was admitted on 10/2/2024 after he was brought to the ED by EMS from facility for evaluation of agitation.     He had a prolonged hospitalization from 6/5/24 through 10/2/24 during which he was treated for cellulitis but mostly awaited long-term care placement due to dementia. He required appointment of guardianship.  He discharged to a long-term care facility on 10/2. Upon transfer to the long-term care facility he became agitated with swearing.  He was wandering around the long-term care facility and was entering other patient's rooms.  He was unable to be redirected and required transfer back to the ED.     In the ED he was stable, no major acute medical issue. He was given seroquel.  He required a bedside attendant as he was agitated and unable to be redirected.  He attempted to leave the emergency department and go into other patient's rooms. His facility was not able to take him back and there was no immediate safe disposition option.  He is here under prison care status.  Patient has improved and is no longer agitated. He remains medically stable and can be discharged whenever placement is found. Prior long term care facility is reportedly not taking patient back.  is looking into memory care units.     No new plans for today:  Reviewed most recent notes from      Safe Disposition/snf Care:  -  Patient remains medically stable for discharge when location found.  SW/CM following and working on options.  No current safe disposition option.    -Patient had prolonged hospitalization due to need for disposition assistance in earlier 2024.  His daughter filed for guardianship paperwork which was completed in July.  Acquired MA application and long-term  care placement.    -Ultimately will require 24/7 care and likely Memory care placement      Acute agitation, improved  Behavior disturbances   Dementia with chronic aphasia:  Suspect triggered by changes in setting and recent move.  No physical complaints or concerns of infection.  Historically required Zyprexa over the summer when he was hospitalized for agitation.  Most recently had been prescribed Seroquel 12.5 mg twice daily as needed although he had not been needing most recently.  -Bedside attendant as needed  -Continue Seroquel PRN + 12.5 mg of Seroquel every evening  - ms prior check  -SW consulted    Actinic Keratosis:  Had surgery for this and prescribed steroids cream and ketoconazole cream, restarted per daughter request previously.     Sinus Bradycardia:  Patient was noted to have HR in 50s.  Asymptomatic.  -no telemetry monitoring needed      History of Constipation:  -laxatives PRN.  Had BM 11/4.            Diet: Combination Diet Regular Diet  Snacks/Supplements Pediatric: Ensure Enlive; Between Meals  Room Service    DVT Prophylaxis: Ambulate every shift  Maddox Catheter: Not present  Lines: None     Cardiac Monitoring: None  Code Status: No CPR- Do NOT Intubate      Clinically Significant Risk Factors Present on Admission                       # Dementia: noted on problem list                  Disposition Plan     Medically Ready for Discharge: Ready Now             Johnathan Ordonez MD, MD  Hospitalist Service  [facility]  Securely message with Teng[employer] (more info)  Text page via [employer] Paging/Directory   ______________________________________________________________________    Interval History   Continuing medicine service care.  No significant reported events overnight.  Stable hemodynamics.   No significant reports from nursing service.  Still tolerating oral diet.  Remained very pleasant and interactive         Physical Exam   Vital Signs:                    Weight:  157 lbs 6.4 oz    HEENT; Atraumatic, normocephalic, pinkish conjuctiva, pupils bilateral reactive   Skin: warm and moist, no rashes  Lungs: equal chest expansion, clear to auscultation, no wheezes, no stridor, no crackles,   Heart: normal rate, normal rhythm, no rubs or gallops.   Abdomen: normal bowel sounds, no tenderness, no peritoneal signs, no guarding  Extremities: no deformities, no edema   Neuro; awake, alert, moving all extremity spontaneously, poor historian not following simple verbal instructions      Medical Decision Making       15 MINUTES SPENT BY ME on the date of service doing chart review, history, exam, documentation & further activities per the note.  MANAGEMENT DISCUSSED with the following over the past 24 hours: Yes   NOTE(S)/MEDICAL RECORDS REVIEWED over the past 24 hours: Yes       Data         Imaging results reviewed over the past 24 hrs:   No results found for this or any previous visit (from the past 24 hours).

## 2024-11-10 NOTE — PLAN OF CARE
group home care patient. Denies pain. Good appetite. Ambulating with staff. Floor uday removed d/t tripping hazard for pt . Pt up with A1 and walker . Voids without dif.   Problem: Adult Inpatient Plan of Care  Goal: Absence of Hospital-Acquired Illness or Injury  Intervention: Identify and Manage Fall Risk  Recent Flowsheet Documentation  Taken 11/10/2024 1155 by Anselmo Best RN  Safety Promotion/Fall Prevention: safety round/check completed  Taken 11/10/2024 0800 by Anselmo Best RN  Safety Promotion/Fall Prevention:   activity supervised   assistive device/personal items within reach   nonskid shoes/slippers when out of bed   patient and family education   safety round/check completed   lighting adjusted   clutter free environment maintained     Problem: Fall Injury Risk  Goal: Absence of Fall and Fall-Related Injury  Intervention: Identify and Manage Contributors  Recent Flowsheet Documentation  Taken 11/10/2024 0800 by Anselmo Best RN  Medication Review/Management: medications reviewed  Intervention: Promote Injury-Free Environment  Recent Flowsheet Documentation  Taken 11/10/2024 1155 by Anselmo Best RN  Safety Promotion/Fall Prevention: safety round/check completed  Taken 11/10/2024 0800 by Anselmo Best RN  Safety Promotion/Fall Prevention:   activity supervised   assistive device/personal items within reach   nonskid shoes/slippers when out of bed   patient and family education   safety round/check completed   lighting adjusted   clutter free environment maintained     Problem: Adult Inpatient Plan of Care  Goal: Absence of Hospital-Acquired Illness or Injury  Intervention: Identify and Manage Fall Risk  Recent Flowsheet Documentation  Taken 11/10/2024 1155 by Anselmo Best RN  Safety Promotion/Fall Prevention: safety round/check completed  Taken 11/10/2024 0800 by Anselmo Best RN  Safety Promotion/Fall Prevention:   activity supervised   assistive device/personal items  within reach   nonskid shoes/slippers when out of bed   patient and family education   safety round/check completed   lighting adjusted   clutter free environment maintained     Problem: Comorbidity Management  Goal: Maintenance of Asthma Control  Intervention: Maintain Asthma Symptom Control  Recent Flowsheet Documentation  Taken 11/10/2024 0800 by Anselmo Best RN  Medication Review/Management: medications reviewed  Goal: Maintenance of Behavioral Health Symptom Control  Intervention: Maintain Behavioral Health Symptom Control  Recent Flowsheet Documentation  Taken 11/10/2024 0800 by Anselmo Best RN  Medication Review/Management: medications reviewed  Goal: Maintenance of COPD Symptom Control  Intervention: Maintain COPD Symptom Control  Recent Flowsheet Documentation  Taken 11/10/2024 0800 by Anselmo Best RN  Medication Review/Management: medications reviewed  Goal: Blood Glucose Levels Within Targeted Range  Intervention: Monitor and Manage Glycemia  Recent Flowsheet Documentation  Taken 11/10/2024 0800 by Anselmo Best RN  Medication Review/Management: medications reviewed  Goal: Maintenance of Heart Failure Symptom Control  Intervention: Maintain Heart Failure Management  Recent Flowsheet Documentation  Taken 11/10/2024 0800 by Anselmo Best RN  Medication Review/Management: medications reviewed  Goal: Blood Pressure in Desired Range  Intervention: Maintain Blood Pressure Management  Recent Flowsheet Documentation  Taken 11/10/2024 0800 by Anselmo Best RN  Medication Review/Management: medications reviewed  Goal: Maintenance of Osteoarthritis Symptom Control  Intervention: Maintain Osteoarthritis Symptom Control  Recent Flowsheet Documentation  Taken 11/10/2024 0800 by Anselmo Best RN  Medication Review/Management: medications reviewed  Goal: Bariatric Home Regimen Maintained  Intervention: Maintain and Manage Postbariatric Surgery Care  Recent Flowsheet  Documentation  Taken 11/10/2024 0800 by Anselmo Best RN  Medication Review/Management: medications reviewed  Goal: Maintenance of Seizure Control  Intervention: Maintain Seizure Symptom Control  Recent Flowsheet Documentation  Taken 11/10/2024 0800 by Anselmo Best RN  Medication Review/Management: medications reviewed     Problem: Pain Acute  Goal: Optimal Pain Control and Function  Intervention: Prevent or Manage Pain  Recent Flowsheet Documentation  Taken 11/10/2024 0800 by Anselmo Best RN  Medication Review/Management: medications reviewed     Problem: Adult Inpatient Plan of Care  Goal: Absence of Hospital-Acquired Illness or Injury  Intervention: Identify and Manage Fall Risk  Recent Flowsheet Documentation  Taken 11/10/2024 1155 by Anselmo Best RN  Safety Promotion/Fall Prevention: safety round/check completed  Taken 11/10/2024 0800 by Anselmo Best RN  Safety Promotion/Fall Prevention:   activity supervised   assistive device/personal items within reach   nonskid shoes/slippers when out of bed   patient and family education   safety round/check completed   lighting adjusted   clutter free environment maintained   Goal Outcome Evaluation:

## 2024-11-10 NOTE — PROGRESS NOTES
Pt has good appetite. Pleasant and singing most of shift. Ambulated in halls x2. No reports of pain. BM this shift.

## 2024-11-10 NOTE — PROGRESS NOTES
Pt sleeping well this shift. Pleasant when awake. No changes in condition. Alert. Dementia. SBA or one with gb and walker in halls. No tele or PIV. Continuing to wait for placement

## 2024-11-11 PROCEDURE — 99232 SBSQ HOSP IP/OBS MODERATE 35: CPT | Performed by: INTERNAL MEDICINE

## 2024-11-11 PROCEDURE — 250N000013 HC RX MED GY IP 250 OP 250 PS 637: Performed by: INTERNAL MEDICINE

## 2024-11-11 PROCEDURE — 101N000002 HC CUSTODIAL CARE DAILY

## 2024-11-11 RX ADMIN — QUETIAPINE FUMARATE 12.5 MG: 25 TABLET ORAL at 20:56

## 2024-11-11 RX ADMIN — KETOCONAZOLE: 20 SHAMPOO, SUSPENSION TOPICAL at 08:07

## 2024-11-11 NOTE — PROGRESS NOTES
[facility]    PROGRESS NOTE - Hospitalist Service    ASSESSMENT AND PLAN     Active Problems:    Dementia, unspecified dementia severity, unspecified dementia type, unspecified whether behavioral, psychotic, or mood disturbance or anxiety (H)    Jemal Gray is a 89 year old male with history of dementia with chronic aphasia. He was admitted on 10/2/2024 after he was brought to the ED by EMS from facility for evaluation of agitation.     He had a prolonged hospitalization from 6/5/24 through 10/2/24 during which he was treated for cellulitis but mostly awaited long-term care placement due to dementia. He required appointment of guardianship.  He discharged to a long-term care facility on 10/2. Upon transfer to the long-term care facility he became agitated with swearing.  He was wandering around the long-term care facility and was entering other patient's rooms.  He was unable to be redirected and required transfer back to the ED.     In the ED he was stable, no major acute medical issue. He was given seroquel.  He required a bedside attendant as he was agitated and unable to be redirected.  He attempted to leave the emergency department and go into other patient's rooms. His facility was not able to take him back and there was no immediate safe disposition option.  He is here under [facility] care status.  Patient has improved and is no longer agitated. He remains medically stable and can be discharged whenever placement is found. Prior long term care facility is reportedly not taking patient back.  is looking into memory care units.     11/11/24- awaiting memory care placement      Safe Disposition/assisted Care:  -  Patient remains medically stable for discharge when location found.  SW/CM following and working on options.  No current safe disposition option.    -Patient had prolonged hospitalization due to need for disposition assistance in earlier 2024.  His daughter  filed for guardianship paperwork which was completed in July.  Acquired MA application and long-term care placement.    -Ultimately will require 24/7 care and likely Memory care placement      Acute agitation, improved  Behavior disturbances   Dementia with chronic aphasia:  Suspect triggered by changes in setting and recent move.  No physical complaints or concerns of infection.  Historically required Zyprexa over the summer when he was hospitalized for agitation.  Most recently had been prescribed Seroquel 12.5 mg twice daily as needed although he had not been needing most recently.  -Bedside attendant as needed  -Continue Seroquel PRN + 12.5 mg of Seroquel every evening  - ms prior check  -SW consulted     Actinic Keratosis:  Had surgery for this and prescribed steroids cream and ketoconazole cream, restarted per daughter request previously.     Sinus Bradycardia:  Patient was noted to have HR in 50s.  Asymptomatic.  -no telemetry monitoring needed      History of Constipation:  -laxatives PRN.  Had BM 11/4.    Barriers to discharge: Memory care placement     Anticipated length of stay: Medically ready at any time     Medically Ready for Discharge: Ready Now    Clinically Significant Risk Factors Present on Admission                       # Dementia: noted on problem list                      Subjective:  Patient resting comfortably in bed. No respiratory distress.     PHYSICAL EXAM  Temp:  [98.2  F (36.8  C)] 98.2  F (36.8  C)  Pulse:  [56] 56  Resp:  [18] 18  BP: (122)/(67) 122/67  SpO2:  [96 %] 96 %  Wt Readings from Last 1 Encounters:   10/02/24 71.4 kg (157 lb 6.4 oz)       Intake/Output Summary (Last 24 hours) at 11/11/2024 1016  Last data filed at 11/10/2024 1841  Gross per 24 hour   Intake 630 ml   Output --   Net 630 ml      Body mass index is 23.93 kg/m .    GENRL: Alert. Not speaking in complete sentences. Not in acute distress. Lying in bed   CHEST:  Breathing easily   EXTRM: No pedal  "edema  NEURO:  nodding head   PSYCH: odd affect and mood.   INTGM: No skin rash    Medical Decision Making       35 MINUTES SPENT BY ME on the date of service doing chart review, history, exam, documentation & further activities per the note.      PERTINENT LABS/IMAGING:  No results found for this visit on 10/02/24.  Most Recent 3 CBC's:  Recent Labs   Lab Test 10/20/24  0558   WBC 9.7   HGB 12.2*   MCV 96        Most Recent 3 BMP's:  Recent Labs   Lab Test 10/20/24  0558 10/03/24  0130 07/26/24  0741    138  --    POTASSIUM 3.9 3.9  --    CHLORIDE 105 103  --    CO2 24 23  --    BUN 23.6* 17.4  --    CR 0.94 0.89  --    ANIONGAP 12 12  --    LEON 8.5* 8.7*  --    * 90 98     Most Recent 2 LFT's:No lab results found.    No results for input(s): \"CHOL\", \"HDL\", \"LDL\", \"TRIG\", \"CHOLHDLRATIO\" in the last 54835 hours.  No results for input(s): \"LDL\" in the last 79896 hours.  Recent Labs   Lab Test 10/20/24  0558      POTASSIUM 3.9   CHLORIDE 105   CO2 24   *   BUN 23.6*   CR 0.94   GFRESTIMATED 78   LEON 8.5*     No results for input(s): \"A1C\" in the last 02306 hours.  Recent Labs   Lab Test 10/20/24  0558   HGB 12.2*     No results for input(s): \"TROPONINI\" in the last 99391 hours.  No results for input(s): \"BNP\", \"NTBNPI\", \"NTBNP\" in the last 80084 hours.  No results for input(s): \"TSH\" in the last 18964 hours.  No results for input(s): \"INR\" in the last 71208 hours.    Leatha Mcginnis, DO  Hospitalist Service  [facility]      "

## 2024-11-11 NOTE — PLAN OF CARE
"Temp: 98.2  F (36.8  C) Temp src: Oral BP: 122/67 Pulse: 56   Resp: 18 SpO2: 96 % O2 Device: None (Room air)       PRIMARY DIAGNOSIS: \"GENERIC\" NURSING  OUTPATIENT/OBSERVATION GOALS TO BE MET BEFORE DISCHARGE:  ADLs back to baseline: Yes    Activity and level of assistance: Up with standby assistance.    Pain status: Pain free.    Return to near baseline physical activity: Yes     Discharge Planner Nurse   Safe discharge environment identified: No  Barriers to discharge: Yes       Entered by: Ina Garvey RN 11/11/2024 1:57 PM     Please review provider order for any additional goals.   Nurse to notify provider when observation goals have been met and patient is ready for discharge.    Problem: Adult Inpatient Plan of Care  Goal: Absence of Hospital-Acquired Illness or Injury  Intervention: Prevent Infection  Recent Flowsheet Documentation  Taken 11/11/2024 0805 by Ina Garvey RN  Infection Prevention:   equipment surfaces disinfected   hand hygiene promoted   personal protective equipment utilized   rest/sleep promoted   single patient room provided      Goal Outcome Evaluation:  Fall Bundle in placed.                      "

## 2024-11-12 PROCEDURE — 101N000002 HC CUSTODIAL CARE DAILY

## 2024-11-12 PROCEDURE — 250N000013 HC RX MED GY IP 250 OP 250 PS 637: Performed by: INTERNAL MEDICINE

## 2024-11-12 PROCEDURE — 99207 PR NO BILLABLE SERVICE THIS VISIT: CPT | Performed by: INTERNAL MEDICINE

## 2024-11-12 RX ADMIN — QUETIAPINE FUMARATE 12.5 MG: 25 TABLET ORAL at 20:09

## 2024-11-12 NOTE — PROGRESS NOTES
Pt awaiting placement. Pleasantly confused/dementia. Good appetite, fluids encouraged. Ambulated x2 this shift. No c/o pain or discomfort.

## 2024-11-12 NOTE — PROGRESS NOTES
[facility]    PROGRESS NOTE - Hospitalist Service    ASSESSMENT AND PLAN     Active Problems:    Dementia, unspecified dementia severity, unspecified dementia type, unspecified whether behavioral, psychotic, or mood disturbance or anxiety (H)    Jemal Gray is a 89 year old male with history of dementia with chronic aphasia. He was admitted on 10/2/2024 after he was brought to the ED by EMS from facility for evaluation of agitation.     He had a prolonged hospitalization from 6/5/24 through 10/2/24 during which he was treated for cellulitis but mostly awaited long-term care placement due to dementia. He required appointment of guardianship.  He discharged to a long-term care facility on 10/2. Upon transfer to the long-term care facility he became agitated with swearing.  He was wandering around the long-term care facility and was entering other patient's rooms.  He was unable to be redirected and required transfer back to the ED.     In the ED he was stable, no major acute medical issue. He was given seroquel.  He required a bedside attendant as he was agitated and unable to be redirected.  He attempted to leave the emergency department and go into other patient's rooms. His facility was not able to take him back and there was no immediate safe disposition option.  He is here under intermediate care status.  Patient has improved and is no longer agitated. He remains medically stable and can be discharged whenever placement is found. Prior long term care facility is reportedly not taking patient back.  is looking into memory care units.     No new plans today.  Reportedly still able to tolerate oral diet.  Demonstrating stable hemodynamics     Safe Disposition/shelter Care:  -  Patient remains medically stable for discharge when location found.  SW/CM following and working on options.  No current safe disposition option.    -Patient had prolonged hospitalization due to need for  disposition assistance in earlier 2024.  His daughter filed for guardianship paperwork which was completed in July.  Acquired MA application and long-term care placement.    -Ultimately will require 24/7 care and likely Memory care placement      Acute agitation, improved  Behavior disturbances   Dementia with chronic aphasia:  Suspect triggered by changes in setting and recent move.  No physical complaints or concerns of infection.  Historically required Zyprexa over the summer when he was hospitalized for agitation.  Most recently had been prescribed Seroquel 12.5 mg twice daily as needed although he had not been needing most recently.  -Bedside attendant as needed  -Continue Seroquel PRN + 12.5 mg of Seroquel every evening  - ms prior check  -SW consulted     Actinic Keratosis:  Had surgery for this and prescribed steroids cream and ketoconazole cream, restarted per daughter request previously.     Sinus Bradycardia:  Patient was noted to have HR in 50s.  Asymptomatic.  -no telemetry monitoring needed      History of Constipation:  -laxatives PRN.  Had BM 11/4.    Barriers to discharge: Memory care placement     Anticipated length of stay: Medically ready at any time     Medically Ready for Discharge: Ready Now    Clinically Significant Risk Factors Present on Admission                       # Dementia: noted on problem list                      Subjective:  I assumed medicine consult service.  I met Dimitris this morning while he is laying comfortably in bed and appears to be at baseline mental state.  He is as always joyfully singing and very much happy and interactive.    PHYSICAL EXAM     Wt Readings from Last 1 Encounters:   10/02/24 71.4 kg (157 lb 6.4 oz)       Intake/Output Summary (Last 24 hours) at 11/11/2024 1016  Last data filed at 11/10/2024 1841  Gross per 24 hour   Intake 630 ml   Output --   Net 630 ml      Body mass index is 23.93 kg/m .    GENRL: Alert. Not speaking in complete sentences. Not  "in acute distress. Lying in bed   CHEST:  Breathing easily   EXTRM: No pedal edema  NEURO:  nodding head   PSYCH: odd affect and mood.   INTGM: No skin rash    Medical Decision Making       35 MINUTES SPENT BY ME on the date of service doing chart review, history, exam, documentation & further activities per the note.      PERTINENT LABS/IMAGING:  No results found for this visit on 10/02/24.  Most Recent 3 CBC's:  Recent Labs   Lab Test 10/20/24  0558   WBC 9.7   HGB 12.2*   MCV 96        Most Recent 3 BMP's:  Recent Labs   Lab Test 10/20/24  0558 10/03/24  0130 07/26/24  0741    138  --    POTASSIUM 3.9 3.9  --    CHLORIDE 105 103  --    CO2 24 23  --    BUN 23.6* 17.4  --    CR 0.94 0.89  --    ANIONGAP 12 12  --    LEON 8.5* 8.7*  --    * 90 98     Most Recent 2 LFT's:No lab results found.    No results for input(s): \"CHOL\", \"HDL\", \"LDL\", \"TRIG\", \"CHOLHDLRATIO\" in the last 26287 hours.  No results for input(s): \"LDL\" in the last 33684 hours.  Recent Labs   Lab Test 10/20/24  0558      POTASSIUM 3.9   CHLORIDE 105   CO2 24   *   BUN 23.6*   CR 0.94   GFRESTIMATED 78   LEON 8.5*     No results for input(s): \"A1C\" in the last 70547 hours.  Recent Labs   Lab Test 10/20/24  0558   HGB 12.2*     No results for input(s): \"TROPONINI\" in the last 70463 hours.  No results for input(s): \"BNP\", \"NTBNPI\", \"NTBNP\" in the last 91631 hours.  No results for input(s): \"TSH\" in the last 20315 hours.  No results for input(s): \"INR\" in the last 17160 hours.        "

## 2024-11-12 NOTE — PLAN OF CARE
Alert and oriented to self only. Denies pain and no nonverbal indicators of pain noted. Pt quite pleasant and slept well most of shift. Incontinent of bladder at times. Last BM 11/12. No IV access. Regular diet. A1 GB and walker. Bed alarm on for safety. Plan: Waiting for placement at . Continue w/ POC.     Goal Outcome Evaluation:         Problem: Adult Inpatient Plan of Care  Goal: Absence of Hospital-Acquired Illness or Injury  Intervention: Identify and Manage Fall Risk  Recent Flowsheet Documentation  Taken 11/12/2024 0038 by Columba Thompson RN  Safety Promotion/Fall Prevention: safety round/check completed  Intervention: Prevent Skin Injury  Recent Flowsheet Documentation  Taken 11/12/2024 0037 by Columba Thompson RN  Body Position: position changed independently  Intervention: Prevent and Manage VTE (Venous Thromboembolism) Risk  Recent Flowsheet Documentation  Taken 11/12/2024 0038 by Columba Thompson RN  VTE Prevention/Management: patient refused intervention  Intervention: Prevent Infection  Recent Flowsheet Documentation  Taken 11/12/2024 0038 by Columba Thompson RN  Infection Prevention:   single patient room provided   rest/sleep promoted   personal protective equipment utilized   hand hygiene promoted   equipment surfaces disinfected   environmental surveillance performed   cohorting utilized     Problem: Adult Inpatient Plan of Care  Goal: Absence of Hospital-Acquired Illness or Injury  Intervention: Identify and Manage Fall Risk  Recent Flowsheet Documentation  Taken 11/12/2024 0038 by Columba Thompson RN  Safety Promotion/Fall Prevention: safety round/check completed  Intervention: Prevent Skin Injury  Recent Flowsheet Documentation  Taken 11/12/2024 0037 by Columba Thompson, RN  Body Position: position changed independently  Intervention: Prevent and Manage VTE (Venous Thromboembolism) Risk  Recent Flowsheet Documentation  Taken 11/12/2024 0038 by Columba Thompson RN  VTE Prevention/Management:  patient refused intervention  Intervention: Prevent Infection  Recent Flowsheet Documentation  Taken 11/12/2024 0038 by Columba Thompson RN  Infection Prevention:   single patient room provided   rest/sleep promoted   personal protective equipment utilized   hand hygiene promoted   equipment surfaces disinfected   environmental surveillance performed   cohorting utilized     Problem: Adult Inpatient Plan of Care  Goal: Absence of Hospital-Acquired Illness or Injury  Intervention: Identify and Manage Fall Risk  Recent Flowsheet Documentation  Taken 11/12/2024 0038 by Columba Thompson RN  Safety Promotion/Fall Prevention: safety round/check completed  Intervention: Prevent Skin Injury  Recent Flowsheet Documentation  Taken 11/12/2024 0037 by Columba Thompson RN  Body Position: position changed independently  Intervention: Prevent and Manage VTE (Venous Thromboembolism) Risk  Recent Flowsheet Documentation  Taken 11/12/2024 0038 by Columba Thompson RN  VTE Prevention/Management: patient refused intervention     Problem: Fall Injury Risk  Goal: Absence of Fall and Fall-Related Injury  Intervention: Identify and Manage Contributors  Recent Flowsheet Documentation  Taken 11/12/2024 0038 by Columba Thompson RN  Medication Review/Management: medications reviewed  Intervention: Promote Injury-Free Environment  Recent Flowsheet Documentation  Taken 11/12/2024 0038 by Columba Thompson RN  Safety Promotion/Fall Prevention: safety round/check completed     Problem: Fall Injury Risk  Goal: Absence of Fall and Fall-Related Injury  11/12/2024 0051 by Columba Thompson RN  Outcome: Progressing  11/12/2024 0040 by Columba Thompson RN  Outcome: Progressing  Intervention: Identify and Manage Contributors  Recent Flowsheet Documentation  Taken 11/12/2024 0038 by Columba Thompson RN  Medication Review/Management: medications reviewed  Intervention: Promote Injury-Free Environment  Recent Flowsheet Documentation  Taken 11/12/2024 0038 by  Columba Thompson, RN  Safety Promotion/Fall Prevention: safety round/check completed     Problem: Skin Injury Risk Increased  Goal: Skin Health and Integrity  Intervention: Optimize Skin Protection  Recent Flowsheet Documentation  Taken 11/12/2024 0037 by Columba Thompson, RN  Activity Management:   activity adjusted per tolerance   activity encouraged  Head of Bed (HOB) Positioning: HOB at 20-30 degrees     Problem: Comorbidity Management  Goal: Maintenance of Asthma Control  Intervention: Maintain Asthma Symptom Control  Recent Flowsheet Documentation  Taken 11/12/2024 0038 by Columba Thompson, RN  Medication Review/Management: medications reviewed  Goal: Maintenance of Behavioral Health Symptom Control  Intervention: Maintain Behavioral Health Symptom Control  Recent Flowsheet Documentation  Taken 11/12/2024 0038 by Colubma Thompson, RN  Medication Review/Management: medications reviewed  Goal: Maintenance of COPD Symptom Control  Intervention: Maintain COPD Symptom Control  Recent Flowsheet Documentation  Taken 11/12/2024 0038 by Columba Thompson, RN  Medication Review/Management: medications reviewed  Goal: Blood Glucose Levels Within Targeted Range  Intervention: Monitor and Manage Glycemia  Recent Flowsheet Documentation  Taken 11/12/2024 0038 by Columba Thompson, RN  Medication Review/Management: medications reviewed  Goal: Maintenance of Heart Failure Symptom Control  Intervention: Maintain Heart Failure Management  Recent Flowsheet Documentation  Taken 11/12/2024 0038 by Columba Thompson, RN  Medication Review/Management: medications reviewed  Goal: Blood Pressure in Desired Range  Intervention: Maintain Blood Pressure Management  Recent Flowsheet Documentation  Taken 11/12/2024 0038 by Columba Thompson, RN  Medication Review/Management: medications reviewed  Goal: Maintenance of Osteoarthritis Symptom Control  Intervention: Maintain Osteoarthritis Symptom Control  Recent Flowsheet Documentation  Taken 11/12/2024  0038 by Columba Thompson, RN  Medication Review/Management: medications reviewed  Taken 11/12/2024 0037 by Columba Thompson, RN  Assistive Device Utilized:   walker   gait belt  Activity Management:   activity adjusted per tolerance   activity encouraged  Goal: Bariatric Home Regimen Maintained  Intervention: Maintain and Manage Postbariatric Surgery Care  Recent Flowsheet Documentation  Taken 11/12/2024 0038 by Columba Thompson, RN  Medication Review/Management: medications reviewed  Goal: Maintenance of Seizure Control  Intervention: Maintain Seizure Symptom Control  Recent Flowsheet Documentation  Taken 11/12/2024 0038 by Columba Thompson, RN  Medication Review/Management: medications reviewed     Problem: Pain Acute  Goal: Optimal Pain Control and Function  Intervention: Prevent or Manage Pain  Recent Flowsheet Documentation  Taken 11/12/2024 0038 by Columba Thompson, RN  Medication Review/Management: medications reviewed

## 2024-11-13 PROCEDURE — 101N000002 HC CUSTODIAL CARE DAILY

## 2024-11-13 PROCEDURE — 99207 PR NO BILLABLE SERVICE THIS VISIT: CPT | Performed by: INTERNAL MEDICINE

## 2024-11-13 PROCEDURE — 250N000013 HC RX MED GY IP 250 OP 250 PS 637: Performed by: INTERNAL MEDICINE

## 2024-11-13 RX ADMIN — QUETIAPINE FUMARATE 12.5 MG: 25 TABLET ORAL at 20:44

## 2024-11-13 NOTE — PLAN OF CARE
Goal Outcome Evaluation:  [facility] care. Confused. Alert to self only. No complaints of pain.     Problem: Adult Inpatient Plan of Care  Goal: Absence of Hospital-Acquired Illness or Injury  Intervention: Identify and Manage Fall Risk  Recent Flowsheet Documentation  Taken 11/12/2024 2030 by Justine Cheung RN  Safety Promotion/Fall Prevention:   clutter free environment maintained   lighting adjusted   nonskid shoes/slippers when out of bed  Intervention: Prevent Skin Injury  Recent Flowsheet Documentation  Taken 11/12/2024 2030 by Justine Cheung RN  Body Position: position changed independently  Intervention: Prevent Infection  Recent Flowsheet Documentation  Taken 11/12/2024 2030 by Justine Cheung RN  Infection Prevention:   rest/sleep promoted   single patient room provided     Problem: Fall Injury Risk  Goal: Absence of Fall and Fall-Related Injury  Intervention: Identify and Manage Contributors  Recent Flowsheet Documentation  Taken 11/12/2024 2030 by Justine Cheung RN  Medication Review/Management: medications reviewed  Intervention: Promote Injury-Free Environment  Recent Flowsheet Documentation  Taken 11/12/2024 2030 by Justine Cheung RN  Safety Promotion/Fall Prevention:   clutter free environment maintained   lighting adjusted   nonskid shoes/slippers when out of bed     Problem: Skin Injury Risk Increased  Goal: Skin Health and Integrity  Intervention: Plan: Nurse Driven Intervention: Moisture Management  Recent Flowsheet Documentation  Taken 11/12/2024 2030 by Justine Cheung RN  Moisture Interventions: Incontinence pad  Intervention: Plan: Nurse Driven Intervention: Friction and Shear  Recent Flowsheet Documentation  Taken 11/12/2024 2030 by Justine Cheung RN  Friction/Shear Interventions: HOB 30 degrees or less  Intervention: Optimize Skin Protection  Recent Flowsheet Documentation  Taken 11/12/2024 2030 by Justine Cheung RN  Activity Management: activity adjusted per tolerance  Head of  Bed (HOB) Positioning: HOB at 30-45 degrees     Problem: Adult Inpatient Plan of Care  Goal: Absence of Hospital-Acquired Illness or Injury  Intervention: Identify and Manage Fall Risk  Recent Flowsheet Documentation  Taken 11/12/2024 2030 by Justine Cheung RN  Safety Promotion/Fall Prevention:   clutter free environment maintained   lighting adjusted   nonskid shoes/slippers when out of bed  Intervention: Prevent Skin Injury  Recent Flowsheet Documentation  Taken 11/12/2024 2030 by Justine Cheung RN  Body Position: position changed independently  Intervention: Prevent Infection  Recent Flowsheet Documentation  Taken 11/12/2024 2030 by Justine Cheung RN  Infection Prevention:   rest/sleep promoted   single patient room provided     Problem: Comorbidity Management  Goal: Maintenance of Asthma Control  Intervention: Maintain Asthma Symptom Control  Recent Flowsheet Documentation  Taken 11/12/2024 2030 by Justine Cheung RN  Medication Review/Management: medications reviewed  Goal: Maintenance of Behavioral Health Symptom Control  Intervention: Maintain Behavioral Health Symptom Control  Recent Flowsheet Documentation  Taken 11/12/2024 2030 by Justine Cheung RN  Medication Review/Management: medications reviewed  Goal: Maintenance of COPD Symptom Control  Intervention: Maintain COPD Symptom Control  Recent Flowsheet Documentation  Taken 11/12/2024 2030 by Justine Cheung RN  Medication Review/Management: medications reviewed  Goal: Blood Glucose Levels Within Targeted Range  Intervention: Monitor and Manage Glycemia  Recent Flowsheet Documentation  Taken 11/12/2024 2030 by Justine Cheung RN  Medication Review/Management: medications reviewed  Goal: Maintenance of Heart Failure Symptom Control  Intervention: Maintain Heart Failure Management  Recent Flowsheet Documentation  Taken 11/12/2024 2030 by Justine Cheung RN  Medication Review/Management: medications reviewed  Goal: Blood Pressure in Desired  Range  Intervention: Maintain Blood Pressure Management  Recent Flowsheet Documentation  Taken 11/12/2024 2030 by Justine Cheung RN  Medication Review/Management: medications reviewed  Goal: Maintenance of Osteoarthritis Symptom Control  Intervention: Maintain Osteoarthritis Symptom Control  Recent Flowsheet Documentation  Taken 11/12/2024 2030 by Justine Cheung RN  Assistive Device Utilized:   gait belt   walker  Activity Management: activity adjusted per tolerance  Medication Review/Management: medications reviewed  Goal: Bariatric Home Regimen Maintained  Intervention: Maintain and Manage Postbariatric Surgery Care  Recent Flowsheet Documentation  Taken 11/12/2024 2030 by Justine Cheung RN  Medication Review/Management: medications reviewed  Goal: Maintenance of Seizure Control  Intervention: Maintain Seizure Symptom Control  Recent Flowsheet Documentation  Taken 11/12/2024 2030 by Justine Cheung RN  Medication Review/Management: medications reviewed     Problem: Pain Acute  Goal: Optimal Pain Control and Function  Intervention: Prevent or Manage Pain  Recent Flowsheet Documentation  Taken 11/12/2024 2030 by Justine Cheung RN  Medication Review/Management: medications reviewed     Problem: Adult Inpatient Plan of Care  Goal: Absence of Hospital-Acquired Illness or Injury  Intervention: Identify and Manage Fall Risk  Recent Flowsheet Documentation  Taken 11/12/2024 2030 by Justine Cheung RN  Safety Promotion/Fall Prevention:   clutter free environment maintained   lighting adjusted   nonskid shoes/slippers when out of bed  Intervention: Prevent Skin Injury  Recent Flowsheet Documentation  Taken 11/12/2024 2030 by Justine Cheung RN  Body Position: position changed independently     Problem: Fall Injury Risk  Goal: Absence of Fall and Fall-Related Injury  Intervention: Identify and Manage Contributors  Recent Flowsheet Documentation  Taken 11/12/2024 2030 by Justine Cheung RN  Medication  Review/Management: medications reviewed  Intervention: Promote Injury-Free Environment  Recent Flowsheet Documentation  Taken 11/12/2024 2030 by Justine Cheung RN  Safety Promotion/Fall Prevention:   clutter free environment maintained   lighting adjusted   nonskid shoes/slippers when out of bed

## 2024-11-13 NOTE — PROGRESS NOTES
[facility]    PROGRESS NOTE - Hospitalist Service    ASSESSMENT AND PLAN     Active Problems:    Dementia, unspecified dementia severity, unspecified dementia type, unspecified whether behavioral, psychotic, or mood disturbance or anxiety (H)    Jemal Gray is a 89 year old male with history of dementia with chronic aphasia. He was admitted on 10/2/2024 after he was brought to the ED by EMS from facility for evaluation of agitation. He had a prolonged hospitalization from 6/5/24 through 10/2/24 during which he was treated for cellulitis but mostly awaited long-term care placement due to dementia. He required appointment of guardianship.  He discharged to a long-term care facility on 10/2. Upon transfer to the long-term care facility he became agitated with swearing.  He was wandering around the long-term care facility and was entering other patient's rooms.  He was unable to be redirected and required transfer back to the ED.     In the ED he was stable, no major acute medical issue. He was given seroquel.  He required a bedside attendant as he was agitated and unable to be redirected.  He attempted to leave the emergency department and go into other patient's rooms. His facility was not able to take him back and there was no immediate safe disposition option.  He is here under senior care care status.  Patient has improved and is no longer agitated. He remains medically stable and can be discharged whenever placement is found. Prior long term care facility is reportedly not taking patient back.  is looking into memory care units.    Patient has been here since 10/2/244 awaiting placement      Patient known to me from previous admission.  Awaiting placement, senior care patient.  No changes in plan of care or orders.  This is a nonbillable note          Safe Disposition/USP Care:  -  Patient remains medically stable for discharge when location found.  SW/CM following and  working on options.  No current safe disposition option.    -Patient had prolonged hospitalization due to need for disposition assistance in earlier 2024.  His daughter filed for guardianship paperwork which was completed in July.  Acquired MA application and long-term care placement.    -Ultimately will require 24/7 care and likely Memory care placement   -not on 1:1 close observation      Acute agitation, resolved  Behavior disturbances   Dementia with chronic aphasia:  Suspect triggered by changes in setting and recent move.  No physical complaints or concerns of infection.  Historically required Zyprexa over the summer when he was hospitalized for agitation.  Most recently had been prescribed Seroquel 12.5 mg twice daily as needed although he had not been needing most recently.  -Bedside attendant as needed  -Continue Seroquel PRN + 12.5 mg of Seroquel every evening  - ms prior check  -SW consulted  -needs memory care  -limit tethers, is on minimal medications      Actinic Keratosis:  Had surgery for this and prescribed steroids cream and ketoconazole cream, restarted per daughter request previously.     Sinus Bradycardia:  Patient was noted to have HR in 50s.  Asymptomatic.  -no telemetry monitoring needed      History of Constipation:  -laxatives PRN.  Had BM 11/4.    Barriers to discharge: Memory care placement     Anticipated length of stay: Medically ready at any time     Medically Ready for Discharge: Ready Now    Clinically Significant Risk Factors Present on Admission       # Dementia: noted on problem list                Discussed with nursing, social work/case management     Ata Nogueira MD        Subjective:  Patient known to me, assuming cares.  Patient is happy go kendra, no new complaints, pleasantly demented.  Interactive with me.  Appear comfortable in bed and appears to be at baseline mental state.       PHYSICAL EXAM  Temp:  [98.1  F (36.7  C)-98.7  F (37.1  C)] 98.7  F (37.1  " C)  Pulse:  [87-89] 89  Resp:  [17-18] 18  BP: (115-167)/() 167/103  SpO2:  [96 %-97 %] 96 %  Wt Readings from Last 1 Encounters:   10/02/24 71.4 kg (157 lb 6.4 oz)       Intake/Output Summary (Last 24 hours) at 11/11/2024 1016  Last data filed at 11/10/2024 1841  Gross per 24 hour   Intake 630 ml   Output --   Net 630 ml      Body mass index is 23.93 kg/m .    Unchanged from last time I saw him  GENRL: Alert. speaking in complete sentences. Not in acute distress. Lying in bed , appears elderly  CHEST:  Breathing comfortably  EXTRM: ALVAREZ, No pedal edema  NEURO:  nodding head       PERTINENT LABS/IMAGING:  No results found for this visit on 10/02/24.  Most Recent 3 CBC's:  Recent Labs   Lab Test 10/20/24  0558   WBC 9.7   HGB 12.2*   MCV 96        Most Recent 3 BMP's:  Recent Labs   Lab Test 10/20/24  0558 10/03/24  0130 07/26/24  0741    138  --    POTASSIUM 3.9 3.9  --    CHLORIDE 105 103  --    CO2 24 23  --    BUN 23.6* 17.4  --    CR 0.94 0.89  --    ANIONGAP 12 12  --    LEON 8.5* 8.7*  --    * 90 98     Most Recent 2 LFT's:No lab results found.    No results for input(s): \"CHOL\", \"HDL\", \"LDL\", \"TRIG\", \"CHOLHDLRATIO\" in the last 70597 hours.  No results for input(s): \"LDL\" in the last 40065 hours.  Recent Labs   Lab Test 10/20/24  0558      POTASSIUM 3.9   CHLORIDE 105   CO2 24   *   BUN 23.6*   CR 0.94   GFRESTIMATED 78   LEON 8.5*     No results for input(s): \"A1C\" in the last 28119 hours.  Recent Labs   Lab Test 10/20/24  0558   HGB 12.2*     No results for input(s): \"TROPONINI\" in the last 37201 hours.  No results for input(s): \"BNP\", \"NTBNPI\", \"NTBNP\" in the last 28176 hours.  No results for input(s): \"TSH\" in the last 67055 hours.  No results for input(s): \"INR\" in the last 72194 hours.        "

## 2024-11-14 PROCEDURE — 250N000013 HC RX MED GY IP 250 OP 250 PS 637: Performed by: INTERNAL MEDICINE

## 2024-11-14 PROCEDURE — 99207 PR NO BILLABLE SERVICE THIS VISIT: CPT | Performed by: INTERNAL MEDICINE

## 2024-11-14 PROCEDURE — 101N000002 HC CUSTODIAL CARE DAILY

## 2024-11-14 RX ADMIN — QUETIAPINE FUMARATE 12.5 MG: 25 TABLET ORAL at 19:44

## 2024-11-14 NOTE — PROGRESS NOTES
[facility]    PROGRESS NOTE - Hospitalist Service    ASSESSMENT AND PLAN     Active Problems:    Dementia, unspecified dementia severity, unspecified dementia type, unspecified whether behavioral, psychotic, or mood disturbance or anxiety (H)    Jemal Gray is a 89 year old male with history of dementia with chronic aphasia. He was admitted on 10/2/2024 after he was brought to the ED by EMS from facility for evaluation of agitation. He had a prolonged hospitalization from 6/5/24 through 10/2/24 during which he was treated for cellulitis but mostly awaited long-term care placement due to dementia. He required appointment of guardianship.  He discharged to a long-term care facility on 10/2. Upon transfer to the long-term care facility he became agitated with swearing.  He was wandering around the long-term care facility and was entering other patient's rooms.  He was unable to be redirected and required transfer back to the ED.     In the ED he was stable, no major acute medical issue. He was given seroquel.  He required a bedside attendant as he was agitated and unable to be redirected.  He attempted to leave the emergency department and go into other patient's rooms. His facility was not able to take him back and there was no immediate safe disposition option.  He is here under [facility] care status.  Patient has improved and is no longer agitated. He remains medically stable and can be discharged whenever placement is found. Prior long term care facility is reportedly not taking patient back.  is looking into memory care units.    Patient has been here since 10/2/244 awaiting placement      11/14/24: ;  Awaiting placement, [facility] patient.  No changes in plan of care or orders or plan of care.  This is a nonbillable note.  Social work working on placement          Safe Disposition/longterm Care:  -  Patient remains medically stable for discharge when location found.   SW/CM following and working on options.  No current safe disposition option.    -Patient had prolonged hospitalization due to need for disposition assistance in earlier 2024.  His daughter filed for guardianship paperwork which was completed in July.  Acquired MA application and long-term care placement.    -Ultimately will require 24/7 care and likely Memory care placement   -not on 1:1 close observation      Acute agitation, resolved  Behavior disturbances   Dementia with chronic aphasia:  Suspect triggered by changes in setting and recent move.  No physical complaints or concerns of infection.  Historically required Zyprexa over the summer when he was hospitalized for agitation.  Most recently had been prescribed Seroquel 12.5 mg twice daily as needed although he had not been needing most recently.  -Bedside attendant as needed, currently is off  -Continue Seroquel PRN + 12.5 mg of Seroquel every evening  - ms prior check  -SW consulted  -needs memory care  -limit tethers, is on minimal medications      Actinic Keratosis:  -Had surgery for this and prescribed steroids cream and ketoconazole cream, restarted per daughter request previously.     Sinus Bradycardia:  -Patient was noted to have HR in 50s.  Asymptomatic.  -no telemetry monitoring needed      History of Constipation:  -laxatives PRN.  Had BM 11/4.    Barriers to discharge: needs Memory care placement     Anticipated length of stay: Medically ready at any time     Medically Ready for Discharge: Ready Now      Clinically Significant Risk Factors Present on Admission       # Dementia: noted on problem list                Discussed with nursing, social work/case management     Ata Nogueira MD        Subjective:  No new complaints overnight.  Patient is smiling and pleasant lying in bed, slept well.        PHYSICAL EXAM     Wt Readings from Last 1 Encounters:   10/02/24 71.4 kg (157 lb 6.4 oz)       Intake/Output Summary (Last 24 hours) at  "11/11/2024 1016  Last data filed at 11/10/2024 1841  Gross per 24 hour   Intake 630 ml   Output --   Net 630 ml      Body mass index is 23.93 kg/m .    Unchanged from last time I saw him  GENRL: Alert. speaking in complete sentences. Not in acute distress. Lying in bed , appears elderly  CHEST:  Breathing comfortably  EXTRM: ALVAREZ, No pedal edema  NEURO:  nodding head       PERTINENT LABS/IMAGING:  No results found for this visit on 10/02/24.  Most Recent 3 CBC's:  Recent Labs   Lab Test 10/20/24  0558   WBC 9.7   HGB 12.2*   MCV 96        Most Recent 3 BMP's:  Recent Labs   Lab Test 10/20/24  0558 10/03/24  0130 07/26/24  0741    138  --    POTASSIUM 3.9 3.9  --    CHLORIDE 105 103  --    CO2 24 23  --    BUN 23.6* 17.4  --    CR 0.94 0.89  --    ANIONGAP 12 12  --    LEON 8.5* 8.7*  --    * 90 98     Most Recent 2 LFT's:No lab results found.    No results for input(s): \"CHOL\", \"HDL\", \"LDL\", \"TRIG\", \"CHOLHDLRATIO\" in the last 62920 hours.  No results for input(s): \"LDL\" in the last 60206 hours.  Recent Labs   Lab Test 10/20/24  0558      POTASSIUM 3.9   CHLORIDE 105   CO2 24   *   BUN 23.6*   CR 0.94   GFRESTIMATED 78   LEON 8.5*     No results for input(s): \"A1C\" in the last 11144 hours.  Recent Labs   Lab Test 10/20/24  0558   HGB 12.2*     No results for input(s): \"TROPONINI\" in the last 96120 hours.  No results for input(s): \"BNP\", \"NTBNPI\", \"NTBNP\" in the last 79682 hours.  No results for input(s): \"TSH\" in the last 08434 hours.  No results for input(s): \"INR\" in the last 87724 hours.        "

## 2024-11-14 NOTE — PROGRESS NOTES
Care Management Follow Up    Length of Stay (days): 0    Expected Discharge Date: 12/10/2024     Concerns to be Addressed:  disposition   Patient plan of care discussed at interdisciplinary rounds: Yes    Anticipated Discharge Disposition:  memory care. Patient doesn't have funding yet for memory care.  Anticipated Discharge Services:  memory care  Anticipated Discharge DME:  none    Patient/family educated on Medicare website which has current facility and service quality ratings:  yes  Education Provided on the Discharge Plan:  yes, guardian  Patient/Family in Agreement with the Plan:  yes    Referrals Placed by CM/SW:  yes  Private pay costs discussed: Not applicable at this time    Discussed  Partnership in Safe Discharge Planning  document with patient/family: No     Handoff Completed: No, handoff not indicated or clinically appropriate    Additional Information:  SW called several memory cares for openings. Patient must have a memory care that takes elderly waive. Patient is being assessed for elderly waiver on 12/9/24. The memory care must be in [facility] due to the elderly waiver appointment. Facilities [facility] either do not have availaility or they don't want to assess patient until closer to his elderly waiver appointment. They want a guarantee of payment before accepting patient.    SW will continue to follow patient and look for appropriate disposition.    NALDO Morris   Inpatient Care Coordination   Supervisor  [facility]  [phone]    NALDO Puentes

## 2024-11-14 NOTE — PLAN OF CARE
"  Problem: Fall Injury Risk  Goal: Absence of Fall and Fall-Related Injury  Outcome: Progressing  Intervention: Promote Injury-Free Environment  Recent Flowsheet Documentation  Taken 11/13/2024 0905 by Ana Shelton RN  Safety Promotion/Fall Prevention: safety round/check completed     Problem: Adult Inpatient Plan of Care  Goal: Plan of Care Review  Description: The Plan of Care Review/Shift note should be completed every shift.  The Outcome Evaluation is a brief statement about your assessment that the patient is improving, declining, or no change.  This information will be displayed automatically on your shift  note.  Outcome: Progressing  Flowsheets (Taken 11/13/2024 1802)  Plan of Care Reviewed With: patient  Overall Patient Progress: no change  Goal: Patient-Specific Goal (Individualized)  Description: You can add care plan individualizations to a care plan. Examples of Individualization might be:  \"Parent requests to be called daily at 9am for status\", \"I have a hard time hearing out of my right ear\", or \"Do not touch me to wake me up as it startles  me\".  Outcome: Progressing  Goal: Absence of Hospital-Acquired Illness or Injury  Outcome: Progressing  Intervention: Identify and Manage Fall Risk  Recent Flowsheet Documentation  Taken 11/13/2024 0905 by Ana Shelton RN  Safety Promotion/Fall Prevention: safety round/check completed  Intervention: Prevent Skin Injury  Recent Flowsheet Documentation  Taken 11/13/2024 0905 by Ana Shelton RN  Body Position: position changed independently  Intervention: Prevent Infection  Recent Flowsheet Documentation  Taken 11/13/2024 1543 by Ana Shelton RN  Infection Prevention: single patient room provided  Taken 11/13/2024 0905 by Ana Shelton RN  Infection Prevention:   rest/sleep promoted   single patient room provided  Goal: Optimal Comfort and Wellbeing  Outcome: Progressing  Goal: Readiness for Transition of Care  Outcome: Progressing   Goal Outcome " Evaluation:      Plan of Care Reviewed With: patient    Overall Patient Progress: no changeOverall Patient Progress: no change       Pt has been awake and alert all shift. No distress noted. Respirations even and unlabored. Oriented to self only. Pleasantly confused and has been singing. He is impulsive and tries to get out of bed often. Bed alarm is active.

## 2024-11-14 NOTE — PLAN OF CARE
"[facility] care. Confused. Alert to self only. No complaints of pain.     Goal Outcome Evaluation:      Plan of Care Reviewed With: patient    Overall Patient Progress: no changeOverall Patient Progress: no change    Outcome Evaluation: [facility] care.      Problem: Fall Injury Risk  Goal: Absence of Fall and Fall-Related Injury  Outcome: Progressing  Intervention: Identify and Manage Contributors  Recent Flowsheet Documentation  Taken 11/13/2024 2015 by Justine Cheung, RN  Medication Review/Management: medications reviewed  Intervention: Promote Injury-Free Environment  Recent Flowsheet Documentation  Taken 11/13/2024 2015 by Justine Cheung, RN  Safety Promotion/Fall Prevention:   clutter free environment maintained   lighting adjusted   nonskid shoes/slippers when out of bed     Problem: Adult Inpatient Plan of Care  Goal: Plan of Care Review  Description: The Plan of Care Review/Shift note should be completed every shift.  The Outcome Evaluation is a brief statement about your assessment that the patient is improving, declining, or no change.  This information will be displayed automatically on your shift  note.  Outcome: Progressing  Flowsheets (Taken 11/14/2024 0357)  Outcome Evaluation: [facility] care.  Plan of Care Reviewed With: patient  Overall Patient Progress: no change  Goal: Patient-Specific Goal (Individualized)  Description: You can add care plan individualizations to a care plan. Examples of Individualization might be:  \"Parent requests to be called daily at 9am for status\", \"I have a hard time hearing out of my right ear\", or \"Do not touch me to wake me up as it startles  me\".  Outcome: Progressing  Goal: Absence of Hospital-Acquired Illness or Injury  Outcome: Progressing  Intervention: Identify and Manage Fall Risk  Recent Flowsheet Documentation  Taken 11/13/2024 2015 by Justine Cheung, RN  Safety Promotion/Fall Prevention:   clutter free environment maintained   lighting adjusted   nonskid " shoes/slippers when out of bed  Intervention: Prevent Skin Injury  Recent Flowsheet Documentation  Taken 11/13/2024 2015 by Justine Cheung, RN  Body Position: position changed independently  Intervention: Prevent Infection  Recent Flowsheet Documentation  Taken 11/13/2024 2015 by Justine Cheung, RN  Infection Prevention:   rest/sleep promoted   single patient room provided  Goal: Optimal Comfort and Wellbeing  Outcome: Progressing  Goal: Readiness for Transition of Care  Outcome: Progressing

## 2024-11-15 PROCEDURE — 99207 PR NO BILLABLE SERVICE THIS VISIT: CPT | Performed by: INTERNAL MEDICINE

## 2024-11-15 PROCEDURE — 250N000013 HC RX MED GY IP 250 OP 250 PS 637: Performed by: PHYSICIAN ASSISTANT

## 2024-11-15 PROCEDURE — 250N000013 HC RX MED GY IP 250 OP 250 PS 637: Performed by: INTERNAL MEDICINE

## 2024-11-15 PROCEDURE — 101N000002 HC CUSTODIAL CARE DAILY

## 2024-11-15 RX ADMIN — KETOCONAZOLE: 20 SHAMPOO, SUSPENSION TOPICAL at 14:42

## 2024-11-15 ASSESSMENT — ACTIVITIES OF DAILY LIVING (ADL)
ADLS_ACUITY_SCORE: 0
ADLS_ACUITY_SCORE: 0
ADLS_ACUITY_SCORE: 26.25
ADLS_ACUITY_SCORE: 0
ADLS_ACUITY_SCORE: 26.25
ADLS_ACUITY_SCORE: 0
ADLS_ACUITY_SCORE: 26.25
ADLS_ACUITY_SCORE: 0
ADLS_ACUITY_SCORE: 26.25
ADLS_ACUITY_SCORE: 26.25
ADLS_ACUITY_SCORE: 0
ADLS_ACUITY_SCORE: 0

## 2024-11-15 NOTE — PLAN OF CARE
Alert to self. Calm and cooperative .  Denies pain , appears in no form of distress .  Slept well during the shift . Ambulated to bath room twice ,  perineal care provided .  BM x 2  formed brown stool. Scheduled QUEtiapine provided . Awaiting placement in permanent memory care.    Goal Outcome Evaluation:      Plan of Care Reviewed With: patient    Overall Patient Progress: no change    Outcome Evaluation:       Problem: Fall Injury Risk  Goal: Absence of Fall and Fall-Related Injury  Outcome: Progressing  Intervention: Identify and Manage Contributors  Recent Flowsheet Documentation  Taken 11/14/2024 1940 by Heraclio Hammond, RN  Medication Review/Management: medications reviewed  Intervention: Promote Injury-Free Environment  Recent Flowsheet Documentation  Taken 11/15/2024 0315 by Heraclio Hammond, RN  Safety Promotion/Fall Prevention: safety round/check completed  Taken 11/14/2024 2127 by Heraclio Hammond RN  Safety Promotion/Fall Prevention:   activity supervised   clutter free environment maintained   increased rounding and observation   lighting adjusted   increase visualization of patient   mobility aid in reach   nonskid shoes/slippers when out of bed   patient and family education   room door open   room near nurse's station   room organization consistent   safety round/check completed   supervised activity  Taken 11/14/2024 1947 by Heraclio Hammond, RN  Safety Promotion/Fall Prevention:   activity supervised   clutter free environment maintained   increased rounding and observation   lighting adjusted   increase visualization of patient   mobility aid in reach   nonskid shoes/slippers when out of bed   patient and family education   room door open   room near nurse's station   room organization consistent   safety round/check completed   supervised activity  Taken 11/14/2024 1943 by Heraclio Hammond, RN  Safety Promotion/Fall Prevention:   activity supervised   clutter free environment maintained    "increased rounding and observation   lighting adjusted   increase visualization of patient   mobility aid in reach   nonskid shoes/slippers when out of bed   patient and family education   room door open   room near nurse's station   room organization consistent   safety round/check completed   supervised activity  Taken 11/14/2024 1940 by Heraclio Hammond, RN  Safety Promotion/Fall Prevention:   clutter free environment maintained   lighting adjusted   nonskid shoes/slippers when out of bed   mobility aid in reach   room door open   room near nurse's station   room organization consistent   safety round/check completed   activity supervised   increased rounding and observation   increase visualization of patient     Problem: Adult Inpatient Plan of Care  Goal: Plan of Care Review  Description: The Plan of Care Review/Shift note should be completed every shift.  The Outcome Evaluation is a brief statement about your assessment that the patient is improving, declining, or no change.  This information will be displayed automatically on your shift  note.  Outcome: Progressing  Flowsheets (Taken 11/15/2024 0449)  Outcome Evaluation: vs  Plan of Care Reviewed With: patient  Overall Patient Progress: no change  Goal: Patient-Specific Goal (Individualized)  Description: You can add care plan individualizations to a care plan. Examples of Individualization might be:  \"Parent requests to be called daily at 9am for status\", \"I have a hard time hearing out of my right ear\", or \"Do not touch me to wake me up as it startles  me\".  Outcome: Progressing  Goal: Absence of Hospital-Acquired Illness or Injury  Outcome: Progressing  Intervention: Identify and Manage Fall Risk  Recent Flowsheet Documentation  Taken 11/15/2024 0315 by Heraclio Hammond, RN  Safety Promotion/Fall Prevention: safety round/check completed  Taken 11/14/2024 2127 by Heraclio Hammond, RN  Safety Promotion/Fall Prevention:   activity supervised   clutter free " environment maintained   increased rounding and observation   lighting adjusted   increase visualization of patient   mobility aid in reach   nonskid shoes/slippers when out of bed   patient and family education   room door open   room near nurse's station   room organization consistent   safety round/check completed   supervised activity  Taken 11/14/2024 1947 by Heraclio Hammond RN  Safety Promotion/Fall Prevention:   activity supervised   clutter free environment maintained   increased rounding and observation   lighting adjusted   increase visualization of patient   mobility aid in reach   nonskid shoes/slippers when out of bed   patient and family education   room door open   room near nurse's station   room organization consistent   safety round/check completed   supervised activity  Taken 11/14/2024 1943 by Heraclio Hammond RN  Safety Promotion/Fall Prevention:   activity supervised   clutter free environment maintained   increased rounding and observation   lighting adjusted   increase visualization of patient   mobility aid in reach   nonskid shoes/slippers when out of bed   patient and family education   room door open   room near nurse's station   room organization consistent   safety round/check completed   supervised activity  Taken 11/14/2024 1940 by Heraclio Hammond RN  Safety Promotion/Fall Prevention:   clutter free environment maintained   lighting adjusted   nonskid shoes/slippers when out of bed   mobility aid in reach   room door open   room near nurse's station   room organization consistent   safety round/check completed   activity supervised   increased rounding and observation   increase visualization of patient  Intervention: Prevent Skin Injury  Recent Flowsheet Documentation  Taken 11/14/2024 2127 by Heraclio Hammond RN  Body Position: position changed independently  Taken 11/14/2024 1940 by Heraclio Hammond RN  Body Position: position changed independently  Intervention: Prevent  Infection  Recent Flowsheet Documentation  Taken 11/14/2024 1940 by Heraclio Hammond, RN  Infection Prevention:   rest/sleep promoted   single patient room provided   cohorting utilized   environmental surveillance performed  Goal: Optimal Comfort and Wellbeing  Outcome: Progressing  Goal: Readiness for Transition of Care  Outcome: Progressing

## 2024-11-15 NOTE — PROGRESS NOTES
[facility]    PROGRESS NOTE - Hospitalist Service    ASSESSMENT AND PLAN     Active Problems:    Dementia, unspecified dementia severity, unspecified dementia type, unspecified whether behavioral, psychotic, or mood disturbance or anxiety (H)    Jemal Gray is a 89 year old male with history of dementia with chronic aphasia. He was admitted on 10/2/2024 after he was brought to the ED by EMS from facility for evaluation of agitation. He had a prolonged hospitalization from 6/5/24 through 10/2/24 during which he was treated for cellulitis but mostly awaited long-term care placement due to dementia. He required appointment of guardianship.  He discharged to a long-term care facility on 10/2. Upon transfer to the long-term care facility he became agitated with swearing.  He was wandering around the long-term care facility and was entering other patient's rooms.  He was unable to be redirected and required transfer back to the ED.     In the ED he was stable, no major acute medical issue. He was given seroquel.  He required a bedside attendant as he was agitated and unable to be redirected.  He attempted to leave the emergency department and go into other patient's rooms. His facility was not able to take him back and there was no immediate safe disposition option.  He is here under group home care status.  Patient has improved and is no longer agitated. He remains medically stable and can be discharged whenever placement is found. Prior long term care facility is reportedly not taking patient back.  is looking into memory care units.    Patient has been here since 10/2/244 awaiting placement      11/15/2024: Patient is group home patient has no new changes overnight.  No changes in orders or the care plan.  Social work is working on placement to long-term memory care.  Unfortunately the patient will not be assessed for elderly waiver until 12/9/2024.  This is a nonbillable note.         Safe Disposition/long-term Care:  -  Patient remains medically stable for discharge when location found.  SW/CM following and working on options.  No current safe disposition option.    -Patient had prolonged hospitalization due to need for disposition assistance in earlier 2024.  His daughter filed for guardianship paperwork which was completed in July.  Acquired MA application and long-term care placement.    -Ultimately will require 24/7 care and likely Memory care placement   -not on 1:1 close observation      Acute agitation, resolved  Behavior disturbances   Dementia with chronic aphasia:  Suspect triggered by changes in setting and recent move.  No physical complaints or concerns of infection.  Historically required Zyprexa over the summer when he was hospitalized for agitation.  Most recently had been prescribed Seroquel 12.5 mg twice daily as needed although he had not been needing most recently.  -Bedside attendant as needed, currently is off  -Continue Seroquel PRN + 12.5 mg of Seroquel every evening  - ms prior check  -SW consulted  -needs memory care  -limit tethers, is on minimal medications      Actinic Keratosis:  -Had surgery for this and prescribed steroids cream and ketoconazole cream, restarted per daughter request previously.     Sinus Bradycardia:  -Patient was noted to have HR in 50s.  Asymptomatic.  -no telemetry monitoring needed      History of Constipation:  -laxatives PRN.  Had BM 11/4.    Barriers to discharge: needs Memory care placement, Unfortunately the patient will not be assessed for elderly waiver until 12/9/2024.     Anticipated length of stay: Medically ready at any time.     Medically Ready for Discharge: Ready Now      Clinically Significant Risk Factors Present on Admission       # Dementia: noted on problem list                Discussed with nursing, social work/case management     Ata Nogueira MD        Subjective:  No new complaints overnight. Slept  "well    PHYSICAL EXAM  Temp:  [97.4  F (36.3  C)] 97.4  F (36.3  C)  Pulse:  [52] 52  Resp:  [18] 18  BP: (138)/(69) 138/69  SpO2:  [97 %] 97 %  Wt Readings from Last 1 Encounters:   10/02/24 71.4 kg (157 lb 6.4 oz)       Intake/Output Summary (Last 24 hours) at 11/11/2024 1016  Last data filed at 11/10/2024 1841  Gross per 24 hour   Intake 630 ml   Output --   Net 630 ml      Body mass index is 23.93 kg/m .        PERTINENT LABS/IMAGING:  No results found for this visit on 10/02/24.  Most Recent 3 CBC's:  Recent Labs   Lab Test 10/20/24  0558   WBC 9.7   HGB 12.2*   MCV 96        Most Recent 3 BMP's:  Recent Labs   Lab Test 10/20/24  0558 10/03/24  0130 07/26/24  0741    138  --    POTASSIUM 3.9 3.9  --    CHLORIDE 105 103  --    CO2 24 23  --    BUN 23.6* 17.4  --    CR 0.94 0.89  --    ANIONGAP 12 12  --    LEON 8.5* 8.7*  --    * 90 98     Most Recent 2 LFT's:No lab results found.    No results for input(s): \"CHOL\", \"HDL\", \"LDL\", \"TRIG\", \"CHOLHDLRATIO\" in the last 02601 hours.  No results for input(s): \"LDL\" in the last 95746 hours.  Recent Labs   Lab Test 10/20/24  0558      POTASSIUM 3.9   CHLORIDE 105   CO2 24   *   BUN 23.6*   CR 0.94   GFRESTIMATED 78   LEON 8.5*     No results for input(s): \"A1C\" in the last 44555 hours.  Recent Labs   Lab Test 10/20/24  0558   HGB 12.2*     No results for input(s): \"TROPONINI\" in the last 69465 hours.  No results for input(s): \"BNP\", \"NTBNPI\", \"NTBNP\" in the last 30876 hours.  No results for input(s): \"TSH\" in the last 64801 hours.  No results for input(s): \"INR\" in the last 32140 hours.        "

## 2024-11-15 NOTE — PLAN OF CARE
"Goal Outcome Evaluation:      Plan of Care Reviewed With: patient    Overall Patient Progress: no change    Outcome Evaluation: Continuing longterm care    Patient calm and cooperative, disorientated to time, place, and situation. VS stable, maintaining O2 sats >90% on RA. No signs of dyspnea. Received medicated shampoo today and completed daily ADL's. Adequate intake and did have a bowel movement. Refusing to wear pull up. Pleasant today with no concerns.     Problem: Fall Injury Risk  Goal: Absence of Fall and Fall-Related Injury  Outcome: Progressing  Intervention: Identify and Manage Contributors  Recent Flowsheet Documentation  Taken 11/15/2024 1508 by Radha Tam, RN  Medication Review/Management: medications reviewed  Intervention: Promote Injury-Free Environment  Recent Flowsheet Documentation  Taken 11/15/2024 1508 by Radha Tam, RN  Safety Promotion/Fall Prevention: safety round/check completed     Problem: Adult Inpatient Plan of Care  Goal: Plan of Care Review  Description: The Plan of Care Review/Shift note should be completed every shift.  The Outcome Evaluation is a brief statement about your assessment that the patient is improving, declining, or no change.  This information will be displayed automatically on your shift  note.  Outcome: Progressing  Flowsheets (Taken 11/15/2024 9666)  Outcome Evaluation: Continuing longterm care  Plan of Care Reviewed With: patient  Overall Patient Progress: no change  Goal: Patient-Specific Goal (Individualized)  Description: You can add care plan individualizations to a care plan. Examples of Individualization might be:  \"Parent requests to be called daily at 9am for status\", \"I have a hard time hearing out of my right ear\", or \"Do not touch me to wake me up as it startles  me\".  Outcome: Progressing  Goal: Absence of Hospital-Acquired Illness or Injury  Outcome: Progressing  Intervention: Identify and Manage Fall Risk  Recent Flowsheet " Documentation  Taken 11/15/2024 1508 by Radha Tam, RN  Safety Promotion/Fall Prevention: safety round/check completed  Intervention: Prevent Skin Injury  Recent Flowsheet Documentation  Taken 11/15/2024 1508 by Radha Tam, RN  Body Position: position changed independently  Intervention: Prevent Infection  Recent Flowsheet Documentation  Taken 11/15/2024 1508 by Radha Tam, RN  Infection Prevention:   equipment surfaces disinfected   hand hygiene promoted   rest/sleep promoted   single patient room provided  Goal: Optimal Comfort and Wellbeing  Outcome: Progressing  Goal: Readiness for Transition of Care  Outcome: Progressing

## 2024-11-15 NOTE — PLAN OF CARE
Temp: 97.4  F (36.3  C) Temp src: Oral BP: 138/69 Pulse: 52   Resp: 18 SpO2: 97 % O2 Device: None (Room air)      Alert to self. Very pleasant today. Hungry for lunch but not much else. Ambulated a couple of times, up to bathroom numerous times. No behavior issues on shift. Awaiting placement in permanent memory care.    Goal Outcome Evaluation:      Plan of Care Reviewed With: patient    Overall Patient Progress: no changeOverall Patient Progress: no change    Outcome Evaluation: [facility] care      Problem: Fall Injury Risk  Goal: Absence of Fall and Fall-Related Injury  Outcome: Progressing  Intervention: Identify and Manage Contributors  Recent Flowsheet Documentation  Taken 11/14/2024 0725 by Wilson Beal RN  Medication Review/Management: medications reviewed  Intervention: Promote Injury-Free Environment  Recent Flowsheet Documentation  Taken 11/14/2024 1835 by Wilson Beal RN  Safety Promotion/Fall Prevention: safety round/check completed  Taken 11/14/2024 1535 by Wilson Beal RN  Safety Promotion/Fall Prevention: safety round/check completed  Taken 11/14/2024 1144 by Wilson Beal RN  Safety Promotion/Fall Prevention: safety round/check completed  Taken 11/14/2024 1025 by Wilson Beal RN  Safety Promotion/Fall Prevention: safety round/check completed  Taken 11/14/2024 0800 by Wilson Beal RN  Safety Promotion/Fall Prevention: safety round/check completed  Taken 11/14/2024 0725 by Wilson Beal RN  Safety Promotion/Fall Prevention: safety round/check completed     Problem: Adult Inpatient Plan of Care  Goal: Plan of Care Review  Description: The Plan of Care Review/Shift note should be completed every shift.  The Outcome Evaluation is a brief statement about your assessment that the patient is improving, declining, or no change.  This information will be displayed automatically on your shift  note.  Outcome: Progressing  Flowsheets (Taken 11/14/2024 1859)  Outcome Evaluation:  "senior care care  Plan of Care Reviewed With: patient  Overall Patient Progress: no change  Goal: Patient-Specific Goal (Individualized)  Description: You can add care plan individualizations to a care plan. Examples of Individualization might be:  \"Parent requests to be called daily at 9am for status\", \"I have a hard time hearing out of my right ear\", or \"Do not touch me to wake me up as it startles  me\".  Outcome: Progressing  Goal: Absence of Hospital-Acquired Illness or Injury  Outcome: Progressing  Intervention: Identify and Manage Fall Risk  Recent Flowsheet Documentation  Taken 11/14/2024 1835 by Wilson Beal RN  Safety Promotion/Fall Prevention: safety round/check completed  Taken 11/14/2024 1535 by Wilson Beal RN  Safety Promotion/Fall Prevention: safety round/check completed  Taken 11/14/2024 1144 by Wilson Beal RN  Safety Promotion/Fall Prevention: safety round/check completed  Taken 11/14/2024 1025 by Wilson Beal RN  Safety Promotion/Fall Prevention: safety round/check completed  Taken 11/14/2024 0800 by Wilson Beal RN  Safety Promotion/Fall Prevention: safety round/check completed  Taken 11/14/2024 0725 by Wilson Beal RN  Safety Promotion/Fall Prevention: safety round/check completed  Intervention: Prevent Skin Injury  Recent Flowsheet Documentation  Taken 11/14/2024 0725 by Wilson Beal RN  Body Position: position changed independently  Goal: Optimal Comfort and Wellbeing  Outcome: Progressing  Goal: Readiness for Transition of Care  Outcome: Progressing     "

## 2024-11-16 VITALS
DIASTOLIC BLOOD PRESSURE: 79 MMHG | OXYGEN SATURATION: 97 % | BODY MASS INDEX: 23.93 KG/M2 | TEMPERATURE: 97.7 F | SYSTOLIC BLOOD PRESSURE: 153 MMHG | WEIGHT: 157.4 LBS | RESPIRATION RATE: 19 BRPM | HEART RATE: 52 BPM

## 2024-11-16 PROCEDURE — 250N000013 HC RX MED GY IP 250 OP 250 PS 637: Performed by: INTERNAL MEDICINE

## 2024-11-16 PROCEDURE — 101N000002 HC CUSTODIAL CARE DAILY

## 2024-11-16 PROCEDURE — 99207 PR NO BILLABLE SERVICE THIS VISIT: CPT | Performed by: INTERNAL MEDICINE

## 2024-11-16 RX ADMIN — QUETIAPINE FUMARATE 12.5 MG: 25 TABLET ORAL at 20:40

## 2024-11-16 NOTE — PLAN OF CARE
Goal Outcome Evaluation:      Plan of Care Reviewed With: patient    Overall Patient Progress: no changeOverall Patient Progress: no change    Outcome Evaluation: no change. po intake good. no aggressive behavior. Daughter updated regarding status today

## 2024-11-16 NOTE — PROGRESS NOTES
[facility]    PROGRESS NOTE - Hospitalist Service    ASSESSMENT AND PLAN     Active Problems:    Dementia, unspecified dementia severity, unspecified dementia type, unspecified whether behavioral, psychotic, or mood disturbance or anxiety (H)    Jemal Gray is a 89 year old male with history of dementia with chronic aphasia. He was admitted on 10/2/2024 after he was brought to the ED by EMS from facility for evaluation of agitation. He had a prolonged hospitalization from 6/5/24 through 10/2/24 during which he was treated for cellulitis but mostly awaited long-term care placement due to dementia. He required appointment of guardianship.  He discharged to a long-term care facility on 10/2. Upon transfer to the long-term care facility he became agitated with swearing.  He was wandering around the long-term care facility and was entering other patient's rooms.  He was unable to be redirected and required transfer back to the ED.     In the ED he was stable, no major acute medical issue. He was given seroquel.  He required a bedside attendant as he was agitated and unable to be redirected.  He attempted to leave the emergency department and go into other patient's rooms. His facility was not able to take him back and there was no immediate safe disposition option.  He is here under long-term care status.  Patient has improved and is no longer agitated. He remains medically stable and can be discharged whenever placement is found. Prior long term care facility is reportedly not taking patient back.  is looking into memory care units.    Patient has been here since 10/2/244 awaiting placement      11/16/2024: Patient is long-term patient has no new changes overnight.  No changes in plan, documentation, nor orders. Unfortunately the patient will not be assessed for elderly waiver until 12/9/2024.  THIS iS A NONBILLABLE NOTTE        Safe Disposition/[facility]FP Care:  -  Patient  remains medically stable for discharge when location found.  SW/CM following and working on options.  No current safe disposition option.    -Patient had prolonged hospitalization due to need for disposition assistance in earlier 2024.  His daughter filed for guardianship paperwork which was completed in July.  Acquired MA application and long-term care placement.    -Ultimately will require 24/7 care and likely Memory care placement   -not on 1:1 close observation      Acute agitation, resolved  Behavior disturbances   Dementia with chronic aphasia:  Suspect triggered by changes in setting and recent move.  No physical complaints or concerns of infection.  Historically required Zyprexa over the summer when he was hospitalized for agitation.  Most recently had been prescribed Seroquel 12.5 mg twice daily as needed although he had not been needing most recently.  -Bedside attendant as needed, currently is off  -Continue Seroquel PRN + 12.5 mg of Seroquel every evening  - ms prior check  -SW consulted  -needs memory care  -limit tethers, is on minimal medications      Actinic Keratosis:  -Had surgery for this and prescribed steroids cream and ketoconazole cream, restarted per daughter request previously.     Sinus Bradycardia:  -Patient was noted to have HR in 50s.  Asymptomatic.  -no telemetry monitoring needed      History of Constipation:  -laxatives PRN.  Had BM 11/4.    Barriers to discharge: needs Memory care placement, Unfortunately the patient will not be assessed for elderly waiver until 12/9/2024.     Anticipated length of stay: Medically ready at any time.     Medically Ready for Discharge: Ready Now      Clinically Significant Risk Factors Present on Admission       # Dementia: noted on problem list                Discussed with nursing ONCE AGAIN     Ata Nogueira MD        Subjective:  No new complaints overnight. Slept well, no changes per nuring    PHYSICAL EXAM  Temp:  [98.2  F (36.8  C)]  "98.2  F (36.8  C)  Pulse:  [57] 57  Resp:  [20] 20  BP: (127)/(72) 127/72  SpO2:  [95 %] 95 %  Wt Readings from Last 1 Encounters:   10/02/24 71.4 kg (157 lb 6.4 oz)       Intake/Output Summary (Last 24 hours) at 11/11/2024 1016  Last data filed at 11/10/2024 1841  Gross per 24 hour   Intake 630 ml   Output --   Net 630 ml      Body mass index is 23.93 kg/m .        PERTINENT LABS/IMAGING:  No results found for this visit on 10/02/24.  Most Recent 3 CBC's:  Recent Labs   Lab Test 10/20/24  0558   WBC 9.7   HGB 12.2*   MCV 96        Most Recent 3 BMP's:  Recent Labs   Lab Test 10/20/24  0558 10/03/24  0130 07/26/24  0741    138  --    POTASSIUM 3.9 3.9  --    CHLORIDE 105 103  --    CO2 24 23  --    BUN 23.6* 17.4  --    CR 0.94 0.89  --    ANIONGAP 12 12  --    LEON 8.5* 8.7*  --    * 90 98     Most Recent 2 LFT's:No lab results found.    No results for input(s): \"CHOL\", \"HDL\", \"LDL\", \"TRIG\", \"CHOLHDLRATIO\" in the last 32095 hours.  No results for input(s): \"LDL\" in the last 34582 hours.  Recent Labs   Lab Test 10/20/24  0558      POTASSIUM 3.9   CHLORIDE 105   CO2 24   *   BUN 23.6*   CR 0.94   GFRESTIMATED 78   LEON 8.5*     No results for input(s): \"A1C\" in the last 58460 hours.  Recent Labs   Lab Test 10/20/24  0558   HGB 12.2*     No results for input(s): \"TROPONINI\" in the last 27796 hours.  No results for input(s): \"BNP\", \"NTBNPI\", \"NTBNP\" in the last 52676 hours.  No results for input(s): \"TSH\" in the last 97023 hours.  No results for input(s): \"INR\" in the last 22324 hours.        "

## 2024-11-16 NOTE — PLAN OF CARE
"Pt alert, oriented to self only. On RA. No signs of pain noted. Voiding adequately, had a small BM this shift. Up with 1 assist, gait belt and walker.     Problem: Fall Injury Risk  Goal: Absence of Fall and Fall-Related Injury  Outcome: Progressing     Problem: Adult Inpatient Plan of Care  Goal: Plan of Care Review  Description: The Plan of Care Review/Shift note should be completed every shift.  The Outcome Evaluation is a brief statement about your assessment that the patient is improving, declining, or no change.  This information will be displayed automatically on your shift  note.  Outcome: Progressing  Flowsheets (Taken 11/16/2024 0634)  Outcome Evaluation: Pt alert, oriented to self only. On RA. No signs of pain noted. Voiding adequately, had a small BM this shift. Up with 1 assist, gait belt and walker.  Plan of Care Reviewed With: patient  Overall Patient Progress: no change  Goal: Patient-Specific Goal (Individualized)  Description: You can add care plan individualizations to a care plan. Examples of Individualization might be:  \"Parent requests to be called daily at 9am for status\", \"I have a hard time hearing out of my right ear\", or \"Do not touch me to wake me up as it startles  me\".  Outcome: Progressing  Goal: Absence of Hospital-Acquired Illness or Injury  Outcome: Progressing  Intervention: Prevent Skin Injury  Recent Flowsheet Documentation  Taken 11/16/2024 0230 by Annamarie Nathan, RN  Body Position: position changed independently  Goal: Optimal Comfort and Wellbeing  Outcome: Progressing  Goal: Readiness for Transition of Care  Outcome: Progressing     Problem: Gas Exchange Impaired  Goal: Optimal Gas Exchange  Outcome: Progressing  Intervention: Optimize Oxygenation and Ventilation  Recent Flowsheet Documentation  Taken 11/16/2024 0230 by Annamarie Nathan RN  Head of Bed (HOB) Positioning: HOB at 20-30 degrees   Goal Outcome Evaluation:      Plan of Care Reviewed With: " patient    Overall Patient Progress: no changeOverall Patient Progress: no change    Outcome Evaluation: Pt alert, oriented to self only. On RA. No signs of pain noted. Voiding adequately, had a small BM this shift. Up with 1 assist, gait belt and walker.

## 2024-11-16 NOTE — PLAN OF CARE
Goal Outcome Evaluation:      Plan of Care Reviewed With: patient    Overall Patient Progress: no changeOverall Patient Progress: no change    Outcome Evaluation: No change. No aggression. PO intake good/voiding +BM. Ambulate in quiles w/staff. Family visited pt today.      Problem: Adult Inpatient Plan of Care  Goal: Plan of Care Review  Description: The Plan of Care Review/Shift note should be completed every shift.  The Outcome Evaluation is a brief statement about your assessment that the patient is improving, declining, or no change.  This information will be displayed automatically on your shift  note.  Recent Flowsheet Documentation  Taken 11/16/2024 1524 by Kym Mobley RN  Outcome Evaluation: No change. No aggression. PO intake good/voiding +BM. Ambulate in quiles w/staff. Family visited pt today.  Plan of Care Reviewed With: patient  Overall Patient Progress: no change  Goal: Absence of Hospital-Acquired Illness or Injury  Intervention: Identify and Manage Fall Risk  Recent Flowsheet Documentation  Taken 11/16/2024 0900 by Kym Mobley RN  Safety Promotion/Fall Prevention: safety round/check completed  Intervention: Prevent Skin Injury  Recent Flowsheet Documentation  Taken 11/16/2024 0900 by Kym Mobley RN  Body Position: position changed independently

## 2024-11-17 PROCEDURE — 99207 PR NO BILLABLE SERVICE THIS VISIT: CPT | Performed by: INTERNAL MEDICINE

## 2024-11-17 PROCEDURE — 101N000002 HC CUSTODIAL CARE DAILY

## 2024-11-17 NOTE — PLAN OF CARE
Pt is alert to self with confusion. Pt was given scheduled Seroquel during the shift. Pt has Dementia. Pt denies pain or discomfort. Pt is hard of hearing. Pt has no IV access. Pt is assist of one in transfer and care. Pt takes meds crushed with pudding. Bed alarm in place and call light within reach. Plan is for placement.           Goal Outcome Evaluation:      Plan of Care Reviewed With: patient    Overall Patient Progress: no changeOverall Patient Progress: no change    Outcome Evaluation: no changed in plan of care. pt is waiting for placement.      Problem: Fall Injury Risk  Goal: Absence of Fall and Fall-Related Injury  Outcome: Progressing  Intervention: Identify and Manage Contributors  Recent Flowsheet Documentation  Taken 11/16/2024 2005 by Reji Duron, RN  Medication Review/Management: medications reviewed  Intervention: Promote Injury-Free Environment  Recent Flowsheet Documentation  Taken 11/16/2024 2005 by Reji Duron, RN  Safety Promotion/Fall Prevention:   activity supervised   assistive device/personal items within reach   clutter free environment maintained   increase visualization of patient   increased rounding and observation   nonskid shoes/slippers when out of bed   patient and family education   safety round/check completed     Problem: Adult Inpatient Plan of Care  Goal: Plan of Care Review  Description: The Plan of Care Review/Shift note should be completed every shift.  The Outcome Evaluation is a brief statement about your assessment that the patient is improving, declining, or no change.  This information will be displayed automatically on your shift  note.  Outcome: Progressing  Flowsheets (Taken 11/17/2024 0029)  Outcome Evaluation: no changed in plan of care. pt is waiting for placement.  Plan of Care Reviewed With: patient  Overall Patient Progress: no change  Goal: Patient-Specific Goal (Individualized)  Description: You can add care plan  "individualizations to a care plan. Examples of Individualization might be:  \"Parent requests to be called daily at 9am for status\", \"I have a hard time hearing out of my right ear\", or \"Do not touch me to wake me up as it startles  me\".  Outcome: Progressing  Goal: Absence of Hospital-Acquired Illness or Injury  Outcome: Progressing  Intervention: Identify and Manage Fall Risk  Recent Flowsheet Documentation  Taken 11/16/2024 2005 by Reji Duron, RN  Safety Promotion/Fall Prevention:   activity supervised   assistive device/personal items within reach   clutter free environment maintained   increase visualization of patient   increased rounding and observation   nonskid shoes/slippers when out of bed   patient and family education   safety round/check completed  Intervention: Prevent Skin Injury  Recent Flowsheet Documentation  Taken 11/16/2024 2005 by Reji Duron RN  Body Position: position changed independently  Intervention: Prevent Infection  Recent Flowsheet Documentation  Taken 11/16/2024 2005 by Reji Duron RN  Infection Prevention:   single patient room provided   rest/sleep promoted   hand hygiene promoted  Goal: Optimal Comfort and Wellbeing  Outcome: Progressing  Goal: Readiness for Transition of Care  Outcome: Progressing     Problem: Gas Exchange Impaired  Goal: Optimal Gas Exchange  Outcome: Progressing  Intervention: Optimize Oxygenation and Ventilation  Recent Flowsheet Documentation  Taken 11/16/2024 2005 by Reji Duron, RN  Head of Bed (HOB) Positioning: HOB at 20-30 degrees     Problem: Adult Inpatient Plan of Care  Goal: Plan of Care Review  Description: The Plan of Care Review/Shift note should be completed every shift.  The Outcome Evaluation is a brief statement about your assessment that the patient is improving, declining, or no change.  This information will be displayed automatically on your shift  note.  Recent Flowsheet " Documentation  Taken 11/17/2024 0029 by Reji Duron RN  Outcome Evaluation: no changed in plan of care. pt is waiting for placement.  Plan of Care Reviewed With: patient  Overall Patient Progress: no change  Goal: Absence of Hospital-Acquired Illness or Injury  Intervention: Identify and Manage Fall Risk  Recent Flowsheet Documentation  Taken 11/16/2024 2005 by Reji Duron RN  Safety Promotion/Fall Prevention:   activity supervised   assistive device/personal items within reach   clutter free environment maintained   increase visualization of patient   increased rounding and observation   nonskid shoes/slippers when out of bed   patient and family education   safety round/check completed  Intervention: Prevent Skin Injury  Recent Flowsheet Documentation  Taken 11/16/2024 2005 by Reji Duron RN  Body Position: position changed independently  Intervention: Prevent Infection  Recent Flowsheet Documentation  Taken 11/16/2024 2005 by Reji Duron RN  Infection Prevention:   single patient room provided   rest/sleep promoted   hand hygiene promoted     Problem: Fall Injury Risk  Goal: Absence of Fall and Fall-Related Injury  Intervention: Identify and Manage Contributors  Recent Flowsheet Documentation  Taken 11/16/2024 2005 by Reji Duron RN  Medication Review/Management: medications reviewed  Intervention: Promote Injury-Free Environment  Recent Flowsheet Documentation  Taken 11/16/2024 2005 by Reji Duron RN  Safety Promotion/Fall Prevention:   activity supervised   assistive device/personal items within reach   clutter free environment maintained   increase visualization of patient   increased rounding and observation   nonskid shoes/slippers when out of bed   patient and family education   safety round/check completed     Problem: Skin Injury Risk Increased  Goal: Skin Health and Integrity  Intervention: Optimize Skin  Protection  Recent Flowsheet Documentation  Taken 11/16/2024 2005 by Reji Duron RN  Activity Management:   activity encouraged   activity adjusted per tolerance  Head of Bed (HOB) Positioning: HOB at 20-30 degrees     Problem: Adult Inpatient Plan of Care  Goal: Plan of Care Review  Description: The Plan of Care Review/Shift note should be completed every shift.  The Outcome Evaluation is a brief statement about your assessment that the patient is improving, declining, or no change.  This information will be displayed automatically on your shift  note.  Recent Flowsheet Documentation  Taken 11/17/2024 0029 by Reji Duron RN  Outcome Evaluation: no changed in plan of care. pt is waiting for placement.  Plan of Care Reviewed With: patient  Overall Patient Progress: no change  Goal: Absence of Hospital-Acquired Illness or Injury  Intervention: Identify and Manage Fall Risk  Recent Flowsheet Documentation  Taken 11/16/2024 2005 by Reji Duron RN  Safety Promotion/Fall Prevention:   activity supervised   assistive device/personal items within reach   clutter free environment maintained   increase visualization of patient   increased rounding and observation   nonskid shoes/slippers when out of bed   patient and family education   safety round/check completed  Intervention: Prevent Skin Injury  Recent Flowsheet Documentation  Taken 11/16/2024 2005 by Reji Duron RN  Body Position: position changed independently  Intervention: Prevent Infection  Recent Flowsheet Documentation  Taken 11/16/2024 2005 by Reji Duron RN  Infection Prevention:   single patient room provided   rest/sleep promoted   hand hygiene promoted     Problem: Comorbidity Management  Goal: Maintenance of Asthma Control  Intervention: Maintain Asthma Symptom Control  Recent Flowsheet Documentation  Taken 11/16/2024 2005 by Reji Duron RN  Medication Review/Management:  medications reviewed  Goal: Maintenance of Behavioral Health Symptom Control  Intervention: Maintain Behavioral Health Symptom Control  Recent Flowsheet Documentation  Taken 11/16/2024 2005 by Reji Duron RN  Medication Review/Management: medications reviewed  Goal: Maintenance of COPD Symptom Control  Intervention: Maintain COPD Symptom Control  Recent Flowsheet Documentation  Taken 11/16/2024 2005 by Reji Duron RN  Medication Review/Management: medications reviewed  Goal: Blood Glucose Levels Within Targeted Range  Intervention: Monitor and Manage Glycemia  Recent Flowsheet Documentation  Taken 11/16/2024 2005 by Reji Duron RN  Medication Review/Management: medications reviewed  Goal: Maintenance of Heart Failure Symptom Control  Intervention: Maintain Heart Failure Management  Recent Flowsheet Documentation  Taken 11/16/2024 2005 by Reji Duron RN  Medication Review/Management: medications reviewed  Goal: Blood Pressure in Desired Range  Intervention: Maintain Blood Pressure Management  Recent Flowsheet Documentation  Taken 11/16/2024 2005 by Reji Duron RN  Medication Review/Management: medications reviewed  Goal: Maintenance of Osteoarthritis Symptom Control  Intervention: Maintain Osteoarthritis Symptom Control  Recent Flowsheet Documentation  Taken 11/16/2024 2005 by Reji Duron RN  Assistive Device Utilized:   gait belt   walker  Activity Management:   activity encouraged   activity adjusted per tolerance  Medication Review/Management: medications reviewed  Goal: Bariatric Home Regimen Maintained  Intervention: Maintain and Manage Postbariatric Surgery Care  Recent Flowsheet Documentation  Taken 11/16/2024 2005 by Reji Duron RN  Medication Review/Management: medications reviewed  Goal: Maintenance of Seizure Control  Intervention: Maintain Seizure Symptom Control  Recent Flowsheet Documentation  Taken  11/16/2024 2005 by Reji Duron RN  Medication Review/Management: medications reviewed     Problem: Pain Acute  Goal: Optimal Pain Control and Function  Intervention: Prevent or Manage Pain  Recent Flowsheet Documentation  Taken 11/16/2024 2005 by Reji Duron RN  Medication Review/Management: medications reviewed     Problem: Adult Inpatient Plan of Care  Goal: Plan of Care Review  Description: The Plan of Care Review/Shift note should be completed every shift.  The Outcome Evaluation is a brief statement about your assessment that the patient is improving, declining, or no change.  This information will be displayed automatically on your shift  note.  Recent Flowsheet Documentation  Taken 11/17/2024 0029 by Reji Duron RN  Outcome Evaluation: no changed in plan of care. pt is waiting for placement.  Plan of Care Reviewed With: patient  Overall Patient Progress: no change  Goal: Absence of Hospital-Acquired Illness or Injury  Intervention: Identify and Manage Fall Risk  Recent Flowsheet Documentation  Taken 11/16/2024 2005 by Reji Duron RN  Safety Promotion/Fall Prevention:   activity supervised   assistive device/personal items within reach   clutter free environment maintained   increase visualization of patient   increased rounding and observation   nonskid shoes/slippers when out of bed   patient and family education   safety round/check completed  Intervention: Prevent Skin Injury  Recent Flowsheet Documentation  Taken 11/16/2024 2005 by Reji Duron RN  Body Position: position changed independently     Problem: Fall Injury Risk  Goal: Absence of Fall and Fall-Related Injury  Intervention: Identify and Manage Contributors  Recent Flowsheet Documentation  Taken 11/16/2024 2005 by Reji Duron RN  Medication Review/Management: medications reviewed  Intervention: Promote Injury-Free Environment  Recent Flowsheet Documentation  Taken  11/16/2024 2005 by Reji Duron RN  Safety Promotion/Fall Prevention:   activity supervised   assistive device/personal items within reach   clutter free environment maintained   increase visualization of patient   increased rounding and observation   nonskid shoes/slippers when out of bed   patient and family education   safety round/check completed     Problem: Fall Injury Risk  Goal: Absence of Fall and Fall-Related Injury  Intervention: Identify and Manage Contributors  Recent Flowsheet Documentation  Taken 11/16/2024 2005 by Reji Duron RN  Medication Review/Management: medications reviewed  Intervention: Promote Injury-Free Environment  Recent Flowsheet Documentation  Taken 11/16/2024 2005 by Reji Duron RN  Safety Promotion/Fall Prevention:   activity supervised   assistive device/personal items within reach   clutter free environment maintained   increase visualization of patient   increased rounding and observation   nonskid shoes/slippers when out of bed   patient and family education   safety round/check completed     Problem: Adult Inpatient Plan of Care  Goal: Plan of Care Review  Description: The Plan of Care Review/Shift note should be completed every shift.  The Outcome Evaluation is a brief statement about your assessment that the patient is improving, declining, or no change.  This information will be displayed automatically on your shift  note.  Outcome: Progressing  Flowsheets (Taken 11/17/2024 0029)  Outcome Evaluation: no changed in plan of care. pt is waiting for placement.  Plan of Care Reviewed With: patient  Overall Patient Progress: no change  Goal: Absence of Hospital-Acquired Illness or Injury  Intervention: Identify and Manage Fall Risk  Recent Flowsheet Documentation  Taken 11/16/2024 2005 by Reji Duron RN  Safety Promotion/Fall Prevention:   activity supervised   assistive device/personal items within reach   clutter free  environment maintained   increase visualization of patient   increased rounding and observation   nonskid shoes/slippers when out of bed   patient and family education   safety round/check completed  Intervention: Prevent Skin Injury  Recent Flowsheet Documentation  Taken 11/16/2024 2005 by Reji Duron, RN  Body Position: position changed independently  Intervention: Prevent Infection  Recent Flowsheet Documentation  Taken 11/16/2024 2005 by Reji Duron, RN  Infection Prevention:   single patient room provided   rest/sleep promoted   hand hygiene promoted     Problem: Gas Exchange Impaired  Goal: Optimal Gas Exchange  Intervention: Optimize Oxygenation and Ventilation  Recent Flowsheet Documentation  Taken 11/16/2024 2005 by Reji Duron, RN  Head of Bed (HOB) Positioning: HOB at 20-30 degrees

## 2024-11-17 NOTE — PLAN OF CARE
Goal Outcome Evaluation:      Plan of Care Reviewed With: patient, child    Overall Patient Progress: no changeOverall Patient Progress: no change    Outcome Evaluation: No change. + BM. Refused shower. PO intake good. Ambulated in all halls x3 w/RN.

## 2024-11-17 NOTE — PROGRESS NOTES
[facility]    PROGRESS NOTE - Hospitalist Service    ASSESSMENT AND PLAN     Active Problems:    Dementia, unspecified dementia severity, unspecified dementia type, unspecified whether behavioral, psychotic, or mood disturbance or anxiety (H)    Jemal Gray is a 89 year old male with history of dementia with chronic aphasia. He was admitted on 10/2/2024 after he was brought to the ED by EMS from facility for evaluation of agitation. He had a prolonged hospitalization from 6/5/24 through 10/2/24 during which he was treated for cellulitis but mostly awaited long-term care placement due to dementia. He required appointment of guardianship.  He discharged to a long-term care facility on 10/2. Upon transfer to the long-term care facility he became agitated with swearing.  He was wandering around the long-term care facility and was entering other patient's rooms.  He was unable to be redirected and required transfer back to the ED.     In the ED he was stable, no major acute medical issue. He was given seroquel.  He required a bedside attendant as he was agitated and unable to be redirected.  He attempted to leave the emergency department and go into other patient's rooms. His facility was not able to take him back and there was no immediate safe disposition option.  He is here under [facility] care status.  Patient has improved and is no longer agitated. He remains medically stable and can be discharged whenever placement is found. Prior long term care facility is reportedly not taking patient back.  is looking into memory care units.    Patient has been here since 10/2/244 awaiting placement      11/17/2024: Patient is [facility] patient has no new changes overnight.  No changes in plan, documentation, nor orders. Unfortunately the patient will not be assessed for elderly waiver until 12/9/2024.  THIS iS A NONBILLABLE NOTTE        Safe Disposition/longterm Care:  -  Patient  remains medically stable for discharge when location found.  SW/CM following and working on options.  No current safe disposition option.    -Patient had prolonged hospitalization due to need for disposition assistance in earlier 2024.  His daughter filed for guardianship paperwork which was completed in July.  Acquired MA application and long-term care placement.    -Ultimately will require 24/7 care and likely Memory care placement   -not on 1:1 close observation      Acute agitation, resolved  Behavior disturbances   Dementia with chronic aphasia:  Suspect triggered by changes in setting and recent move.  No physical complaints or concerns of infection.  Historically required Zyprexa over the summer when he was hospitalized for agitation.  Most recently had been prescribed Seroquel 12.5 mg twice daily as needed although he had not been needing most recently.  -Bedside attendant as needed, currently is off  -Continue Seroquel PRN + 12.5 mg of Seroquel every evening  - ms prior check  -SW consulted  -needs memory care  -limit tethers, is on minimal medications      Actinic Keratosis:  -Had surgery for this and prescribed steroids cream and ketoconazole cream, restarted per daughter request previously.     Sinus Bradycardia:  -Patient was noted to have HR in 50s.  Asymptomatic.  -no telemetry monitoring needed      History of Constipation:  -laxatives PRN.  Had BM 11/4.    Barriers to discharge: needs Memory care placement, Unfortunately the patient will not be assessed for elderly waiver until 12/9/2024.     Anticipated length of stay: Medically ready at any time.     Medically Ready for Discharge: Ready Now      Clinically Significant Risk Factors Present on Admission       # Dementia: noted on problem list                Discussed with nursing ONCE AGAIN     Ata Nogueira MD        Subjective:  No new complaints overnight. Slept well, no changes per nuring    PHYSICAL EXAM  Temp:  [97.3  F (36.3  C)]  "97.3  F (36.3  C)  Pulse:  [52] 52  BP: (146)/(88) 146/88  Wt Readings from Last 1 Encounters:   10/02/24 71.4 kg (157 lb 6.4 oz)       Intake/Output Summary (Last 24 hours) at 11/11/2024 1016  Last data filed at 11/10/2024 1841  Gross per 24 hour   Intake 630 ml   Output --   Net 630 ml      Body mass index is 23.93 kg/m .        PERTINENT LABS/IMAGING:  No results found for this visit on 10/02/24.  Most Recent 3 CBC's:  Recent Labs   Lab Test 10/20/24  0558   WBC 9.7   HGB 12.2*   MCV 96        Most Recent 3 BMP's:  Recent Labs   Lab Test 10/20/24  0558 10/03/24  0130 07/26/24  0741    138  --    POTASSIUM 3.9 3.9  --    CHLORIDE 105 103  --    CO2 24 23  --    BUN 23.6* 17.4  --    CR 0.94 0.89  --    ANIONGAP 12 12  --    LEON 8.5* 8.7*  --    * 90 98     Most Recent 2 LFT's:No lab results found.    No results for input(s): \"CHOL\", \"HDL\", \"LDL\", \"TRIG\", \"CHOLHDLRATIO\" in the last 23729 hours.  No results for input(s): \"LDL\" in the last 05495 hours.  Recent Labs   Lab Test 10/20/24  0558      POTASSIUM 3.9   CHLORIDE 105   CO2 24   *   BUN 23.6*   CR 0.94   GFRESTIMATED 78   LEON 8.5*     No results for input(s): \"A1C\" in the last 59667 hours.  Recent Labs   Lab Test 10/20/24  0558   HGB 12.2*     No results for input(s): \"TROPONINI\" in the last 10042 hours.  No results for input(s): \"BNP\", \"NTBNPI\", \"NTBNP\" in the last 04638 hours.  No results for input(s): \"TSH\" in the last 89172 hours.  No results for input(s): \"INR\" in the last 69724 hours.        "

## 2024-11-18 PROCEDURE — 101N000002 HC CUSTODIAL CARE DAILY

## 2024-11-18 PROCEDURE — 99207 PR NO BILLABLE SERVICE THIS VISIT: CPT | Performed by: INTERNAL MEDICINE

## 2024-11-18 RX ADMIN — KETOCONAZOLE: 20 SHAMPOO, SUSPENSION TOPICAL at 09:58

## 2024-11-18 NOTE — PROGRESS NOTES
[facility]  Hospitalist Progress Note  Med Elliott MD 11/18/2024    Reason for Stay (Diagnosis): dementia         Assessment and Plan:      Summary of Stay: Jemal Gray is a 89 year old male with history of dementia with chronic aphasia. He was admitted on 10/2/2024 after he was brought to the ED by EMS from facility for evaluation of agitation. He had a prolonged hospitalization from 6/5/24 through 10/2/24 during which he was treated for cellulitis but mostly awaited long-term care placement due to dementia. He required appointment of guardianship.  He discharged to a long-term care facility on 10/2. Upon transfer to the long-term care facility he became agitated with swearing.  He was wandering around the long-term care facility and was entering other patient's rooms.  He was unable to be redirected and required transfer back to the ED.     In the ED he was stable, no major acute medical issue. He was given seroquel.  He required a bedside attendant as he was agitated and unable to be redirected.  He attempted to leave the emergency department and go into other patient's rooms. His facility was not able to take him back and there was no immediate safe disposition option.  He is here under group home care status.  Patient has improved and is no longer agitated. He remains medically stable and can be discharged whenever placement is found. Prior long term care facility is reportedly not taking patient back.  is looking into memory care units.     Patient has been here since 10/2/244 awaiting placement        Plans today:  -Chart reviewed including chart notes, lab results, imaging results  -Patient remains group home care  -Await long-term placement  -Medically ready for discharge when placement is found        Safe Disposition/long-term Care:  -  Patient remains medically stable for discharge when location found.  SW/CM following and working on options.  No current safe  disposition option.    -Patient had prolonged hospitalization due to need for disposition assistance in earlier 2024.  His daughter filed for guardianship paperwork which was completed in July.  Acquired MA application and long-term care placement.    -Ultimately will require 24/7 care and likely Memory care placement      Acute agitation, resolved  Behavior disturbances   Dementia with chronic aphasia:  Suspect triggered by changes in setting and recent move.  No physical complaints or concerns of infection.  Historically required Zyprexa over the summer when he was hospitalized for agitation.  Most recently had been prescribed Seroquel 12.5 mg twice daily as needed although he had not been needing most recently.  -Continue Seroquel PRN + 12.5 mg of Seroquel every evening  - ms prior check  -SW consulted  -needs memory care       Actinic Keratosis:  -Had surgery for this and prescribed steroids cream and ketoconazole cream, restarted per daughter request previously.     Sinus Bradycardia:  -Patient was noted to have HR in 50s.  Asymptomatic.  -no telemetry monitoring needed      History of Constipation:  -laxatives PRN.  Had BM 11/4.     Barriers to discharge: needs Memory care placement. Unfortunately the patient will not be assessed for elderly waiver until 12/9/2024.      Anticipated length of stay: Medically ready at any time.            Interval History (Subjective):      Patient is confused, consistent with dementia history.  Poor historian.                  Physical Exam:      Last Vital Signs:  BP (!) 143/87 (BP Location: Right arm)   Pulse 56   Temp 98.4  F (36.9  C) (Oral)   Resp 20   Wt 71.4 kg (157 lb 6.4 oz)   SpO2 96%   BMI 23.93 kg/m        Intake/Output Summary (Last 24 hours) at 11/18/2024 1043  Last data filed at 11/18/2024 0958  Gross per 24 hour   Intake 800 ml   Output --   Net 800 ml       Constitutional: Awake, alert, cooperative, no apparent distress     Respiratory: Clear to  "auscultation bilaterally, no crackles or wheezing   Cardiovascular: Regular rate and rhythm, normal S1 and S2, and no murmur noted   Abdomen: Normal bowel sounds, soft, non-distended, non-tender   Skin: No rashes, no cyanosis, dry to touch   Neuro: Alert and awake, no weakness, numbness, +memory loss   Extremities: No edema, normal range of motion   Other(s):        All other systems: Negative          Medications:      All current medications were reviewed with changes reflected in problem list.         Data:      All new lab and imaging data was reviewed.   Labs:       Lab Results   Component Value Date     10/20/2024     10/03/2024    Lab Results   Component Value Date    CHLORIDE 105 10/20/2024    CHLORIDE 103 10/03/2024    Lab Results   Component Value Date    BUN 23.6 10/20/2024    BUN 17.4 10/03/2024      Lab Results   Component Value Date    POTASSIUM 3.9 10/20/2024    POTASSIUM 3.9 10/03/2024    Lab Results   Component Value Date    CO2 24 10/20/2024    CO2 23 10/03/2024    Lab Results   Component Value Date    CR 0.94 10/20/2024    CR 0.89 10/03/2024        No results for input(s): \"WBC\", \"HGB\", \"HCT\", \"MCV\", \"PLT\" in the last 168 hours.   Imaging:   No results found for this or any previous visit (from the past 24 hours).   "

## 2024-11-18 NOTE — PLAN OF CARE
Pt is alert to self with confusion. pt refused his scheduled Seroquel during the shift. Pt has Dementia. Pt denies pain or discomfort. Pt is hard of hearing. Pt has no IV access. Pt is assist of one in transfer and care. Pt takes meds crushed with pudding. Bed alarm in place and call light within reach. Plan is for placement.           Goal Outcome Evaluation:      Plan of Care Reviewed With: patient    Overall Patient Progress: improvingOverall Patient Progress: improving    Outcome Evaluation: pt is confused. no change in care plan. plan for placement.      Problem: Fall Injury Risk  Goal: Absence of Fall and Fall-Related Injury  11/18/2024 0002 by Reji Duron RN  Outcome: Progressing  11/18/2024 0002 by Reji Duron RN  Outcome: Progressing  Intervention: Identify and Manage Contributors  Recent Flowsheet Documentation  Taken 11/17/2024 2009 by Reji Duron RN  Medication Review/Management: medications reviewed  Intervention: Promote Injury-Free Environment  Recent Flowsheet Documentation  Taken 11/17/2024 2009 by Reji Duron RN  Safety Promotion/Fall Prevention:   activity supervised   assistive device/personal items within reach   clutter free environment maintained   increase visualization of patient   increased rounding and observation   patient and family education   nonskid shoes/slippers when out of bed   safety round/check completed     Problem: Adult Inpatient Plan of Care  Goal: Plan of Care Review  Description: The Plan of Care Review/Shift note should be completed every shift.  The Outcome Evaluation is a brief statement about your assessment that the patient is improving, declining, or no change.  This information will be displayed automatically on your shift  note.  11/18/2024 0002 by Reji Duron RN  Outcome: Progressing  Flowsheets (Taken 11/18/2024 0002)  Outcome Evaluation: pt is confused. no change in care plan. plan for  "placement.  Plan of Care Reviewed With: patient  Overall Patient Progress: improving  11/18/2024 0002 by Reji Duron RN  Outcome: Progressing  Flowsheets (Taken 11/18/2024 0002)  Outcome Evaluation: pt is confused. no change in care plan. plan for placement.  Plan of Care Reviewed With: patient  Overall Patient Progress: improving  Goal: Patient-Specific Goal (Individualized)  Description: You can add care plan individualizations to a care plan. Examples of Individualization might be:  \"Parent requests to be called daily at 9am for status\", \"I have a hard time hearing out of my right ear\", or \"Do not touch me to wake me up as it startles  me\".  11/18/2024 0002 by Reji Duron RN  Outcome: Progressing  11/18/2024 0002 by Reji Duron RN  Outcome: Progressing  Goal: Absence of Hospital-Acquired Illness or Injury  11/18/2024 0002 by Reji Duron RN  Outcome: Progressing  11/18/2024 0002 by Reji Duron RN  Outcome: Progressing  Intervention: Identify and Manage Fall Risk  Recent Flowsheet Documentation  Taken 11/17/2024 2009 by Reji Duron RN  Safety Promotion/Fall Prevention:   activity supervised   assistive device/personal items within reach   clutter free environment maintained   increase visualization of patient   increased rounding and observation   patient and family education   nonskid shoes/slippers when out of bed   safety round/check completed  Intervention: Prevent Skin Injury  Recent Flowsheet Documentation  Taken 11/17/2024 2009 by Reji Duron RN  Body Position: position changed independently  Intervention: Prevent Infection  Recent Flowsheet Documentation  Taken 11/17/2024 2009 by Reji Duron RN  Infection Prevention:   hand hygiene promoted   rest/sleep promoted   single patient room provided  Goal: Optimal Comfort and Wellbeing  11/18/2024 0002 by Reji Duron RN  Outcome: " Progressing  11/18/2024 0002 by Reji Duron RN  Outcome: Progressing  Goal: Readiness for Transition of Care  11/18/2024 0002 by Reji Duron RN  Outcome: Progressing  11/18/2024 0002 by Reji Duron RN  Outcome: Progressing     Problem: Gas Exchange Impaired  Goal: Optimal Gas Exchange  11/18/2024 0002 by Reji Duron RN  Outcome: Progressing  11/18/2024 0002 by Reji Duron RN  Outcome: Progressing  Intervention: Optimize Oxygenation and Ventilation  Recent Flowsheet Documentation  Taken 11/17/2024 2009 by Reji Duron RN  Head of Bed (HOB) Positioning: HOB at 20-30 degrees     Problem: Adult Inpatient Plan of Care  Goal: Plan of Care Review  Description: The Plan of Care Review/Shift note should be completed every shift.  The Outcome Evaluation is a brief statement about your assessment that the patient is improving, declining, or no change.  This information will be displayed automatically on your shift  note.  Recent Flowsheet Documentation  Taken 11/18/2024 0002 by Reji Duron RN  Outcome Evaluation: pt is confused. no change in care plan. plan for placement.  Plan of Care Reviewed With: patient  Overall Patient Progress: improving  Goal: Absence of Hospital-Acquired Illness or Injury  Intervention: Identify and Manage Fall Risk  Recent Flowsheet Documentation  Taken 11/17/2024 2009 by Reji Duron RN  Safety Promotion/Fall Prevention:   activity supervised   assistive device/personal items within reach   clutter free environment maintained   increase visualization of patient   increased rounding and observation   patient and family education   nonskid shoes/slippers when out of bed   safety round/check completed  Intervention: Prevent Skin Injury  Recent Flowsheet Documentation  Taken 11/17/2024 2009 by Reji Duron RN  Body Position: position changed independently  Intervention: Prevent  Infection  Recent Flowsheet Documentation  Taken 11/17/2024 2009 by Reji Duron RN  Infection Prevention:   hand hygiene promoted   rest/sleep promoted   single patient room provided     Problem: Fall Injury Risk  Goal: Absence of Fall and Fall-Related Injury  Intervention: Identify and Manage Contributors  Recent Flowsheet Documentation  Taken 11/17/2024 2009 by Reji Duron RN  Medication Review/Management: medications reviewed  Intervention: Promote Injury-Free Environment  Recent Flowsheet Documentation  Taken 11/17/2024 2009 by Reji Duron RN  Safety Promotion/Fall Prevention:   activity supervised   assistive device/personal items within reach   clutter free environment maintained   increase visualization of patient   increased rounding and observation   patient and family education   nonskid shoes/slippers when out of bed   safety round/check completed     Problem: Skin Injury Risk Increased  Goal: Skin Health and Integrity  Intervention: Plan: Nurse Driven Intervention: Moisture Management  Recent Flowsheet Documentation  Taken 11/17/2024 2009 by Reji Duron RN  Moisture Interventions: Encourage regular toileting  Intervention: Plan: Nurse Driven Intervention: Friction and Shear  Recent Flowsheet Documentation  Taken 11/17/2024 2009 by Reji Duron RN  Friction/Shear Interventions: HOB 30 degrees or less  Intervention: Optimize Skin Protection  Recent Flowsheet Documentation  Taken 11/17/2024 2009 by Reji Duron RN  Activity Management:   activity encouraged   activity adjusted per tolerance  Head of Bed (HOB) Positioning: HOB at 20-30 degrees     Problem: Adult Inpatient Plan of Care  Goal: Plan of Care Review  Description: The Plan of Care Review/Shift note should be completed every shift.  The Outcome Evaluation is a brief statement about your assessment that the patient is improving, declining, or no change.  This  information will be displayed automatically on your shift  note.  Recent Flowsheet Documentation  Taken 11/18/2024 0002 by Reji Duron RN  Outcome Evaluation: pt is confused. no change in care plan. plan for placement.  Plan of Care Reviewed With: patient  Overall Patient Progress: improving  Goal: Absence of Hospital-Acquired Illness or Injury  Intervention: Identify and Manage Fall Risk  Recent Flowsheet Documentation  Taken 11/17/2024 2009 by Reji Duron RN  Safety Promotion/Fall Prevention:   activity supervised   assistive device/personal items within reach   clutter free environment maintained   increase visualization of patient   increased rounding and observation   patient and family education   nonskid shoes/slippers when out of bed   safety round/check completed  Intervention: Prevent Skin Injury  Recent Flowsheet Documentation  Taken 11/17/2024 2009 by Reji Duron RN  Body Position: position changed independently  Intervention: Prevent Infection  Recent Flowsheet Documentation  Taken 11/17/2024 2009 by Reji Duron RN  Infection Prevention:   hand hygiene promoted   rest/sleep promoted   single patient room provided     Problem: Comorbidity Management  Goal: Maintenance of Asthma Control  Intervention: Maintain Asthma Symptom Control  Recent Flowsheet Documentation  Taken 11/17/2024 2009 by Reji Duron RN  Medication Review/Management: medications reviewed  Goal: Maintenance of Behavioral Health Symptom Control  Intervention: Maintain Behavioral Health Symptom Control  Recent Flowsheet Documentation  Taken 11/17/2024 2009 by Reji Duron RN  Medication Review/Management: medications reviewed  Goal: Maintenance of COPD Symptom Control  Intervention: Maintain COPD Symptom Control  Recent Flowsheet Documentation  Taken 11/17/2024 2009 by Reji Duron RN  Medication Review/Management: medications reviewed  Goal:  Blood Glucose Levels Within Targeted Range  Intervention: Monitor and Manage Glycemia  Recent Flowsheet Documentation  Taken 11/17/2024 2009 by Reji Duron RN  Medication Review/Management: medications reviewed  Goal: Maintenance of Heart Failure Symptom Control  Intervention: Maintain Heart Failure Management  Recent Flowsheet Documentation  Taken 11/17/2024 2009 by Reji Duron RN  Medication Review/Management: medications reviewed  Goal: Blood Pressure in Desired Range  Intervention: Maintain Blood Pressure Management  Recent Flowsheet Documentation  Taken 11/17/2024 2009 by Reji Duron RN  Medication Review/Management: medications reviewed  Goal: Maintenance of Osteoarthritis Symptom Control  Intervention: Maintain Osteoarthritis Symptom Control  Recent Flowsheet Documentation  Taken 11/17/2024 2009 by Reji Duron RN  Assistive Device Utilized:   walker   gait belt  Activity Management:   activity encouraged   activity adjusted per tolerance  Medication Review/Management: medications reviewed  Goal: Bariatric Home Regimen Maintained  Intervention: Maintain and Manage Postbariatric Surgery Care  Recent Flowsheet Documentation  Taken 11/17/2024 2009 by Reji Duron RN  Medication Review/Management: medications reviewed  Goal: Maintenance of Seizure Control  Intervention: Maintain Seizure Symptom Control  Recent Flowsheet Documentation  Taken 11/17/2024 2009 by Reji Duron RN  Medication Review/Management: medications reviewed     Problem: Pain Acute  Goal: Optimal Pain Control and Function  Intervention: Prevent or Manage Pain  Recent Flowsheet Documentation  Taken 11/17/2024 2009 by Reji Duron RN  Medication Review/Management: medications reviewed     Problem: Adult Inpatient Plan of Care  Goal: Plan of Care Review  Description: The Plan of Care Review/Shift note should be completed every shift.  The Outcome  Evaluation is a brief statement about your assessment that the patient is improving, declining, or no change.  This information will be displayed automatically on your shift  note.  Recent Flowsheet Documentation  Taken 11/18/2024 0002 by Reji Duron RN  Outcome Evaluation: pt is confused. no change in care plan. plan for placement.  Plan of Care Reviewed With: patient  Overall Patient Progress: improving  Goal: Absence of Hospital-Acquired Illness or Injury  Intervention: Identify and Manage Fall Risk  Recent Flowsheet Documentation  Taken 11/17/2024 2009 by Reji Duron RN  Safety Promotion/Fall Prevention:   activity supervised   assistive device/personal items within reach   clutter free environment maintained   increase visualization of patient   increased rounding and observation   patient and family education   nonskid shoes/slippers when out of bed   safety round/check completed  Intervention: Prevent Skin Injury  Recent Flowsheet Documentation  Taken 11/17/2024 2009 by Reji Duron RN  Body Position: position changed independently     Problem: Fall Injury Risk  Goal: Absence of Fall and Fall-Related Injury  Intervention: Identify and Manage Contributors  Recent Flowsheet Documentation  Taken 11/17/2024 2009 by Reji Duron RN  Medication Review/Management: medications reviewed  Intervention: Promote Injury-Free Environment  Recent Flowsheet Documentation  Taken 11/17/2024 2009 by Reji Duron RN  Safety Promotion/Fall Prevention:   activity supervised   assistive device/personal items within reach   clutter free environment maintained   increase visualization of patient   increased rounding and observation   patient and family education   nonskid shoes/slippers when out of bed   safety round/check completed     Problem: Fall Injury Risk  Goal: Absence of Fall and Fall-Related Injury  Intervention: Identify and Manage Contributors  Recent  Flowsheet Documentation  Taken 11/17/2024 2009 by Reji Duron RN  Medication Review/Management: medications reviewed  Intervention: Promote Injury-Free Environment  Recent Flowsheet Documentation  Taken 11/17/2024 2009 by Reji Duron RN  Safety Promotion/Fall Prevention:   activity supervised   assistive device/personal items within reach   clutter free environment maintained   increase visualization of patient   increased rounding and observation   patient and family education   nonskid shoes/slippers when out of bed   safety round/check completed     Problem: Adult Inpatient Plan of Care  Goal: Plan of Care Review  Description: The Plan of Care Review/Shift note should be completed every shift.  The Outcome Evaluation is a brief statement about your assessment that the patient is improving, declining, or no change.  This information will be displayed automatically on your shift  note.  11/18/2024 0002 by Reji Duron RN  Outcome: Progressing  Flowsheets (Taken 11/18/2024 0002)  Outcome Evaluation: pt is confused. no change in care plan. plan for placement.  Plan of Care Reviewed With: patient  Overall Patient Progress: improving  11/18/2024 0002 by Reji Duron RN  Outcome: Progressing  Flowsheets (Taken 11/18/2024 0002)  Outcome Evaluation: pt is confused. no change in care plan. plan for placement.  Plan of Care Reviewed With: patient  Overall Patient Progress: improving  Goal: Absence of Hospital-Acquired Illness or Injury  Intervention: Identify and Manage Fall Risk  Recent Flowsheet Documentation  Taken 11/17/2024 2009 by Reji Duron RN  Safety Promotion/Fall Prevention:   activity supervised   assistive device/personal items within reach   clutter free environment maintained   increase visualization of patient   increased rounding and observation   patient and family education   nonskid shoes/slippers when out of bed   safety round/check  completed  Intervention: Prevent Skin Injury  Recent Flowsheet Documentation  Taken 11/17/2024 2009 by Reji Duron, RN  Body Position: position changed independently  Intervention: Prevent Infection  Recent Flowsheet Documentation  Taken 11/17/2024 2009 by Reji Duron, RN  Infection Prevention:   hand hygiene promoted   rest/sleep promoted   single patient room provided     Problem: Gas Exchange Impaired  Goal: Optimal Gas Exchange  Intervention: Optimize Oxygenation and Ventilation  Recent Flowsheet Documentation  Taken 11/17/2024 2009 by Reji Duron, RN  Head of Bed (HOB) Positioning: HOB at 20-30 degrees

## 2024-11-19 PROCEDURE — 99207 PR NO BILLABLE SERVICE THIS VISIT: CPT | Performed by: INTERNAL MEDICINE

## 2024-11-19 PROCEDURE — 101N000002 HC CUSTODIAL CARE DAILY

## 2024-11-19 NOTE — PLAN OF CARE
Goal Outcome Evaluation:      Plan of Care Reviewed With: patient    Overall Patient Progress: no changeOverall Patient Progress: no change    Outcome Evaluation: Ambulated in quiles, would not take HS seroquel. Placement Pending      Problem: Adult Inpatient Plan of Care  Goal: Plan of Care Review  Description: The Plan of Care Review/Shift note should be completed every shift.  The Outcome Evaluation is a brief statement about your assessment that the patient is improving, declining, or no change.  This information will be displayed automatically on your shift  note.  Recent Flowsheet Documentation  Taken 11/19/2024 0657 by Shefali Selby RN  Outcome Evaluation: Ambulated in quiles, would not take HS seroquel. Placement Pending  Plan of Care Reviewed With: patient  Overall Patient Progress: no change

## 2024-11-19 NOTE — PROGRESS NOTES
[facility]  Hospitalist Progress Note  Med Elliott MD 11/19/2024    Reason for Stay (Diagnosis): placement need         Assessment and Plan:      Summary of Stay: Jemal Gray is a 89 year old male with history of dementia with chronic aphasia. He was admitted on 10/2/2024 after he was brought to the ED by EMS from facility for evaluation of agitation. He had a prolonged hospitalization from 6/5/24 through 10/2/24 during which he was treated for cellulitis but mostly awaited long-term care placement due to dementia. He required appointment of guardianship.  He discharged to a long-term care facility on 10/2. Upon transfer to the long-term care facility he became agitated with swearing.  He was wandering around the long-term care facility and was entering other patient's rooms.  He was unable to be redirected and required transfer back to the ED.     In the ED he was stable, no major acute medical issue. He was given seroquel.  He required a bedside attendant as he was agitated and unable to be redirected.  He attempted to leave the emergency department and go into other patient's rooms. His facility was not able to take him back and there was no immediate safe disposition option.  He is here under alf care status.  Patient has improved and is no longer agitated. He remains medically stable and can be discharged whenever placement is found. Prior long term care facility is reportedly not taking patient back.  is looking into memory care units.     Patient has been here since 10/2/244 awaiting placement        Plans today:  -Chart reviewed including chart notes, lab results, imaging results  -Patient remains alf care  -Await long-term placement  -Medically ready for discharge when placement is found        Safe Disposition/correction Care:  -  Patient remains medically stable for discharge when location found.  SW/CM following and working on options.  No current  safe disposition option.    -Patient had prolonged hospitalization due to need for disposition assistance in earlier 2024.  His daughter filed for guardianship paperwork which was completed in July.  Acquired MA application and long-term care placement.    -Ultimately will require 24/7 care and likely Memory care placement      Acute agitation, resolved  Behavior disturbances   Dementia with chronic aphasia:  Suspect triggered by changes in setting and recent move.  No physical complaints or concerns of infection.  Historically required Zyprexa over the summer when he was hospitalized for agitation.  Most recently had been prescribed Seroquel 12.5 mg twice daily as needed although he had not been needing most recently.  -Continue Seroquel PRN + 12.5 mg of Seroquel every evening  - ms prior check  -SW consulted  -needs memory care        Actinic Keratosis:  -Had surgery for this and prescribed steroids cream and ketoconazole cream, restarted per daughter request previously.     Sinus Bradycardia:  -Patient was noted to have HR in 50s.  Asymptomatic.  -no telemetry monitoring needed      History of Constipation:  -laxatives PRN.  Had BM 11/4.     Barriers to discharge: needs Memory care placement. Unfortunately the patient will not be assessed for elderly waiver until 12/9/2024.      Anticipated length of stay: Medically ready at any time.            Interval History (Subjective):      Poor historian given dementia.  No significant issues overnight.  No complaints                  Physical Exam:      Last Vital Signs:  /63 (BP Location: Right arm)   Pulse 57   Temp 98.6  F (37  C) (Oral)   Resp 20   Wt 71.4 kg (157 lb 6.4 oz)   SpO2 95%   BMI 23.93 kg/m        Intake/Output Summary (Last 24 hours) at 11/19/2024 1031  Last data filed at 11/19/2024 0936  Gross per 24 hour   Intake 720 ml   Output --   Net 720 ml       Constitutional: Awake, alert, cooperative, no apparent distress.  Eating breakfast,  "watching TV     Respiratory: Clear to auscultation bilaterally, no crackles or wheezing   Cardiovascular: Regular rate and rhythm, normal S1 and S2, and no murmur noted   Abdomen: Normal bowel sounds, soft, non-distended, non-tender   Skin: No rashes, no cyanosis, dry to touch   Neuro: Alert and awake, no weakness, numbness, +memory loss   Extremities: No edema, normal range of motion   Other(s):        All other systems: Negative          Medications:      All current medications were reviewed with changes reflected in problem list.         Data:      All new lab and imaging data was reviewed.   Labs:       Lab Results   Component Value Date     10/20/2024     10/03/2024    Lab Results   Component Value Date    CHLORIDE 105 10/20/2024    CHLORIDE 103 10/03/2024    Lab Results   Component Value Date    BUN 23.6 10/20/2024    BUN 17.4 10/03/2024      Lab Results   Component Value Date    POTASSIUM 3.9 10/20/2024    POTASSIUM 3.9 10/03/2024    Lab Results   Component Value Date    CO2 24 10/20/2024    CO2 23 10/03/2024    Lab Results   Component Value Date    CR 0.94 10/20/2024    CR 0.89 10/03/2024        No results for input(s): \"WBC\", \"HGB\", \"HCT\", \"MCV\", \"PLT\" in the last 168 hours.   Imaging:   No results found for this or any previous visit (from the past 24 hours).   "

## 2024-11-19 NOTE — PLAN OF CARE
"Shift: 4218-1869  A&Ox1 to self only. This evening he is singing and stated his name \"Jemal Stone, Dimitris Stone, football, football, basketball cristiane\"   VSS on RA  Does not appear to be in pain  Activity: SBA walker, ambulates to bathroom and ambulated in hallway multiple times this shift        Goal Outcome Evaluation:      Plan of Care Reviewed With: patient    Overall Patient Progress: no changeOverall Patient Progress: no change    Outcome Evaluation: pt ambulated in hallway multiple times this shift. ate decent for all three meals      Problem: Fall Injury Risk  Goal: Absence of Fall and Fall-Related Injury  Outcome: Progressing     Problem: Adult Inpatient Plan of Care  Goal: Plan of Care Review  Description: The Plan of Care Review/Shift note should be completed every shift.  The Outcome Evaluation is a brief statement about your assessment that the patient is improving, declining, or no change.  This information will be displayed automatically on your shift  note.  Outcome: Progressing  Flowsheets (Taken 11/18/2024 1831)  Outcome Evaluation: pt ambulated in hallway multiple times this shift. ate decent for all three meals  Plan of Care Reviewed With: patient  Overall Patient Progress: no change  Goal: Patient-Specific Goal (Individualized)  Description: You can add care plan individualizations to a care plan. Examples of Individualization might be:  \"Parent requests to be called daily at 9am for status\", \"I have a hard time hearing out of my right ear\", or \"Do not touch me to wake me up as it startles  me\".  Outcome: Progressing  Goal: Absence of Hospital-Acquired Illness or Injury  Outcome: Progressing  Intervention: Prevent Skin Injury  Recent Flowsheet Documentation  Taken 11/18/2024 1058 by Vicente Silva, RN  Body Position: supine, head elevated  Taken 11/18/2024 0958 by Vicente Silva, RN  Body Position: supine, head elevated  Taken 11/18/2024 0741 by Vicente Silva, RN  Body Position: supine, " head elevated  Goal: Optimal Comfort and Wellbeing  Outcome: Progressing  Goal: Readiness for Transition of Care  Outcome: Progressing     Problem: Gas Exchange Impaired  Goal: Optimal Gas Exchange  Outcome: Progressing  Intervention: Optimize Oxygenation and Ventilation  Recent Flowsheet Documentation  Taken 11/18/2024 1058 by Vicente Silva, RN  Head of Bed (HOB) Positioning: HOB at 30-45 degrees  Taken 11/18/2024 0958 by Vicente Silva, RN  Head of Bed (HOB) Positioning: HOB at 30-45 degrees  Taken 11/18/2024 0741 by Vicente Silva, RN  Head of Bed (HOB) Positioning: HOB at 30 degrees

## 2024-11-19 NOTE — PLAN OF CARE
Goal Outcome Evaluation:       No change. Pt cooperative, calm. Voiding w/o difficulty. Refused RN to do full skin check x2. Pt encouraged to lay on side at times. Ambulates w/staff in halls. SW following

## 2024-11-20 PROCEDURE — 99207 PR NO BILLABLE SERVICE THIS VISIT: CPT | Performed by: INTERNAL MEDICINE

## 2024-11-20 PROCEDURE — 101N000002 HC CUSTODIAL CARE DAILY

## 2024-11-20 RX ADMIN — KETOCONAZOLE: 20 SHAMPOO, SUSPENSION TOPICAL at 10:24

## 2024-11-20 NOTE — PLAN OF CARE
"Pt is alert to self with confusion. pt refused his scheduled Seroquel during the shift. Pt has Dementia. Pt denies pain or discomfort. Pt is hard of hearing. Pt has no IV access. Pt is assist of one in transfer and care. Pt takes meds crushed with pudding. Bed alarm in place and call light within reach. Plan is for placement.            Goal Outcome Evaluation:      Plan of Care Reviewed With: patient    Overall Patient Progress: improvingOverall Patient Progress: improving    Outcome Evaluation: pt is confuse. pt is waiting for placement. pt ambulated in the hallway.      Problem: Fall Injury Risk  Goal: Absence of Fall and Fall-Related Injury  Outcome: Progressing     Problem: Adult Inpatient Plan of Care  Goal: Plan of Care Review  Description: The Plan of Care Review/Shift note should be completed every shift.  The Outcome Evaluation is a brief statement about your assessment that the patient is improving, declining, or no change.  This information will be displayed automatically on your shift  note.  Outcome: Progressing  Flowsheets (Taken 11/19/2024 5306)  Outcome Evaluation: pt is confuse. pt is waiting for placement. pt ambulated in the hallway.  Plan of Care Reviewed With: patient  Overall Patient Progress: improving  Goal: Patient-Specific Goal (Individualized)  Description: You can add care plan individualizations to a care plan. Examples of Individualization might be:  \"Parent requests to be called daily at 9am for status\", \"I have a hard time hearing out of my right ear\", or \"Do not touch me to wake me up as it startles  me\".  Outcome: Progressing  Goal: Absence of Hospital-Acquired Illness or Injury  Outcome: Progressing  Intervention: Prevent Skin Injury  Recent Flowsheet Documentation  Taken 11/19/2024 2027 by Reji Duron, RN  Body Position: position changed independently  Intervention: Prevent Infection  Recent Flowsheet Documentation  Taken 11/19/2024 2027 by Reji Duron, " RN  Infection Prevention:   single patient room provided   rest/sleep promoted   hand hygiene promoted  Goal: Optimal Comfort and Wellbeing  Outcome: Progressing  Goal: Readiness for Transition of Care  Outcome: Progressing     Problem: Gas Exchange Impaired  Goal: Optimal Gas Exchange  Outcome: Progressing  Intervention: Optimize Oxygenation and Ventilation  Recent Flowsheet Documentation  Taken 11/19/2024 2027 by Reji Duron RN  Head of Bed (HOB) Positioning: HOB at 30-45 degrees     Problem: Adult Inpatient Plan of Care  Goal: Plan of Care Review  Description: The Plan of Care Review/Shift note should be completed every shift.  The Outcome Evaluation is a brief statement about your assessment that the patient is improving, declining, or no change.  This information will be displayed automatically on your shift  note.  Recent Flowsheet Documentation  Taken 11/19/2024 2338 by Reji Duron RN  Outcome Evaluation: pt is confuse. pt is waiting for placement. pt ambulated in the hallway.  Plan of Care Reviewed With: patient  Overall Patient Progress: improving  Goal: Absence of Hospital-Acquired Illness or Injury  Intervention: Prevent Skin Injury  Recent Flowsheet Documentation  Taken 11/19/2024 2027 by Reji Duron RN  Body Position: position changed independently  Intervention: Prevent Infection  Recent Flowsheet Documentation  Taken 11/19/2024 2027 by Reji Duron RN  Infection Prevention:   single patient room provided   rest/sleep promoted   hand hygiene promoted     Problem: Skin Injury Risk Increased  Goal: Skin Health and Integrity  Intervention: Plan: Nurse Driven Intervention: Moisture Management  Recent Flowsheet Documentation  Taken 11/19/2024 2027 by Reji Duron RN  Moisture Interventions: Encourage regular toileting  Intervention: Plan: Nurse Driven Intervention: Friction and Shear  Recent Flowsheet Documentation  Taken 11/19/2024 2027 by  Reji Duron RN  Friction/Shear Interventions: HOB 30 degrees or less  Intervention: Optimize Skin Protection  Recent Flowsheet Documentation  Taken 11/19/2024 2027 by Reji Duron RN  Activity Management:   activity adjusted per tolerance   activity encouraged   ambulated to bathroom  Head of Bed (HOB) Positioning: HOB at 30-45 degrees     Problem: Adult Inpatient Plan of Care  Goal: Plan of Care Review  Description: The Plan of Care Review/Shift note should be completed every shift.  The Outcome Evaluation is a brief statement about your assessment that the patient is improving, declining, or no change.  This information will be displayed automatically on your shift  note.  Recent Flowsheet Documentation  Taken 11/19/2024 2338 by Reji Duron RN  Outcome Evaluation: pt is confuse. pt is waiting for placement. pt ambulated in the hallway.  Plan of Care Reviewed With: patient  Overall Patient Progress: improving  Goal: Absence of Hospital-Acquired Illness or Injury  Intervention: Prevent Skin Injury  Recent Flowsheet Documentation  Taken 11/19/2024 2027 by Reji Duron RN  Body Position: position changed independently  Intervention: Prevent Infection  Recent Flowsheet Documentation  Taken 11/19/2024 2027 by Reji Duron RN  Infection Prevention:   single patient room provided   rest/sleep promoted   hand hygiene promoted     Problem: Comorbidity Management  Goal: Maintenance of Osteoarthritis Symptom Control  Intervention: Maintain Osteoarthritis Symptom Control  Recent Flowsheet Documentation  Taken 11/19/2024 2027 by Reji Duron RN  Assistive Device Utilized: walker  Activity Management:   activity adjusted per tolerance   activity encouraged   ambulated to bathroom     Problem: Adult Inpatient Plan of Care  Goal: Plan of Care Review  Description: The Plan of Care Review/Shift note should be completed every shift.  The Outcome  Evaluation is a brief statement about your assessment that the patient is improving, declining, or no change.  This information will be displayed automatically on your shift  note.  Recent Flowsheet Documentation  Taken 11/19/2024 2338 by Reji Duron RN  Outcome Evaluation: pt is confuse. pt is waiting for placement. pt ambulated in the hallway.  Plan of Care Reviewed With: patient  Overall Patient Progress: improving  Goal: Absence of Hospital-Acquired Illness or Injury  Intervention: Prevent Skin Injury  Recent Flowsheet Documentation  Taken 11/19/2024 2027 by Reji Duron RN  Body Position: position changed independently     Problem: Adult Inpatient Plan of Care  Goal: Plan of Care Review  Description: The Plan of Care Review/Shift note should be completed every shift.  The Outcome Evaluation is a brief statement about your assessment that the patient is improving, declining, or no change.  This information will be displayed automatically on your shift  note.  Outcome: Progressing  Flowsheets (Taken 11/19/2024 2338)  Outcome Evaluation: pt is confuse. pt is waiting for placement. pt ambulated in the hallway.  Plan of Care Reviewed With: patient  Overall Patient Progress: improving  Goal: Absence of Hospital-Acquired Illness or Injury  Intervention: Prevent Skin Injury  Recent Flowsheet Documentation  Taken 11/19/2024 2027 by Reji Duron RN  Body Position: position changed independently  Intervention: Prevent Infection  Recent Flowsheet Documentation  Taken 11/19/2024 2027 by Reji Duron RN  Infection Prevention:   single patient room provided   rest/sleep promoted   hand hygiene promoted     Problem: Gas Exchange Impaired  Goal: Optimal Gas Exchange  Intervention: Optimize Oxygenation and Ventilation  Recent Flowsheet Documentation  Taken 11/19/2024 2027 by Reji Duron RN  Head of Bed (HOB) Positioning: HOB at 30-45 degrees

## 2024-11-20 NOTE — PLAN OF CARE
Goal Outcome Evaluation:      Plan of Care Reviewed With: patient    Overall Patient Progress: no changeOverall Patient Progress: no change    Outcome Evaluation: No change in status. Pleasantly confuse. Ambulate w/staff to BR/hallways. sacrum coccxy blanchable redeness. Attempted to get pt to turn/reposition, however he refused. Pillows put under buttock & Mepilex on coccxy.        Problem: Adult Inpatient Plan of Care  Goal: Plan of Care Review  Description: The Plan of Care Review/Shift note should be completed every shift.  The Outcome Evaluation is a brief statement about your assessment that the patient is improving, declining, or no change.  This information will be displayed automatically on your shift  note.  Recent Flowsheet Documentation  Taken 11/20/2024 1309 by Kym Mobley RN  Outcome Evaluation: No change in status. Pleasantly confuse. Ambulate w/staff to BR/hallways. sacrum coccxy blanchable redeness. Attempted to get pt to turn/reposition, however he refused. Pillows put under buttock & Mepilex on coccxy.  Plan of Care Reviewed With: patient  Overall Patient Progress: no change  Goal: Absence of Hospital-Acquired Illness or Injury  Intervention: Identify and Manage Fall Risk  Recent Flowsheet Documentation  Taken 11/20/2024 0923 by Kym Mobley RN  Safety Promotion/Fall Prevention: activity supervised  Intervention: Prevent Skin Injury  Recent Flowsheet Documentation  Taken 11/20/2024 0923 by Kym Mobley, RN  Body Position: position changed independently

## 2024-11-20 NOTE — PROGRESS NOTES
[facility]  Hospitalist Progress Note  Med Elliott MD 11/20/2024    Reason for Stay (Diagnosis): placement need         Assessment and Plan:      Summary of Stay: Jemal Gray is a 89 year old male with history of dementia with chronic aphasia. He was admitted on 10/2/2024 after he was brought to the ED by EMS from facility for evaluation of agitation. He had a prolonged hospitalization from 6/5/24 through 10/2/24 during which he was treated for cellulitis but mostly awaited long-term care placement due to dementia. He required appointment of guardianship.  He discharged to a long-term care facility on 10/2. Upon transfer to the long-term care facility he became agitated with swearing.  He was wandering around the long-term care facility and was entering other patient's rooms.  He was unable to be redirected and required transfer back to the ED.     In the ED he was stable, no major acute medical issue. He was given seroquel.  He required a bedside attendant as he was agitated and unable to be redirected.  He attempted to leave the emergency department and go into other patient's rooms. His facility was not able to take him back and there was no immediate safe disposition option.  He is here under senior care care status.  Patient has improved and is no longer agitated. He remains medically stable and can be discharged whenever placement is found. Prior long term care facility is reportedly not taking patient back.  is looking into memory care units.     Patient has been here since 10/2/244 awaiting placement        Plans today:  -as patient consistently refusing scheduled Seroquel and has not been receiving the medication, will change to prn  -Patient remains senior care care  -Await long-term placement  -Medically ready for discharge when placement is found        Safe Disposition/alf Care:  -  Patient remains medically stable for discharge when location found.  SW/CM  following and working on options.  No current safe disposition option.    -Patient had prolonged hospitalization due to need for disposition assistance in earlier 2024.  His daughter filed for guardianship paperwork which was completed in July.  Acquired MA application and long-term care placement.    -Ultimately will require 24/7 care and likely Memory care placement      Acute agitation, resolved  Behavior disturbances   Dementia with chronic aphasia:  Suspect triggered by changes in setting and recent move.  No physical complaints or concerns of infection.  Historically required Zyprexa over the summer when he was hospitalized for agitation.  Most recently had been prescribed Seroquel 12.5 mg twice daily as needed although he had not been needing most recently.  -Continue Seroquel PRN  - ms prior check  -SW consulted  -needs memory care        Actinic Keratosis:  -Had surgery for this and prescribed steroids cream and ketoconazole cream, restarted per daughter request previously.     Sinus Bradycardia:  -Patient was noted to have HR in 50s.  Asymptomatic.  -no telemetry monitoring needed      History of Constipation:  -laxatives PRN.  Had BM 11/4.     Barriers to discharge: needs Memory care placement. Unfortunately the patient will not be assessed for elderly waiver until 12/9/2024.      Anticipated length of stay: Medically ready at any time.                  Interval History (Subjective):      Poor historian.  No concerns or complaints.  Await long-term care placement                  Physical Exam:      Last Vital Signs:  BP (!) 146/74 (BP Location: Right arm, Patient Position: Supine)   Pulse 50   Temp 98.6  F (37  C) (Oral)   Resp 20   Wt 71.4 kg (157 lb 6.4 oz)   SpO2 98%   BMI 23.93 kg/m        Intake/Output Summary (Last 24 hours) at 11/20/2024 1021  Last data filed at 11/19/2024 1200  Gross per 24 hour   Intake 480 ml   Output --   Net 480 ml       Constitutional: Awake, alert, cooperative,  "no apparent distress     Respiratory: Clear to auscultation bilaterally, no crackles or wheezing   Cardiovascular: Regular rate and rhythm, normal S1 and S2, and no murmur noted   Abdomen: Normal bowel sounds, soft, non-distended, non-tender   Skin: No rashes, no cyanosis, dry to touch   Neuro: Alert and awake, no weakness, numbness, ++memory loss   Extremities: No edema, normal range of motion   Other(s):        All other systems: Negative          Medications:      All current medications were reviewed with changes reflected in problem list.         Data:      All new lab and imaging data was reviewed.   Labs:       Lab Results   Component Value Date     10/20/2024     10/03/2024    Lab Results   Component Value Date    CHLORIDE 105 10/20/2024    CHLORIDE 103 10/03/2024    Lab Results   Component Value Date    BUN 23.6 10/20/2024    BUN 17.4 10/03/2024      Lab Results   Component Value Date    POTASSIUM 3.9 10/20/2024    POTASSIUM 3.9 10/03/2024    Lab Results   Component Value Date    CO2 24 10/20/2024    CO2 23 10/03/2024    Lab Results   Component Value Date    CR 0.94 10/20/2024    CR 0.89 10/03/2024        No results for input(s): \"WBC\", \"HGB\", \"HCT\", \"MCV\", \"PLT\" in the last 168 hours.   Imaging:   No results found for this or any previous visit (from the past 24 hours).   "

## 2024-11-21 PROCEDURE — 99207 PR NO BILLABLE SERVICE THIS VISIT: CPT | Performed by: INTERNAL MEDICINE

## 2024-11-21 PROCEDURE — 101N000002 HC CUSTODIAL CARE DAILY

## 2024-11-21 NOTE — PLAN OF CARE
Plan of Care Reviewed With: patient          Outcome Evaluation: Pt bit agitated at times, difficult to redirect at times. Awaiting placement.      Problem: Adult Inpatient Plan of Care  Goal: Plan of Care Review  Description: The Plan of Care Review/Shift note should be completed every shift.  The Outcome Evaluation is a brief statement about your assessment that the patient is improving, declining, or no change.  This information will be displayed automatically on your shift  note.  Recent Flowsheet Documentation  Taken 11/21/2024 8668 by Shefali Selby RN  Outcome Evaluation: Pt bit agitated at times, difficult to redirect at times. Awaiting placement.  Plan of Care Reviewed With: patient

## 2024-11-21 NOTE — PROGRESS NOTES
[facility]  Hospitalist Progress Note  Med Elliott MD 11/21/2024    Reason for Stay (Diagnosis): Dementia, placement         Assessment and Plan:      Summary of Stay: Jemal Gray is a 89 year old male with history of dementia with chronic aphasia. He was admitted on 10/2/2024 after he was brought to the ED by EMS from facility for evaluation of agitation. He had a prolonged hospitalization from 6/5/24 through 10/2/24 during which he was treated for cellulitis but mostly awaited long-term care placement due to dementia. He required appointment of guardianship.  He discharged to a long-term care facility on 10/2. Upon transfer to the long-term care facility he became agitated with swearing.  He was wandering around the long-term care facility and was entering other patient's rooms.  He was unable to be redirected and required transfer back to the ED.     In the ED he was stable, no major acute medical issue. He was given seroquel.  He required a bedside attendant as he was agitated and unable to be redirected.  He attempted to leave the emergency department and go into other patient's rooms. His facility was not able to take him back and there was no immediate safe disposition option.  He is here under halfway care status.  Patient has improved and is no longer agitated. He remains medically stable and can be discharged whenever placement is found. Prior long term care facility is reportedly not taking patient back.  is looking into memory care units.     Patient has been here since 10/2/244 awaiting placement        Plans today:  -Patient remains halfway care  -Await long-term placement  -Medically ready for discharge when placement is found        Safe Disposition/alf Care:  -  Patient remains medically stable for discharge when location found.  SW/CM following and working on options.  No current safe disposition option.    -Patient had prolonged hospitalization  due to need for disposition assistance in earlier 2024.  His daughter filed for guardianship paperwork which was completed in July.  Acquired MA application and long-term care placement.    -Ultimately will require 24/7 care and likely Memory care placement      Acute agitation, resolved  Behavior disturbances   Dementia with chronic aphasia:  Suspect triggered by changes in setting and recent move.  No physical complaints or concerns of infection.  Historically required Zyprexa over the summer when he was hospitalized for agitation.  Most recently had been prescribed Seroquel 12.5 mg twice daily as needed although he had not been needing most recently.  -Continue Seroquel PRN  - ms prior check  -SW consulted  -needs memory care        Actinic Keratosis:  -Had surgery for this and prescribed steroids cream and ketoconazole cream, restarted per daughter request previously.     Sinus Bradycardia:  -Patient was noted to have HR in 50s.  Asymptomatic.  -no telemetry monitoring needed      History of Constipation:  -laxatives PRN.  Had BM 11/4.     Barriers to discharge: needs Memory care placement. Unfortunately the patient will not be assessed for elderly waiver until 12/9/2024.      Anticipated length of stay: Medically ready at any time.               Interval History (Subjective):      Poor historian.  Has no complaints today.                  Physical Exam:      Last Vital Signs:  BP (!) 146/74 (BP Location: Right arm, Patient Position: Supine)   Pulse 50   Temp 98.6  F (37  C) (Oral)   Resp 20   Wt 71.4 kg (157 lb 6.4 oz)   SpO2 98%   BMI 23.93 kg/m      No intake or output data in the 24 hours ending 11/21/24 1009    Constitutional: Awake, alert, cooperative, no apparent distress     Respiratory: Clear to auscultation bilaterally, no crackles or wheezing   Cardiovascular: Regular rate and rhythm, normal S1 and S2, and no murmur noted   Abdomen: Normal bowel sounds, soft, non-distended, non-tender  "  Skin: No rashes, no cyanosis, dry to touch   Neuro: Alert and awake, no weakness, numbness, ++memory loss   Extremities: No edema, normal range of motion   Other(s):        All other systems: Negative          Medications:      All current medications were reviewed with changes reflected in problem list.         Data:      All new lab and imaging data was reviewed.   Labs:       Lab Results   Component Value Date     10/20/2024     10/03/2024    Lab Results   Component Value Date    CHLORIDE 105 10/20/2024    CHLORIDE 103 10/03/2024    Lab Results   Component Value Date    BUN 23.6 10/20/2024    BUN 17.4 10/03/2024      Lab Results   Component Value Date    POTASSIUM 3.9 10/20/2024    POTASSIUM 3.9 10/03/2024    Lab Results   Component Value Date    CO2 24 10/20/2024    CO2 23 10/03/2024    Lab Results   Component Value Date    CR 0.94 10/20/2024    CR 0.89 10/03/2024        No results for input(s): \"WBC\", \"HGB\", \"HCT\", \"MCV\", \"PLT\" in the last 168 hours.   Imaging:   No results found for this or any previous visit (from the past 24 hours).   "

## 2024-11-21 NOTE — PLAN OF CARE
"Pt is alert to self with confusion. Pt has Dementia. Pt denies pain or discomfort. Pt is hard of hearing. Pt has no IV access. Pt is assist of one in transfer and care. Pt takes meds crushed with pudding. Bed alarm in place and call light within reach. Plan is for placement.           Goal Outcome Evaluation:      Plan of Care Reviewed With: patient    Overall Patient Progress: no changeOverall Patient Progress: no change    Outcome Evaluation: pt is waiting for placement. no acute distress noted.      Problem: Fall Injury Risk  Goal: Absence of Fall and Fall-Related Injury  Outcome: Progressing  Intervention: Identify and Manage Contributors  Recent Flowsheet Documentation  Taken 11/20/2024 2110 by Reji Duron, RN  Medication Review/Management: medications reviewed  Intervention: Promote Injury-Free Environment  Recent Flowsheet Documentation  Taken 11/20/2024 2110 by Reji Duron, RN  Safety Promotion/Fall Prevention:   assistive device/personal items within reach   activity supervised   lighting adjusted   increase visualization of patient   increased rounding and observation   clutter free environment maintained   safety round/check completed     Problem: Adult Inpatient Plan of Care  Goal: Plan of Care Review  Description: The Plan of Care Review/Shift note should be completed every shift.  The Outcome Evaluation is a brief statement about your assessment that the patient is improving, declining, or no change.  This information will be displayed automatically on your shift  note.  Outcome: Progressing  Flowsheets (Taken 11/20/2024 2208)  Outcome Evaluation: pt is waiting for placement. no acute distress noted.  Plan of Care Reviewed With: patient  Overall Patient Progress: no change  Goal: Patient-Specific Goal (Individualized)  Description: You can add care plan individualizations to a care plan. Examples of Individualization might be:  \"Parent requests to be called daily at 9am for " "status\", \"I have a hard time hearing out of my right ear\", or \"Do not touch me to wake me up as it startles  me\".  Outcome: Progressing  Goal: Absence of Hospital-Acquired Illness or Injury  Outcome: Progressing  Intervention: Identify and Manage Fall Risk  Recent Flowsheet Documentation  Taken 11/20/2024 2110 by Reji Duron RN  Safety Promotion/Fall Prevention:   assistive device/personal items within reach   activity supervised   lighting adjusted   increase visualization of patient   increased rounding and observation   clutter free environment maintained   safety round/check completed  Intervention: Prevent Skin Injury  Recent Flowsheet Documentation  Taken 11/20/2024 2110 by Reji Duron RN  Body Position: position changed independently  Intervention: Prevent Infection  Recent Flowsheet Documentation  Taken 11/20/2024 2110 by Reji Duron RN  Infection Prevention:   single patient room provided   rest/sleep promoted   hand hygiene promoted  Goal: Optimal Comfort and Wellbeing  Outcome: Progressing  Goal: Readiness for Transition of Care  Outcome: Progressing     Problem: Gas Exchange Impaired  Goal: Optimal Gas Exchange  Outcome: Progressing  Intervention: Optimize Oxygenation and Ventilation  Recent Flowsheet Documentation  Taken 11/20/2024 2110 by Reji Duron RN  Head of Bed (HOB) Positioning: HOB at 30-45 degrees     Problem: Adult Inpatient Plan of Care  Goal: Plan of Care Review  Description: The Plan of Care Review/Shift note should be completed every shift.  The Outcome Evaluation is a brief statement about your assessment that the patient is improving, declining, or no change.  This information will be displayed automatically on your shift  note.  Recent Flowsheet Documentation  Taken 11/20/2024 2208 by Reji Duron RN  Outcome Evaluation: pt is waiting for placement. no acute distress noted.  Plan of Care Reviewed With: patient  Overall " Patient Progress: no change  Goal: Absence of Hospital-Acquired Illness or Injury  Intervention: Identify and Manage Fall Risk  Recent Flowsheet Documentation  Taken 11/20/2024 2110 by Reji Duron RN  Safety Promotion/Fall Prevention:   assistive device/personal items within reach   activity supervised   lighting adjusted   increase visualization of patient   increased rounding and observation   clutter free environment maintained   safety round/check completed  Intervention: Prevent Skin Injury  Recent Flowsheet Documentation  Taken 11/20/2024 2110 by Reji Duron RN  Body Position: position changed independently  Intervention: Prevent Infection  Recent Flowsheet Documentation  Taken 11/20/2024 2110 by Reji Duron RN  Infection Prevention:   single patient room provided   rest/sleep promoted   hand hygiene promoted     Problem: Fall Injury Risk  Goal: Absence of Fall and Fall-Related Injury  Intervention: Identify and Manage Contributors  Recent Flowsheet Documentation  Taken 11/20/2024 2110 by Reji Duron RN  Medication Review/Management: medications reviewed  Intervention: Promote Injury-Free Environment  Recent Flowsheet Documentation  Taken 11/20/2024 2110 by Reji Duron RN  Safety Promotion/Fall Prevention:   assistive device/personal items within reach   activity supervised   lighting adjusted   increase visualization of patient   increased rounding and observation   clutter free environment maintained   safety round/check completed     Problem: Skin Injury Risk Increased  Goal: Skin Health and Integrity  Intervention: Plan: Nurse Driven Intervention: Moisture Management  Recent Flowsheet Documentation  Taken 11/20/2024 2110 by Reji Duron RN  Moisture Interventions: Encourage regular toileting  Intervention: Plan: Nurse Driven Intervention: Friction and Shear  Recent Flowsheet Documentation  Taken 11/20/2024 2110 by Nataly  Reji Cuevas RN  Friction/Shear Interventions: HOB 30 degrees or less  Intervention: Optimize Skin Protection  Recent Flowsheet Documentation  Taken 11/20/2024 2110 by Reji Duron RN  Activity Management:   activity encouraged   activity adjusted per tolerance  Head of Bed (HOB) Positioning: HOB at 30-45 degrees     Problem: Adult Inpatient Plan of Care  Goal: Plan of Care Review  Description: The Plan of Care Review/Shift note should be completed every shift.  The Outcome Evaluation is a brief statement about your assessment that the patient is improving, declining, or no change.  This information will be displayed automatically on your shift  note.  Recent Flowsheet Documentation  Taken 11/20/2024 2208 by Reji Duron RN  Outcome Evaluation: pt is waiting for placement. no acute distress noted.  Plan of Care Reviewed With: patient  Overall Patient Progress: no change  Goal: Absence of Hospital-Acquired Illness or Injury  Intervention: Identify and Manage Fall Risk  Recent Flowsheet Documentation  Taken 11/20/2024 2110 by Reji Duron RN  Safety Promotion/Fall Prevention:   assistive device/personal items within reach   activity supervised   lighting adjusted   increase visualization of patient   increased rounding and observation   clutter free environment maintained   safety round/check completed  Intervention: Prevent Skin Injury  Recent Flowsheet Documentation  Taken 11/20/2024 2110 by Reji Duron RN  Body Position: position changed independently  Intervention: Prevent Infection  Recent Flowsheet Documentation  Taken 11/20/2024 2110 by Reji Duron RN  Infection Prevention:   single patient room provided   rest/sleep promoted   hand hygiene promoted     Problem: Comorbidity Management  Goal: Maintenance of Asthma Control  Intervention: Maintain Asthma Symptom Control  Recent Flowsheet Documentation  Taken 11/20/2024 2110 by Nataly  Reji Cuevas RN  Medication Review/Management: medications reviewed  Goal: Maintenance of Behavioral Health Symptom Control  Intervention: Maintain Behavioral Health Symptom Control  Recent Flowsheet Documentation  Taken 11/20/2024 2110 by Reji Duron RN  Medication Review/Management: medications reviewed  Goal: Maintenance of COPD Symptom Control  Intervention: Maintain COPD Symptom Control  Recent Flowsheet Documentation  Taken 11/20/2024 2110 by Reji Duron RN  Medication Review/Management: medications reviewed  Goal: Blood Glucose Levels Within Targeted Range  Intervention: Monitor and Manage Glycemia  Recent Flowsheet Documentation  Taken 11/20/2024 2110 by Reji Duron RN  Medication Review/Management: medications reviewed  Goal: Maintenance of Heart Failure Symptom Control  Intervention: Maintain Heart Failure Management  Recent Flowsheet Documentation  Taken 11/20/2024 2110 by Reji Duron RN  Medication Review/Management: medications reviewed  Goal: Blood Pressure in Desired Range  Intervention: Maintain Blood Pressure Management  Recent Flowsheet Documentation  Taken 11/20/2024 2110 by Reji Duron RN  Medication Review/Management: medications reviewed  Goal: Maintenance of Osteoarthritis Symptom Control  Intervention: Maintain Osteoarthritis Symptom Control  Recent Flowsheet Documentation  Taken 11/20/2024 2110 by Reij Duron RN  Assistive Device Utilized:   walker   gait belt  Activity Management:   activity encouraged   activity adjusted per tolerance  Medication Review/Management: medications reviewed  Goal: Bariatric Home Regimen Maintained  Intervention: Maintain and Manage Postbariatric Surgery Care  Recent Flowsheet Documentation  Taken 11/20/2024 2110 by Reji Duron RN  Medication Review/Management: medications reviewed  Goal: Maintenance of Seizure Control  Intervention: Maintain Seizure Symptom  Control  Recent Flowsheet Documentation  Taken 11/20/2024 2110 by Reji Duron RN  Medication Review/Management: medications reviewed     Problem: Pain Acute  Goal: Optimal Pain Control and Function  Intervention: Prevent or Manage Pain  Recent Flowsheet Documentation  Taken 11/20/2024 2110 by Reji Duron RN  Medication Review/Management: medications reviewed     Problem: Adult Inpatient Plan of Care  Goal: Plan of Care Review  Description: The Plan of Care Review/Shift note should be completed every shift.  The Outcome Evaluation is a brief statement about your assessment that the patient is improving, declining, or no change.  This information will be displayed automatically on your shift  note.  Recent Flowsheet Documentation  Taken 11/20/2024 2208 by Reji Duron RN  Outcome Evaluation: pt is waiting for placement. no acute distress noted.  Plan of Care Reviewed With: patient  Overall Patient Progress: no change  Goal: Absence of Hospital-Acquired Illness or Injury  Intervention: Identify and Manage Fall Risk  Recent Flowsheet Documentation  Taken 11/20/2024 2110 by Reji Duron RN  Safety Promotion/Fall Prevention:   assistive device/personal items within reach   activity supervised   lighting adjusted   increase visualization of patient   increased rounding and observation   clutter free environment maintained   safety round/check completed  Intervention: Prevent Skin Injury  Recent Flowsheet Documentation  Taken 11/20/2024 2110 by Reji Duron RN  Body Position: position changed independently     Problem: Fall Injury Risk  Goal: Absence of Fall and Fall-Related Injury  Intervention: Identify and Manage Contributors  Recent Flowsheet Documentation  Taken 11/20/2024 2110 by Reji Duron RN  Medication Review/Management: medications reviewed  Intervention: Promote Injury-Free Environment  Recent Flowsheet Documentation  Taken  11/20/2024 2110 by Reji Duron RN  Safety Promotion/Fall Prevention:   assistive device/personal items within reach   activity supervised   lighting adjusted   increase visualization of patient   increased rounding and observation   clutter free environment maintained   safety round/check completed     Problem: Fall Injury Risk  Goal: Absence of Fall and Fall-Related Injury  Intervention: Identify and Manage Contributors  Recent Flowsheet Documentation  Taken 11/20/2024 2110 by Reji Duron RN  Medication Review/Management: medications reviewed  Intervention: Promote Injury-Free Environment  Recent Flowsheet Documentation  Taken 11/20/2024 2110 by Reji Duron RN  Safety Promotion/Fall Prevention:   assistive device/personal items within reach   activity supervised   lighting adjusted   increase visualization of patient   increased rounding and observation   clutter free environment maintained   safety round/check completed     Problem: Adult Inpatient Plan of Care  Goal: Plan of Care Review  Description: The Plan of Care Review/Shift note should be completed every shift.  The Outcome Evaluation is a brief statement about your assessment that the patient is improving, declining, or no change.  This information will be displayed automatically on your shift  note.  Outcome: Progressing  Flowsheets (Taken 11/20/2024 2208)  Outcome Evaluation: pt is waiting for placement. no acute distress noted.  Plan of Care Reviewed With: patient  Overall Patient Progress: no change  Goal: Absence of Hospital-Acquired Illness or Injury  Intervention: Identify and Manage Fall Risk  Recent Flowsheet Documentation  Taken 11/20/2024 2110 by Reji Duron RN  Safety Promotion/Fall Prevention:   assistive device/personal items within reach   activity supervised   lighting adjusted   increase visualization of patient   increased rounding and observation   clutter free environment  maintained   safety round/check completed  Intervention: Prevent Skin Injury  Recent Flowsheet Documentation  Taken 11/20/2024 2110 by Rjei Duron RN  Body Position: position changed independently  Intervention: Prevent Infection  Recent Flowsheet Documentation  Taken 11/20/2024 2110 by Reji Duron RN  Infection Prevention:   single patient room provided   rest/sleep promoted   hand hygiene promoted     Problem: Gas Exchange Impaired  Goal: Optimal Gas Exchange  Intervention: Optimize Oxygenation and Ventilation  Recent Flowsheet Documentation  Taken 11/20/2024 2110 by Reij Duron RN  Head of Bed (HOB) Positioning: HOB at 30-45 degrees

## 2024-11-21 NOTE — PLAN OF CARE
[facility] care. Patient oriented to self. Sets off the bed alarm at times. Ambulated in the hallway.   Assist of 1 w/ walker and GB.  Awaiting placement to memory care facility.         Goal Outcome Evaluation:      Plan of Care Reviewed With: patient    Overall Patient Progress: no changeOverall Patient Progress: no change    Outcome Evaluation: Awaiting placement      Problem: Adult Inpatient Plan of Care  Goal: Plan of Care Review  Description: The Plan of Care Review/Shift note should be completed every shift.  The Outcome Evaluation is a brief statement about your assessment that the patient is improving, declining, or no change.  This information will be displayed automatically on your shift  note.  Recent Flowsheet Documentation  Taken 11/21/2024 1305 by Tan Sharma RN  Outcome Evaluation: Awaiting placement  Plan of Care Reviewed With: patient  Overall Patient Progress: no change  Goal: Absence of Hospital-Acquired Illness or Injury  Intervention: Identify and Manage Fall Risk  Recent Flowsheet Documentation  Taken 11/21/2024 1000 by Tan Sharma RN  Safety Promotion/Fall Prevention:   assistive device/personal items within reach   clutter free environment maintained   increased rounding and observation   mobility aid in reach   lighting adjusted   room door open   room near nurse's station   safety round/check completed     Problem: Fall Injury Risk  Goal: Absence of Fall and Fall-Related Injury  Intervention: Identify and Manage Contributors  Recent Flowsheet Documentation  Taken 11/21/2024 1000 by Tan Sharma RN  Medication Review/Management: medications reviewed  Intervention: Promote Injury-Free Environment  Recent Flowsheet Documentation  Taken 11/21/2024 1000 by Tan Sharma RN  Safety Promotion/Fall Prevention:   assistive device/personal items within reach   clutter free environment maintained   increased rounding and observation   mobility aid in reach   lighting adjusted    room door open   room near nurse's station   safety round/check completed     Problem: Adult Inpatient Plan of Care  Goal: Plan of Care Review  Description: The Plan of Care Review/Shift note should be completed every shift.  The Outcome Evaluation is a brief statement about your assessment that the patient is improving, declining, or no change.  This information will be displayed automatically on your shift  note.  Recent Flowsheet Documentation  Taken 11/21/2024 1305 by Tan Sharma RN  Outcome Evaluation: Awaiting placement  Plan of Care Reviewed With: patient  Overall Patient Progress: no change  Goal: Absence of Hospital-Acquired Illness or Injury  Intervention: Identify and Manage Fall Risk  Recent Flowsheet Documentation  Taken 11/21/2024 1000 by Tan Sharma RN  Safety Promotion/Fall Prevention:   assistive device/personal items within reach   clutter free environment maintained   increased rounding and observation   mobility aid in reach   lighting adjusted   room door open   room near nurse's station   safety round/check completed     Problem: Comorbidity Management  Goal: Maintenance of Asthma Control  Intervention: Maintain Asthma Symptom Control  Recent Flowsheet Documentation  Taken 11/21/2024 1000 by Tan Sharma RN  Medication Review/Management: medications reviewed  Goal: Maintenance of Behavioral Health Symptom Control  Intervention: Maintain Behavioral Health Symptom Control  Recent Flowsheet Documentation  Taken 11/21/2024 1000 by Tan Sharma RN  Medication Review/Management: medications reviewed  Goal: Maintenance of COPD Symptom Control  Intervention: Maintain COPD Symptom Control  Recent Flowsheet Documentation  Taken 11/21/2024 1000 by Tan Sharma RN  Medication Review/Management: medications reviewed  Goal: Blood Glucose Levels Within Targeted Range  Intervention: Monitor and Manage Glycemia  Recent Flowsheet Documentation  Taken 11/21/2024 1000 by Tan Sharma  BALDOMERO EDWARD  Medication Review/Management: medications reviewed  Goal: Maintenance of Heart Failure Symptom Control  Intervention: Maintain Heart Failure Management  Recent Flowsheet Documentation  Taken 11/21/2024 1000 by Tan Sharma RN  Medication Review/Management: medications reviewed  Goal: Blood Pressure in Desired Range  Intervention: Maintain Blood Pressure Management  Recent Flowsheet Documentation  Taken 11/21/2024 1000 by Tan Sharma RN  Medication Review/Management: medications reviewed  Goal: Maintenance of Osteoarthritis Symptom Control  Intervention: Maintain Osteoarthritis Symptom Control  Recent Flowsheet Documentation  Taken 11/21/2024 1000 by Tan Sharma RN  Medication Review/Management: medications reviewed  Goal: Bariatric Home Regimen Maintained  Intervention: Maintain and Manage Postbariatric Surgery Care  Recent Flowsheet Documentation  Taken 11/21/2024 1000 by Tan Sharma RN  Medication Review/Management: medications reviewed  Goal: Maintenance of Seizure Control  Intervention: Maintain Seizure Symptom Control  Recent Flowsheet Documentation  Taken 11/21/2024 1000 by Tan Sharma RN  Medication Review/Management: medications reviewed     Problem: Pain Acute  Goal: Optimal Pain Control and Function  Intervention: Prevent or Manage Pain  Recent Flowsheet Documentation  Taken 11/21/2024 1000 by Tan Sharma RN  Medication Review/Management: medications reviewed     Problem: Adult Inpatient Plan of Care  Goal: Plan of Care Review  Description: The Plan of Care Review/Shift note should be completed every shift.  The Outcome Evaluation is a brief statement about your assessment that the patient is improving, declining, or no change.  This information will be displayed automatically on your shift  note.  Recent Flowsheet Documentation  Taken 11/21/2024 1305 by Tan Sharma RN  Outcome Evaluation: Awaiting placement  Plan of Care Reviewed With: patient  Overall  Patient Progress: no change  Goal: Absence of Hospital-Acquired Illness or Injury  Intervention: Identify and Manage Fall Risk  Recent Flowsheet Documentation  Taken 11/21/2024 1000 by Tan Sharma RN  Safety Promotion/Fall Prevention:   assistive device/personal items within reach   clutter free environment maintained   increased rounding and observation   mobility aid in reach   lighting adjusted   room door open   room near nurse's station   safety round/check completed     Problem: Fall Injury Risk  Goal: Absence of Fall and Fall-Related Injury  Intervention: Identify and Manage Contributors  Recent Flowsheet Documentation  Taken 11/21/2024 1000 by Tan Sharma RN  Medication Review/Management: medications reviewed  Intervention: Promote Injury-Free Environment  Recent Flowsheet Documentation  Taken 11/21/2024 1000 by Tan Sharma RN  Safety Promotion/Fall Prevention:   assistive device/personal items within reach   clutter free environment maintained   increased rounding and observation   mobility aid in reach   lighting adjusted   room door open   room near nurse's station   safety round/check completed     Problem: Fall Injury Risk  Goal: Absence of Fall and Fall-Related Injury  Intervention: Identify and Manage Contributors  Recent Flowsheet Documentation  Taken 11/21/2024 1000 by Tan Sharma RN  Medication Review/Management: medications reviewed  Intervention: Promote Injury-Free Environment  Recent Flowsheet Documentation  Taken 11/21/2024 1000 by Tan Sharma RN  Safety Promotion/Fall Prevention:   assistive device/personal items within reach   clutter free environment maintained   increased rounding and observation   mobility aid in reach   lighting adjusted   room door open   room near nurse's station   safety round/check completed     Problem: Adult Inpatient Plan of Care  Goal: Plan of Care Review  Description: The Plan of Care Review/Shift note should be completed every  shift.  The Outcome Evaluation is a brief statement about your assessment that the patient is improving, declining, or no change.  This information will be displayed automatically on your shift  note.  Outcome: Progressing  Flowsheets (Taken 11/21/2024 1305)  Outcome Evaluation: Awaiting placement  Plan of Care Reviewed With: patient  Overall Patient Progress: no change  Goal: Absence of Hospital-Acquired Illness or Injury  Intervention: Identify and Manage Fall Risk  Recent Flowsheet Documentation  Taken 11/21/2024 1000 by Tan Sharma, RN  Safety Promotion/Fall Prevention:   assistive device/personal items within reach   clutter free environment maintained   increased rounding and observation   mobility aid in reach   lighting adjusted   room door open   room near nurse's station   safety round/check completed

## 2024-11-22 PROCEDURE — 101N000002 HC CUSTODIAL CARE DAILY

## 2024-11-22 PROCEDURE — 99207 PR NO BILLABLE SERVICE THIS VISIT: CPT | Performed by: INTERNAL MEDICINE

## 2024-11-22 RX ADMIN — KETOCONAZOLE: 20 SHAMPOO, SUSPENSION TOPICAL at 12:55

## 2024-11-22 NOTE — PLAN OF CARE
"Goal Outcome Evaluation:    Overall Patient Progress: no change    Outcome Evaluation:     Patient oriented to self. Pt Appears to be in no form of pain or discomfort . Sets off the bed alarm at times.Ambulated to bathroom and perineal care provided . Ambulated in the hallway .     Problem: Adult Inpatient Plan of Care  Goal: Plan of Care Review  Description: The Plan of Care Review/Shift note should be completed every shift.  The Outcome Evaluation is a brief statement about your assessment that the patient is improving, declining, or no change.  This information will be displayed automatically on your shift  note.  Outcome: Progressing  Flowsheets (Taken 11/22/2024 0216)  Outcome Evaluation: ss  Overall Patient Progress: no change  Goal: Patient-Specific Goal (Individualized)  Description: You can add care plan individualizations to a care plan. Examples of Individualization might be:  \"Parent requests to be called daily at 9am for status\", \"I have a hard time hearing out of my right ear\", or \"Do not touch me to wake me up as it startles  me\".  Outcome: Progressing  Goal: Optimal Comfort and Wellbeing  Outcome: Progressing         "

## 2024-11-22 NOTE — PROGRESS NOTES
[facility]  Hospitalist Progress Note  Med Elliott MD 11/22/2024    Reason for Stay (Diagnosis): dementia.  LTC placement.  USP care         Assessment and Plan:      Summary of Stay: Jemal Gray is a 89 year old male with history of dementia with chronic aphasia. He was admitted on 10/2/2024 after he was brought to the ED by EMS from facility for evaluation of agitation. He had a prolonged hospitalization from 6/5/24 through 10/2/24 during which he was treated for cellulitis but mostly awaited long-term care placement due to dementia. He required appointment of guardianship.  He discharged to a long-term care facility on 10/2. Upon transfer to the long-term care facility he became agitated with swearing.  He was wandering around the long-term care facility and was entering other patient's rooms.  He was unable to be redirected and required transfer back to the ED.     In the ED he was stable, no major acute medical issue. He was given seroquel.  He required a bedside attendant as he was agitated and unable to be redirected.  He attempted to leave the emergency department and go into other patient's rooms. His facility was not able to take him back and there was no immediate safe disposition option.  He is here under [facility] care status.  Patient has improved and is no longer agitated. He remains medically stable and can be discharged whenever placement is found. Prior long term care facility is reportedly not taking patient back.  is looking into memory care units.     Patient has been here since 10/2/244 awaiting placement        Plans today:  -Patient remains [facility] care  -Await long-term placement  -Medically ready for discharge when placement is found        Safe Disposition/USP Care:  -  Patient remains medically stable for discharge when location found.  SW/CM following and working on options.  No current safe disposition option.    -Patient had  prolonged hospitalization due to need for disposition assistance in earlier 2024.  His daughter filed for guardianship paperwork which was completed in July.  Acquired MA application and long-term care placement.    -Ultimately will require 24/7 care and likely Memory care placement      Acute agitation, resolved  Behavior disturbances   Dementia with chronic aphasia:  Suspect triggered by changes in setting and recent move.  No physical complaints or concerns of infection.  Historically required Zyprexa over the summer when he was hospitalized for agitation.  Most recently had been prescribed Seroquel 12.5 mg twice daily as needed although he had not been needing most recently.  -Continue Seroquel PRN  - ms prior check  -SW consulted  -needs memory care        Actinic Keratosis:  -Had surgery for this and prescribed steroids cream and ketoconazole cream, restarted per daughter request previously.     Sinus Bradycardia:  -Patient was noted to have HR in 50s.  Asymptomatic.  -no telemetry monitoring needed      History of Constipation:  -laxatives PRN.  Had BM 11/4.     Barriers to discharge: needs Memory care placement. Unfortunately the patient will not be assessed for elderly waiver until 12/9/2024.      Anticipated length of stay: Medically ready at any time.               Interval History (Subjective):      Poor historian due to dementia.  No apparent concerns.  No reported issues overnight.                  Physical Exam:      Last Vital Signs:  BP (!) 146/74 (BP Location: Right arm, Patient Position: Supine)   Pulse 50   Temp 98.6  F (37  C) (Oral)   Resp 20   Wt 71.4 kg (157 lb 6.4 oz)   SpO2 98%   BMI 23.93 kg/m      No intake or output data in the 24 hours ending 11/22/24 1006    Constitutional: Awake, alert, cooperative, no apparent distress.  Verbal with mostly incomprehensible speech     Respiratory: Clear to auscultation bilaterally, no crackles or wheezing   Cardiovascular: Regular rate  "and rhythm, normal S1 and S2, and no murmur noted   Abdomen: Normal bowel sounds, soft, non-distended, non-tender   Skin: No rashes, no cyanosis, dry to touch   Neuro: Alert and awake no weakness, numbness, ++memory loss   Extremities: No edema, normal range of motion   Other(s):        All other systems: Negative          Medications:      All current medications were reviewed with changes reflected in problem list.         Data:      All new lab and imaging data was reviewed.   Labs:       Lab Results   Component Value Date     10/20/2024     10/03/2024    Lab Results   Component Value Date    CHLORIDE 105 10/20/2024    CHLORIDE 103 10/03/2024    Lab Results   Component Value Date    BUN 23.6 10/20/2024    BUN 17.4 10/03/2024      Lab Results   Component Value Date    POTASSIUM 3.9 10/20/2024    POTASSIUM 3.9 10/03/2024    Lab Results   Component Value Date    CO2 24 10/20/2024    CO2 23 10/03/2024    Lab Results   Component Value Date    CR 0.94 10/20/2024    CR 0.89 10/03/2024        No results for input(s): \"WBC\", \"HGB\", \"HCT\", \"MCV\", \"PLT\" in the last 168 hours.   Imaging:   No results found for this or any previous visit (from the past 24 hours).   "

## 2024-11-22 NOTE — PROGRESS NOTES
Care Management Follow Up    Length of Stay (days): 0    Expected Discharge Date: 12/10/2024     Concerns to be Addressed:   Discharge Planning    Patient plan of care discussed at interdisciplinary rounds: Yes    Anticipated Discharge Disposition:  Memory Care once we have elderly waiver funding, appointment is Dec. 9 @ 9am.  Anticipated Discharge Services:  Memory Care locked  Anticipated Discharge DME:  none    Patient/family educated on Medicare website which has current facility and service quality ratings:  yes  Education Provided on the Discharge Plan:  yes  Patient/Family in Agreement with the Plan:  yes    Referrals Placed by CM/SW:  yes  Private pay costs discussed: Not applicable @ this time    Discussed  Partnership in Safe Discharge Planning  document with patient/family: No     Handoff Completed: No, handoff not indicated or clinically appropriate    Additional Information:  Jamar continues to make referrals and calls to memory care facilities inquiring about elderly waiver opening. Patient needs to go to a facility that takes elderly waiver funding. He does not have the funding yet for the memory care. Patient has a assessment for elderly waiver on Dec. 9 @ 9 am. Once funding is secure patient will be eligible for a participating waiver memory care.    [facility] has agreed to do an assessment. Hopefully next week. If they accept him he could go after assessment on Dec. 9,2024.    Next Steps: JAMAR left a message with daughter/guardian, Rhiannon.Update given.    NALDO Morris   Inpatient Care Coordination   Supervisor  [facility]  [phone]          NALDO Puentes

## 2024-11-23 PROCEDURE — 250N000011 HC RX IP 250 OP 636: Performed by: PHYSICIAN ASSISTANT

## 2024-11-23 PROCEDURE — 99207 PR NO BILLABLE SERVICE THIS VISIT: CPT | Performed by: INTERNAL MEDICINE

## 2024-11-23 PROCEDURE — 101N000002 HC CUSTODIAL CARE DAILY

## 2024-11-23 NOTE — PLAN OF CARE
"Pt is on alf cares. VSS. Confused. Denies pain. Slept on and off throughout shift. Ax1 GB walker. Waiting for placement. Discharge TBD.    Goal Outcome Evaluation:      Plan of Care Reviewed With: patient    Overall Patient Progress: no change    Outcome Evaluation: Medically stable. Waiting for placement.    Problem: Adult Inpatient Plan of Care  Goal: Plan of Care Review  Description: The Plan of Care Review/Shift note should be completed every shift.  The Outcome Evaluation is a brief statement about your assessment that the patient is improving, declining, or no change.  This information will be displayed automatically on your shift  note.  Outcome: Progressing  Flowsheets (Taken 11/23/2024 0137)  Outcome Evaluation: Medically stable. Waiting for placement.  Plan of Care Reviewed With: patient  Overall Patient Progress: no change  Goal: Patient-Specific Goal (Individualized)  Description: You can add care plan individualizations to a care plan. Examples of Individualization might be:  \"Parent requests to be called daily at 9am for status\", \"I have a hard time hearing out of my right ear\", or \"Do not touch me to wake me up as it startles  me\".  Outcome: Progressing  Goal: Absence of Hospital-Acquired Illness or Injury  Outcome: Progressing  Intervention: Identify and Manage Fall Risk  Recent Flowsheet Documentation  Taken 11/23/2024 0129 by Donna Shine RN  Safety Promotion/Fall Prevention: safety round/check completed  Taken 11/22/2024 2325 by Donna Shine RN  Safety Promotion/Fall Prevention: safety round/check completed  Taken 11/22/2024 2145 by Donna Shine RN  Safety Promotion/Fall Prevention: safety round/check completed  Taken 11/22/2024 2057 by Donna Shine RN  Safety Promotion/Fall Prevention: safety round/check completed  Taken 11/22/2024 1940 by Donna Shine, RN  Safety Promotion/Fall Prevention: safety round/check completed  Intervention: Prevent Skin Injury  Recent " Flowsheet Documentation  Taken 11/22/2024 1940 by Donna Shine, RN  Body Position: position changed independently  Goal: Optimal Comfort and Wellbeing  Outcome: Progressing  Goal: Readiness for Transition of Care  Outcome: Progressing

## 2024-11-23 NOTE — PLAN OF CARE
"11:40 RN shift summary 7-3:  Pt alert oriented to self, but disoriented to place, time situation.  Confused/forgetful at times.  Pt is in hospital for retirement care.  He is medically ready for discharge, but awaiting placement in LTC / memory care facility. Normally  patient is cheerful and friendly.  He usually has a good appetite and enjoys walking in the hallway.  He usually sings and laughs readily.  Today, however, he is unhappy and c/o abdominal discomfort / not feeling well.  He refused zofran dose. He did not eat breakfast.  He is afebrile. Daughter here to visit.  He will discharge to a care facility once regulatory paperwork is complete and placement is found.        Goal Outcome Evaluation:      Plan of Care Reviewed With: patient    Overall Patient Progress: no changeOverall Patient Progress: no change    Outcome Evaluation: correction care. Waiting for LTC/Memory care placement.      Problem: Adult Inpatient Plan of Care  Goal: Plan of Care Review  Description: The Plan of Care Review/Shift note should be completed every shift.  The Outcome Evaluation is a brief statement about your assessment that the patient is improving, declining, or no change.  This information will be displayed automatically on your shift  note.  Outcome: Adequate for Care Transition  Flowsheets (Taken 11/23/2024 1135)  Outcome Evaluation: correction care. Waiting for LTC/Memory care placement.  Plan of Care Reviewed With: patient  Overall Patient Progress: no change  Goal: Patient-Specific Goal (Individualized)  Description: You can add care plan individualizations to a care plan. Examples of Individualization might be:  \"Parent requests to be called daily at 9am for status\", \"I have a hard time hearing out of my right ear\", or \"Do not touch me to wake me up as it startles  me\".  Outcome: Adequate for Care Transition  Goal: Absence of Hospital-Acquired Illness or Injury  Outcome: Adequate for Care Transition  Intervention: " Identify and Manage Fall Risk  Recent Flowsheet Documentation  Taken 11/23/2024 0800 by Fariha Phillips RN  Safety Promotion/Fall Prevention: activity supervised  Intervention: Prevent Skin Injury  Recent Flowsheet Documentation  Taken 11/23/2024 0800 by Fariha Phillips RN  Body Position: supine, head elevated  Goal: Optimal Comfort and Wellbeing  Outcome: Adequate for Care Transition  Goal: Readiness for Transition of Care  Outcome: Adequate for Care Transition     Problem: Fall Injury Risk  Goal: Absence of Fall and Fall-Related Injury  Outcome: Adequate for Care Transition  Intervention: Identify and Manage Contributors  Recent Flowsheet Documentation  Taken 11/23/2024 0800 by Fariha Phillips RN  Medication Review/Management: medications reviewed  Intervention: Promote Injury-Free Environment  Recent Flowsheet Documentation  Taken 11/23/2024 0800 by Fariha Phillips RN  Safety Promotion/Fall Prevention: activity supervised     Problem: Cognitive Impairment  Goal: Optimal Cognitive Function  Outcome: Adequate for Care Transition

## 2024-11-23 NOTE — PLAN OF CARE
"    BP: 131/73 Pulse: 56   Resp: 18 SpO2: 95 % O2 Device: None (Room air)       A& disoriented to place/time/situation. VSS. Up SBA GB walker. Patient slept on and off today. Partial bed bath with hair wash today. Went for multiple walks with staff. Stable. Waiting for placement.       Goal Outcome Evaluation:      Plan of Care Reviewed With: patient    Overall Patient Progress: no changeOverall Patient Progress: no change    Outcome Evaluation: Medically stable. Waiting placement.      Problem: Adult Inpatient Plan of Care  Goal: Plan of Care Review  Description: The Plan of Care Review/Shift note should be completed every shift.  The Outcome Evaluation is a brief statement about your assessment that the patient is improving, declining, or no change.  This information will be displayed automatically on your shift  note.  Outcome: Met  Flowsheets (Taken 11/22/2024 8949)  Outcome Evaluation: Medically stable. Waiting placement.  Plan of Care Reviewed With: patient  Overall Patient Progress: no change  Goal: Patient-Specific Goal (Individualized)  Description: You can add care plan individualizations to a care plan. Examples of Individualization might be:  \"Parent requests to be called daily at 9am for status\", \"I have a hard time hearing out of my right ear\", or \"Do not touch me to wake me up as it startles  me\".  Outcome: Met  Goal: Optimal Comfort and Wellbeing  Outcome: Met       "

## 2024-11-23 NOTE — PROGRESS NOTES
[facility]  Hospitalist Progress Note  Guicho Rashid MD 11/23/2024    Reason for Stay (Diagnosis): dementia.  LTC placement.  detention care         Assessment and Plan:      Summary of Stay: Jemal Gray is a 89 year old male with history of dementia with chronic aphasia. He was admitted on 10/2/2024 after he was brought to the ED by EMS from facility for evaluation of agitation. He had a prolonged hospitalization from 6/5/24 through 10/2/24 during which he was treated for cellulitis but mostly awaited long-term care placement due to dementia. He required appointment of guardianship and ultimately discharged to a long-term care facility on 10/2. Upon transfer to the long-term care facility he became agitated with swearing.  He was wandering around the long-term care facility, entering other patient's rooms and staff was unable to redirect him. He required transfer back to the ED and was readmitted.     In the ED he was stable, no major acute medical issue. He was given seroquel.  He required a bedside attendant as he was agitated and unable to be redirected.  He attempted to leave the emergency department and go into other patient's rooms. His facility was not able to take him back and there was no immediate safe disposition option.  He is here under prison care status.  Patient has improved and is no longer agitated. He remains medically stable and can be discharged whenever placement is found. Prior long term care facility is reportedly not taking patient back.  is looking into memory care units.     Patient has been here since 10/2/244 awaiting placement.  Elderly waiver appointment is scheduled for 12/9 and this meeting is necessary to establish funding for further cares.       Plans today:  -Patient remains prison care  -Await long-term placement  -Medically ready for discharge when placement is found        Safe Disposition/detention Care:  -  Patient remains medically  stable for discharge when location found.  SW/CM following and working on options.  No current safe disposition option.    -Patient had prolonged hospitalization due to need for disposition assistance in earlier 2024.  His daughter filed for guardianship paperwork which was completed in July.  Acquired MA application and long-term care placement.    -Ultimately will require 24/7 care and likely Memory care placement      Acute agitation, resolved  Behavior disturbances   Dementia with chronic aphasia:  Suspect triggered by changes in setting and recent move.  No physical complaints or concerns of infection.  Historically required Zyprexa over the summer when he was hospitalized for agitation.  Most recently had been prescribed Seroquel 12.5 mg twice daily as needed although he had not been needing most recently.  -Continue Seroquel PRN  - ms prior check  -SW consulted  -needs memory care        Actinic Keratosis:  -Had surgery for this and prescribed steroids cream and ketoconazole cream, restarted per daughter request previously.     Sinus Bradycardia:  -Patient was noted to have HR in 50s.  Asymptomatic.  -no telemetry monitoring needed      History of Constipation:  -laxatives PRN.  Had BM 11/4.     Barriers to discharge: needs Memory care placement. Unfortunately the patient will not be assessed for elderly waiver until 12/9/2024.      Anticipated length of stay: Medically ready at any time.               Interval History (Subjective):      Poor historian due to dementia. Chart reviewed, pt interviewed.      Stable per nursing, but pt reports a loss of appetite.                   Physical Exam:      Last Vital Signs:  /73 (BP Location: Right arm, Patient Position: Semi-Liu's, Cuff Size: Adult Regular)   Pulse 56   Temp 98.6  F (37  C) (Oral)   Resp 18   Wt 71.4 kg (157 lb 6.4 oz)   SpO2 95%   BMI 23.93 kg/m      No intake or output data in the 24 hours ending 11/22/24  "1006    Constitutional: Awake, alert, cooperative, no apparent distress.  Verbal with mostly incomprehensible speech     Respiratory: No increased WOB.   Cardiovascular: Well-perfused   Abdomen: oft, non-distended, non-tender   Skin: No rashes, no cyanosis, dry to touch   Neuro: Alert and awake, ++memory loss   Extremities: No edema, normal range of motion   Other(s):        All other systems: Negative          Medications:      All current medications were reviewed with changes reflected in problem list.         Data:      All new lab and imaging data was reviewed.   Labs:       Lab Results   Component Value Date     10/20/2024     10/03/2024    Lab Results   Component Value Date    CHLORIDE 105 10/20/2024    CHLORIDE 103 10/03/2024    Lab Results   Component Value Date    BUN 23.6 10/20/2024    BUN 17.4 10/03/2024      Lab Results   Component Value Date    POTASSIUM 3.9 10/20/2024    POTASSIUM 3.9 10/03/2024    Lab Results   Component Value Date    CO2 24 10/20/2024    CO2 23 10/03/2024    Lab Results   Component Value Date    CR 0.94 10/20/2024    CR 0.89 10/03/2024        No results for input(s): \"WBC\", \"HGB\", \"HCT\", \"MCV\", \"PLT\" in the last 168 hours.   Imaging:   No results found for this or any previous visit (from the past 24 hours).   "

## 2024-11-24 PROCEDURE — 101N000002 HC CUSTODIAL CARE DAILY

## 2024-11-24 NOTE — PLAN OF CARE
Assumed care at 1500.  Patient alert and confused. Up 1A GB walker. correction care. No complaints of pain or discomfort. Bed alarms on for safety. Waiting placement. Elderly waiver planned for 12/9.     Goal Outcome Evaluation:      Plan of Care Reviewed With: patient    Overall Patient Progress: no changeOverall Patient Progress: no change    Outcome Evaluation: Medically stable. Waiting placement.      Problem: Adult Inpatient Plan of Care  Goal: Absence of Hospital-Acquired Illness or Injury  Intervention: Identify and Manage Fall Risk  Recent Flowsheet Documentation  Taken 11/23/2024 1700 by Qian Lamas RN  Safety Promotion/Fall Prevention:   activity supervised   lighting adjusted   nonskid shoes/slippers when out of bed   room door open   room near nurse's station   room organization consistent   safety round/check completed   supervised activity  Intervention: Prevent Skin Injury  Recent Flowsheet Documentation  Taken 11/23/2024 1700 by Qian Lamas RN  Body Position: supine, head elevated  Intervention: Prevent Infection  Recent Flowsheet Documentation  Taken 11/23/2024 1700 by Qian Lamas RN  Infection Prevention:   rest/sleep promoted   single patient room provided     Problem: Fall Injury Risk  Goal: Absence of Fall and Fall-Related Injury  Intervention: Identify and Manage Contributors  Recent Flowsheet Documentation  Taken 11/23/2024 1700 by Qian Lamas RN  Medication Review/Management: medications reviewed  Intervention: Promote Injury-Free Environment  Recent Flowsheet Documentation  Taken 11/23/2024 1700 by Qian Lamas RN  Safety Promotion/Fall Prevention:   activity supervised   lighting adjusted   nonskid shoes/slippers when out of bed   room door open   room near nurse's station   room organization consistent   safety round/check completed   supervised activity     Problem: Skin Injury Risk Increased  Goal: Skin Health and Integrity  Intervention: Plan: Nurse Driven  Intervention: Moisture Management  Recent Flowsheet Documentation  Taken 11/23/2024 1700 by Qian Lamas RN  Moisture Interventions:   Encourage regular toileting   Incontinence pad  Intervention: Plan: Nurse Driven Intervention: Friction and Shear  Recent Flowsheet Documentation  Taken 11/23/2024 1700 by Qian Lamas RN  Friction/Shear Interventions:   HOB 30 degrees or less   Silicone foam sacral dressing  Intervention: Optimize Skin Protection  Recent Flowsheet Documentation  Taken 11/23/2024 1700 by Qian Lamas RN  Activity Management:   ambulated to bathroom   back to bed  Head of Bed (HOB) Positioning: HOB at 30 degrees     Problem: Adult Inpatient Plan of Care  Goal: Absence of Hospital-Acquired Illness or Injury  Intervention: Identify and Manage Fall Risk  Recent Flowsheet Documentation  Taken 11/23/2024 1700 by Qian Lamas RN  Safety Promotion/Fall Prevention:   activity supervised   lighting adjusted   nonskid shoes/slippers when out of bed   room door open   room near nurse's station   room organization consistent   safety round/check completed   supervised activity  Intervention: Prevent Skin Injury  Recent Flowsheet Documentation  Taken 11/23/2024 1700 by Qian Lamas RN  Body Position: supine, head elevated  Intervention: Prevent Infection  Recent Flowsheet Documentation  Taken 11/23/2024 1700 by Qian Lamas RN  Infection Prevention:   rest/sleep promoted   single patient room provided     Problem: Comorbidity Management  Goal: Maintenance of Asthma Control  Intervention: Maintain Asthma Symptom Control  Recent Flowsheet Documentation  Taken 11/23/2024 1700 by Qian Lamas RN  Medication Review/Management: medications reviewed  Goal: Maintenance of Behavioral Health Symptom Control  Intervention: Maintain Behavioral Health Symptom Control  Recent Flowsheet Documentation  Taken 11/23/2024 1700 by Qian Lamas RN  Medication Review/Management: medications reviewed  Goal:  Maintenance of COPD Symptom Control  Intervention: Maintain COPD Symptom Control  Recent Flowsheet Documentation  Taken 11/23/2024 1700 by Qian Lamas RN  Medication Review/Management: medications reviewed  Goal: Blood Glucose Levels Within Targeted Range  Intervention: Monitor and Manage Glycemia  Recent Flowsheet Documentation  Taken 11/23/2024 1700 by Qian Lamas RN  Medication Review/Management: medications reviewed  Goal: Maintenance of Heart Failure Symptom Control  Intervention: Maintain Heart Failure Management  Recent Flowsheet Documentation  Taken 11/23/2024 1700 by Qian Lamas RN  Medication Review/Management: medications reviewed  Goal: Blood Pressure in Desired Range  Intervention: Maintain Blood Pressure Management  Recent Flowsheet Documentation  Taken 11/23/2024 1700 by Qian Lamas RN  Medication Review/Management: medications reviewed  Goal: Maintenance of Osteoarthritis Symptom Control  Intervention: Maintain Osteoarthritis Symptom Control  Recent Flowsheet Documentation  Taken 11/23/2024 1700 by Qian Lamas RN  Assistive Device Utilized:   gait belt   walker  Activity Management:   ambulated to bathroom   back to bed  Medication Review/Management: medications reviewed  Goal: Bariatric Home Regimen Maintained  Intervention: Maintain and Manage Postbariatric Surgery Care  Recent Flowsheet Documentation  Taken 11/23/2024 1700 by Qian Lamas RN  Medication Review/Management: medications reviewed  Goal: Maintenance of Seizure Control  Intervention: Maintain Seizure Symptom Control  Recent Flowsheet Documentation  Taken 11/23/2024 1700 by Qian Lamas RN  Medication Review/Management: medications reviewed     Problem: Pain Acute  Goal: Optimal Pain Control and Function  Intervention: Prevent or Manage Pain  Recent Flowsheet Documentation  Taken 11/23/2024 1700 by Qian Lamas RN  Medication Review/Management: medications reviewed     Problem: Adult Inpatient Plan of  Care  Goal: Absence of Hospital-Acquired Illness or Injury  Intervention: Identify and Manage Fall Risk  Recent Flowsheet Documentation  Taken 11/23/2024 1700 by Qian Lamas RN  Safety Promotion/Fall Prevention:   activity supervised   lighting adjusted   nonskid shoes/slippers when out of bed   room door open   room near nurse's station   room organization consistent   safety round/check completed   supervised activity  Intervention: Prevent Skin Injury  Recent Flowsheet Documentation  Taken 11/23/2024 1700 by Qian Lamas RN  Body Position: supine, head elevated     Problem: Fall Injury Risk  Goal: Absence of Fall and Fall-Related Injury  Intervention: Identify and Manage Contributors  Recent Flowsheet Documentation  Taken 11/23/2024 1700 by Qian Lamas RN  Medication Review/Management: medications reviewed  Intervention: Promote Injury-Free Environment  Recent Flowsheet Documentation  Taken 11/23/2024 1700 by Qian Lamas RN  Safety Promotion/Fall Prevention:   activity supervised   lighting adjusted   nonskid shoes/slippers when out of bed   room door open   room near nurse's station   room organization consistent   safety round/check completed   supervised activity     Problem: Fall Injury Risk  Goal: Absence of Fall and Fall-Related Injury  Intervention: Identify and Manage Contributors  Recent Flowsheet Documentation  Taken 11/23/2024 1700 by Qian Lamas RN  Medication Review/Management: medications reviewed  Intervention: Promote Injury-Free Environment  Recent Flowsheet Documentation  Taken 11/23/2024 1700 by Qian Lamas RN  Safety Promotion/Fall Prevention:   activity supervised   lighting adjusted   nonskid shoes/slippers when out of bed   room door open   room near nurse's station   room organization consistent   safety round/check completed   supervised activity     Problem: Adult Inpatient Plan of Care  Goal: Absence of Hospital-Acquired Illness or Injury  Intervention: Identify  and Manage Fall Risk  Recent Flowsheet Documentation  Taken 11/23/2024 1700 by Qian Lamas RN  Safety Promotion/Fall Prevention:   activity supervised   lighting adjusted   nonskid shoes/slippers when out of bed   room door open   room near nurse's station   room organization consistent   safety round/check completed   supervised activity  Intervention: Prevent Skin Injury  Recent Flowsheet Documentation  Taken 11/23/2024 1700 by Qian Lamas RN  Body Position: supine, head elevated  Intervention: Prevent Infection  Recent Flowsheet Documentation  Taken 11/23/2024 1700 by Qian Lamas RN  Infection Prevention:   rest/sleep promoted   single patient room provided     Problem: Gas Exchange Impaired  Goal: Optimal Gas Exchange  Intervention: Optimize Oxygenation and Ventilation  Recent Flowsheet Documentation  Taken 11/23/2024 1700 by Qian Lamas RN  Head of Bed (HOB) Positioning: HOB at 30 degrees     Problem: Adult Inpatient Plan of Care  Goal: Absence of Hospital-Acquired Illness or Injury  Intervention: Identify and Manage Fall Risk  Recent Flowsheet Documentation  Taken 11/23/2024 1700 by Qian Lamas RN  Safety Promotion/Fall Prevention:   activity supervised   lighting adjusted   nonskid shoes/slippers when out of bed   room door open   room near nurse's station   room organization consistent   safety round/check completed   supervised activity  Intervention: Prevent Skin Injury  Recent Flowsheet Documentation  Taken 11/23/2024 1700 by Qian Lamas RN  Body Position: supine, head elevated  Intervention: Prevent Infection  Recent Flowsheet Documentation  Taken 11/23/2024 1700 by Qian Lamas RN  Infection Prevention:   rest/sleep promoted   single patient room provided     Problem: Fall Injury Risk  Goal: Absence of Fall and Fall-Related Injury  Intervention: Identify and Manage Contributors  Recent Flowsheet Documentation  Taken 11/23/2024 1700 by Qian Lamas RN  Medication  Review/Management: medications reviewed  Intervention: Promote Injury-Free Environment  Recent Flowsheet Documentation  Taken 11/23/2024 1700 by Qian Lamas, RN  Safety Promotion/Fall Prevention:   activity supervised   lighting adjusted   nonskid shoes/slippers when out of bed   room door open   room near nurse's station   room organization consistent   safety round/check completed   supervised activity

## 2024-11-24 NOTE — PLAN OF CARE
"12:40 Provider notification: Text page sent to MD to update, \" lab attempted to draw blood but pt was unable to cooperate. He became angry, argumentative, suspicious and tried to kick the phlebotomist. Nursing staff tried to assist, but we could not convince him to cooperate either.\"    14:00 RN shift summary 7-3:  Pt alert oriented to self, but disoriented to place, time situation.  Confused/forgetful at times.  Daughter here to  visit. Pt is in hospital for [facility] care.  He is medically ready for discharge, but awaiting placement in LTC / memory care facility. Pt feeling better today compared to yesterday.  Appetite is improving. He ambulated in the hallway with assist of 1 and walker. He is afebrile. Daughter here to visit.  Will discharge to LTC/memory care when placement found.        Goal Outcome Evaluation:       Plan of Care Reviewed With: patient    Overall Patient Progress: no changeOverall Patient Progress: no change    Outcome Evaluation: Continue [facility] care.  Waiting for skilled nursing facility placement. DNR/DNI.      Problem: Adult Inpatient Plan of Care  Goal: Plan of Care Review  Description: The Plan of Care Review/Shift note should be completed every shift.  The Outcome Evaluation is a brief statement about your assessment that the patient is improving, declining, or no change.  This information will be displayed automatically on your shift  note.  Outcome: Adequate for Care Transition  Flowsheets (Taken 11/24/2024 1107)  Outcome Evaluation: Continue [facility] care.  Waiting for skilled nursing facility placement. DNR/DNI.  Plan of Care Reviewed With: patient  Overall Patient Progress: no change  Goal: Patient-Specific Goal (Individualized)  Description: You can add care plan individualizations to a care plan. Examples of Individualization might be:  \"Parent requests to be called daily at 9am for status\", \"I have a hard time hearing out of my right ear\", or \"Do not touch me to wake me up " "as it startles  me\".  Outcome: Adequate for Care Transition  Goal: Absence of Hospital-Acquired Illness or Injury  Outcome: Adequate for Care Transition  Intervention: Identify and Manage Fall Risk  Recent Flowsheet Documentation  Taken 11/24/2024 0945 by Fariha Phillips, RN  Safety Promotion/Fall Prevention: activity supervised  Intervention: Prevent Skin Injury  Recent Flowsheet Documentation  Taken 11/24/2024 0945 by Fariha Phillips, RN  Body Position: supine, head elevated  Goal: Optimal Comfort and Wellbeing  Outcome: Adequate for Care Transition  Goal: Readiness for Transition of Care  Outcome: Adequate for Care Transition         "

## 2024-11-24 NOTE — PLAN OF CARE
"Pt is on care home cares. VSS. No reports of pain or discomfort. Minimal appetite for dinner, barely touched food. Confused. Pt appears to be off from baseline. Not as talkative or able to make needs known, cranky. Ax1 GB walker. Waiting for placement. Discharge TBD.    Goal Outcome Evaluation:      Plan of Care Reviewed With: patient    Overall Patient Progress: no change    Outcome Evaluation: [facility] care. Waiting for Memory care placement.    Problem: Adult Inpatient Plan of Care  Goal: Plan of Care Review  Description: The Plan of Care Review/Shift note should be completed every shift.  The Outcome Evaluation is a brief statement about your assessment that the patient is improving, declining, or no change.  This information will be displayed automatically on your shift  note.  11/24/2024 0217 by Donna Shine RN  Outcome: Progressing  Flowsheets (Taken 11/24/2024 0217)  Outcome Evaluation: [facility] care. Waiting for Memory care placement.  Plan of Care Reviewed With: patient  Overall Patient Progress: no change  11/23/2024 2040 by Donna Shine RN  Outcome: Progressing  Goal: Patient-Specific Goal (Individualized)  Description: You can add care plan individualizations to a care plan. Examples of Individualization might be:  \"Parent requests to be called daily at 9am for status\", \"I have a hard time hearing out of my right ear\", or \"Do not touch me to wake me up as it startles  me\".  11/24/2024 0217 by Donna Shine RN  Outcome: Progressing  11/23/2024 2040 by Donna Shine RN  Outcome: Progressing  Goal: Absence of Hospital-Acquired Illness or Injury  11/24/2024 0217 by Donna Shine, RN  Outcome: Progressing  11/23/2024 2040 by Donna Shine, RN  Outcome: Progressing  Intervention: Identify and Manage Fall Risk  Recent Flowsheet Documentation  Taken 11/23/2024 1933 by Donna Shine, RN  Safety Promotion/Fall Prevention:   assistive device/personal items within reach   clutter " free environment maintained   increased rounding and observation   increase visualization of patient   lighting adjusted   mobility aid in reach   nonskid shoes/slippers when out of bed   patient and family education   room door open   room near nurse's station   room organization consistent   safety round/check completed   treat underlying cause   treat reversible contributory factors  Intervention: Prevent Skin Injury  Recent Flowsheet Documentation  Taken 11/23/2024 1933 by Donna Shine RN  Body Position: position changed independently  Intervention: Prevent Infection  Recent Flowsheet Documentation  Taken 11/23/2024 1933 by Donna Shine RN  Infection Prevention:   hand hygiene promoted   rest/sleep promoted   single patient room provided  Goal: Optimal Comfort and Wellbeing  11/24/2024 0217 by Donna Shine RN  Outcome: Progressing  11/23/2024 2040 by Donna Shine RN  Outcome: Progressing  Goal: Readiness for Transition of Care  11/24/2024 0217 by Donna Shine RN  Outcome: Progressing  11/23/2024 2040 by Donna Shine RN  Outcome: Progressing

## 2024-11-24 NOTE — PLAN OF CARE
No changes. Ambulated halls. Last BM 11-21.     Goal Outcome Evaluation:      Plan of Care Reviewed With: patient    Overall Patient Progress: no changeOverall Patient Progress: no change    Outcome Evaluation: no changes.    Problem: Adult Inpatient Plan of Care  Goal: Plan of Care Review  Description: The Plan of Care Review/Shift note should be completed every shift.  The Outcome Evaluation is a brief statement about your assessment that the patient is improving, declining, or no change.  This information will be displayed automatically on your shift  note.  Recent Flowsheet Documentation  Taken 11/24/2024 1736 by Gill Peguero RN  Outcome Evaluation: no changes.  Plan of Care Reviewed With: patient  Overall Patient Progress: no change  Goal: Absence of Hospital-Acquired Illness or Injury  Intervention: Identify and Manage Fall Risk  Recent Flowsheet Documentation  Taken 11/24/2024 1608 by Gill Peguero RN  Safety Promotion/Fall Prevention: safety round/check completed  Intervention: Prevent Skin Injury  Recent Flowsheet Documentation  Taken 11/24/2024 1608 by Gill Peguero RN  Body Position: position changed independently  Intervention: Prevent Infection  Recent Flowsheet Documentation  Taken 11/24/2024 1608 by Gill Peguero RN  Infection Prevention:   hand hygiene promoted   rest/sleep promoted   single patient room provided

## 2024-11-24 NOTE — PROGRESS NOTES
[facility]  Hospitalist Progress Note  Guicho Rashid MD 11/24/2024    Reason for Stay (Diagnosis): dementia.  LTC placement.  snf care         Assessment and Plan:      Summary of Stay: Jemal Gray is a 89 year old male with history of dementia with chronic aphasia. He was admitted on 10/2/2024 after he was brought to the ED by EMS from facility for evaluation of agitation. He had a prolonged hospitalization from 6/5/24 through 10/2/24 during which he was treated for cellulitis but mostly awaited long-term care placement due to dementia. He required appointment of guardianship and ultimately discharged to a long-term care facility on 10/2. Upon transfer to the long-term care facility he became agitated with swearing.  He was wandering around the long-term care facility, entering other patient's rooms and staff was unable to redirect him. He required transfer back to the ED and was readmitted.     In the ED he was stable, no major acute medical issue. He was given seroquel.  He required a bedside attendant as he was agitated and unable to be redirected.  He attempted to leave the emergency department and go into other patient's rooms. His facility was not able to take him back and there was no immediate safe disposition option.  He is here under nursing home care status.  Patient has improved and is no longer agitated. He remains medically stable and can be discharged whenever placement is found. Prior long term care facility is reportedly not taking patient back.  is looking into memory care units.     Patient has been here since 10/2/244 awaiting placement.  Elderly waiver appointment is scheduled for 12/9 and this meeting is necessary to establish funding for further cares.       Plans today:  -Patient remains nursing home care  -Await long-term placement  -Concern for loss of appetite that is evidently new in the past couple of days. No evident VS change.  - when I met the pt's  daughter, present today at the bedside, the pt is alert and interactive, though does not make sense. Appears close to baseline per her report.   - I had asked for lab to draw BMP and CBC today, but pt was not able to comply. He became agitated and apparently swung at the .        Safe Disposition/detention Care:  -  Patient remains medically stable for discharge when location found.  SW/CM following and working on options.  No current safe disposition option.    -Patient had prolonged hospitalization due to need for disposition assistance in earlier 2024.  His daughter filed for guardianship paperwork which was completed in July.  Acquired MA application and long-term care placement.    -Ultimately will require 24/7 care and likely Memory care placement      Acute agitation, resolved  Behavior disturbances   Dementia with chronic aphasia:  Suspect triggered by changes in setting and recent move.  No physical complaints or concerns of infection.  Historically required Zyprexa over the summer when he was hospitalized for agitation.  Most recently had been prescribed Seroquel 12.5 mg twice daily as needed although he had not been needing most recently.  -Continue Seroquel PRN  - ms prior check  -SW consulted  -needs memory care        Actinic Keratosis:  -Had surgery for this and prescribed steroids cream and ketoconazole cream, restarted per daughter request previously.     Sinus Bradycardia:  -Patient was noted to have HR in 50s.  Asymptomatic.  -no telemetry monitoring needed      History of Constipation:  -laxatives PRN.  Had BM 11/4.     Barriers to discharge: needs Memory care placement. Unfortunately the patient will not be assessed for elderly waiver until 12/9/2024.      Anticipated length of stay: Medically ready at any time.               Interval History (Subjective):      Poor historian due to dementia. Chart reviewed, pt interviewed.      Stable per nursing, but pt reports a loss of  "appetite.   Daughter at bedside indicated that she thought her father appeared roughly at baseline.                   Physical Exam:      Last Vital Signs:  /76 (BP Location: Right arm)   Pulse 56   Temp 98.6  F (37  C) (Oral)   Resp 18   Wt 71.4 kg (157 lb 6.4 oz)   SpO2 96%   BMI 23.93 kg/m      No intake or output data in the 24 hours ending 11/22/24 1006    Constitutional: Awake, alert, cooperative, no apparent distress.  Verbal with mostly incomprehensible speech     Respiratory: No increased WOB.   Cardiovascular: Well-perfused   Abdomen: oft, non-distended, non-tender   Skin: No rashes, no cyanosis, dry to touch   Neuro: Alert and awake, ++memory loss   Extremities: No edema, normal range of motion   Other(s):        All other systems: Negative          Medications:      All current medications were reviewed with changes reflected in problem list.         Data:      All new lab and imaging data was reviewed.   Labs:       Lab Results   Component Value Date     10/20/2024     10/03/2024    Lab Results   Component Value Date    CHLORIDE 105 10/20/2024    CHLORIDE 103 10/03/2024    Lab Results   Component Value Date    BUN 23.6 10/20/2024    BUN 17.4 10/03/2024      Lab Results   Component Value Date    POTASSIUM 3.9 10/20/2024    POTASSIUM 3.9 10/03/2024    Lab Results   Component Value Date    CO2 24 10/20/2024    CO2 23 10/03/2024    Lab Results   Component Value Date    CR 0.94 10/20/2024    CR 0.89 10/03/2024        No results for input(s): \"WBC\", \"HGB\", \"HCT\", \"MCV\", \"PLT\" in the last 168 hours.   Imaging:   No results found for this or any previous visit (from the past 24 hours).   "

## 2024-11-25 PROCEDURE — 250N000013 HC RX MED GY IP 250 OP 250 PS 637: Performed by: PHYSICIAN ASSISTANT

## 2024-11-25 PROCEDURE — 99207 PR NO BILLABLE SERVICE THIS VISIT: CPT | Performed by: INTERNAL MEDICINE

## 2024-11-25 PROCEDURE — 101N000002 HC CUSTODIAL CARE DAILY

## 2024-11-25 ASSESSMENT — ACTIVITIES OF DAILY LIVING (ADL)
ADLS_ACUITY_SCORE: 69
ADLS_ACUITY_SCORE: 0
ADLS_ACUITY_SCORE: 69
ADLS_ACUITY_SCORE: 0
ADLS_ACUITY_SCORE: 69
ADLS_ACUITY_SCORE: 69
ADLS_ACUITY_SCORE: 0
ADLS_ACUITY_SCORE: 69
ADLS_ACUITY_SCORE: 0
ADLS_ACUITY_SCORE: 69
ADLS_ACUITY_SCORE: 0

## 2024-11-25 NOTE — PROGRESS NOTES
[facility]  Hospitalist Progress Note  Guicho Rashid MD 11/25/2024    Reason for Stay (Diagnosis): dementia.  LTC placement.  intermediate care         Assessment and Plan:      Summary of Stay: Jemal Gray is a 89 year old male with history of dementia with chronic aphasia. He was admitted on 10/2/2024 after he was brought to the ED by EMS from facility for evaluation of agitation. He had a prolonged hospitalization from 6/5/24 through 10/2/24 during which he was treated for cellulitis but mostly awaited long-term care placement due to dementia. He required appointment of guardianship and ultimately discharged to a long-term care facility on 10/2. Upon transfer to the long-term care facility he became agitated with swearing.  He was wandering around the long-term care facility, entering other patient's rooms and staff was unable to redirect him. He required transfer back to the ED and was readmitted.     In the ED he was stable, no major acute medical issue. He was given seroquel.  He required a bedside attendant as he was agitated and unable to be redirected.  He attempted to leave the emergency department and go into other patient's rooms. His facility was not able to take him back and there was no immediate safe disposition option.  He is here under snf care status.  Patient has improved and is no longer agitated. He remains medically stable and can be discharged whenever placement is found. Prior long term care facility is reportedly not taking patient back.  is looking into memory care units.     Patient has been here since 10/2/244 awaiting placement.  Elderly waiver appointment is scheduled for 12/9 and this meeting is necessary to establish funding for further cares.       Plans today:  -Patient remains snf care  -Await long-term placement after meeting with Elderly waiver meeting expected on 12/9  -Concern for loss of appetite that is evidently new in the past  couple of days. No evident VS change.  - much more interactive and alert today.  Up and ambulating in halls with support staff        Safe Disposition/jail Care:  -  Patient remains medically stable for discharge when location found.  SW/CM following and working on options.  No current safe disposition option.    -Patient had prolonged hospitalization due to need for disposition assistance in earlier 2024.  His daughter filed for guardianship paperwork which was completed in July.  Acquired MA application and long-term care placement.    -Ultimately will require 24/7 care and likely Memory care placement      Acute agitation, resolved  Behavior disturbances   Dementia with chronic aphasia:  Suspect triggered by changes in setting and recent move.  No physical complaints or concerns of infection.  Historically required Zyprexa over the summer when he was hospitalized for agitation.  Most recently had been prescribed Seroquel 12.5 mg twice daily as needed although he had not been needing most recently.  -Continue Seroquel PRN  - ms prior check  -SW consulted  -needs memory care        Actinic Keratosis:  -Had surgery for this and prescribed steroids cream and ketoconazole cream, restarted per daughter request previously.     Sinus Bradycardia:  -Patient was noted to have HR in 50s.  Asymptomatic.  -no telemetry monitoring needed      History of Constipation:  -laxatives PRN.  Had BM 11/4.     Barriers to discharge: needs Memory care placement. Unfortunately the patient will not be assessed for elderly waiver until 12/9/2024.      Anticipated length of stay: Medically ready at any time.               Interval History (Subjective):      Alert and comfortable.                   Physical Exam:      Last Vital Signs:  /76 (BP Location: Right arm)   Pulse 56   Temp 98.6  F (37  C) (Oral)   Resp 18   Wt 71.4 kg (157 lb 6.4 oz)   SpO2 96%   BMI 23.93 kg/m      No intake or output data in the 24 hours  "ending 11/22/24 1006    Constitutional: Awake, alert, cooperative, no apparent distress.  Verbal with mostly incomprehensible speech     Respiratory: No increased WOB.   Cardiovascular: Well-perfused   Abdomen: oft, non-distended, non-tender   Skin: No rashes, no cyanosis, dry to touch   Neuro: Alert and awake, ++memory loss   Extremities: No edema, normal range of motion   Other(s):        All other systems: Negative          Medications:      All current medications were reviewed with changes reflected in problem list.         Data:      All new lab and imaging data was reviewed.   Labs:       Lab Results   Component Value Date     10/20/2024     10/03/2024    Lab Results   Component Value Date    CHLORIDE 105 10/20/2024    CHLORIDE 103 10/03/2024    Lab Results   Component Value Date    BUN 23.6 10/20/2024    BUN 17.4 10/03/2024      Lab Results   Component Value Date    POTASSIUM 3.9 10/20/2024    POTASSIUM 3.9 10/03/2024    Lab Results   Component Value Date    CO2 24 10/20/2024    CO2 23 10/03/2024    Lab Results   Component Value Date    CR 0.94 10/20/2024    CR 0.89 10/03/2024        No results for input(s): \"WBC\", \"HGB\", \"HCT\", \"MCV\", \"PLT\" in the last 168 hours.   Imaging:   No results found for this or any previous visit (from the past 24 hours).   "

## 2024-11-25 NOTE — PLAN OF CARE
"RN Shift Summary [id number]: Pt on care home cares, awaiting memory care placement. Elderly waiver appt 12/09. Pt confused at baseline d/t dementia dx, no acute events this shift. No BM since 11/21, attempted to give PRN Miralax but pt refused. SW following. VSS. Continue with current POC.     BP (!) 152/81 (BP Location: Right arm)   Pulse 54   Temp 98.4  F (36.9  C) (Oral)   Resp 18   Wt 71.4 kg (157 lb 6.4 oz)   SpO2 97%   BMI 23.93 kg/m          Goal Outcome Evaluation:      Plan of Care Reviewed With: patient    Overall Patient Progress: no changeOverall Patient Progress: no change    Outcome Evaluation: [facility] cares. Pending placement. Elderly waiver appt 12/09.      Problem: Confusion Acute  Goal: Optimal Cognitive Function  Outcome: Progressing     Problem: Adult Inpatient Plan of Care  Goal: Plan of Care Review  Description: The Plan of Care Review/Shift note should be completed every shift.  The Outcome Evaluation is a brief statement about your assessment that the patient is improving, declining, or no change.  This information will be displayed automatically on your shift  note.  Outcome: Progressing  Flowsheets (Taken 11/25/2024 1317)  Outcome Evaluation: [facility] cares. Pending placement. Elderly waiver appt 12/09.  Plan of Care Reviewed With: patient  Overall Patient Progress: no change  Goal: Patient-Specific Goal (Individualized)  Description: You can add care plan individualizations to a care plan. Examples of Individualization might be:  \"Parent requests to be called daily at 9am for status\", \"I have a hard time hearing out of my right ear\", or \"Do not touch me to wake me up as it startles  me\".  Outcome: Progressing  Goal: Absence of Hospital-Acquired Illness or Injury  Outcome: Progressing  Intervention: Identify and Manage Fall Risk  Recent Flowsheet Documentation  Taken 11/25/2024 1047 by Uma Lynch RN  Safety Promotion/Fall Prevention: safety round/check completed  Goal: " Optimal Comfort and Wellbeing  Outcome: Progressing  Goal: Readiness for Transition of Care  Outcome: Progressing     Problem: Constipation  Goal: Effective Bowel Elimination  Outcome: Progressing

## 2024-11-25 NOTE — PLAN OF CARE
"[facility] is on jail cares. VSS. Alert to self only, confused. No reports of pain or discomfort. Pt has not had a bowel movement since Thursday, given prune juice, pt drank a few sips. Slightly cranky at times but seems to be doing a little better. Walked with nursing staff in hallways 2 times during shift. Ax1 GB walker. Waiting for memory care placement. SW following. Elderly waiver appointment 12/9. Discharge TBD.    Goal Outcome Evaluation:      Plan of Care Reviewed With: patient    Overall Patient Progress: no change    Outcome Evaluation: [facility] cares. Remains confused. No BM since Thursday.    Problem: Constipation  Goal: Effective Bowel Elimination  Outcome: Not Progressing     Problem: Confusion Acute  Goal: Optimal Cognitive Function  Outcome: Progressing     Problem: Adult Inpatient Plan of Care  Goal: Plan of Care Review  Description: The Plan of Care Review/Shift note should be completed every shift.  The Outcome Evaluation is a brief statement about your assessment that the patient is improving, declining, or no change.  This information will be displayed automatically on your shift  note.  Outcome: Progressing  Flowsheets (Taken 11/25/2024 0025)  Outcome Evaluation: [facility] cares. Remains confused. No BM since Thursday.  Plan of Care Reviewed With: patient  Overall Patient Progress: no change  Goal: Patient-Specific Goal (Individualized)  Description: You can add care plan individualizations to a care plan. Examples of Individualization might be:  \"Parent requests to be called daily at 9am for status\", \"I have a hard time hearing out of my right ear\", or \"Do not touch me to wake me up as it startles  me\".  Outcome: Progressing  Goal: Absence of Hospital-Acquired Illness or Injury  Outcome: Progressing  Intervention: Identify and Manage Fall Risk  Recent Flowsheet Documentation  Taken 11/24/2024 2351 by Donna Shine RN  Safety Promotion/Fall Prevention: safety round/check completed  Taken " 11/24/2024 2240 by Donna Shine RN  Safety Promotion/Fall Prevention: safety round/check completed  Taken 11/24/2024 2135 by Donna Shine RN  Safety Promotion/Fall Prevention: safety round/check completed  Taken 11/24/2024 2050 by Donna Shine RN  Safety Promotion/Fall Prevention:   assistive device/personal items within reach   clutter free environment maintained   increased rounding and observation   increase visualization of patient   lighting adjusted   mobility aid in reach   nonskid shoes/slippers when out of bed   patient and family education   room door open   room near nurse's station   room organization consistent   safety round/check completed   treat reversible contributory factors   treat underlying cause  Taken 11/24/2024 1909 by Donna Shine RN  Safety Promotion/Fall Prevention: safety round/check completed  Intervention: Prevent Skin Injury  Recent Flowsheet Documentation  Taken 11/24/2024 2050 by Donna Shine RN  Body Position: position changed independently  Intervention: Prevent Infection  Recent Flowsheet Documentation  Taken 11/24/2024 2050 by Donna Shine RN  Infection Prevention:   hand hygiene promoted   rest/sleep promoted   single patient room provided  Goal: Optimal Comfort and Wellbeing  Outcome: Progressing  Goal: Readiness for Transition of Care  Outcome: Progressing

## 2024-11-26 ENCOUNTER — DOCUMENTATION ONLY (OUTPATIENT)
Dept: OTHER | Facility: CLINIC | Age: 89
End: 2024-11-26
Payer: COMMERCIAL

## 2024-11-26 PROCEDURE — 99207 PR NO BILLABLE SERVICE THIS VISIT: CPT | Performed by: INTERNAL MEDICINE

## 2024-11-26 PROCEDURE — 101N000002 HC CUSTODIAL CARE DAILY

## 2024-11-26 PROCEDURE — 250N000013 HC RX MED GY IP 250 OP 250 PS 637: Performed by: INTERNAL MEDICINE

## 2024-11-26 RX ORDER — BISACODYL 10 MG
10 SUPPOSITORY, RECTAL RECTAL DAILY PRN
Status: DISCONTINUED | OUTPATIENT
Start: 2024-11-26 | End: 2024-12-18 | Stop reason: HOSPADM

## 2024-11-26 RX ADMIN — BISACODYL 10 MG: 10 SUPPOSITORY RECTAL at 04:04

## 2024-11-26 ASSESSMENT — ACTIVITIES OF DAILY LIVING (ADL)
ADLS_ACUITY_SCORE: 71
ADLS_ACUITY_SCORE: 71
ADLS_ACUITY_SCORE: 69
ADLS_ACUITY_SCORE: 65
ADLS_ACUITY_SCORE: 71
ADLS_ACUITY_SCORE: 69
ADLS_ACUITY_SCORE: 71
ADLS_ACUITY_SCORE: 69
ADLS_ACUITY_SCORE: 67
ADLS_ACUITY_SCORE: 65
ADLS_ACUITY_SCORE: 65
ADLS_ACUITY_SCORE: 69
ADLS_ACUITY_SCORE: 71
ADLS_ACUITY_SCORE: 65
ADLS_ACUITY_SCORE: 69
ADLS_ACUITY_SCORE: 67
ADLS_ACUITY_SCORE: 65
ADLS_ACUITY_SCORE: 71
ADLS_ACUITY_SCORE: 67
ADLS_ACUITY_SCORE: 69

## 2024-11-26 NOTE — PROGRESS NOTES
[facility]  Hospitalist Progress Note  Guicho Rashid MD 11/26/2024    Reason for Stay (Diagnosis): dementia.  LTC placement.  intermediate care         Assessment and Plan:      Summary of Stay: Jemal Gray is a 89 year old male with history of dementia with chronic aphasia. He was admitted on 10/2/2024 after he was brought to the ED by EMS from facility for evaluation of agitation. He had a prolonged hospitalization from 6/5/24 through 10/2/24 during which he was treated for cellulitis but mostly awaited long-term care placement due to dementia. He required appointment of guardianship and ultimately discharged to a long-term care facility on 10/2. Upon transfer to the long-term care facility he became agitated with swearing.  He was wandering around the long-term care facility, entering other patient's rooms and staff was unable to redirect him. He required transfer back to the ED and was readmitted.     In the ED he was stable, no major acute medical issue. He was given seroquel.  He required a bedside attendant as he was agitated and unable to be redirected.  He attempted to leave the emergency department and go into other patient's rooms. His facility was not able to take him back and there was no immediate safe disposition option.  He is here under longterm care status.  Patient has improved and is no longer agitated. He remains medically stable and can be discharged whenever placement is found. Prior long term care facility is reportedly not taking patient back.  is looking into memory care units.     Patient has been here since 10/2/244 awaiting placement.  Elderly waiver appointment is scheduled for 12/9 and this meeting is necessary to establish funding for further cares.       Plans today:  -Patient remains longterm care  -Await long-term placement after meeting with Elderly waiver meeting expected on 12/9  -Concern for loss of appetite that is evidently new in the past  couple of days. No evident VS change.  - much more interactive and alert today.  Up and ambulating in halls with support staff  -clipped toenails today.         Safe Disposition/shelter Care:  -  Patient remains medically stable for discharge when location found.  SW/CM following and working on options.  No current safe disposition option.    -Patient had prolonged hospitalization due to need for disposition assistance in earlier 2024.  His daughter filed for guardianship paperwork which was completed in July.  Acquired MA application and long-term care placement.    -Ultimately will require 24/7 care and likely Memory care placement      Acute agitation, resolved  Behavior disturbances   Dementia with chronic aphasia:  Suspect triggered by changes in setting and recent move.  No physical complaints or concerns of infection.  Historically required Zyprexa over the summer when he was hospitalized for agitation.  Most recently had been prescribed Seroquel 12.5 mg twice daily as needed although he had not been needing most recently.  -Continue Seroquel PRN  - ms prior check  -SW consulted  -needs memory care        Actinic Keratosis:  -Had surgery for this and prescribed steroids cream and ketoconazole cream, restarted per daughter request previously.     Sinus Bradycardia:  -Patient was noted to have HR in 50s.  Asymptomatic.  -no telemetry monitoring needed      History of Constipation:  -laxatives PRN.  Had BM 11/4.     Barriers to discharge: needs Memory care placement. Unfortunately the patient will not be assessed for elderly waiver until 12/9/2024.      Anticipated length of stay: Medically ready at any time.               Interval History (Subjective):      Alert and comfortable.                   Physical Exam:      Last Vital Signs:  BP (!) 152/81 (BP Location: Right arm)   Pulse 64   Temp 98.5  F (36.9  C) (Oral)   Resp 16   Wt 71.4 kg (157 lb 6.4 oz)   SpO2 95%   BMI 23.93 kg/m      No intake  "or output data in the 24 hours ending 11/22/24 1006    Constitutional: Awake, alert, cooperative, no apparent distress.  Verbal with mostly incomprehensible speech     Respiratory: No increased WOB.   Cardiovascular: Well-perfused   Abdomen: oft, non-distended, non-tender   Skin: No rashes, no cyanosis, dry to touch   Neuro: Alert and awake, ++memory loss   Extremities: No edema, normal range of motion   Other(s):        All other systems: Negative          Medications:      All current medications were reviewed with changes reflected in problem list.         Data:      All new lab and imaging data was reviewed.   Labs:       Lab Results   Component Value Date     10/20/2024     10/03/2024    Lab Results   Component Value Date    CHLORIDE 105 10/20/2024    CHLORIDE 103 10/03/2024    Lab Results   Component Value Date    BUN 23.6 10/20/2024    BUN 17.4 10/03/2024      Lab Results   Component Value Date    POTASSIUM 3.9 10/20/2024    POTASSIUM 3.9 10/03/2024    Lab Results   Component Value Date    CO2 24 10/20/2024    CO2 23 10/03/2024    Lab Results   Component Value Date    CR 0.94 10/20/2024    CR 0.89 10/03/2024        No results for input(s): \"WBC\", \"HGB\", \"HCT\", \"MCV\", \"PLT\" in the last 168 hours.   Imaging:   No results found for this or any previous visit (from the past 24 hours).   "

## 2024-11-26 NOTE — PLAN OF CARE
/81  Pulse 54   Temp 98.4  F  Resp 18  SpO2 97%    Patient is alert but confused, still feels pretty constipated so order for suppository was given, patient is comfortable in his room, no complains of pain, assist of 1 with transfers.     Problem: Adult Inpatient Plan of Care  Goal: Absence of Hospital-Acquired Illness or Injury  Intervention: Identify and Manage Fall Risk  Recent Flowsheet Documentation  Taken 11/26/2024 0227 by Mere Valerio RN  Safety Promotion/Fall Prevention:   activity supervised   clutter free environment maintained   lighting adjusted   nonskid shoes/slippers when out of bed   patient and family education   room near nurse's station   room organization consistent   safety round/check completed  Intervention: Prevent Skin Injury  Recent Flowsheet Documentation  Taken 11/26/2024 0227 by Mere Valerio, RN  Body Position: position changed independently  Intervention: Prevent Infection  Recent Flowsheet Documentation  Taken 11/26/2024 0227 by Mere Valerio, RN  Infection Prevention:   hand hygiene promoted   rest/sleep promoted   single patient room provided

## 2024-11-26 NOTE — PLAN OF CARE
"Goal Outcome Evaluation:      Plan of Care Reviewed With: patient    Overall Patient Progress: no change    Outcome Evaluation: [facility] cares continued. Awaiting placement, elderly waiver appointment 12.09    Patient calm and cooperative. Disorientated x3, alert to self. Linens changed, nail care, and partial bed bath completed. Patient did not want to change clothes. Attempted to give senna in chocolate pudding but patient refused medication. Patient is having hard stools. Continue to monitor and follow POC.     Problem: Confusion Acute  Goal: Optimal Cognitive Function  Outcome: Progressing     Problem: Adult Inpatient Plan of Care  Goal: Plan of Care Review  Description: The Plan of Care Review/Shift note should be completed every shift.  The Outcome Evaluation is a brief statement about your assessment that the patient is improving, declining, or no change.  This information will be displayed automatically on your shift  note.  Outcome: Progressing  Flowsheets (Taken 11/25/2024 2303)  Outcome Evaluation: [facility] cares continued. Awaiting placement, elderly waiver appointment 12.09  Plan of Care Reviewed With: patient  Overall Patient Progress: no change  Goal: Patient-Specific Goal (Individualized)  Description: You can add care plan individualizations to a care plan. Examples of Individualization might be:  \"Parent requests to be called daily at 9am for status\", \"I have a hard time hearing out of my right ear\", or \"Do not touch me to wake me up as it startles  me\".  Outcome: Progressing  Goal: Absence of Hospital-Acquired Illness or Injury  Outcome: Progressing  Intervention: Prevent Skin Injury  Recent Flowsheet Documentation  Taken 11/25/2024 1500 by Radha Tam, RN  Body Position: position changed independently  Goal: Optimal Comfort and Wellbeing  Outcome: Progressing  Goal: Readiness for Transition of Care  Outcome: Progressing     Problem: Constipation  Goal: Effective Bowel " Elimination  Outcome: Progressing

## 2024-11-27 PROCEDURE — 101N000002 HC CUSTODIAL CARE DAILY

## 2024-11-27 PROCEDURE — 99207 PR NO BILLABLE SERVICE THIS VISIT: CPT | Performed by: INTERNAL MEDICINE

## 2024-11-27 RX ADMIN — KETOCONAZOLE: 20 SHAMPOO, SUSPENSION TOPICAL at 12:06

## 2024-11-27 ASSESSMENT — ACTIVITIES OF DAILY LIVING (ADL)
ADLS_ACUITY_SCORE: 67
ADLS_ACUITY_SCORE: 65
ADLS_ACUITY_SCORE: 67
ADLS_ACUITY_SCORE: 65
ADLS_ACUITY_SCORE: 67
ADLS_ACUITY_SCORE: 67
ADLS_ACUITY_SCORE: 65
ADLS_ACUITY_SCORE: 67
ADLS_ACUITY_SCORE: 65

## 2024-11-27 NOTE — PLAN OF CARE
Temp: 98.5  F (36.9  C) Temp src: Oral BP: 125/70 Pulse: 64   Resp: 16 SpO2: 95 % O2 Device: None (Room air)       Continues [facility] cares. A& disoriented to place, time, situation. VSS. Up 1A GB walker. Multiple walks around unit today. Had suppository last night, multiple BMs today. Low appetite continues. Food and fluids encouraged. Toenails clipped today by Dr Rashid. Patient very appreciative of this. Mepilex dressing to sacrum, changed today. Waiting for placement for Memory Care Unit. [facility] Waiver appointment scheduled for 12/09.      Goal Outcome Evaluation:      Plan of Care Reviewed With: patient    Overall Patient Progress: no changeOverall Patient Progress: no change    Outcome Evaluation: [facility] cares. Waiting placement. Eldery waiver 12/09. BM today.      Problem: Confusion Acute  Goal: Optimal Cognitive Function  Outcome: Adequate for Care Transition  Intervention: Minimize Contributing Factors  Recent Flowsheet Documentation  Taken 11/26/2024 1400 by Qian Lamas RN  Sensory Stimulation Regulation:   care clustered   lighting decreased   quiet environment promoted   television on  Reorientation Measures: clock in view  Communication Support Strategies:   active listening utilized   one-step directions provided   simple statements used  Environment Familiarity/Consistency:   daily routine followed   personal clothing/items utilized     Problem: Adult Inpatient Plan of Care  Goal: Plan of Care Review  Description: The Plan of Care Review/Shift note should be completed every shift.  The Outcome Evaluation is a brief statement about your assessment that the patient is improving, declining, or no change.  This information will be displayed automatically on your shift  note.  Outcome: Adequate for Care Transition  Flowsheets (Taken 11/26/2024 1838)  Outcome Evaluation: [facility] cares. Waiting placement. Eldery waiver 12/09. BM today.  Plan of Care Reviewed With: patient  Overall Patient Progress:  "no change  Goal: Patient-Specific Goal (Individualized)  Description: You can add care plan individualizations to a care plan. Examples of Individualization might be:  \"Parent requests to be called daily at 9am for status\", \"I have a hard time hearing out of my right ear\", or \"Do not touch me to wake me up as it startles  me\".  Outcome: Adequate for Care Transition  Goal: Absence of Hospital-Acquired Illness or Injury  Outcome: Adequate for Care Transition  Intervention: Identify and Manage Fall Risk  Recent Flowsheet Documentation  Taken 11/26/2024 1400 by Qian Lamas RN  Safety Promotion/Fall Prevention:   activity supervised   lighting adjusted   nonskid shoes/slippers when out of bed   room door open   room near nurse's station   room organization consistent   safety round/check completed   supervised activity  Intervention: Prevent Skin Injury  Recent Flowsheet Documentation  Taken 11/26/2024 1400 by Qian Lamas RN  Skin Protection:   incontinence pads utilized   silicone foam dressing in place  Intervention: Prevent and Manage VTE (Venous Thromboembolism) Risk  Recent Flowsheet Documentation  Taken 11/26/2024 1400 by Qian Lamas RN  VTE Prevention/Management: (ambulatory)   patient refused intervention   other (see comments)  Intervention: Prevent Infection  Recent Flowsheet Documentation  Taken 11/26/2024 1400 by Qian Lamas RN  Infection Prevention:   rest/sleep promoted   single patient room provided  Goal: Optimal Comfort and Wellbeing  Outcome: Adequate for Care Transition  Goal: Readiness for Transition of Care  Outcome: Adequate for Care Transition     Problem: Constipation  Goal: Effective Bowel Elimination  Outcome: Adequate for Care Transition         "

## 2024-11-27 NOTE — PROGRESS NOTES
[facility]  Hospitalist Progress Note  Guicho Rashid MD 11/27/2024    Reason for Stay (Diagnosis): dementia.  LTC placement.  [facility] care         Assessment and Plan:      Summary of Stay: Jemal Gray is a 89 year old male with history of dementia with chronic aphasia. He was admitted on 10/2/2024 after he was brought to the ED by EMS from facility for evaluation of agitation. He had a prolonged hospitalization from 6/5/24 through 10/2/24 during which he was treated for cellulitis but mostly awaited long-term care placement due to dementia. He required appointment of guardianship and ultimately discharged to a long-term care facility on 10/2. Upon transfer to the long-term care facility he became agitated with swearing.  He was wandering around the long-term care facility, entering other patient's rooms and staff was unable to redirect him. He required transfer back to the ED and was readmitted.     In the ED he was stable, no major acute medical issue. He was given seroquel.  He required a bedside attendant as he was agitated and unable to be redirected.  He attempted to leave the emergency department and go into other patient's rooms. His facility was not able to take him back and there was no immediate safe disposition option.  He is here under longterm care status.  Patient has improved and is no longer agitated. He remains medically stable and can be discharged whenever placement is found. Prior long term care facility is reportedly not taking patient back.  is looking into memory care units.     Patient has been here since 10/2/244 awaiting placement.  Elderly waiver appointment is scheduled for 12/9 and this meeting is necessary to establish funding for further cares.       Plans today:  -Patient remains longterm care  -Await long-term placement after meeting with Elderly waiver meeting expected on 12/9  -Concern for loss of appetite that is evidently new in the past  couple of days. No evident VS change.  - much more interactive and alert today.  Up and ambulating in halls with support staff        Safe Disposition/residential Care:  -  Patient remains medically stable for discharge when location found.  SW/CM following and working on options.  No current safe disposition option.    -Patient had prolonged hospitalization due to need for disposition assistance in earlier 2024.  His daughter filed for guardianship paperwork which was completed in July.  Acquired MA application and long-term care placement.    -Ultimately will require 24/7 care and Memory care placement      Acute agitation, resolved  Behavior disturbances   Dementia with chronic aphasia:  Suspect triggered by changes in setting and recent move.  No physical complaints or concerns of infection.  Historically required Zyprexa over the summer when he was hospitalized for agitation.  Most recently had been prescribed Seroquel 12.5 mg twice daily as needed although he had not been needing most recently.  -Continue Seroquel PRN  - ms prior check  -SW consulted  -needs memory care        Actinic Keratosis:  -Had surgery for this and prescribed steroids cream and ketoconazole cream, restarted per daughter request previously.     Sinus Bradycardia:  -Patient was noted to have HR in 50s.  Asymptomatic.  -no telemetry monitoring needed      History of Constipation:  -laxatives PRN.  Had BM 11/4.     Barriers to discharge: needs Memory care placement. Unfortunately the patient will not be assessed for elderly waiver until 12/9/2024.      Anticipated length of stay: Medically ready at any time.               Interval History (Subjective):      Alert and comfortable. No reported change.   Stable night.                     Physical Exam:      Last Vital Signs:  /70 (BP Location: Right arm, Patient Position: Supine, Cuff Size: Adult Regular)   Pulse 64   Temp 98.5  F (36.9  C) (Oral)   Resp 16   Wt 71.4 kg (157 lb  "6.4 oz)   SpO2 95%   BMI 23.93 kg/m      No intake or output data in the 24 hours ending 11/22/24 1006    Constitutional: Awake, alert, cooperative, no apparent distress.  Verbal with mostly incomprehensible speech     Respiratory: No increased WOB.   Cardiovascular: Well-perfused   Abdomen: oft, non-distended, non-tender   Skin: No rashes, no cyanosis, dry to touch   Neuro: Alert and awake, ++memory loss   Extremities: No edema, normal range of motion   Other(s):        All other systems: Negative          Medications:      All current medications were reviewed with changes reflected in problem list.         Data:      All new lab and imaging data was reviewed.   Labs:       Lab Results   Component Value Date     10/20/2024     10/03/2024    Lab Results   Component Value Date    CHLORIDE 105 10/20/2024    CHLORIDE 103 10/03/2024    Lab Results   Component Value Date    BUN 23.6 10/20/2024    BUN 17.4 10/03/2024      Lab Results   Component Value Date    POTASSIUM 3.9 10/20/2024    POTASSIUM 3.9 10/03/2024    Lab Results   Component Value Date    CO2 24 10/20/2024    CO2 23 10/03/2024    Lab Results   Component Value Date    CR 0.94 10/20/2024    CR 0.89 10/03/2024        No results for input(s): \"WBC\", \"HGB\", \"HCT\", \"MCV\", \"PLT\" in the last 168 hours.   Imaging:   No results found for this or any previous visit (from the past 24 hours).   "

## 2024-11-27 NOTE — PLAN OF CARE
Slept soundly throughout the night, and woke up briefly at 0530 to use the bathroom and then back to sleep. Awaiting for placement at memory care.     Goal Outcome Evaluation:                      Problem: Dementia Signs/Symptoms  Goal: Improved Behavioral Control (Dementia Signs/Symptoms)  Outcome: Progressing  Flowsheets (Taken 11/27/2024 0420)  Mutually Determined Action Steps (Improved Behavioral Control): sleeps 4-6 hours at night  Individualized Action Step (Improved Behavioral Control): slept soundly  Goal: Improved Mood Symptoms (Dementia Signs/Symptoms)  Outcome: Progressing  Goal: Optimized Cognitive Function (Dementia Signs/Symptoms)  Outcome: Progressing  Goal: Optimized Oral Intake (Dementia Signs/Symptoms)  Outcome: Progressing  Goal: Improved Sleep (Dementia Signs/Symptoms)  Outcome: Progressing  Goal: Enhanced Social or Functional Skills and Ability (Dementia Signs/Symptoms)  Outcome: Progressing

## 2024-11-28 PROCEDURE — 101N000002 HC CUSTODIAL CARE DAILY

## 2024-11-28 ASSESSMENT — ACTIVITIES OF DAILY LIVING (ADL)
ADLS_ACUITY_SCORE: 65

## 2024-11-28 NOTE — PROGRESS NOTES
Dimitris's daughters came to visit patient this AM for the holiday. Afterwards, Dimitris seemed in high spirits - singing and smiling. Several walks around the unit taken.

## 2024-11-28 NOTE — PLAN OF CARE
No changes in Pt condition. Alert and oriented to self. Ate well. Snacked through the night. One assist to the bathroom. Updated daughter.

## 2024-11-28 NOTE — PLAN OF CARE
Temp: 97.9  F (36.6  C) Temp src: Oral BP: (!) 146/80 Pulse: 54   Resp: 16 SpO2: 98 % O2 Device: None (Room air)      A& pleasantly confused. Sometimes answers questions appropriately, otherwise illogical but happy and singing. Bms today. Appetite seems better today. Went for multiple walks around the unit. Offered shower, but patient refused once he saw the shower chair. Sponge bath completed with fresh clothes and bed linens. New mepilex to sacrum/coccyx. Light blanchable redness noted. Elderly waiver scheduled for 12/09 per  notes. Waiting for placement.       Goal Outcome Evaluation:      Plan of Care Reviewed With: patient    Overall Patient Progress: no changeOverall Patient Progress: no change    Outcome Evaluation: assisted cares. Waiting placement. Eldery waiver 12/09. BM today.      Problem: Dementia Signs/Symptoms  Goal: Improved Behavioral Control (Dementia Signs/Symptoms)  Outcome: Adequate for Care Transition  Intervention: Manage Behavior  Recent Flowsheet Documentation  Taken 11/27/2024 0930 by Qian Lamas RN  Environmental Support:   calm environment promoted   distractions minimized   environmental consistency promoted   personal routine supported   rest periods encouraged  Goal: Improved Mood Symptoms (Dementia Signs/Symptoms)  Outcome: Adequate for Care Transition  Goal: Optimized Cognitive Function (Dementia Signs/Symptoms)  Outcome: Adequate for Care Transition  Goal: Optimized Oral Intake (Dementia Signs/Symptoms)  Outcome: Adequate for Care Transition  Goal: Improved Sleep (Dementia Signs/Symptoms)  Outcome: Adequate for Care Transition  Goal: Enhanced Social or Functional Skills and Ability (Dementia Signs/Symptoms)  Outcome: Adequate for Care Transition  Intervention: Promote Social and Functional Ability  Recent Flowsheet Documentation  Taken 11/27/2024 0930 by Qian Lamas RN  Social Functional Ability Promotion: social interaction promoted  Trust Relationship/Rapport:   care  explained   choices provided

## 2024-11-28 NOTE — PROGRESS NOTES
[facility]  Hospitalist Progress Note  Javed Valentin, DO 11/28/2024    Reason for Stay (Diagnosis): dementia.  LTC placement.  senior care care         Assessment and Plan:      Summary of Stay: Jemal Gray is a 89 year old male with history of dementia with chronic aphasia. He was admitted on 10/2/2024 after he was brought to the ED by EMS from facility for evaluation of agitation. He had a prolonged hospitalization from 6/5/24 through 10/2/24 during which he was treated for cellulitis but mostly awaited long-term care placement due to dementia. He required appointment of guardianship and ultimately discharged to a long-term care facility on 10/2. Upon transfer to the long-term care facility he became agitated with swearing.  He was wandering around the long-term care facility, entering other patient's rooms and staff was unable to redirect him. He required transfer back to the ED and was readmitted.     In the ED he was stable, no major acute medical issue. He was given seroquel.  He required a bedside attendant as he was agitated and unable to be redirected.  He attempted to leave the emergency department and go into other patient's rooms. His facility was not able to take him back and there was no immediate safe disposition option.  He is here under [facility] care status.  Patient has improved and is no longer agitated. He remains medically stable and can be discharged whenever placement is found. Prior long term care facility is reportedly not taking patient back.  is looking into memory care units.     Patient has been here since 10/2/244 awaiting placement.  Elderly waiver appointment is scheduled for 12/9 and this meeting is necessary to establish funding for further cares.       Plans today:  -Patient remains [facility] care  -Await long-term placement after meeting with Elderly waiver meeting expected on 12/9  -Awake, eating breakfast, watching tv        Safe  Disposition/USP Care:  -  Patient remains medically stable for discharge when location found.  SW/CM following and working on options.  No current safe disposition option.    -Patient had prolonged hospitalization due to need for disposition assistance in earlier 2024.  His daughter filed for guardianship paperwork which was completed in July.  Acquired MA application and long-term care placement.    -Ultimately will require 24/7 care and Memory care placement      Acute agitation, resolved  Behavior disturbances   Dementia with chronic aphasia:  Suspect triggered by changes in setting and recent move.  No physical complaints or concerns of infection.  Historically required Zyprexa over the summer when he was hospitalized for agitation.  Most recently had been prescribed Seroquel 12.5 mg twice daily as needed although he had not been needing most recently.  -Continue Seroquel PRN  - ms prior check  -SW consulted  -needs memory care        Actinic Keratosis:  -Had surgery for this and prescribed steroids cream and ketoconazole cream, restarted per daughter request previously.     Sinus Bradycardia:  -Patient was noted to have HR in 50s.  Asymptomatic.  -no telemetry monitoring needed      History of Constipation:  -laxatives PRN.  Had BM 11/4.     Barriers to discharge: needs Memory care placement. Unfortunately the patient will not be assessed for elderly waiver until 12/9/2024.      Anticipated length of stay: Medically ready at any time.               Interval History (Subjective):      Alert and comfortable. No reported change.   Stable night.                     Physical Exam:      Last Vital Signs:  BP (!) 146/80 (BP Location: Right arm)   Pulse 54   Temp 97.9  F (36.6  C) (Oral)   Resp 16   Wt 71.4 kg (157 lb 6.4 oz)   SpO2 98%   BMI 23.93 kg/m      No intake or output data in the 24 hours ending 11/22/24 1006    Constitutional: Awake, alert, cooperative, no apparent distress.  Verbal with  "mostly incomprehensible speech     Respiratory: No increased WOB.   Cardiovascular: Well-perfused   Abdomen: oft, non-distended, non-tender   Skin: No rashes, no cyanosis, dry to touch   Neuro: Alert and awake, ++memory loss   Extremities: No edema, normal range of motion   Other(s):        All other systems: Negative          Medications:      All current medications were reviewed with changes reflected in problem list.         Data:      All new lab and imaging data was reviewed.   Labs:       Lab Results   Component Value Date     10/20/2024     10/03/2024    Lab Results   Component Value Date    CHLORIDE 105 10/20/2024    CHLORIDE 103 10/03/2024    Lab Results   Component Value Date    BUN 23.6 10/20/2024    BUN 17.4 10/03/2024      Lab Results   Component Value Date    POTASSIUM 3.9 10/20/2024    POTASSIUM 3.9 10/03/2024    Lab Results   Component Value Date    CO2 24 10/20/2024    CO2 23 10/03/2024    Lab Results   Component Value Date    CR 0.94 10/20/2024    CR 0.89 10/03/2024        No results for input(s): \"WBC\", \"HGB\", \"HCT\", \"MCV\", \"PLT\" in the last 168 hours.   Imaging:   No results found for this or any previous visit (from the past 24 hours).   "

## 2024-11-29 PROCEDURE — 101N000002 HC CUSTODIAL CARE DAILY

## 2024-11-29 ASSESSMENT — ACTIVITIES OF DAILY LIVING (ADL)
ADLS_ACUITY_SCORE: 65

## 2024-11-29 NOTE — PROGRESS NOTES
[facility]  Hospitalist Progress Note  Javed Valentin, DO 11/29/2024    Reason for Stay (Diagnosis): dementia.  LTC placement.  senior living care         Assessment and Plan:      Summary of Stay: Jemal Gray is a 89 year old male with history of dementia with chronic aphasia. He was admitted on 10/2/2024 after he was brought to the ED by EMS from facility for evaluation of agitation. He had a prolonged hospitalization from 6/5/24 through 10/2/24 during which he was treated for cellulitis but mostly awaited long-term care placement due to dementia. He required appointment of guardianship and ultimately discharged to a long-term care facility on 10/2. Upon transfer to the long-term care facility he became agitated with swearing.  He was wandering around the long-term care facility, entering other patient's rooms and staff was unable to redirect him. He required transfer back to the ED and was readmitted.     In the ED he was stable, no major acute medical issue. He was given seroquel.  He required a bedside attendant as he was agitated and unable to be redirected.  He attempted to leave the emergency department and go into other patient's rooms. His facility was not able to take him back and there was no immediate safe disposition option.  He is here under skilled nursing care status.  Patient has improved and is no longer agitated. He remains medically stable and can be discharged whenever placement is found. Prior long term care facility is reportedly not taking patient back.  is looking into memory care units.     Patient has been here since 10/2/244 awaiting placement.  Elderly waiver appointment is scheduled for 12/9 and this meeting is necessary to establish funding for further cares.       Plans today:  -Patient remains skilled nursing care  -Await long-term placement after meeting with Elderly waiver meeting expected on 12/9  -Sleepy but wakes up.  No evidence of ongoing restlessness after  seroquel.         Safe Disposition/FCI Care:  -  Patient remains medically stable for discharge when location found.  SW/CM following and working on options.  No current safe disposition option.    -Patient had prolonged hospitalization due to need for disposition assistance in earlier 2024.  His daughter filed for guardianship paperwork which was completed in July.  Acquired MA application and long-term care placement.    -Ultimately will require 24/7 care and Memory care placement      Acute agitation, resolved  Behavior disturbances   Dementia with chronic aphasia:  Suspect triggered by changes in setting and recent move.  No physical complaints or concerns of infection.  Historically required Zyprexa over the summer when he was hospitalized for agitation.  Most recently had been prescribed Seroquel 12.5 mg twice daily as needed although he had not been needing most recently.  -Continue Seroquel PRN  - ms prior check  -SW consulted  -needs memory care        Actinic Keratosis:  -Had surgery for this and prescribed steroids cream and ketoconazole cream, restarted per daughter request previously.     Sinus Bradycardia:  -Patient was noted to have HR in 50s.  Asymptomatic.  -no telemetry monitoring needed      History of Constipation:  -laxatives PRN.  Had BM 11/4.     Barriers to discharge: needs Memory care placement. Unfortunately the patient will not be assessed for elderly waiver until 12/9/2024.      Anticipated length of stay: Medically ready at any time.               Interval History (Subjective):      Sleepy but woke up.  Poor historian.                     Physical Exam:      Last Vital Signs:  BP (!) 161/86 (BP Location: Right arm)   Pulse 90   Temp 97.9  F (36.6  C) (Oral)   Resp 19   Wt 71.4 kg (157 lb 6.4 oz)   SpO2 96%   BMI 23.93 kg/m      No intake or output data in the 24 hours ending 11/22/24 1006  General Appearance: Patient sleepy but did wake up.  No apparent distress.  "  Respiratory: Lungs are CTAB.  Work of breathing appears normal on room air.  Cardiovascular: Regular rate and rhythm.  No murmurs rubs or gallops.  There is no edema present.  Other: Patient is appropriate.           Medications:      All current medications were reviewed with changes reflected in problem list.         Data:      All new lab and imaging data was reviewed.   Labs:       Lab Results   Component Value Date     10/20/2024     10/03/2024    Lab Results   Component Value Date    CHLORIDE 105 10/20/2024    CHLORIDE 103 10/03/2024    Lab Results   Component Value Date    BUN 23.6 10/20/2024    BUN 17.4 10/03/2024      Lab Results   Component Value Date    POTASSIUM 3.9 10/20/2024    POTASSIUM 3.9 10/03/2024    Lab Results   Component Value Date    CO2 24 10/20/2024    CO2 23 10/03/2024    Lab Results   Component Value Date    CR 0.94 10/20/2024    CR 0.89 10/03/2024        No results for input(s): \"WBC\", \"HGB\", \"HCT\", \"MCV\", \"PLT\" in the last 168 hours.   Imaging:   No results found for this or any previous visit (from the past 24 hours).   "

## 2024-11-29 NOTE — PLAN OF CARE
"Pt has been very agitated this shift. He has yelled at staff, attempted to hit at staff. Tried to go in another patient room. At start of the shift he was yelling to fix the picture on the wall. When you touched it he yelled not to touch it. Then he yelled because no one is helping. Pt upset when attempting to remove old food tray. Didn't want snacks at first because there \"is stuff in it\". Pt refused to allow staff to put on a new pad/pull up. He would say what are you doing, don't do that. Then say why wont you help. Pt was walking in room with his pants at his knees and swat/ tried to push staff away yelling don't touch me. Tried several techniques and pt was not easily redirected.     "

## 2024-11-29 NOTE — PLAN OF CARE
"Goal Outcome Evaluation:    Continue [facility] cares. Ambulated in halls. Awaiting memory care placement.      Plan of Care Reviewed With: patient    Overall Patient Progress: no change    Outcome Evaluation: Awaiting placement. JAMAR following.    Problem: Adult Inpatient Plan of Care  Goal: Plan of Care Review  Description: The Plan of Care Review/Shift note should be completed every shift.  The Outcome Evaluation is a brief statement about your assessment that the patient is improving, declining, or no change.  This information will be displayed automatically on your shift  note.  11/29/2024 1409 by Laury Rios, RN  Outcome: Progressing  Flowsheets (Taken 11/29/2024 1409)  Outcome Evaluation: Awaiting placement. SW following.  Plan of Care Reviewed With: patient  Overall Patient Progress: no change  11/29/2024 1409 by Laury Rios, RN  Reactivated  Flowsheets (Taken 11/29/2024 1409)  Outcome Evaluation: Awaiting placement. SW following.  Plan of Care Reviewed With: patient  Overall Patient Progress: no change  Goal: Patient-Specific Goal (Individualized)  Description: You can add care plan individualizations to a care plan. Examples of Individualization might be:  \"Parent requests to be called daily at 9am for status\", \"I have a hard time hearing out of my right ear\", or \"Do not touch me to wake me up as it startles  me\".  11/29/2024 1409 by Laury Rios, RN  Outcome: Progressing  11/29/2024 1409 by Laury Rios, RN  Reactivated  Goal: Absence of Hospital-Acquired Illness or Injury  11/29/2024 1409 by Laury Rios, RN  Outcome: Progressing  11/29/2024 1409 by Laury Rios, RN  Reactivated  Intervention: Identify and Manage Fall Risk  Recent Flowsheet Documentation  Taken 11/29/2024 0930 by Laury Rios, RN  Safety Promotion/Fall Prevention: safety round/check completed  Goal: Optimal Comfort and Wellbeing  11/29/2024 1409 by Laury Rios, RN  Outcome: Progressing  11/29/2024 1409 by Gabriel, " aLury PINTO, RN  Reactivated  Goal: Readiness for Transition of Care  11/29/2024 1409 by Laury Rios, RN  Outcome: Progressing  11/29/2024 1409 by Laury Rios, RN  Reactivated

## 2024-11-30 PROCEDURE — 101N000002 HC CUSTODIAL CARE DAILY

## 2024-11-30 ASSESSMENT — ACTIVITIES OF DAILY LIVING (ADL)
ADLS_ACUITY_SCORE: 62
ADLS_ACUITY_SCORE: 65
ADLS_ACUITY_SCORE: 65
ADLS_ACUITY_SCORE: 62
ADLS_ACUITY_SCORE: 65
ADLS_ACUITY_SCORE: 62
ADLS_ACUITY_SCORE: 65
ADLS_ACUITY_SCORE: 62
ADLS_ACUITY_SCORE: 62
ADLS_ACUITY_SCORE: 65

## 2024-11-30 NOTE — PLAN OF CARE
"  Pt A/O to self. Confusion noted. Ambulated to bathroom to void. Slept for most part of the shift. Pt did not appear to be in pain, N/V, SOB. Calm and cooperative with staff.    Goal Outcome Evaluation:      Plan of Care Reviewed With: patient    Overall Patient Progress: no changeOverall Patient Progress: no change    Outcome Evaluation: Pending placement.      Problem: Adult Inpatient Plan of Care  Goal: Plan of Care Review  Description: The Plan of Care Review/Shift note should be completed every shift.  The Outcome Evaluation is a brief statement about your assessment that the patient is improving, declining, or no change.  This information will be displayed automatically on your shift  note.  Outcome: Progressing  Flowsheets (Taken 11/30/2024 0610)  Outcome Evaluation: Pending placement.  Plan of Care Reviewed With: patient  Overall Patient Progress: no change  Goal: Patient-Specific Goal (Individualized)  Description: You can add care plan individualizations to a care plan. Examples of Individualization might be:  \"Parent requests to be called daily at 9am for status\", \"I have a hard time hearing out of my right ear\", or \"Do not touch me to wake me up as it startles  me\".  Outcome: Progressing  Goal: Absence of Hospital-Acquired Illness or Injury  Outcome: Progressing  Intervention: Identify and Manage Fall Risk  Recent Flowsheet Documentation  Taken 11/29/2024 2333 by Arabella Momin RN  Safety Promotion/Fall Prevention:   activity supervised   safety round/check completed  Intervention: Prevent Skin Injury  Recent Flowsheet Documentation  Taken 11/29/2024 2333 by Arabella Momin RN  Body Position: position changed independently  Intervention: Prevent Infection  Recent Flowsheet Documentation  Taken 11/30/2024 0000 by Arabella Momin, RN  Infection Prevention:   rest/sleep promoted   single patient room provided  Goal: Optimal Comfort and Wellbeing  Outcome: Progressing  Goal: Readiness for Transition of " Care  Outcome: Progressing     Problem: Fall Injury Risk  Goal: Absence of Fall and Fall-Related Injury  Outcome: Progressing  Intervention: Promote Injury-Free Environment  Recent Flowsheet Documentation  Taken 11/29/2024 2333 by Arabella Momin RN  Safety Promotion/Fall Prevention:   activity supervised   safety round/check completed

## 2024-11-30 NOTE — PROGRESS NOTES
[facility]  Hospitalist Progress Note  Javed Valentin, DO 11/30/2024    Reason for Stay (Diagnosis): dementia.  LTC placement.  [facility] care         Assessment and Plan:      Summary of Stay: Jemal Grya is a 89 year old male with history of dementia with chronic aphasia. He was admitted on 10/2/2024 after he was brought to the ED by EMS from facility for evaluation of agitation. He had a prolonged hospitalization from 6/5/24 through 10/2/24 during which he was treated for cellulitis but mostly awaited long-term care placement due to dementia. He required appointment of guardianship and ultimately discharged to a long-term care facility on 10/2. Upon transfer to the long-term care facility he became agitated with swearing.  He was wandering around the long-term care facility, entering other patient's rooms and staff was unable to redirect him. He required transfer back to the ED and was readmitted.     In the ED he was stable, no major acute medical issue. He was given seroquel.  He required a bedside attendant as he was agitated and unable to be redirected.  He attempted to leave the emergency department and go into other patient's rooms. His facility was not able to take him back and there was no immediate safe disposition option.  He is here under penitentiary care status.  Patient has improved and is no longer agitated. He remains medically stable and can be discharged whenever placement is found. Prior long term care facility is reportedly not taking patient back.  is looking into memory care units.     Patient has been here since 10/2/244 awaiting placement.  Elderly waiver appointment is scheduled for 12/9 and this meeting is necessary to establish funding for further cares.       Plans today:  -Patient remains penitentiary care  -Await long-term placement after meeting with Elderly waiver meeting expected on 12/9  -Awake, seems in good spirits.  Daughter updated at bedside.         Safe Disposition/assisted Care:  -  Patient remains medically stable for discharge when location found.  SW/CM following and working on options.  No current safe disposition option.    -Patient had prolonged hospitalization due to need for disposition assistance in earlier 2024.  His daughter filed for guardianship paperwork which was completed in July.  Acquired MA application and long-term care placement.    -Ultimately will require 24/7 care and Memory care placement      Acute agitation, resolved  Behavior disturbances   Dementia with chronic aphasia:  Suspect triggered by changes in setting and recent move.  No physical complaints or concerns of infection.  Historically required Zyprexa over the summer when he was hospitalized for agitation.  Most recently had been prescribed Seroquel 12.5 mg twice daily as needed although he had not been needing most recently.  -Continue Seroquel PRN  - ms prior check  -SW consulted  -needs memory care        Actinic Keratosis:  -Had surgery for this and prescribed steroids cream and ketoconazole cream, restarted per daughter request previously.     Sinus Bradycardia:  -Patient was noted to have HR in 50s.  Asymptomatic.  -no telemetry monitoring needed      History of Constipation:  -laxatives PRN.  Had BM 11/4.     Barriers to discharge: needs Memory care placement. Unfortunately the patient will not be assessed for elderly waiver until 12/9/2024.      Anticipated length of stay: Medically ready at any time.                Interval History (Subjective):      Awake, watching TV.  Daughter updated at bedside.                     Physical Exam:      Last Vital Signs:  BP (!) 140/69 (BP Location: Right arm)   Pulse 53   Temp 97.9  F (36.6  C) (Oral)   Resp 16   Wt 71.4 kg (157 lb 6.4 oz)   SpO2 97%   BMI 23.93 kg/m      No intake or output data in the 24 hours ending 11/22/24 1006  General Appearance: Patient awake.  No apparent distress.   Respiratory: Lungs are  "CTAB.  Work of breathing appears normal on room air.  Cardiovascular: Regular rate and rhythm.  No murmurs rubs or gallops.  There is no edema present.  Other: Patient is appropriate.           Medications:      All current medications were reviewed with changes reflected in problem list.         Data:      All new lab and imaging data was reviewed.   Labs:       Lab Results   Component Value Date     10/20/2024     10/03/2024    Lab Results   Component Value Date    CHLORIDE 105 10/20/2024    CHLORIDE 103 10/03/2024    Lab Results   Component Value Date    BUN 23.6 10/20/2024    BUN 17.4 10/03/2024      Lab Results   Component Value Date    POTASSIUM 3.9 10/20/2024    POTASSIUM 3.9 10/03/2024    Lab Results   Component Value Date    CO2 24 10/20/2024    CO2 23 10/03/2024    Lab Results   Component Value Date    CR 0.94 10/20/2024    CR 0.89 10/03/2024        No results for input(s): \"WBC\", \"HGB\", \"HCT\", \"MCV\", \"PLT\" in the last 168 hours.   Imaging:   No results found for this or any previous visit (from the past 24 hours).   "

## 2024-11-30 NOTE — PLAN OF CARE
"Alert, no pain per PAINAD. Singing in room, happy w/ stuffed plushes, no status changes this shift. Offered ambulation in hallways, pt refused. Up SBA w/ gb and walker. Plan: awaiting memory care placement, elderly waiver 12/9          Goal Outcome Evaluation:      Plan of Care Reviewed With: patient    Overall Patient Progress: no changeOverall Patient Progress: no change    Outcome Evaluation: Awaiting memory care placement, SW following      Problem: Adult Inpatient Plan of Care  Goal: Plan of Care Review  Description: The Plan of Care Review/Shift note should be completed every shift.  The Outcome Evaluation is a brief statement about your assessment that the patient is improving, declining, or no change.  This information will be displayed automatically on your shift  note.  Outcome: Progressing  Flowsheets (Taken 11/29/2024 5064)  Outcome Evaluation: Awaiting memory care placement, SW following  Plan of Care Reviewed With: patient  Overall Patient Progress: no change  Goal: Patient-Specific Goal (Individualized)  Description: You can add care plan individualizations to a care plan. Examples of Individualization might be:  \"Parent requests to be called daily at 9am for status\", \"I have a hard time hearing out of my right ear\", or \"Do not touch me to wake me up as it startles  me\".  Outcome: Progressing  Goal: Absence of Hospital-Acquired Illness or Injury  Outcome: Progressing  Goal: Optimal Comfort and Wellbeing  Outcome: Progressing  Goal: Readiness for Transition of Care  Outcome: Progressing     Problem: Fall Injury Risk  Goal: Absence of Fall and Fall-Related Injury  Outcome: Progressing         "

## 2024-11-30 NOTE — PLAN OF CARE
"Shift Summary [id number]    Calm and cooperative throughout shift.  VSS, no signs of pain.  Ambulated in hallway.  Visited by daughter today.  Waiting for LTC placement.        Goal Outcome Evaluation:      Plan of Care Reviewed With: patient    Overall Patient Progress: no changeOverall Patient Progress: no change    Outcome Evaluation: Pending placement      Problem: Adult Inpatient Plan of Care  Goal: Plan of Care Review  Description: The Plan of Care Review/Shift note should be completed every shift.  The Outcome Evaluation is a brief statement about your assessment that the patient is improving, declining, or no change.  This information will be displayed automatically on your shift  note.  Outcome: Not Progressing  Flowsheets (Taken 11/30/2024 1304)  Outcome Evaluation: Pending placement  Plan of Care Reviewed With: patient  Overall Patient Progress: no change  Goal: Patient-Specific Goal (Individualized)  Description: You can add care plan individualizations to a care plan. Examples of Individualization might be:  \"Parent requests to be called daily at 9am for status\", \"I have a hard time hearing out of my right ear\", or \"Do not touch me to wake me up as it startles  me\".  Outcome: Not Progressing  Goal: Absence of Hospital-Acquired Illness or Injury  Outcome: Not Progressing  Intervention: Identify and Manage Fall Risk  Recent Flowsheet Documentation  Taken 11/30/2024 1223 by Lisa Mason, RN  Safety Promotion/Fall Prevention: safety round/check completed  Taken 11/30/2024 1019 by Lisa Mason, RN  Safety Promotion/Fall Prevention: safety round/check completed  Intervention: Prevent Skin Injury  Recent Flowsheet Documentation  Taken 11/30/2024 0844 by Lisa Mason, RN  Body Position: position changed independently  Goal: Optimal Comfort and Wellbeing  Outcome: Not Progressing  Goal: Readiness for Transition of Care  Outcome: Not Progressing     Problem: Fall Injury " Risk  Goal: Absence of Fall and Fall-Related Injury  Outcome: Not Progressing  Intervention: Promote Injury-Free Environment  Recent Flowsheet Documentation  Taken 11/30/2024 1223 by Lisa Mason, RN  Safety Promotion/Fall Prevention: safety round/check completed  Taken 11/30/2024 1019 by Lisa Mason, RN  Safety Promotion/Fall Prevention: safety round/check completed

## 2024-12-01 PROCEDURE — 101N000002 HC CUSTODIAL CARE DAILY

## 2024-12-01 ASSESSMENT — ACTIVITIES OF DAILY LIVING (ADL)
ADLS_ACUITY_SCORE: 65

## 2024-12-01 NOTE — PLAN OF CARE
"Pt cooperative with cares.  Up out of bed several times overnight to use bathroom, otherwise slept between.  Denies pain.  Awaiting placement to memory care.  Problem: Adult Inpatient Plan of Care  Goal: Plan of Care Review  Description: The Plan of Care Review/Shift note should be completed every shift.  The Outcome Evaluation is a brief statement about your assessment that the patient is improving, declining, or no change.  This information will be displayed automatically on your shift  note.  Outcome: Adequate for Care Transition  Flowsheets (Taken 12/1/2024 2740)  Plan of Care Reviewed With: patient  Goal: Patient-Specific Goal (Individualized)  Description: You can add care plan individualizations to a care plan. Examples of Individualization might be:  \"Parent requests to be called daily at 9am for status\", \"I have a hard time hearing out of my right ear\", or \"Do not touch me to wake me up as it startles  me\".  Outcome: Adequate for Care Transition  Goal: Absence of Hospital-Acquired Illness or Injury  Outcome: Adequate for Care Transition  Goal: Optimal Comfort and Wellbeing  Outcome: Adequate for Care Transition  Goal: Readiness for Transition of Care  Outcome: Adequate for Care Transition   Goal Outcome Evaluation:      Plan of Care Reviewed With: patient                 "

## 2024-12-01 NOTE — PROGRESS NOTES
VSS. Assessment remains unchanged to previous. Pt remains confused. No pain reported. Up w/ SBA. Voiding ok.

## 2024-12-01 NOTE — PROGRESS NOTES
[facility]    Medicine Progress Note - Hospitalist Service    Date of Admission:  10/2/2024    Assessment & Plan   Summary of Stay: Jemal Gray is a 89 year old male with history of dementia with chronic aphasia. He was admitted on 10/2/2024 after he was brought to the ED by EMS from facility for evaluation of agitation. He had a prolonged hospitalization from 6/5/24 through 10/2/24 during which he was treated for cellulitis but mostly awaited long-term care placement due to dementia. He required appointment of guardianship and ultimately discharged to a long-term care facility on 10/2. Upon transfer to the long-term care facility he became agitated with swearing.  He was wandering around the long-term care facility, entering other patient's rooms and staff was unable to redirect him. He required transfer back to the ED and was readmitted.     In the ED he was stable, no major acute medical issue. He was given seroquel.  He required a bedside attendant as he was agitated and unable to be redirected.  He attempted to leave the emergency department and go into other patient's rooms. His facility was not able to take him back and there was no immediate safe disposition option.  He is here under [facility] care status.  Patient has improved and is no longer agitated. He remains medically stable and can be discharged whenever placement is found. Prior long term care facility is reportedly not taking patient back.  is looking into memory care units.     Patient has been here since 10/2/244 awaiting placement.  Elderly waiver appointment is scheduled for 12/9 and this meeting is necessary to establish funding for further cares.        Plans today:  -Patient remains [facility] care  -Await long-term placement after meeting with Elderly waiver meeting expected on 12/9  -Awake, seems in good spirits.         Safe Disposition/senior living Care:  -  Patient remains medically stable for  discharge when location found.  SW/CM following and working on options.  No current safe disposition option.    -Patient had prolonged hospitalization due to need for disposition assistance in earlier 2024.  His daughter filed for guardianship paperwork which was completed in July.  Acquired MA application and long-term care placement.    -Ultimately will require 24/7 care and Memory care placement      Acute agitation, resolved  Behavior disturbances   Dementia with chronic aphasia:  Suspect triggered by changes in setting and recent move.  No physical complaints or concerns of infection.  Historically required Zyprexa over the summer when he was hospitalized for agitation.  Most recently had been prescribed Seroquel 12.5 mg twice daily as needed although he had not been needing most recently.  -Continue Seroquel PRN  - ms prior check  -SW consulted  -needs memory care        Actinic Keratosis:  -Had surgery for this and prescribed steroids cream and ketoconazole cream, restarted per daughter request previously.     Sinus Bradycardia:  -Patient was noted to have HR in 50s.  Asymptomatic.  -no telemetry monitoring needed      History of Constipation:  -laxatives PRN.  Had BM 11/4.     Barriers to discharge: needs Memory care placement. Unfortunately the patient will not be assessed for elderly waiver until 12/9/2024.           Diet: Combination Diet Regular Diet  Snacks/Supplements Pediatric: Ensure Enlive; Between Meals  Room Service    DVT Prophylaxis: Pneumatic Compression Devices  Maddox Catheter: Not present  Lines: None     Cardiac Monitoring: None  Code Status: No CPR- Do NOT Intubate      Clinically Significant Risk Factors Present on Admission                       # Dementia: noted on problem list                  Social Drivers of Health    Food Insecurity: Unknown (10/3/2024)    Food Insecurity     Within the past 12 months, did you worry that your food would run out before you got money to buy  more?: Patient unable to answer     Within the past 12 months, did the food you bought just not last and you didn t have money to get more?: Patient unable to answer   Housing Stability: Unknown (10/3/2024)    Housing Stability     Do you have housing? : Patient unable to answer     Are you worried about losing your housing?: Patient unable to answer   Tobacco Use: Medium Risk (6/3/2024)    Received from [facility]    Patient History     Smoking Tobacco Use: Former     Smokeless Tobacco Use: Never   Financial Resource Strain: Unknown (10/3/2024)    Financial Resource Strain     Within the past 12 months, have you or your family members you live with been unable to get utilities (heat, electricity) when it was really needed?: Patient unable to answer   Transportation Needs: Unknown (10/3/2024)    Transportation Needs     Within the past 12 months, has lack of transportation kept you from medical appointments, getting your medicines, non-medical meetings or appointments, work, or from getting things that you need?: Patient unable to answer   Interpersonal Safety: Unknown (10/4/2024)    Interpersonal Safety     Do you feel physically and emotionally safe where you currently live?: Patient declined     Within the past 12 months, have you been hit, slapped, kicked or otherwise physically hurt by someone?: Patient declined     Within the past 12 months, have you been humiliated or emotionally abused in other ways by your partner or ex-partner?: Patient declined    Received from [facility] [facility]    Social Connections          Disposition Plan     Medically Ready for Discharge: Ready Now             Javed Valentin DO  Hospitalist Service  [facility]  Securely message with Samantha (more info)  Text page via [facility] Paging/Directory   ______________________________________________________________________    Interval History   SAUD. Awake,  "eating breakfast. Watching TV. Reports he is doing fine \"just old\"    Physical Exam   Vital Signs: Temp: 98.2  F (36.8  C) Temp src: Oral BP: (!) 154/87 Pulse: 93   Resp: 18 SpO2: 95 % O2 Device: None (Room air)    Weight: 157 lbs 6.4 oz    General Appearance: Patient awake & alert.  No apparent distress.   Respiratory: Lungs are CTAB.  Work of breathing appears normal on room air.  Cardiovascular: Regular rate and rhythm.  No murmurs rubs or gallops.  There is no edema present.  Other: Patient is appropriate    Medical Decision Making       Non-billable visit MINUTES SPENT BY ME on the date of service doing chart review, history, exam, documentation & further activities per the note.      Data   ------------------------- PAST 24 HR DATA REVIEWED -----------------------------------------------        Imaging results reviewed over the past 24 hrs:   No results found for this or any previous visit (from the past 24 hours).  "

## 2024-12-01 NOTE — PLAN OF CARE
"Alert, no pain per PAINAD. Calm in room, happy w/ stuffed dog, though intermittent instances of verbal aggression. Otherwise no status changes this shift. Offered ambulation in hallways, pt did not understand what staff was explaining and got verbally agitated. Up SBA w/ gb and walker. Plan: awaiting memory care placement, elderly waiver 12/9             Goal Outcome Evaluation:      Plan of Care Reviewed With: patient    Overall Patient Progress: no changeOverall Patient Progress: no change    Outcome Evaluation: Awaiting LTC/memory care placement      Problem: Adult Inpatient Plan of Care  Goal: Plan of Care Review  Description: The Plan of Care Review/Shift note should be completed every shift.  The Outcome Evaluation is a brief statement about your assessment that the patient is improving, declining, or no change.  This information will be displayed automatically on your shift  note.  Outcome: Progressing  Flowsheets (Taken 11/30/2024 2233)  Outcome Evaluation: Awaiting LTC/memory care placement  Plan of Care Reviewed With: patient  Overall Patient Progress: no change  Goal: Patient-Specific Goal (Individualized)  Description: You can add care plan individualizations to a care plan. Examples of Individualization might be:  \"Parent requests to be called daily at 9am for status\", \"I have a hard time hearing out of my right ear\", or \"Do not touch me to wake me up as it startles  me\".  Outcome: Progressing  Goal: Absence of Hospital-Acquired Illness or Injury  Outcome: Progressing  Intervention: Prevent Skin Injury  Recent Flowsheet Documentation  Taken 11/30/2024 2120 by Ivana Anaya RN  Body Position: position changed independently  Goal: Optimal Comfort and Wellbeing  Outcome: Progressing  Goal: Readiness for Transition of Care  Outcome: Progressing     Problem: Fall Injury Risk  Goal: Absence of Fall and Fall-Related Injury  Outcome: Progressing         "

## 2024-12-02 PROCEDURE — 101N000002 HC CUSTODIAL CARE DAILY

## 2024-12-02 RX ORDER — KETOCONAZOLE 20 MG/ML
SHAMPOO, SUSPENSION TOPICAL
Status: DISCONTINUED | OUTPATIENT
Start: 2024-12-09 | End: 2024-12-18 | Stop reason: HOSPADM

## 2024-12-02 ASSESSMENT — ACTIVITIES OF DAILY LIVING (ADL)
ADLS_ACUITY_SCORE: 65

## 2024-12-02 NOTE — PROVIDER NOTIFICATION
Paged dr chavarria: can we stop the shampoo treatments? his scalp looks perfectly fine, and he has been on this for a long time from what I understand. please dc if ok, thanks..    Md to change shampoo to weekly.

## 2024-12-02 NOTE — PROGRESS NOTES
Assumed Care from [id number]    Patient remains confused. Denies pain when asked. Smiles and talks randomly. Bed alarm on to alert staff to transfers. SBA to bathroom. Continent urine. No bm this shift. Did eat some of his peanut butter sandwich, 2 chocolate pudding cups, and drank his bottle of ensure. Declined anything else when asked if he wanted a snack. Bed in low position, door open, room near pod. Continue current POC.

## 2024-12-02 NOTE — PROGRESS NOTES
Vss, no co pain/cp/sob. Sampling meals, ambulating to the bathroom with assist of Ax1+gb+walker, up to chair for this am, alarms on for safety as pt is impulsive. Remains confused but redirectable, DNR/DNI, visual assessment done (full head to toe due weekly), shampoo changed to weekly, scalp WDL today. Planning for placement when able.

## 2024-12-02 NOTE — PROGRESS NOTES
[facility]    Medicine Progress Note - Hospitalist Service    Date of Admission:  10/2/2024    Assessment & Plan   Summary of Stay: Jemal Gray is a 89 year old male with history of dementia with chronic aphasia. He was admitted on 10/2/2024 after he was brought to the ED by EMS from facility for evaluation of agitation. He had a prolonged hospitalization from 6/5/24 through 10/2/24 during which he was treated for cellulitis but mostly awaited long-term care placement due to dementia. He required appointment of guardianship and ultimately discharged to a long-term care facility on 10/2. Upon transfer to the long-term care facility he became agitated with swearing.  He was wandering around the long-term care facility, entering other patient's rooms and staff was unable to redirect him. He required transfer back to the ED and was readmitted.     In the ED he was stable, no major acute medical issue. He was given seroquel.  He required a bedside attendant as he was agitated and unable to be redirected.  He attempted to leave the emergency department and go into other patient's rooms. His facility was not able to take him back and there was no immediate safe disposition option.  He is here under prison care status.  Patient has improved and is no longer agitated. He remains medically stable and can be discharged whenever placement is found. Prior long term care facility is reportedly not taking patient back.  is looking into memory care units.     Patient has been here since 10/2/244 awaiting placement.  Elderly waiver appointment is scheduled for 12/9 and this meeting is necessary to establish funding for further cares.        Plans today:  -Patient remains prison care  -Await long-term placement after meeting with Elderly waiver meeting expected on 12/9  -Awake, seems in good spirits.  Speech garbled at baseline.         Safe Disposition/long-term Care:  -  Patient  remains medically stable for discharge when location found.  SW/CM following and working on options.  No current safe disposition option.    -Patient had prolonged hospitalization due to need for disposition assistance in earlier 2024.  His daughter filed for guardianship paperwork which was completed in July.  Acquired MA application and long-term care placement.    -Ultimately will require 24/7 care and Memory care placement      Acute agitation, resolved  Behavior disturbances   Dementia with chronic aphasia:  Suspect triggered by changes in setting and recent move.  No physical complaints or concerns of infection.  Historically required Zyprexa over the summer when he was hospitalized for agitation.  Most recently had been prescribed Seroquel 12.5 mg twice daily as needed although he had not been needing most recently.  -Continue Seroquel PRN  - ms prior check  -SW consulted  -needs memory care        Actinic Keratosis:  -Had surgery for this and prescribed steroids cream and ketoconazole cream, restarted per daughter request previously.     Sinus Bradycardia:  -Patient was noted to have HR in 50s.  Asymptomatic.  -no telemetry monitoring needed      History of Constipation:  -laxatives PRN.  Had BM 11/4.     Barriers to discharge: needs Memory care placement. Unfortunately the patient will not be assessed for elderly waiver until 12/9/2024.           Diet: Combination Diet Regular Diet  Snacks/Supplements Pediatric: Ensure Enlive; Between Meals  Room Service    DVT Prophylaxis: Pneumatic Compression Devices  Maddox Catheter: Not present  Lines: None     Cardiac Monitoring: None  Code Status: No CPR- Do NOT Intubate      Clinically Significant Risk Factors Present on Admission                       # Dementia: noted on problem list                  Social Drivers of Health    Food Insecurity: Unknown (10/3/2024)    Food Insecurity     Within the past 12 months, did you worry that your food would run out  before you got money to buy more?: Patient unable to answer     Within the past 12 months, did the food you bought just not last and you didn t have money to get more?: Patient unable to answer   Housing Stability: Unknown (10/3/2024)    Housing Stability     Do you have housing? : Patient unable to answer     Are you worried about losing your housing?: Patient unable to answer   Tobacco Use: Medium Risk (6/3/2024)    Received from [facility]    Patient History     Smoking Tobacco Use: Former     Smokeless Tobacco Use: Never   Financial Resource Strain: Unknown (10/3/2024)    Financial Resource Strain     Within the past 12 months, have you or your family members you live with been unable to get utilities (heat, electricity) when it was really needed?: Patient unable to answer   Transportation Needs: Unknown (10/3/2024)    Transportation Needs     Within the past 12 months, has lack of transportation kept you from medical appointments, getting your medicines, non-medical meetings or appointments, work, or from getting things that you need?: Patient unable to answer   Interpersonal Safety: Unknown (10/4/2024)    Interpersonal Safety     Do you feel physically and emotionally safe where you currently live?: Patient declined     Within the past 12 months, have you been hit, slapped, kicked or otherwise physically hurt by someone?: Patient declined     Within the past 12 months, have you been humiliated or emotionally abused in other ways by your partner or ex-partner?: Patient declined    Received from [facility]    Social Connections          Disposition Plan     Medically Ready for Discharge: Ready Now             Javed Valentin DO  Hospitalist Service  [facility]  Securely message with Samantha (more info)  Text page via [facility] Paging/Directory   ______________________________________________________________________    Interval  History   NAEO.  Ongoing garbled speech but says he is doing fine.     Physical Exam   Vital Signs: Temp: (!) 96.7  F (35.9  C) Temp src: Axillary BP: (!) 146/82 Pulse: 57   Resp: 18 SpO2: 96 % O2 Device: None (Room air)    Weight: 157 lbs 6.4 oz    General Appearance: Patient awake & alert.  No apparent distress. Garbled speech.   Respiratory: Lungs are CTAB.  Work of breathing appears normal on room air.  Cardiovascular: Regular rate and rhythm.  No murmurs rubs or gallops.  There is no edema present.  Other: Patient is appropriate    Medical Decision Making       Non-billable visit MINUTES SPENT BY ME on the date of service doing chart review, history, exam, documentation & further activities per the note.      Data   ------------------------- PAST 24 HR DATA REVIEWED -----------------------------------------------        Imaging results reviewed over the past 24 hrs:   No results found for this or any previous visit (from the past 24 hours).

## 2024-12-02 NOTE — PROGRESS NOTES
Pt remains confused, bed alarm is on. Assisted to bathroom when up. Support staff ambulated in hallway twice. Offered food and ate a chocolate pudding cup. Slept soundly off and on throughout the night.

## 2024-12-03 PROCEDURE — 101N000002 HC CUSTODIAL CARE DAILY

## 2024-12-03 PROCEDURE — 99232 SBSQ HOSP IP/OBS MODERATE 35: CPT | Performed by: INTERNAL MEDICINE

## 2024-12-03 ASSESSMENT — ACTIVITIES OF DAILY LIVING (ADL)
ADLS_ACUITY_SCORE: 65

## 2024-12-03 NOTE — PROGRESS NOTES
On [facility] cares. Confused. Denies pain. Assist of 1, GB, walker. On ketoconazole shampoo q7 days for keratosis. Ambulated in halls x1. Drank 1 ensure and some of his pudding cup. Plan to have elderly waiver meeting on 12/9 to establish funding for cares in future. Pt will discharge to memory care facility once placement is found.

## 2024-12-03 NOTE — PROGRESS NOTES
Calm and cooperative throughout shift. Pt confused.  VSS.  No signs of pain.  Ambulated assistx1 to bathroom and in hallways.  Mepilex changed.  Waiting for LTC placement.

## 2024-12-03 NOTE — PROGRESS NOTES
Pt on longterm cares. Pleasantly confused. Some intermittent agitation w/ cares but can be redirected. Denies pain when asked, appears comfortable, no s/s of pain observed. SBA to bathroom, bed alarm in place for safety. Fair appetite, pt eats all the sweet food and drinks his Ensure. Ambulates the quiles 1x w/ staff. BM in the AM. Voids adequately. Elderly waiver appointment on 12/9. Awaiting memory care placement.

## 2024-12-03 NOTE — PROGRESS NOTES
[facility]    Medicine Progress Note - Hospitalist Service    Date of Admission:  10/2/2024    Assessment & Plan     Summary of Stay: Jemal Gray is a 89 year old male with history of dementia with chronic aphasia. He was admitted on 10/2/2024 after he was brought to the ED by EMS from facility for evaluation of agitation. He had a prolonged hospitalization from 6/5/24 through 10/2/24 during which he was treated for cellulitis but mostly awaited long-term care placement due to dementia. He required appointment of guardianship and ultimately discharged to a long-term care facility on 10/2. Upon transfer to the long-term care facility he became agitated with swearing.  He was wandering around the long-term care facility, entering other patient's rooms and staff was unable to redirect him. He required transfer back to the ED and was readmitted.     In the ED he was stable, no major acute medical issue. He was given seroquel.  He required a bedside attendant as he was agitated and unable to be redirected.  He attempted to leave the emergency department and go into other patient's rooms. His facility was not able to take him back and there was no immediate safe disposition option.  He is here under detention care status.  Patient has improved and is no longer agitated. He remains medically stable and can be discharged whenever placement is found. Prior long term care facility is reportedly not taking patient back.  is looking into memory care units.     Patient has been here since 10/2/244 awaiting placement.  Elderly waiver appointment is scheduled for 12/9 and this meeting is necessary to establish funding for further cares.     -Patient remains detention care  -Await long-term placement after meeting with Elderly waiver meeting expected on 12/9  -Awake, seems in good spirits.  Speech garbled at baseline.         Safe Disposition/prison Care:  -  Patient remains medically  stable for discharge when location found.  SW/CM following and working on options.  No current safe disposition option.    -Patient had prolonged hospitalization due to need for disposition assistance in earlier 2024.  His daughter filed for guardianship paperwork which was completed in July.  Acquired MA application and long-term care placement.    -Ultimately will require 24/7 care and Memory care placement      Acute agitation, resolved  Behavior disturbances   Dementia with chronic aphasia:  Suspect triggered by changes in setting and recent move.  No physical complaints or concerns of infection.  Historically required Zyprexa over the summer when he was hospitalized for agitation.  Most recently had been prescribed Seroquel 12.5 mg twice daily as needed although he had not been needing most recently.  -Continue Seroquel PRN  - ms prior check  -SW consulted  -needs memory care     Actinic Keratosis:  -Had surgery for this and prescribed steroids cream and ketoconazole cream, restarted per daughter request previously.     Sinus Bradycardia:  -Patient was noted to have HR in 50s.  Asymptomatic.  -no telemetry monitoring needed      History of Constipation:  -laxatives PRN.  Had BM 11/4.     Barriers to discharge: needs Memory care placement. Unfortunately the patient will not be assessed for elderly waiver until 12/9/2024.           Diet: Combination Diet Regular Diet  Snacks/Supplements Pediatric: Ensure Enlive; Between Meals  Room Service    DVT Prophylaxis: Pneumatic Compression Devices  Maddox Catheter: Not present  Lines: None     Cardiac Monitoring: None  Code Status: No CPR- Do NOT Intubate      Clinically Significant Risk Factors Present on Admission                       # Dementia: noted on problem list                  Social Drivers of Health    Food Insecurity: Unknown (10/3/2024)    Food Insecurity     Within the past 12 months, did you worry that your food would run out before you got money to  buy more?: Patient unable to answer     Within the past 12 months, did the food you bought just not last and you didn t have money to get more?: Patient unable to answer   Housing Stability: Unknown (10/3/2024)    Housing Stability     Do you have housing? : Patient unable to answer     Are you worried about losing your housing?: Patient unable to answer   Tobacco Use: Medium Risk (6/3/2024)    Received from [facility]    Patient History     Smoking Tobacco Use: Former     Smokeless Tobacco Use: Never   Financial Resource Strain: Unknown (10/3/2024)    Financial Resource Strain     Within the past 12 months, have you or your family members you live with been unable to get utilities (heat, electricity) when it was really needed?: Patient unable to answer   Transportation Needs: Unknown (10/3/2024)    Transportation Needs     Within the past 12 months, has lack of transportation kept you from medical appointments, getting your medicines, non-medical meetings or appointments, work, or from getting things that you need?: Patient unable to answer   Interpersonal Safety: Unknown (10/4/2024)    Interpersonal Safety     Do you feel physically and emotionally safe where you currently live?: Patient declined     Within the past 12 months, have you been hit, slapped, kicked or otherwise physically hurt by someone?: Patient declined     Within the past 12 months, have you been humiliated or emotionally abused in other ways by your partner or ex-partner?: Patient declined    [facility]    Social Connections          Disposition Plan     Medically Ready for Discharge: Ready Now             Sunil Mckinnon DO  Hospitalist Service  [facility]  Securely message with via680era (more info)  Text page via [facility] Paging/Directory   ______________________________________________________________________    Interval History   Awake.   Garbled speech.  No obvious complaints.    Physical Exam   Vital Signs:                    Weight: 157 lbs 6.4 oz    Gen:  NAD, awake, speech garbled and difficult to understand.  Does not seem to have any acute complaints.  Eyes:  PERRL, sclera anicteric.  OP:  MMM, no lesions.  Neck:  Supple.  CV: Mildly irregular, no loud murmurs.  Lung:  CTA b/l, normal effort.  Ab:  +BS, soft.  Skin:  Warm, dry to touch.  No rash.  Ext:  No pitting edema LE b/l.      Medical Decision Making       26 MINUTES SPENT BY ME on the date of service doing chart review, history, exam, documentation & further activities per the note.      Data         Imaging results reviewed over the past 24 hrs:   No results found for this or any previous visit (from the past 24 hours).

## 2024-12-04 PROCEDURE — 101N000002 HC CUSTODIAL CARE DAILY

## 2024-12-04 PROCEDURE — 99232 SBSQ HOSP IP/OBS MODERATE 35: CPT | Performed by: INTERNAL MEDICINE

## 2024-12-04 ASSESSMENT — ACTIVITIES OF DAILY LIVING (ADL)
ADLS_ACUITY_SCORE: 62
ADLS_ACUITY_SCORE: 65
ADLS_ACUITY_SCORE: 65
ADLS_ACUITY_SCORE: 62
ADLS_ACUITY_SCORE: 65
ADLS_ACUITY_SCORE: 62
ADLS_ACUITY_SCORE: 65

## 2024-12-04 NOTE — PROGRESS NOTES
"PRIMARY DIAGNOSIS: \"GENERIC\" NURSING  OUTPATIENT/OBSERVATION GOALS TO BE MET BEFORE DISCHARGE:  ADLs back to baseline: Yes    Activity and level of assistance: Up with standby assistance.    Pain status: Pain free.    Return to near baseline physical activity: Yes     Discharge Planner Nurse   Safe discharge environment identified: Yes  Barriers to discharge: Yes       Entered by: Ina Garvey RN 12/04/2024 5:05 PM     Please review provider order for any additional goals.   Nurse to notify provider when observation goals have been met and patient is ready for discharge.  "

## 2024-12-04 NOTE — PLAN OF CARE
"Goal Outcome Evaluation:      Plan of Care Reviewed With: patient    Overall Patient Progress: no changeOverall Patient Progress: no change    Outcome Evaluation: Awaiting LTC/memory care.  Alert/confused, denies pain, FPC care, ambulates A x 1/gb/w,  the quiles x 2, regular diet/room service. Plan appointment on 12/9 for placement.  Will continue POC and monitoring.     Problem: Adult Inpatient Plan of Care  Goal: Plan of Care Review  Description: The Plan of Care Review/Shift note should be completed every shift.  The Outcome Evaluation is a brief statement about your assessment that the patient is improving, declining, or no change.  This information will be displayed automatically on your shift  note.  Outcome: Progressing  Flowsheets (Taken 12/3/2024 1947)  Outcome Evaluation: Awaiting LTC/memory care.  Plan of Care Reviewed With: patient  Overall Patient Progress: no change  Goal: Patient-Specific Goal (Individualized)  Description: You can add care plan individualizations to a care plan. Examples of Individualization might be:  \"Parent requests to be called daily at 9am for status\", \"I have a hard time hearing out of my right ear\", or \"Do not touch me to wake me up as it startles  me\".  Outcome: Progressing  Goal: Absence of Hospital-Acquired Illness or Injury  Outcome: Progressing  Intervention: Identify and Manage Fall Risk  Recent Flowsheet Documentation  Taken 12/3/2024 1095 by Erin Gifford, RN  Safety Promotion/Fall Prevention:   activity supervised   treat underlying cause   treat reversible contributory factors  Goal: Optimal Comfort and Wellbeing  Outcome: Progressing  Goal: Readiness for Transition of Care  Outcome: Progressing     "

## 2024-12-04 NOTE — PLAN OF CARE
"Calm and cooperative. Walked the hallway twice and got up to void a few times. Awaiting placement.     Goal Outcome Evaluation:      Plan of Care Reviewed With: patient    Overall Patient Progress: no changeOverall Patient Progress: no change    Outcome Evaluation: Awaiting placement      Problem: Adult Inpatient Plan of Care  Goal: Plan of Care Review  Description: The Plan of Care Review/Shift note should be completed every shift.  The Outcome Evaluation is a brief statement about your assessment that the patient is improving, declining, or no change.  This information will be displayed automatically on your shift  note.  Outcome: Adequate for Care Transition  Flowsheets (Taken 12/4/2024 0502)  Outcome Evaluation: Awaiting placement  Plan of Care Reviewed With: patient  Overall Patient Progress: no change  Goal: Patient-Specific Goal (Individualized)  Description: You can add care plan individualizations to a care plan. Examples of Individualization might be:  \"Parent requests to be called daily at 9am for status\", \"I have a hard time hearing out of my right ear\", or \"Do not touch me to wake me up as it startles  me\".  Outcome: Adequate for Care Transition  Goal: Absence of Hospital-Acquired Illness or Injury  Outcome: Adequate for Care Transition  Intervention: Identify and Manage Fall Risk  Recent Flowsheet Documentation  Taken 12/4/2024 0447 by Radha Ventura RN  Safety Promotion/Fall Prevention: safety round/check completed  Taken 12/4/2024 0318 by Radha Ventura, RN  Safety Promotion/Fall Prevention: safety round/check completed  Taken 12/4/2024 0140 by Radha Ventura, RN  Safety Promotion/Fall Prevention:   activity supervised   safety round/check completed  Taken 12/3/2024 2330 by Radha Ventura, RN  Safety Promotion/Fall Prevention: safety round/check completed  Goal: Optimal Comfort and Wellbeing  Outcome: Adequate for Care Transition  Goal: Readiness for Transition of Care  Outcome: Adequate for Care " Transition

## 2024-12-04 NOTE — PROGRESS NOTES
[facility]    Medicine Progress Note - Hospitalist Service    Date of Admission:  10/2/2024    Assessment & Plan     Summary of Stay: Jemal Gray is a 89 year old male with history of dementia with chronic aphasia. He was admitted on 10/2/2024 after he was brought to the ED by EMS from facility for evaluation of agitation. He had a prolonged hospitalization from 6/5/24 through 10/2/24 during which he was treated for cellulitis but mostly awaited long-term care placement due to dementia. He required appointment of guardianship and ultimately discharged to a long-term care facility on 10/2. Upon transfer to the long-term care facility he became agitated with swearing.  He was wandering around the long-term care facility, entering other patient's rooms and staff was unable to redirect him. He required transfer back to the ED and was readmitted.     In the ED he was stable, no major acute medical issue. He was given seroquel.  He required a bedside attendant as he was agitated and unable to be redirected.  He attempted to leave the emergency department and go into other patient's rooms. His facility was not able to take him back and there was no immediate safe disposition option.  He is here under senior living care status.  Patient has improved and is no longer agitated. He remains medically stable and can be discharged whenever placement is found. Prior long term care facility is reportedly not taking patient back.  is looking into memory care units.     Patient has been here since 10/2/244 awaiting placement.  Elderly waiver appointment is scheduled for 12/9 and this meeting is necessary to establish funding for further cares.     -Patient remains senior living care  -Await long-term placement after meeting with Elderly waiver meeting expected on 12/9  -Awake, seems in good spirits.  Speech garbled at baseline.         Safe Disposition/senior living Care:  -  Patient remains medically  stable for discharge when location found.  SW/CM following and working on options.  No current safe disposition option.    -Patient had prolonged hospitalization due to need for disposition assistance in earlier 2024.  His daughter filed for guardianship paperwork which was completed in July.  Acquired MA application and long-term care placement.    -Ultimately will require 24/7 care and Memory care placement      Acute agitation, resolved  Behavior disturbances   Dementia with chronic aphasia:  Suspect triggered by changes in setting and recent move.  No physical complaints or concerns of infection.  Historically required Zyprexa over the summer when he was hospitalized for agitation.  Most recently had been prescribed Seroquel 12.5 mg twice daily as needed although he had not been needing most recently.  -Continue Seroquel PRN  - ms prior check  -SW consulted  -needs memory care     Actinic Keratosis:  -Had surgery for this and prescribed steroids cream and ketoconazole cream, restarted per daughter request previously.     Sinus Bradycardia:  -Patient was noted to have HR in 50s.  Asymptomatic.  -no telemetry monitoring needed      History of Constipation:  -laxatives PRN.  Had BM 11/4.     Barriers to discharge: needs Memory care placement. Unfortunately the patient will not be assessed for elderly waiver until 12/9/2024.           Diet: Combination Diet Regular Diet  Snacks/Supplements Pediatric: Ensure Enlive; Between Meals  Room Service    DVT Prophylaxis: Pneumatic Compression Devices  Maddox Catheter: Not present  Lines: None     Cardiac Monitoring: None  Code Status: No CPR- Do NOT Intubate      Clinically Significant Risk Factors Present on Admission                       # Dementia: noted on problem list                  Social Drivers of Health    Food Insecurity: Unknown (10/3/2024)    Food Insecurity     Within the past 12 months, did you worry that your food would run out before you got money to  buy more?: Patient unable to answer     Within the past 12 months, did the food you bought just not last and you didn t have money to get more?: Patient unable to answer   Housing Stability: Unknown (10/3/2024)    Housing Stability     Do you have housing? : Patient unable to answer     Are you worried about losing your housing?: Patient unable to answer   Tobacco Use: Medium Risk (6/3/2024)    [facility]    Patient History     Smoking Tobacco Use: Former     Smokeless Tobacco Use: Never   Financial Resource Strain: Unknown (10/3/2024)    Financial Resource Strain     Within the past 12 months, have you or your family members you live with been unable to get utilities (heat, electricity) when it was really needed?: Patient unable to answer   Transportation Needs: Unknown (10/3/2024)    Transportation Needs     Within the past 12 months, has lack of transportation kept you from medical appointments, getting your medicines, non-medical meetings or appointments, work, or from getting things that you need?: Patient unable to answer   Interpersonal Safety: Unknown (10/4/2024)    Interpersonal Safety     Do you feel physically and emotionally safe where you currently live?: Patient declined     Within the past 12 months, have you been hit, slapped, kicked or otherwise physically hurt by someone?: Patient declined     Within the past 12 months, have you been humiliated or emotionally abused in other ways by your partner or ex-partner?: Patient declined    [facility]    Social Connections          Disposition Plan     Medically Ready for Discharge: Ready now.             Sunil Mckinnon,   Hospitalist Service  [facility]  Securely message with Samantha (more info)  Text page via [employer] Paging/Directory   ______________________________________________________________________    Interval History   Seems to  try to answer questions.  Words are understandable.  However, he does not seem to be adequately understanding questions to answer.    Physical Exam   Vital Signs:     BP: (!) 142/77 Pulse: 55   Resp: 16 SpO2: 96 % O2 Device: None (Room air)    Weight: 157 lbs 6.4 oz    Gen:  NAD, A&O probably x1 to self.  Speaking.  Words are understandable.  He does not seem to understand questions being asked.  Eyes:  PERRL, sclera anicteric.  OP:  MMM, no lesions.  Neck:  Supple.  CV: Fairly regular, no murmurs.  Lung:  CTA b/l, normal effort.  Ab:  +BS, soft.  Skin:  Warm, dry to touch.  No rash.  Ext:  No pitting edema LE b/l.      Medical Decision Making       25 MINUTES SPENT BY ME on the date of service doing chart review, history, exam, documentation & further activities per the note.      Data         Imaging results reviewed over the past 24 hrs:   No results found for this or any previous visit (from the past 24 hours).

## 2024-12-05 PROCEDURE — 99207 PR NO BILLABLE SERVICE THIS VISIT: CPT | Performed by: INTERNAL MEDICINE

## 2024-12-05 PROCEDURE — 101N000002 HC CUSTODIAL CARE DAILY

## 2024-12-05 ASSESSMENT — ACTIVITIES OF DAILY LIVING (ADL)
ADLS_ACUITY_SCORE: 62

## 2024-12-05 NOTE — PLAN OF CARE
RN [id number]:   Pt. Calm, confused, alert to self, redirectable, up out of bed with assist of 1, gaitbelt and walker to walk the halls a few times this shift. 12/09/24, pt. Has elderly waiver appointment. Needs memory care placement. Pt. Denies any needs at this time. Will continue with POC.         Problem: Confusion Acute  Goal: Optimal Cognitive Function  Intervention: Minimize Contributing Factors  Recent Flowsheet Documentation  Taken 12/4/2024 2105 by Radha Mendoza RN  Reorientation Measures: clock in view  Communication Support Strategies: active listening utilized

## 2024-12-05 NOTE — PROGRESS NOTES
Care Management Follow Up    Length of Stay (days): 0    Expected Discharge Date: 12/10/2024     Concerns to be Addressed:    Dispsoition   Patient plan of care discussed at interdisciplinary rounds: Yes    Anticipated Discharge Disposition:  Memory Care  Anticipated Discharge Services:  Memory Care  Anticipated Discharge DME:  None    Patient/family educated on Medicare website which has current facility and service quality ratings:  yes  Education Provided on the Discharge Plan:  yes  Patient/Family in Agreement with the Plan:  yes    Referrals Placed by CM/SW:  yes  Private pay costs discussed: Not applicable @ this time    Discussed  Partnership in Safe Discharge Planning  document with patient/family: No     Handoff Completed: No, handoff not indicated or clinically appropriate    Additional Information:  [facility] has agreed to evaluate patient for one of their facilities. They currently have openings in their [place] and [facility] facilities. Left message for guardian/daughter, Cheri. She would prefer to have him in [place]. Updated Bisi [phone] @ [place]. The facility has clinically accepted patient but they need to come to hospital to assess prior to officially accepting him. She is tried to see patient in the next few days.    [facility]  will be here on Monday 12/9/24 to assess patient for elderly waiver. Once the elderly waiver is approved placement can happen. The elderly waiver is what pays for the memory care.    NALDO Morris   Inpatient Care Coordination   Supervisor  [facility]  [phone]      NALDO Puentes

## 2024-12-05 NOTE — PROGRESS NOTES
[facility]    Medicine Progress Note - Hospitalist Service    Date of Admission:  10/2/2024    Assessment & Plan     Summary of Stay: Jemla Gray is a 89 year old male with history of dementia with chronic aphasia. He was admitted on 10/2/2024 after he was brought to the ED by EMS from facility for evaluation of agitation. He had a prolonged hospitalization from 6/5/24 through 10/2/24 during which he was treated for cellulitis but mostly awaited long-term care placement due to dementia. He required appointment of guardianship and ultimately discharged to a long-term care facility on 10/2. Upon transfer to the long-term care facility he became agitated with swearing.  He was wandering around the long-term care facility, entering other patient's rooms and staff was unable to redirect him. He required transfer back to the ED and was readmitted.     In the ED he was stable, no major acute medical issue. He was given seroquel.  He required a bedside attendant as he was agitated and unable to be redirected.  He attempted to leave the emergency department and go into other patient's rooms. His facility was not able to take him back and there was no immediate safe disposition option.  He is here under long term care status.  Patient has improved and is no longer agitated. He remains medically stable and can be discharged whenever placement is found. Prior long term care facility is reportedly not taking patient back.  is looking into memory care units.     Patient has been here since 10/2/244 awaiting placement.  Elderly waiver appointment is scheduled for 12/9 and this meeting is necessary to establish funding for further cares.     -Patient remains long term care  -Await long-term placement after meeting with Elderly waiver meeting expected on 12/9  -Awake, seems in good spirits.  Speech garbled at baseline.         Safe Disposition/assisted Care:  -  Patient remains medically  stable for discharge when location found.  SW/CM following and working on options.  No current safe disposition option.    -Patient had prolonged hospitalization due to need for disposition assistance in earlier 2024.  His daughter filed for guardianship paperwork which was completed in July.  Acquired MA application and long-term care placement.    -Ultimately will require 24/7 care and Memory care placement      Acute agitation, resolved  Behavior disturbances   Dementia with chronic aphasia:  Suspect triggered by changes in setting and recent move.  No physical complaints or concerns of infection.  Historically required Zyprexa over the summer when he was hospitalized for agitation.  Most recently had been prescribed Seroquel 12.5 mg twice daily as needed although he had not been needing most recently.  -Continue Seroquel PRN  - ms prior check  -SW consulted  -needs memory care     Actinic Keratosis:  -Had surgery for this and prescribed steroids cream and ketoconazole cream, restarted per daughter request previously.     Sinus Bradycardia:  -Patient was noted to have HR in 50s.  Asymptomatic.  -no telemetry monitoring needed      History of Constipation:  -laxatives PRN.  Had BM 11/4.     Barriers to discharge: needs Memory care placement. Unfortunately the patient will not be assessed for elderly waiver until 12/9/2024.           Diet: Combination Diet Regular Diet  Snacks/Supplements Pediatric: Ensure Enlive; Between Meals  Room Service    DVT Prophylaxis: Pneumatic Compression Devices  Maddox Catheter: Not present  Lines: None     Cardiac Monitoring: None  Code Status: No CPR- Do NOT Intubate      Clinically Significant Risk Factors Present on Admission                       # Dementia: noted on problem list                  Social Drivers of Health    Food Insecurity: Unknown (10/3/2024)    Food Insecurity     Within the past 12 months, did you worry that your food would run out before you got money to  buy more?: Patient unable to answer     Within the past 12 months, did the food you bought just not last and you didn t have money to get more?: Patient unable to answer   Housing Stability: Unknown (10/3/2024)    Housing Stability     Do you have housing? : Patient unable to answer     Are you worried about losing your housing?: Patient unable to answer   Tobacco Use: Medium Risk (6/3/2024)    Received from [facility]    Patient History     Smoking Tobacco Use: Former     Smokeless Tobacco Use: Never   Financial Resource Strain: Unknown (10/3/2024)    Financial Resource Strain     Within the past 12 months, have you or your family members you live with been unable to get utilities (heat, electricity) when it was really needed?: Patient unable to answer   Transportation Needs: Unknown (10/3/2024)    Transportation Needs     Within the past 12 months, has lack of transportation kept you from medical appointments, getting your medicines, non-medical meetings or appointments, work, or from getting things that you need?: Patient unable to answer   Interpersonal Safety: Unknown (10/4/2024)    Interpersonal Safety     Do you feel physically and emotionally safe where you currently live?: Patient declined     Within the past 12 months, have you been hit, slapped, kicked or otherwise physically hurt by someone?: Patient declined     Within the past 12 months, have you been humiliated or emotionally abused in other ways by your partner or ex-partner?: Patient declined    Received from [facility] [employer] [facility]    Social Connections          Disposition Plan     Medically Ready for Discharge: Ready Now             Guicho Rashid MD  Hospitalist Service  [facility]  Securely message with [employer] (more info)  Text page via [facility] Paging/Directory   ______________________________________________________________________    Interval History   Chart  "reviewed, pt interviewed.   Pt known to me from a couple of weeks ago.     Alert and interactive.     Physical Exam   Vital Signs:     BP: (!) 142/77 Pulse: 55   Resp: 16 SpO2: 96 % O2 Device: None (Room air)    Weight: 157 lbs 6.4 oz    Gen:  NAD, awake, speech garbled.  Unable to articulate any concerns. Humming and singing. \"Thumbs up\"  Eyes:  PERRL, sclera anicteric.  OP:  MMM, no lesions.  Neck:  Supple.  CV:   Lung:  normal effort in RA  Abd:    Skin: No rash.  Ext:  No pitting edema LE b/l.      Medical Decision Making       26 MINUTES SPENT BY ME on the date of service doing chart review, history, exam, documentation & further activities per the note.      Data         Imaging results reviewed over the past 24 hrs:   No results found for this or any previous visit (from the past 24 hours).  "

## 2024-12-05 NOTE — CARE PLAN
Calm, confused and alert to self. Ambulated A1 with gaitbelt/walker in the hallway and bathroom a few times throughout the night. Pending memory care placement.

## 2024-12-06 PROCEDURE — 101N000002 HC CUSTODIAL CARE DAILY

## 2024-12-06 PROCEDURE — 99207 PR NO BILLABLE SERVICE THIS VISIT: CPT | Performed by: INTERNAL MEDICINE

## 2024-12-06 ASSESSMENT — ACTIVITIES OF DAILY LIVING (ADL)
ADLS_ACUITY_SCORE: 62

## 2024-12-06 NOTE — PLAN OF CARE
Assumed care from: [id number]    Admit Date: 12/5/2024    Neuro: Disorientated x4  Activity: are SBA with Walker/GB  Pain: not showing any nonverbal signs of being in pain.  Fluids: has no IV access.  Diet: Regular  Discharge Disposition: placement to memory care    Pt walked the halls with support staff x1.

## 2024-12-06 NOTE — CARE PLAN
Calm, confused and orientated to self. Ambulated to bathroom and walked the hallway a few times during the night, mostly towards the early morning. Pending placement for memory care.

## 2024-12-06 NOTE — PROGRESS NOTES
Pt confused and pleasant.  VSS, no signs of pain.  Ambulated outside of room.  Bed alarm on when in bed.  Pt to be visited by LTC with family to discuss placement.

## 2024-12-07 PROCEDURE — 99207 PR NO BILLABLE SERVICE THIS VISIT: CPT | Performed by: INTERNAL MEDICINE

## 2024-12-07 PROCEDURE — 101N000002 HC CUSTODIAL CARE DAILY

## 2024-12-07 RX ORDER — OLANZAPINE 10 MG/2ML
2.5 INJECTION, POWDER, FOR SOLUTION INTRAMUSCULAR EVERY 6 HOURS PRN
Status: DISCONTINUED | OUTPATIENT
Start: 2024-12-07 | End: 2024-12-07

## 2024-12-07 ASSESSMENT — ACTIVITIES OF DAILY LIVING (ADL)
ADLS_ACUITY_SCORE: 62
ADLS_ACUITY_SCORE: 65
ADLS_ACUITY_SCORE: 62
ADLS_ACUITY_SCORE: 65
ADLS_ACUITY_SCORE: 62
ADLS_ACUITY_SCORE: 65
ADLS_ACUITY_SCORE: 62
ADLS_ACUITY_SCORE: 65
ADLS_ACUITY_SCORE: 62
ADLS_ACUITY_SCORE: 65
ADLS_ACUITY_SCORE: 65
ADLS_ACUITY_SCORE: 62
ADLS_ACUITY_SCORE: 62
ADLS_ACUITY_SCORE: 65
ADLS_ACUITY_SCORE: 62
ADLS_ACUITY_SCORE: 65
ADLS_ACUITY_SCORE: 62
ADLS_ACUITY_SCORE: 62
ADLS_ACUITY_SCORE: 65

## 2024-12-07 NOTE — PROGRESS NOTES
[facility]    Medicine Progress Note - Hospitalist Service    Date of Admission:  10/2/2024    Assessment & Plan     Summary of Stay: Jemal Gray is a 89 year old male with history of dementia with chronic aphasia. He was admitted on 10/2/2024 after he was brought to the ED by EMS from facility for evaluation of agitation. He had a prolonged hospitalization from 6/5/24 through 10/2/24 during which he was treated for cellulitis but mostly awaited long-term care placement due to dementia. He required appointment of guardianship and ultimately discharged to a long-term care facility on 10/2. Upon transfer to the long-term care facility he became agitated with swearing.  He was wandering around the long-term care facility, entering other patient's rooms and staff was unable to redirect him. He required transfer back to the ED and was readmitted.     In the ED he was stable, no major acute medical issue. He was given seroquel.  He required a bedside attendant as he was agitated and unable to be redirected.  He attempted to leave the emergency department and go into other patient's rooms. His facility was not able to take him back and there was no immediate safe disposition option.  He is here under halfway care status.  Patient has improved and is no longer agitated. He remains medically stable and can be discharged whenever placement is found. Prior long term care facility is reportedly not taking patient back.  is looking into memory care units.     Patient has been here since 10/2/244 awaiting placement.  Elderly waiver appointment is scheduled for 12/9 and this meeting is necessary to establish funding for further cares.     -Patient remains halfway care  -Await long-term placement after meeting with Elderly waiver meeting expected on 12/9  -Awake, seems in good spirits.  Speech garbled at baseline.         Safe Disposition/MCC Care:  -  Patient remains medically  stable for discharge when location found.  SW/CM following and working on options.  No current safe disposition option.    -Patient had prolonged hospitalization due to need for disposition assistance in earlier 2024.  His daughter filed for guardianship paperwork which was completed in July.  Acquired MA application and long-term care placement.    -Ultimately will require 24/7 care and Memory care placement      Acute agitation, resolved  Behavior disturbances   Dementia with chronic aphasia:  Suspect triggered by changes in setting and recent move.  No physical complaints or concerns of infection.  Historically required Zyprexa over the summer when he was hospitalized for agitation.  Most recently had been prescribed Seroquel 12.5 mg twice daily as needed although he had not been needing most recently.  -Continue Seroquel PRN  - ms prior check  -SW consulted  -needs memory care     Actinic Keratosis:  -Had surgery for this and prescribed steroids cream and ketoconazole cream, restarted per daughter request previously.     Sinus Bradycardia:  -Patient was noted to have HR in 50s.  Asymptomatic.  -no telemetry monitoring needed      History of Constipation:  -laxatives PRN.  Had BM 11/4.     Barriers to discharge: needs Memory care placement. Unfortunately the patient will not be assessed for elderly waiver until 12/9/2024.           Diet: Combination Diet Regular Diet  Snacks/Supplements Pediatric: Ensure Enlive; Between Meals  Room Service    DVT Prophylaxis: Pneumatic Compression Devices  Maddox Catheter: Not present  Lines: None     Cardiac Monitoring: None  Code Status: No CPR- Do NOT Intubate      Clinically Significant Risk Factors Present on Admission                       # Dementia: noted on problem list                  Social Drivers of Health    Food Insecurity: Unknown (10/3/2024)    Food Insecurity     Within the past 12 months, did you worry that your food would run out before you got money to  buy more?: Patient unable to answer     Within the past 12 months, did the food you bought just not last and you didn t have money to get more?: Patient unable to answer   Housing Stability: Unknown (10/3/2024)    Housing Stability     Do you have housing? : Patient unable to answer     Are you worried about losing your housing?: Patient unable to answer   Tobacco Use: Medium Risk (6/3/2024)    Received from [facility]    Patient History     Smoking Tobacco Use: Former     Smokeless Tobacco Use: Never   Financial Resource Strain: Unknown (10/3/2024)    Financial Resource Strain     Within the past 12 months, have you or your family members you live with been unable to get utilities (heat, electricity) when it was really needed?: Patient unable to answer   Transportation Needs: Unknown (10/3/2024)    Transportation Needs     Within the past 12 months, has lack of transportation kept you from medical appointments, getting your medicines, non-medical meetings or appointments, work, or from getting things that you need?: Patient unable to answer   Interpersonal Safety: Unknown (10/4/2024)    Interpersonal Safety     Do you feel physically and emotionally safe where you currently live?: Patient declined     Within the past 12 months, have you been hit, slapped, kicked or otherwise physically hurt by someone?: Patient declined     Within the past 12 months, have you been humiliated or emotionally abused in other ways by your partner or ex-partner?: Patient declined    Received from [facility]    Social Connections          Disposition Plan     Medically Ready for Discharge: Ready Now             Guicho Rashid MD  Hospitalist Service  [facility]  Securely message with [employer] (more info)  Text page via [facility] Paging/Directory   ______________________________________________________________________    Interval History   Agitated last  "evening. Resolved spontaneously.    Appears slightly more subdued than usual, but is overall similar to baseline.     Physical Exam   Vital Signs:     BP: 130/62 Pulse: 53     SpO2: 93 % O2 Device: None (Room air)    Weight: 157 lbs 6.4 oz    Gen:  NAD, awake, speech garbled.  Unable to articulate any concerns. Humming and singing. \"Thumbs up\"  Eyes:  PERRL, sclera anicteric.  OP:  MMM, no lesions.  Neck:  Supple.  CV:   Lung:  normal effort in RA  Abd:    Skin: No rash.  Ext:  No pitting edema LE b/l.      Medical Decision Making       10 MINUTES SPENT BY ME on the date of service doing chart review, history, exam, documentation & further activities per the note.      Data         Imaging results reviewed over the past 24 hrs:   No results found for this or any previous visit (from the past 24 hours).  "

## 2024-12-07 NOTE — PLAN OF CARE
"Goal Outcome Evaluation:      Plan of Care Reviewed With: patient    Overall Patient Progress: no changeOverall Patient Progress: no change    Outcome Evaluation: denies pain, ambulates in quiles x 2    Alert/confused, [facility] care, regular diet, up A x 1/gb/w, RA. Plan. LTC placemen.  Will continue POC and monitoring.   Problem: Adult Inpatient Plan of Care  Goal: Plan of Care Review  Description: The Plan of Care Review/Shift note should be completed every shift.  The Outcome Evaluation is a brief statement about your assessment that the patient is improving, declining, or no change.  This information will be displayed automatically on your shift  note.  Outcome: Progressing  Flowsheets (Taken 12/6/2024 2122)  Outcome Evaluation: denies pain, ambulates in quiles x 2  Plan of Care Reviewed With: patient  Overall Patient Progress: no change  Goal: Patient-Specific Goal (Individualized)  Description: You can add care plan individualizations to a care plan. Examples of Individualization might be:  \"Parent requests to be called daily at 9am for status\", \"I have a hard time hearing out of my right ear\", or \"Do not touch me to wake me up as it startles  me\".  Outcome: Progressing  Goal: Absence of Hospital-Acquired Illness or Injury  Outcome: Progressing  Intervention: Identify and Manage Fall Risk  Recent Flowsheet Documentation  Taken 12/6/2024 1620 by Erin Gifford RN  Safety Promotion/Fall Prevention:   activity supervised   treat underlying cause   treat reversible contributory factors  Intervention: Prevent Skin Injury  Recent Flowsheet Documentation  Taken 12/6/2024 1620 by Erin Gifford RN  Body Position: position changed independently  Intervention: Prevent Infection  Recent Flowsheet Documentation  Taken 12/6/2024 1620 by Erin Gifford RN  Infection Prevention: environmental surveillance performed  Goal: Optimal Comfort and Wellbeing  Outcome: Progressing  Goal: Readiness for Transition of " Care  Outcome: Progressing

## 2024-12-07 NOTE — PLAN OF CARE
Pt ambulated in hallway and was up to bathroom w/ walker and gait belt.  VSS.  No s/s of pain.  Pending LTC placement.

## 2024-12-07 NOTE — PLAN OF CARE
"  Pt disoriented x4. Pt abruptly woke up from sleep twice and was threatening staff. Told staff he was going to punch them if someone attempted to touch him. Pt walked down the hallways with staffs near by. Pt verbally abusive by calling staff with inappropriate names. Nurse attempted to remove Zyprexa from omnicell but pt name wasn't listed. Attempted to add pt and remove Zyprexa from stock meds, but didn't allow. Called pharmacy and they send IM Zyprexa, but by the time it arrived pt felt back asleep. Zyprexa stocked back in Med room in pt's bin. Reg diet, room service. Ax1, walker, ref gaitbelt at times. Weekly head to toe assessments. VS daily. Pending placement.      Goal Outcome Evaluation:      Plan of Care Reviewed With: patient    Overall Patient Progress: no changeOverall Patient Progress: no change    Outcome Evaluation: Denied pain. Verbally abusive towards staff.      Problem: Adult Inpatient Plan of Care  Goal: Plan of Care Review  Description: The Plan of Care Review/Shift note should be completed every shift.  The Outcome Evaluation is a brief statement about your assessment that the patient is improving, declining, or no change.  This information will be displayed automatically on your shift  note.  Outcome: Progressing  Flowsheets (Taken 12/7/2024 0624)  Outcome Evaluation: Denied pain. Verbally abusive towards staff.  Plan of Care Reviewed With: patient  Overall Patient Progress: no change  Goal: Patient-Specific Goal (Individualized)  Description: You can add care plan individualizations to a care plan. Examples of Individualization might be:  \"Parent requests to be called daily at 9am for status\", \"I have a hard time hearing out of my right ear\", or \"Do not touch me to wake me up as it startles  me\".  Outcome: Progressing  Goal: Absence of Hospital-Acquired Illness or Injury  Outcome: Progressing  Intervention: Identify and Manage Fall Risk  Recent Flowsheet Documentation  Taken 12/7/2024 0000 " by Arabella Momin, RN  Safety Promotion/Fall Prevention:   activity supervised   nonskid shoes/slippers when out of bed   clutter free environment maintained   room door open   room near nurse's station   room organization consistent   safety round/check completed   supervised activity  Intervention: Prevent Skin Injury  Recent Flowsheet Documentation  Taken 12/7/2024 0356 by Arabella Momin, RN  Body Position: position changed independently  Taken 12/7/2024 0000 by Arabella Momin RN  Body Position: position changed independently  Goal: Optimal Comfort and Wellbeing  Outcome: Progressing  Goal: Readiness for Transition of Care  Outcome: Progressing

## 2024-12-08 PROCEDURE — 99207 PR NO BILLABLE SERVICE THIS VISIT: CPT | Performed by: INTERNAL MEDICINE

## 2024-12-08 PROCEDURE — 101N000002 HC CUSTODIAL CARE DAILY

## 2024-12-08 ASSESSMENT — ACTIVITIES OF DAILY LIVING (ADL)
ADLS_ACUITY_SCORE: 65
ADLS_ACUITY_SCORE: 62
ADLS_ACUITY_SCORE: 65
ADLS_ACUITY_SCORE: 62
ADLS_ACUITY_SCORE: 65
ADLS_ACUITY_SCORE: 65
ADLS_ACUITY_SCORE: 62
ADLS_ACUITY_SCORE: 65
ADLS_ACUITY_SCORE: 62
ADLS_ACUITY_SCORE: 62
ADLS_ACUITY_SCORE: 65

## 2024-12-08 NOTE — PLAN OF CARE
Shift Summary [id number]    Pt confused, VSS.  Pt was agitated ambulating in hallways, refused gait belt and tried to go into other pt's rooms.  Safety checks performed, bed alarm remains on.  No s/s of pain.  Pending LTC placement  [place] visit.

## 2024-12-08 NOTE — PLAN OF CARE
"Goal Outcome Evaluation:      Plan of Care Reviewed With: patient    Overall Patient Progress: no changeOverall Patient Progress: no change    Outcome Evaluation: denies pain, family visited for this shift.    Alert/confused, up SBA/gb/w, regular diet, USP care.  Will continue POC and monitoring.   Problem: Adult Inpatient Plan of Care  Goal: Plan of Care Review  Description: The Plan of Care Review/Shift note should be completed every shift.  The Outcome Evaluation is a brief statement about your assessment that the patient is improving, declining, or no change.  This information will be displayed automatically on your shift  note.  Outcome: Progressing  Flowsheets (Taken 12/7/2024 2223)  Outcome Evaluation: denies pain, family visited for this shift.  Plan of Care Reviewed With: patient  Overall Patient Progress: no change  Goal: Patient-Specific Goal (Individualized)  Description: You can add care plan individualizations to a care plan. Examples of Individualization might be:  \"Parent requests to be called daily at 9am for status\", \"I have a hard time hearing out of my right ear\", or \"Do not touch me to wake me up as it startles  me\".  Outcome: Progressing  Goal: Absence of Hospital-Acquired Illness or Injury  Outcome: Progressing  Intervention: Identify and Manage Fall Risk  Recent Flowsheet Documentation  Taken 12/7/2024 1540 by Erin Gifford RN  Safety Promotion/Fall Prevention:   activity supervised   treat underlying cause   treat reversible contributory factors  Intervention: Prevent Skin Injury  Recent Flowsheet Documentation  Taken 12/7/2024 1540 by Erin Gifofrd RN  Body Position: position changed independently  Goal: Optimal Comfort and Wellbeing  Outcome: Progressing  Goal: Readiness for Transition of Care  Outcome: Progressing     "

## 2024-12-08 NOTE — PROGRESS NOTES
[facility]    Medicine Progress Note - Hospitalist Service    Date of Admission:  10/2/2024    Assessment & Plan     Summary of Stay: Jemal Gray is a 89 year old male with history of dementia with chronic aphasia. He was admitted on 10/2/2024 after he was brought to the ED by EMS from facility for evaluation of agitation. He had a prolonged hospitalization from 6/5/24 through 10/2/24 during which he was treated for cellulitis but mostly awaited long-term care placement due to dementia. He required appointment of guardianship and ultimately discharged to a long-term care facility on 10/2. Upon transfer to the long-term care facility he became agitated with swearing.  He was wandering around the long-term care facility, entering other patient's rooms and staff was unable to redirect him. He required transfer back to the ED and was readmitted.     In the ED he was stable, no major acute medical issue. He was given seroquel.  He required a bedside attendant as he was agitated and unable to be redirected.  He attempted to leave the emergency department and go into other patient's rooms. His facility was not able to take him back and there was no immediate safe disposition option.  He is here under skilled nursing care status.  Patient has improved and is no longer agitated. He remains medically stable and can be discharged whenever placement is found. Prior long term care facility is reportedly not taking patient back.  is looking into memory care units.     Patient has been here since 10/2/244 awaiting placement.  Elderly waiver appointment is scheduled for 12/9 and this meeting is necessary to establish funding for further cares.     -Patient remains skilled nursing care  -Await long-term placement after meeting with Elderly waiver meeting expected on 12/9  -Awake, seems in good spirits.  Speech garbled at baseline.         Safe Disposition/assisted Care:  -  Patient remains medically  stable for discharge when location found.  SW/CM following and working on options.  No current safe disposition option.    -Patient had prolonged hospitalization due to need for disposition assistance in earlier 2024.  His daughter filed for guardianship paperwork which was completed in July.  Acquired MA application and long-term care placement.    -Ultimately will require 24/7 care and Memory care placement      Acute agitation, resolved  Behavior disturbances   Dementia with chronic aphasia:  Suspect triggered by changes in setting and recent move.  No physical complaints or concerns of infection.  Historically required Zyprexa over the summer when he was hospitalized for agitation.  Most recently had been prescribed Seroquel 12.5 mg twice daily as needed although he had not been needing most recently.  -Continue Seroquel PRN  - ms prior check  -SW consulted  -needs memory care     Actinic Keratosis:  -Had surgery for this and prescribed steroids cream and ketoconazole cream, restarted per daughter request previously.     Sinus Bradycardia:  -Patient was noted to have HR in 50s.  Asymptomatic.  -no telemetry monitoring needed      History of Constipation:  -laxatives PRN.  Had BM 11/4.     Barriers to discharge: needs Memory care placement. Unfortunately the patient will not be assessed for elderly waiver until 12/9/2024.           Diet: Combination Diet Regular Diet  Snacks/Supplements Pediatric: Ensure Enlive; Between Meals  Room Service    DVT Prophylaxis: Pneumatic Compression Devices  Maddox Catheter: Not present  Lines: None     Cardiac Monitoring: None  Code Status: No CPR- Do NOT Intubate      Clinically Significant Risk Factors Present on Admission                       # Dementia: noted on problem list                  Social Drivers of Health    Food Insecurity: Unknown (10/3/2024)    Food Insecurity     Within the past 12 months, did you worry that your food would run out before you got money to  buy more?: Patient unable to answer     Within the past 12 months, did the food you bought just not last and you didn t have money to get more?: Patient unable to answer   Housing Stability: Unknown (10/3/2024)    Housing Stability     Do you have housing? : Patient unable to answer     Are you worried about losing your housing?: Patient unable to answer   Tobacco Use: Medium Risk (6/3/2024)    Received from [facility]    Patient History     Smoking Tobacco Use: Former     Smokeless Tobacco Use: Never   Financial Resource Strain: Unknown (10/3/2024)    Financial Resource Strain     Within the past 12 months, have you or your family members you live with been unable to get utilities (heat, electricity) when it was really needed?: Patient unable to answer   Transportation Needs: Unknown (10/3/2024)    Transportation Needs     Within the past 12 months, has lack of transportation kept you from medical appointments, getting your medicines, non-medical meetings or appointments, work, or from getting things that you need?: Patient unable to answer   Interpersonal Safety: Unknown (10/4/2024)    Interpersonal Safety     Do you feel physically and emotionally safe where you currently live?: Patient declined     Within the past 12 months, have you been hit, slapped, kicked or otherwise physically hurt by someone?: Patient declined     Within the past 12 months, have you been humiliated or emotionally abused in other ways by your partner or ex-partner?: Patient declined    Received from [facility]    Social Connections          Disposition Plan     Medically Ready for Discharge: Ready Now             Guicho Rashid MD  Hospitalist Service  [facility]  Securely message with [employer] (more info)  Text page via [facility] Paging/Directory   ______________________________________________________________________    Interval History   Stable night.  "No changes per nursing documentation. No documented recurrence of agitation behaviors.      Physical Exam   Vital Signs:     BP: 131/67 Pulse: 57   Resp: 18 SpO2: 96 % O2 Device: None (Room air)    Weight: 157 lbs 6.4 oz    Gen:  NAD, awake, speech garbled.  Unable to articulate any concerns. Humming and singing. \"Thumbs up\"  Eyes:  PERRL, sclera anicteric.  OP:  MMM, no lesions.  Neck:  Supple.  CV:   Lung:  normal effort in RA  Abd:    Skin: No rash.  Ext:  No pitting edema LE b/l.      Medical Decision Making       10 MINUTES SPENT BY ME on the date of service doing chart review, history, exam, documentation & further activities per the note.      Data         Imaging results reviewed over the past 24 hrs:   No results found for this or any previous visit (from the past 24 hours).  "

## 2024-12-09 PROCEDURE — 101N000002 HC CUSTODIAL CARE DAILY

## 2024-12-09 RX ADMIN — KETOCONAZOLE: 20 SHAMPOO, SUSPENSION TOPICAL at 10:37

## 2024-12-09 ASSESSMENT — ACTIVITIES OF DAILY LIVING (ADL)
ADLS_ACUITY_SCORE: 62
ADLS_ACUITY_SCORE: 65
ADLS_ACUITY_SCORE: 62
ADLS_ACUITY_SCORE: 65
ADLS_ACUITY_SCORE: 62
ADLS_ACUITY_SCORE: 62
ADLS_ACUITY_SCORE: 65
ADLS_ACUITY_SCORE: 62
ADLS_ACUITY_SCORE: 65
ADLS_ACUITY_SCORE: 62
ADLS_ACUITY_SCORE: 65

## 2024-12-09 NOTE — PROGRESS NOTES
[facility]    Medicine Progress Note - Hospitalist Service    Date of Admission:  10/2/2024    Assessment & Plan     Summary of Stay: Jemal Gray is a 89 year old male with history of dementia with chronic aphasia. He was admitted on 10/2/2024 after he was brought to the ED by EMS from facility for evaluation of agitation. He had a prolonged hospitalization from 6/5/24 through 10/2/24 during which he was treated for cellulitis but mostly awaited long-term care placement due to dementia. He required appointment of guardianship and ultimately discharged to a long-term care facility on 10/2. Upon transfer to the long-term care facility he became agitated with swearing.  He was wandering around the long-term care facility, entering other patient's rooms and staff was unable to redirect him. He required transfer back to the ED and was readmitted.     In the ED he was stable, no major acute medical issue. He was given seroquel.  He required a bedside attendant as he was agitated and unable to be redirected.  He attempted to leave the emergency department and go into other patient's rooms. His facility was not able to take him back and there was no immediate safe disposition option.  He is here under skilled nursing care status.  Patient has improved and is no longer agitated. He remains medically stable and can be discharged whenever placement is found. Prior long term care facility is reportedly not taking patient back.  is looking into memory care units.     Patient has been here since 10/2/244 awaiting placement.  Elderly waiver appointment is scheduled for 12/9 and this meeting is necessary to establish funding for further cares.     -Patient remains skilled nursing care  -Await long-term placement after meeting with Elderly waiver meeting expected on 12/9  -Awake, seems in good spirits.  Speech garbled at baseline.         Safe Disposition/skilled nursing Care:  -  Patient remains medically  stable for discharge when location found.  SW/CM following and working on options.  No current safe disposition option.    -Patient had prolonged hospitalization due to need for disposition assistance in earlier 2024.  His daughter filed for guardianship paperwork which was completed in July.  Acquired MA application and long-term care placement.    -Ultimately will require 24/7 care and Memory care placement      Acute agitation, resolved  Behavior disturbances   Dementia with chronic aphasia:  Suspect triggered by changes in setting and recent move.  No physical complaints or concerns of infection.  Historically required Zyprexa over the summer when he was hospitalized for agitation.  Most recently had been prescribed Seroquel 12.5 mg twice daily as needed although he had not been needing most recently.  -Continue Seroquel PRN  - ms prior check  -SW consulted  -needs memory care     Actinic Keratosis:  -Had surgery for this and prescribed steroids cream and ketoconazole cream, restarted per daughter request previously.     Sinus Bradycardia:  -Patient was noted to have HR in 50s.  Asymptomatic.  -no telemetry monitoring needed      History of Constipation:  -laxatives PRN.  Had BM 11/4.     Barriers to discharge: needs Memory care placement. Unfortunately the patient will not be assessed for elderly waiver until 12/9/2024.           Diet: Combination Diet Regular Diet  Snacks/Supplements Pediatric: Ensure Enlive; Between Meals  Room Service    DVT Prophylaxis: Pneumatic Compression Devices  Maddox Catheter: Not present  Lines: None     Cardiac Monitoring: None  Code Status: No CPR- Do NOT Intubate      Clinically Significant Risk Factors Present on Admission                       # Dementia: noted on problem list                  Social Drivers of Health    Food Insecurity: Unknown (10/3/2024)    Food Insecurity     Within the past 12 months, did you worry that your food would run out before you got money to  buy more?: Patient unable to answer     Within the past 12 months, did the food you bought just not last and you didn t have money to get more?: Patient unable to answer   Housing Stability: Unknown (10/3/2024)    Housing Stability     Do you have housing? : Patient unable to answer     Are you worried about losing your housing?: Patient unable to answer   Tobacco Use: Medium Risk (6/3/2024)    Received from [facility]    Patient History     Smoking Tobacco Use: Former     Smokeless Tobacco Use: Never   Financial Resource Strain: Unknown (10/3/2024)    Financial Resource Strain     Within the past 12 months, have you or your family members you live with been unable to get utilities (heat, electricity) when it was really needed?: Patient unable to answer   Transportation Needs: Unknown (10/3/2024)    Transportation Needs     Within the past 12 months, has lack of transportation kept you from medical appointments, getting your medicines, non-medical meetings or appointments, work, or from getting things that you need?: Patient unable to answer   Interpersonal Safety: Unknown (10/4/2024)    Interpersonal Safety     Do you feel physically and emotionally safe where you currently live?: Patient declined     Within the past 12 months, have you been hit, slapped, kicked or otherwise physically hurt by someone?: Patient declined     Within the past 12 months, have you been humiliated or emotionally abused in other ways by your partner or ex-partner?: Patient declined    Received from [facility]    Social Connections          Disposition Plan     Medically Ready for Discharge: Ready Now             Anjali Leiva MD  Hospitalist Service  [facility]  Securely message with [employer] (more info)  Text page via [facility] Paging/Directory   ______________________________________________________________________    Interval History   Nursing notes  "reviewed.  Patient was seen at bedside.  Pleasantly confused, no complaints      Physical Exam   Vital Signs:                    Weight: 157 lbs 6.4 oz    Gen:  NAD, awake, speech garbled.  Unable to articulate any concerns. Humming and singing. \"Thumbs up\"  Eyes:  PERRL, sclera anicteric.  OP:  MMM, no lesions.  Neck:  Supple.  CV:   Lung:  normal effort in RA  Abd:    Skin: No rash.  Ext:  No pitting edema LE b/l.      Medical Decision Making       10 MINUTES SPENT BY ME on the date of service doing chart review, history, exam, documentation & further activities per the note.      Data         Imaging results reviewed over the past 24 hrs:   No results found for this or any previous visit (from the past 24 hours).  "

## 2024-12-09 NOTE — PROGRESS NOTES
Care Management Follow Up    Expected Discharge Date: 12/10/2024     Concerns to be Addressed:       Patient plan of care discussed at interdisciplinary rounds: Yes    Anticipated Discharge Disposition:  care home     Anticipated Discharge Services:  care home  Anticipated Discharge DME:  walker     Patient/Family in Agreement with the Plan:  Yes    Referrals Placed by CM/SW:  post acute referrals    Discussed  Partnership in Safe Discharge Planning  document with patient/family: No     Handoff Completed: No, handoff not indicated or clinically appropriate    Additional Information:  Laila Dinero, RN, PHN from [facility] p: [phone]   f: [phone] started Elderly Waiver assessment with patient and staff today. JAMAR left a message for patients daughter, Cheri, to discuss discharge plan. We requested that Cheri contact Laila to answer remaining questions and complete assessment with Laila. Laila requested that  staff update her after [facility] completes their on-site assessment today.  JAMAR provided Laila with requested clinical documentation.   Next Steps: [facility] staff will come out to the hospital today to complete assessment with patient.     Radha Martinez, MARY  [phone]

## 2024-12-09 NOTE — PLAN OF CARE
"12 hr RN. Pt calm and cooperative. Does refuse gaitbelt. Ambulates well with walker.  Ambulating halls . Good appetite. longterm care  Problem: Adult Inpatient Plan of Care  Goal: Plan of Care Review  Description: The Plan of Care Review/Shift note should be completed every shift.  The Outcome Evaluation is a brief statement about your assessment that the patient is improving, declining, or no change.  This information will be displayed automatically on your shift  note.  Outcome: Progressing  Flowsheets (Taken 12/8/2024 1827)  Outcome Evaluation: calm and cooperative  Plan of Care Reviewed With: patient  Goal: Patient-Specific Goal (Individualized)  Description: You can add care plan individualizations to a care plan. Examples of Individualization might be:  \"Parent requests to be called daily at 9am for status\", \"I have a hard time hearing out of my right ear\", or \"Do not touch me to wake me up as it startles  me\".  Outcome: Progressing  Goal: Absence of Hospital-Acquired Illness or Injury  Outcome: Progressing  Intervention: Identify and Manage Fall Risk  Recent Flowsheet Documentation  Taken 12/8/2024 1600 by Anselmo Best RN  Safety Promotion/Fall Prevention:   activity supervised   assistive device/personal items within reach   clutter free environment maintained   nonskid shoes/slippers when out of bed   increased rounding and observation   mobility aid in reach   safety round/check completed  Intervention: Prevent Skin Injury  Recent Flowsheet Documentation  Taken 12/8/2024 1600 by Anselmo Best RN  Body Position: position changed independently  Goal: Optimal Comfort and Wellbeing  Outcome: Progressing  Intervention: Provide Person-Centered Care  Recent Flowsheet Documentation  Taken 12/8/2024 1600 by Anselmo Best RN  Trust Relationship/Rapport:   care explained   emotional support provided   reassurance provided  Goal: Readiness for Transition of Care  Outcome: Progressing   Goal Outcome " Evaluation:      Plan of Care Reviewed With: patient          Outcome Evaluation: calm and cooperative

## 2024-12-09 NOTE — PLAN OF CARE
Problem: Adult Inpatient Plan of Care  Goal: Plan of Care Review  Description: The Plan of Care Review/Shift note should be completed every shift.  The Outcome Evaluation is a brief statement about your assessment that the patient is improving, declining, or no change.  This information will be displayed automatically on your shift  note.  Recent Flowsheet Documentation  Taken 12/8/2024 2048 by Sabrina Medina RN  Outcome Evaluation: .  Plan of Care Reviewed With: patient  Overall Patient Progress: no change   Goal Outcome Evaluation:      Plan of Care Reviewed With: patient    Overall Patient Progress: no changeOverall Patient Progress: no change    Outcome Evaluation: .Calm    Pt confused, refused vital signs for shift. Safety checks performed and bed alarm on. No sign of pain. Awaiting placement.

## 2024-12-10 PROCEDURE — 101N000002 HC CUSTODIAL CARE DAILY

## 2024-12-10 ASSESSMENT — ACTIVITIES OF DAILY LIVING (ADL)
ADLS_ACUITY_SCORE: 62
ADLS_ACUITY_SCORE: 65
ADLS_ACUITY_SCORE: 62
ADLS_ACUITY_SCORE: 65
ADLS_ACUITY_SCORE: 62
ADLS_ACUITY_SCORE: 62
ADLS_ACUITY_SCORE: 65
ADLS_ACUITY_SCORE: 62
ADLS_ACUITY_SCORE: 65
ADLS_ACUITY_SCORE: 62
ADLS_ACUITY_SCORE: 65
ADLS_ACUITY_SCORE: 62

## 2024-12-10 NOTE — PLAN OF CARE
"Alert to self, confused. Bed alarm on throughout the night for safety. Ambulated the hallway and bathroom a few times during the night. Visit with [facility] for transition pending.       Temp: 97.5  F (36.4  C) Temp src: Oral BP: (!) 147/73 Pulse: 56   Resp: 18 SpO2: 96 % O2 Device: None (Room air)           Goal Outcome Evaluation:           Overall Patient Progress: no changeOverall Patient Progress: no change    Outcome Evaluation: VSS, slept well.      Problem: Adult Inpatient Plan of Care  Goal: Plan of Care Review  Description: The Plan of Care Review/Shift note should be completed every shift.  The Outcome Evaluation is a brief statement about your assessment that the patient is improving, declining, or no change.  This information will be displayed automatically on your shift  note.  Outcome: Progressing  Flowsheets (Taken 12/10/2024 0442)  Outcome Evaluation: VSS, slept well.  Overall Patient Progress: no change  Goal: Patient-Specific Goal (Individualized)  Description: You can add care plan individualizations to a care plan. Examples of Individualization might be:  \"Parent requests to be called daily at 9am for status\", \"I have a hard time hearing out of my right ear\", or \"Do not touch me to wake me up as it startles  me\".  Outcome: Progressing  Goal: Absence of Hospital-Acquired Illness or Injury  Outcome: Progressing  Intervention: Identify and Manage Fall Risk  Recent Flowsheet Documentation  Taken 12/10/2024 0427 by Radha Ventura, RN  Safety Promotion/Fall Prevention: safety round/check completed  Taken 12/10/2024 0253 by Radha Ventura, RN  Safety Promotion/Fall Prevention: safety round/check completed  Taken 12/10/2024 0200 by Radha Ventura, RN  Safety Promotion/Fall Prevention: safety round/check completed  Taken 12/10/2024 0145 by Radha Ventura, RN  Safety Promotion/Fall Prevention: safety round/check completed  Taken 12/9/2024 2330 by Radha Ventura, RN  Safety Promotion/Fall Prevention: safety " round/check completed  Goal: Optimal Comfort and Wellbeing  Outcome: Progressing  Goal: Readiness for Transition of Care  Outcome: Progressing

## 2024-12-10 NOTE — PLAN OF CARE
"Shift Summary [id number]     Pt confused, VSS.  Pt was agitated ambulating in hallways, refused gait belt and tried to go into other pt's rooms.  Safety checks performed, bed alarm remains on.  No s/s of pain.  Pending LTC placement  [facility] visit.    Goal Outcome Evaluation:      Plan of Care Reviewed With: patient    Overall Patient Progress: no changeOverall Patient Progress: no change    Outcome Evaluation: Some agitation today, VSS.      Problem: Adult Inpatient Plan of Care  Goal: Plan of Care Review  Description: The Plan of Care Review/Shift note should be completed every shift.  The Outcome Evaluation is a brief statement about your assessment that the patient is improving, declining, or no change.  This information will be displayed automatically on your shift  note.  Outcome: Not Progressing  Flowsheets (Taken 12/9/2024 2300)  Outcome Evaluation: Some agitation today, VSS.  Plan of Care Reviewed With: patient  Overall Patient Progress: no change  Goal: Patient-Specific Goal (Individualized)  Description: You can add care plan individualizations to a care plan. Examples of Individualization might be:  \"Parent requests to be called daily at 9am for status\", \"I have a hard time hearing out of my right ear\", or \"Do not touch me to wake me up as it startles  me\".  Outcome: Not Progressing  Goal: Absence of Hospital-Acquired Illness or Injury  Outcome: Not Progressing  Intervention: Identify and Manage Fall Risk  Recent Flowsheet Documentation  Taken 12/9/2024 2026 by Lisa Mason, RN  Safety Promotion/Fall Prevention: safety round/check completed  Taken 12/9/2024 1659 by Lisa Mason, RN  Safety Promotion/Fall Prevention:   safety round/check completed   supervised activity   room near nurse's station   room door open   nonskid shoes/slippers when out of bed  Intervention: Prevent Skin Injury  Recent Flowsheet Documentation  Taken 12/9/2024 2026 by Lisa Mason, " RN  Body Position: position changed independently  Taken 12/9/2024 1659 by Lisa Mason RN  Body Position: position changed independently  Intervention: Prevent and Manage VTE (Venous Thromboembolism) Risk  Recent Flowsheet Documentation  Taken 12/9/2024 1659 by Lisa Mason RN  VTE Prevention/Management: SCDs off (sequential compression devices)  Goal: Optimal Comfort and Wellbeing  Outcome: Not Progressing  Goal: Readiness for Transition of Care  Outcome: Not Progressing

## 2024-12-10 NOTE — PLAN OF CARE
Goal Outcome Evaluation:      Plan of Care Reviewed With: patient    Overall Patient Progress: no change      Pt confused. Safety checks performed and bed alarm on. No sign of pain. Po intake good. +BM and voiding w/o difficulty. Ambulates in halls w/staff. Awaiting placement.     Problem: Adult Inpatient Plan of Care  Goal: Plan of Care Review  Description: The Plan of Care Review/Shift note should be completed every shift.  The Outcome Evaluation is a brief statement about your assessment that the patient is improving, declining, or no change.  This information will be displayed automatically on your shift  note.  Recent Flowsheet Documentation  Taken 12/10/2024 1147 by Kym Mobley, RN  Outcome Evaluation: Pt confused, refused vital signs for shift. Safety checks performed and bed alarm on. No sign of pain. Awaiting placement.  Plan of Care Reviewed With: patient  Overall Patient Progress: no change  Goal: Absence of Hospital-Acquired Illness or Injury  Intervention: Identify and Manage Fall Risk  Recent Flowsheet Documentation  Taken 12/10/2024 0841 by Kym Mobley, RN  Safety Promotion/Fall Prevention: safety round/check completed  Intervention: Prevent Skin Injury  Recent Flowsheet Documentation  Taken 12/10/2024 0841 by Kym Mobley, RN  Body Position: position changed independently

## 2024-12-10 NOTE — PROGRESS NOTES
Care Management Follow Up    Length of Stay (days): 0    Concerns to be Addressed: discharge planning  Patient plan of care discussed at interdisciplinary rounds: Yes    Anticipated Discharge Disposition:  [place]     Anticipated Discharge Services:  None  Anticipated Discharge DME:  [facility]    Patient/family educated on Medicare website which has current facility and service quality ratings:  yes  Education Provided on the Discharge Plan:  yes  Patient/Family in Agreement with the Plan:  yes    Referrals Placed by CM/SW:  Post acute facilities    Discussed  Partnership in Safe Discharge Planning  document with patient/family: No     Handoff Completed: No, handoff not indicated or clinically appropriate    Additional Information:  Richy received a call from Bisi at [facility], [phone], updating Richy that the pt's dtr/guardian Cheri, [place] [facility] [place].  Bisi requested a call back to discuss the EW assessment with the [facility] that was completed yesterday.  Cheri told Bisi that she is going to call [facility] and follow up today.  Richy left Bisi a brief vm, updating her that Laila Dinero RN, PHN from  p: [phone]   f: [phone], completed the assessment yesterday and is going to call the Cheri to ask a couple of follow up questions.    Richy left a vm for Laila, updating her that [facility] accepted the pt and Cheri reserved a suite.  Richy asked for a call back to discuss the status of the EW.    Next Steps:   Richy will continue with discharge planning and will be available as needed until discharge.    KARLA William, [facility]  Inpatient Care Coordination  [facility]  [phone]

## 2024-12-10 NOTE — PROGRESS NOTES
[facility]    Medicine Progress Note - Hospitalist Service    Date of Admission:  10/2/2024    Assessment & Plan     Summary of Stay: Jemal Gray is a 89 year old male with history of dementia with chronic aphasia. He was admitted on 10/2/2024 after he was brought to the ED by EMS from facility for evaluation of agitation. He had a prolonged hospitalization from 6/5/24 through 10/2/24 during which he was treated for cellulitis but mostly awaited long-term care placement due to dementia. He required appointment of guardianship and ultimately discharged to a long-term care facility on 10/2. Upon transfer to the long-term care facility he became agitated with swearing.  He was wandering around the long-term care facility, entering other patient's rooms and staff was unable to redirect him. He required transfer back to the ED and was readmitted.     In the ED he was stable, no major acute medical issue. He was given seroquel.  He required a bedside attendant as he was agitated and unable to be redirected.  He attempted to leave the emergency department and go into other patient's rooms. His facility was not able to take him back and there was no immediate safe disposition option.  He is here under [facility] care status.  Patient has improved and is no longer agitated. He remains medically stable and can be discharged whenever placement is found. Prior long term care facility is reportedly not taking patient back.  is looking into memory care units.     Patient has been here since 10/2/244 awaiting placement.  Elderly waiver appointment is pleated on 12/9.  [facility] has accepted the patient and patient's daughter/guardian Jose Eduardo has reserved a suite     -Patient remains [facility] care  -Await long-term placement after meeting with Elderly waiver meeting expected on 12/9  -Awake, seems in good spirits.  Speech garbled at baseline.         Safe Disposition/[facility]FP Care:  -  Patient remains medically stable for discharge when location found.  SW/CM following and working on options.     -Patient had prolonged hospitalization due to need for disposition assistance in earlier 2024.  His daughter filed for guardianship paperwork which was completed in July.  Acquired MA application and long-term care placement.    -Ultimately will require 24/7 care and Memory care placement      Acute agitation, resolved  Behavior disturbances   Dementia with chronic aphasia:  Suspect triggered by changes in setting and recent move.  No physical complaints or concerns of infection.  Historically required Zyprexa over the summer when he was hospitalized for agitation.  Most recently had been prescribed Seroquel 12.5 mg twice daily as needed although he had not been needing most recently.  -Continue Seroquel PRN  - ms prior check  -SW consulted  -needs memory care     Actinic Keratosis:  -Had surgery for this and prescribed steroids cream and ketoconazole cream, restarted per daughter request previously.     Sinus Bradycardia:  -Patient was noted to have HR in 50s.  Asymptomatic.  -no telemetry monitoring needed      History of Constipation:  -laxatives PRN.  Had BM 11/4.               Diet: Combination Diet Regular Diet  Snacks/Supplements Pediatric: Ensure Enlive; Between Meals  Room Service    DVT Prophylaxis: Pneumatic Compression Devices  Maddox Catheter: Not present  Lines: None     Cardiac Monitoring: None  Code Status: No CPR- Do NOT Intubate      Clinically Significant Risk Factors Present on Admission                       # Dementia: noted on problem list                  Social Drivers of Health    Food Insecurity: Unknown (10/3/2024)    Food Insecurity     Within the past 12 months, did you worry that your food would run out before you got money to buy more?: Patient unable to answer     Within the past 12 months, did the food you bought just not last and you didn t have money to get more?:  Patient unable to answer   Housing Stability: Unknown (10/3/2024)    Housing Stability     Do you have housing? : Patient unable to answer     Are you worried about losing your housing?: Patient unable to answer   Tobacco Use: Medium Risk (6/3/2024)    Received from [facility]    Patient History     Smoking Tobacco Use: Former     Smokeless Tobacco Use: Never   Financial Resource Strain: Unknown (10/3/2024)    Financial Resource Strain     Within the past 12 months, have you or your family members you live with been unable to get utilities (heat, electricity) when it was really needed?: Patient unable to answer   Transportation Needs: Unknown (10/3/2024)    Transportation Needs     Within the past 12 months, has lack of transportation kept you from medical appointments, getting your medicines, non-medical meetings or appointments, work, or from getting things that you need?: Patient unable to answer   Interpersonal Safety: Unknown (10/4/2024)    Interpersonal Safety     Do you feel physically and emotionally safe where you currently live?: Patient declined     Within the past 12 months, have you been hit, slapped, kicked or otherwise physically hurt by someone?: Patient declined     Within the past 12 months, have you been humiliated or emotionally abused in other ways by your partner or ex-partner?: Patient declined    Received from [facility]    Social Connections          Disposition Plan     Medically Ready for Discharge: Ready Now             Anjali Leiva MD  Hospitalist Service  [facility]  Securely message with [employer] (more info)  Text page via [employer] Paging/Directory   ______________________________________________________________________    Interval History   Nursing notes reviewed.  Patient was seen at bedside.  Pleasantly confused, no complaints.  Offered me to eat breakfast with him.       Physical Exam   Vital Signs:  "Temp: 98.2  F (36.8  C) Temp src: Axillary BP: 132/75 Pulse: 53   Resp: 17 SpO2: 95 % O2 Device: None (Room air)    Weight: 157 lbs 6.4 oz    Gen:  NAD, awake, speech garbled.  Unable to articulate any concerns. Humming and singing. \"Thumbs up\"  Eyes:  PERRL, sclera anicteric.  OP:  MMM, no lesions.  Neck:  Supple.  CV:   Lung:  normal effort in RA  Abd:    Skin: No rash.  Ext:  No pitting edema LE b/l.      Medical Decision Making       10 MINUTES SPENT BY ME on the date of service doing chart review, history, exam, documentation & further activities per the note.      Data         Imaging results reviewed over the past 24 hrs:   No results found for this or any previous visit (from the past 24 hours).  "

## 2024-12-11 PROCEDURE — 101N000002 HC CUSTODIAL CARE DAILY

## 2024-12-11 ASSESSMENT — ACTIVITIES OF DAILY LIVING (ADL)
ADLS_ACUITY_SCORE: 65
ADLS_ACUITY_SCORE: 65
ADLS_ACUITY_SCORE: 62
ADLS_ACUITY_SCORE: 62
ADLS_ACUITY_SCORE: 65
ADLS_ACUITY_SCORE: 65
ADLS_ACUITY_SCORE: 62
ADLS_ACUITY_SCORE: 65
ADLS_ACUITY_SCORE: 65
ADLS_ACUITY_SCORE: 62
ADLS_ACUITY_SCORE: 65
ADLS_ACUITY_SCORE: 62
ADLS_ACUITY_SCORE: 62
ADLS_ACUITY_SCORE: 65
ADLS_ACUITY_SCORE: 62
ADLS_ACUITY_SCORE: 65
ADLS_ACUITY_SCORE: 62
ADLS_ACUITY_SCORE: 65

## 2024-12-11 NOTE — PLAN OF CARE
"Calm, confused. Orientated to self. Ambulated the hallway towards the end of the shift. Plan to discharge to long term placement at [facility] this Friday.           Goal Outcome Evaluation:           Overall Patient Progress: no changeOverall Patient Progress: no change    Outcome Evaluation: confused, calm. bed alarm on      Problem: Adult Inpatient Plan of Care  Goal: Plan of Care Review  Description: The Plan of Care Review/Shift note should be completed every shift.  The Outcome Evaluation is a brief statement about your assessment that the patient is improving, declining, or no change.  This information will be displayed automatically on your shift  note.  Outcome: Progressing  Flowsheets (Taken 12/11/2024 0306)  Outcome Evaluation: confused, calm. bed alarm on  Overall Patient Progress: no change  Goal: Patient-Specific Goal (Individualized)  Description: You can add care plan individualizations to a care plan. Examples of Individualization might be:  \"Parent requests to be called daily at 9am for status\", \"I have a hard time hearing out of my right ear\", or \"Do not touch me to wake me up as it startles  me\".  Outcome: Progressing  Goal: Absence of Hospital-Acquired Illness or Injury  Outcome: Progressing  Intervention: Identify and Manage Fall Risk  Recent Flowsheet Documentation  Taken 12/11/2024 0218 by Radha Ventura, RN  Safety Promotion/Fall Prevention: safety round/check completed  Taken 12/11/2024 0128 by Radha Ventura, RN  Safety Promotion/Fall Prevention: safety round/check completed  Taken 12/10/2024 2330 by Radha Ventura, RN  Safety Promotion/Fall Prevention: safety round/check completed  Goal: Optimal Comfort and Wellbeing  Outcome: Progressing  Goal: Readiness for Transition of Care  Outcome: Progressing     "

## 2024-12-11 NOTE — PLAN OF CARE
Patient ambulated in halls with staff. Calm. No concerns.     Goal Outcome Evaluation:      Plan of Care Reviewed With: patient    Overall Patient Progress: no changeOverall Patient Progress: no change    Outcome Evaluation: Confused, no changes, ambulated a few times. Bed alarm on.      Problem: Adult Inpatient Plan of Care  Goal: Plan of Care Review  Description: The Plan of Care Review/Shift note should be completed every shift.  The Outcome Evaluation is a brief statement about your assessment that the patient is improving, declining, or no change.  This information will be displayed automatically on your shift  note.  Recent Flowsheet Documentation  Taken 12/10/2024 2214 by Brandon Bear RN  Outcome Evaluation: Confused, no changes, ambulated a few times. Bed alarm on.  Plan of Care Reviewed With: patient  Overall Patient Progress: no change     Problem: Adult Inpatient Plan of Care  Goal: Plan of Care Review  Description: The Plan of Care Review/Shift note should be completed every shift.  The Outcome Evaluation is a brief statement about your assessment that the patient is improving, declining, or no change.  This information will be displayed automatically on your shift  note.  Recent Flowsheet Documentation  Taken 12/10/2024 2214 by Brandon Bear RN  Outcome Evaluation: Confused, no changes, ambulated a few times. Bed alarm on.  Plan of Care Reviewed With: patient  Overall Patient Progress: no change     Problem: Adult Inpatient Plan of Care  Goal: Plan of Care Review  Description: The Plan of Care Review/Shift note should be completed every shift.  The Outcome Evaluation is a brief statement about your assessment that the patient is improving, declining, or no change.  This information will be displayed automatically on your shift  note.  Recent Flowsheet Documentation  Taken 12/10/2024 2214 by Brandon Bear RN  Outcome Evaluation: Confused, no changes, ambulated a few times. Bed alarm on.  Plan  of Care Reviewed With: patient  Overall Patient Progress: no change     Problem: Adult Inpatient Plan of Care  Goal: Plan of Care Review  Description: The Plan of Care Review/Shift note should be completed every shift.  The Outcome Evaluation is a brief statement about your assessment that the patient is improving, declining, or no change.  This information will be displayed automatically on your shift  note.  Recent Flowsheet Documentation  Taken 12/10/2024 2214 by Brandon Bear RN  Outcome Evaluation: Confused, no changes, ambulated a few times. Bed alarm on.  Plan of Care Reviewed With: patient  Overall Patient Progress: no change     Problem: Adult Inpatient Plan of Care  Goal: Plan of Care Review  Description: The Plan of Care Review/Shift note should be completed every shift.  The Outcome Evaluation is a brief statement about your assessment that the patient is improving, declining, or no change.  This information will be displayed automatically on your shift  note.  Recent Flowsheet Documentation  Taken 12/10/2024 2214 by Brandon Bear RN  Outcome Evaluation: Confused, no changes, ambulated a few times. Bed alarm on.  Plan of Care Reviewed With: patient  Overall Patient Progress: no change     Problem: Adult Inpatient Plan of Care  Goal: Plan of Care Review  Description: The Plan of Care Review/Shift note should be completed every shift.  The Outcome Evaluation is a brief statement about your assessment that the patient is improving, declining, or no change.  This information will be displayed automatically on your shift  note.  Recent Flowsheet Documentation  Taken 12/10/2024 2214 by Brandon Bear RN  Outcome Evaluation: Confused, no changes, ambulated a few times. Bed alarm on.  Plan of Care Reviewed With: patient  Overall Patient Progress: no change     Problem: Adult Inpatient Plan of Care  Goal: Plan of Care Review  Description: The Plan of Care Review/Shift note should be completed every shift.   The Outcome Evaluation is a brief statement about your assessment that the patient is improving, declining, or no change.  This information will be displayed automatically on your shift  note.  Recent Flowsheet Documentation  Taken 12/10/2024 2214 by Brandon Bear RN  Outcome Evaluation: Confused, no changes, ambulated a few times. Bed alarm on.  Plan of Care Reviewed With: patient  Overall Patient Progress: no change     Problem: Adult Inpatient Plan of Care  Goal: Plan of Care Review  Description: The Plan of Care Review/Shift note should be completed every shift.  The Outcome Evaluation is a brief statement about your assessment that the patient is improving, declining, or no change.  This information will be displayed automatically on your shift  note.  Recent Flowsheet Documentation  Taken 12/10/2024 2214 by Brandon Bear RN  Outcome Evaluation: Confused, no changes, ambulated a few times. Bed alarm on.  Plan of Care Reviewed With: patient  Overall Patient Progress: no change     Problem: Adult Inpatient Plan of Care  Goal: Plan of Care Review  Description: The Plan of Care Review/Shift note should be completed every shift.  The Outcome Evaluation is a brief statement about your assessment that the patient is improving, declining, or no change.  This information will be displayed automatically on your shift  note.  Outcome: Progressing  Flowsheets (Taken 12/10/2024 2214)  Outcome Evaluation: Confused, no changes, ambulated a few times. Bed alarm on.  Plan of Care Reviewed With: patient  Overall Patient Progress: no change

## 2024-12-11 NOTE — PROGRESS NOTES
[facility]    Medicine Progress Note - Hospitalist Service    Date of Admission:  10/2/2024    Assessment & Plan     Summary of Stay: Jemal Gray is a 89 year old male with history of dementia with chronic aphasia. He was admitted on 10/2/2024 after he was brought to the ED by EMS from facility for evaluation of agitation. He had a prolonged hospitalization from 6/5/24 through 10/2/24 during which he was treated for cellulitis but mostly awaited long-term care placement due to dementia. He required appointment of guardianship and ultimately discharged to a long-term care facility on 10/2. Upon transfer to the long-term care facility he became agitated with swearing.  He was wandering around the long-term care facility, entering other patient's rooms and staff was unable to redirect him. He required transfer back to the ED and was readmitted.     In the ED he was stable, no major acute medical issue. He was given seroquel.  He required a bedside attendant as he was agitated and unable to be redirected.  He attempted to leave the emergency department and go into other patient's rooms. His facility was not able to take him back and there was no immediate safe disposition option.  He is here under prison care status.  Patient has improved and is no longer agitated. He remains medically stable and can be discharged whenever placement is found. Prior long term care facility is reportedly not taking patient back.  is looking into memory care units.     Patient has been here since 10/2/244 awaiting placement.  Elderly waiver appointment is pleated on 12/9.  [facility] has accepted the patient and patient's daughter/guardian Jose Eduardo has reserved a suite     -Patient remains prison care.  No change in management  -Await long-term placement, anticipating discharge to [facility] this Friday        Safe Disposition/care home Care:  - Patient remains medically stable for  discharge when location found.  SW/CM following and working on options.     -Patient had prolonged hospitalization due to need for disposition assistance in earlier 2024.  His daughter filed for guardianship paperwork which was completed in July.  Acquired MA application and long-term care placement.    -Ultimately will require 24/7 care and Memory care placement      Acute agitation, resolved  Behavior disturbances   Dementia with chronic aphasia:  Suspect triggered by changes in setting and recent move.  No physical complaints or concerns of infection.  Historically required Zyprexa over the summer when he was hospitalized for agitation.  Most recently had been prescribed Seroquel 12.5 mg twice daily as needed although he had not been needing most recently.  -Continue Seroquel PRN  - ms prior check  -SW consulted  -needs memory care     Actinic Keratosis:  -Had surgery for this and prescribed steroids cream and ketoconazole cream, restarted per daughter request previously.     Sinus Bradycardia:  -Patient was noted to have HR in 50s.  Asymptomatic.  -no telemetry monitoring needed      History of Constipation:  -laxatives PRN.  Had BM 11/4.               Diet: Combination Diet Regular Diet  Snacks/Supplements Pediatric: Ensure Enlive; Between Meals  Room Service    DVT Prophylaxis: Pneumatic Compression Devices  Maddox Catheter: Not present  Lines: None     Cardiac Monitoring: None  Code Status: No CPR- Do NOT Intubate      Clinically Significant Risk Factors Present on Admission                       # Dementia: noted on problem list                  Social Drivers of Health    Food Insecurity: Unknown (10/3/2024)    Food Insecurity     Within the past 12 months, did you worry that your food would run out before you got money to buy more?: Patient unable to answer     Within the past 12 months, did the food you bought just not last and you didn t have money to get more?: Patient unable to answer   Housing  Stability: Unknown (10/3/2024)    Housing Stability     Do you have housing? : Patient unable to answer     Are you worried about losing your housing?: Patient unable to answer   Tobacco Use: Medium Risk (6/3/2024)    Received from [facility]    Patient History     Smoking Tobacco Use: Former     Smokeless Tobacco Use: Never   Financial Resource Strain: Unknown (10/3/2024)    Financial Resource Strain     Within the past 12 months, have you or your family members you live with been unable to get utilities (heat, electricity) when it was really needed?: Patient unable to answer   Transportation Needs: Unknown (10/3/2024)    Transportation Needs     Within the past 12 months, has lack of transportation kept you from medical appointments, getting your medicines, non-medical meetings or appointments, work, or from getting things that you need?: Patient unable to answer   Interpersonal Safety: Unknown (10/4/2024)    Interpersonal Safety     Do you feel physically and emotionally safe where you currently live?: Patient declined     Within the past 12 months, have you been hit, slapped, kicked or otherwise physically hurt by someone?: Patient declined     Within the past 12 months, have you been humiliated or emotionally abused in other ways by your partner or ex-partner?: Patient declined    Received from [facility]    Social Connections          Disposition Plan     Medically Ready for Discharge: Ready Now             Anjali Leiva MD  Hospitalist Service  [facility]  Securely message with [employer] (more info)  Text page via [employer] Paging/Directory   ______________________________________________________________________    Interval History   Nursing notes reviewed.  Patient was seen at bedside.  Pleasantly confused, no complaints.        Physical Exam   Vital Signs:                    Weight: 157 lbs 6.4 oz    Gen:  NAD, awake, speech  "garbled.  Unable to articulate any concerns. Humming and singing. \"Thumbs up\"  Eyes:   sclera anicteric.  Neck:  Supple.  CV:   Lung:  normal effort in RA  Abd:    Skin: No rash.  Ext:  No pitting edema LE b/l.      Medical Decision Making       10 MINUTES SPENT BY ME on the date of service doing chart review, history, exam, documentation & further activities per the note.      Data         Imaging results reviewed over the past 24 hrs:   No results found for this or any previous visit (from the past 24 hours).  "

## 2024-12-12 ENCOUNTER — DOCUMENTATION ONLY (OUTPATIENT)
Dept: OTHER | Facility: CLINIC | Age: 89
End: 2024-12-12
Payer: COMMERCIAL

## 2024-12-12 PROCEDURE — 101N000002 HC CUSTODIAL CARE DAILY

## 2024-12-12 ASSESSMENT — ACTIVITIES OF DAILY LIVING (ADL)
ADLS_ACUITY_SCORE: 62

## 2024-12-12 NOTE — PLAN OF CARE
Outcome Evaluation:        Plan of Care Reviewed With: patient     Overall Patient Progress: no changeOverall Patient Progress: no change     Outcome Evaluation: No change in patient status.      Pertinent Assessments: Up SBA. Voiding via BR.   Major Shift Events: None  Treatment Plan: Placement pending.       Problem: Fall Injury Risk  Goal: Absence of Fall and Fall-Related Injury  12/12/2024 1124 by Ly Santos RN  Outcome: Progressing  12/12/2024 1119 by Ly Santos RN  Outcome: Progressing  12/12/2024 1115 by Ly Santos RN  Outcome: Progressing  12/12/2024 1115 by Ly Santos RN  Outcome: Progressing  12/12/2024 1114 by Ly Santos RN  Outcome: Progressing  Intervention: Identify and Manage Contributors  Recent Flowsheet Documentation  Taken 12/12/2024 0736 by Ly Santos RN  Medication Review/Management: medications reviewed  Intervention: Promote Injury-Free Environment  Recent Flowsheet Documentation  Taken 12/12/2024 0769 by Ly Santos RN  Safety Promotion/Fall Prevention:   activity supervised   nonskid shoes/slippers when out of bed   patient and family education   safety round/check completed

## 2024-12-12 NOTE — PROGRESS NOTES
[facility]    Medicine Progress Note - Hospitalist Service    Date of Admission:  10/2/2024    Assessment & Plan     Summary of Stay: Jemal Gray is a 89 year old male with history of dementia with chronic aphasia. He was admitted on 10/2/2024 after he was brought to the ED by EMS from facility for evaluation of agitation. He had a prolonged hospitalization from 6/5/24 through 10/2/24 during which he was treated for cellulitis but mostly awaited long-term care placement due to dementia. He required appointment of guardianship and ultimately discharged to a long-term care facility on 10/2. Upon transfer to the long-term care facility he became agitated with swearing.  He was wandering around the long-term care facility, entering other patient's rooms and staff was unable to redirect him. He required transfer back to the ED and was readmitted.     In the ED he was stable, no major acute medical issue. He was given seroquel.  He required a bedside attendant as he was agitated and unable to be redirected.  He attempted to leave the emergency department and go into other patient's rooms. His facility was not able to take him back and there was no immediate safe disposition option.  He is here under assisted care status.  Patient has improved and is no longer agitated. He remains medically stable and can be discharged whenever placement is found. Prior long term care facility is reportedly not taking patient back.  is looking into memory care units.     Patient has been here since 10/2/244 awaiting placement.  Elderly waiver appointment completed on 12/9.  [facility] has accepted the patient and patient's daughter/guardian Jose Eduardo has reserved a suite     -Patient remains assisted care.  No change in management  -Await long-term placement, anticipating discharge to [facility] this Friday        Safe Disposition/senior living Care:  - Patient remains medically stable for discharge  when location found.  SW/CM following and working on options.     -Patient had prolonged hospitalization due to need for disposition assistance in earlier 2024.  His daughter filed for guardianship paperwork which was completed in July.  Acquired MA application and long-term care placement.    -Ultimately will require 24/7 care and Memory care placement      Acute agitation, resolved  Behavior disturbances   Dementia with chronic aphasia:  Suspect triggered by changes in setting and recent move.  No physical complaints or concerns of infection.  Historically required Zyprexa over the summer when he was hospitalized for agitation.  Most recently had been prescribed Seroquel 12.5 mg twice daily as needed although he had not been needing most recently.  -Continue Seroquel PRN  - ms prior check  -SW consulted  -needs memory care     Actinic Keratosis:  -Had surgery for this and prescribed steroids cream and ketoconazole cream, restarted per daughter request previously.     Sinus Bradycardia:  -Patient was noted to have HR in 50s.  Asymptomatic.  -no telemetry monitoring needed      History of Constipation:  -laxatives PRN.  Had BM 11/4.               Diet: Combination Diet Regular Diet  Snacks/Supplements Pediatric: Ensure Enlive; Between Meals  Room Service    DVT Prophylaxis: Pneumatic Compression Devices  Maddox Catheter: Not present  Lines: None     Cardiac Monitoring: None  Code Status: No CPR- Do NOT Intubate      Clinically Significant Risk Factors Present on Admission                       # Dementia: noted on problem list                  Social Drivers of Health    Food Insecurity: Unknown (10/3/2024)    Food Insecurity     Within the past 12 months, did you worry that your food would run out before you got money to buy more?: Patient unable to answer     Within the past 12 months, did the food you bought just not last and you didn t have money to get more?: Patient unable to answer   Housing Stability:  Unknown (10/3/2024)    Housing Stability     Do you have housing? : Patient unable to answer     Are you worried about losing your housing?: Patient unable to answer   Tobacco Use: Medium Risk (6/3/2024)    Received from [facility]    Patient History     Smoking Tobacco Use: Former     Smokeless Tobacco Use: Never   Financial Resource Strain: Unknown (10/3/2024)    Financial Resource Strain     Within the past 12 months, have you or your family members you live with been unable to get utilities (heat, electricity) when it was really needed?: Patient unable to answer   Transportation Needs: Unknown (10/3/2024)    Transportation Needs     Within the past 12 months, has lack of transportation kept you from medical appointments, getting your medicines, non-medical meetings or appointments, work, or from getting things that you need?: Patient unable to answer   Interpersonal Safety: Unknown (10/4/2024)    Interpersonal Safety     Do you feel physically and emotionally safe where you currently live?: Patient declined     Within the past 12 months, have you been hit, slapped, kicked or otherwise physically hurt by someone?: Patient declined     Within the past 12 months, have you been humiliated or emotionally abused in other ways by your partner or ex-partner?: Patient declined    Received from [facility]    Social Connections          Disposition Plan     Medically Ready for Discharge: Ready Now             Anjali Leiva MD  Hospitalist Service  [facility]  Securely message with [employer] (more info)  Text page via [facility] Paging/Directory   ______________________________________________________________________    Interval History   Nursing notes reviewed.  Patient was seen at bedside.  Pleasantly confused, no complaints.        Physical Exam   Vital Signs:     BP: (!) 144/69 Pulse: 59   Resp: 20 SpO2: 97 % O2 Device: None (Room air)   "  Weight: 157 lbs 6.4 oz    Gen:  NAD, awake, speech garbled.  Unable to articulate any concerns. Humming and singing. \"Thumbs up\"  Eyes:   sclera anicteric.  Neck:  Supple.  CV:   Lung:  normal effort in RA  Abd:    Skin: No rash.  Ext:  No pitting edema LE b/l.      Medical Decision Making       10 MINUTES SPENT BY ME on the date of service doing chart review, history, exam, documentation & further activities per the note.      Data         Imaging results reviewed over the past 24 hrs:   No results found for this or any previous visit (from the past 24 hours).  "

## 2024-12-12 NOTE — PROGRESS NOTES
Care Management Follow Up    Expected Discharge Date: As soon as payment and placement are in place     Concerns to be Addressed: discharge planning  Patient plan of care discussed at interdisciplinary rounds: Yes    Anticipated Discharge Disposition:  custodial    Anticipated Discharge Services:  None  Anticipated Discharge DME:  [facility]    Patient/family educated on Medicare website which has current facility and service quality ratings:  yes  Education Provided on the Discharge Plan:  yes  Patient/Family in Agreement with the Plan:  yes    Referrals Placed by CM/SW:  custodial    Discussed  Partnership in Safe Discharge Planning  document with patient/family: No     Handoff Completed: No, handoff not indicated or clinically appropriate    Additional Information:  Richy received a vm from Laila Dinero, RN, PHN from  p: [phone] f: [phone], who completed the pt's EW assessment on Monday, 12/9.  Laila said that she was told the pt's MA was filed through [facility] instead of [facility].  She is looking further into this.  Once that is figured out, the EW can be finalized.  Richy left Laila a vm, requesting a call back with an update.    Next Steps:   Richy will continue with discharge planning and will be available as needed until discharge.  Richy will update the pt's sister/guardian Cheri and Bisi at [facility] once they have a response from Laila.    KARLA William, [facility]  Inpatient Care Coordination  [facility]  [phone]

## 2024-12-12 NOTE — PLAN OF CARE
Goal Outcome Evaluation:      Plan of Care Reviewed With: patient    Overall Patient Progress: no changeOverall Patient Progress: no change    Outcome Evaluation: Confused, no pain. PO intake good. Await discharge - see  note.      Problem: Adult Inpatient Plan of Care  Goal: Plan of Care Review  Description: The Plan of Care Review/Shift note should be completed every shift.  The Outcome Evaluation is a brief statement about your assessment that the patient is improving, declining, or no change.  This information will be displayed automatically on your shift  note.  Recent Flowsheet Documentation  Taken 12/11/2024 1817 by Kym Mobley, RN  Outcome Evaluation: Confused, no pain. PO intake good. Await discharge - see  note.  Plan of Care Reviewed With: patient  Overall Patient Progress: no change  Goal: Absence of Hospital-Acquired Illness or Injury  Intervention: Identify and Manage Fall Risk  Recent Flowsheet Documentation  Taken 12/11/2024 1400 by Kym Mobley, RN  Safety Promotion/Fall Prevention: safety round/check completed  Taken 12/11/2024 1209 by Kym Mobley, RN  Safety Promotion/Fall Prevention: safety round/check completed

## 2024-12-12 NOTE — PLAN OF CARE
Pt rested well overnight and ambulated the halls a few times. SBA with walker and regular diet. Should be discharging to  soon.    Goal Outcome Evaluation:       Plan of Care Reviewed With: patient     Overall Patient Progress: no changeOverall Patient Progress: no change     Outcome Evaluation: Rested well. No issues overnight    Problem: Adult Inpatient Plan of Care  Goal: Absence of Hospital-Acquired Illness or Injury  Intervention: Identify and Manage Fall Risk  Recent Flowsheet Documentation  Taken 12/11/2024 1949 by Severson, Amy C, RN  Safety Promotion/Fall Prevention:   activity supervised   increased rounding and observation   room door open   supervised activity   safety round/check completed  Intervention: Prevent and Manage VTE (Venous Thromboembolism) Risk  Recent Flowsheet Documentation  Taken 12/11/2024 1949 by Severson, Amy C, RN  VTE Prevention/Management: (ambulates frequently) SCDs off (sequential compression devices)  Intervention: Prevent Infection  Recent Flowsheet Documentation  Taken 12/11/2024 1949 by Severson, Amy C, RN  Infection Prevention:   environmental surveillance performed   single patient room provided

## 2024-12-13 PROCEDURE — 99207 PR NO BILLABLE SERVICE THIS VISIT: CPT | Performed by: INTERNAL MEDICINE

## 2024-12-13 PROCEDURE — 101N000002 HC CUSTODIAL CARE DAILY

## 2024-12-13 ASSESSMENT — ACTIVITIES OF DAILY LIVING (ADL)
ADLS_ACUITY_SCORE: 62
ADLS_ACUITY_SCORE: 65
ADLS_ACUITY_SCORE: 62
ADLS_ACUITY_SCORE: 65
ADLS_ACUITY_SCORE: 62
ADLS_ACUITY_SCORE: 62
ADLS_ACUITY_SCORE: 65
ADLS_ACUITY_SCORE: 62
ADLS_ACUITY_SCORE: 65
ADLS_ACUITY_SCORE: 65
ADLS_ACUITY_SCORE: 62

## 2024-12-13 NOTE — PROGRESS NOTES
Care Management Follow Up    Length of Stay (days): 0    Expected Discharge Date: 12/18/2024     Concerns to be Addressed: discharge planning  Patient plan of care discussed at interdisciplinary rounds: Yes    Anticipated Discharge Disposition:  long-term/MC      Anticipated Discharge Services:  None  Anticipated Discharge DME:  [facility]    Patient/family educated on Medicare website which has current facility and service quality ratings:  yes  Education Provided on the Discharge Plan:  yes  Patient/Family in Agreement with the Plan:  yes    Referrals Placed by CM/SW:  post acute facilities    Discussed  Partnership in Safe Discharge Planning  document with patient/family: No     Handoff Completed: No, handoff not indicated or clinically appropriate    Additional Information:  Richy spoke with Laila at [facility], p: [phone] f: [phone], who said that the pt's EW was approved.  Upon admission to [facility], the pt will be on EW.  The pt's MA nyla was retrieved from [facility] by the pt's [facility] financial worker, Rukhsana, and switched to [facility] MA.  They are aware that the pt will be moving to [place] which is [facility].  Richy left a message with Bisi at [facility], [phone], to see when they are able to accommodate the pt's move in date.    Next Steps:   Richy will continue with discharge planning and will be available as needed until discharge.    KARLA William, [facility]  Inpatient Care Coordination  [facility]  [phone]    ADDENDUM:  Richy received a vm from Bisi at [facility].  She is hoping that the pt can admit to the facility on Wednesday, 12/18/24.  She is reaching out to the pt's dtr Cheri to make arrangements with her.  Bisi said that they are working on the pt's room now.

## 2024-12-13 NOTE — PLAN OF CARE
"Pt confused, VSS, no signs of pain.  Some agitation but able to be redirected.  Pending placement.        Goal Outcome Evaluation:      Plan of Care Reviewed With: patient    Overall Patient Progress: no changeOverall Patient Progress: no change    Outcome Evaluation: VSS, no signs of pain.  awaiting d/c.      Problem: Adult Inpatient Plan of Care  Goal: Plan of Care Review  Description: The Plan of Care Review/Shift note should be completed every shift.  The Outcome Evaluation is a brief statement about your assessment that the patient is improving, declining, or no change.  This information will be displayed automatically on your shift  note.  Outcome: Not Progressing  Flowsheets (Taken 12/12/2024 1912)  Outcome Evaluation: VSS, no signs of pain.  awaiting d/c.  Plan of Care Reviewed With: patient  Overall Patient Progress: no change  Goal: Patient-Specific Goal (Individualized)  Description: You can add care plan individualizations to a care plan. Examples of Individualization might be:  \"Parent requests to be called daily at 9am for status\", \"I have a hard time hearing out of my right ear\", or \"Do not touch me to wake me up as it startles  me\".  Outcome: Not Progressing  Goal: Absence of Hospital-Acquired Illness or Injury  Outcome: Not Progressing  Intervention: Identify and Manage Fall Risk  Recent Flowsheet Documentation  Taken 12/12/2024 1656 by Lisa Mason, RN  Safety Promotion/Fall Prevention:   nonskid shoes/slippers when out of bed   activity supervised   clutter free environment maintained   increased rounding and observation   lighting adjusted   room door open   room near nurse's station   safety round/check completed   supervised activity  Goal: Optimal Comfort and Wellbeing  Outcome: Not Progressing  Goal: Readiness for Transition of Care  Outcome: Not Progressing     Problem: Fall Injury Risk  Goal: Absence of Fall and Fall-Related Injury  Intervention: Promote Injury-Free " Environment  Recent Flowsheet Documentation  Taken 12/12/2024 1656 by Lisa Mason RN  Safety Promotion/Fall Prevention:   nonskid shoes/slippers when out of bed   activity supervised   clutter free environment maintained   increased rounding and observation   lighting adjusted   room door open   room near nurse's station   safety round/check completed   supervised activity     Problem: Adult Inpatient Plan of Care  Goal: Plan of Care Review  Description: The Plan of Care Review/Shift note should be completed every shift.  The Outcome Evaluation is a brief statement about your assessment that the patient is improving, declining, or no change.  This information will be displayed automatically on your shift  note.  Recent Flowsheet Documentation  Taken 12/12/2024 1912 by Lisa Mason RN  Outcome Evaluation: VSS, no signs of pain.  awaiting d/c.  Plan of Care Reviewed With: patient  Overall Patient Progress: no change  Goal: Absence of Hospital-Acquired Illness or Injury  Intervention: Identify and Manage Fall Risk  Recent Flowsheet Documentation  Taken 12/12/2024 1656 by Lisa Mason RN  Safety Promotion/Fall Prevention:   nonskid shoes/slippers when out of bed   activity supervised   clutter free environment maintained   increased rounding and observation   lighting adjusted   room door open   room near nurse's station   safety round/check completed   supervised activity     Problem: Adult Inpatient Plan of Care  Goal: Plan of Care Review  Description: The Plan of Care Review/Shift note should be completed every shift.  The Outcome Evaluation is a brief statement about your assessment that the patient is improving, declining, or no change.  This information will be displayed automatically on your shift  note.  Recent Flowsheet Documentation  Taken 12/12/2024 1912 by Lisa Mason RN  Outcome Evaluation: VSS, no signs of pain.  awaiting d/c.  Plan of Care Reviewed  With: patient  Overall Patient Progress: no change  Goal: Absence of Hospital-Acquired Illness or Injury  Intervention: Identify and Manage Fall Risk  Recent Flowsheet Documentation  Taken 12/12/2024 1656 by Lisa Mason RN  Safety Promotion/Fall Prevention:   nonskid shoes/slippers when out of bed   activity supervised   clutter free environment maintained   increased rounding and observation   lighting adjusted   room door open   room near nurse's station   safety round/check completed   supervised activity     Problem: Fall Injury Risk  Goal: Absence of Fall and Fall-Related Injury  Intervention: Promote Injury-Free Environment  Recent Flowsheet Documentation  Taken 12/12/2024 1656 by Lisa Mason RN  Safety Promotion/Fall Prevention:   nonskid shoes/slippers when out of bed   activity supervised   clutter free environment maintained   increased rounding and observation   lighting adjusted   room door open   room near nurse's station   safety round/check completed   supervised activity     Problem: Fall Injury Risk  Goal: Absence of Fall and Fall-Related Injury  Intervention: Promote Injury-Free Environment  Recent Flowsheet Documentation  Taken 12/12/2024 1656 by Lisa Mason RN  Safety Promotion/Fall Prevention:   nonskid shoes/slippers when out of bed   activity supervised   clutter free environment maintained   increased rounding and observation   lighting adjusted   room door open   room near nurse's station   safety round/check completed   supervised activity     Problem: Adult Inpatient Plan of Care  Goal: Plan of Care Review  Description: The Plan of Care Review/Shift note should be completed every shift.  The Outcome Evaluation is a brief statement about your assessment that the patient is improving, declining, or no change.  This information will be displayed automatically on your shift  note.  Recent Flowsheet Documentation  Taken 12/12/2024 1912 by  iLsa Mason RN  Outcome Evaluation: VSS, no signs of pain.  awaiting d/c.  Plan of Care Reviewed With: patient  Overall Patient Progress: no change  Goal: Absence of Hospital-Acquired Illness or Injury  Intervention: Identify and Manage Fall Risk  Recent Flowsheet Documentation  Taken 12/12/2024 1656 by Lisa Mason RN  Safety Promotion/Fall Prevention:   nonskid shoes/slippers when out of bed   activity supervised   clutter free environment maintained   increased rounding and observation   lighting adjusted   room door open   room near nurse's station   safety round/check completed   supervised activity     Problem: Adult Inpatient Plan of Care  Goal: Plan of Care Review  Description: The Plan of Care Review/Shift note should be completed every shift.  The Outcome Evaluation is a brief statement about your assessment that the patient is improving, declining, or no change.  This information will be displayed automatically on your shift  note.  Recent Flowsheet Documentation  Taken 12/12/2024 1912 by Lisa Mason RN  Outcome Evaluation: VSS, no signs of pain.  awaiting d/c.  Plan of Care Reviewed With: patient  Overall Patient Progress: no change  Goal: Absence of Hospital-Acquired Illness or Injury  Intervention: Identify and Manage Fall Risk  Recent Flowsheet Documentation  Taken 12/12/2024 1656 by Lisa Mason RN  Safety Promotion/Fall Prevention:   nonskid shoes/slippers when out of bed   activity supervised   clutter free environment maintained   increased rounding and observation   lighting adjusted   room door open   room near nurse's station   safety round/check completed   supervised activity     Problem: Fall Injury Risk  Goal: Absence of Fall and Fall-Related Injury  Intervention: Promote Injury-Free Environment  Recent Flowsheet Documentation  Taken 12/12/2024 1656 by Lisa Mason RN  Safety Promotion/Fall Prevention:   nonskid  shoes/slippers when out of bed   activity supervised   clutter free environment maintained   increased rounding and observation   lighting adjusted   room door open   room near nurse's station   safety round/check completed   supervised activity     Problem: Adult Inpatient Plan of Care  Goal: Plan of Care Review  Description: The Plan of Care Review/Shift note should be completed every shift.  The Outcome Evaluation is a brief statement about your assessment that the patient is improving, declining, or no change.  This information will be displayed automatically on your shift  note.  Recent Flowsheet Documentation  Taken 12/12/2024 1912 by Lisa Mason RN  Outcome Evaluation: VSS, no signs of pain.  awaiting d/c.  Plan of Care Reviewed With: patient  Overall Patient Progress: no change  Goal: Absence of Hospital-Acquired Illness or Injury  Intervention: Identify and Manage Fall Risk  Recent Flowsheet Documentation  Taken 12/12/2024 1656 by Lisa Mason RN  Safety Promotion/Fall Prevention:   nonskid shoes/slippers when out of bed   activity supervised   clutter free environment maintained   increased rounding and observation   lighting adjusted   room door open   room near nurse's station   safety round/check completed   supervised activity     Problem: Adult Inpatient Plan of Care  Goal: Plan of Care Review  Description: The Plan of Care Review/Shift note should be completed every shift.  The Outcome Evaluation is a brief statement about your assessment that the patient is improving, declining, or no change.  This information will be displayed automatically on your shift  note.  Recent Flowsheet Documentation  Taken 12/12/2024 1912 by Lisa Mason RN  Outcome Evaluation: VSS, no signs of pain.  awaiting d/c.  Plan of Care Reviewed With: patient  Overall Patient Progress: no change  Goal: Absence of Hospital-Acquired Illness or Injury  Intervention: Identify and Manage Fall  Risk  Recent Flowsheet Documentation  Taken 12/12/2024 1656 by Lisa Mason RN  Safety Promotion/Fall Prevention:   nonskid shoes/slippers when out of bed   activity supervised   clutter free environment maintained   increased rounding and observation   lighting adjusted   room door open   room near nurse's station   safety round/check completed   supervised activity     Problem: Fall Injury Risk  Goal: Absence of Fall and Fall-Related Injury  Intervention: Promote Injury-Free Environment  Recent Flowsheet Documentation  Taken 12/12/2024 1656 by Lisa Mason RN  Safety Promotion/Fall Prevention:   nonskid shoes/slippers when out of bed   activity supervised   clutter free environment maintained   increased rounding and observation   lighting adjusted   room door open   room near nurse's station   safety round/check completed   supervised activity     Problem: Adult Inpatient Plan of Care  Goal: Plan of Care Review  Description: The Plan of Care Review/Shift note should be completed every shift.  The Outcome Evaluation is a brief statement about your assessment that the patient is improving, declining, or no change.  This information will be displayed automatically on your shift  note.  Recent Flowsheet Documentation  Taken 12/12/2024 1912 by Lisa Mason RN  Outcome Evaluation: VSS, no signs of pain.  awaiting d/c.  Plan of Care Reviewed With: patient  Overall Patient Progress: no change  Goal: Absence of Hospital-Acquired Illness or Injury  Intervention: Identify and Manage Fall Risk  Recent Flowsheet Documentation  Taken 12/12/2024 1656 by Lisa Mason RN  Safety Promotion/Fall Prevention:   nonskid shoes/slippers when out of bed   activity supervised   clutter free environment maintained   increased rounding and observation   lighting adjusted   room door open   room near nurse's station   safety round/check completed   supervised activity     Problem: Adult  Inpatient Plan of Care  Goal: Plan of Care Review  Description: The Plan of Care Review/Shift note should be completed every shift.  The Outcome Evaluation is a brief statement about your assessment that the patient is improving, declining, or no change.  This information will be displayed automatically on your shift  note.  Recent Flowsheet Documentation  Taken 12/12/2024 1912 by Lisa Mason, RN  Outcome Evaluation: VSS, no signs of pain.  awaiting d/c.  Plan of Care Reviewed With: patient  Overall Patient Progress: no change  Goal: Absence of Hospital-Acquired Illness or Injury  Intervention: Identify and Manage Fall Risk  Recent Flowsheet Documentation  Taken 12/12/2024 1656 by Lisa Mason, RN  Safety Promotion/Fall Prevention:   nonskid shoes/slippers when out of bed   activity supervised   clutter free environment maintained   increased rounding and observation   lighting adjusted   room door open   room near nurse's station   safety round/check completed   supervised activity

## 2024-12-13 NOTE — PROGRESS NOTES
[facility]    Medicine Progress Note - Hospitalist Service    Date of Admission:  10/2/2024    Assessment & Plan     Summary of Stay: Jemal Gray is a 89 year old male with history of dementia with chronic aphasia. He was admitted on 10/2/2024 after he was brought to the ED by EMS from facility for evaluation of agitation. He had a prolonged hospitalization from 6/5/24 through 10/2/24 during which he was treated for cellulitis but mostly awaited long-term care placement due to dementia. He required appointment of guardianship and ultimately discharged to a long-term care facility on 10/2. Upon transfer to the long-term care facility he became agitated with swearing.  He was wandering around the long-term care facility, entering other patient's rooms and staff was unable to redirect him. He required transfer back to the ED and was readmitted.     In the ED he was stable, no major acute medical issue. He was given seroquel.  He required a bedside attendant as he was agitated and unable to be redirected.  He attempted to leave the emergency department and go into other patient's rooms. His facility was not able to take him back and there was no immediate safe disposition option.  He is here under [facility] care status.  Patient has improved and is no longer agitated. He remains medically stable and can be discharged whenever placement is found. Prior long term care facility is reportedly not taking patient back.  is looking into memory care units.     Patient has been here since 10/2/244 awaiting placement.  Elderly waiver appointment completed on 12/9.  [facility] accepted the patient and patient's daughter/guardian Jose Eduardo has reserved a suite     -Patient remains [facility] care.  No change in management  -Await long-term placement, anticipating discharge to [facility]        Safe Disposition/assisted Care:  - Patient remains medically stable for discharge when  location found.  SW/CM following and working on options.     -Patient had prolonged hospitalization due to need for disposition assistance in earlier 2024.  His daughter filed for guardianship paperwork which was completed in July.  Acquired MA application and long-term care placement.    -Ultimately will require 24/7 care and Memory care placement      Acute agitation, resolved  Behavior disturbances   Dementia with chronic aphasia:  Suspect triggered by changes in setting and recent move.  No physical complaints or concerns of infection.  Historically required Zyprexa over the summer when he was hospitalized for agitation.  Most recently had been prescribed Seroquel 12.5 mg twice daily as needed although he had not been needing most recently.  -Continue Seroquel PRN  - ms prior check  -SW consulted  -needs memory care     Actinic Keratosis:  -Had surgery for this and prescribed steroids cream and ketoconazole cream, restarted per daughter request previously.     Sinus Bradycardia:  -Patient was noted to have HR in 50s.  Asymptomatic.  -no telemetry monitoring needed      History of Constipation:  -laxatives PRN.  Had BM 11/4.               Diet: Combination Diet Regular Diet  Snacks/Supplements Pediatric: Ensure Enlive; Between Meals  Room Service    DVT Prophylaxis: Pneumatic Compression Devices  Maddox Catheter: Not present  Lines: None     Cardiac Monitoring: None  Code Status: No CPR- Do NOT Intubate      Clinically Significant Risk Factors Present on Admission                       # Dementia: noted on problem list                  Social Drivers of Health    Food Insecurity: Unknown (10/3/2024)    Food Insecurity     Within the past 12 months, did you worry that your food would run out before you got money to buy more?: Patient unable to answer     Within the past 12 months, did the food you bought just not last and you didn t have money to get more?: Patient unable to answer   Housing Stability:  Unknown (10/3/2024)    Housing Stability     Do you have housing? : Patient unable to answer     Are you worried about losing your housing?: Patient unable to answer   Tobacco Use: Medium Risk (6/3/2024)    Received from [facility]    Patient History     Smoking Tobacco Use: Former     Smokeless Tobacco Use: Never   Financial Resource Strain: Unknown (10/3/2024)    Financial Resource Strain     Within the past 12 months, have you or your family members you live with been unable to get utilities (heat, electricity) when it was really needed?: Patient unable to answer   Transportation Needs: Unknown (10/3/2024)    Transportation Needs     Within the past 12 months, has lack of transportation kept you from medical appointments, getting your medicines, non-medical meetings or appointments, work, or from getting things that you need?: Patient unable to answer   Interpersonal Safety: Unknown (10/4/2024)    Interpersonal Safety     Do you feel physically and emotionally safe where you currently live?: Patient declined     Within the past 12 months, have you been hit, slapped, kicked or otherwise physically hurt by someone?: Patient declined     Within the past 12 months, have you been humiliated or emotionally abused in other ways by your partner or ex-partner?: Patient declined    Received from [facility]    Social Connections          Disposition Plan     Medically Ready for Discharge: Ready Now             Merlyn Rodriguez MD, MD  Hospitalist Service  [facility]  Securely message with [employer] (more info)  Text page via [employer] Paging/Directory   ______________________________________________________________________    Interval History   Nursing notes reviewed.  Patient was seen at bedside.  Pleasantly confused, no complaints. Comfortably lying in bed      Physical Exam   Vital Signs:     BP: 137/61 Pulse: 57   Resp: 20 SpO2:  "97 % O2 Device: None (Room air)    Weight: 157 lbs 6.4 oz    Gen:  NAD, awake, speech garbled.  Unable to articulate any concerns. Humming and singing. \"Thumbs up\"  Eyes:   sclera anicteric.  Neck:  Supple.  CV:   Lung:  normal effort in RA  Abd:    Skin: No rash.  Ext:  No pitting edema LE b/l.      Medical Decision Making       10 MINUTES SPENT BY ME on the date of service doing chart review, history, exam, documentation & further activities per the note.      Data         Imaging results reviewed over the past 24 hrs:   No results found for this or any previous visit (from the past 24 hours).  "

## 2024-12-13 NOTE — PLAN OF CARE
"  Problem: Adult Inpatient Plan of Care  Goal: Plan of Care Review  Description: The Plan of Care Review/Shift note should be completed every shift.  The Outcome Evaluation is a brief statement about your assessment that the patient is improving, declining, or no change.  This information will be displayed automatically on your shift  note.  Outcome: Not Progressing  Flowsheets (Taken 12/13/2024 0512)  Outcome Evaluation: awaiting placeemnt. up with assist of 1 with walker/gait belt.  Plan of Care Reviewed With: patient  Overall Patient Progress: no change  Goal: Patient-Specific Goal (Individualized)  Description: You can add care plan individualizations to a care plan. Examples of Individualization might be:  \"Parent requests to be called daily at 9am for status\", \"I have a hard time hearing out of my right ear\", or \"Do not touch me to wake me up as it startles  me\".  Outcome: Not Progressing  Goal: Absence of Hospital-Acquired Illness or Injury  Outcome: Not Progressing  Intervention: Prevent Skin Injury  Recent Flowsheet Documentation  Taken 12/13/2024 0333 by Laurence Trevizo RN  Body Position: position changed independently  Taken 12/13/2024 0000 by Laurence Trevizo, RN  Body Position: position changed independently  Goal: Optimal Comfort and Wellbeing  Outcome: Not Progressing  Goal: Readiness for Transition of Care  Outcome: Not Progressing     Problem: Fall Injury Risk  Goal: Absence of Fall and Fall-Related Injury  Outcome: Not Progressing   Goal Outcome Evaluation:      Plan of Care Reviewed With: patient    Overall Patient Progress: no changeOverall Patient Progress: no change    Outcome Evaluation: awaiting placeemnt. up with assist of 1 with walker/gait belt.      "

## 2024-12-14 PROCEDURE — 101N000002 HC CUSTODIAL CARE DAILY

## 2024-12-14 PROCEDURE — 99207 PR NO BILLABLE SERVICE THIS VISIT: CPT | Performed by: INTERNAL MEDICINE

## 2024-12-14 ASSESSMENT — ACTIVITIES OF DAILY LIVING (ADL)
ADLS_ACUITY_SCORE: 65

## 2024-12-14 NOTE — PLAN OF CARE
"Shift Note [id number]:   Activity: Assist x1 GB walker if Pt allows.   Respiratory: on RA.   Other: No IV access. Walked in hallway.  Plan of Care: Placement confirmed with AL. Tentative discharge day planned for 12/18/24.     Goal Outcome Evaluation:      Plan of Care Reviewed With: patient    Overall Patient Progress: no changeOverall Patient Progress: no change    Outcome Evaluation: Denies pain. On RA. Awaiting placement to facility.      Problem: Adult Inpatient Plan of Care  Goal: Plan of Care Review  Description: The Plan of Care Review/Shift note should be completed every shift.  The Outcome Evaluation is a brief statement about your assessment that the patient is improving, declining, or no change.  This information will be displayed automatically on your shift  note.  Outcome: Not Progressing  Flowsheets (Taken 12/14/2024 0207)  Outcome Evaluation: Denies pain. On RA. Awaiting placement to facility.  Plan of Care Reviewed With: patient  Overall Patient Progress: no change  Goal: Patient-Specific Goal (Individualized)  Description: You can add care plan individualizations to a care plan. Examples of Individualization might be:  \"Parent requests to be called daily at 9am for status\", \"I have a hard time hearing out of my right ear\", or \"Do not touch me to wake me up as it startles  me\".  Outcome: Not Progressing  Goal: Absence of Hospital-Acquired Illness or Injury  Outcome: Not Progressing  Goal: Optimal Comfort and Wellbeing  Outcome: Not Progressing  Goal: Readiness for Transition of Care  Outcome: Not Progressing     Problem: Fall Injury Risk  Goal: Absence of Fall and Fall-Related Injury  Outcome: Not Progressing         "

## 2024-12-14 NOTE — PLAN OF CARE
"End of shift note: Alert, disoriented to time, place, situation. Pt up 1 assist w/ GB and walker. No IV access. Walked in hallway. Waiting for placement.    Goal Outcome Evaluation:      Plan of Care Reviewed With: patient    Overall Patient Progress: no changeOverall Patient Progress: no change    Outcome Evaluation: Denies pain. VSS, afebrile, sating well on RA.      Problem: Adult Inpatient Plan of Care  Goal: Plan of Care Review  Description: The Plan of Care Review/Shift note should be completed every shift.  The Outcome Evaluation is a brief statement about your assessment that the patient is improving, declining, or no change.  This information will be displayed automatically on your shift  note.  Outcome: Progressing  Flowsheets (Taken 12/13/2024 1822)  Outcome Evaluation: Denies pain. VSS, afebrile, sating well on RA.  Plan of Care Reviewed With: patient  Overall Patient Progress: no change  Goal: Patient-Specific Goal (Individualized)  Description: You can add care plan individualizations to a care plan. Examples of Individualization might be:  \"Parent requests to be called daily at 9am for status\", \"I have a hard time hearing out of my right ear\", or \"Do not touch me to wake me up as it startles  me\".  Outcome: Progressing  Goal: Absence of Hospital-Acquired Illness or Injury  Outcome: Progressing  Intervention: Identify and Manage Fall Risk  Recent Flowsheet Documentation  Taken 12/13/2024 1804 by Donna Mauricio, RN  Safety Promotion/Fall Prevention: safety round/check completed  Taken 12/13/2024 1726 by Donna Mauricio, RN  Safety Promotion/Fall Prevention: safety round/check completed  Taken 12/13/2024 1515 by Donna Mauricio, RN  Safety Promotion/Fall Prevention: safety round/check completed  Taken 12/13/2024 1417 by Donna Mauricio, RN  Safety Promotion/Fall Prevention: safety round/check completed  Taken 12/13/2024 1306 by Donna Mauricio, RN  Safety Promotion/Fall Prevention: safety " round/check completed  Taken 12/13/2024 1201 by Donna Mauricio RN  Safety Promotion/Fall Prevention: safety round/check completed  Taken 12/13/2024 1132 by Donna Mauricio RN  Safety Promotion/Fall Prevention: safety round/check completed  Taken 12/13/2024 1100 by Donna Mauricio RN  Safety Promotion/Fall Prevention: safety round/check completed  Taken 12/13/2024 1000 by Donna Mauricio RN  Safety Promotion/Fall Prevention: safety round/check completed  Taken 12/13/2024 0852 by Donna Mauricio RN  Safety Promotion/Fall Prevention: safety round/check completed  Taken 12/13/2024 0727 by Donna Mauricio RN  Safety Promotion/Fall Prevention: safety round/check completed  Intervention: Prevent Skin Injury  Recent Flowsheet Documentation  Taken 12/13/2024 0852 by Donna Mauricio RN  Body Position: position changed independently  Intervention: Prevent and Manage VTE (Venous Thromboembolism) Risk  Recent Flowsheet Documentation  Taken 12/13/2024 0852 by Donna Mauricio RN  VTE Prevention/Management: (Ambulates frequently) SCDs off (sequential compression devices)  Intervention: Prevent Infection  Recent Flowsheet Documentation  Taken 12/13/2024 0852 by Donna Mauricio RN  Infection Prevention:   hand hygiene promoted   rest/sleep promoted  Goal: Optimal Comfort and Wellbeing  Outcome: Progressing  Goal: Readiness for Transition of Care  Outcome: Progressing     Problem: Fall Injury Risk  Goal: Absence of Fall and Fall-Related Injury  Outcome: Progressing  Intervention: Identify and Manage Contributors  Recent Flowsheet Documentation  Taken 12/13/2024 0852 by Donna Mauricio RN  Medication Review/Management: medications reviewed  Intervention: Promote Injury-Free Environment  Recent Flowsheet Documentation  Taken 12/13/2024 1804 by Donna Mauricio RN  Safety Promotion/Fall Prevention: safety round/check completed  Taken 12/13/2024 1726 by Donna Mauricio RN  Safety Promotion/Fall Prevention: safety  round/check completed  Taken 12/13/2024 1515 by Donna Mauricio RN  Safety Promotion/Fall Prevention: safety round/check completed  Taken 12/13/2024 1417 by Donna Mauricio RN  Safety Promotion/Fall Prevention: safety round/check completed  Taken 12/13/2024 1306 by Donna Mauricio RN  Safety Promotion/Fall Prevention: safety round/check completed  Taken 12/13/2024 1201 by Donna Mauricio RN  Safety Promotion/Fall Prevention: safety round/check completed  Taken 12/13/2024 1132 by Donna Mauricio RN  Safety Promotion/Fall Prevention: safety round/check completed  Taken 12/13/2024 1100 by Donna Mauricio RN  Safety Promotion/Fall Prevention: safety round/check completed  Taken 12/13/2024 1000 by Donna Mauricio RN  Safety Promotion/Fall Prevention: safety round/check completed  Taken 12/13/2024 0852 by Donna Mauricio RN  Safety Promotion/Fall Prevention: safety round/check completed  Taken 12/13/2024 0727 by Donna Mauricio RN  Safety Promotion/Fall Prevention: safety round/check completed

## 2024-12-15 PROCEDURE — 99207 PR NO BILLABLE SERVICE THIS VISIT: CPT | Performed by: INTERNAL MEDICINE

## 2024-12-15 PROCEDURE — 101N000002 HC CUSTODIAL CARE DAILY

## 2024-12-15 ASSESSMENT — ACTIVITIES OF DAILY LIVING (ADL)
ADLS_ACUITY_SCORE: 65
ADLS_ACUITY_SCORE: 62
ADLS_ACUITY_SCORE: 65
ADLS_ACUITY_SCORE: 62
ADLS_ACUITY_SCORE: 65

## 2024-12-15 NOTE — PLAN OF CARE
"End of shift note: Alert, disoriented to time, place, situation. Pt up 1 assist w/ walker, refused GB. No IV access. Walked in hallway. Waiting for placement.    Goal Outcome Evaluation:      Plan of Care Reviewed With: patient, child    Overall Patient Progress: no changeOverall Patient Progress: no change    Outcome Evaluation: Denies pain. VSS, sating well on RA. Refused to have temp taken.      Problem: Adult Inpatient Plan of Care  Goal: Plan of Care Review  Description: The Plan of Care Review/Shift note should be completed every shift.  The Outcome Evaluation is a brief statement about your assessment that the patient is improving, declining, or no change.  This information will be displayed automatically on your shift  note.  Outcome: Progressing  Flowsheets (Taken 12/14/2024 1812)  Outcome Evaluation: Denies pain. VSS, sating well on RA. Refused to have temp taken.  Plan of Care Reviewed With:   patient   child  Overall Patient Progress: no change  Goal: Patient-Specific Goal (Individualized)  Description: You can add care plan individualizations to a care plan. Examples of Individualization might be:  \"Parent requests to be called daily at 9am for status\", \"I have a hard time hearing out of my right ear\", or \"Do not touch me to wake me up as it startles  me\".  Outcome: Progressing  Goal: Absence of Hospital-Acquired Illness or Injury  Outcome: Progressing  Intervention: Identify and Manage Fall Risk  Recent Flowsheet Documentation  Taken 12/14/2024 1812 by Donna Mauricio, RN  Safety Promotion/Fall Prevention: safety round/check completed  Taken 12/14/2024 1619 by Donna Mauricio, RN  Safety Promotion/Fall Prevention: safety round/check completed  Taken 12/14/2024 1530 by Donna Mauricio, RN  Safety Promotion/Fall Prevention: safety round/check completed  Taken 12/14/2024 1458 by Donna Mauricio, RN  Safety Promotion/Fall Prevention: safety round/check completed  Taken 12/14/2024 1332 by Hang, " BALDOMERO Thompson  Safety Promotion/Fall Prevention: safety round/check completed  Taken 12/14/2024 1306 by Donna Mauricio RN  Safety Promotion/Fall Prevention: safety round/check completed  Taken 12/14/2024 1205 by Donna Mauricio RN  Safety Promotion/Fall Prevention: safety round/check completed  Taken 12/14/2024 1120 by Donna Mauricio RN  Safety Promotion/Fall Prevention: safety round/check completed  Taken 12/14/2024 1020 by Donna Mauricio RN  Safety Promotion/Fall Prevention: safety round/check completed  Taken 12/14/2024 0910 by Donna Mauricio RN  Safety Promotion/Fall Prevention: safety round/check completed  Taken 12/14/2024 0758 by Donna Mauricio RN  Safety Promotion/Fall Prevention: safety round/check completed  Taken 12/14/2024 0715 by Donna Mauricio RN  Safety Promotion/Fall Prevention: safety round/check completed  Intervention: Prevent Skin Injury  Recent Flowsheet Documentation  Taken 12/14/2024 0758 by Donna Mauricio RN  Body Position: weight shifting  Intervention: Prevent Infection  Recent Flowsheet Documentation  Taken 12/14/2024 0758 by Donna Mauricio RN  Infection Prevention:   hand hygiene promoted   rest/sleep promoted  Goal: Optimal Comfort and Wellbeing  Outcome: Progressing  Goal: Readiness for Transition of Care  Outcome: Progressing     Problem: Fall Injury Risk  Goal: Absence of Fall and Fall-Related Injury  Outcome: Progressing  Intervention: Identify and Manage Contributors  Recent Flowsheet Documentation  Taken 12/14/2024 0758 by Donna Mauricio RN  Medication Review/Management: medications reviewed  Intervention: Promote Injury-Free Environment  Recent Flowsheet Documentation  Taken 12/14/2024 1812 by Donna Mauricio RN  Safety Promotion/Fall Prevention: safety round/check completed  Taken 12/14/2024 1619 by Donna Mauricio RN  Safety Promotion/Fall Prevention: safety round/check completed  Taken 12/14/2024 1530 by Donna Mauricio RN  Safety Promotion/Fall  Prevention: safety round/check completed  Taken 12/14/2024 1458 by Donna Mauricio, RN  Safety Promotion/Fall Prevention: safety round/check completed  Taken 12/14/2024 1332 by Donna Mauricio RN  Safety Promotion/Fall Prevention: safety round/check completed  Taken 12/14/2024 1306 by Donna Mauricio RN  Safety Promotion/Fall Prevention: safety round/check completed  Taken 12/14/2024 1205 by Donna Mauricio, RN  Safety Promotion/Fall Prevention: safety round/check completed  Taken 12/14/2024 1120 by Donna Mauricio RN  Safety Promotion/Fall Prevention: safety round/check completed  Taken 12/14/2024 1020 by Donna Mauricio, RN  Safety Promotion/Fall Prevention: safety round/check completed  Taken 12/14/2024 0910 by Donna Mauricio, RN  Safety Promotion/Fall Prevention: safety round/check completed  Taken 12/14/2024 0758 by Donna Mauricio, RN  Safety Promotion/Fall Prevention: safety round/check completed  Taken 12/14/2024 0715 by Donna Mauricio, RN  Safety Promotion/Fall Prevention: safety round/check completed

## 2024-12-15 NOTE — PROGRESS NOTES
[facility]    Medicine Progress Note - Hospitalist Service    Date of Admission:  10/2/2024    Assessment & Plan     Summary of Stay: Jemal Gray is a 89 year old male with history of dementia with chronic aphasia. He was admitted on 10/2/2024 after he was brought to the ED by EMS from facility for evaluation of agitation. He had a prolonged hospitalization from 6/5/24 through 10/2/24 during which he was treated for cellulitis but mostly awaited long-term care placement due to dementia. He required appointment of guardianship and ultimately discharged to a long-term care facility on 10/2. Upon transfer to the long-term care facility he became agitated with swearing.  He was wandering around the long-term care facility, entering other patient's rooms and staff was unable to redirect him. He required transfer back to the ED and was readmitted.     In the ED he was stable, no major acute medical issue. He was given seroquel.  He required a bedside attendant as he was agitated and unable to be redirected.  He attempted to leave the emergency department and go into other patient's rooms. His facility was not able to take him back and there was no immediate safe disposition option.  He is here under intermediate care status.  Patient has improved and is no longer agitated. He remains medically stable and can be discharged whenever placement is found. Prior long term care facility is reportedly not taking patient back.  is looking into memory care units.     Patient has been here since 10/2/244 awaiting placement.  Elderly waiver appointment completed on 12/9.  [facility] has accepted the patient and patient's daughter/guardian Jose Eduardo has reserved a suite     -Patient remains intermediate care.  No change in management  -Await long-term placement, anticipating discharge to [facility] likely on 12/18 per case management/social work        Safe Disposition/[facility] Care:  -Patient  remains medically stable for discharge when location found.  SW/CM following and working on options.     -Patient had prolonged hospitalization due to need for disposition assistance in earlier 2024.  His daughter filed for guardianship paperwork which was completed in July.  Acquired MA application and long-term care placement.    -Ultimately will require 24/7 care and Memory care placement      Acute agitation, resolved  Behavior disturbances   Dementia with chronic aphasia:  Suspect triggered by changes in setting and recent move.  No physical complaints or concerns of infection.  Historically required Zyprexa over the summer when he was hospitalized for agitation.  Most recently had been prescribed Seroquel 12.5 mg twice daily as needed although he had not been needing most recently.  -Continue Seroquel PRN  -SW consulted for discharge planning  -Plan to discharge to [facility] on 12/18.     Actinic Keratosis:  -Had surgery for this and prescribed steroids cream and ketoconazole cream, restarted per daughter request previously.     Sinus Bradycardia:  -Patient was noted to have HR in 50s.  Asymptomatic.  -no telemetry monitoring needed      History of Constipation:  -laxatives PRN.  Had BM 11/4.               Diet: Combination Diet Regular Diet  Snacks/Supplements Pediatric: Ensure Enlive; Between Meals  Room Service    DVT Prophylaxis: Pneumatic Compression Devices  Maddox Catheter: Not present  Lines: None     Cardiac Monitoring: None  Code Status: No CPR- Do NOT Intubate      Clinically Significant Risk Factors Present on Admission                       # Dementia: noted on problem list                  Social Drivers of Health    Food Insecurity: Unknown (10/3/2024)    Food Insecurity     Within the past 12 months, did you worry that your food would run out before you got money to buy more?: Patient unable to answer     Within the past 12 months, did the food you bought just not last and you didn t have  money to get more?: Patient unable to answer   Housing Stability: Unknown (10/3/2024)    Housing Stability     Do you have housing? : Patient unable to answer     Are you worried about losing your housing?: Patient unable to answer   Tobacco Use: Medium Risk (6/3/2024)    Received from [facility]    Patient History     Smoking Tobacco Use: Former     Smokeless Tobacco Use: Never   Financial Resource Strain: Unknown (10/3/2024)    Financial Resource Strain     Within the past 12 months, have you or your family members you live with been unable to get utilities (heat, electricity) when it was really needed?: Patient unable to answer   Transportation Needs: Unknown (10/3/2024)    Transportation Needs     Within the past 12 months, has lack of transportation kept you from medical appointments, getting your medicines, non-medical meetings or appointments, work, or from getting things that you need?: Patient unable to answer   Interpersonal Safety: Unknown (10/4/2024)    Interpersonal Safety     Do you feel physically and emotionally safe where you currently live?: Patient declined     Within the past 12 months, have you been hit, slapped, kicked or otherwise physically hurt by someone?: Patient declined     Within the past 12 months, have you been humiliated or emotionally abused in other ways by your partner or ex-partner?: Patient declined    Received from [facility]    Social Connections          Disposition Plan     Medically Ready for Discharge: Ready Now             Merlyn Rodriguez MD, MD  Hospitalist Service  [facility]  Securely message with [employer] (more info)  Text page via [facility] Paging/Directory   ______________________________________________________________________    Interval History   Nursing notes reviewed.  Patient was seen at bedside.  Sleeping comfortably.       Physical Exam   Vital Signs:       Pulse: 77  "  Resp: 20 SpO2: 96 % O2 Device: None (Room air)    Weight: 157 lbs 6.4 oz    Gen:  NAD, awake, speech garbled.  Unable to articulate any concerns. Humming and singing. \"Thumbs up\"  Eyes:   sclera anicteric.  Neck:  Supple.  CV:   Lung:  normal effort in RA  Abd:    Skin: No rash.  Ext:  No pitting edema LE b/l.      Medical Decision Making       10 MINUTES SPENT BY ME on the date of service doing chart review, history, exam, documentation & further activities per the note.      Data         Imaging results reviewed over the past 24 hrs:   No results found for this or any previous visit (from the past 24 hours).  "

## 2024-12-15 NOTE — PLAN OF CARE
"Goal Outcome Evaluation:    Overall Patient Progress: no changeOverall Patient Progress: no change    jail care. SBA walker. Disoriented x4. Slept overnight. Plan discharge to 12/18 to [facility].           Problem: Adult Inpatient Plan of Care  Goal: Plan of Care Review  Description: The Plan of Care Review/Shift note should be completed every shift.  The Outcome Evaluation is a brief statement about your assessment that the patient is improving, declining, or no change.  This information will be displayed automatically on your shift  note.  Outcome: Progressing  Flowsheets (Taken 12/15/2024 0616)  Overall Patient Progress: no change  Goal: Patient-Specific Goal (Individualized)  Description: You can add care plan individualizations to a care plan. Examples of Individualization might be:  \"Parent requests to be called daily at 9am for status\", \"I have a hard time hearing out of my right ear\", or \"Do not touch me to wake me up as it startles  me\".  Outcome: Progressing  Goal: Absence of Hospital-Acquired Illness or Injury  Outcome: Progressing  Goal: Optimal Comfort and Wellbeing  Outcome: Progressing  Goal: Readiness for Transition of Care  Outcome: Progressing     Problem: Fall Injury Risk  Goal: Absence of Fall and Fall-Related Injury  Outcome: Progressing       "

## 2024-12-16 PROCEDURE — 101N000002 HC CUSTODIAL CARE DAILY

## 2024-12-16 PROCEDURE — 99207 PR NO BILLABLE SERVICE THIS VISIT: CPT | Performed by: INTERNAL MEDICINE

## 2024-12-16 ASSESSMENT — ACTIVITIES OF DAILY LIVING (ADL)
ADLS_ACUITY_SCORE: 62
ADLS_ACUITY_SCORE: 65
ADLS_ACUITY_SCORE: 62
ADLS_ACUITY_SCORE: 65
ADLS_ACUITY_SCORE: 65
ADLS_ACUITY_SCORE: 62
ADLS_ACUITY_SCORE: 65
ADLS_ACUITY_SCORE: 62
ADLS_ACUITY_SCORE: 65
ADLS_ACUITY_SCORE: 62
ADLS_ACUITY_SCORE: 62
ADLS_ACUITY_SCORE: 65
ADLS_ACUITY_SCORE: 65
ADLS_ACUITY_SCORE: 62
ADLS_ACUITY_SCORE: 65

## 2024-12-16 NOTE — PLAN OF CARE
Personal belongings and call light in reach. Bed alarm on and falls bundle present. For VS and assessment, please see documentation under flowsheets.     Problem: Fall Injury Risk  Goal: Absence of Fall and Fall-Related Injury  Outcome: Progressing  Intervention: Identify and Manage Contributors  Recent Flowsheet Documentation  Taken 12/16/2024 1000 by Columba Soto, RN  Medication Review/Management: medications reviewed  Intervention: Promote Injury-Free Environment  Recent Flowsheet Documentation  Taken 12/16/2024 1000 by Columba Soto, RN  Safety Promotion/Fall Prevention:   activity supervised   clutter free environment maintained   mobility aid in reach   nonskid shoes/slippers when out of bed   patient and family education   room door open   room near nurse's station   room organization consistent   safety round/check completed   supervised activity

## 2024-12-16 NOTE — PLAN OF CARE
"End of shift note: Alert, disoriented to time, place, situation. Pt up 1 assist w/ walker, refused GB. No IV access. Walked in hallway. Waiting for placement.    Goal Outcome Evaluation:      Plan of Care Reviewed With: patient    Overall Patient Progress: no changeOverall Patient Progress: no change    Outcome Evaluation: Denies pain. VSS, sating well on RA. Refused vitals.      Problem: Adult Inpatient Plan of Care  Goal: Plan of Care Review  Description: The Plan of Care Review/Shift note should be completed every shift.  The Outcome Evaluation is a brief statement about your assessment that the patient is improving, declining, or no change.  This information will be displayed automatically on your shift  note.  Outcome: Progressing  Flowsheets (Taken 12/15/2024 1813)  Outcome Evaluation: Denies pain. VSS, sating well on RA. Refused vitals  Plan of Care Reviewed With: patient  Overall Patient Progress: no change  Goal: Patient-Specific Goal (Individualized)  Description: You can add care plan individualizations to a care plan. Examples of Individualization might be:  \"Parent requests to be called daily at 9am for status\", \"I have a hard time hearing out of my right ear\", or \"Do not touch me to wake me up as it startles  me\".  Outcome: Progressing  Goal: Absence of Hospital-Acquired Illness or Injury  Outcome: Progressing  Intervention: Identify and Manage Fall Risk  Recent Flowsheet Documentation  Taken 12/15/2024 1810 by Donna Mauricio, RN  Safety Promotion/Fall Prevention: safety round/check completed  Taken 12/15/2024 1707 by Donna Mauricio, RN  Safety Promotion/Fall Prevention: safety round/check completed  Taken 12/15/2024 1550 by Donna Mauricio, RN  Safety Promotion/Fall Prevention: safety round/check completed  Taken 12/15/2024 1500 by Donna Mauricio, RN  Safety Promotion/Fall Prevention: safety round/check completed  Taken 12/15/2024 1400 by Donna Mauricio, RN  Safety Promotion/Fall Prevention: " safety round/check completed  Taken 12/15/2024 1307 by Donna Mauricio RN  Safety Promotion/Fall Prevention: safety round/check completed  Taken 12/15/2024 1202 by Donna Mauricio RN  Safety Promotion/Fall Prevention: safety round/check completed  Taken 12/15/2024 1043 by Donna Mauricio RN  Safety Promotion/Fall Prevention: safety round/check completed  Taken 12/15/2024 0936 by Donna Mauricio RN  Safety Promotion/Fall Prevention: safety round/check completed  Taken 12/15/2024 0830 by Donna Mauricio RN  Safety Promotion/Fall Prevention: safety round/check completed  Taken 12/15/2024 0727 by Donna Mauricio RN  Safety Promotion/Fall Prevention: safety round/check completed  Taken 12/15/2024 0720 by Donna Mauricio RN  Safety Promotion/Fall Prevention: safety round/check completed  Intervention: Prevent Skin Injury  Recent Flowsheet Documentation  Taken 12/15/2024 0727 by Donna Mauricio RN  Body Position: weight shifting  Intervention: Prevent and Manage VTE (Venous Thromboembolism) Risk  Recent Flowsheet Documentation  Taken 12/15/2024 0727 by Donna Mauricio RN  VTE Prevention/Management: (Ambulates frequently) SCDs off (sequential compression devices)  Intervention: Prevent Infection  Recent Flowsheet Documentation  Taken 12/15/2024 0727 by Donna Mauricio RN  Infection Prevention:   hand hygiene promoted   rest/sleep promoted  Goal: Optimal Comfort and Wellbeing  Outcome: Progressing  Goal: Readiness for Transition of Care  Outcome: Progressing     Problem: Fall Injury Risk  Goal: Absence of Fall and Fall-Related Injury  Outcome: Progressing  Intervention: Identify and Manage Contributors  Recent Flowsheet Documentation  Taken 12/15/2024 0727 by Donna Mauricio RN  Medication Review/Management: medications reviewed  Intervention: Promote Injury-Free Environment  Recent Flowsheet Documentation  Taken 12/15/2024 1810 by Donna Mauricio RN  Safety Promotion/Fall Prevention: safety  round/check completed  Taken 12/15/2024 1707 by Donna Mauricio RN  Safety Promotion/Fall Prevention: safety round/check completed  Taken 12/15/2024 1550 by Donna Mauricio RN  Safety Promotion/Fall Prevention: safety round/check completed  Taken 12/15/2024 1500 by Donna Mauricio RN  Safety Promotion/Fall Prevention: safety round/check completed  Taken 12/15/2024 1400 by Donna Mauricio RN  Safety Promotion/Fall Prevention: safety round/check completed  Taken 12/15/2024 1307 by Donna Mauricio RN  Safety Promotion/Fall Prevention: safety round/check completed  Taken 12/15/2024 1202 by Donna Mauricio RN  Safety Promotion/Fall Prevention: safety round/check completed  Taken 12/15/2024 1043 by Donna Mauricio RN  Safety Promotion/Fall Prevention: safety round/check completed  Taken 12/15/2024 0936 by Donna Mauricio RN  Safety Promotion/Fall Prevention: safety round/check completed  Taken 12/15/2024 0830 by Donna Mauricio RN  Safety Promotion/Fall Prevention: safety round/check completed  Taken 12/15/2024 0727 by Donna Mauricio RN  Safety Promotion/Fall Prevention: safety round/check completed  Taken 12/15/2024 0720 by Donna Mauricio RN  Safety Promotion/Fall Prevention: safety round/check completed

## 2024-12-16 NOTE — PROGRESS NOTES
Care Management Follow Up    Expected Discharge Date: 12/18/2024     Concerns to be Addressed: discharge planning  Patient plan of care discussed at interdisciplinary rounds: Yes    Anticipated Discharge Disposition: [place]/    Anticipated Discharge Services:  None  Anticipated Discharge DME:  Connor    Patient/family educated on Medicare website which has current facility and service quality ratings:  N/A  Education Provided on the Discharge Plan:  yes  Patient/Family in Agreement with the Plan:  yes    Referrals Placed by CM/SW:  post acute facilities  Private pay costs discussed: transportation costs    Discussed  Partnership in Safe Discharge Planning  document with patient/family: No     Handoff Completed: No, handoff not indicated or clinically appropriate    Additional Information:  Richy received a vm from Bisi at [facility] in [place], P: [phone] F: [phone], who said that they are able to admit the pt on Wednesday, 12/18/24.  They request that the pt arrive before [id number].  They request that Richy fax them the pt's discharge orders, signed med list, H&P, and discharge orders that say it is okay for the pt to admit to [facility].  Richy spoke with the pt's dtr/guardian, Cheri, who said that she will provide transport for the pt at 1300 on Wednesday, 12/18.  Richy addressed her questions and concerns.  Cheri will be at the hospital Tuesday evening to help gather the pt's belongings.  Richy does request that the pt be brought down the the main entrance on day of discharge for her to pick him up.  Richy left a vm for Laila at [facility], p: [phone] f: [phone], updating her on the pt's discharge planned for Wednesday, 12/18.    Next Steps:   Richy will continue with discharge planning and will be available as needed until discharge.      KARLA William, [facility]  Inpatient Care Coordination  [facility]  [phone]    ADDENDUM 1414:  Richy spoke with Laila who said that  everything from her end is in place for the pt's discharge.  She is going to update the pt's [facility] Financial Worker, Rukhsana P: [phone], to finalize the funding on her end.  Richy informed Richy that there are no other needs at this time.

## 2024-12-16 NOTE — PLAN OF CARE
"Goal Outcome Evaluation:    Overall Patient Progress: no changeOverall Patient Progress: no change    Outcome Evaluation: Denies pain. Refused vitals.    alf care. SBA walker. Disoriented x4. Slept overnight. Plan discharge to 12/18 to [facility].         Problem: Adult Inpatient Plan of Care  Goal: Plan of Care Review  Description: The Plan of Care Review/Shift note should be completed every shift.  The Outcome Evaluation is a brief statement about your assessment that the patient is improving, declining, or no change.  This information will be displayed automatically on your shift  note.  Outcome: Adequate for Care Transition  Flowsheets (Taken 12/16/2024 0511)  Outcome Evaluation: Denies pain. Refused vitals.  Overall Patient Progress: no change  Goal: Patient-Specific Goal (Individualized)  Description: You can add care plan individualizations to a care plan. Examples of Individualization might be:  \"Parent requests to be called daily at 9am for status\", \"I have a hard time hearing out of my right ear\", or \"Do not touch me to wake me up as it startles  me\".  Outcome: Adequate for Care Transition  Goal: Absence of Hospital-Acquired Illness or Injury  Outcome: Adequate for Care Transition  Goal: Optimal Comfort and Wellbeing  Outcome: Adequate for Care Transition  Goal: Readiness for Transition of Care  Outcome: Adequate for Care Transition     Problem: Fall Injury Risk  Goal: Absence of Fall and Fall-Related Injury  Outcome: Adequate for Care Transition         "

## 2024-12-16 NOTE — PROGRESS NOTES
[facility]    Medicine Progress Note - Hospitalist Service    Date of Admission:  10/2/2024    Assessment & Plan     Summary of Stay: Jemal Gray is a 89 year old male with history of dementia with chronic aphasia. He was admitted on 10/2/2024 after he was brought to the ED by EMS from facility for evaluation of agitation. He had a prolonged hospitalization from 6/5/24 through 10/2/24 during which he was treated for cellulitis but mostly awaited long-term care placement due to dementia. He required appointment of guardianship and ultimately discharged to a long-term care facility on 10/2. Upon transfer to the long-term care facility he became agitated with swearing.  He was wandering around the long-term care facility, entering other patient's rooms and staff was unable to redirect him. He required transfer back to the ED and was readmitted.     In the ED he was stable, no major acute medical issue. He was given seroquel.  He required a bedside attendant as he was agitated and unable to be redirected.  He attempted to leave the emergency department and go into other patient's rooms. His facility was not able to take him back and there was no immediate safe disposition option.  He is here under snf care status.  Patient has improved and is no longer agitated. He remains medically stable and can be discharged whenever placement is found. Prior long term care facility is reportedly not taking patient back.  is looking into memory care units.     Patient has been here since 10/2/244 awaiting placement.  Elderly waiver appointment completed on 12/9.  [facility] has accepted the patient and patient's daughter/guardian Jose Eduardo has reserved a suite     -Patient remains snf care.  No change in management  -Await long-term placement, anticipating discharge to [facility] likely on 12/18 per case management/social work        Safe Disposition/alf Care:  -Patient  remains medically stable for discharge when location found.  SW/CM following and working on options.     -Patient had prolonged hospitalization due to need for disposition assistance in earlier 2024.  His daughter filed for guardianship paperwork which was completed in July.  Acquired MA application and long-term care placement.    -Ultimately will require 24/7 care and Memory care placement   -Awaiting discharge     Acute agitation, resolved  Behavior disturbances   Dementia with chronic aphasia:  Suspect triggered by changes in setting and recent move.  No physical complaints or concerns of infection.  Historically required Zyprexa over the summer when he was hospitalized for agitation.  Most recently had been prescribed Seroquel 12.5 mg twice daily as needed although he had not been needing most recently.  -Continue Seroquel PRN  -SW consulted for discharge planning  -Plan to discharge to [facility] on 12/18.     Actinic Keratosis:  -Had surgery for this and prescribed steroids cream and ketoconazole cream, restarted per daughter request previously.     Sinus Bradycardia:  -Patient was noted to have HR in 50s.  Asymptomatic.  -no telemetry monitoring needed      History of Constipation:  -laxatives PRN.  Had BM 11/4.               Diet: Combination Diet Regular Diet  Snacks/Supplements Pediatric: Ensure Enlive; Between Meals  Room Service    DVT Prophylaxis: Pneumatic Compression Devices  Maddox Catheter: Not present  Lines: None     Cardiac Monitoring: None  Code Status: No CPR- Do NOT Intubate      Clinically Significant Risk Factors Present on Admission                       # Dementia: noted on problem list                  Social Drivers of Health    Food Insecurity: Unknown (10/3/2024)    Food Insecurity     Within the past 12 months, did you worry that your food would run out before you got money to buy more?: Patient unable to answer     Within the past 12 months, did the food you bought just not last  and you didn t have money to get more?: Patient unable to answer   Housing Stability: Unknown (10/3/2024)    Housing Stability     Do you have housing? : Patient unable to answer     Are you worried about losing your housing?: Patient unable to answer   Tobacco Use: Medium Risk (6/3/2024)    Received from [facility]    Patient History     Smoking Tobacco Use: Former     Smokeless Tobacco Use: Never   Financial Resource Strain: Unknown (10/3/2024)    Financial Resource Strain     Within the past 12 months, have you or your family members you live with been unable to get utilities (heat, electricity) when it was really needed?: Patient unable to answer   Transportation Needs: Unknown (10/3/2024)    Transportation Needs     Within the past 12 months, has lack of transportation kept you from medical appointments, getting your medicines, non-medical meetings or appointments, work, or from getting things that you need?: Patient unable to answer   Interpersonal Safety: Unknown (10/4/2024)    Interpersonal Safety     Do you feel physically and emotionally safe where you currently live?: Patient declined     Within the past 12 months, have you been hit, slapped, kicked or otherwise physically hurt by someone?: Patient declined     Within the past 12 months, have you been humiliated or emotionally abused in other ways by your partner or ex-partner?: Patient declined    Received from [facility]    Social Connections          Disposition Plan     Medically Ready for Discharge: Ready Now             Merlyn Rodriguez MD, MD  Hospitalist Service  [facility]  Securely message with [employer] (more info)  Text page via [employer] Paging/Directory   ______________________________________________________________________    Interval History   Nursing notes reviewed.  Patient was seen at bedside.  Sleeping comfortably. Not agitated at this time        Physical Exam   Vital Signs: Temp: 98.9  F (37.2  C) Temp src: Axillary BP: 117/59 Pulse: 89   Resp: 20 SpO2: 96 % O2 Device: None (Room air)    Weight: 157 lbs 6.4 oz    Gen:  NAD  Eyes:   sclera anicteric.  Neck:  Supple.  CV:   Lung:  normal effort in RA  Abd:    Skin: No rash.  Ext:  No pitting edema LE b/l.      Medical Decision Making       10 MINUTES SPENT BY ME on the date of service doing chart review, history, exam, documentation & further activities per the note.      Data         Imaging results reviewed over the past 24 hrs:   No results found for this or any previous visit (from the past 24 hours).

## 2024-12-17 VITALS
WEIGHT: 157.4 LBS | TEMPERATURE: 98.9 F | BODY MASS INDEX: 23.93 KG/M2 | SYSTOLIC BLOOD PRESSURE: 159 MMHG | DIASTOLIC BLOOD PRESSURE: 67 MMHG | RESPIRATION RATE: 20 BRPM | OXYGEN SATURATION: 96 % | HEART RATE: 50 BPM

## 2024-12-17 PROCEDURE — 99207 PR NO BILLABLE SERVICE THIS VISIT: CPT | Performed by: INTERNAL MEDICINE

## 2024-12-17 PROCEDURE — 101N000002 HC CUSTODIAL CARE DAILY

## 2024-12-17 ASSESSMENT — ACTIVITIES OF DAILY LIVING (ADL)
ADLS_ACUITY_SCORE: 62

## 2024-12-17 NOTE — PLAN OF CARE
Care from 19:00-07:30  Inpatient Progress Note - MS3  For vital signs and complete assessments, please see documentation flowsheets.     [facility] cares.    ORIENTATION: Alert, disoriented to time, place, situation. Calm. Ambulated a few times outside the room with walker and staff.  PAIN: No non-verbal indications of pain.  ACTIVITY: SBA w/ walker; refused GB.  DIET: Regular; room service.  LINES/DRAINS: No IV access.  SHIFT EVENT: Ambulated the hallways.  PLAN: Pending discharge possibly 12/18 to [facility]; see care coordinator note.    Goal Outcome Evaluation:

## 2024-12-17 NOTE — PROGRESS NOTES
[facility]    Medicine Progress Note - Hospitalist Service    Date of Admission:  10/2/2024    Assessment & Plan     Summary of Stay: Jemal Gray is a 89 year old male with history of dementia with chronic aphasia. He was admitted on 10/2/2024 after he was brought to the ED by EMS from facility for evaluation of agitation. He had a prolonged hospitalization from 6/5/24 through 10/2/24 during which he was treated for cellulitis but mostly awaited long-term care placement due to dementia. He required appointment of guardianship and ultimately discharged to a long-term care facility on 10/2. Upon transfer to the long-term care facility he became agitated with swearing.  He was wandering around the long-term care facility, entering other patient's rooms and staff was unable to redirect him. He required transfer back to the ED and was readmitted.     In the ED he was stable, no major acute medical issue. He was given seroquel.  He required a bedside attendant as he was agitated and unable to be redirected.  He attempted to leave the emergency department and go into other patient's rooms. His facility was not able to take him back and there was no immediate safe disposition option.  He is here under retirement care status.  Patient has improved and is no longer agitated. He remains medically stable and can be discharged whenever placement is found. Prior long term care facility is reportedly not taking patient back.  is looking into memory care units.     Patient has been here since 10/2/244 awaiting placement.  Elderly waiver appointment completed on 12/9.  [facility] has accepted the patient and patient's daughter/guardian Jose Eduardo has reserved a suite     -Patient remains retirement care.  No change in management  -Await long-term placement, anticipating discharge to [facility] likely on 12/18 per case management/social work        Safe Disposition/FDC Care:  -Patient  remains medically stable for discharge when location found.  SW/CM following and working on options.     -Patient had prolonged hospitalization due to need for disposition assistance in earlier 2024.  His daughter filed for guardianship paperwork which was completed in July.  Acquired MA application and long-term care placement.    -Ultimately will require 24/7 care and Memory care placement   -Awaiting discharge     Acute agitation, resolved  Behavior disturbances   Dementia with chronic aphasia:  Suspect triggered by changes in setting and recent move.  No physical complaints or concerns of infection.  Historically required Zyprexa over the summer when he was hospitalized for agitation.  Most recently had been prescribed Seroquel 12.5 mg twice daily as needed although he had not been needing most recently.  -Continue Seroquel PRN  -SW consulted for discharge planning  -Plan to discharge to [facility] on 12/18.     Actinic Keratosis:  -Had surgery for this and prescribed steroids cream and ketoconazole cream, restarted per daughter request previously.     Sinus Bradycardia:  -Patient was noted to have HR in 50s.  Asymptomatic.  -no telemetry monitoring needed      History of Constipation:  -laxatives PRN.  Had BM 11/4.       Diet: Combination Diet Regular Diet  Snacks/Supplements Pediatric: Ensure Enlive; Between Meals  Room Service    DVT Prophylaxis: Pneumatic Compression Devices  Maddox Catheter: Not present  Lines: None     Cardiac Monitoring: None  Code Status: No CPR- Do NOT Intubate      Clinically Significant Risk Factors Present on Admission                       # Dementia: noted on problem list                  Social Drivers of Health    Food Insecurity: Unknown (10/3/2024)    Food Insecurity     Within the past 12 months, did you worry that your food would run out before you got money to buy more?: Patient unable to answer     Within the past 12 months, did the food you bought just not last and you  didn t have money to get more?: Patient unable to answer   Housing Stability: Unknown (10/3/2024)    Housing Stability     Do you have housing? : Patient unable to answer     Are you worried about losing your housing?: Patient unable to answer   Tobacco Use: Medium Risk (6/3/2024)    Received from [facility]    Patient History     Smoking Tobacco Use: Former     Smokeless Tobacco Use: Never   Financial Resource Strain: Unknown (10/3/2024)    Financial Resource Strain     Within the past 12 months, have you or your family members you live with been unable to get utilities (heat, electricity) when it was really needed?: Patient unable to answer   Transportation Needs: Unknown (10/3/2024)    Transportation Needs     Within the past 12 months, has lack of transportation kept you from medical appointments, getting your medicines, non-medical meetings or appointments, work, or from getting things that you need?: Patient unable to answer   Interpersonal Safety: Unknown (10/4/2024)    Interpersonal Safety     Do you feel physically and emotionally safe where you currently live?: Patient declined     Within the past 12 months, have you been hit, slapped, kicked or otherwise physically hurt by someone?: Patient declined     Within the past 12 months, have you been humiliated or emotionally abused in other ways by your partner or ex-partner?: Patient declined    Received from [facility]    Social Connections          Disposition Plan     Medically Ready for Discharge: Ready Now             Merlyn Rodriguez MD, MD  Hospitalist Service  [facility]  Securely message with [employer] (more info)  Text page via [employer] Paging/Directory   ______________________________________________________________________    Interval History   Nursing notes reviewed.  Patient was seen at bedside.  Sleeping comfortably. Not agitated at this time        Physical Exam   Vital Signs:     BP: (!) 159/67 Pulse: 50   Resp: 20 SpO2: 96 % O2 Device: None (Room air)    Weight: 157 lbs 6.4 oz    Gen:  NAD  Eyes:   sclera anicteric.  Neck:  Supple.  CV:   Lung:  normal effort in RA  Abd:    Skin: No rash.  Ext:  No pitting edema LE b/l.      Medical Decision Making       10 MINUTES SPENT BY ME on the date of service doing chart review, history, exam, documentation & further activities per the note.      Data         Imaging results reviewed over the past 24 hrs:   No results found for this or any previous visit (from the past 24 hours).

## 2024-12-18 PROCEDURE — 99207 PR NO BILLABLE SERVICE THIS VISIT: CPT | Performed by: INTERNAL MEDICINE

## 2024-12-18 RX ORDER — BETAMETHASONE DIPROPIONATE 0.05 %
OINTMENT (GRAM) TOPICAL 2 TIMES DAILY
COMMUNITY
Start: 2024-12-18

## 2024-12-18 RX ORDER — QUETIAPINE FUMARATE 25 MG/1
12.5 TABLET, FILM COATED ORAL 2 TIMES DAILY PRN
Qty: 10 TABLET | Refills: 0 | Status: SHIPPED | OUTPATIENT
Start: 2024-12-18

## 2024-12-18 RX ORDER — VITAMIN B COMPLEX
50 TABLET ORAL 2 TIMES DAILY
Qty: 30 TABLET | Refills: 0 | Status: SHIPPED | OUTPATIENT
Start: 2024-12-18

## 2024-12-18 ASSESSMENT — ACTIVITIES OF DAILY LIVING (ADL)
ADLS_ACUITY_SCORE: 62

## 2024-12-18 NOTE — PROGRESS NOTES
Care Management Discharge Note    Discharge Date: 12/18/2024       Discharge Disposition:  senior care/    Discharge Services:  None    Discharge DME:  Walker    Discharge Transportation:  Dtr    Private pay costs discussed: transportation costs    Does the patient's insurance plan have a 3 day qualifying hospital stay waiver?  No    PAS Confirmation Code:  N/A  Patient/family educated on Medicare website which has current facility and service quality ratings:  N/A    Education Provided on the Discharge Plan:  yes  Persons Notified of Discharge Plans: Pt's Bisi samuels at [facility]  Patient/Family in Agreement with the Plan:  yes    Handoff Referral Completed: No, handoff not indicated or clinically appropriate    Additional Information:  [facility]  His dtr Cheri will pick him up around 1300.  Richy faxed the pt's discharge orders to Bisi at [facility] and confirmed the pt's discharge with her P: [phone] F: [phone].  She asked that a 3 day supply of the pt's meds be filled at the hospital and sent with the pt.  She also asked Sw to fax the pt's discharge orders to [facility] for refills of the pt's medications.      Sw will continue to be available as needed until discharge.  Richy faxed all of the pt's discharge orders.    KARLA William, [facility]  Inpatient Care Coordination  [facility]  [phone]

## 2024-12-18 NOTE — PLAN OF CARE
Care from 19:00-07:30  Inpatient Progress Note - MS3  For vital signs and complete assessments, please see documentation flowsheets.      correction cares.     ORIENTATION: Alert, disoriented to time, place, situation. Calm. Ambulated a few times outside the room with walker and staff.  PAIN: No non-verbal indications of pain.  ACTIVITY: SBA w/ walker; refused GB.  DIET: Regular; room service.  LINES/DRAINS: No IV access.  SHIFT EVENT: Ambulated the hallways.  PLAN: Pending discharge today 12/18 to [facility]; see care coordinator note.    Goal Outcome Evaluation:

## 2024-12-18 NOTE — PLAN OF CARE
Goal Outcome Evaluation: Discharged to Memory care via private car. All belongings with patient. Medications as well.

## 2024-12-18 NOTE — DISCHARGE SUMMARY
[facility]    Discharge Summary  Hospitalist    Date of Admission:  10/2/2024  Date of Discharge:  12/18/2024  Discharging Provider: Merlyn Rodriguez MD, MD  Date of Service (when I saw the patient): 12/18/24    Discharge Diagnoses      Safe Disposition/intermediate Care:     Acute agitation, resolved  Behavior disturbances   Dementia with chronic aphasia:     Actinic Keratosis:    Sinus Bradycardia:    History of Constipation:    History of Present Illness   Jemal Gary is an 89 year old male who presented with acute agitation. Please see hospital course for details.    Hospital Course   Summary of Stay: Jemal Gray is a 89 year old male with history of dementia with chronic aphasia. He was admitted on 10/2/2024 after he was brought to the ED by EMS from facility for evaluation of agitation. He had a prolonged hospitalization from 6/5/24 through 10/2/24 during which he was treated for cellulitis but mostly awaited long-term care placement due to dementia. He required appointment of guardianship and ultimately discharged to a long-term care facility on 10/2. Upon transfer to the long-term care facility he became agitated with swearing.  He was wandering around the long-term care facility, entering other patient's rooms and staff was unable to redirect him. He required transfer back to the ED and was readmitted.     In the ED he was stable, no major acute medical issue. He was given seroquel.  He required a bedside attendant as he was agitated and unable to be redirected.  He attempted to leave the emergency department and go into other patient's rooms. His facility was not able to take him back and there was no immediate safe disposition option.  He is here under longterm care status.  Patient has improved and is no longer agitated. He remains medically stable and can be discharged whenever placement is found. Prior long term care facility is reportedly not taking patient back.   is looking into memory care units.     Patient has been here since 10/2/244 awaiting placement. Elderly waiver appointment completed on 12/9.  [facility] has accepted the patient and patient's daughter/guardian Jose Eduardo has reserved a suite     -Patient remains group home care. No change in management  -Await long-term placement, anticipating discharge to [facility] likely on 12/18 per case management/social work     Safe Disposition/correction Care:  -Patient remains medically stable for discharge when location found.  SW/CM following and working on options.     -Patient had prolonged hospitalization due to need for disposition assistance in earlier 2024.  His daughter filed for guardianship paperwork which was completed in July.  Acquired MA application and long-term care placement.    -Ultimately will require 24/7 care and Memory care placement   -Patient discharged.  Patient discharged to [facility].     Acute agitation, resolved  Behavior disturbances   Dementia with chronic aphasia:  Suspect triggered by changes in setting and recent move.  No physical complaints or concerns of infection.  Historically required Zyprexa over the summer when he was hospitalized for agitation.  Most recently had been prescribed Seroquel 12.5 mg twice daily as needed although he had not been needing most recently.  -Continue Seroquel PRN  -SW consulted for discharge planning  -Plan to discharge to [facility] on 12/18.     Actinic Keratosis:  -Had surgery for this and prescribed steroids cream and ketoconazole cream, restarted per daughter request previously.     Sinus Bradycardia:  -Patient was noted to have HR in 50s.  Asymptomatic.  -no telemetry monitoring needed      History of Constipation:  -laxatives PRN.  Had BM 11/4.    Significant Results and Procedures   No results found for this or any previous visit.    Pending Results   None  Code Status   Full Code       Primary Care Physician   [facility]  [facility]      Discharge Disposition   Discharged to home  Condition at discharge: Stable    Consultations This Hospital Stay   CARE MANAGEMENT / SOCIAL WORK IP CONSULT  PHARMACY IP CONSULT  CARE MANAGEMENT / SOCIAL WORK IP CONSULT  CARE MANAGEMENT / SOCIAL WORK IP CONSULT  PHARMACY IP CONSULT    Time Spent on this Encounter   Merlyn ARORA MD, MD, personally saw the patient today and spent greater than 30 minutes discharging this patient.    Discharge Orders      Reason for your hospital stay    Behavioral disturbance     Follow-up and recommended labs and tests     Follow up with primary care provider, [facility], within 7 days     Activity    Your activity upon discharge: activity as tolerated     Walker Order for DME - ONLY FOR DME     Diet    Follow this diet upon discharge: Current Diet:Orders Placed This Encounter      Snacks/Supplements Pediatric: Ensure Enlive; Between Meals      Room Service      Combination Diet Regular Diet     Discharge Medications         Allergies   No Known Allergies  Data   Most Recent 3 CBC's:  Recent Labs   Lab Test 10/20/24  0558   WBC 9.7   HGB 12.2*   MCV 96         Most Recent 3 BMP's:  Recent Labs   Lab Test 10/20/24  0558 10/03/24  0130 07/26/24  0741    138  --    POTASSIUM 3.9 3.9  --    CHLORIDE 105 103  --    CO2 24 23  --    BUN 23.6* 17.4  --    CR 0.94 0.89  --    ANIONGAP 12 12  --    LEON 8.5* 8.7*  --    * 90 98     Most Recent 2 LFT's:No lab results found.  Most Recent INR's and Anticoagulation Dosing History:  Anticoagulation Dose History           No data to display              Most Recent 3 Troponin's:No lab results found.  Most Recent Cholesterol Panel:No lab results found.  Most Recent 6 Bacteria Isolates From Any Culture (See EPIC Reports for Culture Details):No lab results found.  Most Recent TSH, T4 and A1c Labs:No lab results found.

## 2025-01-21 ENCOUNTER — DOCUMENTATION ONLY (OUTPATIENT)
Dept: GERIATRICS | Facility: CLINIC | Age: 89
End: 2025-01-21
Payer: COMMERCIAL

## 2025-01-23 ENCOUNTER — ASSISTED LIVING VISIT (OUTPATIENT)
Dept: GERIATRICS | Facility: CLINIC | Age: 89
End: 2025-01-23
Payer: COMMERCIAL

## 2025-01-23 VITALS
HEIGHT: 68 IN | DIASTOLIC BLOOD PRESSURE: 82 MMHG | SYSTOLIC BLOOD PRESSURE: 162 MMHG | RESPIRATION RATE: 18 BRPM | BODY MASS INDEX: 26.55 KG/M2 | HEART RATE: 52 BPM | WEIGHT: 175.2 LBS | OXYGEN SATURATION: 92 % | TEMPERATURE: 97.5 F

## 2025-01-23 DIAGNOSIS — L57.0 ACTINIC KERATOSIS: ICD-10-CM

## 2025-01-23 DIAGNOSIS — F43.0 AGITATION STATES AS ACUTE REACTION TO EXCEPTIONAL (GROSS) STRESS: Primary | ICD-10-CM

## 2025-01-23 DIAGNOSIS — F02.818 DEMENTIA DUE TO MEDICAL CONDITION WITH BEHAVIORAL DISTURBANCE (H): ICD-10-CM

## 2025-01-23 DIAGNOSIS — G30.1 MODERATE LATE ONSET ALZHEIMER'S DEMENTIA WITH AGITATION (H): Primary | ICD-10-CM

## 2025-01-23 DIAGNOSIS — R60.0 BILATERAL LOWER EXTREMITY EDEMA: ICD-10-CM

## 2025-01-23 DIAGNOSIS — F03.90 DEMENTIA (H): ICD-10-CM

## 2025-01-23 DIAGNOSIS — R00.1 SINUS BRADYCARDIA: ICD-10-CM

## 2025-01-23 DIAGNOSIS — Z87.19 HISTORY OF CONSTIPATION: ICD-10-CM

## 2025-01-23 DIAGNOSIS — R47.01 APHASIA: ICD-10-CM

## 2025-01-23 DIAGNOSIS — F02.B11 MODERATE LATE ONSET ALZHEIMER'S DEMENTIA WITH AGITATION (H): Primary | ICD-10-CM

## 2025-01-23 PROCEDURE — 99344 HOME/RES VST NEW MOD MDM 60: CPT | Performed by: NURSE PRACTITIONER

## 2025-01-23 NOTE — PROGRESS NOTES
[facility] GERIATRICS    PRIMARY CARE PROVIDER AND CLINIC:  LANI John CNP, [address] [facility] [address]  Chief Complaint   Patient presents with    Hospital F/U      [facility] Medical Record Number:  [id number]  Place of Service where encounter took place:  [facility] ([facility]) [[id number]]    Jemal Gray  is a 89 year old  (1935), admitted to the above facility from  [facility]. Hospital stay 10/2/24 through 12/18/24..   HPI:        CODE STATUS/ADVANCE DIRECTIVES DISCUSSION:  Prior  CPR/Full code   ALLERGIES: No Known Allergies   PAST MEDICAL HISTORY: No past medical history on file.   PAST SURGICAL HISTORY:   has a past surgical history that includes joint replacement (Bilateral hip replacement); Tonsillectomy; and [employer] Line Insertion [employer] He (4/6/2015).  FAMILY HISTORY: family history includes Heart Disease in his father; Hypertension in his father.  SOCIAL HISTORY:   reports that he quit smoking about 43 years ago. He does not have any smokeless tobacco history on file. He reports that he does not drink alcohol and does not use drugs.  Patient's living condition: lives in an assisted living facility    Post Discharge Medication Reconciliation Status:   MED REC REQUIRED{TIP  Click the link below to document or use med rec list, use list to pull in response :544616}  Post Medication Reconciliation Status: {MED REC LIST:636400}       Current Outpatient Medications   Medication Sig Dispense Refill    acetaminophen (TYLENOL) 325 MG tablet Take 3 tablets (975 mg) by mouth every 6 hours as needed for mild pain.      ascorbic acid (VITAMIN C) 1000 MG tablet [ASCORBIC ACID (VITAMIN C) 1000 MG TABLET] Take 1,000 mg by mouth 2 (two) times a day.       betamethasone dipropionate (DIPROSONE) 0.05 % external ointment Apply topically 2 times daily.      ketoconazole (NIZORAL) 2 % external shampoo Apply topically Every Mon, Wed, Fri Morning.       "melatonin 5 MG tablet Take 1 tablet (5 mg) by mouth nightly as needed for sleep.      polyethylene glycol (MIRALAX) 17 g packet Take 17 g by mouth daily as needed for constipation.      QUEtiapine (SEROQUEL) 25 MG tablet Take 0.5 tablets (12.5 mg) by mouth 2 times daily as needed (agitation, anxiety, sleep). 10 tablet 0    senna-docusate (SENOKOT-S/PERICOLACE) 8.6-50 MG tablet Take 1 tablet by mouth at bedtime.      Vitamin D3 (CHOLECALCIFEROL) 25 mcg (1000 units) tablet Take 2 tablets (50 mcg) by mouth 2 times daily. 30 tablet 0     No current facility-administered medications for this visit.       ROS:  {ROS FGS:110587}    Vitals:  BP (!) 162/82   Pulse 52   Temp 97.5  F (36.4  C)   Resp 18   Ht 1.727 m (5' 8\")   Wt 79.5 kg (175 lb 3.2 oz)   SpO2 92%   BMI 26.64 kg/m    Exam:  {Nursing home physical exam :489659}    Lab/Diagnostic data:  {fgslab:419166}    ASSESSMENT/PLAN:    {FGS DX2:571724}    Orders:  {fgsorders:154751}  ***    Electronically signed by:  Estefanía Hwang ***               " "daily as needed (agitation, anxiety, sleep). 10 tablet 0    senna-docusate (SENOKOT-S/PERICOLACE) 8.6-50 MG tablet Take 1 tablet by mouth at bedtime.      Vitamin D3 (CHOLECALCIFEROL) 25 mcg (1000 units) tablet Take 2 tablets (50 mcg) by mouth 2 times daily. 30 tablet 0     No current facility-administered medications for this visit.       ROS:  Limited secondary to cognitive impairment but today pt reports as above    Vitals:  BP (!) 162/82   Pulse 52   Temp 97.5  F (36.4  C)   Resp 18   Ht 1.727 m (5' 8\")   Wt 79.5 kg (175 lb 3.2 oz)   SpO2 92%   BMI 26.64 kg/m    Exam:    GENERAL APPEARANCE: Alert, in no distress   ENT: Mouth and posterior oropharynx normal, moist mucous membranes Very [place]  EYES: EOM, conjunctivae, lids, pupils and irises normal   NECK: No adenopathy,masses or thyromegaly   RESP: respiratory effort and palpation of chest normal, Lungs clear to auscultation  CV: RRR, VS reviewed, 1+ bilateral LE edema noted  ABDOMEN: normal bowel sounds, soft, nontender, no hepatosplenomegaly or other masses   : palpation of bladder WNL   M/S: Gait and station normal   Digits and nails normal - seated, station WNL  SKIN: Inspection of skin and subcutaneous tissue baseline, Palpation of skin and subcutaneous tissue baseline  NEURO: Cranial nerves 2-12 are grossly at patient's baseline, Examination of sensation by touch normal   PSYCH: oriented X self, general immediate situation, affect and mood normal           Lab/Diagnostic data:  Most Recent 3 CBC's:  Recent Labs   Lab Test 10/20/24  0558   WBC 9.7   HGB 12.2*   MCV 96        Most Recent 3 BMP's:  Recent Labs   Lab Test 10/20/24  0558 10/03/24  0130 07/26/24  0741    138  --    POTASSIUM 3.9 3.9  --    CHLORIDE 105 103  --    CO2 24 23  --    BUN 23.6* 17.4  --    CR 0.94 0.89  --    ANIONGAP 12 12  --    LEON 8.5* 8.7*  --    * 90 98     Others reviewed in Care Everywhere (LFT, A1c, TSH, Lipid panel)    ASSESSMENT/PLAN:     "   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 Extended halfway hospitalization while awaiting placement. Episode of severe agitation with wandering and swearing with care transition in October 2024. Treated with Seroquel and improved. Remains on prn Seroquel but has not required use in last several weeks. Mood/behaviors stable since admit to this facility 12/18.  - continue on prn Seroquel for now 2/2 recent care transition  - continue prn melatonin  - continue close monitoring of mood and behaviors, sleep pattern  -  continue supportive cares and services in shelter, anticipate needs, cue for safety    Sinus bradycardia  Chronic, no associated s/s. HR 50-70s.   - monitor VS, avoid beta blockers    Bilateral lower extremity edema  Chronic, wax/wane.   - agree with compression stockings during the day  - monitor clinically, VS, weights    Actinic keratosis  Chronic,   - continue on scheduled betamethasone, ketoconazole    History of constipation  Noted. Reportedly moving bowels without issue  - continue scheduled low dose Senna S, and prn miralax  - monitor bowel pattern    Discussed plan of care with nursing.    Electronically signed by:  LANI John CNP

## 2025-01-29 VITALS
RESPIRATION RATE: 17 BRPM | OXYGEN SATURATION: 92 % | TEMPERATURE: 98 F | SYSTOLIC BLOOD PRESSURE: 150 MMHG | HEART RATE: 96 BPM | DIASTOLIC BLOOD PRESSURE: 76 MMHG | BODY MASS INDEX: 26.55 KG/M2 | HEIGHT: 68 IN | WEIGHT: 175.2 LBS

## 2025-01-30 ENCOUNTER — ASSISTED LIVING VISIT (OUTPATIENT)
Dept: GERIATRICS | Facility: CLINIC | Age: 89
End: 2025-01-30
Payer: COMMERCIAL

## 2025-01-30 DIAGNOSIS — F02.B11 MODERATE LATE ONSET ALZHEIMER'S DEMENTIA WITH AGITATION (H): Primary | ICD-10-CM

## 2025-01-30 DIAGNOSIS — R00.1 SINUS BRADYCARDIA: ICD-10-CM

## 2025-01-30 DIAGNOSIS — E56.9 VITAMIN DEFICIENCY: ICD-10-CM

## 2025-01-30 DIAGNOSIS — G30.1 MODERATE LATE ONSET ALZHEIMER'S DEMENTIA WITH AGITATION (H): Primary | ICD-10-CM

## 2025-01-30 DIAGNOSIS — R60.0 BILATERAL LOWER EXTREMITY EDEMA: ICD-10-CM

## 2025-01-30 DIAGNOSIS — L57.0 ACTINIC KERATOSIS: ICD-10-CM

## 2025-01-30 PROCEDURE — 99349 HOME/RES VST EST MOD MDM 40: CPT | Performed by: INTERNAL MEDICINE

## 2025-01-30 NOTE — LETTER
1/30/2025      Jemal Gray  [address]  [place] [address]        No notes on file      Sincerely,        Ara Chavis MD    Electronically signed

## 2025-02-02 PROBLEM — F03.90 DEMENTIA, UNSPECIFIED DEMENTIA SEVERITY, UNSPECIFIED DEMENTIA TYPE, UNSPECIFIED WHETHER BEHAVIORAL, PSYCHOTIC, OR MOOD DISTURBANCE OR ANXIETY (H): Status: RESOLVED | Noted: 2024-06-05 | Resolved: 2025-02-02

## 2025-02-20 ENCOUNTER — ASSISTED LIVING VISIT (OUTPATIENT)
Dept: GERIATRICS | Facility: CLINIC | Age: 89
End: 2025-02-20
Payer: COMMERCIAL

## 2025-02-20 VITALS
HEART RATE: 53 BPM | HEIGHT: 68 IN | DIASTOLIC BLOOD PRESSURE: 70 MMHG | SYSTOLIC BLOOD PRESSURE: 132 MMHG | TEMPERATURE: 97.9 F | RESPIRATION RATE: 18 BRPM | OXYGEN SATURATION: 97 % | BODY MASS INDEX: 24.74 KG/M2 | WEIGHT: 163.2 LBS

## 2025-02-20 DIAGNOSIS — L98.9 SKIN LESION: Primary | ICD-10-CM

## 2025-02-20 PROCEDURE — 99348 HOME/RES VST EST LOW MDM 30: CPT | Performed by: NURSE PRACTITIONER

## 2025-02-20 NOTE — PROGRESS NOTES
"[facility] GERIATRICS    Chief Complaint   Patient presents with    Nursing Home Acute     HPI:  Jemal Gray is a 89 year old  (1935), who is being seen today for an episodic care visit at: [facility] ([facility]) [[id number]]. Today's concern is: ***    Allergies, and PMH/PSH reviewed in [facility] today.  REVIEW OF SYSTEMS:  {jjztvt06:389684}    Objective:   /70   Pulse 53   Temp 97.9  F (36.6  C)   Resp 18   Ht 1.727 m (5' 8\")   Wt 74 kg (163 lb 3.2 oz)   SpO2 97%   BMI 24.81 kg/m    {Nursing home physical exam :992989}    {fgslab:142581}    Assessment/Plan:  {FGS DX2:107321}    MED REC REQUIRED{TIP  Click the link below to document or use med rec list, use list to pull in response :423612}  Post Medication Reconciliation Status: {MED REC LIST:624766}      Orders:  {fgsorders:111524}  ***    Electronically signed by: Estefanía Hwang ***     "

## 2025-02-20 NOTE — LETTER
2/20/2025      Jemal Gray  [address] [place] [id number]        No notes on file      Sincerely,        LANI John CNP    Electronically signed
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
## 2025-03-12 ENCOUNTER — TRANSFERRED RECORDS (OUTPATIENT)
Dept: HEALTH INFORMATION MANAGEMENT | Facility: CLINIC | Age: 89
End: 2025-03-12
Payer: COMMERCIAL

## 2025-03-20 ENCOUNTER — ASSISTED LIVING VISIT (OUTPATIENT)
Dept: GERIATRICS | Facility: CLINIC | Age: 89
End: 2025-03-20
Payer: COMMERCIAL

## 2025-03-20 VITALS
DIASTOLIC BLOOD PRESSURE: 84 MMHG | SYSTOLIC BLOOD PRESSURE: 156 MMHG | BODY MASS INDEX: 25.31 KG/M2 | HEART RATE: 57 BPM | WEIGHT: 167 LBS | RESPIRATION RATE: 17 BRPM | TEMPERATURE: 91.8 F | HEIGHT: 68 IN

## 2025-03-20 DIAGNOSIS — M25.512 CHRONIC LEFT SHOULDER PAIN: Primary | ICD-10-CM

## 2025-03-20 DIAGNOSIS — G89.29 CHRONIC LEFT SHOULDER PAIN: Primary | ICD-10-CM

## 2025-03-20 DIAGNOSIS — M75.102 TEAR OF LEFT ROTATOR CUFF, UNSPECIFIED TEAR EXTENT, UNSPECIFIED WHETHER TRAUMATIC: ICD-10-CM

## 2025-03-20 DIAGNOSIS — R52 PAIN: ICD-10-CM

## 2025-03-20 PROBLEM — I49.3 ASYMPTOMATIC PVCS: Status: ACTIVE | Noted: 2019-11-07

## 2025-03-20 PROBLEM — K59.01 SLOW TRANSIT CONSTIPATION: Status: ACTIVE | Noted: 2024-10-02

## 2025-03-20 PROBLEM — G47.00 INSOMNIA: Status: ACTIVE | Noted: 2024-10-02

## 2025-03-20 PROBLEM — L21.9 SEBORRHEIC DERMATITIS OF SCALP: Status: ACTIVE | Noted: 2024-10-02

## 2025-03-20 PROBLEM — H90.3 SENSORINEURAL HEARING LOSS (SNHL) OF BOTH EARS: Status: ACTIVE | Noted: 2018-12-19

## 2025-03-20 PROBLEM — H26.491 POSTERIOR CAPSULAR OPACIFICATION OF RIGHT EYE, OBSCURING VISION: Chronic | Status: ACTIVE | Noted: 2017-12-18

## 2025-03-20 PROBLEM — C44.90 MALIGNANT NEOPLASM OF SKIN: Status: ACTIVE | Noted: 2020-09-23

## 2025-03-20 PROBLEM — D64.9 ANEMIA: Status: ACTIVE | Noted: 2023-08-18

## 2025-03-20 PROCEDURE — 99348 HOME/RES VST EST LOW MDM 30: CPT | Performed by: NURSE PRACTITIONER

## 2025-03-20 NOTE — PROGRESS NOTES
"[facility] GERIATRICS    Chief Complaint   Patient presents with    Nursing Home Acute     HPI:  Jemal Gray is a 89 year old  (1935), who is being seen today for an episodic care visit at: [facility] ([facility]) [[id number]]. Today's concern is: ***    Allergies, and PMH/PSH reviewed in [facility] today.  REVIEW OF SYSTEMS:  {xyfmgg54:218323}    Objective:   BP (!) 156/84   Pulse 57   Temp (!) 91.8  F (33.2  C)   Resp 17   Ht 1.727 m (5' 8\")   Wt 75.8 kg (167 lb)   BMI 25.39 kg/m    {Nursing home physical exam :502589}    {fgslab:075305}    Assessment/Plan:  {FGS DX2:148325}    MED REC REQUIRED{TIP  Click the link below to document or use med rec list, use list to pull in response :340245}  Post Medication Reconciliation Status: {MED REC LIST:661773}      Orders:  {fgsorders:205132}  ***    Electronically signed by: Sandi Van CMA ***    "

## 2025-03-20 NOTE — LETTER
3/20/2025      Jemal Gray  [address] [place] [id number]        No notes on file      Sincerely,        LANI John CNP    Electronically signed   signed

## 2025-03-26 RX ORDER — ACETAMINOPHEN 325 MG/1
TABLET ORAL
Status: SHIPPED
Start: 2025-03-26

## 2025-05-22 ENCOUNTER — ASSISTED LIVING VISIT (OUTPATIENT)
Dept: GERIATRICS | Facility: CLINIC | Age: 89
End: 2025-05-22
Payer: COMMERCIAL

## 2025-05-22 VITALS
SYSTOLIC BLOOD PRESSURE: 107 MMHG | WEIGHT: 180.8 LBS | HEART RATE: 53 BPM | HEIGHT: 68 IN | BODY MASS INDEX: 27.4 KG/M2 | TEMPERATURE: 98.2 F | RESPIRATION RATE: 16 BRPM | DIASTOLIC BLOOD PRESSURE: 55 MMHG | OXYGEN SATURATION: 94 %

## 2025-05-22 DIAGNOSIS — F02.B11 MODERATE LATE ONSET ALZHEIMER'S DEMENTIA WITH AGITATION (H): Primary | ICD-10-CM

## 2025-05-22 DIAGNOSIS — F03.90 DEMENTIA, UNSPECIFIED DEMENTIA SEVERITY, UNSPECIFIED DEMENTIA TYPE, UNSPECIFIED WHETHER BEHAVIORAL, PSYCHOTIC, OR MOOD DISTURBANCE OR ANXIETY (H): ICD-10-CM

## 2025-05-22 DIAGNOSIS — G30.1 MODERATE LATE ONSET ALZHEIMER'S DEMENTIA WITH AGITATION (H): Primary | ICD-10-CM

## 2025-05-22 DIAGNOSIS — R60.0 BILATERAL LOWER EXTREMITY EDEMA: ICD-10-CM

## 2025-05-22 PROCEDURE — 99349 HOME/RES VST EST MOD MDM 40: CPT | Performed by: NURSE PRACTITIONER

## 2025-05-22 NOTE — LETTER
5/22/2025      Jemal Gray  [address] [address] [address] [place] [phone]        No notes on file      Sincerely,        LANI John CNP    Electronically signed   stable.   - order to increase lasix from 10 mg to 20 mg 3 x week  - order to attempt to get BMP in a couple of weeks to ensure stability after medication change (patient sometimes refuses lab draws)  - continue to attempt to apply compression socks daily, encourage elevation periodically during day        Orders:  Written in facility and discussed with nursing    Electronically signed by: LANI John CNP          Sincerely,        LANI John CNP    Electronically signed

## 2025-05-22 NOTE — PROGRESS NOTES
"[facility] GERIATRICS    Chief Complaint   Patient presents with    Nursing Home Acute     HPI:  Jemal Gray is a 89 year old  (1935), who is being seen today for an episodic care visit at: [facility] ([facility]) [[id number]]. Today's concern is: ***    Allergies, and PMH/PSH reviewed in [facility] today.  REVIEW OF SYSTEMS:  {evvaim67:854664}    Objective:   /55   Pulse 53   Temp 98.2  F (36.8  C)   Resp 16   Ht 1.727 m (5' 8\")   Wt 82 kg (180 lb 12.8 oz)   SpO2 94%   BMI 27.49 kg/m    {Nursing home physical exam :513868}    {fgslab:124076}    Assessment/Plan:  {FGS DX2:701034}    MED REC REQUIRED{TIP  Click the link below to document or use med rec list, use list to pull in response :953844}  Post Medication Reconciliation Status: {MED REC LIST:532764}      Orders:  {fgsorders:759790}  ***    Electronically signed by: Estefanía Hwang ***     " in a couple of weeks to ensure stability after medication change (patient sometimes refuses lab draws)  - continue to attempt to apply compression socks daily, encourage elevation periodically during day        Orders:  Written in facility and discussed with nursing    Electronically signed by: LANI John CNP

## 2025-05-28 RX ORDER — FUROSEMIDE 20 MG/1
20 TABLET ORAL
Qty: 12 TABLET | Refills: 5 | Status: SHIPPED | OUTPATIENT
Start: 2025-05-28

## 2025-05-28 RX ORDER — QUETIAPINE FUMARATE 25 MG/1
TABLET, FILM COATED ORAL
Qty: 15 TABLET | Refills: 5 | Status: SHIPPED | OUTPATIENT
Start: 2025-05-28

## 2025-07-24 ENCOUNTER — ASSISTED LIVING VISIT (OUTPATIENT)
Dept: GERIATRICS | Facility: CLINIC | Age: 89
End: 2025-07-24
Payer: COMMERCIAL

## 2025-07-24 VITALS
TEMPERATURE: 98.5 F | HEIGHT: 68 IN | WEIGHT: 179.8 LBS | BODY MASS INDEX: 27.25 KG/M2 | DIASTOLIC BLOOD PRESSURE: 89 MMHG | RESPIRATION RATE: 16 BRPM | HEART RATE: 49 BPM | SYSTOLIC BLOOD PRESSURE: 150 MMHG | OXYGEN SATURATION: 98 %

## 2025-07-24 DIAGNOSIS — R58 BLEEDING: Primary | ICD-10-CM

## 2025-07-24 PROCEDURE — 99348 HOME/RES VST EST LOW MDM 30: CPT | Performed by: NURSE PRACTITIONER

## 2025-07-24 NOTE — LETTER
7/24/2025      Jemal Gray  [address] [facility] [address] [place] [phone]        No notes on file      Sincerely,        LANI John CNP    Electronically signed   CNP    Electronically signed

## 2025-07-24 NOTE — PROGRESS NOTES
"[facility] GERIATRICS    Chief Complaint   Patient presents with    Nursing Home Acute     HPI:  Jemal Gray is a 89 year old  (1935), who is being seen today for an episodic care visit at: [facility] ([facility]) [[id number]]. Today's concern is: ***    Allergies, and PMH/PSH reviewed in [facility] today.  REVIEW OF SYSTEMS:  {bftfhq21:455052}    Objective:   BP (!) 150/89   Pulse (!) 49   Temp 98.5  F (36.9  C)   Resp 16   Ht 1.727 m (5' 8\")   Wt 81.6 kg (179 lb 12.8 oz)   SpO2 98%   BMI 27.34 kg/m    {Nursing home physical exam :961785}    {fgslab:751226}    Assessment/Plan:  {FGS DX2:830562}    MED REC REQUIRED{TIP  Click the link below to document or use med rec list, use list to pull in response :448257}  Post Medication Reconciliation Status: {MED REC LIST:786183}      Orders:  {fgsorders:405079}  ***    Electronically signed by: Estefanía Hwang ***     "